# Patient Record
Sex: FEMALE | Race: WHITE | NOT HISPANIC OR LATINO | URBAN - METROPOLITAN AREA
[De-identification: names, ages, dates, MRNs, and addresses within clinical notes are randomized per-mention and may not be internally consistent; named-entity substitution may affect disease eponyms.]

---

## 2017-09-17 ENCOUNTER — INPATIENT (INPATIENT)
Facility: HOSPITAL | Age: 77
LOS: 2 days | Discharge: HOME | End: 2017-09-20
Attending: INTERNAL MEDICINE | Admitting: INTERNAL MEDICINE

## 2017-09-17 DIAGNOSIS — J15.6 PNEUMONIA DUE TO OTHER GRAM-NEGATIVE BACTERIA: ICD-10-CM

## 2017-09-17 DIAGNOSIS — R55 SYNCOPE AND COLLAPSE: ICD-10-CM

## 2017-09-17 DIAGNOSIS — N39.0 URINARY TRACT INFECTION, SITE NOT SPECIFIED: ICD-10-CM

## 2017-09-22 DIAGNOSIS — Z79.82 LONG TERM (CURRENT) USE OF ASPIRIN: ICD-10-CM

## 2017-09-22 DIAGNOSIS — L40.9 PSORIASIS, UNSPECIFIED: ICD-10-CM

## 2017-09-22 DIAGNOSIS — A41.9 SEPSIS, UNSPECIFIED ORGANISM: ICD-10-CM

## 2017-09-22 DIAGNOSIS — E11.9 TYPE 2 DIABETES MELLITUS WITHOUT COMPLICATIONS: ICD-10-CM

## 2017-09-22 DIAGNOSIS — F32.9 MAJOR DEPRESSIVE DISORDER, SINGLE EPISODE, UNSPECIFIED: ICD-10-CM

## 2017-09-22 DIAGNOSIS — R26.89 OTHER ABNORMALITIES OF GAIT AND MOBILITY: ICD-10-CM

## 2017-09-22 DIAGNOSIS — D50.9 IRON DEFICIENCY ANEMIA, UNSPECIFIED: ICD-10-CM

## 2017-09-22 DIAGNOSIS — Z79.4 LONG TERM (CURRENT) USE OF INSULIN: ICD-10-CM

## 2017-09-22 DIAGNOSIS — J18.9 PNEUMONIA, UNSPECIFIED ORGANISM: ICD-10-CM

## 2017-09-22 DIAGNOSIS — Z87.891 PERSONAL HISTORY OF NICOTINE DEPENDENCE: ICD-10-CM

## 2017-09-22 DIAGNOSIS — I10 ESSENTIAL (PRIMARY) HYPERTENSION: ICD-10-CM

## 2017-09-22 DIAGNOSIS — E78.5 HYPERLIPIDEMIA, UNSPECIFIED: ICD-10-CM

## 2017-09-22 DIAGNOSIS — N39.0 URINARY TRACT INFECTION, SITE NOT SPECIFIED: ICD-10-CM

## 2017-09-22 DIAGNOSIS — R55 SYNCOPE AND COLLAPSE: ICD-10-CM

## 2017-09-22 DIAGNOSIS — E86.0 DEHYDRATION: ICD-10-CM

## 2017-09-22 DIAGNOSIS — M19.90 UNSPECIFIED OSTEOARTHRITIS, UNSPECIFIED SITE: ICD-10-CM

## 2017-09-22 DIAGNOSIS — R53.1 WEAKNESS: ICD-10-CM

## 2017-09-22 DIAGNOSIS — M06.9 RHEUMATOID ARTHRITIS, UNSPECIFIED: ICD-10-CM

## 2017-10-03 DIAGNOSIS — E87.2 ACIDOSIS: ICD-10-CM

## 2017-10-03 DIAGNOSIS — J15.6 PNEUMONIA DUE TO OTHER GRAM-NEGATIVE BACTERIA: ICD-10-CM

## 2017-11-24 ENCOUNTER — EMERGENCY (EMERGENCY)
Facility: HOSPITAL | Age: 77
LOS: 0 days | Discharge: HOME | End: 2017-11-24

## 2017-11-24 DIAGNOSIS — J02.9 ACUTE PHARYNGITIS, UNSPECIFIED: ICD-10-CM

## 2017-11-24 DIAGNOSIS — R07.0 PAIN IN THROAT: ICD-10-CM

## 2017-11-24 DIAGNOSIS — Z79.899 OTHER LONG TERM (CURRENT) DRUG THERAPY: ICD-10-CM

## 2017-11-24 DIAGNOSIS — K08.89 OTHER SPECIFIED DISORDERS OF TEETH AND SUPPORTING STRUCTURES: ICD-10-CM

## 2017-11-24 DIAGNOSIS — Z79.4 LONG TERM (CURRENT) USE OF INSULIN: ICD-10-CM

## 2017-11-24 DIAGNOSIS — N39.0 URINARY TRACT INFECTION, SITE NOT SPECIFIED: ICD-10-CM

## 2017-11-24 DIAGNOSIS — Z79.82 LONG TERM (CURRENT) USE OF ASPIRIN: ICD-10-CM

## 2017-11-24 DIAGNOSIS — R35.0 FREQUENCY OF MICTURITION: ICD-10-CM

## 2017-11-24 DIAGNOSIS — I10 ESSENTIAL (PRIMARY) HYPERTENSION: ICD-10-CM

## 2017-11-24 DIAGNOSIS — E11.9 TYPE 2 DIABETES MELLITUS WITHOUT COMPLICATIONS: ICD-10-CM

## 2017-11-24 DIAGNOSIS — R55 SYNCOPE AND COLLAPSE: ICD-10-CM

## 2017-12-01 ENCOUNTER — EMERGENCY (EMERGENCY)
Facility: HOSPITAL | Age: 77
LOS: 0 days | Discharge: HOME | End: 2017-12-01
Admitting: INTERNAL MEDICINE

## 2017-12-01 DIAGNOSIS — N39.0 URINARY TRACT INFECTION, SITE NOT SPECIFIED: ICD-10-CM

## 2017-12-01 DIAGNOSIS — E86.0 DEHYDRATION: ICD-10-CM

## 2017-12-01 DIAGNOSIS — I10 ESSENTIAL (PRIMARY) HYPERTENSION: ICD-10-CM

## 2017-12-01 DIAGNOSIS — J02.9 ACUTE PHARYNGITIS, UNSPECIFIED: ICD-10-CM

## 2017-12-01 DIAGNOSIS — E11.9 TYPE 2 DIABETES MELLITUS WITHOUT COMPLICATIONS: ICD-10-CM

## 2017-12-01 DIAGNOSIS — R63.0 ANOREXIA: ICD-10-CM

## 2017-12-01 DIAGNOSIS — R55 SYNCOPE AND COLLAPSE: ICD-10-CM

## 2017-12-01 DIAGNOSIS — Z79.899 OTHER LONG TERM (CURRENT) DRUG THERAPY: ICD-10-CM

## 2017-12-01 DIAGNOSIS — K12.1 OTHER FORMS OF STOMATITIS: ICD-10-CM

## 2017-12-01 DIAGNOSIS — F32.9 MAJOR DEPRESSIVE DISORDER, SINGLE EPISODE, UNSPECIFIED: ICD-10-CM

## 2017-12-01 DIAGNOSIS — Z79.82 LONG TERM (CURRENT) USE OF ASPIRIN: ICD-10-CM

## 2017-12-01 DIAGNOSIS — R50.9 FEVER, UNSPECIFIED: ICD-10-CM

## 2017-12-01 DIAGNOSIS — K13.79 OTHER LESIONS OF ORAL MUCOSA: ICD-10-CM

## 2018-02-06 ENCOUNTER — EMERGENCY (EMERGENCY)
Facility: HOSPITAL | Age: 78
LOS: 0 days | Discharge: HOME | End: 2018-02-06
Attending: EMERGENCY MEDICINE

## 2018-02-06 VITALS
RESPIRATION RATE: 18 BRPM | DIASTOLIC BLOOD PRESSURE: 77 MMHG | SYSTOLIC BLOOD PRESSURE: 152 MMHG | OXYGEN SATURATION: 100 % | HEART RATE: 74 BPM | TEMPERATURE: 97 F

## 2018-02-06 VITALS
RESPIRATION RATE: 18 BRPM | OXYGEN SATURATION: 98 % | DIASTOLIC BLOOD PRESSURE: 83 MMHG | HEART RATE: 90 BPM | SYSTOLIC BLOOD PRESSURE: 196 MMHG

## 2018-02-06 DIAGNOSIS — I10 ESSENTIAL (PRIMARY) HYPERTENSION: ICD-10-CM

## 2018-02-06 DIAGNOSIS — Z79.4 LONG TERM (CURRENT) USE OF INSULIN: ICD-10-CM

## 2018-02-06 DIAGNOSIS — Z77.22 CONTACT WITH AND (SUSPECTED) EXPOSURE TO ENVIRONMENTAL TOBACCO SMOKE (ACUTE) (CHRONIC): ICD-10-CM

## 2018-02-06 DIAGNOSIS — R53.1 WEAKNESS: ICD-10-CM

## 2018-02-06 DIAGNOSIS — E11.649 TYPE 2 DIABETES MELLITUS WITH HYPOGLYCEMIA WITHOUT COMA: ICD-10-CM

## 2018-02-06 DIAGNOSIS — R10.814 LEFT LOWER QUADRANT ABDOMINAL TENDERNESS: ICD-10-CM

## 2018-02-06 DIAGNOSIS — Z79.82 LONG TERM (CURRENT) USE OF ASPIRIN: ICD-10-CM

## 2018-02-06 DIAGNOSIS — Z79.52 LONG TERM (CURRENT) USE OF SYSTEMIC STEROIDS: ICD-10-CM

## 2018-02-06 DIAGNOSIS — Z79.899 OTHER LONG TERM (CURRENT) DRUG THERAPY: ICD-10-CM

## 2018-02-06 LAB
ALBUMIN SERPL ELPH-MCNC: 3.5 G/DL — SIGNIFICANT CHANGE UP (ref 3–5.5)
ALP SERPL-CCNC: 60 U/L — SIGNIFICANT CHANGE UP (ref 30–115)
ALT FLD-CCNC: 23 U/L — SIGNIFICANT CHANGE UP (ref 0–41)
ANION GAP SERPL CALC-SCNC: 8 MMOL/L — SIGNIFICANT CHANGE UP (ref 7–14)
APPEARANCE UR: CLEAR — SIGNIFICANT CHANGE UP
APTT BLD: 37.6 SEC — SIGNIFICANT CHANGE UP (ref 27–39.2)
AST SERPL-CCNC: 25 U/L — SIGNIFICANT CHANGE UP (ref 0–41)
BACTERIA # UR AUTO: (no result)
BASE EXCESS BLDV CALC-SCNC: 1.4 MMOL/L — SIGNIFICANT CHANGE UP (ref -2–2)
BASOPHILS # BLD AUTO: 0.03 K/UL — SIGNIFICANT CHANGE UP (ref 0–0.2)
BASOPHILS NFR BLD AUTO: 0.4 % — SIGNIFICANT CHANGE UP (ref 0–1)
BILIRUB SERPL-MCNC: 0.4 MG/DL — SIGNIFICANT CHANGE UP (ref 0.2–1.2)
BILIRUB UR-MCNC: NEGATIVE — SIGNIFICANT CHANGE UP
BUN SERPL-MCNC: 20 MG/DL — SIGNIFICANT CHANGE UP (ref 10–20)
CA-I SERPL-SCNC: 1.24 MMOL/L — SIGNIFICANT CHANGE UP (ref 1.12–1.3)
CALCIUM SERPL-MCNC: 9.3 MG/DL — SIGNIFICANT CHANGE UP (ref 8.5–10.1)
CHLORIDE SERPL-SCNC: 105 MMOL/L — SIGNIFICANT CHANGE UP (ref 98–110)
CK MB BLD-MCNC: 5 % — HIGH (ref 0–4)
CK MB CFR SERPL CALC: 3.7 NG/ML — SIGNIFICANT CHANGE UP (ref 0.6–6.3)
CK SERPL-CCNC: 82 U/L — SIGNIFICANT CHANGE UP (ref 0–225)
CO2 SERPL-SCNC: 26 MMOL/L — SIGNIFICANT CHANGE UP (ref 17–32)
COD CRY URNS QL: NEGATIVE — SIGNIFICANT CHANGE UP
COLOR SPEC: YELLOW — SIGNIFICANT CHANGE UP
CREAT SERPL-MCNC: 0.8 MG/DL — SIGNIFICANT CHANGE UP (ref 0.7–1.5)
DIFF PNL FLD: SIGNIFICANT CHANGE UP
EOSINOPHIL # BLD AUTO: 0.04 K/UL — SIGNIFICANT CHANGE UP (ref 0–0.7)
EOSINOPHIL NFR BLD AUTO: 0.5 % — SIGNIFICANT CHANGE UP (ref 0–8)
EPI CELLS # UR: (no result) /HPF
GAS PNL BLDV: 138 MMOL/L — SIGNIFICANT CHANGE UP (ref 136–145)
GAS PNL BLDV: SIGNIFICANT CHANGE UP
GLUCOSE SERPL-MCNC: 248 MG/DL — HIGH (ref 70–110)
GLUCOSE UR QL: 100 MG/DL
GRAN CASTS # UR COMP ASSIST: NEGATIVE — SIGNIFICANT CHANGE UP
HCO3 BLDV-SCNC: 29 MMOL/L — SIGNIFICANT CHANGE UP (ref 22–29)
HCT VFR BLD CALC: 39.3 % — SIGNIFICANT CHANGE UP (ref 37–47)
HGB BLD-MCNC: 12.1 G/DL — LOW (ref 14–18)
HYALINE CASTS # UR AUTO: NEGATIVE — SIGNIFICANT CHANGE UP
IMM GRANULOCYTES NFR BLD AUTO: 1 % — HIGH (ref 0.1–0.3)
INR BLD: 1.01 RATIO — SIGNIFICANT CHANGE UP (ref 0.65–1.3)
KETONES UR-MCNC: NEGATIVE — SIGNIFICANT CHANGE UP
LACTATE BLDV-MCNC: 1.7 MMOL/L — HIGH (ref 0.5–1.6)
LACTATE SERPL-SCNC: 1.4 MMOL/L — SIGNIFICANT CHANGE UP (ref 0.5–2.2)
LEUKOCYTE ESTERASE UR-ACNC: (no result)
LIDOCAIN IGE QN: 93 U/L — HIGH (ref 7–60)
LYMPHOCYTES # BLD AUTO: 1.01 K/UL — LOW (ref 1.2–3.4)
LYMPHOCYTES # BLD AUTO: 12 % — LOW (ref 20.5–51.1)
MCHC RBC-ENTMCNC: 23.9 PG — LOW (ref 27–31)
MCHC RBC-ENTMCNC: 30.8 G/DL — LOW (ref 32–37)
MCV RBC AUTO: 77.5 FL — LOW (ref 81–91)
MONOCYTES # BLD AUTO: 0.68 K/UL — HIGH (ref 0.1–0.6)
MONOCYTES NFR BLD AUTO: 8.1 % — SIGNIFICANT CHANGE UP (ref 1.7–9.3)
NEUTROPHILS # BLD AUTO: 6.58 K/UL — HIGH (ref 1.4–6.5)
NEUTROPHILS NFR BLD AUTO: 78 % — HIGH (ref 42.2–75.2)
NITRITE UR-MCNC: NEGATIVE — SIGNIFICANT CHANGE UP
NRBC # BLD: 0 /100 WBCS — SIGNIFICANT CHANGE UP (ref 0–0)
PCO2 BLDV: 57 MMHG — HIGH (ref 41–51)
PH BLDV: 7.31 — SIGNIFICANT CHANGE UP (ref 7.26–7.43)
PH UR: 6.5 — SIGNIFICANT CHANGE UP (ref 5–8)
PLATELET # BLD AUTO: 208 K/UL — SIGNIFICANT CHANGE UP (ref 130–400)
PO2 BLDV: 38 MMHG — SIGNIFICANT CHANGE UP (ref 20–40)
POTASSIUM BLDV-SCNC: 4.2 MMOL/L — SIGNIFICANT CHANGE UP (ref 3.3–5.6)
POTASSIUM SERPL-MCNC: 4 MMOL/L — SIGNIFICANT CHANGE UP (ref 3.5–5)
POTASSIUM SERPL-SCNC: 4 MMOL/L — SIGNIFICANT CHANGE UP (ref 3.5–5)
PROT SERPL-MCNC: 6.5 G/DL — SIGNIFICANT CHANGE UP (ref 6–8)
PROT UR-MCNC: 100
PROTHROM AB SERPL-ACNC: 10.9 SEC — SIGNIFICANT CHANGE UP (ref 9.95–12.87)
RBC # BLD: 5.07 M/UL — SIGNIFICANT CHANGE UP (ref 4.2–5.4)
RBC # FLD: 14.6 % — HIGH (ref 11.5–14.5)
RBC CASTS # UR COMP ASSIST: (no result) /HPF
SAO2 % BLDV: 65 % — SIGNIFICANT CHANGE UP
SODIUM SERPL-SCNC: 139 MMOL/L — SIGNIFICANT CHANGE UP (ref 135–146)
SP GR SPEC: 1.01 — SIGNIFICANT CHANGE UP (ref 1.01–1.03)
TRI-PHOS CRY UR QL COMP ASSIST: NEGATIVE — SIGNIFICANT CHANGE UP
TROPONIN I SERPL-MCNC: <0.02 NG/ML — SIGNIFICANT CHANGE UP (ref 0–0.05)
URATE CRY FLD QL MICRO: NEGATIVE — SIGNIFICANT CHANGE UP
UROBILINOGEN FLD QL: 0.2 — SIGNIFICANT CHANGE UP (ref 0.2–0.2)
WBC # BLD: 8.42 K/UL — SIGNIFICANT CHANGE UP (ref 4.8–10.8)
WBC # FLD AUTO: 8.42 K/UL — SIGNIFICANT CHANGE UP (ref 4.8–10.8)
WBC UR QL: (no result) /HPF

## 2018-02-06 RX ORDER — SODIUM CHLORIDE 9 MG/ML
1000 INJECTION INTRAMUSCULAR; INTRAVENOUS; SUBCUTANEOUS ONCE
Qty: 0 | Refills: 0 | Status: COMPLETED | OUTPATIENT
Start: 2018-02-06 | End: 2018-02-06

## 2018-02-06 RX ORDER — CEFPODOXIME PROXETIL 100 MG
1 TABLET ORAL
Qty: 14 | Refills: 0 | OUTPATIENT
Start: 2018-02-06 | End: 2018-02-12

## 2018-02-06 RX ORDER — SODIUM CHLORIDE 9 MG/ML
3 INJECTION INTRAMUSCULAR; INTRAVENOUS; SUBCUTANEOUS ONCE
Qty: 0 | Refills: 0 | Status: COMPLETED | OUTPATIENT
Start: 2018-02-06 | End: 2018-02-06

## 2018-02-06 RX ORDER — CEFTRIAXONE 500 MG/1
1 INJECTION, POWDER, FOR SOLUTION INTRAMUSCULAR; INTRAVENOUS ONCE
Qty: 0 | Refills: 0 | Status: COMPLETED | OUTPATIENT
Start: 2018-02-06 | End: 2018-02-06

## 2018-02-06 RX ADMIN — SODIUM CHLORIDE 1000 MILLILITER(S): 9 INJECTION INTRAMUSCULAR; INTRAVENOUS; SUBCUTANEOUS at 04:23

## 2018-02-06 RX ADMIN — CEFTRIAXONE 100 GRAM(S): 500 INJECTION, POWDER, FOR SOLUTION INTRAMUSCULAR; INTRAVENOUS at 11:48

## 2018-02-06 RX ADMIN — SODIUM CHLORIDE 3 MILLILITER(S): 9 INJECTION INTRAMUSCULAR; INTRAVENOUS; SUBCUTANEOUS at 04:00

## 2018-02-06 NOTE — ED PROVIDER NOTE - DIAGNOSIS COUNSELING, MDM
conducted a detailed discussion... I had a detailed discussion with the patient and/or guardian regarding the historical points, exam findings, and any diagnostic results supporting the discharge/admit diagnosis. Family requested to go home with PO abx for UTI

## 2018-02-06 NOTE — ED PROVIDER NOTE - NS ED ROS FT
Review of Systems    Constitutional: (-) fever + weakness   Eyes/ENT: (-) blurry vision, (-) epistaxis  Cardiovascular: (-) chest pain, (-) syncope  Respiratory: (-) cough, (-) shortness of breath  Gastrointestinal: (-) vomiting, (-) diarrhea  Musculoskeletal: (-) neck pain, (-) back pain, (-) joint pain  Integumentary: (-) rash, (-) edema  Neurological: (-) headache, (-) altered mental status  Psychiatric: (-) hallucinations  Allergic/Immunologic: (-) pruritus

## 2018-02-06 NOTE — ED ADULT NURSE NOTE - OBJECTIVE STATEMENT
Patient arrived to the ED with daughter, [place] speaking for  hypoglycemia and chills.  Pt presents tot he ED via EMS, fs 37.  Yogurt and juice given, fs rechecked 107.  Pt placed on cardiac monitor, IV line 20G obtained.  Bloods drawn and sent.  CT scan obtained as well.  Meds given as ordered by MD.  NO other orders pending at this time, Will continue to monitor and reassess.

## 2018-02-06 NOTE — ED PROVIDER NOTE - PHYSICAL EXAMINATION
Physical Exam    Vital Signs: I have reviewed the initial vital signs.  Constitutional: elderly and frail, appears stated age, no acute distress  Cardiovascular: regular rate, regular rhythm, well-perfused extremities  Respiratory: unlabored respiratory effort, clear to auscultation bilaterally  Gastrointestinal: soft, non-tender abdomen, no pulsatile mass  Musculoskeletal: supple neck, no lower extremity edema  Integumentary: warm, dry, no rash  Neurologic: awake, alert, cranial nerves II-XII grossly intact, extremities’ motor and sensory functions grossly intact  Psychiatric: appropriate mood, appropriate affect

## 2018-02-06 NOTE — ED PROVIDER NOTE - OBJECTIVE STATEMENT
77 year old with PMH of HTN, DM and RA recently d/c methotraxte due to rash and started on prednisone. Pt on glyburide and Lantus comes in for 2 hypoglycemic episodes. Pt had called family and stated she was weak and sweaty - FSBS 36 given yoghurt on EMS arrival 180 and feeling better but still weak. Denies fever, chills, n/v/d, HA, dizziness, CP.

## 2018-02-06 NOTE — ED PROVIDER NOTE - ATTENDING CONTRIBUTION TO CARE
77yF pmhx DM HTN Chol RA, (on MTX - currently dc'd), pemphigus -  started on prednisone recently - since then has been having incontrolled FS at home -   Pt was on Glyburide am and pm,  lantus at night, Humalog recently added to am regiment.    Now has been having 2 days of hypoglycemic episdoes  around 2am.   Pt lives with   -  Patient called her daughter  to say   that sh felt hot sweaty and generally weak -  FS was 36 - pt ate yogurt-  on EMS arrival  FS was 180 - pt still admits to feeling generally weak,. Denies any chest pain sob,  abdominal pain  + urinary hesitancy, no fall . PE alert nontoxic  cvs rrr resp cta  head atraumatic perrl eomi  mm Dry  abd soft mild ttp LLQ,   neuro aox3 [id number]  5/5stregnth all extremities   skin  psoriatic rash to LE.  Plan labs keg cardiac   urine CXR ,  reeval 77yF pmhx DM HTN Chol RA, (on MTX - currently dc'd), pemphigus -  started on prednisone recently - since then has been having uncontrolled FS at home -   Pt was on Glyburide am and pm,  lantus at night, Humalog recently added to am regiment.    Now has been having 2 days of hypoglycemic episodes  around 2am.   Pt lives with   -  Patient called her daughter  to say   that sh felt hot sweaty and generally weak -  FS was 36 - pt ate yogurt-  on EMS arrival  FS was 180 - pt still admits to feeling generally weak,. Denies any chest pain sob,  abdominal pain  + urinary hesitancy, no fall . PE alert nontoxic  cvs rrr resp cta  head atraumatic perrl eomi  mm Dry  abd soft mild ttp LLQ,   neuro aox3 [id number]  5/5stregnth all extremities   skin  psoriatic rash to LE.  Plan labs keg cardiac   urine CXR ,  reeval

## 2018-02-07 LAB
CULTURE RESULTS: SIGNIFICANT CHANGE UP
SPECIMEN SOURCE: SIGNIFICANT CHANGE UP

## 2018-02-11 LAB
CULTURE RESULTS: SIGNIFICANT CHANGE UP
CULTURE RESULTS: SIGNIFICANT CHANGE UP
SPECIMEN SOURCE: SIGNIFICANT CHANGE UP
SPECIMEN SOURCE: SIGNIFICANT CHANGE UP

## 2018-04-17 ENCOUNTER — OUTPATIENT (OUTPATIENT)
Dept: OUTPATIENT SERVICES | Facility: HOSPITAL | Age: 78
LOS: 1 days | Discharge: HOME | End: 2018-04-17

## 2018-04-18 DIAGNOSIS — Z78.0 ASYMPTOMATIC MENOPAUSAL STATE: ICD-10-CM

## 2018-04-18 DIAGNOSIS — Z79.52 LONG TERM (CURRENT) USE OF SYSTEMIC STEROIDS: ICD-10-CM

## 2018-04-18 DIAGNOSIS — M89.9 DISORDER OF BONE, UNSPECIFIED: ICD-10-CM

## 2018-04-18 DIAGNOSIS — M06.9 RHEUMATOID ARTHRITIS, UNSPECIFIED: ICD-10-CM

## 2018-04-18 DIAGNOSIS — Z13.820 ENCOUNTER FOR SCREENING FOR OSTEOPOROSIS: ICD-10-CM

## 2018-05-01 ENCOUNTER — INPATIENT (INPATIENT)
Facility: HOSPITAL | Age: 78
LOS: 1 days | Discharge: HOME | End: 2018-05-03
Attending: INTERNAL MEDICINE | Admitting: INTERNAL MEDICINE
Payer: MEDICARE

## 2018-05-01 VITALS
TEMPERATURE: 98 F | DIASTOLIC BLOOD PRESSURE: 77 MMHG | HEIGHT: 64 IN | SYSTOLIC BLOOD PRESSURE: 142 MMHG | RESPIRATION RATE: 20 BRPM | OXYGEN SATURATION: 96 % | WEIGHT: 139.99 LBS | HEART RATE: 101 BPM

## 2018-05-01 LAB
ALBUMIN SERPL ELPH-MCNC: 3.8 G/DL — SIGNIFICANT CHANGE UP (ref 3.5–5.2)
ALP SERPL-CCNC: 92 U/L — SIGNIFICANT CHANGE UP (ref 30–115)
ALT FLD-CCNC: 13 U/L — SIGNIFICANT CHANGE UP (ref 0–41)
ANION GAP SERPL CALC-SCNC: 13 MMOL/L — SIGNIFICANT CHANGE UP (ref 7–14)
AST SERPL-CCNC: 18 U/L — SIGNIFICANT CHANGE UP (ref 0–41)
BASOPHILS # BLD AUTO: 0.01 K/UL — SIGNIFICANT CHANGE UP (ref 0–0.2)
BASOPHILS NFR BLD AUTO: 0.1 % — SIGNIFICANT CHANGE UP (ref 0–1)
BILIRUB SERPL-MCNC: 0.6 MG/DL — SIGNIFICANT CHANGE UP (ref 0.2–1.2)
BUN SERPL-MCNC: 14 MG/DL — SIGNIFICANT CHANGE UP (ref 10–20)
CALCIUM SERPL-MCNC: 9.3 MG/DL — SIGNIFICANT CHANGE UP (ref 8.5–10.1)
CHLORIDE SERPL-SCNC: 100 MMOL/L — SIGNIFICANT CHANGE UP (ref 98–110)
CO2 SERPL-SCNC: 27 MMOL/L — SIGNIFICANT CHANGE UP (ref 17–32)
CREAT SERPL-MCNC: 0.7 MG/DL — SIGNIFICANT CHANGE UP (ref 0.7–1.5)
EOSINOPHIL # BLD AUTO: 0.02 K/UL — SIGNIFICANT CHANGE UP (ref 0–0.7)
EOSINOPHIL NFR BLD AUTO: 0.2 % — SIGNIFICANT CHANGE UP (ref 0–8)
GLUCOSE SERPL-MCNC: 102 MG/DL — HIGH (ref 70–99)
HCT VFR BLD CALC: 41.9 % — SIGNIFICANT CHANGE UP (ref 37–47)
HGB BLD-MCNC: 13.3 G/DL — SIGNIFICANT CHANGE UP (ref 12–16)
IMM GRANULOCYTES NFR BLD AUTO: 0.4 % — HIGH (ref 0.1–0.3)
LACTATE BLDV-MCNC: 1.3 MMOL/L — SIGNIFICANT CHANGE UP (ref 0.5–1.6)
LYMPHOCYTES # BLD AUTO: 1.54 K/UL — SIGNIFICANT CHANGE UP (ref 1.2–3.4)
LYMPHOCYTES # BLD AUTO: 14.6 % — LOW (ref 20.5–51.1)
MCHC RBC-ENTMCNC: 23.6 PG — LOW (ref 27–31)
MCHC RBC-ENTMCNC: 31.7 G/DL — LOW (ref 32–37)
MCV RBC AUTO: 74.4 FL — LOW (ref 81–99)
MONOCYTES # BLD AUTO: 0.78 K/UL — HIGH (ref 0.1–0.6)
MONOCYTES NFR BLD AUTO: 7.4 % — SIGNIFICANT CHANGE UP (ref 1.7–9.3)
NEUTROPHILS # BLD AUTO: 8.17 K/UL — HIGH (ref 1.4–6.5)
NEUTROPHILS NFR BLD AUTO: 77.3 % — HIGH (ref 42.2–75.2)
PLATELET # BLD AUTO: 246 K/UL — SIGNIFICANT CHANGE UP (ref 130–400)
POTASSIUM SERPL-MCNC: 4.1 MMOL/L — SIGNIFICANT CHANGE UP (ref 3.5–5)
POTASSIUM SERPL-SCNC: 4.1 MMOL/L — SIGNIFICANT CHANGE UP (ref 3.5–5)
PROT SERPL-MCNC: 6.7 G/DL — SIGNIFICANT CHANGE UP (ref 6–8)
RBC # BLD: 5.63 M/UL — HIGH (ref 4.2–5.4)
RBC # FLD: 15.4 % — HIGH (ref 11.5–14.5)
SODIUM SERPL-SCNC: 140 MMOL/L — SIGNIFICANT CHANGE UP (ref 135–146)
WBC # BLD: 10.56 K/UL — SIGNIFICANT CHANGE UP (ref 4.8–10.8)
WBC # FLD AUTO: 10.56 K/UL — SIGNIFICANT CHANGE UP (ref 4.8–10.8)

## 2018-05-01 RX ORDER — PREDNISOLONE 5 MG
0 TABLET ORAL
Qty: 0 | Refills: 0 | COMMUNITY

## 2018-05-01 RX ORDER — BUPROPION HYDROCHLORIDE 150 MG/1
300 TABLET, EXTENDED RELEASE ORAL DAILY
Qty: 0 | Refills: 0 | Status: DISCONTINUED | OUTPATIENT
Start: 2018-05-01 | End: 2018-05-03

## 2018-05-01 RX ORDER — AMPICILLIN SODIUM AND SULBACTAM SODIUM 250; 125 MG/ML; MG/ML
3 INJECTION, POWDER, FOR SUSPENSION INTRAMUSCULAR; INTRAVENOUS ONCE
Qty: 0 | Refills: 0 | Status: COMPLETED | OUTPATIENT
Start: 2018-05-01 | End: 2018-05-01

## 2018-05-01 RX ORDER — ATORVASTATIN CALCIUM 80 MG/1
10 TABLET, FILM COATED ORAL AT BEDTIME
Qty: 0 | Refills: 0 | Status: DISCONTINUED | OUTPATIENT
Start: 2018-05-01 | End: 2018-05-03

## 2018-05-01 RX ORDER — LISINOPRIL 2.5 MG/1
40 TABLET ORAL DAILY
Qty: 0 | Refills: 0 | Status: DISCONTINUED | OUTPATIENT
Start: 2018-05-01 | End: 2018-05-03

## 2018-05-01 RX ORDER — AMLODIPINE BESYLATE 2.5 MG/1
5 TABLET ORAL DAILY
Qty: 0 | Refills: 0 | Status: DISCONTINUED | OUTPATIENT
Start: 2018-05-01 | End: 2018-05-03

## 2018-05-01 RX ORDER — METHOTREXATE 2.5 MG/1
2.5 TABLET ORAL DAILY
Qty: 0 | Refills: 0 | Status: DISCONTINUED | OUTPATIENT
Start: 2018-05-02 | End: 2018-05-02

## 2018-05-01 RX ORDER — ENOXAPARIN SODIUM 100 MG/ML
0 INJECTION SUBCUTANEOUS
Qty: 0 | Refills: 0 | COMMUNITY

## 2018-05-01 RX ORDER — SITAGLIPTIN AND METFORMIN HYDROCHLORIDE 500; 50 MG/1; MG/1
1 TABLET, FILM COATED ORAL
Qty: 0 | Refills: 0 | COMMUNITY

## 2018-05-01 RX ORDER — FOLIC ACID 0.8 MG
2 TABLET ORAL DAILY
Qty: 0 | Refills: 0 | Status: DISCONTINUED | OUTPATIENT
Start: 2018-05-01 | End: 2018-05-03

## 2018-05-01 RX ORDER — SODIUM CHLORIDE 9 MG/ML
1000 INJECTION INTRAMUSCULAR; INTRAVENOUS; SUBCUTANEOUS
Qty: 0 | Refills: 0 | Status: COMPLETED | OUTPATIENT
Start: 2018-05-01 | End: 2018-05-02

## 2018-05-01 RX ORDER — ASPIRIN/CALCIUM CARB/MAGNESIUM 324 MG
81 TABLET ORAL DAILY
Qty: 0 | Refills: 0 | Status: DISCONTINUED | OUTPATIENT
Start: 2018-05-01 | End: 2018-05-03

## 2018-05-01 RX ORDER — AMPICILLIN SODIUM AND SULBACTAM SODIUM 250; 125 MG/ML; MG/ML
3 INJECTION, POWDER, FOR SUSPENSION INTRAMUSCULAR; INTRAVENOUS EVERY 6 HOURS
Qty: 0 | Refills: 0 | Status: DISCONTINUED | OUTPATIENT
Start: 2018-05-01 | End: 2018-05-03

## 2018-05-01 RX ADMIN — AMPICILLIN SODIUM AND SULBACTAM SODIUM 200 GRAM(S): 250; 125 INJECTION, POWDER, FOR SUSPENSION INTRAMUSCULAR; INTRAVENOUS at 13:16

## 2018-05-01 NOTE — ED ADULT TRIAGE NOTE - CHIEF COMPLAINT QUOTE
Daughter states "she has swollen glands we went to urgent care the other day they said to take motrin, its not helping she does not eat or drink".

## 2018-05-01 NOTE — ED PROVIDER NOTE - NS ED ROS FT
Constitutional: (+) fevers/chills.    Head: No injury, headache.    Eyes:  No visual changes, eye pain or discharge. No injury.    ENMT:  No hearing changes, pain, discharge or infections. (+) neck pain (-) stiffness. No loss ROM.    Cardiac:  No chest pain, SOB or edema. No chest pain with exertion.    Respiratory:  No congestion, cough or respiratory distress.     GI:  No nausea, vomiting, diarrhea or abdominal pain. No anorexia. No change in PO intake.    :  No dysuria, frequency, urgency or burning. No change in urine output.    MS:  No myalgia, muscle weakness, joint pain or back pain. No loss ROM.    Neuro:  No dizziness or weakness.  No LOC.    Skin:  No skin rash.     Endocrine: No history of thyroid disease or diabetes.

## 2018-05-01 NOTE — ED ADULT NURSE REASSESSMENT NOTE - NS ED NURSE REASSESS COMMENT FT1
Pt to be transferred to [facility] for ENT consult.  Report to PILY Bello.  Pt. awaiting transport.

## 2018-05-01 NOTE — ED PROVIDER NOTE - PROGRESS NOTE DETAILS
Case discussed with ENT who agrees with plan.  Will see patient upon arrival at [facility].  Spoke to Dr. Benton who is aware of transfer. reviewed note. ENT MERARI Chau has seen patient and expressed pus from site. Sending wound culture. No surgery. Recommends medicine admission for IV ABX spoke to VENKATA Long, and hospitalist Dr CINDY Leone for admission for IV abx and reassessments by ENT

## 2018-05-01 NOTE — H&P ADULT - NSHPLABSRESULTS_GEN_ALL_CORE
ct neck1.  Right submandibular gland inflammation due to an obstructive calculus   measuring 1 cm at the level of the gland hilum/proximal duct.   Intraglandular ductal dilatation as well as an additional 2.5 mm calculus   within the gland are noted. Surrounding inflammatory changes without   abscess.    2.  Thickened/edematous appearance of the epiglottis and bilateral   aryepiglottic folds, recommend follow-up with directvisualization.

## 2018-05-01 NOTE — CONSULT NOTE ADULT - SUBJECTIVE AND OBJECTIVE BOX
HPI: Patient is a 78y old  Female with PMH of HTN, DM type 2, RA on Methotrexate and Prednisone, Psoriasis  presents To ED with a chief complaint of right neck pain 10/10,  swelling, + odynophagia, + dysphagia  for solids.  Pt's daughter at bedside, translates. Pt. states that Right neck swelling and tenderness started 10 days ago , on Sunday it got progressively worse. Pt. went to Urgent care center Motrin was prescribed, Pt. was sent home. Right sided swelling got worse with Pt. went to ED [facility] site CT neck soft tissues showed right submandibular gland inflammation due to obstructive calculus measuring 1 cm at the level of the gland hilum/proximal duct. ENT [facility] were called and are asked to evaluate the patient for CT neck findings.  Pt. states she is able to swallow liquids, but has + odynophagia, + dysphagia for solid food. Pt.b states she received  IV Abx (Unasyn) at the [facility] site. Pt. denies fever, chills, SOB, difficulty breathing, CP, hemoptysis.      PAST MEDICAL & SURGICAL HISTORY:  HTN (hypertension)  Diabetes  RA  Psoriasis    Allergies: No Known Allergies    Home medications:   Methotrexate, Folic Acid, Prednisone, Viibryo, buproprion, ASA , Vit. D, Vit. B complex, Fish oil.        ROS: GI, , CV, Pulm, Neuro, Psych, MS, Heme, Endo, Constitutional all negative except as noted in HPI    Vital Signs Last 24 Hrs  T(C): 36.9 (01 May 2018 18:57), Max: 36.9 (01 May 2018 12:10)  T(F): 98.5 (01 May 2018 18:57), Max: 98.5 (01 May 2018 18:57)  HR: 88 (01 May 2018 18:57) (80 - 101)  BP: 170/70 (01 May 2018 18:57) (137/79 - 180/90)  RR: 18 (01 May 2018 18:57) (18 - 20)  SpO2: 100% (01 May 2018 18:57) (96% - 100%)                          13.3   10.56 )-----------( 246      ( 01 May 2018 13:20 )             41.9    05-01    140  |  100  |  14  ----------------------------<  102<H>  4.1   |  27  |  0.7    Ca    9.3      01 May 2018 13:20    TPro  6.7  /  Alb  3.8  /  TBili  0.6  /  DBili  x   /  AST  18  /  ALT  13  /  AlkPhos  92  05-01       PHYSICAL EXAM:  Gen: NAD, well-developed  Head: Normocephalic, Atraumatic  Eyes: PERRL, EOMI  Ears: Right - ear canal clear, TM intact without effusion. No oralia/post auricular tenderness to palaption            Left - ear canal clear, TM intact without effusion. No oralia/post auricular tenderness to palaption    Nose: Nares bilaterally patent, no discharge  Mouth: Mucosa moist, + erythema, + mild edema, Uvula midline. + FOM tenderness over inferior surface of the tongue, frenulum of the tongue with + drainage of purulent fluid from submandibular gland.   Neck: + right submandibular swelling approximately 4x 4 cm,  with + tenderness to palpation, trachea midline.   Resp: breathing easily, no stridor, comprehensive speech.   CV: no peripheral edema/cyanosis      < from: CT Neck Soft Tissue w/ IV Cont (05.01.18 @ 15:07) >  EXAM:  CT NECK SOFT TISSUE IC            PROCEDURE DATE:  05/01/2018            INTERPRETATION:  Clinical History / Reason for exam: Rule out abscess.    Technique: Contrast-enhanced CT of the neck. Contiguous CT axial images   of the neck were obtained following the intravenous administration of 95   cc of Optiray (5 cc discarded) with coronal and sagittal reformats.    Comparison: None available    Findings:    There is enlargement and edema of the right submandibular gland with   intraglandular ductal dilatation. There is an obstructing calculus at the   level of the submandibular gland hilum/proximal duct measuring 1.0 x 0.9   x 0.7 cm (transverse, AP, CC). Additional calculus noted within the right   submandibular gland measuring 2.5 x2 x 2 mm. There are surrounding   inflammatory changes with subcutaneous fat stranding, thickening of the   platysma muscle and multiple presumably reactive lymph nodes.    The left submandibular gland is unremarkable. The bilateral parotid   glands are unremarkable.    The visualized brain parenchyma is unremarkable. The globes and orbits   are unremarkable.    The paranasal sinuses and mastoids are well-aerated.    There is thickening of the aryepiglottic folds greater on the right side   as well as the epiglottis. The visualized upper aerodigestive structures   are otherwise unremarkable.    Vascular structures demonstrate atheromatous changes.    There are multilevel cervical spine degenerative changes with disc   osteophyte complexes indenting the ventral thecal sac at C3-4, C4-5, C5-6   and C6-7.      IMPRESSION:    1.  Right submandibular gland inflammation due to an obstructive calculus   measuring 1 cm at the level of the gland hilum/proximal duct.   Intraglandular ductal dilatation as well as an additional 2.5 mm calculus   within the gland are noted. Surrounding inflammatory changes without   abscess.    2.  Thickened/edematous appearance of the epiglottis and bilateral   aryepiglottic folds, recommend follow-up with directvisualization.    < end of copied text >

## 2018-05-01 NOTE — CONSULT NOTE ADULT - ASSESSMENT
78 y.o F with right submandibular swelling x 10 days, getting progressively worse, + odynophagia, + dysphagia with CT neck soft tissues finding of right submandibular gland inflammation due to an obstructive calculus measuring 1 cm at the level of the gland hilum/proximal duct. goodman=ith intraglandular ductal dilation and additional 2.5 mm calculus within gland. Findings suggestive of Sialadenitis Sialolithiasis.         Plan   Admit to medicine   Monitor VS  Submandibular gland Cx were sent  Cont. IV Unasyn  Cont. NSAID  Start IVF   Warm compress  Encourage citrus fruit intake (Lemon, drinking water with lemon)  ENT will F/U

## 2018-05-01 NOTE — H&P ADULT - ATTENDING COMMENTS
Patient is a 78y old  Female with PMH of HTN, DM type 2, RA on Methotrexate and Prednisone, Psoriasis  presents To ED with a chief complaint of right neck pain 10/10,  swelling, + odynophagia, + dysphagia  for solids. used Belarusian interpretor and called her daughter as well to obtain history.  Pt. states that Right neck swelling and tenderness started 10 days ago , on Sunday it got progressively worse. Pt. went to Urgent care center Motrin was prescribed, Pt. was sent home. Right sided swelling got worse with Pt. went to ED [facility] site CT neck soft tissues showed right submandibular gland inflammation due to obstructive calculus measuring 1 cm at the level of the gland hilum/proximal duct. ENT [facility] were called and are asked to evaluate the patient for CT neck findings.  Pt. states she is able to swallow liquids, but has + odynophagia, + dysphagia for solid food. Pt. states she received  IV Abx (Unasyn) at the [facility] site. Pt. denies fever, chills, SOB, difficulty breathing, CP, hemoptysis. was transferred to [facility]    REVIEW OF SYSTEMS: See HPI  CONSTITUTIONAL: No weakness, fevers or chills  EYES/ENT: R sided facial and mandibular swelling and tender to palp, foul smelling odor. No visual changes;  No vertigo or throat pain   NECK: No pain or stiffness. (+) submandibular and ant cervical swelling on the R  RESPIRATORY: No cough, wheezing, hemoptysis; No shortness of breath  CARDIOVASCULAR: No chest pain or palpitations  GASTROINTESTINAL: No abdominal or epigastric pain. No nausea, vomiting, or hematemesis; No diarrhea or constipation. No melena or hematochezia.  GENITOURINARY: No dysuria, frequency or hematuria  NEUROLOGICAL: No numbness or weakness  SKIN: No itching, rashes    CT Neck Soft Tissue w/ IV Cont (05.01.18 @ 15:07) >    IMPRESSION:    1.  Right submandibular gland inflammation due to an obstructive calculus   measuring 1 cm at the level of the gland hilum/proximal duct.   Intraglandular ductal dilatation as well as an additional 2.5 mm calculus   within the gland are noted. Surrounding inflammatory changes without   abscess.    2.  Thickened/edematous appearance of the epiglottis and bilateral   aryepiglottic folds, recommend follow-up with directvisualization.    MARTHA JENSEN M.D., ATTENDING RADIOLOGIST  This document has been electronically signed. May  1 2018  3:40PM            Physical Exam:  General: WN/WD NAD  Neurology: A&Ox3, nonfocal, follows commands  Eyes: PERRLA/ EOMI  ENT/Neck: Neck supple, trachea midline, No JVD  Respiratory: B/L CTA, No wheezing, rales, rhonchi  CV: Normal rate regular rhythm, S1S2, no murmurs, rubs or gallops  Abdominal: Soft, NT, ND +BS,   Extremities: No edema, + peripheral pulses  Skin: No Rashes, Hematoma, Ecchymosis  Incisions: n/a  Tubes: n/a    A/P   Submandibular inflammation / sialolithiasis  - IV Abx  -PRN NSAID and warm compresses  -IV fluid   -ENT follow up    DM - stable  - hold oral hypoglycemic   -F/S monitoring and Lantus / Novolog SS    HTN - stable   - c/w outpatient Rx    DVT

## 2018-05-01 NOTE — CONSULT NOTE ADULT - CONSULT REASON
Right submandibular gland inflammation due to obstructive caliculus measuring 1 cm at the level of the gland hilum/ proximal duct.

## 2018-05-01 NOTE — ED PROVIDER NOTE - MEDICAL DECISION MAKING DETAILS
Chart finished.  Submandibular swelling due to obstructive calculus.  But radiology also concerned for epiglottic swelling.  Patient is no distress.  Will transfer for ENT eval. Chart finished.  Submandibular swelling due to obstructive calculus.  But radiology also concerned for epiglottic swelling.  Patient is no distress.  Will transfer for ENT eval.    See progress note

## 2018-05-01 NOTE — H&P ADULT - NSHPPHYSICALEXAM_GEN_ALL_CORE
PHYSICAL EXAM:  GENERAL: NAD, speaks in full sentences, no signs of respiratory distress  HEAD:  Atraumatic, Normocephalic  EYES: conjunctiva and sclera clear  NECK: Supple  CHEST/LUNG: Clear to auscultation bilaterally; No wheeze; No crackles; No accessory muscles used  HEART: Regular rate and rhythm; No murmurs;   ABDOMEN: Soft, Nontender, Nondistended; Bowel sounds present; No guarding  EXTREMITIES:  2+ Peripheral Pulses, No cyanosis or edema  PSYCH: AAOx3  NEUROLOGY: non-focal  SKIN: No rashes or lesions PHYSICAL EXAM:  GENERAL: NAD, speaks in full sentences, no signs of respiratory distress  HEAD:  Atraumatic, Normocephalic    oral cavity- tender swelling noted on right side of floor of mouth, erythema noted, there was some pus drainage from the selling when ENT examined the patient ans it was sent for culture to the lab.  EYES: conjunctiva and sclera clear    NECK: + right submandibular swelling approximately 4x 4 cm,  with + tenderness to palpation, trachea midline.   CHEST/LUNG: Clear to auscultation bilaterally; No wheeze; No crackles; No accessory muscles used  HEART: Regular rate and rhythm; No murmurs;   ABDOMEN: Soft, Nontender, Nondistended; Bowel sounds present; No guarding  EXTREMITIES:  2+ Peripheral Pulses, No cyanosis or edema  PSYCH: AAOx3  NEUROLOGY: non-focal  SKIN: No rashes or lesions

## 2018-05-01 NOTE — ED PROVIDER NOTE - ATTENDING CONTRIBUTION TO CARE
I have personally performed a history and physical exam on this patient and personally directed the management of the patient with resident.

## 2018-05-01 NOTE — ED PROVIDER NOTE - PHYSICAL EXAMINATION
GEN: Well appearing, in no apparent distress.    HEAD:  Normocephalic, atraumatic.    EYES:  Clear conjunctivae without injection, drainage or discharge.    ENMT:  Nasal mucosa moist; mouth moist without ulcerations or lesions; throat moist without erythema, exudate, ulcerations or lesions.    NECK:  Supple, (+) swelling to right submental area, tender to palpation approx 4cm x 3cm. Normal ROM.    CARDIAC:  RRR, normal S1 and S2, no murmurs, rubs or gallops.    RESP:  Respiratory rate and effort appear normal; lungs are clear to auscultation bilaterally; no rhonchi, rales or wheezes.    ABDOMEN:  Soft, non-tender, non-distended, no masses. Normal BS throughout.    MUSCULOSKELETAL/NEURO:  AAO x 3. Motor 5/5. Sensory intact. CN II – XII intact. Patient able to ambulate without difficulty.    SKIN:  Normal skin color for age and race, well-perfused; warm and dry.

## 2018-05-01 NOTE — H&P ADULT - HISTORY OF PRESENT ILLNESS
Patient is a 78y old  Female with PMH of HTN, DM type 2, RA on Methotrexate and Prednisone, Psoriasis  presents To ED with a chief complaint of right neck pain 10/10,  swelling, + odynophagia, + dysphagia  for solids.  Pt's daughter at bedside, translates. Pt. states that Right neck swelling and tenderness started 10 days ago , on Sunday it got progressively worse. Pt. went to Urgent care center Motrin was prescribed, Pt. was sent home. Right sided swelling got worse with Pt. went to ED [facility] site CT neck soft tissues showed right submandibular gland inflammation due to obstructive calculus measuring 1 cm at the level of the gland hilum/proximal duct. ENT [facility] were called and are asked to evaluate the patient for CT neck findings.  Pt. states she is able to swallow liquids, but has + odynophagia, + dysphagia for solid food. Pt.b states she received  IV Abx (Unasyn) at the [facility] site. Pt. denies fever, chills, SOB, difficulty breathing, CP, hemoptysis. Patient is a 78y old  Female with PMH of HTN, DM type 2, RA on Methotrexate and Prednisone, Psoriasis  presents To ED with a chief complaint of right neck pain 10/10,  swelling, + odynophagia, + dysphagia  for solids. used Mongolian interpretor and called her daughter as well to obtain history.  Pt. states that Right neck swelling and tenderness started 10 days ago , on Sunday it got progressively worse. Pt. went to Urgent care center Motrin was prescribed, Pt. was sent home. Right sided swelling got worse with Pt. went to ED [facility] CT neck soft tissues showed right submandibular gland inflammation due to obstructive calculus measuring 1 cm at the level of the gland hilum/proximal duct. ENT [facility] were called and are asked to evaluate the patient for CT neck findings.  Pt. states she is able to swallow liquids, but has + odynophagia, + dysphagia for solid food. Pt. states she received  IV Abx (Unasyn) at the [facility] site. Pt. denies fever, chills, SOB, difficulty breathing, CP, hemoptysis. was transferred to [facility] ER

## 2018-05-01 NOTE — ED PROVIDER NOTE - OBJECTIVE STATEMENT
79 yo female with PMHx HTN, DM, RA (on immunosuppresive therapy - prednisolone, methotrexate) presents for right neck swelling x 4 days. Patient admits to low grade subjective fevers and pain with swallowing. Patient went to urgent care two days ago and was told to take motrin and follow up with PMD. Patient states swelling worsened so came to ED. Patient/family denies chest pain, SOB, abdominal pain, nausea, vomiting, diarrhea, constipation, dizziness, weakness, recent travel, changes in PO intake, changes in urine output, changes in mental status.

## 2018-05-01 NOTE — H&P ADULT - ASSESSMENT
Admit to medicine   Monitor VS  Submandibular gland Cx were sent  Cont. IV Unasyn  Cont. NSAID  Start IVF   Warm compress  Encourage citrus fruit intake (Lemon, drinking water with lemon)  ENT will F/U 78 F admitted for submandibular gland swelling and calculus    1- Submandibular gland inflamation secondary to calculus  Submandibular gland Cx were sent by ENT  Cont. IV Unasyn  Cont. NSAID  Start IVF   Warm compress  Encourage citrus fruit intake (Lemon, drinking water with lemon)  ENT will F/U     2- DM2- f/u FS AC HS  patient takes glipizide 5 at home, will hold that for now  takes lantus at bedtime (2 UNITS FOR FS , 3 units for 150-200 and add 1 unit for every 50 of FS)  and humulin (26-30 units based on FS)  in am   start lantus and lispro based on FS    3- HTN-   c/w amlodpine and started lisinopril instead of benazepril    4- Depression- c/w buproprion  please follow with daughter to bring vibryd from home    5- RA- c/w MTX  and prednisone po    ambulate as tolerated  carb consistent diet    6- dvt ppx- f/u ent tomorrow, if no plan for surgery, start lovenox 40 q24 78 F admitted for submandibular gland swelling and calculus    1- Submandibular gland inflamation secondary to calculus  Submandibular gland Cx were sent by ENT  Cont. IV Unasyn  Cont. NSAID  Start IVF ns at 74 for 12 hrs as pt not eating drinking well , reassess in am,   Warm compress  Encourage citrus fruit intake (Lemon, drinking water with lemon)  ENT will F/U     2- DM2- f/u FS AC HS  patient takes glipizide 5 at home, will hold that for now  takes lantus at bedtime (2 UNITS FOR FS , 3 units for 150-200 and add 1 unit for every 50 of FS)  and humulin (26-30 units based on FS)  in am   start lantus and lispro based on FS    3- HTN-   c/w amlodpine and started lisinopril instead of benazepril    4- Depression- c/w buproprion  please follow with daughter to bring vibryd from home    5- RA- c/w MTX  and prednisone po    ambulate as tolerated  carb consistent diet    6- dvt ppx- f/u ent tomorrow, if no plan for surgery, start lovenox 40 q24 78 F admitted for submandibular gland swelling and calculus    1- Submandibular gland inflamation secondary to calculus  Submandibular gland Cx were sent by ENT  Cont. IV Unasyn  Cont. NSAID naproxen 250 q6h for 2days  Start IVF ns at 75 for 12 hrs as pt not eating drinking well , reassess in am,   Warm compress  Encourage citrus fruit intake (Lemon, drinking water with lemon)  ENT will F/U     2- DM2- f/u FS AC HS  patient takes glipizide 5 at home, will hold that for now  takes lantus at bedtime (2 UNITS FOR FS , 3 units for 150-200 and add 1 unit for every 50 of FS)  and humulin (26-30 units based on FS)  in am   start lantus and lispro based on FS    3- HTN-   c/w amlodpine and started lisinopril instead of benazepril    4- Depression- c/w buproprion  please follow with daughter to bring vibryd from home    5- RA- c/w MTX  and prednisone po    ambulate as tolerated  carb consistent diet    6- dvt ppx- f/u ent tomorrow, if no plan for surgery, start lovenox 40 q24

## 2018-05-02 DIAGNOSIS — F32.9 MAJOR DEPRESSIVE DISORDER, SINGLE EPISODE, UNSPECIFIED: ICD-10-CM

## 2018-05-02 DIAGNOSIS — I10 ESSENTIAL (PRIMARY) HYPERTENSION: ICD-10-CM

## 2018-05-02 DIAGNOSIS — K11.20 SIALOADENITIS, UNSPECIFIED: ICD-10-CM

## 2018-05-02 DIAGNOSIS — E11.9 TYPE 2 DIABETES MELLITUS WITHOUT COMPLICATIONS: ICD-10-CM

## 2018-05-02 DIAGNOSIS — M06.9 RHEUMATOID ARTHRITIS, UNSPECIFIED: ICD-10-CM

## 2018-05-02 LAB
ANION GAP SERPL CALC-SCNC: 12 MMOL/L — SIGNIFICANT CHANGE UP (ref 7–14)
BUN SERPL-MCNC: 14 MG/DL — SIGNIFICANT CHANGE UP (ref 10–20)
CALCIUM SERPL-MCNC: 8.3 MG/DL — LOW (ref 8.5–10.1)
CHLORIDE SERPL-SCNC: 100 MMOL/L — SIGNIFICANT CHANGE UP (ref 98–110)
CO2 SERPL-SCNC: 26 MMOL/L — SIGNIFICANT CHANGE UP (ref 17–32)
CREAT SERPL-MCNC: 0.8 MG/DL — SIGNIFICANT CHANGE UP (ref 0.7–1.5)
ESTIMATED AVERAGE GLUCOSE: 240 MG/DL — HIGH (ref 68–114)
GLUCOSE SERPL-MCNC: 167 MG/DL — HIGH (ref 70–99)
HBA1C BLD-MCNC: 10 % — HIGH (ref 4–5.6)
HCT VFR BLD CALC: 38.8 % — SIGNIFICANT CHANGE UP (ref 37–47)
HGB BLD-MCNC: 12 G/DL — SIGNIFICANT CHANGE UP (ref 12–16)
INR BLD: 1.2 RATIO — SIGNIFICANT CHANGE UP (ref 0.65–1.3)
MCHC RBC-ENTMCNC: 22.8 PG — LOW (ref 27–31)
MCHC RBC-ENTMCNC: 30.9 G/DL — LOW (ref 32–37)
MCV RBC AUTO: 73.8 FL — LOW (ref 81–99)
NRBC # BLD: 0 /100 WBCS — SIGNIFICANT CHANGE UP (ref 0–0)
PLATELET # BLD AUTO: 244 K/UL — SIGNIFICANT CHANGE UP (ref 130–400)
POTASSIUM SERPL-MCNC: 4.2 MMOL/L — SIGNIFICANT CHANGE UP (ref 3.5–5)
POTASSIUM SERPL-SCNC: 4.2 MMOL/L — SIGNIFICANT CHANGE UP (ref 3.5–5)
PROTHROM AB SERPL-ACNC: 13 SEC — HIGH (ref 9.95–12.87)
RBC # BLD: 5.26 M/UL — SIGNIFICANT CHANGE UP (ref 4.2–5.4)
RBC # FLD: 15.9 % — HIGH (ref 11.5–14.5)
SODIUM SERPL-SCNC: 138 MMOL/L — SIGNIFICANT CHANGE UP (ref 135–146)
WBC # BLD: 10.57 K/UL — SIGNIFICANT CHANGE UP (ref 4.8–10.8)
WBC # FLD AUTO: 10.57 K/UL — SIGNIFICANT CHANGE UP (ref 4.8–10.8)

## 2018-05-02 PROCEDURE — 99223 1ST HOSP IP/OBS HIGH 75: CPT

## 2018-05-02 RX ORDER — DEXTROSE 50 % IN WATER 50 %
12.5 SYRINGE (ML) INTRAVENOUS ONCE
Qty: 0 | Refills: 0 | Status: DISCONTINUED | OUTPATIENT
Start: 2018-05-02 | End: 2018-05-03

## 2018-05-02 RX ORDER — DEXTROSE 50 % IN WATER 50 %
25 SYRINGE (ML) INTRAVENOUS ONCE
Qty: 0 | Refills: 0 | Status: DISCONTINUED | OUTPATIENT
Start: 2018-05-02 | End: 2018-05-03

## 2018-05-02 RX ORDER — DEXTROSE 50 % IN WATER 50 %
1 SYRINGE (ML) INTRAVENOUS ONCE
Qty: 0 | Refills: 0 | Status: DISCONTINUED | OUTPATIENT
Start: 2018-05-02 | End: 2018-05-03

## 2018-05-02 RX ORDER — INSULIN LISPRO 100/ML
VIAL (ML) SUBCUTANEOUS
Qty: 0 | Refills: 0 | Status: DISCONTINUED | OUTPATIENT
Start: 2018-05-02 | End: 2018-05-03

## 2018-05-02 RX ORDER — SODIUM CHLORIDE 9 MG/ML
1000 INJECTION, SOLUTION INTRAVENOUS
Qty: 0 | Refills: 0 | Status: DISCONTINUED | OUTPATIENT
Start: 2018-05-02 | End: 2018-05-03

## 2018-05-02 RX ORDER — INSULIN GLARGINE 100 [IU]/ML
9 INJECTION, SOLUTION SUBCUTANEOUS AT BEDTIME
Qty: 0 | Refills: 0 | Status: DISCONTINUED | OUTPATIENT
Start: 2018-05-02 | End: 2018-05-03

## 2018-05-02 RX ORDER — INSULIN LISPRO 100/ML
3 VIAL (ML) SUBCUTANEOUS
Qty: 0 | Refills: 0 | Status: DISCONTINUED | OUTPATIENT
Start: 2018-05-02 | End: 2018-05-03

## 2018-05-02 RX ORDER — GLUCAGON INJECTION, SOLUTION 0.5 MG/.1ML
1 INJECTION, SOLUTION SUBCUTANEOUS ONCE
Qty: 0 | Refills: 0 | Status: DISCONTINUED | OUTPATIENT
Start: 2018-05-02 | End: 2018-05-03

## 2018-05-02 RX ORDER — METHOTREXATE 2.5 MG/1
10 TABLET ORAL
Qty: 0 | Refills: 0 | Status: DISCONTINUED | OUTPATIENT
Start: 2018-05-02 | End: 2018-05-03

## 2018-05-02 RX ADMIN — Medication 250 MILLIGRAM(S): at 23:03

## 2018-05-02 RX ADMIN — INSULIN GLARGINE 9 UNIT(S): 100 INJECTION, SOLUTION SUBCUTANEOUS at 22:40

## 2018-05-02 RX ADMIN — Medication 250 MILLIGRAM(S): at 12:44

## 2018-05-02 RX ADMIN — Medication 81 MILLIGRAM(S): at 12:43

## 2018-05-02 RX ADMIN — Medication 250 MILLIGRAM(S): at 01:47

## 2018-05-02 RX ADMIN — ATORVASTATIN CALCIUM 10 MILLIGRAM(S): 80 TABLET, FILM COATED ORAL at 22:20

## 2018-05-02 RX ADMIN — Medication 2: at 17:47

## 2018-05-02 RX ADMIN — Medication 2.5 MILLIGRAM(S): at 05:52

## 2018-05-02 RX ADMIN — AMPICILLIN SODIUM AND SULBACTAM SODIUM 200 GRAM(S): 250; 125 INJECTION, POWDER, FOR SUSPENSION INTRAMUSCULAR; INTRAVENOUS at 06:09

## 2018-05-02 RX ADMIN — LISINOPRIL 40 MILLIGRAM(S): 2.5 TABLET ORAL at 05:52

## 2018-05-02 RX ADMIN — Medication 2 MILLIGRAM(S): at 12:43

## 2018-05-02 RX ADMIN — AMLODIPINE BESYLATE 5 MILLIGRAM(S): 2.5 TABLET ORAL at 05:52

## 2018-05-02 RX ADMIN — SODIUM CHLORIDE 75 MILLILITER(S): 9 INJECTION INTRAMUSCULAR; INTRAVENOUS; SUBCUTANEOUS at 16:23

## 2018-05-02 RX ADMIN — AMPICILLIN SODIUM AND SULBACTAM SODIUM 200 GRAM(S): 250; 125 INJECTION, POWDER, FOR SUSPENSION INTRAMUSCULAR; INTRAVENOUS at 12:44

## 2018-05-02 RX ADMIN — AMPICILLIN SODIUM AND SULBACTAM SODIUM 200 GRAM(S): 250; 125 INJECTION, POWDER, FOR SUSPENSION INTRAMUSCULAR; INTRAVENOUS at 23:04

## 2018-05-02 RX ADMIN — Medication 3 UNIT(S): at 17:47

## 2018-05-02 RX ADMIN — Medication 250 MILLIGRAM(S): at 17:46

## 2018-05-02 RX ADMIN — Medication 250 MILLIGRAM(S): at 07:03

## 2018-05-02 RX ADMIN — SODIUM CHLORIDE 75 MILLILITER(S): 9 INJECTION INTRAMUSCULAR; INTRAVENOUS; SUBCUTANEOUS at 00:27

## 2018-05-02 RX ADMIN — Medication 250 MILLIGRAM(S): at 01:16

## 2018-05-02 RX ADMIN — Medication 250 MILLIGRAM(S): at 05:52

## 2018-05-02 RX ADMIN — AMPICILLIN SODIUM AND SULBACTAM SODIUM 200 GRAM(S): 250; 125 INJECTION, POWDER, FOR SUSPENSION INTRAMUSCULAR; INTRAVENOUS at 17:46

## 2018-05-02 RX ADMIN — AMPICILLIN SODIUM AND SULBACTAM SODIUM 200 GRAM(S): 250; 125 INJECTION, POWDER, FOR SUSPENSION INTRAMUSCULAR; INTRAVENOUS at 00:27

## 2018-05-02 RX ADMIN — BUPROPION HYDROCHLORIDE 300 MILLIGRAM(S): 150 TABLET, EXTENDED RELEASE ORAL at 12:43

## 2018-05-02 NOTE — PROGRESS NOTE ADULT - ASSESSMENT
77 y/o female with sialoadenitis  - cont warm compress to area  - cont sialogogues  - pain control  - IV unasyn  - IV fluid hydration  - change to soft diet if not tolerating regular  - will d/w attending, will follow

## 2018-05-02 NOTE — PROGRESS NOTE ADULT - SUBJECTIVE AND OBJECTIVE BOX
SUBJECTIVE:    Patient is a 78y old Female who presents with a chief complaint of submandibular salivary gland stone (01 May 2018 22:36).    Currently admitted to medicine with the primary diagnosis of Submandibular gland inflammation secondary to obstruction due to calculus.      Today is hospital day 1d. This morning she is resting comfortably in bed. Pt complaining of right mandibular swelling and pain.     PAST MEDICAL & SURGICAL HISTORY  Rheumatoid arthritis  HTN (hypertension)  Diabetes  No significant past surgical history    SOCIAL HISTORY:  Negative for smoking/alcohol/drug use.     ALLERGIES:  No Known Allergies    MEDICATIONS:  STANDING MEDICATIONS  amLODIPine   Tablet 5 milliGRAM(s) Oral daily  ampicillin/sulbactam  IVPB 3 Gram(s) IV Intermittent every 6 hours  aspirin enteric coated 81 milliGRAM(s) Oral daily  atorvastatin 10 milliGRAM(s) Oral at bedtime  buPROPion XL . 300 milliGRAM(s) Oral daily  folic acid 2 milliGRAM(s) Oral daily  lisinopril 40 milliGRAM(s) Oral daily  methotrexate 2.5 milliGRAM(s) Oral daily  naproxen 250 milliGRAM(s) Oral every 6 hours  predniSONE   Tablet 2.5 milliGRAM(s) Oral daily  sodium chloride 0.9%. 1000 milliLiter(s) IV Continuous <Continuous>    PRN MEDICATIONS    VITALS:   T(F): 98.3  HR: 86  BP: 143/66  RR: 18  SpO2: 100%    LABS:                        13.3   10.56 )-----------( 246      ( 01 May 2018 13:20 )             41.9     05-01    140  |  100  |  14  ----------------------------<  102<H>  4.1   |  27  |  0.7    Ca    9.3      01 May 2018 13:20    TPro  6.7  /  Alb  3.8  /  TBili  0.6  /  DBili  x   /  AST  18  /  ALT  13  /  AlkPhos  92  05-01        RADIOLOGY:    < from: CT Neck Soft Tissue w/ IV Cont (05.01.18 @ 15:07) >  IMPRESSION:    1.  Right submandibular gland inflammation due to an obstructive calculus   measuring 1 cm at the level of the gland hilum/proximal duct.   Intraglandular ductal dilatation as well as an additional 2.5 mm calculus   within the gland are noted. Surrounding inflammatory changes without   abscess.    2.  Thickened/edematous appearance of the epiglottis and bilateral   aryepiglottic folds, recommend follow-up with directvisualization.    < end of copied text >      PHYSICAL EXAM:  GEN: No acute distress. Right mandibular swelling and tenderness present.   LUNGS: Clear to auscultation bilaterally   HEART: S1/S2 present. RRR.   ABD: Soft, non-tender, non-distended. Bowel sounds present  EXT: NC/NC/NE/2+PP/VANEGAS  NEURO: Awake and alert.

## 2018-05-02 NOTE — PROGRESS NOTE ADULT - ASSESSMENT
78 F admitted for submandibular gland swelling and calculus    # Submandibular gland inflamation secondary to calculus   - Submandibular gland Cx were sent by ENT   - ENT following - recs warm compressed and citrus fruit intake. f/u further recs.    - c/w IV unasyn, IV hydration, and naptroxen for pain control.     # DM2- f/u FS AC HS   - Continue to monitor fingerstick glucose.    - Not currently on sq insulin.     # HTN   - c/w amlodpine and lisinopril    # Depression   - c/w buproprion   - Please follow with daughter to bring vibryd from home    # RA   - c/w MTX   - Prednisone po    DVT ppx- f/u ent tomorrow, if no plan for surgery, start lovenox 40 q24  ambulate as tolerated  carb consistent diet

## 2018-05-02 NOTE — PROGRESS NOTE ADULT - SUBJECTIVE AND OBJECTIVE BOX
ENT DAILY PROGRESS NOTE    Overnight events/Interval HPI: HPI:  Patient is a 78y old  Female with PMH of HTN, DM type 2, RA on Methotrexate and Prednisone, Psoriasis  presents To ED with a chief complaint of right neck pain 10/10,  swelling, + odynophagia, + dysphagia  for solids. used Armenian interpretor and called her daughter as well to obtain history.  Pt. states that Right neck swelling and tenderness started 10 days ago , on Sunday it got progressively worse. Pt. went to Urgent care center Motrin was prescribed, Pt. was sent home. Right sided swelling got worse with Pt. went to ED [facility] site CT neck soft tissues showed right submandibular gland inflammation due to obstructive calculus measuring 1 cm at the level of the gland hilum/proximal duct. ENT [facility] were called and are asked to evaluate the patient for CT neck findings.  Pt. states she is able to swallow liquids, but has + odynophagia, + dysphagia for solid food. Pt. states she received  IV Abx (Unasyn) at the [facility] site. Pt. denies fever, chills, SOB, difficulty breathing, CP, hemoptysis. was transferred [facility] (01 May 2018 22:36)    77 y/o female with R submandibular edema 2/2 R sialoadenitis w 1 cm obstructing submandibular gland stone. seen and examined at bedside, states she feels the same as yesterday with not much improvement but no worsening of symptoms. Has some odynophagia/dysphagia, not tolerating solids well but tolerating liquids.             Allergies    No Known Allergies    Intolerances        MEDICATIONS:  Antiinfectives:   ampicillin/sulbactam  IVPB 3 Gram(s) IV Intermittent every 6 hours    IV fluids:  folic acid 2 milliGRAM(s) Oral daily  sodium chloride 0.9%. 1000 milliLiter(s) IV Continuous <Continuous>    Hematologic/Anticoagulation:  aspirin enteric coated 81 milliGRAM(s) Oral daily    Pain medications/Neuro:  buPROPion XL . 300 milliGRAM(s) Oral daily  naproxen 250 milliGRAM(s) Oral every 6 hours    Endocrine Medications:   atorvastatin 10 milliGRAM(s) Oral at bedtime  predniSONE   Tablet 2.5 milliGRAM(s) Oral daily    All other standing medications:   amLODIPine   Tablet 5 milliGRAM(s) Oral daily  lisinopril 40 milliGRAM(s) Oral daily  methotrexate 2.5 milliGRAM(s) Oral daily    All other PRN medications:      Vital Signs Last 24 Hrs  T(C): 36.8 (02 May 2018 05:25), Max: 36.9 (01 May 2018 12:10)  T(F): 98.3 (02 May 2018 05:25), Max: 98.5 (01 May 2018 18:57)  HR: 86 (02 May 2018 05:25) (80 - 710)  BP: 143/66 (02 May 2018 05:25) (132/60 - 180/90)  BP(mean): --  RR: 18 (02 May 2018 05:25) (18 - 20)  SpO2: 100% (01 May 2018 18:57) (96% - 100%)          PHYSICAL EXAM:    ENT EXAM-   Constitutional: Well-developed, well-nourished.  No hoarseness.   awake/alert  Head:  normocephalic, atraumatic.   OC/OP:  mucosa moist, No FOM tenderness, + submandibular gland drainage with massage  Neck:  + R sided swelling over submandibular area. nontender to palpation, soft,    LABS:  CBC-                        13.3   10.56 )-----------( 246      ( 01 May 2018 13:20 )             41.9     BMP/CMP-  01 May 2018 13:20    140    |  100    |  14     ----------------------------<  102    4.1     |  27     |  0.7      Ca    9.3        01 May 2018 13:20    TPro  6.7    /  Alb  3.8    /  TBili  0.6    /  DBili  x      /  AST  18     /  ALT  13     /  AlkPhos  92     01 May 2018 13:20    Coagulation Studies-    Endocrine Panel-  Calcium, Total Serum: 9.3 mg/dL (05-01 @ 13:20)              RADIOLOGY & ADDITIONAL STUDIES:

## 2018-05-02 NOTE — PATIENT PROFILE ADULT. - NS PRO PT RIGHT SUPPORT PERSON
Declines

## 2018-05-03 ENCOUNTER — TRANSCRIPTION ENCOUNTER (OUTPATIENT)
Age: 78
End: 2018-05-03

## 2018-05-03 VITALS
SYSTOLIC BLOOD PRESSURE: 113 MMHG | TEMPERATURE: 98 F | HEART RATE: 86 BPM | RESPIRATION RATE: 17 BRPM | DIASTOLIC BLOOD PRESSURE: 56 MMHG

## 2018-05-03 RX ORDER — OXYCODONE HYDROCHLORIDE 5 MG/1
5 TABLET ORAL EVERY 6 HOURS
Qty: 0 | Refills: 0 | Status: DISCONTINUED | OUTPATIENT
Start: 2018-05-03 | End: 2018-05-03

## 2018-05-03 RX ORDER — METHOTREXATE 2.5 MG/1
4 TABLET ORAL
Qty: 0 | Refills: 0 | DISCHARGE
Start: 2018-05-03

## 2018-05-03 RX ORDER — ENOXAPARIN SODIUM 100 MG/ML
40 INJECTION SUBCUTANEOUS EVERY 24 HOURS
Qty: 0 | Refills: 0 | Status: DISCONTINUED | OUTPATIENT
Start: 2018-05-03 | End: 2018-05-03

## 2018-05-03 RX ORDER — OXYCODONE HYDROCHLORIDE 5 MG/1
1 TABLET ORAL
Qty: 8 | Refills: 0 | OUTPATIENT
Start: 2018-05-03

## 2018-05-03 RX ORDER — METHOTREXATE 2.5 MG/1
0 TABLET ORAL
Qty: 0 | Refills: 0 | COMMUNITY

## 2018-05-03 RX ORDER — METHOTREXATE 2.5 MG/1
6 TABLET ORAL
Qty: 0 | Refills: 0 | DISCHARGE
Start: 2018-05-03

## 2018-05-03 RX ADMIN — Medication 1 TABLET(S): at 17:58

## 2018-05-03 RX ADMIN — Medication 2 MILLIGRAM(S): at 12:02

## 2018-05-03 RX ADMIN — Medication 3 UNIT(S): at 17:57

## 2018-05-03 RX ADMIN — Medication 250 MILLIGRAM(S): at 05:51

## 2018-05-03 RX ADMIN — Medication 2: at 08:36

## 2018-05-03 RX ADMIN — Medication 3: at 17:57

## 2018-05-03 RX ADMIN — Medication 4: at 12:03

## 2018-05-03 RX ADMIN — LISINOPRIL 40 MILLIGRAM(S): 2.5 TABLET ORAL at 05:52

## 2018-05-03 RX ADMIN — Medication 3 UNIT(S): at 08:35

## 2018-05-03 RX ADMIN — AMPICILLIN SODIUM AND SULBACTAM SODIUM 200 GRAM(S): 250; 125 INJECTION, POWDER, FOR SUSPENSION INTRAMUSCULAR; INTRAVENOUS at 05:52

## 2018-05-03 RX ADMIN — Medication 250 MILLIGRAM(S): at 12:02

## 2018-05-03 RX ADMIN — Medication 3 UNIT(S): at 12:02

## 2018-05-03 RX ADMIN — Medication 250 MILLIGRAM(S): at 07:10

## 2018-05-03 RX ADMIN — Medication 250 MILLIGRAM(S): at 17:58

## 2018-05-03 RX ADMIN — AMLODIPINE BESYLATE 5 MILLIGRAM(S): 2.5 TABLET ORAL at 05:52

## 2018-05-03 RX ADMIN — Medication 81 MILLIGRAM(S): at 12:02

## 2018-05-03 RX ADMIN — Medication 2.5 MILLIGRAM(S): at 06:12

## 2018-05-03 RX ADMIN — Medication 250 MILLIGRAM(S): at 05:52

## 2018-05-03 RX ADMIN — BUPROPION HYDROCHLORIDE 300 MILLIGRAM(S): 150 TABLET, EXTENDED RELEASE ORAL at 12:02

## 2018-05-03 RX ADMIN — AMPICILLIN SODIUM AND SULBACTAM SODIUM 200 GRAM(S): 250; 125 INJECTION, POWDER, FOR SUSPENSION INTRAMUSCULAR; INTRAVENOUS at 12:01

## 2018-05-03 NOTE — PROGRESS NOTE ADULT - SUBJECTIVE AND OBJECTIVE BOX
SUBJECTIVE:    Patient is a 78y old Female who presents with a chief complaint of submandibular salivary gland stone (01 May 2018 22:36)    Currently admitted to medicine with the primary diagnosis of Submandibular gland inflammation secondary to obstructing calculus.      Today is hospital day 2d. This morning she is resting comfortably in bed. Still complaining of right jaw pain. Tolerating her diet.     PAST MEDICAL & SURGICAL HISTORY  Rheumatoid arthritis  HTN (hypertension)  Diabetes  No significant past surgical history    SOCIAL HISTORY:  Negative for smoking/alcohol/drug use.     ALLERGIES:  No Known Allergies    MEDICATIONS:  STANDING MEDICATIONS  amLODIPine   Tablet 5 milliGRAM(s) Oral daily  ampicillin/sulbactam  IVPB 3 Gram(s) IV Intermittent every 6 hours  aspirin enteric coated 81 milliGRAM(s) Oral daily  atorvastatin 10 milliGRAM(s) Oral at bedtime  buPROPion XL . 300 milliGRAM(s) Oral daily  dextrose 5%. 1000 milliLiter(s) IV Continuous <Continuous>  dextrose 50% Injectable 12.5 Gram(s) IV Push once  dextrose 50% Injectable 25 Gram(s) IV Push once  dextrose 50% Injectable 25 Gram(s) IV Push once  folic acid 2 milliGRAM(s) Oral daily  insulin glargine Injectable (LANTUS) 9 Unit(s) SubCutaneous at bedtime  insulin lispro (HumaLOG) corrective regimen sliding scale   SubCutaneous three times a day before meals  insulin lispro Injectable (HumaLOG) 3 Unit(s) SubCutaneous three times a day before meals  lisinopril 40 milliGRAM(s) Oral daily  methotrexate 10 milliGRAM(s) Oral <User Schedule>  naproxen 250 milliGRAM(s) Oral every 6 hours  predniSONE   Tablet 2.5 milliGRAM(s) Oral daily    PRN MEDICATIONS  dextrose Gel 1 Dose(s) Oral once PRN  glucagon  Injectable 1 milliGRAM(s) IntraMuscular once PRN    VITALS:   T(F): 97.8  HR: 76  BP: 134/60  RR: 18  SpO2: --    LABS:                        12.0   10.57 )-----------( 244      ( 02 May 2018 10:22 )             38.8     05-02    138  |  100  |  14  ----------------------------<  167<H>  4.2   |  26  |  0.8    Ca    8.3<L>      02 May 2018 10:22    TPro  6.7  /  Alb  3.8  /  TBili  0.6  /  DBili  x   /  AST  18  /  ALT  13  /  AlkPhos  92  05-01    PT/INR - ( 02 May 2018 10:22 )   PT: 13.00 sec;   INR: 1.20 ratio                   Culture - Blood (collected 01 May 2018 14:05)  Source: .Blood Blood  Preliminary Report (03 May 2018 01:02):    No growth to date.    Culture - Blood (collected 01 May 2018 14:05)  Source: .Blood Blood  Preliminary Report (03 May 2018 01:02):    No growth to date.    RADIOLOGY:    < from: CT Neck Soft Tissue w/ IV Cont (05.01.18 @ 15:07) >  IMPRESSION:    1.  Right submandibular gland inflammation due to an obstructive calculus   measuring 1 cm at the level of the gland hilum/proximal duct.   Intraglandular ductal dilatation as well as an additional 2.5 mm calculus   within the gland are noted. Surrounding inflammatory changes without   abscess.    2.  Thickened/edematous appearance of the epiglottis and bilateral   aryepiglottic folds, recommend follow-up with directvisualization.    < end of copied text >      PHYSICAL EXAM:  GEN: No acute distress. Right mandibular swelling present.   LUNGS: Clear to auscultation bilaterally   HEART: S1/S2 present. RRR.   ABD: Soft, non-tender, non-distended. Bowel sounds present  EXT: NC/NC/NE/2+PP/VANEGAS  NEURO: AAOX3

## 2018-05-03 NOTE — DISCHARGE NOTE ADULT - ADDITIONAL INSTRUCTIONS
Otolaryngology - Dr. Vazquez - [phone] - Within One week after discharge from the hospital.  Primary Care Doctor - Dr. Basurto - [phone] - 1 Week after discharge from the hospital.

## 2018-05-03 NOTE — PROGRESS NOTE ADULT - SUBJECTIVE AND OBJECTIVE BOX
Pt is a 78 y.o female with sialoadenitis with obstructing stone, seen and examined at bedside - doing well. Pt states slight improvement, but still with discomfort. PT eating breakfast well.     MEDICATIONS  (STANDING):  amLODIPine   Tablet 5 milliGRAM(s) Oral daily  ampicillin/sulbactam  IVPB 3 Gram(s) IV Intermittent every 6 hours  aspirin enteric coated 81 milliGRAM(s) Oral daily  atorvastatin 10 milliGRAM(s) Oral at bedtime  buPROPion XL . 300 milliGRAM(s) Oral daily  dextrose 5%. 1000 milliLiter(s) (50 mL/Hr) IV Continuous  dextrose 50% Injectable 12.5 Gram(s) IV Push once  dextrose 50% Injectable 25 Gram(s) IV Push once  dextrose 50% Injectable 25 Gram(s) IV Push once  folic acid 2 milliGRAM(s) Oral daily  insulin glargine Injectable (LANTUS) 9 Unit(s) SubCutaneous at bedtime  insulin lispro (HumaLOG) corrective regimen sliding scale   SubCutaneous three times a day before meals  insulin lispro Injectable (HumaLOG) 3 Unit(s) SubCutaneous three times a day before meals  lisinopril 40 milliGRAM(s) Oral daily  methotrexate 10 milliGRAM(s) Oral  naproxen 250 milliGRAM(s) Oral every 6 hours  predniSONE   Tablet 2.5 milliGRAM(s) Oral daily    Vital Signs: T(F): 97.8 (03 May 2018 05:48), Max: 98.6 (02 May 2018 21:21), HR: 76, BP: 134/60, RR: 18  GEN: NAD, awake and alert.   HEENT: + R submandibular gland swelling, area firm with TTP. oral mucosa pink, no erythema/edema, uvula midline. + edema to FOM noted, soft, not firm; + pus expressed from duct on palpation, no pus expressed with gland massage                          12.0   10.57 )-----------( 244      ( 02 May 2018 10:22 )             38.8

## 2018-05-03 NOTE — DISCHARGE NOTE ADULT - MEDICATION SUMMARY - MEDICATIONS TO TAKE
I will START or STAY ON the medications listed below when I get home from the hospital:    predniSONE 2.5 mg oral tablet  -- 1 tab(s) by mouth once a day  -- Indication: For Rheumatoid arthritis    Aspir-Low 81 mg oral delayed release tablet  -- 1 tab(s) by mouth once a day  -- Indication: For Coronary Artery Disease    naproxen 250 mg oral tablet  -- 1 tab(s) by mouth every 6 hours, As Needed   -- Indication: For Pain Conrol for Submandibular gland inflammation    oxyCODONE 5 mg oral tablet  -- 1 tab(s) by mouth every 6 hours, As needed, Severe Pain (7 - 10) MDD:Maximum 4 Tablets per day  -- Indication: For Pain Contrl for Submandibular gland inflammation    Viibryd 40 mg oral tablet  -- 1 tab(s) by mouth once a day  -- Indication: For Depression, unspecified depression type    Lantus Solostar Pen 100 units/mL subcutaneous solution  -- 2units for fs , 3 units 150-200, and 1 unit for every 50 increase in fs, subcutaneous once a day (at bedtime)  -- Indication: For Diabetes    HumuLIN 70/30 KwikPen 70 units-30 units/mL subcutaneous suspension  -- 26-30 units based on fs in am  -- Indication: For Diabetes    glipiZIDE 5 mg oral tablet  -- 1 tab(s) by mouth once a day  -- Indication: For Diabetes    atorvastatin 10 mg oral tablet  -- 1 tab(s) by mouth once a day  -- Indication: For Dyslipidemia    amlodipine-benazepril 5 mg-20 mg oral capsule  -- 1 cap(s) by mouth once a day  -- Indication: For Hypertension, unspecified type    methotrexate 2.5 mg oral tablet  -- 4 tab(s) by mouth once a week, on Wednesday  -- Indication: For Rheumatoid arthritis    amoxicillin-clavulanate 875 mg-125 mg oral tablet  -- 1 tab(s) by mouth 2 times a day  -- Indication: For Submandibular gland inflammation    buPROPion 300 mg/24 hours (XL) oral tablet, extended release  -- 1 tab(s) by mouth every 24 hours  -- Indication: For Depression, unspecified depression type    folic acid 1 mg oral tablet  -- 2 tab(s) by mouth once a day  -- Indication: For Vitamin Supplementation

## 2018-05-03 NOTE — DISCHARGE NOTE ADULT - PATIENT PORTAL LINK FT
You can access the [facility] Patient Portal, offered by [facility], by registering with the following website: [email or web address]

## 2018-05-03 NOTE — PROGRESS NOTE ADULT - ASSESSMENT
78 F admitted for submandibular gland swelling and calculus    # Submandibular gland inflamation secondary to calculus   - Submandibular gland Cx were sent by ENT   - Blood cultures remain negative.    - ENT following - recs warm compressed and citrus fruit intake. No acute surgical intervention while pt is admitted as per ENT.    - c/w IV unasyn, IV hydration, naproxen, and oxycodone for pain control.   - Consider transition to PO abx.     # DM2- f/u FS AC HS   - Continue to monitor fingerstick glucose.    - c/w current insulin regiment.     # HTN   - c/w amlodpine and lisinopril    # Depression   - c/w buproprion   - Pt also on Vilazodone at home.     # RA   - c/w MTX   - Prednisone po    DVT ppx- sq lovenox  ambulate as tolerated  carb consistent diet

## 2018-05-03 NOTE — DISCHARGE NOTE ADULT - CARE PLAN
Principal Discharge DX:	Submandibular gland inflammation  Goal:	Resolution of inflammation  Assessment and plan of treatment:	- Continue taking oral antibiotic as instructed for 10 days in total. Also followup with ENT doctors in outpatient setting to discuss further surgical intervention needed to remove stone present in submandibular gland. Also take Naproxen and oxycodone as needed for pain control.  Secondary Diagnosis:	HTN (hypertension)  Goal:	Control of blood pressure  Assessment and plan of treatment:	- Continue taking home blood pressure medications.  Secondary Diagnosis:	Rheumatoid arthritis  Goal:	Treatment of rheumatoid arthritis  Assessment and plan of treatment:	- Continue taking oral prednisone and oral methotrexate.  Secondary Diagnosis:	Diabetes  Goal:	Control of blood sugar levels  Assessment and plan of treatment:	- Continue with your home diabetes medications  Secondary Diagnosis:	Depression, unspecified depression type  Goal:	Prevention of depressive episode  Assessment and plan of treatment:	- Continue with your home depression medications.

## 2018-05-03 NOTE — PROGRESS NOTE ADULT - ASSESSMENT
Pt is a 78 y.o female with sialoadenitis with obstructing sialolith.    ·	cont IV abx  ·	cont pain control  ·	increase oral fluid intake  ·	will manage stone as OP after infection cleared  ·	w/d with attng, will follow

## 2018-05-03 NOTE — DISCHARGE NOTE ADULT - PLAN OF CARE
Resolution of inflammation - Continue taking oral antibiotic as instructed for 10 days in total. Also followup with ENT doctors in outpatient setting to discuss further surgical intervention needed to remove stone present in submandibular gland. Also take Naproxen and oxycodone as needed for pain control. Control of blood pressure - Continue taking home blood pressure medications. Treatment of rheumatoid arthritis - Continue taking oral prednisone and oral methotrexate. Control of blood sugar levels - Continue with your home diabetes medications Prevention of depressive episode - Continue with your home depression medications.

## 2018-05-03 NOTE — DISCHARGE NOTE ADULT - CARE PROVIDER_API CALL
Allen Maravilla), Medicine  [facility] [place] [address]  Phone: [phone]  Fax: [phone]    Josh Vazquez), Surgical Physicians  [address] [place] [address]  [address] [place] [address]  Phone: [phone]  Fax: [phone]

## 2018-05-07 DIAGNOSIS — I10 ESSENTIAL (PRIMARY) HYPERTENSION: ICD-10-CM

## 2018-05-07 DIAGNOSIS — K11.5 SIALOLITHIASIS: ICD-10-CM

## 2018-05-07 DIAGNOSIS — F32.9 MAJOR DEPRESSIVE DISORDER, SINGLE EPISODE, UNSPECIFIED: ICD-10-CM

## 2018-05-07 DIAGNOSIS — K11.20 SIALOADENITIS, UNSPECIFIED: ICD-10-CM

## 2018-05-07 DIAGNOSIS — M06.9 RHEUMATOID ARTHRITIS, UNSPECIFIED: ICD-10-CM

## 2018-05-07 DIAGNOSIS — E11.9 TYPE 2 DIABETES MELLITUS WITHOUT COMPLICATIONS: ICD-10-CM

## 2018-05-07 DIAGNOSIS — Z79.84 LONG TERM (CURRENT) USE OF ORAL HYPOGLYCEMIC DRUGS: ICD-10-CM

## 2018-05-21 ENCOUNTER — APPOINTMENT (OUTPATIENT)
Dept: OTOLARYNGOLOGY | Facility: CLINIC | Age: 78
End: 2018-05-21
Payer: MEDICARE

## 2018-05-21 VITALS — BODY MASS INDEX: 25.61 KG/M2 | HEIGHT: 64 IN | WEIGHT: 150 LBS

## 2018-05-21 PROCEDURE — 31575 DIAGNOSTIC LARYNGOSCOPY: CPT

## 2018-05-21 PROCEDURE — 99215 OFFICE O/P EST HI 40 MIN: CPT | Mod: 25

## 2018-06-17 ENCOUNTER — EMERGENCY (EMERGENCY)
Facility: HOSPITAL | Age: 78
LOS: 0 days | Discharge: HOME | End: 2018-06-17
Attending: EMERGENCY MEDICINE | Admitting: INTERNAL MEDICINE

## 2018-06-17 VITALS
OXYGEN SATURATION: 97 % | SYSTOLIC BLOOD PRESSURE: 150 MMHG | DIASTOLIC BLOOD PRESSURE: 68 MMHG | HEART RATE: 97 BPM | RESPIRATION RATE: 20 BRPM | TEMPERATURE: 99 F

## 2018-06-17 DIAGNOSIS — Z02.9 ENCOUNTER FOR ADMINISTRATIVE EXAMINATIONS, UNSPECIFIED: ICD-10-CM

## 2018-06-17 LAB
ALBUMIN SERPL ELPH-MCNC: 4.2 G/DL — SIGNIFICANT CHANGE UP (ref 3.5–5.2)
ALP SERPL-CCNC: 94 U/L — SIGNIFICANT CHANGE UP (ref 30–115)
ALT FLD-CCNC: 16 U/L — SIGNIFICANT CHANGE UP (ref 0–41)
ANION GAP SERPL CALC-SCNC: 11 MMOL/L — SIGNIFICANT CHANGE UP (ref 7–14)
APTT BLD: 35.2 SEC — SIGNIFICANT CHANGE UP (ref 27–39.2)
AST SERPL-CCNC: 17 U/L — SIGNIFICANT CHANGE UP (ref 0–41)
BASOPHILS # BLD AUTO: 0.03 K/UL — SIGNIFICANT CHANGE UP (ref 0–0.2)
BASOPHILS NFR BLD AUTO: 0.3 % — SIGNIFICANT CHANGE UP (ref 0–1)
BILIRUB SERPL-MCNC: 0.6 MG/DL — SIGNIFICANT CHANGE UP (ref 0.2–1.2)
BUN SERPL-MCNC: 16 MG/DL — SIGNIFICANT CHANGE UP (ref 10–20)
CALCIUM SERPL-MCNC: 9.2 MG/DL — SIGNIFICANT CHANGE UP (ref 8.5–10.1)
CHLORIDE SERPL-SCNC: 95 MMOL/L — LOW (ref 98–110)
CO2 SERPL-SCNC: 26 MMOL/L — SIGNIFICANT CHANGE UP (ref 17–32)
CREAT SERPL-MCNC: 0.8 MG/DL — SIGNIFICANT CHANGE UP (ref 0.7–1.5)
EOSINOPHIL # BLD AUTO: 0.02 K/UL — SIGNIFICANT CHANGE UP (ref 0–0.7)
EOSINOPHIL NFR BLD AUTO: 0.2 % — SIGNIFICANT CHANGE UP (ref 0–8)
GLUCOSE SERPL-MCNC: 241 MG/DL — HIGH (ref 70–99)
HCT VFR BLD CALC: 39.3 % — SIGNIFICANT CHANGE UP (ref 37–47)
HGB BLD-MCNC: 12.3 G/DL — SIGNIFICANT CHANGE UP (ref 12–16)
IMM GRANULOCYTES NFR BLD AUTO: 0.4 % — HIGH (ref 0.1–0.3)
INR BLD: 1.08 RATIO — SIGNIFICANT CHANGE UP (ref 0.65–1.3)
LACTATE SERPL-SCNC: 1.4 MMOL/L — SIGNIFICANT CHANGE UP (ref 0.5–2.2)
LYMPHOCYTES # BLD AUTO: 1.63 K/UL — SIGNIFICANT CHANGE UP (ref 1.2–3.4)
LYMPHOCYTES # BLD AUTO: 13.8 % — LOW (ref 20.5–51.1)
MCHC RBC-ENTMCNC: 23.2 PG — LOW (ref 27–31)
MCHC RBC-ENTMCNC: 31.3 G/DL — LOW (ref 32–37)
MCV RBC AUTO: 74 FL — LOW (ref 81–99)
MONOCYTES # BLD AUTO: 0.73 K/UL — HIGH (ref 0.1–0.6)
MONOCYTES NFR BLD AUTO: 6.2 % — SIGNIFICANT CHANGE UP (ref 1.7–9.3)
NEUTROPHILS # BLD AUTO: 9.31 K/UL — HIGH (ref 1.4–6.5)
NEUTROPHILS NFR BLD AUTO: 79.1 % — HIGH (ref 42.2–75.2)
NRBC # BLD: 0 /100 WBCS — SIGNIFICANT CHANGE UP (ref 0–0)
PLATELET # BLD AUTO: 247 K/UL — SIGNIFICANT CHANGE UP (ref 130–400)
POTASSIUM SERPL-MCNC: 4.2 MMOL/L — SIGNIFICANT CHANGE UP (ref 3.5–5)
POTASSIUM SERPL-SCNC: 4.2 MMOL/L — SIGNIFICANT CHANGE UP (ref 3.5–5)
PROT SERPL-MCNC: 7.1 G/DL — SIGNIFICANT CHANGE UP (ref 6–8)
PROTHROM AB SERPL-ACNC: 11.7 SEC — SIGNIFICANT CHANGE UP (ref 9.95–12.87)
RBC # BLD: 5.31 M/UL — SIGNIFICANT CHANGE UP (ref 4.2–5.4)
RBC # FLD: 15.9 % — HIGH (ref 11.5–14.5)
SODIUM SERPL-SCNC: 132 MMOL/L — LOW (ref 135–146)
WBC # BLD: 11.77 K/UL — HIGH (ref 4.8–10.8)
WBC # FLD AUTO: 11.77 K/UL — HIGH (ref 4.8–10.8)

## 2018-06-17 RX ORDER — ACETAMINOPHEN 500 MG
650 TABLET ORAL ONCE
Qty: 0 | Refills: 0 | Status: COMPLETED | OUTPATIENT
Start: 2018-06-17 | End: 2018-06-17

## 2018-06-17 RX ORDER — MORPHINE SULFATE 50 MG/1
2 CAPSULE, EXTENDED RELEASE ORAL ONCE
Qty: 0 | Refills: 0 | Status: DISCONTINUED | OUTPATIENT
Start: 2018-06-17 | End: 2018-06-17

## 2018-06-17 RX ADMIN — Medication 650 MILLIGRAM(S): at 14:21

## 2018-06-17 RX ADMIN — Medication 300 MILLIGRAM(S): at 14:21

## 2018-06-17 NOTE — CONSULT NOTE ADULT - SUBJECTIVE AND OBJECTIVE BOX
Patient is a 78y old Female admitted April/May 2018 for R submandibular sialoadenitis 2* 1cm obstructing sialolith. Pt was given IVF hydration and Unasyn, d/c home on PO amoxicillin x10 days which was completed about 1 month ago. Pt states symptoms completely resolved but returned 4 days ago, +odynophagia and dysphagia with R submandibular swelling. No fever/chills, resp distress/SOB. Tolerating PO liquids and solids. 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 Loera.    PAST MEDICAL & SURGICAL HISTORY:  Rheumatoid arthritis  HTN (hypertension)  Diabetes  No significant past surgical history  ALLERGIES: No Known Allergies    VS: T(F): 98.7, Max: 98.7 (-17 @ 12:07), HR: 97 (97 - 97), BP: 150/68 (150/68 - 150/68), RR: 20, SpO2: 97% (97% - 97%)  GEN: Alert, awake, NAD  HEENT: R submandibular gland with firm edema, no fluctuance, minimal TTP, +pus expressed from duct (about 2cc) with gland massage, FOM soft/NT, oral mucosa pink, no erythema/edema, uvula midline    LABS/IMAGIN.3   11.77 )-----------( 247      ( 2018 13:40 )             39.3     (CT 18: enlargement and edema of the right submandibular gland with   intraglandular ductal dilatation. There is an obstructing calculus at the   level of the submandibular gland hilum/proximal duct measuring 1.0 x 0.9   x 0.7 cm (transverse, AP, CC). Additional calculus noted within the right   submandibular gland measuring 2.5 x2 x 2 mm. There are surrounding   inflammatory changes with subcutaneous fat stranding, thickening of the   platysma muscle and multiple presumably reactive lymph nodes.

## 2018-06-17 NOTE — CONSULT NOTE ADULT - ASSESSMENT
78y old Female with recurrent R submandibular sialoadenitis 2* 1cm obstructing sialolith. Non-toxic, no fever, no leukocytosis, rere PO.    PLAN:  1.	Ok for d/c home on PO Clinda  2.	Encourage hydration  3.	Sialogogues  4.	Warm compresses  5.	F/u culture  6.	NSAIDs if not C/I  7.	Return to ER if worsening pain/swelling/fever/inability to tolerate PO/difficulty breathing - pt and family at bedside amenable to plan

## 2018-06-17 NOTE — ED PROVIDER NOTE - OBJECTIVE STATEMENT
79 yo F with h/o HTN, RA, DM, depression, p/w R neck pain/swelling. Pt states that she was diagnosed with a 1cm obstructed stone within her right salivery gland on 5/1/18. Pt was admitted to the hospital at that time and was given IV abx. Pt states that she was d/c'ed home with PO abx and sx had improved. Pt states that the sx had improved but she again developed swelling three days ago. Pt states that she is again having dysphagia and odynophagia with solids. Pt is scheduled to have surgery on 7/12/18 with . Pt denies any fever/chills, ha, dizziness, difficulty breathing, chest pain, sob.

## 2018-06-17 NOTE — ED ADULT TRIAGE NOTE - CHIEF COMPLAINT QUOTE
Pt came c/o right salivary gland swelling and pain, pt was in ER 40 days ago for the same problem, was scheduled for the surgery on July 12 but can not swallow today.

## 2018-06-17 NOTE — ED PROVIDER NOTE - PROGRESS NOTE DETAILS
Spoke with ENT PA and states will come to evaluate pt in ED ENT PAPrachi, able to massage pus out of salivary gland. Pt feeling better and able to tolerate PO fluids. Pt refusing morphine, given tylenol for pain control. ENT recommends d/c home with clindamycin. Awaiting labs Pt feeling better, abx and pain meds given. Labs stable. Will d/c pt home with abx and f/u with ENT. Pt tolerating oral fluids. Pt given instructions on when to return to ED

## 2018-06-17 NOTE — ED ADULT NURSE NOTE - OBJECTIVE STATEMENT
Pt recently admitted for right swollen salivary gland. Pt was sent home on PO antibiotics but has recently gotten swollen again. Pt scheduled for sx in a month.

## 2018-06-17 NOTE — ED PROVIDER NOTE - MEDICAL DECISION MAKING DETAILS
77 yo F with h/o HTN, RA, DM, depression, p/w R neck pain/swelling. Pt with h/o sialdenitis 2/2 to obstructing salivary stone. Pt seen by ENT and massaged out pus. Pt feeling better, abx and pain meds given. Labs stable. Will d/c pt home with abx and f/u with ENT. Pt tolerating oral fluids. Pt given instructions on when to return to ED.

## 2018-06-17 NOTE — ED PROVIDER NOTE - PHYSICAL EXAMINATION
· CONSTITUTIONAL: Well appearing, well nourished, +appears in moderate distress  ENT: +swelling noted to right submandibular region, no erythema/warmth/induration or fluctuant, +white spot noted under right side of tongue, no trismus, +tolerating oral secretions, +normal phonation, no drooling, no maurice's angina   · CARDIAC: Normal rate, regular rhythm.  Heart sounds S1, S2.  No murmurs, rubs or gallops.  · RESPIRATORY: Breath sounds clear and equal bilaterally.

## 2018-06-17 NOTE — ED PROVIDER NOTE - NS ED ROS FT
· Constitutional [-]: no chills, no fever, no night sweats, no weight loss  · CARDIOVASCULAR: normal rate and rhythm, no chest pain and no edema.  · RESPIRATORY: no chest pain, no cough, and no shortness of breath.  · GASTROINTESTINAL: no abdominal pain, no constipation, no diarrhea, no nausea and no vomiting.  ·ENT: +odynophagia with solids, +dysphagia with liquids  · ROS STATEMENT: all other ROS negative except as per HPI

## 2018-06-19 ENCOUNTER — OUTPATIENT (OUTPATIENT)
Dept: OUTPATIENT SERVICES | Facility: HOSPITAL | Age: 78
LOS: 1 days | Discharge: HOME | End: 2018-06-19

## 2018-06-19 ENCOUNTER — APPOINTMENT (OUTPATIENT)
Dept: OTOLARYNGOLOGY | Facility: CLINIC | Age: 78
End: 2018-06-19
Payer: MEDICARE

## 2018-06-19 VITALS — WEIGHT: 150 LBS | BODY MASS INDEX: 25.61 KG/M2 | HEIGHT: 64 IN

## 2018-06-19 DIAGNOSIS — K11.5 SIALOLITHIASIS: ICD-10-CM

## 2018-06-19 PROCEDURE — 99213 OFFICE O/P EST LOW 20 MIN: CPT

## 2018-06-20 ENCOUNTER — INPATIENT (INPATIENT)
Facility: HOSPITAL | Age: 78
LOS: 4 days | Discharge: HOME | End: 2018-06-25
Attending: HOSPITALIST | Admitting: HOSPITALIST
Payer: MEDICARE

## 2018-06-20 VITALS
HEART RATE: 89 BPM | SYSTOLIC BLOOD PRESSURE: 130 MMHG | OXYGEN SATURATION: 98 % | TEMPERATURE: 98 F | DIASTOLIC BLOOD PRESSURE: 63 MMHG | RESPIRATION RATE: 18 BRPM

## 2018-06-20 LAB
ALBUMIN SERPL ELPH-MCNC: 4 G/DL — SIGNIFICANT CHANGE UP (ref 3.5–5.2)
ALP SERPL-CCNC: 100 U/L — SIGNIFICANT CHANGE UP (ref 30–115)
ALT FLD-CCNC: 14 U/L — SIGNIFICANT CHANGE UP (ref 0–41)
ANION GAP SERPL CALC-SCNC: 13 MMOL/L — SIGNIFICANT CHANGE UP (ref 7–14)
APTT BLD: 33.2 SEC — SIGNIFICANT CHANGE UP (ref 27–39.2)
AST SERPL-CCNC: 14 U/L — SIGNIFICANT CHANGE UP (ref 0–41)
BASOPHILS # BLD AUTO: 0.03 K/UL — SIGNIFICANT CHANGE UP (ref 0–0.2)
BASOPHILS NFR BLD AUTO: 0.2 % — SIGNIFICANT CHANGE UP (ref 0–1)
BILIRUB SERPL-MCNC: 0.5 MG/DL — SIGNIFICANT CHANGE UP (ref 0.2–1.2)
BUN SERPL-MCNC: 14 MG/DL — SIGNIFICANT CHANGE UP (ref 10–20)
CALCIUM SERPL-MCNC: 9.2 MG/DL — SIGNIFICANT CHANGE UP (ref 8.5–10.1)
CHLORIDE SERPL-SCNC: 95 MMOL/L — LOW (ref 98–110)
CO2 SERPL-SCNC: 27 MMOL/L — SIGNIFICANT CHANGE UP (ref 17–32)
CREAT SERPL-MCNC: 0.7 MG/DL — SIGNIFICANT CHANGE UP (ref 0.7–1.5)
EOSINOPHIL # BLD AUTO: 0.03 K/UL — SIGNIFICANT CHANGE UP (ref 0–0.7)
EOSINOPHIL NFR BLD AUTO: 0.2 % — SIGNIFICANT CHANGE UP (ref 0–8)
GLUCOSE SERPL-MCNC: 244 MG/DL — HIGH (ref 70–99)
HCT VFR BLD CALC: 40.4 % — SIGNIFICANT CHANGE UP (ref 37–47)
HGB BLD-MCNC: 12.6 G/DL — SIGNIFICANT CHANGE UP (ref 12–16)
IMM GRANULOCYTES NFR BLD AUTO: 0.5 % — HIGH (ref 0.1–0.3)
INR BLD: 1.13 RATIO — SIGNIFICANT CHANGE UP (ref 0.65–1.3)
LACTATE SERPL-SCNC: 1.4 MMOL/L — SIGNIFICANT CHANGE UP (ref 0.5–2.2)
LYMPHOCYTES # BLD AUTO: 1.45 K/UL — SIGNIFICANT CHANGE UP (ref 1.2–3.4)
LYMPHOCYTES # BLD AUTO: 11.4 % — LOW (ref 20.5–51.1)
MCHC RBC-ENTMCNC: 23.2 PG — LOW (ref 27–31)
MCHC RBC-ENTMCNC: 31.2 G/DL — LOW (ref 32–37)
MCV RBC AUTO: 74.4 FL — LOW (ref 81–99)
MONOCYTES # BLD AUTO: 0.79 K/UL — HIGH (ref 0.1–0.6)
MONOCYTES NFR BLD AUTO: 6.2 % — SIGNIFICANT CHANGE UP (ref 1.7–9.3)
NEUTROPHILS # BLD AUTO: 10.39 K/UL — HIGH (ref 1.4–6.5)
NEUTROPHILS NFR BLD AUTO: 81.5 % — HIGH (ref 42.2–75.2)
NRBC # BLD: 0 /100 WBCS — SIGNIFICANT CHANGE UP (ref 0–0)
PLATELET # BLD AUTO: 261 K/UL — SIGNIFICANT CHANGE UP (ref 130–400)
POTASSIUM SERPL-MCNC: 5 MMOL/L — SIGNIFICANT CHANGE UP (ref 3.5–5)
POTASSIUM SERPL-SCNC: 5 MMOL/L — SIGNIFICANT CHANGE UP (ref 3.5–5)
PROT SERPL-MCNC: 7.1 G/DL — SIGNIFICANT CHANGE UP (ref 6–8)
PROTHROM AB SERPL-ACNC: 12.2 SEC — SIGNIFICANT CHANGE UP (ref 9.95–12.87)
RBC # BLD: 5.43 M/UL — HIGH (ref 4.2–5.4)
RBC # FLD: 16 % — HIGH (ref 11.5–14.5)
SODIUM SERPL-SCNC: 135 MMOL/L — SIGNIFICANT CHANGE UP (ref 135–146)
WBC # BLD: 12.76 K/UL — HIGH (ref 4.8–10.8)
WBC # FLD AUTO: 12.76 K/UL — HIGH (ref 4.8–10.8)

## 2018-06-20 PROCEDURE — 99223 1ST HOSP IP/OBS HIGH 75: CPT

## 2018-06-20 RX ORDER — OXYCODONE HYDROCHLORIDE 5 MG/1
5 TABLET ORAL EVERY 6 HOURS
Qty: 0 | Refills: 0 | Status: DISCONTINUED | OUTPATIENT
Start: 2018-06-20 | End: 2018-06-21

## 2018-06-20 RX ORDER — SODIUM CHLORIDE 9 MG/ML
1000 INJECTION INTRAMUSCULAR; INTRAVENOUS; SUBCUTANEOUS ONCE
Qty: 0 | Refills: 0 | Status: COMPLETED | OUTPATIENT
Start: 2018-06-20 | End: 2018-06-20

## 2018-06-20 RX ORDER — DEXTROSE 50 % IN WATER 50 %
15 SYRINGE (ML) INTRAVENOUS ONCE
Qty: 0 | Refills: 0 | Status: DISCONTINUED | OUTPATIENT
Start: 2018-06-20 | End: 2018-06-25

## 2018-06-20 RX ORDER — LISINOPRIL 2.5 MG/1
20 TABLET ORAL DAILY
Qty: 0 | Refills: 0 | Status: DISCONTINUED | OUTPATIENT
Start: 2018-06-20 | End: 2018-06-25

## 2018-06-20 RX ORDER — SODIUM CHLORIDE 9 MG/ML
1000 INJECTION INTRAMUSCULAR; INTRAVENOUS; SUBCUTANEOUS
Qty: 0 | Refills: 0 | Status: DISCONTINUED | OUTPATIENT
Start: 2018-06-20 | End: 2018-06-25

## 2018-06-20 RX ORDER — DEXTROSE 50 % IN WATER 50 %
12.5 SYRINGE (ML) INTRAVENOUS ONCE
Qty: 0 | Refills: 0 | Status: DISCONTINUED | OUTPATIENT
Start: 2018-06-20 | End: 2018-06-25

## 2018-06-20 RX ORDER — ENOXAPARIN SODIUM 100 MG/ML
40 INJECTION SUBCUTANEOUS EVERY 24 HOURS
Qty: 0 | Refills: 0 | Status: DISCONTINUED | OUTPATIENT
Start: 2018-06-20 | End: 2018-06-25

## 2018-06-20 RX ORDER — INSULIN LISPRO 100/ML
4 VIAL (ML) SUBCUTANEOUS
Qty: 0 | Refills: 0 | Status: DISCONTINUED | OUTPATIENT
Start: 2018-06-20 | End: 2018-06-23

## 2018-06-20 RX ORDER — KETOROLAC TROMETHAMINE 30 MG/ML
30 SYRINGE (ML) INJECTION ONCE
Qty: 0 | Refills: 0 | Status: DISCONTINUED | OUTPATIENT
Start: 2018-06-20 | End: 2018-06-20

## 2018-06-20 RX ORDER — ENOXAPARIN SODIUM 100 MG/ML
40 INJECTION SUBCUTANEOUS EVERY 24 HOURS
Qty: 0 | Refills: 0 | Status: DISCONTINUED | OUTPATIENT
Start: 2018-06-20 | End: 2018-06-20

## 2018-06-20 RX ORDER — SODIUM CHLORIDE 9 MG/ML
1000 INJECTION, SOLUTION INTRAVENOUS
Qty: 0 | Refills: 0 | Status: DISCONTINUED | OUTPATIENT
Start: 2018-06-20 | End: 2018-06-25

## 2018-06-20 RX ORDER — ASPIRIN/CALCIUM CARB/MAGNESIUM 324 MG
81 TABLET ORAL DAILY
Qty: 0 | Refills: 0 | Status: DISCONTINUED | OUTPATIENT
Start: 2018-06-20 | End: 2018-06-25

## 2018-06-20 RX ORDER — KETOROLAC TROMETHAMINE 30 MG/ML
30 SYRINGE (ML) INJECTION EVERY 6 HOURS
Qty: 0 | Refills: 0 | Status: DISCONTINUED | OUTPATIENT
Start: 2018-06-20 | End: 2018-06-20

## 2018-06-20 RX ORDER — INSULIN LISPRO 100/ML
VIAL (ML) SUBCUTANEOUS
Qty: 0 | Refills: 0 | Status: DISCONTINUED | OUTPATIENT
Start: 2018-06-20 | End: 2018-06-25

## 2018-06-20 RX ORDER — DEXTROSE 50 % IN WATER 50 %
25 SYRINGE (ML) INTRAVENOUS ONCE
Qty: 0 | Refills: 0 | Status: DISCONTINUED | OUTPATIENT
Start: 2018-06-20 | End: 2018-06-25

## 2018-06-20 RX ORDER — INSULIN GLARGINE 100 [IU]/ML
11 INJECTION, SOLUTION SUBCUTANEOUS EVERY MORNING
Qty: 0 | Refills: 0 | Status: DISCONTINUED | OUTPATIENT
Start: 2018-06-20 | End: 2018-06-23

## 2018-06-20 RX ORDER — BUPROPION HYDROCHLORIDE 150 MG/1
300 TABLET, EXTENDED RELEASE ORAL DAILY
Qty: 0 | Refills: 0 | Status: DISCONTINUED | OUTPATIENT
Start: 2018-06-20 | End: 2018-06-25

## 2018-06-20 RX ORDER — SALIVA SUBSTITUTE COMB NO.11 351 MG
30 POWDER IN PACKET (EA) MUCOUS MEMBRANE
Qty: 0 | Refills: 0 | Status: DISCONTINUED | OUTPATIENT
Start: 2018-06-20 | End: 2018-06-25

## 2018-06-20 RX ORDER — GLUCAGON INJECTION, SOLUTION 0.5 MG/.1ML
1 INJECTION, SOLUTION SUBCUTANEOUS ONCE
Qty: 0 | Refills: 0 | Status: DISCONTINUED | OUTPATIENT
Start: 2018-06-20 | End: 2018-06-25

## 2018-06-20 RX ORDER — ENOXAPARIN SODIUM 100 MG/ML
0 INJECTION SUBCUTANEOUS
Qty: 0 | Refills: 0 | COMMUNITY

## 2018-06-20 RX ORDER — AMLODIPINE BESYLATE 2.5 MG/1
5 TABLET ORAL DAILY
Qty: 0 | Refills: 0 | Status: DISCONTINUED | OUTPATIENT
Start: 2018-06-20 | End: 2018-06-21

## 2018-06-20 RX ORDER — ATORVASTATIN CALCIUM 80 MG/1
10 TABLET, FILM COATED ORAL DAILY
Qty: 0 | Refills: 0 | Status: DISCONTINUED | OUTPATIENT
Start: 2018-06-20 | End: 2018-06-25

## 2018-06-20 RX ORDER — SODIUM CHLORIDE 9 MG/ML
1000 INJECTION INTRAMUSCULAR; INTRAVENOUS; SUBCUTANEOUS
Qty: 0 | Refills: 0 | Status: DISCONTINUED | OUTPATIENT
Start: 2018-06-20 | End: 2018-06-20

## 2018-06-20 RX ORDER — FOLIC ACID 0.8 MG
2 TABLET ORAL DAILY
Qty: 0 | Refills: 0 | Status: DISCONTINUED | OUTPATIENT
Start: 2018-06-20 | End: 2018-06-25

## 2018-06-20 RX ORDER — INSULIN NPH HUM/REG INSULIN HM 70-30/ML
0 VIAL (ML) SUBCUTANEOUS
Qty: 0 | Refills: 0 | COMMUNITY

## 2018-06-20 RX ADMIN — SODIUM CHLORIDE 50 MILLILITER(S): 9 INJECTION INTRAMUSCULAR; INTRAVENOUS; SUBCUTANEOUS at 19:39

## 2018-06-20 RX ADMIN — Medication 30 MILLILITER(S): at 23:22

## 2018-06-20 RX ADMIN — Medication 30 MILLIGRAM(S): at 18:10

## 2018-06-20 RX ADMIN — OXYCODONE HYDROCHLORIDE 5 MILLIGRAM(S): 5 TABLET ORAL at 21:32

## 2018-06-20 RX ADMIN — Medication 30 MILLIGRAM(S): at 18:25

## 2018-06-20 RX ADMIN — SODIUM CHLORIDE 1000 MILLILITER(S): 9 INJECTION INTRAMUSCULAR; INTRAVENOUS; SUBCUTANEOUS at 13:55

## 2018-06-20 RX ADMIN — Medication 100 MILLIGRAM(S): at 15:32

## 2018-06-20 RX ADMIN — Medication 100 MILLIGRAM(S): at 21:33

## 2018-06-20 RX ADMIN — ENOXAPARIN SODIUM 40 MILLIGRAM(S): 100 INJECTION SUBCUTANEOUS at 21:29

## 2018-06-20 RX ADMIN — ATORVASTATIN CALCIUM 10 MILLIGRAM(S): 80 TABLET, FILM COATED ORAL at 21:29

## 2018-06-20 RX ADMIN — OXYCODONE HYDROCHLORIDE 5 MILLIGRAM(S): 5 TABLET ORAL at 22:52

## 2018-06-20 NOTE — ED PROVIDER NOTE - PHYSICAL EXAMINATION
CONST: Well appearing in NAD  EYES: PERRL, EOMI, Sclera and conjunctiva clear.   ENT: + swelling to right submandibular gland, mild erythema, no sublingual swelling or drooling, No nasal discharge. TM's clear B/L without drainage. Oropharynx normal appearing, no erythema or exudates. Uvula midline.  NECK: Non-tender, no meningeal signs  CARD: Normal S1 S2; Normal rate and rhythm  RESP: Equal BS B/L, No wheezes, rhonchi or rales. No distress  GI: Soft, non-tender, non-distended.  MS: Normal ROM in all extremities. No midline spinal tenderness.  SKIN: Warm, dry, no acute rashes. Good turgor  NEURO: A&Ox3, No focal deficits. Strength 5/5 with no sensory deficits. Steady gait

## 2018-06-20 NOTE — ED PROVIDER NOTE - OBJECTIVE STATEMENT
78 year old female with pmhx of DM, RA, and salivary gland stone, presents with right facial pain and swelling. pt was diagnosed with infected stone few weeks ago on CT scan. Pt following up with dr narayanan from ENT, seen in ED on sunday, given clindamycin. Pt denies fever, chills, drooling, N/V/D, or abdominal pain.

## 2018-06-20 NOTE — ED PROVIDER NOTE - ATTENDING CONTRIBUTION TO CARE
78y f hx HTN, DM, RA on methotexate presents w L sided face pain. 78y f hx HTN, DM, RA on methotexate presents w R sided face pain. Patient initially seen for this 5/1, CT at that time showed R submandibular gland inflammation 2/2 obstructing calculus. Has also been seen earlier this week in ED for similar. Patient has been on abx (clinda), and then after f/u with Dr. Carver, increased frequency of abx. However, patient without resolution of sx - pain, swelling, difficulty eating. Patient denies fever or systemic sx. No difficulty breathing. No HA, dizziness, CP, SOB, abdominal pain, n/v/d. Exam: WDWN NAD comfortable appearing and conversing appropriately, speaking in full sentences, no drooling or stridor. NCAT neck FROM no ttp and no meningismus. R submandibular region w edema, erythema, warmth, and ttp. OP clear, no airway edema. MMM. Skin warm and dry.  S1S2 RRR, equal pulses b/l, lungs CTAB, no w/r/r, abdomen soft NTND, no r/g, no CVAT, no suprapubic ttp. No LE edema. A&O, normal strength and sensation, neuro nonfocal. A/P - submandibular infection with failure outpatient abx - ENT consult, labs, abx, ivf, reassess, admit.

## 2018-06-20 NOTE — H&P ADULT - NSHPREVIEWOFSYSTEMS_GEN_ALL_CORE
: Review of Systems:  •	CONSTITUTIONAL - no fever, no diaphoresis, no chills  •	SKIN - no rash  •	EYES - no eye pain, no blurry vision  •	ENT - + right facial pain and swelling   •	RESPIRATORY - no shortness of breath, no cough  •	CARDIAC - no chest pain, no palpitations  •	GI - no abd pain, no nausea, no vomiting, no diarrhea, no constipation  •	GENITO-URINARY - no discharge, no dysuria; no hematuria, no increased urinary frequency  •	MUSCULOSKELETAL - no joint paint, no swelling, no redness  •	NEUROLOGIC - no weakness, no headache, no paresthesias, no LOC •CONSTITUTIONAL - Chills, no fever, no diaphoresis,   •SKIN - no rash  •EYES - no eye pain, no blurry vision  •ENT - + right facial pain and swelling ; Neck pain, Dysphagia   •RESPIRATORY - no shortness of breath, no cough  •CARDIAC - no chest pain, no palpitations  •GI - no abd pain, no nausea, no vomiting, no diarrhea, no constipation  •GENITO-URINARY - no discharge, no dysuria; no hematuria, no increased urinary frequency  •MUSCULOSKELETAL - no joint paint, no swelling, no redness  •NEUROLOGIC - no weakness, no headache, no paresthesias, no LOC

## 2018-06-20 NOTE — ED PROVIDER NOTE - NS ED ROS FT
Review of Systems:  	•	CONSTITUTIONAL - no fever, no diaphoresis, no chills  	•	SKIN - no rash  	•	EYES - no eye pain, no blurry vision  	•	ENT - + right facial pain and swelling   	•	RESPIRATORY - no shortness of breath, no cough  	•	CARDIAC - no chest pain, no palpitations  	•	GI - no abd pain, no nausea, no vomiting, no diarrhea, no constipation  	•	GENITO-URINARY - no discharge, no dysuria; no hematuria, no increased urinary frequency  	•	MUSCULOSKELETAL - no joint paint, no swelling, no redness  	•	NEUROLOGIC - no weakness, no headache, no paresthesias, no LOC

## 2018-06-20 NOTE — H&P ADULT - ASSESSMENT
79 yo F with PMHx of DM, RA on immunosuppression therapy, HTN, Depression and recurrent right sided sialoadenitis was sent to ED by ENT to ED for the right sided neck and face pain and dysphagia for one week.    # Right Sided submandibular sialoadenitis and sialolithiasis   - IV Clindamycin 300mg TID   - IV hydration   - ENT Follow up   - Spoke with the ENT  Continue IV fluids, Antibiotics and Pain control, No surgery at this time because of active infection   - Pain control with IV ketorolac   - Warm compresses   - ID consult     #HTN  - Continue home medications Amlodipine and Lisinopril ( instead of Benzapril)     # Rheumatoid arthritis   -Continue MTX and Prednisone   - Folic acid     # DM  - Check fingersticks and Insulin scale     # Depression  - Continue Bupropion and Viibryd ( Non formulary Daughter will bring medication tomorrow)     #DVT ppx: Lovenox    # Dispo: Home 79 yo F with PMHx of DM, RA on immunosuppression therapy, HTN, Depression and recurrent right sided sialoadenitis was sent to ED by ENT to ED for the right sided neck and face pain and dysphagia for one week.    # Right Sided submandibular sialoadenitis and sialolithiasis   - IV Clindamycin 900mg TID   - IV hydration NS 75 cc/hr   - ENT Follow up   - Spoke with the ENT  Continue IV fluids, Antibiotics and Pain control, No surgery at this time because of active infection   - Pain control with IV ketorolac   - Warm compresses   - ID consult     #HTN  - Continue home medications Amlodipine and Lisinopril ( instead of Benzapril)     # Rheumatoid arthritis   -Continue MTX and Prednisone   - Folic acid     # DM  - Check fingersticks and Insulin scale     # Depression  - Continue Bupropion and Viibryd ( Non formulary Daughter will bring medication tomorrow)     #DVT ppx: Lovenox    # Dispo: Home 77 yo F with PMHx of DM, RA on immunosuppression therapy, HTN, Depression and recurrent right sided sialoadenitis was sent to ED by ENT to ED for the right sided neck and face pain and dysphagia for one week.    # Right Sided submandibular sialoadenitis and sialolithiasis   - IV Clindamycin 900mg TID   - IV hydration NS 50 cc/hr   - ENT Follow up   - Spoke with the ENT  Continue IV fluids, Antibiotics and Pain control, No surgery at this time because of active infection   - Consider Ketorolac   - Warm compresses and sialogogue   - ID consult     #HTN  - Continue home medications Amlodipine and Lisinopril ( instead of Benzapril)     # Rheumatoid arthritis   -Continue MTX and Prednisone   - Folic acid     # DM  - Check fingersticks and Insulin scale     # Depression  - Continue Bupropion and Viibryd ( Non formulary Daughter will bring medication tomorrow)     #DVT ppx: Lovenox    # Dispo: Home

## 2018-06-20 NOTE — H&P ADULT - ATTENDING COMMENTS
Patient seen and examined independently. I agree with the resident's note, physical exam, and plan except as below.    #right sided submandibular swelling due to sialdentitis (acute on chronic) and sialolith  - Iv unasyn , check blood culture  =pain control , ENT follow up - no surgical intervention at this time - will need surgical fixation after acute infection clears  #dysphagia and odynophagia due to above - pt has not been able to eat or drink - keep IVFs  #DM II - monitor finger sticks - if >200 then can cover with insulin as she is not eating     discussed in detail with family at bedside

## 2018-06-20 NOTE — H&P ADULT - NSHPLABSRESULTS_GEN_ALL_CORE
06-20    135  |  95<L>  |  14  ----------------------------<  244<H>  5.0   |  27  |  0.7    Ca    9.2      20 Jun 2018 13:21    TPro  7.1  /  Alb  4.0  /  TBili  0.5  /  DBili  x   /  AST  14  /  ALT  14  /  AlkPhos  100  06-20                          12.6   12.76 )-----------( 261      ( 20 Jun 2018 13:21 )             40.4      < from: CT Neck Soft Tissue w/ IV Cont (05.01.18 @ 15:07) >    1.  Right submandibular gland inflammation due to an obstructive calculus   measuring 1 cm at the level of the gland hilum/proximal duct.   Intraglandular ductal dilatation as well as an additional 2.5 mm calculus   within the gland are noted. Surrounding inflammatory changes without   abscess.    2.  Thickened/edematous appearance of the epiglottis and bilateral   aryepiglottic folds, recommend follow-up with direct visualization.

## 2018-06-20 NOTE — H&P ADULT - PMH
Diabetes    HTN (hypertension)    Rheumatoid arthritis Depression    Diabetes    HTN (hypertension)    Rheumatoid arthritis

## 2018-06-20 NOTE — ED ADULT NURSE NOTE - OBJECTIVE STATEMENT
pt came to the ER today with c/o salivary gland pain. pt states she was recently here on sunday was given antibiotics but cannot swallow and is in pain

## 2018-06-20 NOTE — H&P ADULT - NSHPPHYSICALEXAM_GEN_ALL_CORE
PHYSICAL EXAM:      Constitutional: Well nourished, NAD     HEENT:  Right facial swelling and redness and tenderness over the face and neck     Respiratory: CTABL    Cardiovascular: Normal S1 S2 no m/r/g     Gastrointestinal: Soft, Nontender, No HSM, BS +     Extremities: No edema     Neurological: AA0 x3 Non focal

## 2018-06-20 NOTE — CONSULT NOTE ADULT - ASSESSMENT
78y old Female with recurrent R submandibular sialoadenitis 2* 1cm obstructing sialolith. No improvement on PO Clinda.     PLAN:  1.	  2.	Encourage hydration  3.	Sialogogues  4.	Warm compresses  5. 78y old Female with recurrent R submandibular sialoadenitis 2* 1cm obstructing sialolith. No improvement on PO Clinda.     PLAN:  1.	Cont IV abx  2.	F/u culture sent from office yesterday  3.	IVF hydration  4.	Full liquid to soft diet as tolerated  5.	Sialogogues  6.	Warm compresses  7.	IV Toradol for pain if no CI 78y old Female with recurrent R submandibular sialoadenitis 2* 1cm obstructing sialolith. +Odynophagia/dysphagia with R submandibular swelling -- no improvement on PO Clinda.     PLAN:  1.	Cont IV abx  2.	F/u culture sent from office yesterday  3.	IVF hydration  4.	Full liquid to soft diet as tolerated  5.	Sialogogues  6.	Warm compresses/gland massage  7.	IV Toradol for pain if no CI

## 2018-06-20 NOTE — H&P ADULT - HISTORY OF PRESENT ILLNESS
77 yo F with PMHx of DM, RA on immunosuppression therapy, HTN and recurrent right sided sialoadenitis presents to ED for the right sided neck and face pain. The patient came to ED for the 6/17 for the same symptoms and was sent home with PO Clindamycin. Patient was seen by Dr. Loera yesterday in the office and Clindamycin frequency  was increased to toTID but Patient had no improvement in symptoms.    Patient is scheduled for elective surgery 7/12/2018. 77 yo F with PMHx of DM, RA on immunosuppression therapy, HTN, Depression and recurrent right sided sialoadenitis presents to ED for the right sided neck and face pain and dysphagia for one week. The patient came to ED for the 6/17 for the same symptoms and was sent home with PO Clindamycin. Patient was seen by Dr. Loera yesterday in the office and Clindamycin frequency was increased to TID but Patient had no improvement in symptoms so she was sent to ED for the iv antibiotics.     Patient is scheduled for elective surgery 7/12/2018.

## 2018-06-20 NOTE — CONSULT NOTE ADULT - SUBJECTIVE AND OBJECTIVE BOX
78y old Female admitted April/May 2018 for R submandibular sialoadenitis 2* 1cm obstructing sialolith. Pt was given IVF hydration and Unasyn, d/c home on PO amoxicillin x10 days which was completed about 5 wks ago. Pt states symptoms completely resolved but returned 7 days ago, +odynophagia and dysphagia with R submandibular swelling. Of note, pt was seen in ER 6/17 for same sxs, started on PO Clinda without improvement. No fever/chills, resp distress/SOB. Tolerating PO liquids and solids. 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 Loera.    Vital Signs Last 24 Hrs  T(F): 98.3 (20 Jun 2018 12:32), Max: 98.3 (20 Jun 2018 12:32)  HR: 89 (20 Jun 2018 12:32) (89 - 89)  BP: 130/63 (20 Jun 2018 12:32) (130/63 - 130/63)  RR: 18 (20 Jun 2018 12:32) (18 - 18)  SpO2: 98% (20 Jun 2018 12:32) (98% - 98%)  GEN:  HEENT: R submandibular gland with firm edema, no fluctuance, minimal TTP, +pus expressed from duct (about 2cc) with gland massage, FOM soft/NT, oral mucosa pink, no erythema/edema, uvula midline    (CT 5/1/18: enlargement and edema of the right submandibular gland with   intraglandular ductal dilatation. There is an obstructing calculus at the   level of the submandibular gland hilum/proximal duct measuring 1.0 x 0.9   x 0.7 cm (transverse, AP, CC). Additional calculus noted within the right   submandibular gland measuring 2.5 x2 x 2 mm. There are surrounding   inflammatory changes with subcutaneous fat stranding, thickening of the   platysma muscle and multiple presumably reactive lymph nodes. 78y old Female admitted April/May 2018 for R submandibular sialoadenitis 2* 1cm obstructing sialolith. Pt was given IVF hydration and Unasyn, d/c home on PO amoxicillin x10 days which was completed about 5 wks ago. Pt states symptoms completely resolved but returned 7 days ago, +odynophagia and dysphagia with R submandibular swelling. Of note, pt was seen in ER 6/17 for same sxs, started on PO Clinda without improvement. Pt was seen yesterday in ENT Dr. Loera office & Clinda PO was increased to TID. No fever/chills, resp distress/SOB. Tolerating PO w significant discomfort. Pt scheduled to undergo SMG resection in July.    Vital Signs Last 24 Hrs  T(F): 98.3 (20 Jun 2018 12:32), Max: 98.3 (20 Jun 2018 12:32)  HR: 89 (20 Jun 2018 12:32) (89 - 89)  BP: 130/63 (20 Jun 2018 12:32) (130/63 - 130/63)  RR: 18 (20 Jun 2018 12:32) (18 - 18)  SpO2: 98% (20 Jun 2018 12:32) (98% - 98%)  GEN: ALert, NAD  HEENT: R submandibular gland with firm edema, no fluctuance, minimal TTP, no pus expressed from duct with gland massage, FOM soft/NT, oral mucosa pink, no erythema/edema, uvula midline    (CT 5/1/18: enlargement and edema of the right submandibular gland with   intraglandular ductal dilatation. There is an obstructing calculus at the   level of the submandibular gland hilum/proximal duct measuring 1.0 x 0.9   x 0.7 cm (transverse, AP, CC). Additional calculus noted within the right   submandibular gland measuring 2.5 x2 x 2 mm. There are surrounding   inflammatory changes with subcutaneous fat stranding, thickening of the   platysma muscle and multiple presumably reactive lymph nodes. 78y old Female admitted April/May 2018 for R submandibular sialoadenitis 2* 1cm obstructing sialolith. Pt was given IVF hydration and Unasyn, d/c home on PO amoxicillin x10 days which was completed about 5 wks ago. Pt states symptoms completely resolved but returned 7 days ago, +odynophagia and dysphagia with R submandibular swelling. Of note, pt was seen in ER 6/17 for same sxs, started on PO Clinda without improvement. Pt was seen yesterday in ENT Dr. Loera office & Clinda PO was increased to TID. No fever/chills, resp distress/SOB. Tolerating PO w significant discomfort. Pt scheduled to undergo SMG resection in July.    PAST MEDICAL & SURGICAL HISTORY:  Depression  Rheumatoid arthritis  HTN (hypertension)  Diabetes  No significant past surgical history  HOME MEDS:  amlodipine-benazepril 5 mg-20 mg oral capsule: 1 cap(s) orally once a day (01 May 2018 23:34)  Aspir-Low 81 mg oral delayed release tablet: 1 tab(s) orally once a day (01 May 2018 23:34)  atorvastatin 10 mg oral tablet: 1 tab(s) orally once a day (01 May 2018 23:34)  buPROPion 300 mg/24 hours (XL) oral tablet, extended release: 1 tab(s) orally every 24 hours (01 May 2018 23:34)  folic acid 1 mg oral tablet: 2 tab(s) orally once a day (01 May 2018 23:34)  glipiZIDE 5 mg oral tablet: 1 tab(s) orally once a day (01 May 2018 23:34)  HumuLIN 70/30 KwikPen 70 units-30 units/mL subcutaneous suspension: subcutaneous 2 times a day -30 units based on fs in am (20 Jun 2018 16:09)  methotrexate 2.5 mg oral tablet: 4 tab(s) orally once a week, on Wednesday (03 May 2018 14:47)  predniSONE 2.5 mg oral tablet: 1 tab(s) orally once a day (01 May 2018 23:34)  Viibryd 40 mg oral tablet: 1 tab(s) orally once a day (01 May 2018 23:34)  ALLERGIES: No Known Allergies    PHYSICAL EXAM:  VS: (20 Jun 2018 12:32) T(F): 98.3, HR: 89, BP: 130/63, RR: 18, SpO2: 98%  GEN: ALert, NAD. No drooling/pooling of secretions. Resp even/unlabored.  HEENT: R submandibular gland with firm edema, no fluctuance, minimal TTP, no pus expressed from duct with gland massage, FOM soft/NT, oral mucosa pink, no erythema/edema, uvula midline    LABS/IMAGING:    (CT 5/1/18: enlargement and edema of the right submandibular gland with   intraglandular ductal dilatation. There is an obstructing calculus at the   level of the submandibular gland hilum/proximal duct measuring 1.0 x 0.9   x 0.7 cm (transverse, AP, CC). Additional calculus noted within the right   submandibular gland measuring 2.5 x2 x 2 mm. There are surrounding   inflammatory changes with subcutaneous fat stranding, thickening of the   platysma muscle and multiple presumably reactive lymph nodes.                        12.6   12.76 )-----------( 261      ( 20 Jun 2018 13:21 )             40.4     06-20    135  |  95<L>  |  14  ----------------------------<  244<H>  5.0   |  27  |  0.7    Ca    9.2      20 Jun 2018 13:21    TPro  7.1  /  Alb  4.0  /  TBili  0.5  /  DBili  x   /  AST  14  /  ALT  14  /  AlkPhos  100  06-20

## 2018-06-20 NOTE — ED ADULT TRIAGE NOTE - CHIEF COMPLAINT QUOTE
Pt returns to ED for infected salivary gland, has been on po abx, swelling & pain is getting worse, sent by MD for poss IV abx

## 2018-06-21 DIAGNOSIS — Z02.9 ENCOUNTER FOR ADMINISTRATIVE EXAMINATIONS, UNSPECIFIED: ICD-10-CM

## 2018-06-21 LAB
ALBUMIN SERPL ELPH-MCNC: 3.4 G/DL — LOW (ref 3.5–5.2)
ALP SERPL-CCNC: 86 U/L — SIGNIFICANT CHANGE UP (ref 30–115)
ALT FLD-CCNC: 11 U/L — SIGNIFICANT CHANGE UP (ref 0–41)
ANION GAP SERPL CALC-SCNC: 13 MMOL/L — SIGNIFICANT CHANGE UP (ref 7–14)
AST SERPL-CCNC: 11 U/L — SIGNIFICANT CHANGE UP (ref 0–41)
BILIRUB SERPL-MCNC: 0.5 MG/DL — SIGNIFICANT CHANGE UP (ref 0.2–1.2)
BUN SERPL-MCNC: 10 MG/DL — SIGNIFICANT CHANGE UP (ref 10–20)
CALCIUM SERPL-MCNC: 8.6 MG/DL — SIGNIFICANT CHANGE UP (ref 8.5–10.1)
CHLORIDE SERPL-SCNC: 99 MMOL/L — SIGNIFICANT CHANGE UP (ref 98–110)
CO2 SERPL-SCNC: 26 MMOL/L — SIGNIFICANT CHANGE UP (ref 17–32)
CREAT SERPL-MCNC: 0.7 MG/DL — SIGNIFICANT CHANGE UP (ref 0.7–1.5)
GLUCOSE SERPL-MCNC: 227 MG/DL — HIGH (ref 70–99)
HCT VFR BLD CALC: 37.2 % — SIGNIFICANT CHANGE UP (ref 37–47)
HGB BLD-MCNC: 11.6 G/DL — LOW (ref 12–16)
MCHC RBC-ENTMCNC: 23.4 PG — LOW (ref 27–31)
MCHC RBC-ENTMCNC: 31.2 G/DL — LOW (ref 32–37)
MCV RBC AUTO: 75.2 FL — LOW (ref 81–99)
NRBC # BLD: 0 /100 WBCS — SIGNIFICANT CHANGE UP (ref 0–0)
PLATELET # BLD AUTO: 251 K/UL — SIGNIFICANT CHANGE UP (ref 130–400)
POTASSIUM SERPL-MCNC: 4.7 MMOL/L — SIGNIFICANT CHANGE UP (ref 3.5–5)
POTASSIUM SERPL-SCNC: 4.7 MMOL/L — SIGNIFICANT CHANGE UP (ref 3.5–5)
PROT SERPL-MCNC: 6.2 G/DL — SIGNIFICANT CHANGE UP (ref 6–8)
RBC # BLD: 4.95 M/UL — SIGNIFICANT CHANGE UP (ref 4.2–5.4)
RBC # FLD: 16 % — HIGH (ref 11.5–14.5)
SODIUM SERPL-SCNC: 138 MMOL/L — SIGNIFICANT CHANGE UP (ref 135–146)
WBC # BLD: 9.63 K/UL — SIGNIFICANT CHANGE UP (ref 4.8–10.8)
WBC # FLD AUTO: 9.63 K/UL — SIGNIFICANT CHANGE UP (ref 4.8–10.8)

## 2018-06-21 RX ORDER — METHOTREXATE 2.5 MG/1
10 TABLET ORAL ONCE
Qty: 0 | Refills: 0 | Status: COMPLETED | OUTPATIENT
Start: 2018-06-21 | End: 2018-06-21

## 2018-06-21 RX ORDER — HYDROMORPHONE HYDROCHLORIDE 2 MG/ML
0.5 INJECTION INTRAMUSCULAR; INTRAVENOUS; SUBCUTANEOUS ONCE
Qty: 0 | Refills: 0 | Status: DISCONTINUED | OUTPATIENT
Start: 2018-06-21 | End: 2018-06-21

## 2018-06-21 RX ORDER — OXYCODONE HYDROCHLORIDE 5 MG/1
5 TABLET ORAL EVERY 4 HOURS
Qty: 0 | Refills: 0 | Status: DISCONTINUED | OUTPATIENT
Start: 2018-06-21 | End: 2018-06-25

## 2018-06-21 RX ORDER — AMLODIPINE BESYLATE 2.5 MG/1
2.5 TABLET ORAL DAILY
Qty: 0 | Refills: 0 | Status: DISCONTINUED | OUTPATIENT
Start: 2018-06-21 | End: 2018-06-25

## 2018-06-21 RX ORDER — AMPICILLIN SODIUM AND SULBACTAM SODIUM 250; 125 MG/ML; MG/ML
3 INJECTION, POWDER, FOR SUSPENSION INTRAMUSCULAR; INTRAVENOUS ONCE
Qty: 0 | Refills: 0 | Status: COMPLETED | OUTPATIENT
Start: 2018-06-21 | End: 2018-06-21

## 2018-06-21 RX ORDER — AMPICILLIN SODIUM AND SULBACTAM SODIUM 250; 125 MG/ML; MG/ML
3 INJECTION, POWDER, FOR SUSPENSION INTRAMUSCULAR; INTRAVENOUS EVERY 6 HOURS
Qty: 0 | Refills: 0 | Status: DISCONTINUED | OUTPATIENT
Start: 2018-06-21 | End: 2018-06-25

## 2018-06-21 RX ORDER — HYDROMORPHONE HYDROCHLORIDE 2 MG/ML
0.5 INJECTION INTRAMUSCULAR; INTRAVENOUS; SUBCUTANEOUS EVERY 4 HOURS
Qty: 0 | Refills: 0 | Status: DISCONTINUED | OUTPATIENT
Start: 2018-06-21 | End: 2018-06-22

## 2018-06-21 RX ORDER — MORPHINE SULFATE 50 MG/1
4 CAPSULE, EXTENDED RELEASE ORAL EVERY 4 HOURS
Qty: 0 | Refills: 0 | Status: DISCONTINUED | OUTPATIENT
Start: 2018-06-21 | End: 2018-06-21

## 2018-06-21 RX ORDER — AMPICILLIN SODIUM AND SULBACTAM SODIUM 250; 125 MG/ML; MG/ML
INJECTION, POWDER, FOR SUSPENSION INTRAMUSCULAR; INTRAVENOUS
Qty: 0 | Refills: 0 | Status: DISCONTINUED | OUTPATIENT
Start: 2018-06-21 | End: 2018-06-25

## 2018-06-21 RX ADMIN — HYDROMORPHONE HYDROCHLORIDE 0.5 MILLIGRAM(S): 2 INJECTION INTRAMUSCULAR; INTRAVENOUS; SUBCUTANEOUS at 13:50

## 2018-06-21 RX ADMIN — AMPICILLIN SODIUM AND SULBACTAM SODIUM 200 GRAM(S): 250; 125 INJECTION, POWDER, FOR SUSPENSION INTRAMUSCULAR; INTRAVENOUS at 23:27

## 2018-06-21 RX ADMIN — HYDROMORPHONE HYDROCHLORIDE 0.5 MILLIGRAM(S): 2 INJECTION INTRAMUSCULAR; INTRAVENOUS; SUBCUTANEOUS at 16:05

## 2018-06-21 RX ADMIN — AMPICILLIN SODIUM AND SULBACTAM SODIUM 200 GRAM(S): 250; 125 INJECTION, POWDER, FOR SUSPENSION INTRAMUSCULAR; INTRAVENOUS at 10:08

## 2018-06-21 RX ADMIN — Medication 2: at 11:37

## 2018-06-21 RX ADMIN — OXYCODONE HYDROCHLORIDE 5 MILLIGRAM(S): 5 TABLET ORAL at 09:24

## 2018-06-21 RX ADMIN — AMPICILLIN SODIUM AND SULBACTAM SODIUM 200 GRAM(S): 250; 125 INJECTION, POWDER, FOR SUSPENSION INTRAMUSCULAR; INTRAVENOUS at 18:35

## 2018-06-21 RX ADMIN — ATORVASTATIN CALCIUM 10 MILLIGRAM(S): 80 TABLET, FILM COATED ORAL at 11:35

## 2018-06-21 RX ADMIN — BUPROPION HYDROCHLORIDE 300 MILLIGRAM(S): 150 TABLET, EXTENDED RELEASE ORAL at 11:34

## 2018-06-21 RX ADMIN — Medication 2 MILLIGRAM(S): at 11:34

## 2018-06-21 RX ADMIN — LISINOPRIL 20 MILLIGRAM(S): 2.5 TABLET ORAL at 06:36

## 2018-06-21 RX ADMIN — ENOXAPARIN SODIUM 40 MILLIGRAM(S): 100 INJECTION SUBCUTANEOUS at 20:31

## 2018-06-21 RX ADMIN — HYDROMORPHONE HYDROCHLORIDE 0.5 MILLIGRAM(S): 2 INJECTION INTRAMUSCULAR; INTRAVENOUS; SUBCUTANEOUS at 20:25

## 2018-06-21 RX ADMIN — Medication 4 UNIT(S): at 11:36

## 2018-06-21 RX ADMIN — INSULIN GLARGINE 11 UNIT(S): 100 INJECTION, SOLUTION SUBCUTANEOUS at 10:07

## 2018-06-21 RX ADMIN — Medication 30 MILLILITER(S): at 06:37

## 2018-06-21 RX ADMIN — AMLODIPINE BESYLATE 2.5 MILLIGRAM(S): 2.5 TABLET ORAL at 06:35

## 2018-06-21 RX ADMIN — Medication 100 MILLIGRAM(S): at 06:36

## 2018-06-21 RX ADMIN — OXYCODONE HYDROCHLORIDE 5 MILLIGRAM(S): 5 TABLET ORAL at 09:21

## 2018-06-21 RX ADMIN — Medication 30 MILLILITER(S): at 23:28

## 2018-06-21 RX ADMIN — Medication 2.5 MILLIGRAM(S): at 06:37

## 2018-06-21 RX ADMIN — Medication 30 MILLILITER(S): at 11:35

## 2018-06-21 RX ADMIN — SODIUM CHLORIDE 50 MILLILITER(S): 9 INJECTION INTRAMUSCULAR; INTRAVENOUS; SUBCUTANEOUS at 20:32

## 2018-06-21 RX ADMIN — HYDROMORPHONE HYDROCHLORIDE 0.5 MILLIGRAM(S): 2 INJECTION INTRAMUSCULAR; INTRAVENOUS; SUBCUTANEOUS at 16:04

## 2018-06-21 RX ADMIN — METHOTREXATE 10 MILLIGRAM(S): 2.5 TABLET ORAL at 06:35

## 2018-06-21 RX ADMIN — HYDROMORPHONE HYDROCHLORIDE 0.5 MILLIGRAM(S): 2 INJECTION INTRAMUSCULAR; INTRAVENOUS; SUBCUTANEOUS at 14:19

## 2018-06-21 RX ADMIN — HYDROMORPHONE HYDROCHLORIDE 0.5 MILLIGRAM(S): 2 INJECTION INTRAMUSCULAR; INTRAVENOUS; SUBCUTANEOUS at 20:27

## 2018-06-21 RX ADMIN — Medication 30 MILLILITER(S): at 18:35

## 2018-06-21 RX ADMIN — Medication 81 MILLIGRAM(S): at 11:34

## 2018-06-21 RX ADMIN — AMPICILLIN SODIUM AND SULBACTAM SODIUM 200 GRAM(S): 250; 125 INJECTION, POWDER, FOR SUSPENSION INTRAMUSCULAR; INTRAVENOUS at 20:29

## 2018-06-21 NOTE — CONSULT NOTE ADULT - ENMT COMMENTS
pain right submandibular area. extensive edema/ erythema/ induration/ pain right submandibular area.

## 2018-06-21 NOTE — CONSULT NOTE ADULT - SUBJECTIVE AND OBJECTIVE BOX
JOY ARAGON  78y, Female  Allergy: No Known Allergies      HPI:  77 yo F with PMHx of DM, RA on immunosuppression therapy, HTN, Depression and recurrent right sided sialoadenitis presents to ED for the right sided neck and face pain and dysphagia for one week. The patient came to ED for the 6/17 for the same symptoms and was sent home with PO Clindamycin. Patient was seen by Dr. Loera yesterday in the office and Clindamycin frequency was increased to TID but Patient had no improvement in symptoms so she was sent to ED for the iv antibiotics.     Patient is scheduled for elective surgery 7/12/2018. (20 Jun 2018 15:16)    FAMILY HISTORY:  No pertinent family history in first degree relatives    PAST MEDICAL & SURGICAL HISTORY:  Depression  Rheumatoid arthritis  HTN (hypertension)  Diabetes  No significant past surgical history        VITALS:  T(F): 98.5, Max: 99.1 (06-20-18 @ 17:03)  HR: 67  BP: 147/52  RR: 18Vital Signs Last 24 Hrs  T(C): 36.9 (21 Jun 2018 05:41), Max: 37.3 (20 Jun 2018 17:03)  T(F): 98.5 (21 Jun 2018 05:41), Max: 99.1 (20 Jun 2018 17:03)  HR: 67 (21 Jun 2018 05:41) (67 - 90)  BP: 147/52 (21 Jun 2018 05:41) (129/95 - 172/76)  BP(mean): --  RR: 18 (21 Jun 2018 05:41) (17 - 18)  SpO2: 98% (20 Jun 2018 12:32) (98% - 98%)    TESTS & MEASUREMENTS:                        11.6   9.63  )-----------( 251      ( 21 Jun 2018 06:35 )             37.2     06-21    138  |  99  |  10  ----------------------------<  227<H>  4.7   |  26  |  0.7    Ca    8.6      21 Jun 2018 06:35    TPro  6.2  /  Alb  3.4<L>  /  TBili  0.5  /  DBili  x   /  AST  11  /  ALT  11  /  AlkPhos  86  06-21    LIVER FUNCTIONS - ( 21 Jun 2018 06:35 )  Alb: 3.4 g/dL / Pro: 6.2 g/dL / ALK PHOS: 86 U/L / ALT: 11 U/L / AST: 11 U/L / GGT: x                   RADIOLOGY & ADDITIONAL TESTS:    ANTIBIOTICS:  clindamycin IVPB 900 milliGRAM(s) IV Intermittent every 8 hours

## 2018-06-21 NOTE — CONSULT NOTE ADULT - ASSESSMENT
IMPRESSION:  Right acute on chronic sialoadenitis secondary to obstructive sialoith.    RECOMMENDATIONS:  Unasyn 3 gm iv q6h.  D/C clinda.  ENT F/U for source control

## 2018-06-22 PROCEDURE — 99231 SBSQ HOSP IP/OBS SF/LOW 25: CPT

## 2018-06-22 RX ORDER — KETOROLAC TROMETHAMINE 30 MG/ML
15 SYRINGE (ML) INJECTION EVERY 12 HOURS
Qty: 0 | Refills: 0 | Status: DISCONTINUED | OUTPATIENT
Start: 2018-06-22 | End: 2018-06-25

## 2018-06-22 RX ORDER — HYDROMORPHONE HYDROCHLORIDE 2 MG/ML
1 INJECTION INTRAMUSCULAR; INTRAVENOUS; SUBCUTANEOUS EVERY 4 HOURS
Qty: 0 | Refills: 0 | Status: DISCONTINUED | OUTPATIENT
Start: 2018-06-22 | End: 2018-06-25

## 2018-06-22 RX ORDER — DEXAMETHASONE 0.5 MG/5ML
10 ELIXIR ORAL ONCE
Qty: 0 | Refills: 0 | Status: COMPLETED | OUTPATIENT
Start: 2018-06-22 | End: 2018-06-22

## 2018-06-22 RX ADMIN — HYDROMORPHONE HYDROCHLORIDE 1 MILLIGRAM(S): 2 INJECTION INTRAMUSCULAR; INTRAVENOUS; SUBCUTANEOUS at 11:19

## 2018-06-22 RX ADMIN — Medication 2.5 MILLIGRAM(S): at 06:31

## 2018-06-22 RX ADMIN — AMPICILLIN SODIUM AND SULBACTAM SODIUM 200 GRAM(S): 250; 125 INJECTION, POWDER, FOR SUSPENSION INTRAMUSCULAR; INTRAVENOUS at 17:30

## 2018-06-22 RX ADMIN — LISINOPRIL 20 MILLIGRAM(S): 2.5 TABLET ORAL at 06:30

## 2018-06-22 RX ADMIN — Medication 2 MILLIGRAM(S): at 11:25

## 2018-06-22 RX ADMIN — Medication 81 MILLIGRAM(S): at 11:26

## 2018-06-22 RX ADMIN — INSULIN GLARGINE 11 UNIT(S): 100 INJECTION, SOLUTION SUBCUTANEOUS at 08:19

## 2018-06-22 RX ADMIN — Medication 4 UNIT(S): at 17:31

## 2018-06-22 RX ADMIN — HYDROMORPHONE HYDROCHLORIDE 1 MILLIGRAM(S): 2 INJECTION INTRAMUSCULAR; INTRAVENOUS; SUBCUTANEOUS at 15:13

## 2018-06-22 RX ADMIN — ATORVASTATIN CALCIUM 10 MILLIGRAM(S): 80 TABLET, FILM COATED ORAL at 11:26

## 2018-06-22 RX ADMIN — Medication 2: at 17:30

## 2018-06-22 RX ADMIN — Medication 30 MILLILITER(S): at 06:30

## 2018-06-22 RX ADMIN — Medication 4 UNIT(S): at 08:21

## 2018-06-22 RX ADMIN — HYDROMORPHONE HYDROCHLORIDE 1 MILLIGRAM(S): 2 INJECTION INTRAMUSCULAR; INTRAVENOUS; SUBCUTANEOUS at 11:34

## 2018-06-22 RX ADMIN — AMLODIPINE BESYLATE 2.5 MILLIGRAM(S): 2.5 TABLET ORAL at 06:31

## 2018-06-22 RX ADMIN — ENOXAPARIN SODIUM 40 MILLIGRAM(S): 100 INJECTION SUBCUTANEOUS at 21:42

## 2018-06-22 RX ADMIN — Medication 102 MILLIGRAM(S): at 12:47

## 2018-06-22 RX ADMIN — Medication 30 MILLILITER(S): at 11:27

## 2018-06-22 RX ADMIN — BUPROPION HYDROCHLORIDE 300 MILLIGRAM(S): 150 TABLET, EXTENDED RELEASE ORAL at 11:24

## 2018-06-22 RX ADMIN — HYDROMORPHONE HYDROCHLORIDE 1 MILLIGRAM(S): 2 INJECTION INTRAMUSCULAR; INTRAVENOUS; SUBCUTANEOUS at 19:43

## 2018-06-22 RX ADMIN — HYDROMORPHONE HYDROCHLORIDE 1 MILLIGRAM(S): 2 INJECTION INTRAMUSCULAR; INTRAVENOUS; SUBCUTANEOUS at 15:29

## 2018-06-22 RX ADMIN — AMPICILLIN SODIUM AND SULBACTAM SODIUM 200 GRAM(S): 250; 125 INJECTION, POWDER, FOR SUSPENSION INTRAMUSCULAR; INTRAVENOUS at 11:24

## 2018-06-22 RX ADMIN — AMPICILLIN SODIUM AND SULBACTAM SODIUM 200 GRAM(S): 250; 125 INJECTION, POWDER, FOR SUSPENSION INTRAMUSCULAR; INTRAVENOUS at 23:39

## 2018-06-22 RX ADMIN — Medication 2: at 08:20

## 2018-06-22 RX ADMIN — AMPICILLIN SODIUM AND SULBACTAM SODIUM 200 GRAM(S): 250; 125 INJECTION, POWDER, FOR SUSPENSION INTRAMUSCULAR; INTRAVENOUS at 06:29

## 2018-06-22 RX ADMIN — Medication 4 UNIT(S): at 12:43

## 2018-06-22 NOTE — PROGRESS NOTE ADULT - ASSESSMENT
IMPRESSION:  Right acute on chronic sialoadenitis secondary to obstructive sialoith.  No significant change with antibiotics alone    RECOMMENDATIONS:  Unasyn 3 gm iv q6h.  ENT F/U for source control

## 2018-06-22 NOTE — PROGRESS NOTE ADULT - ASSESSMENT
77 yo F with PMHx of DM, RA on immunosuppression therapy, HTN, Depression and recurrent right sided sialoadenitis presents to ED for the right sided neck and face pain and dysphagia for one week. The patient came to ED for the 6/17 for the same symptoms and was sent home with PO Clindamycin. Patient was seen by Dr. Loera yesterday in the office and Clindamycin frequency was increased to TID but Patient had no improvement in symptoms so she was sent to ED for the iv antibiotics.         #right sided submandibular swelling due to sialdentitis (acute on chronic) and obstructing sialolith  - Iv unasyn (failed po clinda), check blood culture  =pain control , ENT follow up - no surgical intervention at this time - will need surgical fixation after acute infection clears - liekly will need earlier surgical intervention than scheduled elective date  -decadron for antiinflammatory effect   #dysphagia and odynophagia due to above - pt has not been able to eat or drink - keep IVFs - tolerated some clears, yogurt   #DM II - monitor finger sticks - if >200 then can cover with insulin as she is not eating   CAPILLARY BLOOD GLUCOSE  202 (22 Jun 2018 07:58)  144 (21 Jun 2018 22:42)  121 (21 Jun 2018 17:03)  228 (21 Jun 2018 11:37) 77 yo F with PMHx of DM, RA on immunosuppression therapy, HTN, Depression and recurrent right sided sialoadenitis presents to ED for the right sided neck and face pain and dysphagia for one week. The patient came to ED for the 6/17 for the same symptoms and was sent home with PO Clindamycin. Patient was seen by Dr. Loera yesterday in the office and Clindamycin frequency was increased to TID but Patient had no improvement in symptoms so she was sent to ED for the iv antibiotics.         #right sided submandibular swelling due to sialdentitis (acute on chronic) and obstructing sialolith  - Iv unasyn (failed po clinda), check blood culture - afebrile , bcx negative to date ,   =pain control , ENT follow up - no surgical intervention at this time - will need surgical fixation after acute infection clears - likely  will need earlier surgical intervention than scheduled elective date  -decadron for antiinflammatory effect   #dysphagia and odynophagia due to above - pt has not been able to eat or drink - keep IVFs - tolerated some clears, yogurt   #DM II - monitor finger sticks - if >200 then can cover with insulin as she is not eating   CAPILLARY BLOOD GLUCOSE  202 (22 Jun 2018 07:58)  144 (21 Jun 2018 22:42)  121 (21 Jun 2018 17:03)  228 (21 Jun 2018 11:37)

## 2018-06-22 NOTE — PROGRESS NOTE ADULT - SUBJECTIVE AND OBJECTIVE BOX
SONDRA ARAGONENZA    Patient is a 78y old  Female who presents with a chief complaint of Right sided neck pain & facial swelling (20 Jun 2018 15:16)      Interval History/Overnight events:    T(C): 36.6 (06-22-18 @ 13:59), Max: 37 (06-21-18 @ 22:42)  HR: 95 (06-22-18 @ 13:59)  BP: 113/53 (06-22-18 @ 13:59)  RR: 16 (06-22-18 @ 13:59)  SpO2: 96% (06-22-18 @ 00:30)    PHYSICAL EXAM:    GENERAL: Uncomfortable , in pain   HEENT: Anicteric sclera, EOMI , swollen and tenderness in the R mandibular area  CHEST/LUNG: Clear to auscultation bilaterally  HEART: Regular rate and rhythm; No murmurs, rubs, or gallops  ABDOMEN: Soft, Nontender, Nondistended; Bowel sounds present  EXTREMITIES: No clubbing, cyanosis, or edema      LABS:                          11.6   9.63  )-----------( 251      ( 21 Jun 2018 06:35 )             37.2     06-21    138  |  99  |  10  ----------------------------<  227<H>  4.7   |  26  |  0.7    Ca    8.6      21 Jun 2018 06:35    TPro  6.2  /  Alb  3.4<L>  /  TBili  0.5  /  DBili  x   /  AST  11  /  ALT  11  /  AlkPhos  86  06-21              LIVER FUNCTIONS - ( 21 Jun 2018 06:35 )  Alb: 3.4 g/dL / Pro: 6.2 g/dL / ALK PHOS: 86 U/L / ALT: 11 U/L / AST: 11 U/L / GGT: x             MEDICATIONS:    MEDICATIONS  (STANDING):  amLODIPine   Tablet 2.5 milliGRAM(s) Oral daily  ampicillin/sulbactam  IVPB 3 Gram(s) IV Intermittent every 6 hours  ampicillin/sulbactam  IVPB      aspirin enteric coated 81 milliGRAM(s) Oral daily  atorvastatin Oral Tab/Cap - Peds 10 milliGRAM(s) Oral daily  buPROPion XL . 300 milliGRAM(s) Oral daily  dextrose 5%. 1000 milliLiter(s) (50 mL/Hr) IV Continuous <Continuous>  dextrose 50% Injectable 12.5 Gram(s) IV Push once  dextrose 50% Injectable 25 Gram(s) IV Push once  dextrose 50% Injectable 25 Gram(s) IV Push once  enoxaparin Injectable 40 milliGRAM(s) SubCutaneous every 24 hours  folic acid 2 milliGRAM(s) Oral daily  insulin glargine Injectable (LANTUS) 11 Unit(s) SubCutaneous every morning  insulin lispro (HumaLOG) corrective regimen sliding scale   SubCutaneous three times a day before meals  insulin lispro Injectable (HumaLOG) 4 Unit(s) SubCutaneous three times a day before meals  lisinopril 20 milliGRAM(s) Oral daily  predniSONE Oral Tab/Cap - Peds 2.5 milliGRAM(s) Oral daily  Saliva Substitute (CAPHOSOL) 30 milliLiter(s) Swish and Spit four times a day  sodium chloride 0.9%. 1000 milliLiter(s) (50 mL/Hr) IV Continuous <Continuous>      MEDICATIONS  (PRN):  dextrose 40% Gel 15 Gram(s) Oral once PRN Blood Glucose LESS THAN 70 milliGRAM(s)/deciliter  glucagon  Injectable 1 milliGRAM(s) IntraMuscular once PRN Glucose LESS THAN 70 milligrams/deciliter  HYDROmorphone  Injectable 1 milliGRAM(s) IV Push every 4 hours PRN Severe Pain (7 - 10)  ketorolac   Injectable 15 milliGRAM(s) IV Push every 12 hours PRN Moderate Pain (4 - 6)  oxyCODONE    IR 5 milliGRAM(s) Oral every 4 hours PRN Mild Pain (1 - 3)

## 2018-06-22 NOTE — PROGRESS NOTE ADULT - SUBJECTIVE AND OBJECTIVE BOX
HOSPITALIST ATTENDING NOTE    AVZQUEZVIPINJOY OLIVA  78y Female  [id number]    INTERVAL HPI/OVERNIGHT EVENTS: still with significant pain - no change in swelling     T(C): 36.8 (06-22-18 @ 05:25), Max: 37 (06-21-18 @ 22:42)  HR: 101 (06-22-18 @ 05:25) (83 - 105)  BP: 145/67 (06-22-18 @ 05:25) (116/58 - 145/67)  RR: 18 (06-22-18 @ 05:25) (18 - 18)  SpO2: 96% (06-22-18 @ 00:30) (96% - 96%)  Wt(kg): --      PHYSICAL EXAM:  GENERAL: NAD  HEAD:  Atraumatic, Normocephalic  EYES: EOMI, PERRLA, conjunctiva and sclera clear  ENMT: No tonsillar erythema, exudates, or enlargement  NECK: Supple, No JVD, Normal thyroid - right submandibular swelling - tender - no erythema   NERVOUS SYSTEM:  Alert & Oriented X3, Good concentration; Non-focal- Motor Strength 5/5 B/L upper and lower extremities;  CHEST/LUNG: Clear to ascultation bilaterally; No rales, rhonchi, wheezing,   HEART: Regular rate and rhythm; No murmurs, rubs, or gallops  ABDOMEN: Soft, Nontender, Nondistended; Bowel sounds present  EXTREMITIES: No clubbing, cyanosis, or edema  LYMPH: No lymphadenopathy noted  SKIN: No rashes or lesions      Consultant(s) Notes Reviewed:  [x ] YES  [ ] NO  Care Discussed with Consultants/Other Providers/ Housestaff [ x] YES  [ ] NO    LABS:                        11.6   9.63  )-----------( 251      ( 21 Jun 2018 06:35 )             37.2     06-21    138  |  99  |  10  ----------------------------<  227<H>  4.7   |  26  |  0.7    Ca    8.6      21 Jun 2018 06:35    TPro  6.2  /  Alb  3.4<L>  /  TBili  0.5  /  DBili  x   /  AST  11  /  ALT  11  /  AlkPhos  86  06-21          RADIOLOGY & ADDITIONAL TESTS:    Imaging or report Personally Reviewed:  [ ] YES  [ ] NO    Case discussed with resident    Care discussed with pt/family      HEALTH ISSUES - PROBLEM Dx:

## 2018-06-22 NOTE — PROGRESS NOTE ADULT - ASSESSMENT
Pt is a 78 y.o female with R sialoadenitis 2* obstructing stone, clinically still with significant pain.    ·	Decadron 10mg x 1 dose, if no c/i - need to monitor FS as pt is DM  ·	cont IV abx  ·	pain control  ·	warm compresses to area, gland massage  ·	cont diet as tolerated  ·	w/d with attng, will follow

## 2018-06-22 NOTE — PROGRESS NOTE ADULT - SUBJECTIVE AND OBJECTIVE BOX
MARGOJOY  78y, Female      OVERNIGHT EVENTS:    no change in pain.    VITALS:  T(F): 98.2, Max: 98.6 (06-21-18 @ 22:42)  HR: 101  BP: 145/67  RR: 18Vital Signs Last 24 Hrs  T(C): 36.8 (22 Jun 2018 05:25), Max: 37 (21 Jun 2018 22:42)  T(F): 98.2 (22 Jun 2018 05:25), Max: 98.6 (21 Jun 2018 22:42)  HR: 101 (22 Jun 2018 05:25) (83 - 105)  BP: 145/67 (22 Jun 2018 05:25) (116/58 - 145/67)  BP(mean): --  RR: 18 (22 Jun 2018 05:25) (18 - 18)  SpO2: 96% (22 Jun 2018 00:30) (96% - 96%)    TESTS & MEASUREMENTS:                        11.6   9.63  )-----------( 251      ( 21 Jun 2018 06:35 )             37.2     06-21    138  |  99  |  10  ----------------------------<  227<H>  4.7   |  26  |  0.7    Ca    8.6      21 Jun 2018 06:35    TPro  6.2  /  Alb  3.4<L>  /  TBili  0.5  /  DBili  x   /  AST  11  /  ALT  11  /  AlkPhos  86  06-21    LIVER FUNCTIONS - ( 21 Jun 2018 06:35 )  Alb: 3.4 g/dL / Pro: 6.2 g/dL / ALK PHOS: 86 U/L / ALT: 11 U/L / AST: 11 U/L / GGT: x                   RADIOLOGY & ADDITIONAL TESTS:    ANTIBIOTICS:  ampicillin/sulbactam  IVPB 3 Gram(s) IV Intermittent every 6 hours  ampicillin/sulbactam  IVPB

## 2018-06-22 NOTE — PROGRESS NOTE ADULT - SUBJECTIVE AND OBJECTIVE BOX
Pt is a 78 y.o female with R sialoadenitis 2* obstructing stone, known to ENT - scheduled for resection in July, seen and examined at bedside. Pt still c/o pain to face and intraorally. Pt is able to take some PO (yogurt/liquid consistency), no difficulty breathing or swallowing saliva.    MEDICATIONS  (STANDING):  amLODIPine Tablet 2.5 milliGRAM(s) Oral daily  ampicillin/sulbactam IVPB 3 Gram(s) IV Intermittent every 6 hours  aspirin enteric coated 81 milliGRAM(s) Oral daily  atorvastatin Oral Tab/Cap - Peds 10 milliGRAM(s) Oral daily  buPROPion  milliGRAM(s) Oral daily  dextrose 5%. 1000 milliLiter(s) (50 mL/Hr) IV Continuous  dextrose 50% Injectable 25 Gram(s)  enoxaparin Injectable 40 milliGRAM(s) SubCutaneous every 24 hours  folic acid 2 milliGRAM(s) Oral daily  insulin glargine Injectable (LANTUS) 11 Unit(s) SubCutaneous every morning  insulin lispro (HumaLOG) corrective regimen sliding scale   SubCutaneous three times a day before meals  insulin lispro Injectable (HumaLOG) 4 Unit(s) SubCutaneous three times a day before meals  lisinopril 20 milliGRAM(s) Oral daily  predniSONE Oral Tab/Cap - Peds 2.5 milliGRAM(s) Oral daily  Saliva Substitute (CAPHOSOL) 30 milliLiter(s) Swish and Spit four times a day  sodium chloride 0.9%. 1000 milliLiter(s) (50 mL/Hr) IV Continuous <Continuous>    MEDICATIONS  (PRN):  dextrose 40% Gel 15 Gram(s) Oral once PRN Blood Glucose LESS THAN 70 milliGRAM(s)/deciliter  glucagon  Injectable 1 milliGRAM(s) IntraMuscular once PRN Glucose LESS THAN 70 milligrams/deciliter  HYDROmorphone  Injectable 0.5 milliGRAM(s) IV Push every 4 hours PRN Severe Pain (7 - 10)  oxyCODONE    IR 5 milliGRAM(s) Oral every 4 hours PRN Mild Pain (1 - 3)    Vital Signs: T(F): 98.2 (22 Jun 2018 05:25), Max: 98.6 (21 Jun 2018 22:42), HR: 101, BP: 145/67, RR: 18, SpO2: 96%  GEN: NAD, sleeping comfortably, easily aroused  HEENT: + L submandibular edema and TTP over gland, gland firm, no fluctuance. no surrounding areas open with drainage. + TTP with gland massage, no pus expressed intraorally with massage. + TTP to FOM, mild FOM edema, tongue is not raised. + mild trismus.                          11.6   9.63  )-----------( 251      ( 21 Jun 2018 06:35 )             37.2     Culture 6/19 - normal mouth leticia

## 2018-06-22 NOTE — PROGRESS NOTE ADULT - ASSESSMENT
79 yo F with PMHx of DM, RA on immunosuppression therapy, HTN, Depression and recurrent right sided sialoadenitis presents to ED for the right sided neck and face pain and dysphagia for one week. The patient came to ED for the 6/17 for the same symptoms and was sent home with PO Clindamycin. Patient was seen by Dr. Loera yesterday in the office and Clindamycin frequency was increased to TID but Patient had no improvement in symptoms so she was sent to ED for the iv antibiotics.         1)Right sided submandibular swelling due to sialdentitis (acute on chronic) and obstructing sialolith     -Keep on IV Unasyn     -Blood culture negative    -Remains afebrile     -Pain control with Dilaudid ,     -ENT following - no surgical intervention at this time -no need for reimaging     -S/P Decadron one dose for antiinflammatory effect     2)Dysphagia and odynophagia due to above - pt has not been able to eat or drink - keep IVFs - tolerated some clears, yogurt     3)DM II    - Keep on Lantus     - Monitor FS

## 2018-06-23 RX ORDER — SENNA PLUS 8.6 MG/1
2 TABLET ORAL AT BEDTIME
Qty: 0 | Refills: 0 | Status: DISCONTINUED | OUTPATIENT
Start: 2018-06-23 | End: 2018-06-25

## 2018-06-23 RX ORDER — INSULIN GLARGINE 100 [IU]/ML
14 INJECTION, SOLUTION SUBCUTANEOUS EVERY MORNING
Qty: 0 | Refills: 0 | Status: DISCONTINUED | OUTPATIENT
Start: 2018-06-23 | End: 2018-06-25

## 2018-06-23 RX ORDER — INSULIN HUMAN 100 [IU]/ML
6 INJECTION, SOLUTION SUBCUTANEOUS ONCE
Qty: 0 | Refills: 0 | Status: COMPLETED | OUTPATIENT
Start: 2018-06-23 | End: 2018-06-23

## 2018-06-23 RX ORDER — INSULIN LISPRO 100/ML
6 VIAL (ML) SUBCUTANEOUS
Qty: 0 | Refills: 0 | Status: DISCONTINUED | OUTPATIENT
Start: 2018-06-23 | End: 2018-06-25

## 2018-06-23 RX ORDER — DOCUSATE SODIUM 100 MG
100 CAPSULE ORAL THREE TIMES A DAY
Qty: 0 | Refills: 0 | Status: DISCONTINUED | OUTPATIENT
Start: 2018-06-23 | End: 2018-06-25

## 2018-06-23 RX ADMIN — LISINOPRIL 20 MILLIGRAM(S): 2.5 TABLET ORAL at 05:53

## 2018-06-23 RX ADMIN — HYDROMORPHONE HYDROCHLORIDE 1 MILLIGRAM(S): 2 INJECTION INTRAMUSCULAR; INTRAVENOUS; SUBCUTANEOUS at 17:05

## 2018-06-23 RX ADMIN — BUPROPION HYDROCHLORIDE 300 MILLIGRAM(S): 150 TABLET, EXTENDED RELEASE ORAL at 11:30

## 2018-06-23 RX ADMIN — AMPICILLIN SODIUM AND SULBACTAM SODIUM 200 GRAM(S): 250; 125 INJECTION, POWDER, FOR SUSPENSION INTRAMUSCULAR; INTRAVENOUS at 11:30

## 2018-06-23 RX ADMIN — AMLODIPINE BESYLATE 2.5 MILLIGRAM(S): 2.5 TABLET ORAL at 05:51

## 2018-06-23 RX ADMIN — HYDROMORPHONE HYDROCHLORIDE 1 MILLIGRAM(S): 2 INJECTION INTRAMUSCULAR; INTRAVENOUS; SUBCUTANEOUS at 11:53

## 2018-06-23 RX ADMIN — Medication 4: at 11:29

## 2018-06-23 RX ADMIN — Medication 2.5 MILLIGRAM(S): at 05:51

## 2018-06-23 RX ADMIN — INSULIN GLARGINE 11 UNIT(S): 100 INJECTION, SOLUTION SUBCUTANEOUS at 07:26

## 2018-06-23 RX ADMIN — INSULIN HUMAN 6 UNIT(S): 100 INJECTION, SOLUTION SUBCUTANEOUS at 19:49

## 2018-06-23 RX ADMIN — HYDROMORPHONE HYDROCHLORIDE 1 MILLIGRAM(S): 2 INJECTION INTRAMUSCULAR; INTRAVENOUS; SUBCUTANEOUS at 11:38

## 2018-06-23 RX ADMIN — ATORVASTATIN CALCIUM 10 MILLIGRAM(S): 80 TABLET, FILM COATED ORAL at 11:30

## 2018-06-23 RX ADMIN — Medication 1: at 17:06

## 2018-06-23 RX ADMIN — HYDROMORPHONE HYDROCHLORIDE 1 MILLIGRAM(S): 2 INJECTION INTRAMUSCULAR; INTRAVENOUS; SUBCUTANEOUS at 07:47

## 2018-06-23 RX ADMIN — Medication 4 UNIT(S): at 17:06

## 2018-06-23 RX ADMIN — Medication 2 MILLIGRAM(S): at 11:30

## 2018-06-23 RX ADMIN — AMPICILLIN SODIUM AND SULBACTAM SODIUM 200 GRAM(S): 250; 125 INJECTION, POWDER, FOR SUSPENSION INTRAMUSCULAR; INTRAVENOUS at 05:51

## 2018-06-23 RX ADMIN — Medication 4 UNIT(S): at 11:29

## 2018-06-23 RX ADMIN — ENOXAPARIN SODIUM 40 MILLIGRAM(S): 100 INJECTION SUBCUTANEOUS at 21:44

## 2018-06-23 RX ADMIN — AMPICILLIN SODIUM AND SULBACTAM SODIUM 200 GRAM(S): 250; 125 INJECTION, POWDER, FOR SUSPENSION INTRAMUSCULAR; INTRAVENOUS at 17:05

## 2018-06-23 RX ADMIN — HYDROMORPHONE HYDROCHLORIDE 1 MILLIGRAM(S): 2 INJECTION INTRAMUSCULAR; INTRAVENOUS; SUBCUTANEOUS at 07:32

## 2018-06-23 RX ADMIN — Medication 4 UNIT(S): at 07:27

## 2018-06-23 RX ADMIN — SENNA PLUS 2 TABLET(S): 8.6 TABLET ORAL at 21:44

## 2018-06-23 RX ADMIN — Medication 81 MILLIGRAM(S): at 11:30

## 2018-06-23 RX ADMIN — Medication 100 MILLIGRAM(S): at 21:45

## 2018-06-23 RX ADMIN — HYDROMORPHONE HYDROCHLORIDE 1 MILLIGRAM(S): 2 INJECTION INTRAMUSCULAR; INTRAVENOUS; SUBCUTANEOUS at 17:20

## 2018-06-23 RX ADMIN — Medication 4: at 07:27

## 2018-06-23 RX ADMIN — Medication 40 MILLIGRAM(S): at 19:50

## 2018-06-23 RX ADMIN — Medication 100 MILLIGRAM(S): at 14:23

## 2018-06-23 NOTE — PROGRESS NOTE ADULT - ASSESSMENT
79 y/o female with R sialoadenitis   - cont IV abx  - pain control  - warm compress to area  - monitor FS   - w/d with attending

## 2018-06-23 NOTE — PROGRESS NOTE ADULT - SUBJECTIVE AND OBJECTIVE BOX
JOY ARAGON  78y  Female      Patient is a 78y old  Female who presents with a chief complaint of Right sided neck pain & facial swelling (20 Jun 2018 15:16)      INTERVAL HPI/OVERNIGHT EVENTS: still having quite severe R sided jaw pain with salivation as prior. relieved with iv analgesia intermittently      REVIEW OF SYSTEMS:  as above  All other review of systems negative    T(C): 35.7 (06-23-18 @ 13:19), Max: 36.8 (06-23-18 @ 06:08)  HR: 73 (06-23-18 @ 13:19) (73 - 95)  BP: 106/58 (06-23-18 @ 13:19) (106/58 - 138/64)  RR: 16 (06-23-18 @ 13:19) (16 - 18)  SpO2: --  Wt(kg): --Vital Signs Last 24 Hrs  T(C): 35.7 (23 Jun 2018 13:19), Max: 36.8 (23 Jun 2018 06:08)  T(F): 96.2 (23 Jun 2018 13:19), Max: 98.3 (23 Jun 2018 06:08)  HR: 73 (23 Jun 2018 13:19) (73 - 95)  BP: 106/58 (23 Jun 2018 13:19) (106/58 - 138/64)  BP(mean): --  RR: 16 (23 Jun 2018 13:19) (16 - 18)  SpO2: --      06-22-18 @ 07:01  -  06-23-18 @ 07:00  --------------------------------------------------------  IN: 850 mL / OUT: 0 mL / NET: 850 mL    06-23-18 @ 07:01  -  06-23-18 @ 18:11  --------------------------------------------------------  IN: 750 mL / OUT: 0 mL / NET: 750 mL        PHYSICAL EXAM:  GENERAL: NAD  PSYCH: no agitation, baseline mentation  HEENT: a bit hoarse per family, R submandibular edema and tenderness to palpation, unable to appreciated drainage tract in the mouth, patent airway without stridor, no evidence of co2 narcosis, no upper airway turbulence, uvula visualized  NERVOUS SYSTEM:  Alert & Oriented X3, no new focal deficits  PULMONARY: Clear to percussion bilaterally; No rales, rhonchi, wheezing, or rubs  CARDIOVASCULAR: Regular rate and rhythm; No murmurs, rubs, or gallops  GI: Soft, Nontender, Nondistended; Bowel sounds present  EXTREMITIES:  2+ Peripheral Pulses, No clubbing, cyanosis, or edema    Consultant(s) Notes Reviewed:  [x ] YES  [ ] NO    Discussed with Consultants/Other Providers [ x] YES     LABS                  Lactate Trend  06-20 @ 13:21 Lactate:1.4         CAPILLARY BLOOD GLUCOSE  163 (23 Jun 2018 17:00)            RADIOLOGY & ADDITIONAL TESTS:    Imaging Personally Reviewed:  [ ] YES  [ ] NO    HEALTH ISSUES - PROBLEM Dx:

## 2018-06-23 NOTE — PROGRESS NOTE ADULT - ATTENDING COMMENTS
Patient seen and examined independently of resident. Case discussed with housestaff, nursing and patient and daughter

## 2018-06-23 NOTE — PROGRESS NOTE ADULT - SUBJECTIVE AND OBJECTIVE BOX
ENT DAILY PROGRESS NOTE    Overnight events/Interval HPI: HPI:  77 yo F with PMHx of DM, RA on immunosuppression therapy, HTN, Depression and recurrent right sided sialoadenitis presents to ED for the right sided neck and face pain and dysphagia for one week. The patient came to ED for the 6/17 for the same symptoms and was sent home with PO Clindamycin. Patient was seen by Dr. Loera yesterday in the office and Clindamycin frequency was increased to TID but Patient had no improvement in symptoms so she was sent to ED for the iv antibiotics.     Patient is scheduled for elective surgery 7/12/2018. (20 Jun 2018 15:16)    seen and examined at bedside, states she has improved symptoms from yesterday but still has tenderness to the area. trismus has improved since the decadron dose. Tolerating PO, no difficulty breathing. tolerating secretions, no fevers, chills, N/V          Allergies    No Known Allergies    Intolerances        MEDICATIONS:  Antiinfectives:   ampicillin/sulbactam  IVPB 3 Gram(s) IV Intermittent every 6 hours  ampicillin/sulbactam  IVPB        IV fluids:  dextrose 5%. 1000 milliLiter(s) IV Continuous <Continuous>  folic acid 2 milliGRAM(s) Oral daily  sodium chloride 0.9%. 1000 milliLiter(s) IV Continuous <Continuous>    Hematologic/Anticoagulation:  aspirin enteric coated 81 milliGRAM(s) Oral daily  enoxaparin Injectable 40 milliGRAM(s) SubCutaneous every 24 hours    Pain medications/Neuro:  buPROPion XL . 300 milliGRAM(s) Oral daily  HYDROmorphone  Injectable 1 milliGRAM(s) IV Push every 4 hours PRN  ketorolac   Injectable 15 milliGRAM(s) IV Push every 12 hours PRN  oxyCODONE    IR 5 milliGRAM(s) Oral every 4 hours PRN    Endocrine Medications:   atorvastatin Oral Tab/Cap - Peds 10 milliGRAM(s) Oral daily  dextrose 40% Gel 15 Gram(s) Oral once PRN  dextrose 50% Injectable 12.5 Gram(s) IV Push once  dextrose 50% Injectable 25 Gram(s) IV Push once  dextrose 50% Injectable 25 Gram(s) IV Push once  glucagon  Injectable 1 milliGRAM(s) IntraMuscular once PRN  insulin glargine Injectable (LANTUS) 11 Unit(s) SubCutaneous every morning  insulin lispro (HumaLOG) corrective regimen sliding scale   SubCutaneous three times a day before meals  insulin lispro Injectable (HumaLOG) 4 Unit(s) SubCutaneous three times a day before meals  predniSONE Oral Tab/Cap - Peds 2.5 milliGRAM(s) Oral daily    All other standing medications:   amLODIPine   Tablet 2.5 milliGRAM(s) Oral daily  lisinopril 20 milliGRAM(s) Oral daily  Saliva Substitute (CAPHOSOL) 30 milliLiter(s) Swish and Spit four times a day  vibryd 40 milliGRAM(s) 40 milliGRAM(s) Oral daily    All other PRN medications:      Vital Signs Last 24 Hrs  T(C): 36.8 (23 Jun 2018 06:08), Max: 36.8 (23 Jun 2018 06:08)  T(F): 98.3 (23 Jun 2018 06:08), Max: 98.3 (23 Jun 2018 06:08)  HR: 95 (23 Jun 2018 06:08) (87 - 95)  BP: 138/64 (23 Jun 2018 06:08) (111/53 - 138/64)  BP(mean): --  RR: 18 (23 Jun 2018 06:08) (16 - 18)  SpO2: --      06-22 @ 07:01  -  06-23 @ 07:00  --------------------------------------------------------  IN:    IV PiggyBack: 250 mL    sodium chloride 0.9%.: 600 mL  Total IN: 850 mL    OUT:  Total OUT: 0 mL    Total NET: 850 mL            PHYSICAL EXAM:    ENT EXAM-   Constitutional: Well-developed, well-nourished.  No hoarseness. awake/alert     Head:  normocephalic, atraumatic.   HEENT: + R submandibular gland edema and TTP over area. No drainage, firm, non fluctuant.       MULTISYSTEM EXAM-  Neuro/Psych:  A&O x 3.  Mood stable.  Affect bright.  Cranial nerves: 2-12 grossly intact bilaterally.  Eyes:  EOMI, no nystagmus.  Pulm:  No dyspnea, non-labored breathing  Cardiovascular: Carotid pulses 2+ bilaterally.  No periphreal edema.  Skin:  No rash or lesions on exposed skin of head/neck    LABS:  CBC-    BMP/CMP-        Coagulation Studies-    Endocrine Panel-              RADIOLOGY & ADDITIONAL STUDIES:

## 2018-06-23 NOTE — PROGRESS NOTE ADULT - ASSESSMENT
77 yo F with PMHx of DM, RA on immunosuppression therapy, HTN, Depression and recurrent right sided sialoadenitis here with recurrent R sialoadenitis failing outpatient PO abx regimen      #right sided submandibular swelling due to sialdentitis (acute on chronic) and obstructing sialolith  - Iv unasyn (failed po clinda), check blood culture - afebrile , bcx negative to date ,   =pain control , ENT follow up - no surgical intervention at this time - will need surgical fixation after acute infection clears  -decadron for antiinflammatory effect initially given, transitioning to PO prednisone  #dysphagia and odynophagia due to above - soft diet with aspiration precautions  #DM II with hyperglyemia potentiated by steroid- increasing insulin basal bolus, and giving additional insulin regular now. no evidence of dka  dvt ppx  bowel regimen with analgesia

## 2018-06-24 PROCEDURE — 99231 SBSQ HOSP IP/OBS SF/LOW 25: CPT

## 2018-06-24 RX ADMIN — Medication 6 UNIT(S): at 17:41

## 2018-06-24 RX ADMIN — BUPROPION HYDROCHLORIDE 300 MILLIGRAM(S): 150 TABLET, EXTENDED RELEASE ORAL at 11:16

## 2018-06-24 RX ADMIN — AMPICILLIN SODIUM AND SULBACTAM SODIUM 200 GRAM(S): 250; 125 INJECTION, POWDER, FOR SUSPENSION INTRAMUSCULAR; INTRAVENOUS at 05:32

## 2018-06-24 RX ADMIN — AMPICILLIN SODIUM AND SULBACTAM SODIUM 200 GRAM(S): 250; 125 INJECTION, POWDER, FOR SUSPENSION INTRAMUSCULAR; INTRAVENOUS at 23:11

## 2018-06-24 RX ADMIN — Medication 30 MILLILITER(S): at 17:42

## 2018-06-24 RX ADMIN — Medication 40 MILLIGRAM(S): at 05:33

## 2018-06-24 RX ADMIN — Medication 6 UNIT(S): at 11:20

## 2018-06-24 RX ADMIN — Medication 6 UNIT(S): at 08:12

## 2018-06-24 RX ADMIN — Medication 30 MILLILITER(S): at 11:21

## 2018-06-24 RX ADMIN — AMLODIPINE BESYLATE 2.5 MILLIGRAM(S): 2.5 TABLET ORAL at 05:32

## 2018-06-24 RX ADMIN — Medication 2 MILLIGRAM(S): at 11:16

## 2018-06-24 RX ADMIN — ENOXAPARIN SODIUM 40 MILLIGRAM(S): 100 INJECTION SUBCUTANEOUS at 22:01

## 2018-06-24 RX ADMIN — Medication 81 MILLIGRAM(S): at 11:16

## 2018-06-24 RX ADMIN — SENNA PLUS 2 TABLET(S): 8.6 TABLET ORAL at 22:01

## 2018-06-24 RX ADMIN — LISINOPRIL 20 MILLIGRAM(S): 2.5 TABLET ORAL at 05:32

## 2018-06-24 RX ADMIN — Medication 4: at 17:41

## 2018-06-24 RX ADMIN — Medication 100 MILLIGRAM(S): at 13:20

## 2018-06-24 RX ADMIN — AMPICILLIN SODIUM AND SULBACTAM SODIUM 200 GRAM(S): 250; 125 INJECTION, POWDER, FOR SUSPENSION INTRAMUSCULAR; INTRAVENOUS at 11:13

## 2018-06-24 RX ADMIN — ATORVASTATIN CALCIUM 10 MILLIGRAM(S): 80 TABLET, FILM COATED ORAL at 22:01

## 2018-06-24 RX ADMIN — Medication 4: at 11:20

## 2018-06-24 RX ADMIN — Medication 100 MILLIGRAM(S): at 22:01

## 2018-06-24 RX ADMIN — Medication 3: at 08:12

## 2018-06-24 RX ADMIN — AMPICILLIN SODIUM AND SULBACTAM SODIUM 200 GRAM(S): 250; 125 INJECTION, POWDER, FOR SUSPENSION INTRAMUSCULAR; INTRAVENOUS at 17:44

## 2018-06-24 RX ADMIN — INSULIN GLARGINE 14 UNIT(S): 100 INJECTION, SOLUTION SUBCUTANEOUS at 08:11

## 2018-06-24 RX ADMIN — Medication 100 MILLIGRAM(S): at 05:32

## 2018-06-24 RX ADMIN — AMPICILLIN SODIUM AND SULBACTAM SODIUM 200 GRAM(S): 250; 125 INJECTION, POWDER, FOR SUSPENSION INTRAMUSCULAR; INTRAVENOUS at 00:02

## 2018-06-24 NOTE — PROGRESS NOTE ADULT - ASSESSMENT
79 yo F with PMHx of DM, RA on immunosuppression therapy, HTN, Depression and recurrent right sided sialoadenitis here with recurrent R sialoadenitis failing outpatient PO abx regimen      #right sided submandibular swelling due to sialdentitis (acute on chronic) and obstructing sialolith  - Iv unasyn (failed po clinda), check blood culture - afebrile , bcx negative to date  =pain control , ENT follow up - no surgical intervention at this time - will need surgical fixation after acute infection clears. d/w ENT team today  -decadron for antiinflammatory effect initially given, will c/w PO prednisone  #dysphagia and odynophagia due to above - soft diet with aspiration precautions  #DM II with hyperglyemia improving with basal bolus regimen, potentiated by steroids  bowel regimen with analgesia  Dispo: if continues to improve will consider deescalating to PO augmentin and short steroid course in 24-48hr  marked improvement from yesterday

## 2018-06-24 NOTE — PROGRESS NOTE ADULT - SUBJECTIVE AND OBJECTIVE BOX
ENT DAILY PROGRESS NOTE    Overnight events/Interval HPI: HPI:  79 yo F with PMHx of DM, RA on immunosuppression therapy, HTN, Depression and recurrent right sided sialoadenitis presents to ED for the right sided neck and face pain and dysphagia for one week. pt with R submandibular sialoadenitis secondary to 1cm obstructing sialolith. seen and examined at bedside. pt states that the pain has improved and swallowing function is about the same. Less pain with opening mouth now as well.           Allergies    No Known Allergies    Intolerances        MEDICATIONS:  Antiinfectives:   ampicillin/sulbactam  IVPB 3 Gram(s) IV Intermittent every 6 hours  ampicillin/sulbactam  IVPB        IV fluids:  dextrose 5%. 1000 milliLiter(s) IV Continuous <Continuous>  folic acid 2 milliGRAM(s) Oral daily  sodium chloride 0.9%. 1000 milliLiter(s) IV Continuous <Continuous>    Hematologic/Anticoagulation:  aspirin enteric coated 81 milliGRAM(s) Oral daily  enoxaparin Injectable 40 milliGRAM(s) SubCutaneous every 24 hours    Pain medications/Neuro:  buPROPion XL . 300 milliGRAM(s) Oral daily  HYDROmorphone  Injectable 1 milliGRAM(s) IV Push every 4 hours PRN  ketorolac   Injectable 15 milliGRAM(s) IV Push every 12 hours PRN  oxyCODONE    IR 5 milliGRAM(s) Oral every 4 hours PRN    Endocrine Medications:   atorvastatin Oral Tab/Cap - Peds 10 milliGRAM(s) Oral daily  dextrose 40% Gel 15 Gram(s) Oral once PRN  dextrose 50% Injectable 12.5 Gram(s) IV Push once  dextrose 50% Injectable 25 Gram(s) IV Push once  dextrose 50% Injectable 25 Gram(s) IV Push once  glucagon  Injectable 1 milliGRAM(s) IntraMuscular once PRN  insulin glargine Injectable (LANTUS) 14 Unit(s) SubCutaneous every morning  insulin lispro (HumaLOG) corrective regimen sliding scale   SubCutaneous three times a day before meals  insulin lispro Injectable (HumaLOG) 6 Unit(s) SubCutaneous three times a day before meals  predniSONE   Tablet 40 milliGRAM(s) Oral daily    All other standing medications:   amLODIPine   Tablet 2.5 milliGRAM(s) Oral daily  docusate sodium 100 milliGRAM(s) Oral three times a day  lisinopril 20 milliGRAM(s) Oral daily  Saliva Substitute (CAPHOSOL) 30 milliLiter(s) Swish and Spit four times a day  senna 2 Tablet(s) Oral at bedtime  vibryd 40 milliGRAM(s) 40 milliGRAM(s) Oral daily    All other PRN medications:      Vital Signs Last 24 Hrs  T(C): 36.5 (24 Jun 2018 05:29), Max: 36.5 (24 Jun 2018 05:29)  T(F): 97.7 (24 Jun 2018 05:29), Max: 97.7 (24 Jun 2018 05:29)  HR: 78 (24 Jun 2018 05:29) (73 - 91)  BP: 123/58 (24 Jun 2018 05:29) (106/58 - 147/65)  BP(mean): --  RR: 18 (24 Jun 2018 05:29) (16 - 18)  SpO2: 95% (23 Jun 2018 20:17) (95% - 95%)      06-23 @ 07:01  -  06-24 @ 07:00  --------------------------------------------------------  IN:    IV PiggyBack: 200 mL    sodium chloride 0.9%.: 600 mL  Total IN: 800 mL    OUT:  Total OUT: 0 mL    Total NET: 800 mL            PHYSICAL EXAM:    ENT EXAM-   Constitutional: Well-developed, well-nourished.  No hoarseness.  awake/ alert   Head:  normocephalic, atraumatic.   OC/OP:  +FOM mild edema. no tongue protrusion.  unable to express pus with massage  Neck:  + R submandibular swelling, firm, nonfluctuant, nontender      LABS:  CBC-    BMP/CMP-        Coagulation Studies-    Endocrine Panel-              RADIOLOGY & ADDITIONAL STUDIES:

## 2018-06-24 NOTE — PROGRESS NOTE ADULT - ASSESSMENT
79 y/o female with R submandibular sialoadenitis secondary to 1cm obstructing sialolith  - cont IV abx  - Cont IVF hydration  - warm compress/massage to affected area  - sialologues  - pain control  - will d/w attending

## 2018-06-24 NOTE — PROGRESS NOTE ADULT - SUBJECTIVE AND OBJECTIVE BOX
JOY ARAGON  78y  Female      Patient is a 78y old  Female who presents with a chief complaint of Right sided neck pain & facial swelling (20 Jun 2018 15:16)      INTERVAL HPI/OVERNIGHT EVENTS: opening mouth wider today. able to eat a bit. still having pain but improving a bit      REVIEW OF SYSTEMS:  as above  All other review of systems negative    T(C): 36.7 (06-24-18 @ 14:25), Max: 36.7 (06-24-18 @ 14:25)  HR: 89 (06-24-18 @ 14:25) (78 - 91)  BP: 114/55 (06-24-18 @ 14:25) (114/55 - 147/65)  RR: 16 (06-24-18 @ 14:25) (16 - 18)  SpO2: 95% (06-23-18 @ 20:17) (95% - 95%)  Wt(kg): --Vital Signs Last 24 Hrs  T(C): 36.7 (24 Jun 2018 14:25), Max: 36.7 (24 Jun 2018 14:25)  T(F): 98 (24 Jun 2018 14:25), Max: 98 (24 Jun 2018 14:25)  HR: 89 (24 Jun 2018 14:25) (78 - 91)  BP: 114/55 (24 Jun 2018 14:25) (114/55 - 147/65)  BP(mean): --  RR: 16 (24 Jun 2018 14:25) (16 - 18)  SpO2: 95% (23 Jun 2018 20:17) (95% - 95%)      06-23-18 @ 07:01  -  06-24-18 @ 07:00  --------------------------------------------------------  IN: 800 mL / OUT: 0 mL / NET: 800 mL        PHYSICAL EXAM:  GENERAL: NAD  PSYCH: no agitation, baseline mentation  HEENT: R submandibular edema/ TTP, improving without any drainage tract directly visualized internally or externally  NERVOUS SYSTEM:  Alert & Oriented X3, no new focal deficits  PULMONARY: Clear to percussion bilaterally; No rales, rhonchi, wheezing, or rubs  CARDIOVASCULAR: Regular rate and rhythm; No murmurs, rubs, or gallops  GI: Soft, Nontender, Nondistended; Bowel sounds present  EXTREMITIES:  2+ Peripheral Pulses, No clubbing, cyanosis, or edema    Consultant(s) Notes Reviewed:  [x ] YES  [ ] NO    Discussed with Consultants/Other Providers [ x] YES     LABS                  Lactate Trend  06-20 @ 13:21 Lactate:1.4         CAPILLARY BLOOD GLUCOSE  332 (24 Jun 2018 16:35)            RADIOLOGY & ADDITIONAL TESTS:    Imaging Personally Reviewed:  [ ] YES  [ ] NO    HEALTH ISSUES - PROBLEM Dx:

## 2018-06-25 ENCOUNTER — TRANSCRIPTION ENCOUNTER (OUTPATIENT)
Age: 78
End: 2018-06-25

## 2018-06-25 VITALS
SYSTOLIC BLOOD PRESSURE: 128 MMHG | TEMPERATURE: 97 F | DIASTOLIC BLOOD PRESSURE: 51 MMHG | HEART RATE: 95 BPM | RESPIRATION RATE: 16 BRPM

## 2018-06-25 RX ORDER — OXYCODONE HYDROCHLORIDE 5 MG/1
1 TABLET ORAL
Qty: 16 | Refills: 0 | OUTPATIENT
Start: 2018-06-25 | End: 2018-06-28

## 2018-06-25 RX ORDER — SALIVA SUBSTITUTE COMB NO.11 351 MG
30 POWDER IN PACKET (EA) MUCOUS MEMBRANE
Qty: 300 | Refills: 0 | OUTPATIENT
Start: 2018-06-25

## 2018-06-25 RX ADMIN — INSULIN GLARGINE 14 UNIT(S): 100 INJECTION, SOLUTION SUBCUTANEOUS at 08:30

## 2018-06-25 RX ADMIN — Medication 2: at 08:30

## 2018-06-25 RX ADMIN — SODIUM CHLORIDE 50 MILLILITER(S): 9 INJECTION INTRAMUSCULAR; INTRAVENOUS; SUBCUTANEOUS at 09:02

## 2018-06-25 RX ADMIN — Medication 100 MILLIGRAM(S): at 06:00

## 2018-06-25 RX ADMIN — Medication 30 MILLILITER(S): at 06:04

## 2018-06-25 RX ADMIN — Medication 6 UNIT(S): at 11:38

## 2018-06-25 RX ADMIN — AMLODIPINE BESYLATE 2.5 MILLIGRAM(S): 2.5 TABLET ORAL at 06:00

## 2018-06-25 RX ADMIN — Medication 4: at 11:38

## 2018-06-25 RX ADMIN — AMPICILLIN SODIUM AND SULBACTAM SODIUM 200 GRAM(S): 250; 125 INJECTION, POWDER, FOR SUSPENSION INTRAMUSCULAR; INTRAVENOUS at 11:32

## 2018-06-25 RX ADMIN — LISINOPRIL 20 MILLIGRAM(S): 2.5 TABLET ORAL at 06:00

## 2018-06-25 RX ADMIN — Medication 40 MILLIGRAM(S): at 06:00

## 2018-06-25 RX ADMIN — Medication 30 MILLILITER(S): at 11:39

## 2018-06-25 RX ADMIN — AMPICILLIN SODIUM AND SULBACTAM SODIUM 200 GRAM(S): 250; 125 INJECTION, POWDER, FOR SUSPENSION INTRAMUSCULAR; INTRAVENOUS at 05:59

## 2018-06-25 RX ADMIN — Medication 6 UNIT(S): at 08:29

## 2018-06-25 RX ADMIN — Medication 2 MILLIGRAM(S): at 11:33

## 2018-06-25 RX ADMIN — BUPROPION HYDROCHLORIDE 300 MILLIGRAM(S): 150 TABLET, EXTENDED RELEASE ORAL at 11:33

## 2018-06-25 RX ADMIN — Medication 81 MILLIGRAM(S): at 11:33

## 2018-06-25 NOTE — DISCHARGE NOTE ADULT - PLAN OF CARE
Treatment Keep on antibiotics and salivary gland replacement  for 10 days   Follow up with ENT (Dr Loera)

## 2018-06-25 NOTE — PROGRESS NOTE ADULT - ASSESSMENT
78 y.o female with R submandibular gland edema 2* sialolith    ·	continue abx, consider switching to PO  ·	cont soft/full liquid diet  ·	pain control  ·	sialogogues/warm compress  ·	Dr Colin at bedside.

## 2018-06-25 NOTE — DISCHARGE NOTE ADULT - MEDICATION SUMMARY - MEDICATIONS TO TAKE
I will START or STAY ON the medications listed below when I get home from the hospital:    predniSONE 2.5 mg oral tablet  -- 1 tab(s) by mouth once a day  -- Indication: For RA    Aspir-Low 81 mg oral delayed release tablet  -- 1 tab(s) by mouth once a day  -- Indication: For Heart disease Prevention    oxyCODONE 5 mg oral tablet  -- 1 tab(s) by mouth every 6 hours, As Needed -Severe Pain (7 - 10) - for severe pain MDD:Maximum 4 Tablets per day   -- Indication: For pain    Viibryd 40 mg oral tablet  -- 1 tab(s) by mouth once a day  -- Indication: For Depression    glipiZIDE 5 mg oral tablet  -- 1 tab(s) by mouth once a day  -- Indication: For Diabetes    HumuLIN 70/30 KwikPen 70 units-30 units/mL subcutaneous suspension  -- subcutaneous 2 times a day -30 units based on fs in am  -- Indication: For Diabetes    atorvastatin 10 mg oral tablet  -- 1 tab(s) by mouth once a day  -- Indication: For Dyslipidemia    amlodipine-benazepril 5 mg-20 mg oral capsule  -- 1 cap(s) by mouth once a day  -- Indication: For Hyoertension    methotrexate 2.5 mg oral tablet  -- 4 tab(s) by mouth once a week, on Wednesday  -- Indication: For RA    saliva substitutes oral solution  -- 30 milliliter(s) by mouth every 6 hours   -- Indication: For Salivary gland inflammation    Augmentin 875 mg-125 mg oral tablet  -- 1 tab(s) by mouth 2 times a day   -- Finish all this medication unless otherwise directed by prescriber.  Take with food or milk.    -- Indication: For Salivary gland infection    buPROPion 300 mg/24 hours (XL) oral tablet, extended release  -- 1 tab(s) by mouth every 24 hours  -- Indication: For Depression    folic acid 1 mg oral tablet  -- 2 tab(s) by mouth once a day  -- Indication: For Supplement

## 2018-06-25 NOTE — DISCHARGE NOTE ADULT - CARE PROVIDER_API CALL
Josselyn Loera), [facility]  [address] [place] [address]  [address] [place] [address]  Phone: [phone]  Fax: [phone]

## 2018-06-25 NOTE — PROGRESS NOTE ADULT - SUBJECTIVE AND OBJECTIVE BOX
Discharge note    JOY ARAGON  78y Female    INTERVAL HPI/OVERNIGHT EVENTS:    Pt feels better.  Some pain of the right side of face but improved.   Pt tolerating soft diet. Ambulating.     T(F): 98 (06-25-18 @ 05:44), Max: 98 (06-24-18 @ 14:25)  HR: 80 (06-25-18 @ 05:44) (67 - 89)  BP: 148/67 (06-25-18 @ 05:44) (114/55 - 157/68)  RR: 16 (06-25-18 @ 05:44) (16 - 16)  SpO2: 99% (06-24-18 @ 20:04) (99% - 99%)  I&O's Summary    CAPILLARY BLOOD GLUCOSE  308 (25 Jun 2018 11:21)  201 (25 Jun 2018 07:37)  157 (25 Jun 2018 02:00)  222 (24 Jun 2018 20:23)  332 (24 Jun 2018 16:35)      PHYSICAL EXAM:  GENERAL: NAD  HEAD:  Normocephalic  EYES:  conjunctiva and sclera clear  ENMT: Moist mucous membranes  some tenderness and induration of right submandibular area   NECK: Supple  NERVOUS SYSTEM:  Alert & Oriented X3, Good concentration  CHEST/LUNG: Clear to percussion bilaterally; No rales, rhonchi, wheezing  HEART: Regular rate and rhythm; No murmurs  ABDOMEN: Soft, Nontender, Nondistended; Bowel sounds present  EXTREMITIES:   No edema  SKIN: No rashes     Consultant(s) Notes Reviewed:  [x ] YES  [ ] NO  Care Discussed with Consultants/Other Providers [ x] YES  [ ] NO    MEDICATIONS  (STANDING):  amLODIPine   Tablet 2.5 milliGRAM(s) Oral daily  ampicillin/sulbactam  IVPB 3 Gram(s) IV Intermittent every 6 hours  ampicillin/sulbactam  IVPB      aspirin enteric coated 81 milliGRAM(s) Oral daily  atorvastatin Oral Tab/Cap - Peds 10 milliGRAM(s) Oral daily  buPROPion XL . 300 milliGRAM(s) Oral daily  dextrose 5%. 1000 milliLiter(s) (50 mL/Hr) IV Continuous <Continuous>  dextrose 50% Injectable 12.5 Gram(s) IV Push once  dextrose 50% Injectable 25 Gram(s) IV Push once  dextrose 50% Injectable 25 Gram(s) IV Push once  docusate sodium 100 milliGRAM(s) Oral three times a day  enoxaparin Injectable 40 milliGRAM(s) SubCutaneous every 24 hours  folic acid 2 milliGRAM(s) Oral daily  insulin glargine Injectable (LANTUS) 14 Unit(s) SubCutaneous every morning  insulin lispro (HumaLOG) corrective regimen sliding scale   SubCutaneous three times a day before meals  insulin lispro Injectable (HumaLOG) 6 Unit(s) SubCutaneous three times a day before meals  lisinopril 20 milliGRAM(s) Oral daily  predniSONE   Tablet 40 milliGRAM(s) Oral daily  Saliva Substitute (CAPHOSOL) 30 milliLiter(s) Swish and Spit four times a day  senna 2 Tablet(s) Oral at bedtime  sodium chloride 0.9%. 1000 milliLiter(s) (50 mL/Hr) IV Continuous <Continuous>  vibryd 40 milliGRAM(s) 40 milliGRAM(s) Oral daily    MEDICATIONS  (PRN):  dextrose 40% Gel 15 Gram(s) Oral once PRN Blood Glucose LESS THAN 70 milliGRAM(s)/deciliter  glucagon  Injectable 1 milliGRAM(s) IntraMuscular once PRN Glucose LESS THAN 70 milligrams/deciliter  HYDROmorphone  Injectable 1 milliGRAM(s) IV Push every 4 hours PRN Severe Pain (7 - 10)  ketorolac   Injectable 15 milliGRAM(s) IV Push every 12 hours PRN Moderate Pain (4 - 6)  oxyCODONE    IR 5 milliGRAM(s) Oral every 4 hours PRN Mild Pain (1 - 3)        RADIOLOGY & ADDITIONAL TESTS:    Imaging or report Personally Reviewed:  [x ] YES  [ ] NO    < from: CT Neck Soft Tissue w/ IV Cont (05.01.18 @ 15:07) >  IMPRESSION:    1.  Right submandibular gland inflammation due to an obstructive calculus   measuring 1 cm at the level of the gland hilum/proximal duct.   Intraglandular ductal dilatation as well as an additional 2.5 mm calculus   within the gland are noted. Surrounding inflammatory changes without   abscess.    2.  Thickened/edematous appearance of the epiglottis and bilateral   aryepiglottic folds, recommend follow-up with directvisualization.    < end of copied text >      Case discussed with resident    Care discussed with pt

## 2018-06-25 NOTE — PROGRESS NOTE ADULT - SUBJECTIVE AND OBJECTIVE BOX
Pt is a 78 y.o female with R sialoadenitis 2* obstructing stone, known to ENT - scheduled for resection in July, seen and examined at bedside. Pt still c/o pain to face and intraorally. Pt is taking PO, feeling better overall. No difficulty breathing or swallowing saliva.    MEDICATIONS  (STANDING):  amLODIPine Tablet 2.5 milliGRAM(s) Oral daily  ampicillin/sulbactam IVPB 3 Gram(s) IV Intermittent every 6 hours  ampicillin/sulbactam IVPB      aspirin enteric coated 81 milliGRAM(s) Oral daily  atorvastatin Oral Tab/Cap - Peds 10 milliGRAM(s) Oral daily  buPROPion XL . 300 milliGRAM(s) Oral daily  dextrose 5%. 1000 milliLiter(s) (50 mL/Hr) IV Continuous <Continuous>  dextrose 50% Injectable 12.5 Gram(s) IV Push once  dextrose 50% Injectable 25 Gram(s) IV Push once  dextrose 50% Injectable 25 Gram(s) IV Push once  docusate sodium 100 milliGRAM(s) Oral three times a day  enoxaparin Injectable 40 milliGRAM(s) SubCutaneous every 24 hours  folic acid 2 milliGRAM(s) Oral daily  insulin glargine Injectable (LANTUS) 14 Unit(s) SubCutaneous every morning  insulin lispro (HumaLOG) corrective regimen sliding scale   SubCutaneous three times a day before meals  insulin lispro Injectable (HumaLOG) 6 Unit(s) SubCutaneous three times a day before meals  lisinopril 20 milliGRAM(s) Oral daily  predniSONE Tablet 40 milliGRAM(s) Oral daily  Saliva Substitute (CAPHOSOL) 30 milliLiter(s) Swish and Spit four times a day  senna 2 Tablet(s) Oral at bedtime  sodium chloride 0.9%. 1000 milliLiter(s) (50 mL/Hr) IV Continuous <Continuous>  vibryd 40 milliGRAM(s) 40 milliGRAM(s) Oral daily    Vital Signs: T(F): 98 (25 Jun 2018 05:44), Max: 98 (24 Jun 2018 14:25), HR: 80, BP: 148/67, RR: 16, SpO2: 99%  GEN: NAD, awake and alert.  HEENT: + mild edema noted to R submandibular area, gland still firm, but decreased in size overall. no surrounding erythema. no trismus, no pus expressed with gland massage.

## 2018-06-25 NOTE — PROGRESS NOTE ADULT - ASSESSMENT
1. 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 HTN, RA and depression - continue current management    3. DM type 2 - uncontrolled at times - on insulin and glipizide at home

## 2018-06-25 NOTE — DISCHARGE NOTE ADULT - CARE PLAN
Principal Discharge DX:	Sialoadenitis  Goal:	Treatment  Assessment and plan of treatment:	Keep on antibiotics and salivary gland replacement  for 10 days   Follow up with ENT (Dr Loera)

## 2018-06-25 NOTE — PROGRESS NOTE ADULT - SUBJECTIVE AND OBJECTIVE BOX
MARGO JOY  78y, Female      OVERNIGHT EVENTS:    Feels better. Pain is less.    VITALS:  T(F): 98, Max: 98 (06-24-18 @ 14:25)  HR: 80  BP: 148/67  RR: 16Vital Signs Last 24 Hrs  T(C): 36.7 (25 Jun 2018 05:44), Max: 36.7 (24 Jun 2018 14:25)  T(F): 98 (25 Jun 2018 05:44), Max: 98 (24 Jun 2018 14:25)  HR: 80 (25 Jun 2018 05:44) (67 - 89)  BP: 148/67 (25 Jun 2018 05:44) (114/55 - 157/68)  BP(mean): --  RR: 16 (25 Jun 2018 05:44) (16 - 16)  SpO2: 99% (24 Jun 2018 20:04) (99% - 99%)    TESTS & MEASUREMENTS:              Culture - Blood (collected 06-21-18 @ 06:35)  Source: .Blood None  Preliminary Report (06-22-18 @ 19:01):    No growth to date.            RADIOLOGY & ADDITIONAL TESTS:    ANTIBIOTICS:  ampicillin/sulbactam  IVPB 3 Gram(s) IV Intermittent every 6 hours  ampicillin/sulbactam  IVPB

## 2018-06-25 NOTE — PROGRESS NOTE ADULT - ASSESSMENT
IMPRESSION:  Right acute on chronic sialoadenitis secondary to obstructive sialoith.  Overall much improved on antibiotics    RECOMMENDATIONS:  Augmentin po 875 mg q12h for 10 more days.    Recall prn please.

## 2018-06-25 NOTE — DISCHARGE NOTE ADULT - PATIENT PORTAL LINK FT
You can access the [facility], offered by [facility], by registering with the following website: [email or web address]

## 2018-06-26 LAB
BACTERIA FLD CULT: NORMAL
CULTURE RESULTS: SIGNIFICANT CHANGE UP
SPECIMEN SOURCE: SIGNIFICANT CHANGE UP

## 2018-07-02 ENCOUNTER — FORM ENCOUNTER (OUTPATIENT)
Age: 78
End: 2018-07-02

## 2018-07-02 DIAGNOSIS — K11.22 ACUTE RECURRENT SIALOADENITIS: ICD-10-CM

## 2018-07-02 DIAGNOSIS — Z79.899 OTHER LONG TERM (CURRENT) DRUG THERAPY: ICD-10-CM

## 2018-07-02 DIAGNOSIS — M06.9 RHEUMATOID ARTHRITIS, UNSPECIFIED: ICD-10-CM

## 2018-07-02 DIAGNOSIS — R13.19 OTHER DYSPHAGIA: ICD-10-CM

## 2018-07-02 DIAGNOSIS — F32.9 MAJOR DEPRESSIVE DISORDER, SINGLE EPISODE, UNSPECIFIED: ICD-10-CM

## 2018-07-02 DIAGNOSIS — K11.5 SIALOLITHIASIS: ICD-10-CM

## 2018-07-02 DIAGNOSIS — Z79.4 LONG TERM (CURRENT) USE OF INSULIN: ICD-10-CM

## 2018-07-02 DIAGNOSIS — I10 ESSENTIAL (PRIMARY) HYPERTENSION: ICD-10-CM

## 2018-07-02 DIAGNOSIS — E11.65 TYPE 2 DIABETES MELLITUS WITH HYPERGLYCEMIA: ICD-10-CM

## 2018-07-03 ENCOUNTER — OUTPATIENT (OUTPATIENT)
Dept: OUTPATIENT SERVICES | Facility: HOSPITAL | Age: 78
LOS: 1 days | Discharge: HOME | End: 2018-07-03

## 2018-07-03 VITALS
SYSTOLIC BLOOD PRESSURE: 119 MMHG | HEART RATE: 78 BPM | DIASTOLIC BLOOD PRESSURE: 73 MMHG | RESPIRATION RATE: 18 BRPM | OXYGEN SATURATION: 98 % | WEIGHT: 132.28 LBS | HEIGHT: 64 IN | TEMPERATURE: 99 F

## 2018-07-03 DIAGNOSIS — Z92.89 PERSONAL HISTORY OF OTHER MEDICAL TREATMENT: Chronic | ICD-10-CM

## 2018-07-03 DIAGNOSIS — K11.5 SIALOLITHIASIS: ICD-10-CM

## 2018-07-03 DIAGNOSIS — R22.40 LOCALIZED SWELLING, MASS AND LUMP, UNSPECIFIED LOWER LIMB: Chronic | ICD-10-CM

## 2018-07-03 DIAGNOSIS — I10 ESSENTIAL (PRIMARY) HYPERTENSION: ICD-10-CM

## 2018-07-03 DIAGNOSIS — F17.200 NICOTINE DEPENDENCE, UNSPECIFIED, UNCOMPLICATED: ICD-10-CM

## 2018-07-03 DIAGNOSIS — F32.9 MAJOR DEPRESSIVE DISORDER, SINGLE EPISODE, UNSPECIFIED: ICD-10-CM

## 2018-07-03 DIAGNOSIS — Z01.818 ENCOUNTER FOR OTHER PREPROCEDURAL EXAMINATION: ICD-10-CM

## 2018-07-03 DIAGNOSIS — J03.91 ACUTE RECURRENT TONSILLITIS, UNSPECIFIED: ICD-10-CM

## 2018-07-03 DIAGNOSIS — E11.9 TYPE 2 DIABETES MELLITUS WITHOUT COMPLICATIONS: ICD-10-CM

## 2018-07-03 LAB
ALBUMIN SERPL ELPH-MCNC: 4 G/DL — SIGNIFICANT CHANGE UP (ref 3.5–5.2)
ALP SERPL-CCNC: 100 U/L — SIGNIFICANT CHANGE UP (ref 30–115)
ALT FLD-CCNC: 14 U/L — SIGNIFICANT CHANGE UP (ref 0–41)
ANION GAP SERPL CALC-SCNC: 12 MMOL/L — SIGNIFICANT CHANGE UP (ref 7–14)
APTT BLD: 35.3 SEC — SIGNIFICANT CHANGE UP (ref 27–39.2)
AST SERPL-CCNC: 15 U/L — SIGNIFICANT CHANGE UP (ref 0–41)
BASOPHILS # BLD AUTO: 0.02 K/UL — SIGNIFICANT CHANGE UP (ref 0–0.2)
BASOPHILS NFR BLD AUTO: 0.2 % — SIGNIFICANT CHANGE UP (ref 0–1)
BILIRUB SERPL-MCNC: 0.3 MG/DL — SIGNIFICANT CHANGE UP (ref 0.2–1.2)
BUN SERPL-MCNC: 12 MG/DL — SIGNIFICANT CHANGE UP (ref 10–20)
CALCIUM SERPL-MCNC: 10 MG/DL — SIGNIFICANT CHANGE UP (ref 8.5–10.1)
CHLORIDE SERPL-SCNC: 97 MMOL/L — LOW (ref 98–110)
CO2 SERPL-SCNC: 30 MMOL/L — SIGNIFICANT CHANGE UP (ref 17–32)
CREAT SERPL-MCNC: 0.8 MG/DL — SIGNIFICANT CHANGE UP (ref 0.7–1.5)
EOSINOPHIL # BLD AUTO: 0.04 K/UL — SIGNIFICANT CHANGE UP (ref 0–0.7)
EOSINOPHIL NFR BLD AUTO: 0.4 % — SIGNIFICANT CHANGE UP (ref 0–8)
ESTIMATED AVERAGE GLUCOSE: 220 MG/DL — HIGH (ref 68–114)
GLUCOSE SERPL-MCNC: 190 MG/DL — HIGH (ref 70–99)
HBA1C BLD-MCNC: 9.3 % — HIGH (ref 4–5.6)
HCT VFR BLD CALC: 42 % — SIGNIFICANT CHANGE UP (ref 37–47)
HGB BLD-MCNC: 12.7 G/DL — SIGNIFICANT CHANGE UP (ref 12–16)
IMM GRANULOCYTES NFR BLD AUTO: 0.4 % — HIGH (ref 0.1–0.3)
INR BLD: 1.22 RATIO — SIGNIFICANT CHANGE UP (ref 0.65–1.3)
LYMPHOCYTES # BLD AUTO: 1.38 K/UL — SIGNIFICANT CHANGE UP (ref 1.2–3.4)
LYMPHOCYTES # BLD AUTO: 14.4 % — LOW (ref 20.5–51.1)
MCHC RBC-ENTMCNC: 23 PG — LOW (ref 27–31)
MCHC RBC-ENTMCNC: 30.2 G/DL — LOW (ref 32–37)
MCV RBC AUTO: 76.1 FL — LOW (ref 81–99)
MONOCYTES # BLD AUTO: 0.7 K/UL — HIGH (ref 0.1–0.6)
MONOCYTES NFR BLD AUTO: 7.3 % — SIGNIFICANT CHANGE UP (ref 1.7–9.3)
NEUTROPHILS # BLD AUTO: 7.4 K/UL — HIGH (ref 1.4–6.5)
NEUTROPHILS NFR BLD AUTO: 77.3 % — HIGH (ref 42.2–75.2)
NRBC # BLD: 0 /100 WBCS — SIGNIFICANT CHANGE UP (ref 0–0)
PLATELET # BLD AUTO: 327 K/UL — SIGNIFICANT CHANGE UP (ref 130–400)
POTASSIUM SERPL-MCNC: 6 MMOL/L — CRITICAL HIGH (ref 3.5–5)
POTASSIUM SERPL-SCNC: 6 MMOL/L — CRITICAL HIGH (ref 3.5–5)
PROT SERPL-MCNC: 7.1 G/DL — SIGNIFICANT CHANGE UP (ref 6–8)
PROTHROM AB SERPL-ACNC: 13.2 SEC — HIGH (ref 9.95–12.87)
RBC # BLD: 5.52 M/UL — HIGH (ref 4.2–5.4)
RBC # FLD: 15.5 % — HIGH (ref 11.5–14.5)
SODIUM SERPL-SCNC: 139 MMOL/L — SIGNIFICANT CHANGE UP (ref 135–146)
WBC # BLD: 9.58 K/UL — SIGNIFICANT CHANGE UP (ref 4.8–10.8)
WBC # FLD AUTO: 9.58 K/UL — SIGNIFICANT CHANGE UP (ref 4.8–10.8)

## 2018-07-03 NOTE — H&P PST ADULT - NSANTHOSAYNRD_GEN_A_CORE
No. ASH screening performed.  STOP BANG Legend: 0-2 = LOW Risk; 3-4 = INTERMEDIATE Risk; 5-8 = HIGH Risk/SEE SCREEN

## 2018-07-03 NOTE — H&P PST ADULT - NECK DETAILS
decreased rom with pain due to enlarged gland  decreased rom   grape  tomato firm mass right neck  painful on touch

## 2018-07-03 NOTE — H&P PST ADULT - ATTENDING COMMENTS
Since previous clinic visit, patient was admitted to [facility] for acute right SMG sialadenitis, required IV antibiotics and pain control. She has since done well, afebrile. Continues to have right submandibular gland fullness.

## 2018-07-03 NOTE — H&P PST ADULT - HISTORY OF PRESENT ILLNESS
pt speaks no english  contacted [employer] interpre[employer]   for spelling of name, date of birth and side and site of procedure.   wishes daughter to be  for remainder of visit    "THEY ARE REMOVING A GLAND ON MY NECK"   POINTS TO MASS ON RIGHT NECK"  PT CURRENTLY DENIES CHEST PAIN, PALPITATIONS, DYSURIA, RECENT ILLNESS  EXERCISE TOLERANCE 4-5 BLOCKS  PT DENIES ANY RASHES, ABRASION, OR OPEN WOUNDS OR CUTS pt speaks no english  contacted [employer] interpreters   for spelling of name, date of birth and side and site of procedure.   wishes daughter to be  for remainder of visit    "THEY ARE REMOVING A GLAND ON MY NECK"   POINTS TO MASS ON RIGHT NECK"  PT CURRENTLY DENIES CHEST PAIN, PALPITATIONS, DYSURIA, RECENT ILLNESS  EXERCISE TOLERANCE 4-5 BLOCKS  PT DENIES ANY RASHES, ABRASION, OR OPEN WOUNDS OR CUTS    AS PER THE PT's daughter, THIS IS A COMPLETE MEDICAL AND SURGICAL HX, INCLUDING MEDICATIONS PRESCRIBED AND OVER THE COUNTER pt speaks no english  contacted [employer] interpreters   for spelling of name, date of birth and side and site of procedure.   wishes daughter to be  for remainder of visit    "THEY ARE REMOVING A GLAND ON MY NECK"   POINTS TO MASS ON RIGHT NECK"  PT CURRENTLY DENIES CHEST PAIN, PALPITATIONS, DYSURIA, RECENT ILLNESS  EXERCISE TOLERANCE 4-5 BLOCKS  PT DENIES ANY RASHES, ABRASION, OR OPEN WOUNDS OR CUTS    AS PER THE PT'S DAUGHTER, THIS IS A COMPLETE MEDICAL AND SURGICAL HX, INCLUDING MEDICATIONS PRESCRIBED AND OVER THE COUNTER

## 2018-07-03 NOTE — H&P PST ADULT - PMH
Depression    Diabetes    HTN (hypertension)    Rheumatoid arthritis    Sialadenitis Anxiety    Depression    Diabetes    HTN (hypertension)    Rheumatoid arthritis    Sialadenitis

## 2018-07-05 ENCOUNTER — OUTPATIENT (OUTPATIENT)
Dept: OUTPATIENT SERVICES | Facility: HOSPITAL | Age: 78
LOS: 1 days | Discharge: HOME | End: 2018-07-05

## 2018-07-05 DIAGNOSIS — Z01.818 ENCOUNTER FOR OTHER PREPROCEDURAL EXAMINATION: ICD-10-CM

## 2018-07-05 DIAGNOSIS — R22.40 LOCALIZED SWELLING, MASS AND LUMP, UNSPECIFIED LOWER LIMB: Chronic | ICD-10-CM

## 2018-07-05 DIAGNOSIS — Z92.89 PERSONAL HISTORY OF OTHER MEDICAL TREATMENT: Chronic | ICD-10-CM

## 2018-07-06 ENCOUNTER — OUTPATIENT (OUTPATIENT)
Dept: OUTPATIENT SERVICES | Facility: HOSPITAL | Age: 78
LOS: 1 days | Discharge: HOME | End: 2018-07-06

## 2018-07-06 DIAGNOSIS — R22.40 LOCALIZED SWELLING, MASS AND LUMP, UNSPECIFIED LOWER LIMB: Chronic | ICD-10-CM

## 2018-07-06 DIAGNOSIS — Z92.89 PERSONAL HISTORY OF OTHER MEDICAL TREATMENT: Chronic | ICD-10-CM

## 2018-07-06 DIAGNOSIS — Z01.818 ENCOUNTER FOR OTHER PREPROCEDURAL EXAMINATION: ICD-10-CM

## 2018-07-06 LAB
ALBUMIN SERPL ELPH-MCNC: 3.9 G/DL — SIGNIFICANT CHANGE UP (ref 3.5–5.2)
ALP SERPL-CCNC: 96 U/L — SIGNIFICANT CHANGE UP (ref 30–115)
ALT FLD-CCNC: 16 U/L — SIGNIFICANT CHANGE UP (ref 0–41)
ANION GAP SERPL CALC-SCNC: 12 MMOL/L — SIGNIFICANT CHANGE UP (ref 7–14)
AST SERPL-CCNC: 15 U/L — SIGNIFICANT CHANGE UP (ref 0–41)
BILIRUB SERPL-MCNC: 0.3 MG/DL — SIGNIFICANT CHANGE UP (ref 0.2–1.2)
BUN SERPL-MCNC: 16 MG/DL — SIGNIFICANT CHANGE UP (ref 10–20)
CALCIUM SERPL-MCNC: 9.4 MG/DL — SIGNIFICANT CHANGE UP (ref 8.5–10.1)
CHLORIDE SERPL-SCNC: 98 MMOL/L — SIGNIFICANT CHANGE UP (ref 98–110)
CO2 SERPL-SCNC: 26 MMOL/L — SIGNIFICANT CHANGE UP (ref 17–32)
CREAT SERPL-MCNC: 0.8 MG/DL — SIGNIFICANT CHANGE UP (ref 0.7–1.5)
GLUCOSE SERPL-MCNC: 256 MG/DL — HIGH (ref 70–99)
POTASSIUM SERPL-MCNC: 4.7 MMOL/L — SIGNIFICANT CHANGE UP (ref 3.5–5)
POTASSIUM SERPL-SCNC: 4.7 MMOL/L — SIGNIFICANT CHANGE UP (ref 3.5–5)
PROT SERPL-MCNC: 6.6 G/DL — SIGNIFICANT CHANGE UP (ref 6–8)
SODIUM SERPL-SCNC: 136 MMOL/L — SIGNIFICANT CHANGE UP (ref 135–146)

## 2018-07-16 ENCOUNTER — OTHER (OUTPATIENT)
Age: 78
End: 2018-07-16

## 2018-07-17 ENCOUNTER — OUTPATIENT (OUTPATIENT)
Dept: OUTPATIENT SERVICES | Facility: HOSPITAL | Age: 78
LOS: 1 days | Discharge: HOME | End: 2018-07-17

## 2018-07-17 ENCOUNTER — APPOINTMENT (OUTPATIENT)
Dept: OTOLARYNGOLOGY | Facility: AMBULATORY SURGERY CENTER | Age: 78
End: 2018-07-17
Payer: MEDICARE

## 2018-07-17 ENCOUNTER — RESULT REVIEW (OUTPATIENT)
Age: 78
End: 2018-07-17

## 2018-07-17 VITALS
HEART RATE: 70 BPM | TEMPERATURE: 98 F | WEIGHT: 132.28 LBS | HEIGHT: 64 IN | OXYGEN SATURATION: 100 % | RESPIRATION RATE: 18 BRPM | SYSTOLIC BLOOD PRESSURE: 134 MMHG | DIASTOLIC BLOOD PRESSURE: 59 MMHG

## 2018-07-17 VITALS
HEART RATE: 88 BPM | SYSTOLIC BLOOD PRESSURE: 140 MMHG | OXYGEN SATURATION: 98 % | DIASTOLIC BLOOD PRESSURE: 67 MMHG | RESPIRATION RATE: 17 BRPM

## 2018-07-17 DIAGNOSIS — R22.40 LOCALIZED SWELLING, MASS AND LUMP, UNSPECIFIED LOWER LIMB: Chronic | ICD-10-CM

## 2018-07-17 DIAGNOSIS — Z92.89 PERSONAL HISTORY OF OTHER MEDICAL TREATMENT: Chronic | ICD-10-CM

## 2018-07-17 PROBLEM — E11.9 TYPE 2 DIABETES MELLITUS WITHOUT COMPLICATIONS: Chronic | Status: ACTIVE | Noted: 2018-02-06

## 2018-07-17 PROBLEM — F32.9 MAJOR DEPRESSIVE DISORDER, SINGLE EPISODE, UNSPECIFIED: Chronic | Status: ACTIVE | Noted: 2018-06-20

## 2018-07-17 PROBLEM — M06.9 RHEUMATOID ARTHRITIS, UNSPECIFIED: Chronic | Status: ACTIVE | Noted: 2018-05-01

## 2018-07-17 PROBLEM — I10 ESSENTIAL (PRIMARY) HYPERTENSION: Chronic | Status: ACTIVE | Noted: 2018-02-06

## 2018-07-17 PROBLEM — F41.9 ANXIETY DISORDER, UNSPECIFIED: Chronic | Status: ACTIVE | Noted: 2018-07-03

## 2018-07-17 PROCEDURE — 42440 EXCISE SUBMAXILLARY GLAND: CPT

## 2018-07-17 RX ORDER — KETOROLAC TROMETHAMINE 30 MG/ML
15 SYRINGE (ML) INJECTION ONCE
Qty: 0 | Refills: 0 | Status: DISCONTINUED | OUTPATIENT
Start: 2018-07-17 | End: 2018-07-17

## 2018-07-17 RX ORDER — ONDANSETRON 8 MG/1
4 TABLET, FILM COATED ORAL ONCE
Qty: 0 | Refills: 0 | Status: DISCONTINUED | OUTPATIENT
Start: 2018-07-17 | End: 2018-08-01

## 2018-07-17 RX ORDER — SODIUM CHLORIDE 9 MG/ML
1000 INJECTION, SOLUTION INTRAVENOUS
Qty: 0 | Refills: 0 | Status: DISCONTINUED | OUTPATIENT
Start: 2018-07-17 | End: 2018-08-01

## 2018-07-17 RX ORDER — MORPHINE SULFATE 50 MG/1
1 CAPSULE, EXTENDED RELEASE ORAL ONCE
Qty: 0 | Refills: 0 | Status: DISCONTINUED | OUTPATIENT
Start: 2018-07-17 | End: 2018-07-17

## 2018-07-17 RX ADMIN — SODIUM CHLORIDE 75 MILLILITER(S): 9 INJECTION, SOLUTION INTRAVENOUS at 12:21

## 2018-07-17 NOTE — BRIEF OPERATIVE NOTE - OPERATION/FINDINGS
firm right enlarged submandibular gland, ~2cm sialolith, friable and necrotic appearing tissue at the entry point of the duct into gland - area of stone.

## 2018-07-17 NOTE — BRIEF OPERATIVE NOTE - PROCEDURE
<<-----Click on this checkbox to enter Procedure Submandibular gland removal  07/17/2018    Active  [email or web address]

## 2018-07-17 NOTE — BRIEF OPERATIVE NOTE - PRE-OP DX
Sialadenitis  07/17/2018    Active  Josselyn Loera  Sialolithiasis of submandibular gland  07/17/2018    Active  Josselyn Loera

## 2018-07-18 LAB — SURGICAL PATHOLOGY STUDY: SIGNIFICANT CHANGE UP

## 2018-07-19 ENCOUNTER — APPOINTMENT (OUTPATIENT)
Dept: OTOLARYNGOLOGY | Facility: CLINIC | Age: 78
End: 2018-07-19
Payer: MEDICARE

## 2018-07-19 VITALS
BODY MASS INDEX: 25.61 KG/M2 | SYSTOLIC BLOOD PRESSURE: 129 MMHG | WEIGHT: 150 LBS | HEIGHT: 64 IN | DIASTOLIC BLOOD PRESSURE: 78 MMHG

## 2018-07-19 DIAGNOSIS — Z79.4 LONG TERM (CURRENT) USE OF INSULIN: ICD-10-CM

## 2018-07-19 DIAGNOSIS — K11.5 SIALOLITHIASIS: ICD-10-CM

## 2018-07-19 DIAGNOSIS — I10 ESSENTIAL (PRIMARY) HYPERTENSION: ICD-10-CM

## 2018-07-19 DIAGNOSIS — E11.9 TYPE 2 DIABETES MELLITUS WITHOUT COMPLICATIONS: ICD-10-CM

## 2018-07-19 DIAGNOSIS — F41.8 OTHER SPECIFIED ANXIETY DISORDERS: ICD-10-CM

## 2018-07-19 DIAGNOSIS — K11.20 SIALOADENITIS, UNSPECIFIED: ICD-10-CM

## 2018-07-19 PROCEDURE — 99024 POSTOP FOLLOW-UP VISIT: CPT

## 2018-08-02 ENCOUNTER — APPOINTMENT (OUTPATIENT)
Dept: OTOLARYNGOLOGY | Facility: CLINIC | Age: 78
End: 2018-08-02
Payer: MEDICARE

## 2018-08-02 VITALS
DIASTOLIC BLOOD PRESSURE: 78 MMHG | BODY MASS INDEX: 25.61 KG/M2 | SYSTOLIC BLOOD PRESSURE: 122 MMHG | WEIGHT: 150 LBS | HEIGHT: 64 IN

## 2018-08-02 DIAGNOSIS — K11.20 SIALOADENITIS, UNSPECIFIED: ICD-10-CM

## 2018-08-02 DIAGNOSIS — G89.18 OTHER ACUTE POSTPROCEDURAL PAIN: ICD-10-CM

## 2018-08-02 PROCEDURE — 99024 POSTOP FOLLOW-UP VISIT: CPT

## 2018-09-10 ENCOUNTER — OUTPATIENT (OUTPATIENT)
Dept: OUTPATIENT SERVICES | Facility: HOSPITAL | Age: 78
LOS: 1 days | Discharge: HOME | End: 2018-09-10

## 2018-09-10 ENCOUNTER — APPOINTMENT (OUTPATIENT)
Dept: OTOLARYNGOLOGY | Facility: CLINIC | Age: 78
End: 2018-09-10
Payer: MEDICARE

## 2018-09-10 DIAGNOSIS — Z92.89 PERSONAL HISTORY OF OTHER MEDICAL TREATMENT: Chronic | ICD-10-CM

## 2018-09-10 DIAGNOSIS — K11.5 SIALOLITHIASIS: ICD-10-CM

## 2018-09-10 DIAGNOSIS — R22.40 LOCALIZED SWELLING, MASS AND LUMP, UNSPECIFIED LOWER LIMB: Chronic | ICD-10-CM

## 2018-09-10 PROCEDURE — 99212 OFFICE O/P EST SF 10 MIN: CPT | Mod: 24

## 2018-09-12 DIAGNOSIS — K11.5 SIALOLITHIASIS: ICD-10-CM

## 2018-09-19 LAB — BACTERIA SPEC CULT: NORMAL

## 2018-09-27 ENCOUNTER — INPATIENT (INPATIENT)
Facility: HOSPITAL | Age: 78
LOS: 3 days | Discharge: HOME | End: 2018-10-01

## 2018-09-27 VITALS
OXYGEN SATURATION: 97 % | RESPIRATION RATE: 18 BRPM | DIASTOLIC BLOOD PRESSURE: 70 MMHG | SYSTOLIC BLOOD PRESSURE: 120 MMHG | HEART RATE: 104 BPM | WEIGHT: 121.92 LBS | HEIGHT: 62 IN | TEMPERATURE: 100 F

## 2018-09-27 DIAGNOSIS — M06.9 RHEUMATOID ARTHRITIS, UNSPECIFIED: ICD-10-CM

## 2018-09-27 DIAGNOSIS — F32.9 MAJOR DEPRESSIVE DISORDER, SINGLE EPISODE, UNSPECIFIED: ICD-10-CM

## 2018-09-27 DIAGNOSIS — Z92.89 PERSONAL HISTORY OF OTHER MEDICAL TREATMENT: Chronic | ICD-10-CM

## 2018-09-27 DIAGNOSIS — R22.40 LOCALIZED SWELLING, MASS AND LUMP, UNSPECIFIED LOWER LIMB: Chronic | ICD-10-CM

## 2018-09-27 DIAGNOSIS — K51.00 ULCERATIVE (CHRONIC) PANCOLITIS WITHOUT COMPLICATIONS: ICD-10-CM

## 2018-09-27 DIAGNOSIS — E11.9 TYPE 2 DIABETES MELLITUS WITHOUT COMPLICATIONS: ICD-10-CM

## 2018-09-27 DIAGNOSIS — R19.7 DIARRHEA, UNSPECIFIED: ICD-10-CM

## 2018-09-27 DIAGNOSIS — F41.9 ANXIETY DISORDER, UNSPECIFIED: ICD-10-CM

## 2018-09-27 DIAGNOSIS — Z79.4 LONG TERM (CURRENT) USE OF INSULIN: ICD-10-CM

## 2018-09-27 DIAGNOSIS — I10 ESSENTIAL (PRIMARY) HYPERTENSION: ICD-10-CM

## 2018-09-27 LAB
ALBUMIN SERPL ELPH-MCNC: 4 G/DL — SIGNIFICANT CHANGE UP (ref 3.5–5.2)
ALP SERPL-CCNC: 91 U/L — SIGNIFICANT CHANGE UP (ref 30–115)
ALT FLD-CCNC: 12 U/L — SIGNIFICANT CHANGE UP (ref 0–41)
ANION GAP SERPL CALC-SCNC: 9 MMOL/L — SIGNIFICANT CHANGE UP (ref 7–14)
APPEARANCE UR: CLEAR — SIGNIFICANT CHANGE UP
APTT BLD: 28.5 SEC — SIGNIFICANT CHANGE UP (ref 27–39.2)
AST SERPL-CCNC: 15 U/L — SIGNIFICANT CHANGE UP (ref 0–41)
BACTERIA # UR AUTO: ABNORMAL
BASE EXCESS BLDV CALC-SCNC: 3.9 MMOL/L — HIGH (ref -2–2)
BASOPHILS # BLD AUTO: 0.01 K/UL — SIGNIFICANT CHANGE UP (ref 0–0.2)
BASOPHILS NFR BLD AUTO: 0.1 % — SIGNIFICANT CHANGE UP (ref 0–1)
BILIRUB SERPL-MCNC: 0.4 MG/DL — SIGNIFICANT CHANGE UP (ref 0.2–1.2)
BILIRUB UR-MCNC: NEGATIVE — SIGNIFICANT CHANGE UP
BUN SERPL-MCNC: 10 MG/DL — SIGNIFICANT CHANGE UP (ref 10–20)
CA-I SERPL-SCNC: 1.15 MMOL/L — SIGNIFICANT CHANGE UP (ref 1.12–1.3)
CALCIUM SERPL-MCNC: 8.7 MG/DL — SIGNIFICANT CHANGE UP (ref 8.5–10.1)
CHLORIDE SERPL-SCNC: 92 MMOL/L — LOW (ref 98–110)
CO2 SERPL-SCNC: 27 MMOL/L — SIGNIFICANT CHANGE UP (ref 17–32)
COLOR SPEC: YELLOW — SIGNIFICANT CHANGE UP
CREAT SERPL-MCNC: 1.1 MG/DL — SIGNIFICANT CHANGE UP (ref 0.7–1.5)
DIFF PNL FLD: ABNORMAL
EOSINOPHIL # BLD AUTO: 0 K/UL — SIGNIFICANT CHANGE UP (ref 0–0.7)
EOSINOPHIL NFR BLD AUTO: 0 % — SIGNIFICANT CHANGE UP (ref 0–8)
GAS PNL BLDV: 130 MMOL/L — LOW (ref 136–145)
GAS PNL BLDV: SIGNIFICANT CHANGE UP
GLUCOSE BLDC GLUCOMTR-MCNC: 208 MG/DL — HIGH (ref 70–99)
GLUCOSE BLDC GLUCOMTR-MCNC: 220 MG/DL — HIGH (ref 70–99)
GLUCOSE SERPL-MCNC: 354 MG/DL — HIGH (ref 70–99)
GLUCOSE UR QL: 500 MG/DL
HCO3 BLDV-SCNC: 30 MMOL/L — HIGH (ref 22–29)
HCT VFR BLD CALC: 40.9 % — SIGNIFICANT CHANGE UP (ref 37–47)
HCT VFR BLDA CALC: 42.3 % — SIGNIFICANT CHANGE UP (ref 34–44)
HGB BLD CALC-MCNC: 13.8 G/DL — LOW (ref 14–18)
HGB BLD-MCNC: 12.6 G/DL — SIGNIFICANT CHANGE UP (ref 12–16)
HOROWITZ INDEX BLDV+IHG-RTO: 21 — SIGNIFICANT CHANGE UP
IMM GRANULOCYTES NFR BLD AUTO: 0.3 % — SIGNIFICANT CHANGE UP (ref 0.1–0.3)
INR BLD: 1.13 RATIO — SIGNIFICANT CHANGE UP (ref 0.65–1.3)
KETONES UR-MCNC: 15 — SIGNIFICANT CHANGE UP
LACTATE BLDV-MCNC: 1.4 MMOL/L — SIGNIFICANT CHANGE UP (ref 0.5–1.6)
LEUKOCYTE ESTERASE UR-ACNC: NEGATIVE — SIGNIFICANT CHANGE UP
LYMPHOCYTES # BLD AUTO: 0.78 K/UL — LOW (ref 1.2–3.4)
LYMPHOCYTES # BLD AUTO: 6.7 % — LOW (ref 20.5–51.1)
MCHC RBC-ENTMCNC: 23.5 PG — LOW (ref 27–31)
MCHC RBC-ENTMCNC: 30.8 G/DL — LOW (ref 32–37)
MCV RBC AUTO: 76.2 FL — LOW (ref 81–99)
MONOCYTES # BLD AUTO: 0.47 K/UL — SIGNIFICANT CHANGE UP (ref 0.1–0.6)
MONOCYTES NFR BLD AUTO: 4 % — SIGNIFICANT CHANGE UP (ref 1.7–9.3)
NEUTROPHILS # BLD AUTO: 10.32 K/UL — HIGH (ref 1.4–6.5)
NEUTROPHILS NFR BLD AUTO: 88.9 % — HIGH (ref 42.2–75.2)
NITRITE UR-MCNC: NEGATIVE — SIGNIFICANT CHANGE UP
NRBC # BLD: 0 /100 WBCS — SIGNIFICANT CHANGE UP (ref 0–0)
PCO2 BLDV: 48 MMHG — SIGNIFICANT CHANGE UP (ref 41–51)
PH BLDV: 7.4 — SIGNIFICANT CHANGE UP (ref 7.26–7.43)
PH UR: 6 — SIGNIFICANT CHANGE UP (ref 5–8)
PLATELET # BLD AUTO: 177 K/UL — SIGNIFICANT CHANGE UP (ref 130–400)
PO2 BLDV: 14 MMHG — LOW (ref 20–40)
POTASSIUM BLDV-SCNC: 4.1 MMOL/L — SIGNIFICANT CHANGE UP (ref 3.3–5.6)
POTASSIUM SERPL-MCNC: 4.2 MMOL/L — SIGNIFICANT CHANGE UP (ref 3.5–5)
POTASSIUM SERPL-SCNC: 4.2 MMOL/L — SIGNIFICANT CHANGE UP (ref 3.5–5)
PROT SERPL-MCNC: 6.6 G/DL — SIGNIFICANT CHANGE UP (ref 6–8)
PROT UR-MCNC: 100
PROTHROM AB SERPL-ACNC: 12.2 SEC — SIGNIFICANT CHANGE UP (ref 9.95–12.87)
RBC # BLD: 5.37 M/UL — SIGNIFICANT CHANGE UP (ref 4.2–5.4)
RBC # FLD: 15.4 % — HIGH (ref 11.5–14.5)
RBC CASTS # UR COMP ASSIST: SIGNIFICANT CHANGE UP /HPF
SAO2 % BLDV: 22 % — SIGNIFICANT CHANGE UP
SODIUM SERPL-SCNC: 128 MMOL/L — LOW (ref 135–146)
SP GR SPEC: 1.02 — SIGNIFICANT CHANGE UP (ref 1.01–1.03)
UROBILINOGEN FLD QL: 0.2 — SIGNIFICANT CHANGE UP (ref 0.2–0.2)
WBC # BLD: 11.61 K/UL — HIGH (ref 4.8–10.8)
WBC # FLD AUTO: 11.61 K/UL — HIGH (ref 4.8–10.8)

## 2018-09-27 RX ORDER — SODIUM CHLORIDE 9 MG/ML
1000 INJECTION INTRAMUSCULAR; INTRAVENOUS; SUBCUTANEOUS
Qty: 0 | Refills: 0 | Status: DISCONTINUED | OUTPATIENT
Start: 2018-09-27 | End: 2018-10-01

## 2018-09-27 RX ORDER — ACETAMINOPHEN 500 MG
650 TABLET ORAL EVERY 6 HOURS
Qty: 0 | Refills: 0 | Status: DISCONTINUED | OUTPATIENT
Start: 2018-09-27 | End: 2018-10-01

## 2018-09-27 RX ORDER — INSULIN LISPRO 100/ML
VIAL (ML) SUBCUTANEOUS
Qty: 0 | Refills: 0 | Status: DISCONTINUED | OUTPATIENT
Start: 2018-09-27 | End: 2018-10-01

## 2018-09-27 RX ORDER — CEFOTETAN DISODIUM 1 G
2 VIAL (EA) INJECTION ONCE
Qty: 0 | Refills: 0 | Status: COMPLETED | OUTPATIENT
Start: 2018-09-27 | End: 2018-09-27

## 2018-09-27 RX ORDER — LOPERAMIDE HCL 2 MG
2 TABLET ORAL EVERY 6 HOURS
Qty: 0 | Refills: 0 | Status: DISCONTINUED | OUTPATIENT
Start: 2018-09-27 | End: 2018-09-27

## 2018-09-27 RX ORDER — OXYCODONE HYDROCHLORIDE 5 MG/1
5 TABLET ORAL EVERY 6 HOURS
Qty: 0 | Refills: 0 | Status: DISCONTINUED | OUTPATIENT
Start: 2018-09-27 | End: 2018-10-01

## 2018-09-27 RX ORDER — ATORVASTATIN CALCIUM 80 MG/1
10 TABLET, FILM COATED ORAL AT BEDTIME
Qty: 0 | Refills: 0 | Status: DISCONTINUED | OUTPATIENT
Start: 2018-09-27 | End: 2018-10-01

## 2018-09-27 RX ORDER — ACETAMINOPHEN 500 MG
975 TABLET ORAL ONCE
Qty: 0 | Refills: 0 | Status: COMPLETED | OUTPATIENT
Start: 2018-09-27 | End: 2018-09-27

## 2018-09-27 RX ORDER — METRONIDAZOLE 500 MG
500 TABLET ORAL EVERY 8 HOURS
Qty: 0 | Refills: 0 | Status: DISCONTINUED | OUTPATIENT
Start: 2018-09-27 | End: 2018-10-01

## 2018-09-27 RX ORDER — SODIUM CHLORIDE 9 MG/ML
1000 INJECTION, SOLUTION INTRAVENOUS
Qty: 0 | Refills: 0 | Status: DISCONTINUED | OUTPATIENT
Start: 2018-09-27 | End: 2018-10-01

## 2018-09-27 RX ORDER — BUPROPION HYDROCHLORIDE 150 MG/1
1 TABLET, EXTENDED RELEASE ORAL
Qty: 0 | Refills: 0 | COMMUNITY

## 2018-09-27 RX ORDER — FOLIC ACID 0.8 MG
1 TABLET ORAL DAILY
Qty: 0 | Refills: 0 | Status: DISCONTINUED | OUTPATIENT
Start: 2018-09-27 | End: 2018-10-01

## 2018-09-27 RX ORDER — PREGABALIN 225 MG/1
500 CAPSULE ORAL DAILY
Qty: 0 | Refills: 0 | Status: DISCONTINUED | OUTPATIENT
Start: 2018-09-27 | End: 2018-09-27

## 2018-09-27 RX ORDER — AMLODIPINE BESYLATE 2.5 MG/1
5 TABLET ORAL DAILY
Qty: 0 | Refills: 0 | Status: COMPLETED | OUTPATIENT
Start: 2018-09-27 | End: 2018-09-28

## 2018-09-27 RX ORDER — SODIUM CHLORIDE 9 MG/ML
2000 INJECTION INTRAMUSCULAR; INTRAVENOUS; SUBCUTANEOUS ONCE
Qty: 0 | Refills: 0 | Status: COMPLETED | OUTPATIENT
Start: 2018-09-27 | End: 2018-09-27

## 2018-09-27 RX ORDER — CEFOTETAN DISODIUM 1 G
1 VIAL (EA) INJECTION EVERY 12 HOURS
Qty: 0 | Refills: 0 | Status: DISCONTINUED | OUTPATIENT
Start: 2018-09-27 | End: 2018-10-01

## 2018-09-27 RX ORDER — METRONIDAZOLE 500 MG
500 TABLET ORAL ONCE
Qty: 0 | Refills: 0 | Status: COMPLETED | OUTPATIENT
Start: 2018-09-27 | End: 2018-09-27

## 2018-09-27 RX ORDER — BUPROPION HYDROCHLORIDE 150 MG/1
300 TABLET, EXTENDED RELEASE ORAL DAILY
Qty: 0 | Refills: 0 | Status: DISCONTINUED | OUTPATIENT
Start: 2018-09-27 | End: 2018-10-01

## 2018-09-27 RX ADMIN — Medication 100 MILLIGRAM(S): at 16:52

## 2018-09-27 RX ADMIN — SODIUM CHLORIDE 2000 MILLILITER(S): 9 INJECTION INTRAMUSCULAR; INTRAVENOUS; SUBCUTANEOUS at 14:30

## 2018-09-27 RX ADMIN — SODIUM CHLORIDE 75 MILLILITER(S): 9 INJECTION INTRAMUSCULAR; INTRAVENOUS; SUBCUTANEOUS at 18:16

## 2018-09-27 RX ADMIN — ATORVASTATIN CALCIUM 10 MILLIGRAM(S): 80 TABLET, FILM COATED ORAL at 21:47

## 2018-09-27 RX ADMIN — Medication 100 GRAM(S): at 14:58

## 2018-09-27 RX ADMIN — Medication 650 MILLIGRAM(S): at 21:47

## 2018-09-27 RX ADMIN — Medication 100 MILLIGRAM(S): at 21:47

## 2018-09-27 RX ADMIN — Medication 650 MILLIGRAM(S): at 22:15

## 2018-09-27 RX ADMIN — Medication 30 MILLILITER(S): at 18:38

## 2018-09-27 RX ADMIN — Medication 975 MILLIGRAM(S): at 14:30

## 2018-09-27 NOTE — H&P ADULT - NSHPLABSRESULTS_GEN_ALL_CORE
12.6   11.61 )-----------( 177      ( 27 Sep 2018 14:26 )             40.9           128<L>  |  92<L>  |  10  ----------------------------<  354<H>  4.2   |  27  |  1.1    Ca    8.7      27 Sep 2018 14:26    TPro  6.6  /  Alb  4.0  /  TBili  0.4  /  DBili  x   /  AST  15  /  ALT  12  /  AlkPhos  91                    Urinalysis Basic - ( 27 Sep 2018 14:53 )    Color: Yellow / Appearance: Clear / S.025 / pH: x  Gluc: x / Ketone: 15  / Bili: Negative / Urobili: 0.2   Blood: x / Protein: 100 / Nitrite: Negative   Leuk Esterase: Negative / RBC: 1-2 /HPF / WBC x   Sq Epi: x / Non Sq Epi: x / Bacteria: Few        PT/INR - ( 27 Sep 2018 14:26 )   PT: 12.20 sec;   INR: 1.13 ratio         PTT - ( 27 Sep 2018 14:26 )  PTT:28.5 sec    Lactate Trend

## 2018-09-27 NOTE — ED PROVIDER NOTE - MEDICAL DECISION MAKING DETAILS
I personally evaluated the patient. I reviewed the Resident’s or Physician Assistant’s note (as assigned above), and agree with the findings and plan except as documented in my note. Patient admitted for IV antibiotics and sepsis

## 2018-09-27 NOTE — ED PROVIDER NOTE - OBJECTIVE STATEMENT
77 y/o female with hx of diabetes, rheumatoid arthritis, HTN presents with daughter with fever and diarrhea x 4 days. daughter noted weight loss over last few months. patient not on any recent antibx, travel or sick contacts. patient with non bloody diarrhea. no vomiting. good appetite. no cough, dysuria or back pain.

## 2018-09-27 NOTE — H&P ADULT - ASSESSMENT
DX; PANCOLITIS  A/P; admit to med-surg  iv abx  iv hydration  labs/ecg/c-xray  continue previous meds  F.S with insulin coverage  replace sodium  pain mgmt  GI consult  monitor vss  monitor pt DX; PANCOLITIS  A/P; admit to med-surg  iv abx, clear diet for now  iv hydration,   labs/ecg/c-xray  Clinically stable    continue previous meds  F.S with insulin coverage  replace sodium  pain mgmt  GI consult  monitor vss  monitor pt

## 2018-09-27 NOTE — ED PROVIDER NOTE - ATTENDING CONTRIBUTION TO CARE
78 year old female, pmhx of htn and dm, come sin with complaint of abdominal pain, dull, suprapubic, + 4 days of multiple episodes of nb diarrhea, no cp/sob, no loc, no vomiting. + febrile in our ed. Code sepsis activated upon patient arrival. Daughter is bedside and is providing translation services.     CONSTITUTIONAL: Well-developed; well-nourished; in no acute distress. Sitting up and providing appropriate history and physical examination  SKIN: skin exam is warm and dry, no acute rash.  HEAD: Normocephalic; atraumatic.  EYES: PERRL, 3 mm bilateral, no nystagmus, EOM intact; conjunctiva and sclera clear.  ENT: + Dry mucous membranes, No nasal discharge; airway clear.  NECK: Supple; non tender. + full passive ROM in all directions. No JVD  CARD: S1, S2 normal; no murmurs, gallops, or rubs. Regular rate and rhythm. + Symmetric Strong Pulses  RESP: No wheezes, rales or rhonchi. Good air movement bilaterally  ABD: soft; non-distended; + LLQ tenderness. No Rebound, No Guarding, No signs of peritonitis, No CVA tenderness. No pulsatile abdominal mass. + Strong and Symmetric Pulses  EXT: Normal ROM. No clubbing, cyanosis or edema. Dp and Pt Pulses intact. Cap refill less than 3 seconds  NEURO: Alert, oriented, grossly unremarkable. No Focal deficits. GCS 15. NIH 0  PSYCH: Cooperative, appropriate.

## 2018-09-27 NOTE — H&P ADULT - HISTORY OF PRESENT ILLNESS
78y old female pmhx below presents to the ED as per daughter complaining of abdominal pain x4days, associated with non-bloody diarrhea x2days several times. pt now with fever, tmax-102. pt denies chest pain, SOB, but admits to decrease po intake

## 2018-09-27 NOTE — ED ADULT NURSE NOTE - NSIMPLEMENTINTERV_GEN_ALL_ED
Implemented All Universal Safety Interventions:  [place] to call system. Call bell, personal items and telephone within reach. Instruct patient to call for assistance. Room bathroom lighting operational. Non-slip footwear when patient is off stretcher. Physically safe environment: no spills, clutter or unnecessary equipment. Stretcher in lowest position, wheels locked, appropriate side rails in place.

## 2018-09-27 NOTE — H&P ADULT - NSHPPHYSICALEXAM_GEN_ALL_CORE
PHYSICAL EXAM:  GENERAL: NAD, well-developed, well nourished, looks stated age  HEAD:  Atraumatic, Normocephalic  EYES: EOMI, PERRLA, conjunctiva and sclera clear  NECK: Supple, No JVD, no bruits, no masses, no thyroid enlargement  ENT; dry mucous membranes  CHEST/LUNG: Clear to auscultation bilaterally  HEART: S1,S2 Regular rate and rhythm  ABDOMEN: Soft, +tenderness RLQ and LLQ, no rebound tenderness, Bowel sounds present and normoactive  EXTREMITIES:  2+ peripheral pulses bilaterally and symmetrically, no clubbing, cyanosis, or edema  NEUROLOGY: non-focal, muscle strength 5/5 all extremities  SKIN: No rashes or lesions

## 2018-09-27 NOTE — ED PROVIDER NOTE - PROGRESS NOTE DETAILS
sepsis suspected. code sepsis activated. labs drawn, fluid bolus administered, tylenol given, vbg drawn. spoke with dr. aceves who accepts admission. pa adde aware of admission

## 2018-09-28 LAB
ALBUMIN SERPL ELPH-MCNC: 2.9 G/DL — LOW (ref 3.5–5.2)
ALP SERPL-CCNC: 70 U/L — SIGNIFICANT CHANGE UP (ref 30–115)
ALT FLD-CCNC: 8 U/L — SIGNIFICANT CHANGE UP (ref 0–41)
ANION GAP SERPL CALC-SCNC: 14 MMOL/L — SIGNIFICANT CHANGE UP (ref 7–14)
AST SERPL-CCNC: 13 U/L — SIGNIFICANT CHANGE UP (ref 0–41)
BILIRUB SERPL-MCNC: 0.2 MG/DL — SIGNIFICANT CHANGE UP (ref 0.2–1.2)
BUN SERPL-MCNC: 6 MG/DL — LOW (ref 10–20)
CALCIUM SERPL-MCNC: 8 MG/DL — LOW (ref 8.5–10.1)
CHLORIDE SERPL-SCNC: 100 MMOL/L — SIGNIFICANT CHANGE UP (ref 98–110)
CO2 SERPL-SCNC: 22 MMOL/L — SIGNIFICANT CHANGE UP (ref 17–32)
CREAT SERPL-MCNC: 0.8 MG/DL — SIGNIFICANT CHANGE UP (ref 0.7–1.5)
CULTURE RESULTS: NO GROWTH — SIGNIFICANT CHANGE UP
ESTIMATED AVERAGE GLUCOSE: 209 MG/DL — HIGH (ref 68–114)
GLUCOSE BLDC GLUCOMTR-MCNC: 176 MG/DL — HIGH (ref 70–99)
GLUCOSE BLDC GLUCOMTR-MCNC: 183 MG/DL — HIGH (ref 70–99)
GLUCOSE BLDC GLUCOMTR-MCNC: 195 MG/DL — HIGH (ref 70–99)
GLUCOSE BLDC GLUCOMTR-MCNC: 203 MG/DL — HIGH (ref 70–99)
GLUCOSE BLDC GLUCOMTR-MCNC: 216 MG/DL — HIGH (ref 70–99)
GLUCOSE BLDC GLUCOMTR-MCNC: 249 MG/DL — HIGH (ref 70–99)
GLUCOSE SERPL-MCNC: 215 MG/DL — HIGH (ref 70–99)
HBA1C BLD-MCNC: 8.9 % — HIGH (ref 4–5.6)
HCT VFR BLD CALC: 35.2 % — LOW (ref 37–47)
HGB BLD-MCNC: 11.1 G/DL — LOW (ref 12–16)
MCHC RBC-ENTMCNC: 23.9 PG — LOW (ref 27–31)
MCHC RBC-ENTMCNC: 31.5 G/DL — LOW (ref 32–37)
MCV RBC AUTO: 75.9 FL — LOW (ref 81–99)
NRBC # BLD: 0 /100 WBCS — SIGNIFICANT CHANGE UP (ref 0–0)
PLATELET # BLD AUTO: 148 K/UL — SIGNIFICANT CHANGE UP (ref 130–400)
POTASSIUM SERPL-MCNC: 3.4 MMOL/L — LOW (ref 3.5–5)
POTASSIUM SERPL-SCNC: 3.4 MMOL/L — LOW (ref 3.5–5)
PROT SERPL-MCNC: 5.2 G/DL — LOW (ref 6–8)
RBC # BLD: 4.64 M/UL — SIGNIFICANT CHANGE UP (ref 4.2–5.4)
RBC # FLD: 15.3 % — HIGH (ref 11.5–14.5)
SODIUM SERPL-SCNC: 136 MMOL/L — SIGNIFICANT CHANGE UP (ref 135–146)
SPECIMEN SOURCE: SIGNIFICANT CHANGE UP
WBC # BLD: 7.6 K/UL — SIGNIFICANT CHANGE UP (ref 4.8–10.8)
WBC # FLD AUTO: 7.6 K/UL — SIGNIFICANT CHANGE UP (ref 4.8–10.8)

## 2018-09-28 RX ADMIN — BUPROPION HYDROCHLORIDE 300 MILLIGRAM(S): 150 TABLET, EXTENDED RELEASE ORAL at 11:12

## 2018-09-28 RX ADMIN — AMLODIPINE BESYLATE 5 MILLIGRAM(S): 2.5 TABLET ORAL at 05:46

## 2018-09-28 RX ADMIN — Medication 100 MILLIGRAM(S): at 05:46

## 2018-09-28 RX ADMIN — Medication 100 MILLIGRAM(S): at 13:58

## 2018-09-28 RX ADMIN — Medication 100 GRAM(S): at 17:17

## 2018-09-28 RX ADMIN — SODIUM CHLORIDE 75 MILLILITER(S): 9 INJECTION INTRAMUSCULAR; INTRAVENOUS; SUBCUTANEOUS at 05:47

## 2018-09-28 RX ADMIN — Medication 100 GRAM(S): at 05:47

## 2018-09-28 RX ADMIN — Medication 1 MILLIGRAM(S): at 11:12

## 2018-09-28 RX ADMIN — Medication 100 MILLIGRAM(S): at 21:24

## 2018-09-28 RX ADMIN — ATORVASTATIN CALCIUM 10 MILLIGRAM(S): 80 TABLET, FILM COATED ORAL at 21:24

## 2018-09-29 LAB
GLUCOSE BLDC GLUCOMTR-MCNC: 167 MG/DL — HIGH (ref 70–99)
GLUCOSE BLDC GLUCOMTR-MCNC: 178 MG/DL — HIGH (ref 70–99)
GLUCOSE BLDC GLUCOMTR-MCNC: 209 MG/DL — HIGH (ref 70–99)
GLUCOSE BLDC GLUCOMTR-MCNC: 245 MG/DL — HIGH (ref 70–99)

## 2018-09-29 RX ADMIN — Medication 2: at 17:15

## 2018-09-29 RX ADMIN — Medication 100 GRAM(S): at 17:15

## 2018-09-29 RX ADMIN — Medication 2: at 11:54

## 2018-09-29 RX ADMIN — BUPROPION HYDROCHLORIDE 300 MILLIGRAM(S): 150 TABLET, EXTENDED RELEASE ORAL at 11:22

## 2018-09-29 RX ADMIN — Medication 100 MILLIGRAM(S): at 13:45

## 2018-09-29 RX ADMIN — SODIUM CHLORIDE 75 MILLILITER(S): 9 INJECTION INTRAMUSCULAR; INTRAVENOUS; SUBCUTANEOUS at 03:18

## 2018-09-29 RX ADMIN — SODIUM CHLORIDE 75 MILLILITER(S): 9 INJECTION INTRAMUSCULAR; INTRAVENOUS; SUBCUTANEOUS at 13:46

## 2018-09-29 RX ADMIN — Medication 1: at 08:49

## 2018-09-29 RX ADMIN — Medication 100 GRAM(S): at 05:12

## 2018-09-29 RX ADMIN — Medication 1 MILLIGRAM(S): at 11:22

## 2018-09-29 RX ADMIN — Medication 100 MILLIGRAM(S): at 05:13

## 2018-09-29 RX ADMIN — ATORVASTATIN CALCIUM 10 MILLIGRAM(S): 80 TABLET, FILM COATED ORAL at 21:14

## 2018-09-29 RX ADMIN — Medication 100 MILLIGRAM(S): at 21:15

## 2018-09-30 LAB
ALBUMIN SERPL ELPH-MCNC: 2.9 G/DL — LOW (ref 3.5–5.2)
ALP SERPL-CCNC: 68 U/L — SIGNIFICANT CHANGE UP (ref 30–115)
ALT FLD-CCNC: 12 U/L — SIGNIFICANT CHANGE UP (ref 0–41)
ANION GAP SERPL CALC-SCNC: 10 MMOL/L — SIGNIFICANT CHANGE UP (ref 7–14)
AST SERPL-CCNC: 17 U/L — SIGNIFICANT CHANGE UP (ref 0–41)
BILIRUB SERPL-MCNC: <0.2 MG/DL — SIGNIFICANT CHANGE UP (ref 0.2–1.2)
BUN SERPL-MCNC: 4 MG/DL — LOW (ref 10–20)
CALCIUM SERPL-MCNC: 8.1 MG/DL — LOW (ref 8.5–10.1)
CHLORIDE SERPL-SCNC: 102 MMOL/L — SIGNIFICANT CHANGE UP (ref 98–110)
CO2 SERPL-SCNC: 28 MMOL/L — SIGNIFICANT CHANGE UP (ref 17–32)
CREAT SERPL-MCNC: 0.7 MG/DL — SIGNIFICANT CHANGE UP (ref 0.7–1.5)
GLUCOSE BLDC GLUCOMTR-MCNC: 198 MG/DL — HIGH (ref 70–99)
GLUCOSE BLDC GLUCOMTR-MCNC: 248 MG/DL — HIGH (ref 70–99)
GLUCOSE BLDC GLUCOMTR-MCNC: 256 MG/DL — HIGH (ref 70–99)
GLUCOSE BLDC GLUCOMTR-MCNC: 340 MG/DL — HIGH (ref 70–99)
GLUCOSE SERPL-MCNC: 299 MG/DL — HIGH (ref 70–99)
POTASSIUM SERPL-MCNC: 3.2 MMOL/L — LOW (ref 3.5–5)
POTASSIUM SERPL-SCNC: 3.2 MMOL/L — LOW (ref 3.5–5)
PROT SERPL-MCNC: 5.1 G/DL — LOW (ref 6–8)
SODIUM SERPL-SCNC: 140 MMOL/L — SIGNIFICANT CHANGE UP (ref 135–146)

## 2018-09-30 RX ADMIN — Medication 4: at 17:07

## 2018-09-30 RX ADMIN — Medication 1: at 08:13

## 2018-09-30 RX ADMIN — Medication 100 MILLIGRAM(S): at 05:47

## 2018-09-30 RX ADMIN — ATORVASTATIN CALCIUM 10 MILLIGRAM(S): 80 TABLET, FILM COATED ORAL at 21:21

## 2018-09-30 RX ADMIN — Medication 100 GRAM(S): at 17:08

## 2018-09-30 RX ADMIN — Medication 100 GRAM(S): at 05:46

## 2018-09-30 RX ADMIN — BUPROPION HYDROCHLORIDE 300 MILLIGRAM(S): 150 TABLET, EXTENDED RELEASE ORAL at 11:10

## 2018-09-30 RX ADMIN — Medication 1 MILLIGRAM(S): at 11:10

## 2018-09-30 RX ADMIN — SODIUM CHLORIDE 75 MILLILITER(S): 9 INJECTION INTRAMUSCULAR; INTRAVENOUS; SUBCUTANEOUS at 05:47

## 2018-09-30 RX ADMIN — Medication 100 MILLIGRAM(S): at 21:21

## 2018-09-30 RX ADMIN — Medication 100 MILLIGRAM(S): at 13:35

## 2018-09-30 RX ADMIN — Medication 2: at 12:15

## 2018-09-30 NOTE — DIETITIAN INITIAL EVALUATION ADULT. - NS AS NUTRI INTERV MEALS SNACK
Carbohydrate - modified diet/Fiber - modified diet/advance as tolerated to GI soft low fiber CHO controlled diet

## 2018-09-30 NOTE — CHART NOTE - NSCHARTNOTEFT_GEN_A_CORE
Called by attending and instructed to advance diet to full liquids.  Orders placed in SCM.  RN notified.

## 2018-09-30 NOTE — PROGRESS NOTE ADULT - SUBJECTIVE AND OBJECTIVE BOX
Name: JOY ARAGON  Age: 78y  Gender: Female    Pt was seen and examined.   c/o:  feels much better    Allergies:  No Known Allergies      PHYSICAL EXAM:    Vitals:  T(C): 36.3 (09-29-18 @ 22:20), Max: 36.4 (09-29-18 @ 05:50)  HR: 66 (09-29-18 @ 22:20) (66 - 80)  BP: 180/70 (09-29-18 @ 22:20) (119/54 - 182/67)  RR: 16 (09-29-18 @ 22:20) (16 - 16)  SpO2: --  Wt(kg): --Vital Signs Last 24 Hrs  T(C): 36.3 (29 Sep 2018 22:20), Max: 36.4 (29 Sep 2018 05:50)  T(F): 97.4 (29 Sep 2018 22:20), Max: 97.6 (29 Sep 2018 05:50)  HR: 66 (29 Sep 2018 22:20) (66 - 80)  BP: 180/70 (29 Sep 2018 22:20) (119/54 - 182/67)  BP(mean): --  RR: 16 (29 Sep 2018 22:20) (16 - 16)  SpO2: --      NECK: Supple, No JVD  CHEST/LUNG: CTA, B/L, No rales, rhonchi, wheezing,  HEART: S1,S2, N1 Regular rate and rhythm; No murmurs, rubs, or gallops  ABDOMEN: Soft, Nontender, Nondistended; Bowel sounds present  EXTREMITIES:  2+ Peripheral Pulses, No clubbing, cyanosis, or edema      LABS:                        11.1   7.60  )-----------( 148      ( 28 Sep 2018 07:24 )             35.2     09-28    136  |  100  |  6<L>  ----------------------------<  215<H>  3.4<L>   |  22  |  0.8    Ca    8.0<L>      28 Sep 2018 07:24    TPro  5.2<L>  /  Alb  2.9<L>  /  TBili  0.2  /  DBili  x   /  AST  13  /  ALT  8   /  AlkPhos  70  09-28    LIVER FUNCTIONS - ( 28 Sep 2018 07:24 )  Alb: 2.9 g/dL / Pro: 5.2 g/dL / ALK PHOS: 70 U/L / ALT: 8 U/L / AST: 13 U/L / GGT: x                 MEDICATIONS  (STANDING):  atorvastatin 10 milliGRAM(s) Oral at bedtime  buPROPion XL . 300 milliGRAM(s) Oral daily  cefoTEtan  IVPB 1 Gram(s) IV Intermittent every 12 hours  dextrose 5%. 1000 milliLiter(s) (50 mL/Hr) IV Continuous <Continuous>  folic acid 1 milliGRAM(s) Oral daily  insulin lispro (HumaLOG) corrective regimen sliding scale   SubCutaneous three times a day before meals  metroNIDAZOLE  IVPB 500 milliGRAM(s) IV Intermittent every 8 hours  sodium chloride 0.9%. 1000 milliLiter(s) (75 mL/Hr) IV Continuous <Continuous>  Viibryd 40 milliGRAM(s) 40 milliGRAM(s) Oral daily        RADIOLOGY & ADDITIONAL TESTS:    Imaging Personally Reviewed:  [ ] YES  [ ] NO    A/P:  A/P:  DX; PANCOLITIS  A/P; admit to med-surg  iv abx, clear diet for now  iv hydration,   labs/ecg/c-xray  Clinically stable    plan - d/c planing
Name: JOY ARAGON  Age: 78y  Gender: Female    Pt was seen and examined.   c/o:  doing much better, no diarrhea today    Allergies:  No Known Allergies      PHYSICAL EXAM:    Vitals:  T(C): 36.8 (09-30-18 @ 21:35), Max: 36.8 (09-30-18 @ 21:35)  HR: 63 (09-30-18 @ 21:35) (63 - 77)  BP: 174/67 (09-30-18 @ 21:35) (104/59 - 174/67)  RR: 16 (09-30-18 @ 21:35) (16 - 18)  SpO2: --  Wt(kg): --Vital Signs Last 24 Hrs  T(C): 36.8 (30 Sep 2018 21:35), Max: 36.8 (30 Sep 2018 21:35)  T(F): 98.2 (30 Sep 2018 21:35), Max: 98.2 (30 Sep 2018 21:35)  HR: 63 (30 Sep 2018 21:35) (63 - 77)  BP: 174/67 (30 Sep 2018 21:35) (104/59 - 174/67)  BP(mean): --  RR: 16 (30 Sep 2018 21:35) (16 - 18)  SpO2: --      NECK: Supple, No JVD  CHEST/LUNG: CTA, B/L, No rales, rhonchi, wheezing,   HEART: S1,S2, N1 Regular rate and rhythm; No murmurs, rubs, or gallops  ABDOMEN: Soft, Nontender, Nondistended; Bowel sounds present  EXTREMITIES:  2+ Peripheral Pulses, No clubbing, cyanosis, or edema      LABS:    09-30    140  |  102  |  4<L>  ----------------------------<  299<H>  3.2<L>   |  28  |  0.7    Ca    8.1<L>      30 Sep 2018 21:57    TPro  5.1<L>  /  Alb  2.9<L>  /  TBili  <0.2  /  DBili  x   /  AST  17  /  ALT  12  /  AlkPhos  68  09-30    LIVER FUNCTIONS - ( 30 Sep 2018 21:57 )  Alb: 2.9 g/dL / Pro: 5.1 g/dL / ALK PHOS: 68 U/L / ALT: 12 U/L / AST: 17 U/L / GGT: x                 MEDICATIONS  (STANDING):  atorvastatin 10 milliGRAM(s) Oral at bedtime  buPROPion XL . 300 milliGRAM(s) Oral daily  cefoTEtan  IVPB 1 Gram(s) IV Intermittent every 12 hours  dextrose 5%. 1000 milliLiter(s) (50 mL/Hr) IV Continuous <Continuous>  folic acid 1 milliGRAM(s) Oral daily  insulin lispro (HumaLOG) corrective regimen sliding scale   SubCutaneous three times a day before meals  metroNIDAZOLE  IVPB 500 milliGRAM(s) IV Intermittent every 8 hours  sodium chloride 0.9%. 1000 milliLiter(s) (75 mL/Hr) IV Continuous <Continuous>  Viibryd 40 milliGRAM(s) 40 milliGRAM(s) Oral daily        RADIOLOGY & ADDITIONAL TESTS:    Imaging Personally Reviewed:  [ ] YES  [ ] NO    A/P:  DX; PANCOLITIS  A/P; admit to med-surg  iv abx, doing well so will advance diet to full and soft as tolerating    iv hydration,   labs/ecg/c-xray  Clinically stable    plan - d/c planing
Name: JOY ARAGON  Age: 78y  Gender: Female    Pt was seen and examined.   c/o: feeling better    Allergies:  No Known Allergies      PHYSICAL EXAM:    Vitals:  T(C): 36.7 (09-28-18 @ 22:10), Max: 37.8 (09-28-18 @ 06:00)  HR: 76 (09-28-18 @ 22:10) (76 - 86)  BP: 120/53 (09-28-18 @ 22:10) (115/53 - 131/61)  RR: 16 (09-28-18 @ 22:10) (16 - 16)  SpO2: --  Wt(kg): --Vital Signs Last 24 Hrs  T(C): 36.7 (28 Sep 2018 22:10), Max: 37.8 (28 Sep 2018 06:00)  T(F): 98 (28 Sep 2018 22:10), Max: 100 (28 Sep 2018 06:00)  HR: 76 (28 Sep 2018 22:10) (76 - 86)  BP: 120/53 (28 Sep 2018 22:10) (115/53 - 131/61)  BP(mean): --  RR: 16 (28 Sep 2018 22:10) (16 - 16)  SpO2: --      NECK: Supple, No JVD  CHEST/LUNG: CTA, B/L, No rales, rhonchi, wheezing, or rubs  HEART: S1,S2, N1 Regular rate and rhythm; No murmurs, rubs, or gallops  ABDOMEN: Soft, Nontender, Nondistended; Bowel sounds present  EXTREMITIES:  2+ Peripheral Pulses, No clubbing, cyanosis, or edema      LABS:                        11.1   7.60  )-----------( 148      ( 28 Sep 2018 07:24 )             35.2     09-28    136  |  100  |  6<L>  ----------------------------<  215<H>  3.4<L>   |  22  |  0.8    Ca    8.0<L>      28 Sep 2018 07:24    TPro  5.2<L>  /  Alb  2.9<L>  /  TBili  0.2  /  DBili  x   /  AST  13  /  ALT  8   /  AlkPhos  70  09-28    LIVER FUNCTIONS - ( 28 Sep 2018 07:24 )  Alb: 2.9 g/dL / Pro: 5.2 g/dL / ALK PHOS: 70 U/L / ALT: 8 U/L / AST: 13 U/L / GGT: x                 MEDICATIONS  (STANDING):  atorvastatin 10 milliGRAM(s) Oral at bedtime  buPROPion XL . 300 milliGRAM(s) Oral daily  cefoTEtan  IVPB 1 Gram(s) IV Intermittent every 12 hours  dextrose 5%. 1000 milliLiter(s) (50 mL/Hr) IV Continuous <Continuous>  folic acid 1 milliGRAM(s) Oral daily  insulin lispro (HumaLOG) corrective regimen sliding scale   SubCutaneous three times a day before meals  metroNIDAZOLE  IVPB 500 milliGRAM(s) IV Intermittent every 8 hours  sodium chloride 0.9%. 1000 milliLiter(s) (75 mL/Hr) IV Continuous <Continuous>        RADIOLOGY & ADDITIONAL TESTS:    Imaging Personally Reviewed:  [ ] YES  [ ] NO    A/P:  DX; PANCOLITIS  A/P; admit to med-surg  iv abx, clear diet for now  iv hydration,   labs/ecg/c-xray  Clinically stable    continue previous meds  F.S with insulin coverage  replace sodium  pain mgmt  GI consult  monitor vss  monitor pt

## 2018-09-30 NOTE — DIETITIAN INITIAL EVALUATION ADULT. - OTHER INFO
pt presents with abd pain x 4 days with non bloody diarrhea x 2 days, + temp, CT indicative of pancolitis. PO diet presently  upgraded to full liquids tolerating well 100% consumed, pt requesting to upgraded to a regular diet

## 2018-10-01 ENCOUNTER — TRANSCRIPTION ENCOUNTER (OUTPATIENT)
Age: 78
End: 2018-10-01

## 2018-10-01 VITALS — DIASTOLIC BLOOD PRESSURE: 62 MMHG | SYSTOLIC BLOOD PRESSURE: 135 MMHG | HEART RATE: 71 BPM

## 2018-10-01 LAB
GLUCOSE BLDC GLUCOMTR-MCNC: 296 MG/DL — HIGH (ref 70–99)
GLUCOSE BLDC GLUCOMTR-MCNC: 324 MG/DL — HIGH (ref 70–99)
HCT VFR BLD CALC: 35.4 % — LOW (ref 37–47)
HGB BLD-MCNC: 11.1 G/DL — LOW (ref 12–16)
MAGNESIUM SERPL-MCNC: 1.5 MG/DL — LOW (ref 1.8–2.4)
MCHC RBC-ENTMCNC: 23.2 PG — LOW (ref 27–31)
MCHC RBC-ENTMCNC: 31.4 G/DL — LOW (ref 32–37)
MCV RBC AUTO: 74.1 FL — LOW (ref 81–99)
NRBC # BLD: 0 /100 WBCS — SIGNIFICANT CHANGE UP (ref 0–0)
PLATELET # BLD AUTO: 183 K/UL — SIGNIFICANT CHANGE UP (ref 130–400)
RBC # BLD: 4.78 M/UL — SIGNIFICANT CHANGE UP (ref 4.2–5.4)
RBC # FLD: 14.9 % — HIGH (ref 11.5–14.5)
WBC # BLD: 4.95 K/UL — SIGNIFICANT CHANGE UP (ref 4.8–10.8)
WBC # FLD AUTO: 4.95 K/UL — SIGNIFICANT CHANGE UP (ref 4.8–10.8)

## 2018-10-01 RX ORDER — FOLIC ACID 0.8 MG
2 TABLET ORAL
Qty: 0 | Refills: 0 | COMMUNITY

## 2018-10-01 RX ORDER — OXYCODONE HYDROCHLORIDE 5 MG/1
1 TABLET ORAL
Qty: 0 | Refills: 0 | DISCHARGE
Start: 2018-10-01

## 2018-10-01 RX ORDER — BUPROPION HYDROCHLORIDE 150 MG/1
1 TABLET, EXTENDED RELEASE ORAL
Qty: 0 | Refills: 0 | DISCHARGE
Start: 2018-10-01

## 2018-10-01 RX ORDER — CIPROFLOXACIN LACTATE 400MG/40ML
1 VIAL (ML) INTRAVENOUS
Qty: 10 | Refills: 0 | OUTPATIENT
Start: 2018-10-01 | End: 2018-10-05

## 2018-10-01 RX ORDER — FOLIC ACID 0.8 MG
1 TABLET ORAL
Qty: 0 | Refills: 0 | DISCHARGE
Start: 2018-10-01

## 2018-10-01 RX ORDER — CIPROFLOXACIN LACTATE 400MG/40ML
1 VIAL (ML) INTRAVENOUS
Qty: 10 | Refills: 0
Start: 2018-10-01 | End: 2018-10-05

## 2018-10-01 RX ORDER — ATORVASTATIN CALCIUM 80 MG/1
1 TABLET, FILM COATED ORAL
Qty: 0 | Refills: 0 | DISCHARGE
Start: 2018-10-01

## 2018-10-01 RX ORDER — METRONIDAZOLE 500 MG
1 TABLET ORAL
Qty: 15 | Refills: 0
Start: 2018-10-01 | End: 2018-10-05

## 2018-10-01 RX ORDER — ATORVASTATIN CALCIUM 80 MG/1
1 TABLET, FILM COATED ORAL
Qty: 0 | Refills: 0 | COMMUNITY

## 2018-10-01 RX ORDER — BUPROPION HYDROCHLORIDE 150 MG/1
1 TABLET, EXTENDED RELEASE ORAL
Qty: 0 | Refills: 0 | COMMUNITY

## 2018-10-01 RX ADMIN — Medication 4: at 12:21

## 2018-10-01 RX ADMIN — SODIUM CHLORIDE 75 MILLILITER(S): 9 INJECTION INTRAMUSCULAR; INTRAVENOUS; SUBCUTANEOUS at 08:04

## 2018-10-01 RX ADMIN — Medication 1 MILLIGRAM(S): at 12:22

## 2018-10-01 RX ADMIN — Medication 3: at 08:04

## 2018-10-01 RX ADMIN — BUPROPION HYDROCHLORIDE 300 MILLIGRAM(S): 150 TABLET, EXTENDED RELEASE ORAL at 12:22

## 2018-10-01 RX ADMIN — Medication 100 GRAM(S): at 06:26

## 2018-10-01 RX ADMIN — Medication 100 MILLIGRAM(S): at 05:19

## 2018-10-01 NOTE — DISCHARGE NOTE ADULT - PATIENT PORTAL LINK FT
You can access the [facility] Patient Portal, offered by [facility], by registering with the following website: [email or web address]

## 2018-10-01 NOTE — DISCHARGE NOTE ADULT - MEDICATION SUMMARY - MEDICATIONS TO TAKE
I will START or STAY ON the medications listed below when I get home from the hospital:    Flagyl 500 mg oral tablet  -- 1 tab(s) by mouth 3 times a day   -- Do not drink alcoholic beverages when taking this medication.  Finish all this medication unless otherwise directed by prescriber.  May discolor urine or feces.    -- Indication: For Pancolitis    oxyCODONE 5 mg oral tablet  -- 1 tab(s) by mouth every 6 hours, As needed, Severe Pain (7 - 10)  -- Indication: For RA    Aspir-Low 81 mg oral delayed release tablet  -- 1 tab(s) by mouth once a day  -- Indication: For Preventative    Viibryd 40 mg oral tablet  -- 1 tab(s) by mouth once a day  -- Indication: For Depression    glipiZIDE 2.5 mg oral tablet, extended release  -- orally 2 times a day  -- Indication: For DM    HumuLIN 70/30 KwikPen 70 units-30 units/mL subcutaneous suspension  -- subcutaneous 2 times a day -correction dose  -- Indication: For DM    atorvastatin 10 mg oral tablet  -- 1 tab(s) by mouth once a day (at bedtime)  -- Indication: For HLD    amlodipine-benazepril 5 mg-20 mg oral capsule  -- 1 cap(s) by mouth once a day  -- Indication: For HTN    methotrexate 2.5 mg oral tablet  -- 4 tab(s) by mouth once a week, on Wednesday  -- Indication: For RA    Fish Oil 1000 mg oral capsule  -- 1 cap(s) by mouth 3 times a day  -- Indication: For Preventative    ciprofloxacin 500 mg oral tablet, extended release  -- 1 tab(s) by mouth 2 times a day   -- Avoid prolonged or excessive exposure to direct and/or artificial sunlight while taking this medication.  Check with your doctor before becoming pregnant.  Do not take dairy products, antacids, or iron preparations within one hour of this medication.  Finish all this medication unless otherwise directed by prescriber.  It is very important that you take or use this exactly as directed.  Do not skip doses or discontinue unless directed by your doctor.  Medication should be taken with plenty of water.  Obtain medical advice before taking any non-prescription drugs as some may affect the action of this medication.  Swallow whole.  Do not crush.  This drug may impair the ability to drive or operate machinery.  Use care until you become familiar with its effects.    -- Indication: For Pancolitis    buPROPion 300 mg/24 hours (XL) oral tablet, extended release  -- 1 tab(s) by mouth once a day  -- Indication: For depression    folic acid 1 mg oral tablet  -- 1 tab(s) by mouth once a day  -- Indication: For Preventative    Vitamin D3 50,000 intl units oral capsule  -- 1 cap(s) by mouth once a month    -- Indication: For Preventative    Vitamin B12 500 mcg oral tablet  -- 1 tab(s) by mouth once a day  -- Indication: For Preventative I will START or STAY ON the medications listed below when I get home from the hospital:    Flagyl 500 mg oral tablet  -- 1 tab(s) by mouth 3 times a day   -- Do not drink alcoholic beverages when taking this medication.  Finish all this medication unless otherwise directed by prescriber.  May discolor urine or feces.    -- Indication: For Pancolitis    oxyCODONE 5 mg oral tablet  -- 1 tab(s) by mouth every 6 hours, As needed, Severe Pain (7 - 10)  -- Indication: For RA    Aspir-Low 81 mg oral delayed release tablet  -- 1 tab(s) by mouth once a day  -- Indication: For Preventative    Viibryd 40 mg oral tablet  -- 1 tab(s) by mouth once a day  -- Indication: For Depression    glipiZIDE 2.5 mg oral tablet, extended release  -- orally 2 times a day  -- Indication: For DM    HumuLIN 70/30 KwikPen 70 units-30 units/mL subcutaneous suspension  -- subcutaneous 2 times a day -correction dose  -- Indication: For DM    atorvastatin 10 mg oral tablet  -- 1 tab(s) by mouth once a day (at bedtime)  -- Indication: For HLD    amlodipine-benazepril 5 mg-20 mg oral capsule  -- 1 cap(s) by mouth once a day  -- Indication: For HTN    methotrexate 2.5 mg oral tablet  -- 4 tab(s) by mouth once a week, on Wednesday  -- Indication: For RA    Fish Oil 1000 mg oral capsule  -- 1 cap(s) by mouth 3 times a day  -- Indication: For Preventative    ciprofloxacin 500 mg oral tablet  -- 1 tab(s) by mouth 2 times a day   -- Avoid prolonged or excessive exposure to direct and/or artificial sunlight while taking this medication.  Check with your doctor before becoming pregnant.  Do not take dairy products, antacids, or iron preparations within one hour of this medication.  Finish all this medication unless otherwise directed by prescriber.  Medication should be taken with plenty of water.    -- Indication: For Pancolitis    buPROPion 300 mg/24 hours (XL) oral tablet, extended release  -- 1 tab(s) by mouth once a day  -- Indication: For depression    folic acid 1 mg oral tablet  -- 1 tab(s) by mouth once a day  -- Indication: For Preventative    Vitamin D3 50,000 intl units oral capsule  -- 1 cap(s) by mouth once a month    -- Indication: For Preventative    Vitamin B12 500 mcg oral tablet  -- 1 tab(s) by mouth once a day  -- Indication: For Preventative

## 2018-10-01 NOTE — CONSULT NOTE ADULT - ASSESSMENT
Impression: 78y year old Female with diarrhea: Infectious vs Colitis  vs Ischemic vs Neoplastic  Diarrhea and pain have resolved, and she is tolerating solid food.  She is requesting outpatient followup, which is reasonable, as long as there is close outpatient followup.    Recommendation:    Check stool Ova and Parasites, Cultures, C Diff by PCR, Stool Giardia, Serum Ameba titres    Empiric Cipro/Flagyl po for 5 days : since she is no longer having diarrhea, she could be discharged with close followup,  since she and her daughter have expressed a strong preference for outpatient followup    Outpatient Colonoscopy

## 2018-10-01 NOTE — CHART NOTE - NSCHARTNOTEFT_GEN_A_CORE
Called by Dr Yan to DC pt today - reviewed all DC instructions, FUP and meds with him. Pt aware also

## 2018-10-01 NOTE — DISCHARGE NOTE ADULT - SECONDARY DIAGNOSIS.
Diabetes Hypertension, unspecified type Depression, unspecified depression type Rheumatoid arthritis, involving unspecified site, unspecified rheumatoid factor presence

## 2018-10-01 NOTE — DISCHARGE NOTE ADULT - CARE PROVIDER_API CALL
Finesse Yan), Medicine  [address] [place] [address]  Phone: [phone]  Fax: [phone]    Zoltan Squires), Gastroenterology; Internal Medicine  [address] [place] [address]  Phone: [phone]  Fax: [phone]

## 2018-10-01 NOTE — DISCHARGE NOTE ADULT - CARE PLAN
Principal Discharge DX:	Pancolitis  Goal:	resolution  Assessment and plan of treatment:	cont antibx and FUP OP GI  Secondary Diagnosis:	Diabetes  Assessment and plan of treatment:	cont current tx  Secondary Diagnosis:	Hypertension, unspecified type  Assessment and plan of treatment:	cont meds  Secondary Diagnosis:	Depression, unspecified depression type  Assessment and plan of treatment:	cont meds  Secondary Diagnosis:	Rheumatoid arthritis, involving unspecified site, unspecified rheumatoid factor presence  Assessment and plan of treatment:	cont current tx

## 2018-10-01 NOTE — DISCHARGE NOTE ADULT - CARE PROVIDERS DIRECT ADDRESSES
,DirectAddress_Unknown,[email or web address]

## 2018-10-01 NOTE — DISCHARGE NOTE ADULT - MEDICATION SUMMARY - MEDICATIONS TO STOP TAKING
I will STOP taking the medications listed below when I get home from the hospital:    predniSONE 2.5 mg oral tablet  -- 1 tab(s) by mouth once a day

## 2018-12-05 NOTE — ED ADULT TRIAGE NOTE - NSWEIGHTCALCTOOLDRUG_GEN_A_CORE
Patient Instructions by Mark Lozada MD at 04/26/18 09:45 AM     Author:  Mark Lozada MD Service:  (none) Author Type:  Physician     Filed:  04/26/18 09:53 AM Encounter Date:  4/26/2018 Status:  Signed     :  Mark Lozada MD (Physician)              The following changes were made to your medications today:   Continue all present medications    The following lifestyle modifications are encouraged:  Diet  Exercise  Med Compliance  Compression    The following tests have been ordered:  Echocardiogram  Vein Ultrasound    Return to Clinic in 2 months or sooner if needed.    Additional Educational Resources:  For additional resources regarding your symptoms, diagnosis, or further health information, please visit the Health Resources section on [email or web address] or the Online Health Resources section in [email or web address].          Revision History        User Key Date/Time User Provider Type Action    > [N/A] 04/26/18 09:53 AM Mark Lozada MD Physician Sign            
 used

## 2018-12-06 ENCOUNTER — APPOINTMENT (OUTPATIENT)
Dept: OTOLARYNGOLOGY | Facility: CLINIC | Age: 78
End: 2018-12-06

## 2020-08-22 ENCOUNTER — EMERGENCY (EMERGENCY)
Facility: HOSPITAL | Age: 80
LOS: 0 days | Discharge: HOME | End: 2020-08-22
Attending: EMERGENCY MEDICINE | Admitting: EMERGENCY MEDICINE
Payer: MEDICARE

## 2020-08-22 VITALS
RESPIRATION RATE: 20 BRPM | SYSTOLIC BLOOD PRESSURE: 175 MMHG | HEIGHT: 65 IN | OXYGEN SATURATION: 100 % | DIASTOLIC BLOOD PRESSURE: 75 MMHG | WEIGHT: 160.06 LBS | HEART RATE: 78 BPM | TEMPERATURE: 98 F

## 2020-08-22 VITALS
HEART RATE: 68 BPM | RESPIRATION RATE: 18 BRPM | SYSTOLIC BLOOD PRESSURE: 125 MMHG | OXYGEN SATURATION: 100 % | TEMPERATURE: 98 F | DIASTOLIC BLOOD PRESSURE: 60 MMHG

## 2020-08-22 DIAGNOSIS — R22.40 LOCALIZED SWELLING, MASS AND LUMP, UNSPECIFIED LOWER LIMB: Chronic | ICD-10-CM

## 2020-08-22 DIAGNOSIS — R42 DIZZINESS AND GIDDINESS: ICD-10-CM

## 2020-08-22 DIAGNOSIS — Z98.890 OTHER SPECIFIED POSTPROCEDURAL STATES: ICD-10-CM

## 2020-08-22 DIAGNOSIS — Z92.89 PERSONAL HISTORY OF OTHER MEDICAL TREATMENT: Chronic | ICD-10-CM

## 2020-08-22 LAB
ALBUMIN SERPL ELPH-MCNC: 4.2 G/DL — SIGNIFICANT CHANGE UP (ref 3.5–5.2)
ALP SERPL-CCNC: 91 U/L — SIGNIFICANT CHANGE UP (ref 30–115)
ALT FLD-CCNC: 16 U/L — SIGNIFICANT CHANGE UP (ref 0–41)
ANION GAP SERPL CALC-SCNC: 9 MMOL/L — SIGNIFICANT CHANGE UP (ref 7–14)
APPEARANCE UR: CLEAR — SIGNIFICANT CHANGE UP
AST SERPL-CCNC: 17 U/L — SIGNIFICANT CHANGE UP (ref 0–41)
B-OH-BUTYR SERPL-SCNC: <0.2 MMOL/L — SIGNIFICANT CHANGE UP
BASE EXCESS BLDV CALC-SCNC: 6.4 MMOL/L — HIGH (ref -2–2)
BASOPHILS # BLD AUTO: 0.03 K/UL — SIGNIFICANT CHANGE UP (ref 0–0.2)
BASOPHILS NFR BLD AUTO: 0.4 % — SIGNIFICANT CHANGE UP (ref 0–1)
BILIRUB SERPL-MCNC: 0.5 MG/DL — SIGNIFICANT CHANGE UP (ref 0.2–1.2)
BILIRUB UR-MCNC: NEGATIVE — SIGNIFICANT CHANGE UP
BUN SERPL-MCNC: 16 MG/DL — SIGNIFICANT CHANGE UP (ref 10–20)
CA-I SERPL-SCNC: 1.25 MMOL/L — SIGNIFICANT CHANGE UP (ref 1.12–1.3)
CALCIUM SERPL-MCNC: 9.9 MG/DL — SIGNIFICANT CHANGE UP (ref 8.5–10.1)
CHLORIDE SERPL-SCNC: 97 MMOL/L — LOW (ref 98–110)
CO2 SERPL-SCNC: 29 MMOL/L — SIGNIFICANT CHANGE UP (ref 17–32)
COLOR SPEC: YELLOW — SIGNIFICANT CHANGE UP
CREAT SERPL-MCNC: 0.9 MG/DL — SIGNIFICANT CHANGE UP (ref 0.7–1.5)
DIFF PNL FLD: ABNORMAL
EOSINOPHIL # BLD AUTO: 0.13 K/UL — SIGNIFICANT CHANGE UP (ref 0–0.7)
EOSINOPHIL NFR BLD AUTO: 1.9 % — SIGNIFICANT CHANGE UP (ref 0–8)
EPI CELLS # UR: ABNORMAL /HPF
GAS PNL BLDV: 135 MMOL/L — LOW (ref 136–145)
GAS PNL BLDV: SIGNIFICANT CHANGE UP
GLUCOSE SERPL-MCNC: 271 MG/DL — HIGH (ref 70–99)
GLUCOSE UR QL: 100 MG/DL
HCO3 BLDV-SCNC: 33 MMOL/L — HIGH (ref 22–29)
HCT VFR BLD CALC: 40.8 % — SIGNIFICANT CHANGE UP (ref 37–47)
HCT VFR BLDA CALC: 41 % — SIGNIFICANT CHANGE UP (ref 34–44)
HGB BLD CALC-MCNC: 13.4 G/DL — LOW (ref 14–18)
HGB BLD-MCNC: 12.4 G/DL — SIGNIFICANT CHANGE UP (ref 12–16)
HOROWITZ INDEX BLDV+IHG-RTO: 21 — SIGNIFICANT CHANGE UP
IMM GRANULOCYTES NFR BLD AUTO: 0.3 % — SIGNIFICANT CHANGE UP (ref 0.1–0.3)
KETONES UR-MCNC: NEGATIVE — SIGNIFICANT CHANGE UP
LACTATE BLDV-MCNC: 1.5 MMOL/L — SIGNIFICANT CHANGE UP (ref 0.5–1.6)
LEUKOCYTE ESTERASE UR-ACNC: NEGATIVE — SIGNIFICANT CHANGE UP
LYMPHOCYTES # BLD AUTO: 1.39 K/UL — SIGNIFICANT CHANGE UP (ref 1.2–3.4)
LYMPHOCYTES # BLD AUTO: 20.2 % — LOW (ref 20.5–51.1)
MAGNESIUM SERPL-MCNC: 1.8 MG/DL — SIGNIFICANT CHANGE UP (ref 1.8–2.4)
MCHC RBC-ENTMCNC: 23.1 PG — LOW (ref 27–31)
MCHC RBC-ENTMCNC: 30.4 G/DL — LOW (ref 32–37)
MCV RBC AUTO: 76.1 FL — LOW (ref 81–99)
MONOCYTES # BLD AUTO: 0.56 K/UL — SIGNIFICANT CHANGE UP (ref 0.1–0.6)
MONOCYTES NFR BLD AUTO: 8.1 % — SIGNIFICANT CHANGE UP (ref 1.7–9.3)
NEUTROPHILS # BLD AUTO: 4.76 K/UL — SIGNIFICANT CHANGE UP (ref 1.4–6.5)
NEUTROPHILS NFR BLD AUTO: 69.1 % — SIGNIFICANT CHANGE UP (ref 42.2–75.2)
NITRITE UR-MCNC: NEGATIVE — SIGNIFICANT CHANGE UP
NRBC # BLD: 0 /100 WBCS — SIGNIFICANT CHANGE UP (ref 0–0)
PCO2 BLDV: 55 MMHG — HIGH (ref 41–51)
PH BLDV: 7.38 — SIGNIFICANT CHANGE UP (ref 7.26–7.43)
PH UR: 7 — SIGNIFICANT CHANGE UP (ref 5–8)
PLATELET # BLD AUTO: 239 K/UL — SIGNIFICANT CHANGE UP (ref 130–400)
PO2 BLDV: 28 MMHG — SIGNIFICANT CHANGE UP (ref 20–40)
POTASSIUM BLDV-SCNC: 4.5 MMOL/L — SIGNIFICANT CHANGE UP (ref 3.3–5.6)
POTASSIUM SERPL-MCNC: 5.3 MMOL/L — HIGH (ref 3.5–5)
POTASSIUM SERPL-SCNC: 5.3 MMOL/L — HIGH (ref 3.5–5)
PROT SERPL-MCNC: 6.8 G/DL — SIGNIFICANT CHANGE UP (ref 6–8)
PROT UR-MCNC: NEGATIVE MG/DL — SIGNIFICANT CHANGE UP
RBC # BLD: 5.36 M/UL — SIGNIFICANT CHANGE UP (ref 4.2–5.4)
RBC # FLD: 15 % — HIGH (ref 11.5–14.5)
RBC CASTS # UR COMP ASSIST: SIGNIFICANT CHANGE UP /HPF
SAO2 % BLDV: 52 % — SIGNIFICANT CHANGE UP
SODIUM SERPL-SCNC: 135 MMOL/L — SIGNIFICANT CHANGE UP (ref 135–146)
SP GR SPEC: 1.01 — SIGNIFICANT CHANGE UP (ref 1.01–1.03)
TROPONIN T SERPL-MCNC: <0.01 NG/ML — SIGNIFICANT CHANGE UP
UROBILINOGEN FLD QL: 0.2 MG/DL — SIGNIFICANT CHANGE UP (ref 0.2–0.2)
WBC # BLD: 6.89 K/UL — SIGNIFICANT CHANGE UP (ref 4.8–10.8)
WBC # FLD AUTO: 6.89 K/UL — SIGNIFICANT CHANGE UP (ref 4.8–10.8)
WBC UR QL: NEGATIVE — SIGNIFICANT CHANGE UP

## 2020-08-22 PROCEDURE — 70450 CT HEAD/BRAIN W/O DYE: CPT | Mod: 26

## 2020-08-22 PROCEDURE — 71045 X-RAY EXAM CHEST 1 VIEW: CPT | Mod: 26

## 2020-08-22 PROCEDURE — 99285 EMERGENCY DEPT VISIT HI MDM: CPT

## 2020-08-22 NOTE — ED PROVIDER NOTE - CLINICAL SUMMARY MEDICAL DECISION MAKING FREE TEXT BOX
Results reviewed and d/w pateint and daughter,  Rec to follow up with PMD on Monday for possible med change,  Pt to stop eating bananas, Pt instructed to return if any worsening symptoms or concerns.  They verbalize understanding.

## 2020-08-22 NOTE — ED PROVIDER NOTE - OBJECTIVE STATEMENT
patient Romanian speaking, daughter as translater  79 yo F hx of DM, HTN, RA, depression c/o intermittent dizziness since yesterday. No symptoms now. Patient also has been having elevated FS up to 500. Patient saw PMD 2 weeks ago and her Amlodipine/benazepril was discontinued and she was started on  Lisinopril. No CP, SOB, n/v, or abdominal pains.

## 2020-08-22 NOTE — ED PROVIDER NOTE - NSFOLLOWUPINSTRUCTIONS_ED_ALL_ED_FT
Dizziness    Dizziness is a common problem. It is a feeling of unsteadiness or light-headedness. You may feel like you are about to faint. This condition can be caused by a number of things, including medicines, dehydration, or illness. Drink enough fluid to keep your urine clear or pale yellow. Do not drink alcohol and limit your caffeine and salt intake. Avoid quick movement.  Rise slowly from chairs and steady yourself until you feel okay. In the morning, first sit up on the side of the bed.    SEEK IMMEDIATE MEDICAL CARE IF YOU HAVE THE FOLLOWING SYMPTOMS: vomiting, changes in your vision or speech, weakness in your arms or legs, trouble speaking or swallowing, chest pain, abdominal pain, shortness of breath, sweating, bleeding, headache, neck pain, or fever.    Hyperglycemia    Hyperglycemia occurs when the glucose (a sugar) level in your blood is too high. Symptoms include increased urination, increased thirst, a dry mouth, or changes in appetite. If started on a medication, take exactly as prescribed by your health care professional. Maintain a healthy lifestyle and follow up with your primary care physician.    SEEK IMMEDIATE MEDICAL CARE IF YOU HAVE THE FOLLOWING SYMPTOMS: shortness of breath, change in mental status, nausea or vomiting, fruity smell to your breath, or any signs of dehydration.    Hyperkalemia  Hyperkalemia is when you have too much potassium in your blood. Potassium is normally removed (excreted) from your body by your kidneys. If there is too much potassium in your blood, it can affect your heart’s ability to function.    What are the causes?  Hyperkalemia may be caused by:    Taking in too much potassium. You can do this by:    Using salt substitutes. They contain large amounts of potassium.  Taking potassium supplements.  Eating foods high in potassium.    Excreting too little potassium. This can happen if:    Your kidneys are not working properly. Kidney (renal) disease, including short- or long-term renal failure, is a very common cause of hyperkalemia.  You are taking medicines that lower your excretion of potassium.  You have Beaver disease.  You have a urinary tract blockage, such as kidney stones.  You are on treatment to mechanically clean your blood (dialysis) and you skip a treatment.    Releasing a high amount of potassium from your cells into your blood. This can happen with:    Injury to muscles (rhabdomyolysis) or other tissues. Most potassium is stored in your muscles.  Severe burns or infections.  Acidic blood plasma (acidosis). Acidosis can result from many diseases, such as uncontrolled diabetes.      What increases the risk?  The most common risk factor of hyperkalemia is kidney disease. Other risk factors of hyperkalemia include:    Beaver disease. This is a condition where your glands do not produce enough hormones.  Alcoholism or heavy drug use.  Using certain blood pressure medicines, such as angiotensin-converting enzyme (ACE) inhibitors, angiotensin II receptor blockers (ARBs), or potassium-sparing diuretics such as spironolactone.  Severe injury or burn.    What are the signs or symptoms?  Oftentimes, there are no signs or symptoms of hyperkalemia. However, when your potassium level becomes high enough, you may experience symptoms such as:    Irregular or very slow heartbeat.  Nausea.  Fatigue.  Tingling of the skin or numbness of the hands or feet.  Muscle weakness.  Fatigue.  Not being able to move (paralysis).    You may not have any symptoms of hyperkalemia.    How is this diagnosed?  Hyperkalemia may be diagnosed by:    Physical exam.  Blood tests.  ECG (electrocardiogram).  Discussion of prescription and non-prescription drug use.    How is this treated?  Treatment for hyperkalemia is often directed at the underlying cause. In some instances, treatment may include:    Insulin.  Glucose (sugar) and water solution given through a vein (intravenous or IV ).  Dialysis.  Medicines to remove the potassium from your body.  Medicines to move calcium from your bloodstream into your tissues.    Follow these instructions at home:  Take medicines only as directed by your health care provider.  Do not take any supplements, natural products, herbs, or vitamins without reviewing them with your health care provider. Certain supplements and natural food products can have high amounts of potassium.  Limit your alcohol intake as directed by your health care provider.  Stop illegal drug use. If you need help quitting, ask your health care provider.  Keep all follow-up visits as directed by your health care provider. This is important.  If you have kidney disease, you may need to follow a low potassium diet. A dietitian can help educate you on low potassium foods.  Contact a health care provider if:  You notice an irregular or very slow heartbeat.  You feel light-headed.  You feel weak.  You are nauseous.  You have tingling or numbness in your hands or feet.  Get help right away if:  You have shortness of breath.  You have chest pain or discomfort.  You pass out.  You have muscle paralysis.  This information is not intended to replace advice given to you by your health care provider. Make sure you discuss any questions you have with your health care provider.

## 2020-08-22 NOTE — ED PROVIDER NOTE - PATIENT PORTAL LINK FT
You can access the FollowMyHealth Patient Portal offered by [facility] by registering at the following website: [email or web address]. By joining [employer]s FollowMyHealth portal, you will also be able to view your health information using other applications (apps) compatible with our system.

## 2020-08-22 NOTE — ED PROVIDER NOTE - CARE PROVIDER_API CALL
Finesse Yan [facility]  [address] [place] [address]  Phone: [phone]  Fax: [phone]  Follow Up Time: 1-3 Days

## 2020-08-22 NOTE — ED PROVIDER NOTE - PHYSICAL EXAMINATION
Gen: Alert, NAD, well appearing  Head: NC, AT, PERRL, EOMI, normal lids/conjunctiva  ENT: normal hearing  Neck: +supple, no tenderness/meningismus,  Pulm: Bilateral BS, normal resp effort, no wheeze/stridor/retractions  CV: RRR, no murmer  Abd: soft, NT/ND  Mskel: no edema/erythema/cyanosis  Skin: no rash, warm/dry  Neuro: AAOx3, no sensory/motor deficits. Normal finger nose. No drift

## 2020-08-22 NOTE — ED ADULT TRIAGE NOTE - CHIEF COMPLAINT QUOTE
as per daughter "Her BP has been elevated her md changed her from norvasc to lisinopril. She has been feeling dizzy on and off for 1 day"

## 2020-08-22 NOTE — ED PROVIDER NOTE - NS ED ROS FT
Review of Systems    Constitutional: (-) fever, (-) chills  Eyes/ENT: (-) blurry vision, (-) epistaxis, (-) sore throat  Cardiovascular: (-) chest pain, (-) syncope  Respiratory: (-) cough, (-) shortness of breath  Gastrointestinal: (-) pain, (-) nausea, (-) vomiting, (-) diarrhea  Musculoskeletal: (-) neck pain, (-) back pain, (-) body aches  Integumentary: (-) rash, (-) edema  Neurological: (-) headache, (-) altered mental status, (+) dizzy  Psychiatric: (-) hallucinations  Allergic/Immunologic: (-) pruritus

## 2020-08-22 NOTE — ED PROVIDER NOTE - ATTENDING CONTRIBUTION TO CARE
81 yo PMHx noted including DM, HTN, RA presents with daughter from home with c/o feeling dizzy on and off since yesterday.  Daughter measured Bp at home and was elevated at 198/73.  BP meds changed about 2 weeks ago.  Amlodipine/Benzapril was d/demetrius and Lisinopril started,  FS was also elevated.  Pt ate banana and had some tea and felt better, no CP, no SOB, no n/v, no cough, no fever or chills.  on exam pt in nad aao x 3, face symmetrical, PERRL, TM clear and intact b/l, neck supple, Lungs cta b/l, good tone, equal strength, good finger to nose.

## 2020-09-18 ENCOUNTER — APPOINTMENT (OUTPATIENT)
Dept: VASCULAR SURGERY | Facility: CLINIC | Age: 80
End: 2020-09-18

## 2021-03-02 NOTE — CONSULT NOTE ADULT - CONSULT REASON
[History reviewed] : History reviewed. Sialoadenitis with sialolith [Medications and Allergies reviewed] : Medications and allergies reviewed.

## 2021-05-05 ENCOUNTER — APPOINTMENT (OUTPATIENT)
Dept: RADIATION ONCOLOGY | Facility: HOSPITAL | Age: 81
End: 2021-05-05
Payer: MEDICARE

## 2021-05-05 VITALS
SYSTOLIC BLOOD PRESSURE: 162 MMHG | WEIGHT: 174 LBS | DIASTOLIC BLOOD PRESSURE: 70 MMHG | HEART RATE: 69 BPM | TEMPERATURE: 97 F | RESPIRATION RATE: 12 BRPM | OXYGEN SATURATION: 100 % | BODY MASS INDEX: 29.87 KG/M2

## 2021-05-05 DIAGNOSIS — Z86.39 PERSONAL HISTORY OF OTHER ENDOCRINE, NUTRITIONAL AND METABOLIC DISEASE: ICD-10-CM

## 2021-05-05 DIAGNOSIS — Z86.79 PERSONAL HISTORY OF OTHER DISEASES OF THE CIRCULATORY SYSTEM: ICD-10-CM

## 2021-05-05 DIAGNOSIS — Z87.2 PERSONAL HISTORY OF DISEASES OF THE SKIN AND SUBCUTANEOUS TISSUE: ICD-10-CM

## 2021-05-05 PROCEDURE — 99205 OFFICE O/P NEW HI 60 MIN: CPT

## 2021-05-05 RX ORDER — VILAZODONE HYDROCHLORIDE 40 MG/1
40 TABLET ORAL
Refills: 0 | Status: ACTIVE | COMMUNITY

## 2021-05-05 RX ORDER — INSULIN HUMAN 100 [IU]/ML
INJECTION, SUSPENSION SUBCUTANEOUS
Refills: 0 | Status: ACTIVE | COMMUNITY

## 2021-05-05 RX ORDER — ACETAMINOPHEN AND CODEINE 300; 30 MG/1; MG/1
300-30 TABLET ORAL
Qty: 15 | Refills: 0 | Status: DISCONTINUED | COMMUNITY
Start: 2018-06-19 | End: 2021-05-05

## 2021-05-05 RX ORDER — GLIPIZIDE 2.5 MG/1
2.5 TABLET, FILM COATED, EXTENDED RELEASE ORAL
Refills: 0 | Status: ACTIVE | COMMUNITY

## 2021-05-05 RX ORDER — IBUPROFEN 600 MG
600 TABLET ORAL
Refills: 0 | Status: ACTIVE | COMMUNITY

## 2021-05-05 RX ORDER — FOLIC ACID 1 MG/1
1 TABLET ORAL
Refills: 0 | Status: ACTIVE | COMMUNITY

## 2021-05-05 RX ORDER — OXYCODONE AND ACETAMINOPHEN 5; 325 MG/1; MG/1
5-325 TABLET ORAL
Qty: 20 | Refills: 0 | Status: DISCONTINUED | COMMUNITY
Start: 2018-07-16 | End: 2021-05-05

## 2021-05-05 RX ORDER — AMOXICILLIN AND CLAVULANATE POTASSIUM 875; 125 MG/1; MG/1
875-125 TABLET, COATED ORAL
Qty: 14 | Refills: 0 | Status: DISCONTINUED | COMMUNITY
Start: 2018-09-10 | End: 2021-05-05

## 2021-05-05 RX ORDER — BUPROPION HYDROCHLORIDE 100 MG/1
TABLET, FILM COATED ORAL
Refills: 0 | Status: ACTIVE | COMMUNITY

## 2021-05-06 PROBLEM — Z86.39 HISTORY OF DIABETES MELLITUS: Status: ACTIVE | Noted: 2021-05-05

## 2021-05-06 PROBLEM — Z87.2 HISTORY OF PSORIASIS: Status: RESOLVED | Noted: 2021-05-05 | Resolved: 2021-05-06

## 2021-05-06 PROBLEM — Z86.39 HISTORY OF HYPERCHOLESTEROLEMIA: Status: RESOLVED | Noted: 2021-05-05 | Resolved: 2021-05-06

## 2021-05-06 PROBLEM — Z86.79 HISTORY OF HYPERTENSION: Status: RESOLVED | Noted: 2021-05-05 | Resolved: 2021-05-06

## 2021-05-06 NOTE — DISEASE MANAGEMENT
[Clinical] : TNM Stage: c [IIIA] : IIIA [FreeTextEntry4] : Squamous cell carcinoma of the cervix, FIGO Stage IIIA [TTNM] : 3a [NTNM] : 0 [MTNM] : 0

## 2021-05-06 NOTE — REVIEW OF SYSTEMS
[Negative] : Allergic/Immunologic [Patient Intake Form Reviewed] : Patient intake form was reviewed [Vaginal Discharge] : vaginal discharge [Urinary Frequency] : no change in urinary frequency [Nocturia] : no nocturia [FreeTextEntry3] : wears glasses

## 2021-05-06 NOTE — PHYSICAL EXAM
[Sclera] : the sclera and conjunctiva were normal [Heart Rate And Rhythm] : heart rate and rhythm were normal [Heart Sounds] : normal S1 and S2 [Normal External Genitalia] : normal external genitalia  [Normal] : oriented to person, place and time, the affect was normal, the mood was normal and not anxious [de-identified] : Erythematous and ulcerated cervix, firm, with anterior extension into the vagina.  No gross bleeding. No parametrial disease.

## 2021-05-06 NOTE — VITALS
[Maximal Pain Intensity: 4/10] : 4/10 [Least Pain Intensity: 2/10] : 2/10 [Pain Description/Quality: ___] : Pain description/quality: [unfilled] [Pain Duration: ___] : Pain duration: [unfilled] [Pain Location: ___] : Pain Location: [unfilled] [NSAID/Non-Opioid] : NSAID/Non-Opioid [80: Normal activity with effort; some signs or symptoms of disease.] : 80: Normal activity with effort; some signs or symptoms of disease.  [Pain Interferes with ADLs] : Pain does not interfere with activities of daily living

## 2021-05-06 NOTE — HISTORY OF PRESENT ILLNESS
[FreeTextEntry1] : The patient is an 81 year old woman who presented with post-menopausal vaginal bleeding in March 2021.    \par Upon further work up, PAP smear on 3/18/21 showed: LSIL/AGC/HPV+.  Pelvic US on 3/22/21 showed endometrium to be 4.2mm thick. 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  \par MRI pelvis on 4/3/21 showed signal abnormality in the posterior cervix  2.5cm x 1.6cm.  The lesion does not go beyond the limit of the cervix.  \par She saw Dr. Echevarria on 4/14/21 and on his exam, she was noted to have a 4-5cm cervical mass involving at least the middle of the vaginal wall, circumferentially.  No parametrial involvement. She was not deemed a surgical candidate.  \par Definitive chemoradiation is recommended.   She is here to discuss radiation. \par PET/CT: Pending\par Med/Onc consult: Pending\par \par

## 2021-05-06 NOTE — LETTER CLOSING
[Consult Closing:] : Thank you for allowing me to participate in the care of this patient.  If you have any questions, please do not hesitate to contact me. [Sincerely yours,] : Sincerely yours, [FreeTextEntry3] : Leslie Ragland M.D. \par \par Electronically proofread and signed by:  Leslie Ragland MD\par Attending, Department of Radiation Medicine\par [facility]\par \par CC: Dr. Krueger

## 2021-05-10 ENCOUNTER — LABORATORY RESULT (OUTPATIENT)
Age: 81
End: 2021-05-10

## 2021-05-10 ENCOUNTER — APPOINTMENT (OUTPATIENT)
Dept: HEMATOLOGY ONCOLOGY | Facility: CLINIC | Age: 81
End: 2021-05-10
Payer: MEDICARE

## 2021-05-10 VITALS
WEIGHT: 176 LBS | HEART RATE: 91 BPM | SYSTOLIC BLOOD PRESSURE: 125 MMHG | HEIGHT: 64 IN | TEMPERATURE: 98.3 F | BODY MASS INDEX: 30.05 KG/M2 | DIASTOLIC BLOOD PRESSURE: 60 MMHG

## 2021-05-10 LAB
ALBUMIN SERPL ELPH-MCNC: 4.1 G/DL
ALP BLD-CCNC: 89 U/L
ALT SERPL-CCNC: 15 U/L
ANION GAP SERPL CALC-SCNC: 16 MMOL/L
AST SERPL-CCNC: 20 U/L
BILIRUB SERPL-MCNC: 0.4 MG/DL
BUN SERPL-MCNC: 26 MG/DL
CALCIUM SERPL-MCNC: 10 MG/DL
CHLORIDE SERPL-SCNC: 101 MMOL/L
CO2 SERPL-SCNC: 23 MMOL/L
CREAT SERPL-MCNC: 1 MG/DL
GLUCOSE SERPL-MCNC: 125 MG/DL
HCT VFR BLD CALC: 40.4 %
HGB BLD-MCNC: 12.2 G/DL
IRON SATN MFR SERPL: 20 %
IRON SERPL-MCNC: 69 UG/DL
MCHC RBC-ENTMCNC: 23.5 PG
MCHC RBC-ENTMCNC: 30.2 G/DL
MCV RBC AUTO: 77.7 FL
PLATELET # BLD AUTO: 274 K/UL
PMV BLD: 11.3 FL
POTASSIUM SERPL-SCNC: 4.4 MMOL/L
PROT SERPL-MCNC: 6.6 G/DL
RBC # BLD: 5.2 M/UL
RBC # FLD: 14.6 %
SODIUM SERPL-SCNC: 140 MMOL/L
TIBC SERPL-MCNC: 345 UG/DL
UIBC SERPL-MCNC: 276 UG/DL
WBC # FLD AUTO: 10.54 K/UL

## 2021-05-10 PROCEDURE — 99205 OFFICE O/P NEW HI 60 MIN: CPT

## 2021-05-10 NOTE — CONSULT LETTER
[Dear  ___] : Dear  [unfilled], [Consult Letter:] : I had the pleasure of evaluating your patient, [unfilled]. [Please see my note below.] : Please see my note below. [Consult Closing:] : Thank you very much for allowing me to participate in the care of this patient.  If you have any questions, please do not hesitate to contact me. [Sincerely,] : Sincerely, [DrRaya  ___] : Dr. HOLGUIN [FreeTextEntry3] : Marlena Krueger MD\par Professor [employer]\par [place]\par Attending Physician\par [facility] [place]\par [phone]\par \par

## 2021-05-10 NOTE — HISTORY OF PRESENT ILLNESS
[Disease: _____________________] : Disease: [unfilled] [de-identified] : The patient is an 81 year old woman who presented with post-menopausal vaginal bleeding in March 2021. \par Upon further work up, PAP smear on 3/18/21 showed: LSIL/AGC/HPV+. Pelvic US on 3/22/21 showed endometrium to be 4.2mm thick. 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 \par MRI pelvis on 4/3/21 showed signal abnormality in the posterior cervix 2.5cm x 1.6cm. The lesion does not go beyond the limit of the cervix. \par She saw Dr. Echevarria on 4/14/21 and on his exam, she was noted to have a 4-5cm cervical mass involving at least the middle of the vaginal wall, circumferentially. No parametrial involvement. She was not deemed a surgical candidate. \par Definitive chemoradiation was recommended. She is here to discuss radiation. \par PET/CT: Pending\par \par Pt C/O low back pain, for which she takes Tylenol. Vaginal bleeding is not very excessive. No SOB, Chest pain.\par Pt has a stress test next week\par  [de-identified] : Squamous cell

## 2021-05-10 NOTE — ASSESSMENT
[FreeTextEntry1] : 81 year old female with squamous cell carcinoma of the cervix, FIGO Stage IIIA. PET scan is pending. \par \par PLAN\par Discussed staging, pathology and natural history of cervical cancer.\par Pt needs to complete PET scan to assess disease extent.\par if localized will offer chemo/RT.\par Given her age and possible cardiac issues ( inability to handle large volumes of pre and post chemo hydration) may need to use Carboplatin AUC 2 as a radiation sensitizer.\par Will need to obtain slides from [facility] for pathology review.\par Final plan will be discussed after PET scan.\par Labs ordered incl CBC.\par Plan d/w at length  pt and her daughter who preferred to translate.\par RTc in 2 weeks or earlier

## 2021-05-10 NOTE — REVIEW OF SYSTEMS
[Negative] : Allergic/Immunologic [FreeTextEntry2] : never had colonoscopy, last mammogram 2 yrs ago, completed Covid vaccination

## 2021-05-11 LAB
FERRITIN SERPL-MCNC: 215 NG/ML
FOLATE SERPL-MCNC: 19.6 NG/ML
VIT B12 SERPL-MCNC: 757 PG/ML

## 2021-05-14 ENCOUNTER — OUTPATIENT (OUTPATIENT)
Dept: OUTPATIENT SERVICES | Facility: HOSPITAL | Age: 81
LOS: 1 days | Discharge: HOME | End: 2021-05-14
Payer: MEDICARE

## 2021-05-14 DIAGNOSIS — C53.8 MALIGNANT NEOPLASM OF OVERLAPPING SITES OF CERVIX UTERI: ICD-10-CM

## 2021-05-14 DIAGNOSIS — R22.40 LOCALIZED SWELLING, MASS AND LUMP, UNSPECIFIED LOWER LIMB: Chronic | ICD-10-CM

## 2021-05-14 DIAGNOSIS — Z92.89 PERSONAL HISTORY OF OTHER MEDICAL TREATMENT: Chronic | ICD-10-CM

## 2021-05-14 DIAGNOSIS — C53.9 MALIGNANT NEOPLASM OF CERVIX UTERI, UNSPECIFIED: ICD-10-CM

## 2021-05-14 LAB — GLUCOSE BLDC GLUCOMTR-MCNC: 130 MG/DL — HIGH (ref 70–99)

## 2021-05-14 PROCEDURE — 78815 PET IMAGE W/CT SKULL-THIGH: CPT | Mod: 26,PI,MH

## 2021-05-18 ENCOUNTER — APPOINTMENT (OUTPATIENT)
Dept: HEMATOLOGY ONCOLOGY | Facility: CLINIC | Age: 81
End: 2021-05-18
Payer: MEDICARE

## 2021-05-18 VITALS
TEMPERATURE: 97.7 F | SYSTOLIC BLOOD PRESSURE: 143 MMHG | HEIGHT: 64 IN | DIASTOLIC BLOOD PRESSURE: 65 MMHG | BODY MASS INDEX: 30.05 KG/M2 | HEART RATE: 113 BPM | WEIGHT: 176 LBS

## 2021-05-18 PROCEDURE — 99215 OFFICE O/P EST HI 40 MIN: CPT

## 2021-05-19 ENCOUNTER — LABORATORY RESULT (OUTPATIENT)
Age: 81
End: 2021-05-19

## 2021-05-19 PROCEDURE — 77263 THER RADIOLOGY TX PLNG CPLX: CPT

## 2021-05-22 NOTE — HISTORY OF PRESENT ILLNESS
[Disease: _____________________] : Disease: [unfilled] [de-identified] : The patient is an 81 year old woman who presented with post-menopausal vaginal bleeding in March 2021. \par Upon further work up, PAP smear on 3/18/21 showed: LSIL/AGC/HPV+. Pelvic US on 3/22/21 showed endometrium to be 4.2mm thick. 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 \par MRI pelvis on 4/3/21 showed signal abnormality in the posterior cervix 2.5cm x 1.6cm. The lesion does not go beyond the limit of the cervix. \par She saw Dr. Echevarria on 4/14/21 and on his exam, she was noted to have a 4-5cm cervical mass involving at least the middle of the vaginal wall, circumferentially. No parametrial involvement. She was not deemed a surgical candidate. \par Definitive chemoradiation was recommended. She is here to discuss radiation. \par PET/CT: Pending\par \par Pt C/O low back pain, for which she takes Tylenol. Vaginal bleeding is not very excessive. No SOB, Chest pain.\par Pt has a stress test next week\par  [de-identified] : Squamous cell [de-identified] : 5/18/21\par Pt is here for follow up.\par She has completed a PET scan.\par has moderate back pain

## 2021-05-22 NOTE — ASSESSMENT
[FreeTextEntry1] : 81 year old female with squamous cell carcinoma of the cervix, FIGO Stage IIIA. PET scan is pending. \par \par PLAN\par Discussed staging, pathology and natural history of cervical cancer.\par Discussed PET scan results no distant disease\par  will offer chemo/RT.\par Given her age and possible cardiac issues ( inability to handle large volumes of pre and post chemo hydration) will need to use Carboplatin AUC 2 as a radiation sensitizer.\par will be administered with precautions, premeds every week X 5 weeks once weekly.\par SE discussed incl myelosuppression, allergic reactions, risk of infection, N, V, D\par The chemotherapy dose was adjusted according to pt's height, weight, labs and anticipated tolerance.\par The high risk of complications and complexity associated with antineoplastic therapy administration has been explained to the pt and family. Chemotherapy will be given with adequate precautions including premedications, hydration and close monitoring during treatment.\par Chemotherapy will be administered under my direct supervision\par \par SUPPORTIVE MEASURES\par Zofran 8mg Q 8 hrs prn for emesis and nausea.\par Compazine  Q 6hrs prn\par Encourage adequate fluid untake,\par GI prophylaxis with PPI\par \par \par Will coordinate care with Rad/Onc. Plan d/w dr Ragland\par Will need to obtain slides from [facility] for pathology review.\par \par Plan d/w at length  pt and her daughter who preferred to translate.\par RTc in 2 weeks or earlier

## 2021-05-22 NOTE — CONSULT LETTER
[Dear  ___] : Dear  [unfilled], [Consult Letter:] : I had the pleasure of evaluating your patient, [unfilled]. [Please see my note below.] : Please see my note below. [Consult Closing:] : Thank you very much for allowing me to participate in the care of this patient.  If you have any questions, please do not hesitate to contact me. [Sincerely,] : Sincerely, [DrRaya  ___] : Dr. HOLGUIN [FreeTextEntry3] : Marlena Krueger MD\par Professor [employer]\par [place]\par Attending Physician\par [facility] [place]\par [phone]\par \par

## 2021-05-27 PROCEDURE — 77300 RADIATION THERAPY DOSE PLAN: CPT | Mod: 26

## 2021-05-27 PROCEDURE — 77338 DESIGN MLC DEVICE FOR IMRT: CPT | Mod: 26

## 2021-05-27 PROCEDURE — 77301 RADIOTHERAPY DOSE PLAN IMRT: CPT | Mod: 26

## 2021-06-01 ENCOUNTER — APPOINTMENT (OUTPATIENT)
Dept: HEMATOLOGY ONCOLOGY | Facility: CLINIC | Age: 81
End: 2021-06-01

## 2021-06-02 ENCOUNTER — NON-APPOINTMENT (OUTPATIENT)
Age: 81
End: 2021-06-02

## 2021-06-02 ENCOUNTER — LABORATORY RESULT (OUTPATIENT)
Age: 81
End: 2021-06-02

## 2021-06-02 ENCOUNTER — APPOINTMENT (OUTPATIENT)
Dept: INFUSION THERAPY | Facility: CLINIC | Age: 81
End: 2021-06-02

## 2021-06-02 VITALS
DIASTOLIC BLOOD PRESSURE: 68 MMHG | HEART RATE: 79 BPM | WEIGHT: 176.13 LBS | TEMPERATURE: 96.9 F | RESPIRATION RATE: 16 BRPM | SYSTOLIC BLOOD PRESSURE: 164 MMHG | BODY MASS INDEX: 30.23 KG/M2 | OXYGEN SATURATION: 99 %

## 2021-06-02 DIAGNOSIS — Z00.00 ENCOUNTER FOR GENERAL ADULT MEDICAL EXAMINATION W/OUT ABNORMAL FINDINGS: ICD-10-CM

## 2021-06-02 LAB
HCT VFR BLD CALC: 40.7 %
HGB BLD-MCNC: 12.4 G/DL
MCHC RBC-ENTMCNC: 23.5 PG
MCHC RBC-ENTMCNC: 30.5 G/DL
MCV RBC AUTO: 77.2 FL
PLATELET # BLD AUTO: 167 K/UL
PMV BLD: 11.7 FL
RBC # BLD: 5.27 M/UL
RBC # FLD: 15 %
WBC # FLD AUTO: 8.1 K/UL

## 2021-06-02 PROCEDURE — 77427 RADIATION TX MANAGEMENT X5: CPT

## 2021-06-02 PROCEDURE — G6002: CPT | Mod: 26

## 2021-06-02 RX ORDER — FOSAPREPITANT DIMEGLUMINE 150 MG/5ML
150 INJECTION, POWDER, LYOPHILIZED, FOR SOLUTION INTRAVENOUS ONCE
Refills: 0 | Status: COMPLETED | OUTPATIENT
Start: 2021-06-02 | End: 2021-06-02

## 2021-06-02 RX ORDER — CYANOCOBALAMIN (VITAMIN B-12) 2500 MCG
2500 TABLET, SUBLINGUAL SUBLINGUAL
Refills: 0 | Status: DISCONTINUED | COMMUNITY
End: 2021-06-02

## 2021-06-02 RX ORDER — CHOLECALCIFEROL (VITAMIN D3) 125 MCG
125 MCG CAPSULE ORAL
Refills: 0 | Status: DISCONTINUED | COMMUNITY
End: 2021-06-02

## 2021-06-02 RX ORDER — CARBOPLATIN 50 MG
160 VIAL (EA) INTRAVENOUS ONCE
Refills: 0 | Status: COMPLETED | OUTPATIENT
Start: 2021-06-02 | End: 2021-06-02

## 2021-06-02 RX ORDER — DEXAMETHASONE 0.5 MG/5ML
12 ELIXIR ORAL ONCE
Refills: 0 | Status: COMPLETED | OUTPATIENT
Start: 2021-06-02 | End: 2021-06-02

## 2021-06-02 RX ORDER — METHOTREXATE 2.5 MG/1
2.5 TABLET ORAL
Refills: 0 | Status: DISCONTINUED | COMMUNITY
End: 2021-06-02

## 2021-06-02 RX ORDER — DIPHENHYDRAMINE HCL 50 MG
25 CAPSULE ORAL ONCE
Refills: 0 | Status: COMPLETED | OUTPATIENT
Start: 2021-06-02 | End: 2021-06-02

## 2021-06-02 RX ORDER — FAMOTIDINE 10 MG/ML
20 INJECTION INTRAVENOUS ONCE
Refills: 0 | Status: COMPLETED | OUTPATIENT
Start: 2021-06-02 | End: 2021-06-02

## 2021-06-02 RX ORDER — OMEGA-3/DHA/EPA/FISH OIL 300-1000MG
1000 CAPSULE ORAL
Refills: 0 | Status: DISCONTINUED | COMMUNITY
End: 2021-06-02

## 2021-06-02 RX ADMIN — Medication 266 MILLIGRAM(S): at 11:51

## 2021-06-02 RX ADMIN — Medication 12 MILLIGRAM(S): at 11:55

## 2021-06-02 RX ADMIN — Medication 101 MILLIGRAM(S): at 12:15

## 2021-06-02 RX ADMIN — FOSAPREPITANT DIMEGLUMINE 300 MILLIGRAM(S): 150 INJECTION, POWDER, LYOPHILIZED, FOR SOLUTION INTRAVENOUS at 12:35

## 2021-06-02 RX ADMIN — Medication 25 MILLIGRAM(S): at 12:35

## 2021-06-02 RX ADMIN — Medication 122 MILLIGRAM(S): at 11:35

## 2021-06-02 RX ADMIN — FAMOTIDINE 20 MILLIGRAM(S): 10 INJECTION INTRAVENOUS at 12:15

## 2021-06-02 RX ADMIN — FAMOTIDINE 104 MILLIGRAM(S): 10 INJECTION INTRAVENOUS at 11:55

## 2021-06-02 NOTE — DISEASE MANAGEMENT
[Clinical] : TNM Stage: c [IIIA] : IIIA [FreeTextEntry4] : Squamous cell carcinoma of the cervix, FIGO Stage IIIA [TTNM] : 3a [NTNM] : 0 [MTNM] : 0 [de-identified] : 180cGy [de-identified] : 6540cGy [de-identified] : pelvis/cervix

## 2021-06-02 NOTE — PHYSICAL EXAM
[] : no respiratory distress [Abdomen Tenderness] : non-tender [Exaggerated Use Of Accessory Muscles For Inspiration] : no accessory muscle use [Normal] : oriented to person, place and time, the affect was normal, the mood was normal and not anxious

## 2021-06-02 NOTE — REASON FOR VISIT
[Routine On-Treatment] : a routine on-treatment visit for [Other: ___] : [unfilled] [Other: _____] : [unfilled] [Patient Declined  Services] : - None: Patient declined  services [FreeTextEntry4] : daughter, Safia [TWNoteComboBox1] : [place]

## 2021-06-02 NOTE — REVIEW OF SYSTEMS
PROCEDURE:  CHEST RADIOGRAPH, 1 VIEW



HISTORY:

SOB



COMPARISON:

None available.



FINDINGS:



LUNGS:

Clear.



PLEURA:

No pneumothorax or pleural fluid seen.



CARDIOVASCULAR:

Normal.



OSSEOUS STRUCTURES:

No significant abnormalities.



VISUALIZED UPPER ABDOMEN:

Normal.



OTHER FINDINGS:

None. 



IMPRESSION:

No active disease. [Diarrhea: Grade 0] : Diarrhea: Grade 0 [Fatigue: Grade 0] : Fatigue: Grade 0 [Urinary Frequency: Grade 0] : Urinary Frequency: Grade 0 [Dermatitis Radiation: Grade 0] : Dermatitis Radiation: Grade 0

## 2021-06-02 NOTE — HISTORY OF PRESENT ILLNESS
[FreeTextEntry1] : Nursing OTV: 1st treatment today. Scheduled for chemo today. Reviewed treatment related side effect. \par \par MD Note:  She is doing well.  Just started. No bowel/bladder issues.  No new concerns\par

## 2021-06-03 ENCOUNTER — NON-APPOINTMENT (OUTPATIENT)
Age: 81
End: 2021-06-03

## 2021-06-03 PROCEDURE — G6002: CPT | Mod: 26

## 2021-06-07 ENCOUNTER — NON-APPOINTMENT (OUTPATIENT)
Age: 81
End: 2021-06-07

## 2021-06-07 VITALS
DIASTOLIC BLOOD PRESSURE: 90 MMHG | TEMPERATURE: 98.4 F | RESPIRATION RATE: 16 BRPM | SYSTOLIC BLOOD PRESSURE: 130 MMHG | WEIGHT: 175.13 LBS | HEART RATE: 92 BPM | BODY MASS INDEX: 30.06 KG/M2 | OXYGEN SATURATION: 99 %

## 2021-06-07 LAB
ALBUMIN SERPL ELPH-MCNC: 4.2 G/DL
ALP BLD-CCNC: 86 U/L
ALT SERPL-CCNC: 14 U/L
ANION GAP SERPL CALC-SCNC: 10 MMOL/L
AST SERPL-CCNC: 26 U/L
BILIRUB SERPL-MCNC: 0.3 MG/DL
BUN SERPL-MCNC: 25 MG/DL
CALCIUM SERPL-MCNC: 9.5 MG/DL
CHLORIDE SERPL-SCNC: 98 MMOL/L
CO2 SERPL-SCNC: 27 MMOL/L
CREAT SERPL-MCNC: 1 MG/DL
GLUCOSE SERPL-MCNC: 203 MG/DL
POTASSIUM SERPL-SCNC: 5.7 MMOL/L
PROT SERPL-MCNC: 6.8 G/DL
SODIUM SERPL-SCNC: 135 MMOL/L

## 2021-06-07 PROCEDURE — G6002: CPT | Mod: 26

## 2021-06-07 NOTE — REASON FOR VISIT
[Routine On-Treatment] : a routine on-treatment visit for [Other: ___] : [unfilled] [Patient Declined  Services] : - None: Patient declined  services [FreeTextEntry4] : daughter, Safia [TWNoteComboBox1] : [place]ic

## 2021-06-07 NOTE — PHYSICAL EXAM
[Sclera] : the sclera and conjunctiva were normal [] : no respiratory distress [Exaggerated Use Of Accessory Muscles For Inspiration] : no accessory muscle use [Abdomen Soft] : soft [Abdomen Tenderness] : non-tender [No Focal Deficits] : no focal deficits [Normal] : oriented to person, place and time, the affect was normal, the mood was normal and not anxious

## 2021-06-07 NOTE — DISEASE MANAGEMENT
[Clinical] : TNM Stage: c [IIIA] : IIIA [FreeTextEntry4] : Squamous cell carcinoma of the cervix, FIGO Stage IIIA [TTNM] : 3a [NTNM] : 0 [MTNM] : 0 [de-identified] : 746cGy [de-identified] : 0650cGy [de-identified] : pelvis/cervix

## 2021-06-07 NOTE — REVIEW OF SYSTEMS
[Diarrhea: Grade 2 - Increase of 4 - 6 stools per day over baseline; moderate increase in ostomy output compared to baseline] : Diarrhea: Grade 2 - Increase of 4 - 6 stools per day over baseline; moderate increase in ostomy output compared to baseline [Nausea: Grade 0] : Nausea: Grade 0 [Vomiting: Grade 0] : Vomiting: Grade 0 [Fatigue: Grade 1 - Fatigue relieved by rest] : Fatigue: Grade 1 - Fatigue relieved by rest [Urinary Tract Pain: Grade 0] : Urinary Tract Pain: Grade 0 [Dermatitis Radiation: Grade 0] : Dermatitis Radiation: Grade 0

## 2021-06-07 NOTE — HISTORY OF PRESENT ILLNESS
[FreeTextEntry1] : Nursing OTV: Just started radiation.  Had chemo last week.  Pt c/o diarrhea and mild cramping. Having 4-5 loose stool per day. Following low residue diet. Will try using Imodium PRN. Offered pt to speak with nutritionist but refused at this time. Next chemo scheduled for Wednesday.

## 2021-06-08 PROCEDURE — 77014: CPT | Mod: 26

## 2021-06-08 PROCEDURE — 77427 RADIATION TX MANAGEMENT X5: CPT

## 2021-06-09 ENCOUNTER — LABORATORY RESULT (OUTPATIENT)
Age: 81
End: 2021-06-09

## 2021-06-09 ENCOUNTER — APPOINTMENT (OUTPATIENT)
Dept: HEMATOLOGY ONCOLOGY | Facility: CLINIC | Age: 81
End: 2021-06-09
Payer: MEDICARE

## 2021-06-09 ENCOUNTER — APPOINTMENT (OUTPATIENT)
Dept: INFUSION THERAPY | Facility: CLINIC | Age: 81
End: 2021-06-09
Payer: MEDICARE

## 2021-06-09 VITALS
WEIGHT: 174 LBS | TEMPERATURE: 97.2 F | HEIGHT: 64 IN | SYSTOLIC BLOOD PRESSURE: 128 MMHG | DIASTOLIC BLOOD PRESSURE: 60 MMHG | BODY MASS INDEX: 29.71 KG/M2 | HEART RATE: 105 BPM

## 2021-06-09 LAB
HCT VFR BLD CALC: 38 %
HGB BLD-MCNC: 11.8 G/DL
MCHC RBC-ENTMCNC: 23.6 PG
MCHC RBC-ENTMCNC: 31.1 G/DL
MCV RBC AUTO: 76 FL
PLATELET # BLD AUTO: 173 K/UL
PMV BLD: 11.7 FL
RBC # BLD: 5 M/UL
RBC # FLD: 14.7 %
WBC # FLD AUTO: 6.74 K/UL

## 2021-06-09 PROCEDURE — G6002: CPT | Mod: 26

## 2021-06-09 PROCEDURE — 99215 OFFICE O/P EST HI 40 MIN: CPT

## 2021-06-09 PROCEDURE — 77427 RADIATION TX MANAGEMENT X5: CPT

## 2021-06-09 RX ORDER — CARBOPLATIN 50 MG
160 VIAL (EA) INTRAVENOUS ONCE
Refills: 0 | Status: COMPLETED | OUTPATIENT
Start: 2021-06-09 | End: 2021-06-09

## 2021-06-09 RX ORDER — SODIUM CHLORIDE 9 MG/ML
500 INJECTION INTRAMUSCULAR; INTRAVENOUS; SUBCUTANEOUS
Refills: 0 | Status: DISCONTINUED | OUTPATIENT
Start: 2021-06-09 | End: 2021-12-13

## 2021-06-09 RX ORDER — DEXAMETHASONE 0.5 MG/5ML
12 ELIXIR ORAL ONCE
Refills: 0 | Status: COMPLETED | OUTPATIENT
Start: 2021-06-09 | End: 2021-06-09

## 2021-06-09 RX ORDER — FOSAPREPITANT DIMEGLUMINE 150 MG/5ML
150 INJECTION, POWDER, LYOPHILIZED, FOR SOLUTION INTRAVENOUS ONCE
Refills: 0 | Status: COMPLETED | OUTPATIENT
Start: 2021-06-09 | End: 2021-06-09

## 2021-06-09 RX ORDER — FAMOTIDINE 10 MG/ML
20 INJECTION INTRAVENOUS ONCE
Refills: 0 | Status: COMPLETED | OUTPATIENT
Start: 2021-06-09 | End: 2021-06-09

## 2021-06-09 RX ORDER — DIPHENHYDRAMINE HCL 50 MG
25 CAPSULE ORAL ONCE
Refills: 0 | Status: COMPLETED | OUTPATIENT
Start: 2021-06-09 | End: 2021-06-09

## 2021-06-09 RX ADMIN — Medication 122 MILLIGRAM(S): at 15:00

## 2021-06-09 RX ADMIN — FOSAPREPITANT DIMEGLUMINE 300 MILLIGRAM(S): 150 INJECTION, POWDER, LYOPHILIZED, FOR SOLUTION INTRAVENOUS at 15:50

## 2021-06-09 RX ADMIN — Medication 101 MILLIGRAM(S): at 15:30

## 2021-06-09 RX ADMIN — SODIUM CHLORIDE 166.7 MILLILITER(S): 9 INJECTION INTRAMUSCULAR; INTRAVENOUS; SUBCUTANEOUS at 15:10

## 2021-06-09 RX ADMIN — Medication 160 MILLIGRAM(S): at 17:20

## 2021-06-09 RX ADMIN — Medication 266 MILLIGRAM(S): at 16:20

## 2021-06-09 RX ADMIN — FAMOTIDINE 104 MILLIGRAM(S): 10 INJECTION INTRAVENOUS at 15:15

## 2021-06-10 LAB
ANION GAP SERPL CALC-SCNC: 10 MMOL/L
BUN SERPL-MCNC: 24 MG/DL
CALCIUM SERPL-MCNC: 9.5 MG/DL
CHLORIDE SERPL-SCNC: 99 MMOL/L
CO2 SERPL-SCNC: 27 MMOL/L
CREAT SERPL-MCNC: 0.9 MG/DL
GLUCOSE SERPL-MCNC: 67 MG/DL
MAGNESIUM SERPL-MCNC: 1.8 MG/DL
POTASSIUM SERPL-SCNC: 4.1 MMOL/L
SODIUM SERPL-SCNC: 136 MMOL/L

## 2021-06-10 NOTE — ASSESSMENT
[FreeTextEntry1] : 81 year old female with squamous cell carcinoma of the cervix, FIGO Stage IIIA. \par \par PLAN\par Discussed staging, pathology and natural history of cervical cancer.\par Pt has started  chemo/RT.\par Given her age and possible cardiac issues ( inability to handle large volumes of pre and post chemo hydration) will  decided to use Carboplatin AUC 2 as a radiation sensitizer. She is s/p 1 weekly dose.\par She developed diarrhea d/t to RT and chemo\par She will proceed with week 2 of Carboplatin which will be administered with precautions, premeds every week X 5 weeks once weekly.\par \par Will administer IV hydration also\par SE discussed incl myelosuppression, allergic reactions, risk of infection, N, V, D\par The chemotherapy dose was adjusted according to pt's height, weight, labs and anticipated tolerance.\par The high risk of complications and complexity associated with antineoplastic therapy administration has been explained to the pt and family. Chemotherapy will be given with adequate precautions including premedications, hydration and close monitoring during treatment.\par Chemotherapy will be administered under my direct supervision\par \par Monitoring will be with each cycle Q 1 week\par CBC (with differential and platelet count), serum electrolytes, serum creatinine and BUN, CrCl, LFTs; signs/symptoms of hypersensitivity reactions.\par \par \par SUPPORTIVE MEASURES\par Zofran 8mg Q 8 hrs prn for emesis and nausea.\par Compazine  Q 6hrs prn\par Encourage adequate fluid untake,\par GI prophylaxis with PPI\par \par Discussed management of chemo induced diarrhea with loperamide\par loperamide 4 mg orally at onset of late diarrhea, followed by 2 mg every 2 hours (or 4 mg every 4 hours at night) until 12 hours have passed without a bowel movement. If diarrhea recurs, then repeat administration. \par \par If diarrhea is not controlled with this regimen will add LOMOTIL 1 tablet 4 times daily. Once control is achieved reduce dose and may continue with 1 tablet daily if needed\par \par Dietary modifications with BRAT diet and increased fluid intake to balance losses.\par \par \par \par Will coordinate care with Rad/Onc. \par  slides from [facility] were reviewed at [facility].\par \par Plan d/w at length  pt and her daughter who preferred to translate.\par RTc in  1 week

## 2021-06-10 NOTE — HISTORY OF PRESENT ILLNESS
[Disease: _____________________] : Disease: [unfilled] [de-identified] : The patient is an 81 year old woman who presented with post-menopausal vaginal bleeding in March 2021. \par Upon further work up, PAP smear on 3/18/21 showed: LSIL/AGC/HPV+. Pelvic US on 3/22/21 showed endometrium to be 4.2mm thick. 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 \par MRI pelvis on 4/3/21 showed signal abnormality in the posterior cervix 2.5cm x 1.6cm. The lesion does not go beyond the limit of the cervix. \par She saw Dr. Echevarria on 4/14/21 and on his exam, she was noted to have a 4-5cm cervical mass involving at least the middle of the vaginal wall, circumferentially. No parametrial involvement. She was not deemed a surgical candidate. \par Definitive chemoradiation was recommended. She is here to discuss radiation. \par PET/CT: Pending\par \par Pt C/O low back pain, for which she takes Tylenol. Vaginal bleeding is not very excessive. No SOB, Chest pain.\par Pt has a stress test next week\par  [de-identified] : Squamous cell [de-identified] : 5/18/21\par Pt is here for follow up.\par She has completed a PET scan.\par has moderate back pain\par \par 6/9/21\par Pt is here f or follow up.\par She has started RT last week. She also recd 1 dose of carboplatin last week.\par She did not have any nausea or vomiting. She has been getting diarrhea which she has been managing with imodium. Her bld sugar was also running high.\par No fever, mouth sores.

## 2021-06-10 NOTE — CONSULT LETTER
[Dear  ___] : Dear  [unfilled], [Consult Letter:] : I had the pleasure of evaluating your patient, [unfilled]. [Please see my note below.] : Please see my note below. [Consult Closing:] : Thank you very much for allowing me to participate in the care of this patient.  If you have any questions, please do not hesitate to contact me. [Sincerely,] : Sincerely, [DrRaya  ___] : Dr. HOLGUIN [FreeTextEntry3] : Marlena Krueger MD\par Professor [employer]\par [place]\par Attending Physician\par [facility] [place]\par [phone]\par \par

## 2021-06-11 PROCEDURE — G6002: CPT | Mod: 26

## 2021-06-14 ENCOUNTER — NON-APPOINTMENT (OUTPATIENT)
Age: 81
End: 2021-06-14

## 2021-06-14 ENCOUNTER — LABORATORY RESULT (OUTPATIENT)
Age: 81
End: 2021-06-14

## 2021-06-14 VITALS
HEART RATE: 92 BPM | RESPIRATION RATE: 16 BRPM | WEIGHT: 172.25 LBS | SYSTOLIC BLOOD PRESSURE: 135 MMHG | DIASTOLIC BLOOD PRESSURE: 60 MMHG | BODY MASS INDEX: 29.57 KG/M2 | OXYGEN SATURATION: 98 % | TEMPERATURE: 97.6 F

## 2021-06-14 DIAGNOSIS — R30.0 DYSURIA: ICD-10-CM

## 2021-06-14 PROCEDURE — G6002: CPT | Mod: 26

## 2021-06-14 NOTE — REASON FOR VISIT
[Routine On-Treatment] : a routine on-treatment visit for [Other: ___] : [unfilled] [Patient Declined  Services] : - None: Patient declined  services [Other: _____] : [unfilled] [FreeTextEntry4] : daughter, Safia [TWNoteComboBox1] : Armenian

## 2021-06-14 NOTE — VITALS
[Maximal Pain Intensity: 7/10] : 7/10 [Least Pain Intensity: 0/10] : 0/10 [Pain Location: ___] : Pain Location: [unfilled] [OTC] : OTC [80: Normal activity with effort; some signs or symptoms of disease.] : 80: Normal activity with effort; some signs or symptoms of disease.

## 2021-06-14 NOTE — REVIEW OF SYSTEMS
[Nausea: Grade 0] : Nausea: Grade 0 [Vomiting: Grade 0] : Vomiting: Grade 0 [Urinary Tract Pain: Grade 0] : Urinary Tract Pain: Grade 0 [Urinary Urgency: Grade 0] : Urinary Urgency: Grade 0 [Urinary Frequency: Grade 1 - Present] : Urinary Frequency: Grade 1 - Present [Dermatitis Radiation: Grade 0] : Dermatitis Radiation: Grade 0 [Diarrhea: Grade 1 - Increase of <4 stools per day over baseline; mild increase in ostomy output compared to baseline] : Diarrhea: Grade 1 - Increase of <4 stools per day over baseline; mild increase in ostomy output compared to baseline

## 2021-06-14 NOTE — PHYSICAL EXAM
[Sclera] : the sclera and conjunctiva were normal [Hearing Threshold Finger Rub Not Hood] : hearing was normal [] : no respiratory distress [Exaggerated Use Of Accessory Muscles For Inspiration] : no accessory muscle use [Abdomen Soft] : soft [Abdomen Tenderness] : non-tender [Normal] : oriented to person, place and time, the affect was normal, the mood was normal and not anxious

## 2021-06-14 NOTE — DISEASE MANAGEMENT
[Clinical] : TNM Stage: c [IIIA] : IIIA [FreeTextEntry4] : Squamous cell carcinoma of the cervix, FIGO Stage IIIA [TTNM] : 3a [NTNM] : 0 [MTNM] : 0 [de-identified] : 3386cGy [de-identified] : 8230cGy [de-identified] : pelvis/cervix

## 2021-06-15 PROCEDURE — 77014: CPT | Mod: 26

## 2021-06-16 ENCOUNTER — LABORATORY RESULT (OUTPATIENT)
Age: 81
End: 2021-06-16

## 2021-06-16 ENCOUNTER — APPOINTMENT (OUTPATIENT)
Dept: HEMATOLOGY ONCOLOGY | Facility: CLINIC | Age: 81
End: 2021-06-16
Payer: MEDICARE

## 2021-06-16 ENCOUNTER — APPOINTMENT (OUTPATIENT)
Dept: INFUSION THERAPY | Facility: CLINIC | Age: 81
End: 2021-06-16
Payer: MEDICARE

## 2021-06-16 VITALS
HEIGHT: 64 IN | HEART RATE: 114 BPM | SYSTOLIC BLOOD PRESSURE: 186 MMHG | WEIGHT: 172 LBS | RESPIRATION RATE: 14 BRPM | BODY MASS INDEX: 29.37 KG/M2 | DIASTOLIC BLOOD PRESSURE: 74 MMHG | TEMPERATURE: 98.4 F

## 2021-06-16 DIAGNOSIS — E86.0 DEHYDRATION: ICD-10-CM

## 2021-06-16 LAB
HCT VFR BLD CALC: 36.6 %
HGB BLD-MCNC: 11.1 G/DL
MCHC RBC-ENTMCNC: 23.2 PG
MCHC RBC-ENTMCNC: 30.3 G/DL
MCV RBC AUTO: 76.4 FL
PLATELET # BLD AUTO: 160 K/UL
PMV BLD: 10.8 FL
RBC # BLD: 4.79 M/UL
RBC # FLD: 14.7 %
WBC # FLD AUTO: 5.76 K/UL

## 2021-06-16 PROCEDURE — G6002: CPT | Mod: 26

## 2021-06-16 PROCEDURE — 99215 OFFICE O/P EST HI 40 MIN: CPT

## 2021-06-16 PROCEDURE — 77427 RADIATION TX MANAGEMENT X5: CPT

## 2021-06-16 RX ORDER — SODIUM CHLORIDE 9 MG/ML
500 INJECTION INTRAMUSCULAR; INTRAVENOUS; SUBCUTANEOUS
Refills: 0 | Status: DISCONTINUED | OUTPATIENT
Start: 2021-06-16 | End: 2021-12-13

## 2021-06-16 RX ORDER — THIAMINE HCL 100 MG
500 TABLET ORAL
Qty: 5 | Refills: 0 | Status: COMPLETED | COMMUNITY
Start: 2021-06-16 | End: 2021-06-21

## 2021-06-17 LAB
ALBUMIN SERPL ELPH-MCNC: 4 G/DL
ALP BLD-CCNC: 78 U/L
ALT SERPL-CCNC: 11 U/L
ANION GAP SERPL CALC-SCNC: 10 MMOL/L
AST SERPL-CCNC: 22 U/L
BILIRUB DIRECT SERPL-MCNC: <0.2 MG/DL
BILIRUB INDIRECT SERPL-MCNC: >0.1 MG/DL
BILIRUB SERPL-MCNC: 0.3 MG/DL
BUN SERPL-MCNC: 18 MG/DL
CALCIUM SERPL-MCNC: 8.8 MG/DL
CHLORIDE SERPL-SCNC: 98 MMOL/L
CO2 SERPL-SCNC: 25 MMOL/L
CREAT SERPL-MCNC: 0.9 MG/DL
GLUCOSE SERPL-MCNC: 196 MG/DL
MAGNESIUM SERPL-MCNC: 1.6 MG/DL
POTASSIUM SERPL-SCNC: 4.4 MMOL/L
PROT SERPL-MCNC: 6.4 G/DL
SODIUM SERPL-SCNC: 133 MMOL/L

## 2021-06-17 PROCEDURE — G6002: CPT | Mod: 26

## 2021-06-18 PROBLEM — E86.0 DEHYDRATION: Status: ACTIVE | Noted: 2021-06-18

## 2021-06-18 PROCEDURE — 77427 RADIATION TX MANAGEMENT X5: CPT

## 2021-06-18 PROCEDURE — G6002: CPT | Mod: 26

## 2021-06-18 NOTE — PHYSICAL EXAM
[Normal] : affect appropriate [Ambulatory and capable of all self care but unable to carry out any work activities] : Status 2- Ambulatory and capable of all self care but unable to carry out any work activities. Up and about more than 50% of waking hours [de-identified] : appears sick today [de-identified] : dry oral mucosa

## 2021-06-18 NOTE — HISTORY OF PRESENT ILLNESS
[Disease: _____________________] : Disease: [unfilled] [de-identified] : The patient is an 81 year old woman who presented with post-menopausal vaginal bleeding in March 2021. \par Upon further work up, PAP smear on 3/18/21 showed: LSIL/AGC/HPV+. Pelvic US on 3/22/21 showed endometrium to be 4.2mm thick. 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 \par MRI pelvis on 4/3/21 showed signal abnormality in the posterior cervix 2.5cm x 1.6cm. The lesion does not go beyond the limit of the cervix. \par She saw Dr. Echevarria on 4/14/21 and on his exam, she was noted to have a 4-5cm cervical mass involving at least the middle of the vaginal wall, circumferentially. No parametrial involvement. She was not deemed a surgical candidate. \par Definitive chemoradiation was recommended. She is here to discuss radiation. \par PET/CT: Pending\par \par Pt C/O low back pain, for which she takes Tylenol. Vaginal bleeding is not very excessive. No SOB, Chest pain.\par Pt has a stress test next week\par  [de-identified] : Squamous cell [de-identified] : 5/18/21\par Pt is here for follow up.\par She has completed a PET scan.\par has moderate back pain\par \par 6/9/21\par Pt is here for follow up.\par She has started RT last week. She also recd 1 dose of carboplatin last week.\par She did not have any nausea or vomiting. She has been getting diarrhea which she has been managing with imodium. Her bld sugar was also running high.\par No fever, mouth sores.\par \par 6/16/21\par Pt is here for follow up.\par She is s/p 2 doses of Carbo.\par C/O feeling very fatigued. Had diarrhea which is controlled with imodium.\par No N, V.\par has abdominal discomfort.

## 2021-06-18 NOTE — CONSULT LETTER
[Dear  ___] : Dear  [unfilled], [Consult Letter:] : I had the pleasure of evaluating your patient, [unfilled]. [Please see my note below.] : Please see my note below. [Consult Closing:] : Thank you very much for allowing me to participate in the care of this patient.  If you have any questions, please do not hesitate to contact me. [Sincerely,] : Sincerely, [DrRaya  ___] : Dr. HOLGUIN [FreeTextEntry3] : Marlena Krueger MD\par Professor [employer]\par [place]\par Attending Physician\par [facility] [place]\par [phone]\par \par

## 2021-06-18 NOTE — ASSESSMENT
[FreeTextEntry1] : 81 year old female with squamous cell carcinoma of the cervix, FIGO Stage IIIA. \par \par PLAN\par Discussed staging, pathology and natural history of cervical cancer.\par Pt has started  chemo/RT.\par Given her age and possible cardiac issues ( inability to handle large volumes of pre and post chemo hydration) we had decided to use Carboplatin AUC 2 as a radiation sensitizer. She is s/p 2 weekly doses.\par She developed diarrhea d/t to RT and chemo\par She appears dehydrated and weak today\par Will administer IV hydration and send stat BMP, Mg, LFTS\par \par If she improved post hydration will consider giving her the 3rd dose of carboplatin. If no improvement will hold treatment this week.\par \par \par SE discussed incl myelosuppression, allergic reactions, risk of infection, N, V, D\par The chemotherapy dose was adjusted according to pt's height, weight, labs and anticipated tolerance.\par The high risk of complications and complexity associated with antineoplastic therapy administration has been explained to the pt and family. Chemotherapy will be given with adequate precautions including premedications, hydration and close monitoring during treatment.\par Chemotherapy will be administered under my direct supervision\par \par \par \par Monitoring will be with each cycle Q 1 week\par CBC (with differential and platelet count), serum electrolytes, serum creatinine and BUN, CrCl, LFTs; signs/symptoms of hypersensitivity reactions.\par \par \par SUPPORTIVE MEASURES\par Zofran 8mg Q 8 hrs prn for emesis and nausea.\par Compazine  Q 6hrs prn\par Encourage adequate fluid intake,\par GI prophylaxis with PPI.\par imodium 2mg every 2 hrs prn for diarrhea\par \par Discussed management of chemo induced diarrhea with loperamide\par loperamide 4 mg orally at onset of late diarrhea, followed by 2 mg every 2 hours (or 4 mg every 4 hours at night) until 12 hours have passed without a bowel movement. If diarrhea recurs, then repeat administration. \par \par If diarrhea is not controlled with this regimen will add LOMOTIL 1 tablet 4 times daily. Once control is achieved reduce dose and may continue with 1 tablet daily if needed\par \par Dietary modifications with BRAT diet and increased fluid intake to balance losses.\par \par \par \par Will coordinate care with Rad/Onc. \par \par Plan d/w at length  pt and her daughter who preferred to translate.\par RTc in  1 week

## 2021-06-21 ENCOUNTER — NON-APPOINTMENT (OUTPATIENT)
Age: 81
End: 2021-06-21

## 2021-06-21 VITALS
BODY MASS INDEX: 29.7 KG/M2 | HEART RATE: 94 BPM | SYSTOLIC BLOOD PRESSURE: 136 MMHG | DIASTOLIC BLOOD PRESSURE: 66 MMHG | TEMPERATURE: 96.6 F | RESPIRATION RATE: 16 BRPM | OXYGEN SATURATION: 98 % | WEIGHT: 173 LBS

## 2021-06-21 PROCEDURE — G6002: CPT | Mod: 26

## 2021-06-21 NOTE — HISTORY OF PRESENT ILLNESS
[FreeTextEntry1] : She received IV fluids for dehydration last week, s/p 3 doses of Carbo with labs to be monitored. Scheduled for chemotherapy this Wednesday. She continues to have diarrhea and constipation, not taking Imodium. Denies N/V or urinary issues.

## 2021-06-21 NOTE — DISEASE MANAGEMENT
[Clinical] : TNM Stage: c [IIIA] : IIIA [FreeTextEntry4] : Squamous cell carcinoma of the cervix, FIGO Stage IIIA [TTNM] : 3a [NTNM] : 0 [MTNM] : 0 [de-identified] : 7386cGy [de-identified] : 6440cGy [de-identified] : pelvis/cervix

## 2021-06-21 NOTE — PHYSICAL EXAM
[Outer Ear] : the ears and nose were normal in appearance [] : no respiratory distress [Abdomen Soft] : soft [Abdomen Tenderness] : non-tender [Normal] : oriented to person, place and time, the affect was normal, the mood was normal and not anxious

## 2021-06-21 NOTE — REVIEW OF SYSTEMS
[Constipation: Grade 1 - Occasional or intermittent symptoms; occasional use of stool softeners, laxatives, dietary modification, or enema] : Constipation: Grade 1 - Occasional or intermittent symptoms; occasional use of stool softeners, laxatives, dietary modification, or enema [Diarrhea: Grade 1 - Increase of <4 stools per day over baseline; mild increase in ostomy output compared to baseline] : Diarrhea: Grade 1 - Increase of <4 stools per day over baseline; mild increase in ostomy output compared to baseline [Fatigue: Grade 2 - Fatigue not relieved by rest; limiting instrumental ADL] : Fatigue: Grade 2 - Fatigue not relieved by rest; limiting instrumental ADL [Urinary Tract Pain: Grade 0] : Urinary Tract Pain: Grade 0 [Dermatitis Radiation: Grade 0] : Dermatitis Radiation: Grade 0

## 2021-06-22 PROCEDURE — 77014: CPT | Mod: 26

## 2021-06-23 ENCOUNTER — APPOINTMENT (OUTPATIENT)
Dept: INFUSION THERAPY | Facility: CLINIC | Age: 81
End: 2021-06-23
Payer: MEDICARE

## 2021-06-23 ENCOUNTER — APPOINTMENT (OUTPATIENT)
Dept: HEMATOLOGY ONCOLOGY | Facility: CLINIC | Age: 81
End: 2021-06-23
Payer: MEDICARE

## 2021-06-23 ENCOUNTER — LABORATORY RESULT (OUTPATIENT)
Age: 81
End: 2021-06-23

## 2021-06-23 VITALS
HEIGHT: 64 IN | BODY MASS INDEX: 29.71 KG/M2 | WEIGHT: 174 LBS | TEMPERATURE: 97.6 F | HEART RATE: 92 BPM | SYSTOLIC BLOOD PRESSURE: 142 MMHG | DIASTOLIC BLOOD PRESSURE: 71 MMHG

## 2021-06-23 LAB
HCT VFR BLD CALC: 35.3 %
HGB BLD-MCNC: 11 G/DL
MCHC RBC-ENTMCNC: 24 PG
MCHC RBC-ENTMCNC: 31.2 G/DL
MCV RBC AUTO: 76.9 FL
PLATELET # BLD AUTO: 134 K/UL
PMV BLD: 11.7 FL
RBC # BLD: 4.59 M/UL
RBC # FLD: 14.8 %
WBC # FLD AUTO: 6.41 K/UL

## 2021-06-23 PROCEDURE — 77427 RADIATION TX MANAGEMENT X5: CPT

## 2021-06-23 PROCEDURE — 99215 OFFICE O/P EST HI 40 MIN: CPT

## 2021-06-23 PROCEDURE — G6002: CPT | Mod: 26

## 2021-06-23 RX ORDER — DEXAMETHASONE 0.5 MG/5ML
12 ELIXIR ORAL ONCE
Refills: 0 | Status: COMPLETED | OUTPATIENT
Start: 2021-06-23 | End: 2021-06-23

## 2021-06-23 RX ORDER — FOSAPREPITANT DIMEGLUMINE 150 MG/5ML
150 INJECTION, POWDER, LYOPHILIZED, FOR SOLUTION INTRAVENOUS ONCE
Refills: 0 | Status: COMPLETED | OUTPATIENT
Start: 2021-06-23 | End: 2021-06-23

## 2021-06-23 RX ORDER — FAMOTIDINE 10 MG/ML
20 INJECTION INTRAVENOUS ONCE
Refills: 0 | Status: COMPLETED | OUTPATIENT
Start: 2021-06-23 | End: 2021-06-23

## 2021-06-23 RX ORDER — CARBOPLATIN 50 MG
160 VIAL (EA) INTRAVENOUS ONCE
Refills: 0 | Status: COMPLETED | OUTPATIENT
Start: 2021-06-23 | End: 2021-06-23

## 2021-06-23 RX ORDER — DIPHENHYDRAMINE HCL 50 MG
25 CAPSULE ORAL ONCE
Refills: 0 | Status: COMPLETED | OUTPATIENT
Start: 2021-06-23 | End: 2021-06-23

## 2021-06-23 RX ADMIN — Medication 122 MILLIGRAM(S): at 14:50

## 2021-06-23 RX ADMIN — Medication 160 MILLIGRAM(S): at 17:20

## 2021-06-23 RX ADMIN — FAMOTIDINE 104 MILLIGRAM(S): 10 INJECTION INTRAVENOUS at 15:10

## 2021-06-23 RX ADMIN — Medication 101 MILLIGRAM(S): at 15:30

## 2021-06-23 RX ADMIN — Medication 25 MILLIGRAM(S): at 15:50

## 2021-06-23 RX ADMIN — FOSAPREPITANT DIMEGLUMINE 300 MILLIGRAM(S): 150 INJECTION, POWDER, LYOPHILIZED, FOR SOLUTION INTRAVENOUS at 15:50

## 2021-06-23 RX ADMIN — Medication 266 MILLIGRAM(S): at 16:20

## 2021-06-23 RX ADMIN — FOSAPREPITANT DIMEGLUMINE 150 MILLIGRAM(S): 150 INJECTION, POWDER, LYOPHILIZED, FOR SOLUTION INTRAVENOUS at 16:10

## 2021-06-23 RX ADMIN — Medication 12 MILLIGRAM(S): at 15:10

## 2021-06-23 RX ADMIN — FAMOTIDINE 20 MILLIGRAM(S): 10 INJECTION INTRAVENOUS at 15:30

## 2021-06-24 LAB
ALBUMIN SERPL ELPH-MCNC: 3.5 G/DL
ALP BLD-CCNC: 72 U/L
ALT SERPL-CCNC: 9 U/L
ANION GAP SERPL CALC-SCNC: 6 MMOL/L
AST SERPL-CCNC: 11 U/L
BILIRUB SERPL-MCNC: 0.2 MG/DL
BUN SERPL-MCNC: 15 MG/DL
CALCIUM SERPL-MCNC: 8.6 MG/DL
CHLORIDE SERPL-SCNC: 94 MMOL/L
CO2 SERPL-SCNC: 30 MMOL/L
CREAT SERPL-MCNC: 0.9 MG/DL
GLUCOSE SERPL-MCNC: 350 MG/DL
MAGNESIUM SERPL-MCNC: 1.7 MG/DL
POTASSIUM SERPL-SCNC: 4.3 MMOL/L
PROT SERPL-MCNC: 5.9 G/DL
SODIUM SERPL-SCNC: 130 MMOL/L

## 2021-06-24 PROCEDURE — 77427 RADIATION TX MANAGEMENT X5: CPT

## 2021-06-24 PROCEDURE — G6002: CPT | Mod: 26

## 2021-06-26 NOTE — HISTORY OF PRESENT ILLNESS
[Disease: _____________________] : Disease: [unfilled] [de-identified] : The patient is an 81 year old woman who presented with post-menopausal vaginal bleeding in March 2021. \par Upon further work up, PAP smear on 3/18/21 showed: LSIL/AGC/HPV+. Pelvic US on 3/22/21 showed endometrium to be 4.2mm thick. 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 \par MRI pelvis on 4/3/21 showed signal abnormality in the posterior cervix 2.5cm x 1.6cm. The lesion does not go beyond the limit of the cervix. \par She saw Dr. Echevarria on 4/14/21 and on his exam, she was noted to have a 4-5cm cervical mass involving at least the middle of the vaginal wall, circumferentially. No parametrial involvement. She was not deemed a surgical candidate. \par Definitive chemoradiation was recommended. She is here to discuss radiation. \par PET/CT: Pending\par \par Pt C/O low back pain, for which she takes Tylenol. Vaginal bleeding is not very excessive. No SOB, Chest pain.\par Pt has a stress test next week\par  [de-identified] : Squamous cell [de-identified] : 5/18/21\par Pt is here for follow up.\par She has completed a PET scan.\par has moderate back pain\par \par 6/9/21\par Pt is here for follow up.\par She has started RT last week. She also recd 1 dose of carboplatin last week.\par She did not have any nausea or vomiting. She has been getting diarrhea which she has been managing with imodium. Her bld sugar was also running high.\par No fever, mouth sores.\par \par 6/16/21\par Pt is here for follow up.\par She is s/p 2 doses of Carbo.\par C/O feeling very fatigued. Had diarrhea which is controlled with imodium.\par No N, V.\par has abdominal discomfort.\par \par 6/23/2021\par Pt is here to follow up for b=cervical cancer, accompanied by daughter\par She is on Carbo s/p 2 cycles, tolerating well\par She feels a lot better today\par She denies abdominal pain, nausea or vomiting\par She gets diarrhea but is managed with Imodium\par

## 2021-06-26 NOTE — ASSESSMENT
[FreeTextEntry1] : 81 year old female with squamous cell carcinoma of the cervix, FIGO Stage IIIA. \par \par PLAN:\par Previous notes reviewed and all relevant laboratory results discussed with Dr Krueger and communicated to the patient and her daughter.\par \par Discussed staging, pathology and natural history of cervical cancer.\par Pt has started  chemo/RT.\par Given her age and possible cardiac issues ( inability to handle large volumes of pre and post chemo hydration) we had decided to use Carboplatin AUC 2 as a radiation sensitizer. She is s/p 2 weekly doses.\par She developed diarrhea d/t to RT and chemo and cycle #3 was delayed\par \par - Continue cycle #3 Carbo. CBC is stable today\par SE discussed incl myelosuppression, allergic reactions, risk of infection, N, V, D\par The chemotherapy dose was adjusted according to pt's height, weight, labs and anticipated tolerance.\par The high risk of complications and complexity associated with antineoplastic therapy administration has been explained to the pt and family. Chemotherapy will be given with adequate precautions including premedications, hydration and close monitoring during treatment.\par Chemotherapy will be administered under my direct supervision\par \par Monitoring will be with each cycle Q 1 week\par CBC (with differential and platelet count), serum electrolytes, serum creatinine and BUN, CrCl, LFTs; signs/symptoms of hypersensitivity reactions.\par \par SUPPORTIVE MEASURES\par Zofran 8 mg Q 8 hrs prn for emesis and nausea.\par Compazine  Q 6 hrs prn\par Encourage adequate fluid intake,\par GI prophylaxis with PPI.\par Imodium 2 mg every 2 hrs prn for diarrhea\par \par Plan d/w at length  pt and her daughter who preferred to translate.\par \par RTC in  1 week\par \par Case was seen and discussed with Dr. Kureger who agreed with assessment and plan.\par

## 2021-06-26 NOTE — CONSULT LETTER
[Dear  ___] : Dear  [unfilled], [Consult Letter:] : I had the pleasure of evaluating your patient, [unfilled]. [Please see my note below.] : Please see my note below. [Consult Closing:] : Thank you very much for allowing me to participate in the care of this patient.  If you have any questions, please do not hesitate to contact me. [Sincerely,] : Sincerely, [DrRaya  ___] : Dr. HOLGUIN [FreeTextEntry3] : Marlena Krueger MD\par Professor [employer]\par [place]\par Attending Physician\par [facility] [place]\par [phone]\par \par

## 2021-06-28 ENCOUNTER — NON-APPOINTMENT (OUTPATIENT)
Age: 81
End: 2021-06-28

## 2021-06-28 VITALS
BODY MASS INDEX: 29.11 KG/M2 | RESPIRATION RATE: 16 BRPM | DIASTOLIC BLOOD PRESSURE: 66 MMHG | SYSTOLIC BLOOD PRESSURE: 135 MMHG | HEART RATE: 93 BPM | TEMPERATURE: 97.8 F | WEIGHT: 169.56 LBS

## 2021-06-28 PROCEDURE — G6002: CPT | Mod: 26

## 2021-06-28 NOTE — HISTORY OF PRESENT ILLNESS
[FreeTextEntry1] : She has alternating constipation and diarrhea.  Modifying diet and using Imodium.  She notes fatigue.  Doing well otherwise.

## 2021-06-28 NOTE — REASON FOR VISIT
[Routine On-Treatment] : a routine on-treatment visit for [Other: ___] : [unfilled] [Other: _____] : [unfilled] [Patient Declined  Services] : - None: Patient declined  services [FreeTextEntry4] : daughter, Safia [TWNoteComboBox1] : [place]

## 2021-06-28 NOTE — REVIEW OF SYSTEMS
[Constipation: Grade 1 - Occasional or intermittent symptoms; occasional use of stool softeners, laxatives, dietary modification, or enema] : Constipation: Grade 1 - Occasional or intermittent symptoms; occasional use of stool softeners, laxatives, dietary modification, or enema [Diarrhea: Grade 1 - Increase of <4 stools per day over baseline; mild increase in ostomy output compared to baseline] : Diarrhea: Grade 1 - Increase of <4 stools per day over baseline; mild increase in ostomy output compared to baseline [Fatigue: Grade 1 - Fatigue relieved by rest] : Fatigue: Grade 1 - Fatigue relieved by rest [Dermatitis Radiation: Grade 0] : Dermatitis Radiation: Grade 0

## 2021-06-28 NOTE — DISEASE MANAGEMENT
[Clinical] : TNM Stage: c [IIIA] : IIIA [FreeTextEntry4] : Squamous cell carcinoma of the cervix, FIGO Stage IIIA [TTNM] : 3a [NTNM] : 0 [MTNM] : 0 [de-identified] : 3420cZi [de-identified] : 7810cGy [de-identified] : pelvis/cervix

## 2021-06-28 NOTE — PHYSICAL EXAM
[Sclera] : the sclera and conjunctiva were normal [] : no respiratory distress [Exaggerated Use Of Accessory Muscles For Inspiration] : no accessory muscle use [Abdomen Soft] : soft [Abdomen Tenderness] : non-tender [Normal] : oriented to person, place and time, the affect was normal, the mood was normal and not anxious [Nail Clubbing] : no clubbing  or cyanosis of the fingernails

## 2021-06-29 PROCEDURE — 77014: CPT | Mod: 26

## 2021-06-29 NOTE — ED PROVIDER NOTE - NSTIMEPROVIDERCAREINITIATE_GEN_ER
Bed: RT3  Expected date:   Expected time:   Means of arrival:   Comments:  rtu  
Pt given discharge instructions with understanding. Pt is leaving ambulatory with employer. Bandage applied to head.   
Wound to the right side of the head cleaned with wound cleanser.   
22-Aug-2020 12:55

## 2021-06-30 PROCEDURE — 77334 RADIATION TREATMENT AID(S): CPT | Mod: 26

## 2021-06-30 PROCEDURE — G6002: CPT | Mod: 26

## 2021-07-01 ENCOUNTER — APPOINTMENT (OUTPATIENT)
Dept: HEMATOLOGY ONCOLOGY | Facility: CLINIC | Age: 81
End: 2021-07-01
Payer: MEDICARE

## 2021-07-01 ENCOUNTER — APPOINTMENT (OUTPATIENT)
Dept: INFUSION THERAPY | Facility: CLINIC | Age: 81
End: 2021-07-01

## 2021-07-01 ENCOUNTER — LABORATORY RESULT (OUTPATIENT)
Age: 81
End: 2021-07-01

## 2021-07-01 VITALS
WEIGHT: 169 LBS | DIASTOLIC BLOOD PRESSURE: 65 MMHG | HEIGHT: 64 IN | BODY MASS INDEX: 28.85 KG/M2 | HEART RATE: 94 BPM | TEMPERATURE: 97.6 F | SYSTOLIC BLOOD PRESSURE: 147 MMHG

## 2021-07-01 LAB
ALBUMIN SERPL ELPH-MCNC: 3.7 G/DL
ALP BLD-CCNC: 91 U/L
ALT SERPL-CCNC: 10 U/L
ANION GAP SERPL CALC-SCNC: 9 MMOL/L
AST SERPL-CCNC: 11 U/L
BILIRUB SERPL-MCNC: 0.3 MG/DL
BUN SERPL-MCNC: 12 MG/DL
CALCIUM SERPL-MCNC: 8.9 MG/DL
CHLORIDE SERPL-SCNC: 93 MMOL/L
CO2 SERPL-SCNC: 29 MMOL/L
CREAT SERPL-MCNC: 0.9 MG/DL
GLUCOSE SERPL-MCNC: 350 MG/DL
HCT VFR BLD CALC: 36.8 %
HGB BLD-MCNC: 11.5 G/DL
MAGNESIUM SERPL-MCNC: 1.7 MG/DL
MCHC RBC-ENTMCNC: 23.6 PG
MCHC RBC-ENTMCNC: 31.3 G/DL
MCV RBC AUTO: 75.6 FL
PLATELET # BLD AUTO: 192 K/UL
PMV BLD: 10.3 FL
POTASSIUM SERPL-SCNC: 4.9 MMOL/L
PROT SERPL-MCNC: 6.3 G/DL
RBC # BLD: 4.87 M/UL
RBC # FLD: 14.8 %
SODIUM SERPL-SCNC: 131 MMOL/L
WBC # FLD AUTO: 6.6 K/UL

## 2021-07-01 PROCEDURE — G6002: CPT | Mod: 26

## 2021-07-01 PROCEDURE — 99215 OFFICE O/P EST HI 40 MIN: CPT

## 2021-07-01 PROCEDURE — 77427 RADIATION TX MANAGEMENT X5: CPT

## 2021-07-01 RX ORDER — DIPHENHYDRAMINE HCL 50 MG
25 CAPSULE ORAL ONCE
Refills: 0 | Status: COMPLETED | OUTPATIENT
Start: 2021-07-01 | End: 2021-07-01

## 2021-07-01 RX ORDER — DEXAMETHASONE 0.5 MG/5ML
12 ELIXIR ORAL ONCE
Refills: 0 | Status: COMPLETED | OUTPATIENT
Start: 2021-07-01 | End: 2021-07-01

## 2021-07-01 RX ORDER — CARBOPLATIN 50 MG
160 VIAL (EA) INTRAVENOUS ONCE
Refills: 0 | Status: COMPLETED | OUTPATIENT
Start: 2021-07-01 | End: 2021-07-01

## 2021-07-01 RX ORDER — FOSAPREPITANT DIMEGLUMINE 150 MG/5ML
150 INJECTION, POWDER, LYOPHILIZED, FOR SOLUTION INTRAVENOUS ONCE
Refills: 0 | Status: COMPLETED | OUTPATIENT
Start: 2021-07-01 | End: 2021-07-01

## 2021-07-01 RX ORDER — FAMOTIDINE 10 MG/ML
20 INJECTION INTRAVENOUS ONCE
Refills: 0 | Status: COMPLETED | OUTPATIENT
Start: 2021-07-01 | End: 2021-07-01

## 2021-07-01 RX ADMIN — Medication 101 MILLIGRAM(S): at 11:30

## 2021-07-01 RX ADMIN — Medication 160 MILLIGRAM(S): at 14:00

## 2021-07-01 RX ADMIN — FAMOTIDINE 104 MILLIGRAM(S): 10 INJECTION INTRAVENOUS at 11:50

## 2021-07-01 RX ADMIN — Medication 266 MILLIGRAM(S): at 13:00

## 2021-07-01 RX ADMIN — FOSAPREPITANT DIMEGLUMINE 150 MILLIGRAM(S): 150 INJECTION, POWDER, LYOPHILIZED, FOR SOLUTION INTRAVENOUS at 12:50

## 2021-07-01 RX ADMIN — Medication 12 MILLIGRAM(S): at 12:30

## 2021-07-01 RX ADMIN — Medication 25 MILLIGRAM(S): at 11:50

## 2021-07-01 RX ADMIN — FOSAPREPITANT DIMEGLUMINE 300 MILLIGRAM(S): 150 INJECTION, POWDER, LYOPHILIZED, FOR SOLUTION INTRAVENOUS at 12:30

## 2021-07-01 RX ADMIN — Medication 122 MILLIGRAM(S): at 12:10

## 2021-07-01 RX ADMIN — FAMOTIDINE 20 MILLIGRAM(S): 10 INJECTION INTRAVENOUS at 12:10

## 2021-07-01 NOTE — ASSESSMENT
[FreeTextEntry1] : 81 year old female with squamous cell carcinoma of the cervix, FIGO Stage IIIA. \par \par PLAN:\par Previous notes reviewed and all relevant laboratory results discussed with Dr Krueger and communicated to the patient and her daughter.\par \par Discussed staging, pathology and natural history of cervical cancer.\par Pt has started  chemo/RT.\par Given her age and possible cardiac issues ( inability to handle large volumes of pre and post chemo hydration). We had decided to use Carboplatin AUC 2 as a radiation sensitizer. She is s/p 3 weekly doses of Carboplatin.\par She developed diarrhea d/t to RT and chemo and cycle #3 was delayed\par \par - Continue cycle #4 Carbo. CBC is stable today\par SE discussed incl myelosuppression, allergic reactions, risk of infection, N, V, D\par The chemotherapy dose was adjusted according to pt's height, weight, labs and anticipated tolerance.\par The high risk of complications and complexity associated with antineoplastic therapy administration has been explained to the pt and family. Chemotherapy will be given with adequate precautions including premedications, hydration and close monitoring during treatment.\par Chemotherapy will be administered under my direct supervision\par \par Monitoring will be with each cycle Q 1 week\par CBC (with differential and platelet count), serum electrolytes, serum creatinine and BUN, CrCl, LFTs; signs/symptoms of hypersensitivity reactions.\par \par SUPPORTIVE MEASURES\par Zofran 8 mg Q 8 hrs prn for emesis and nausea.\par Compazine  Q 6 hrs prn\par Encourage adequate fluid intake,\par GI prophylaxis with PPI.\par Imodium 2 mg every 2 hrs prn for diarrhea. Emphasized how to take Imodium appropriately.\par \par Plan d/w at length  pt and her daughter who preferred to translate.\par \par RTC on Wednesday for visit and chemo to coincide with her last RT.\par \par Case was seen and discussed with Dr. Krueger who agreed with assessment and plan.\par

## 2021-07-01 NOTE — CONSULT LETTER
[Dear  ___] : Dear  [unfilled], [Consult Letter:] : I had the pleasure of evaluating your patient, [unfilled]. [Please see my note below.] : Please see my note below. [Consult Closing:] : Thank you very much for allowing me to participate in the care of this patient.  If you have any questions, please do not hesitate to contact me. [Sincerely,] : Sincerely, [DrRaya  ___] : Dr. HOLGUIN [FreeTextEntry3] : Marlena Krueger MD\par Professor [employer]\par [place]\par Attending Physician\par [facility] [place]\par [phone]\par \par

## 2021-07-01 NOTE — HISTORY OF PRESENT ILLNESS
[Disease: _____________________] : Disease: [unfilled] [de-identified] : The patient is an 81 year old woman who presented with post-menopausal vaginal bleeding in March 2021. \par Upon further work up, PAP smear on 3/18/21 showed: LSIL/AGC/HPV+. Pelvic US on 3/22/21 showed endometrium to be 4.2mm thick. 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 \par MRI pelvis on 4/3/21 showed signal abnormality in the posterior cervix 2.5cm x 1.6cm. The lesion does not go beyond the limit of the cervix. \par She saw Dr. Echevarria on 4/14/21 and on his exam, she was noted to have a 4-5cm cervical mass involving at least the middle of the vaginal wall, circumferentially. No parametrial involvement. She was not deemed a surgical candidate. \par Definitive chemoradiation was recommended. She is here to discuss radiation. \par PET/CT: Pending\par \par Pt C/O low back pain, for which she takes Tylenol. Vaginal bleeding is not very excessive. No SOB, Chest pain.\par Pt has a stress test next week\par  [de-identified] : Squamous cell [de-identified] : 5/18/21\par Pt is here for follow up.\par She has completed a PET scan.\par has moderate back pain\par \par 6/9/21\par Pt is here for follow up.\par She has started RT last week. She also recd 1 dose of carboplatin last week.\par She did not have any nausea or vomiting. She has been getting diarrhea which she has been managing with imodium. Her bld sugar was also running high.\par No fever, mouth sores.\par \par 6/16/21\par Pt is here for follow up.\par She is s/p 2 doses of Carbo.\par C/O feeling very fatigued. Had diarrhea which is controlled with imodium.\par No N, V.\par has abdominal discomfort.\par \par 6/23/2021\par Pt is here to follow up for b=cervical cancer, accompanied by daughter\par She is on Carbo s/p 2 cycles, tolerating well\par She feels a lot better today\par She denies abdominal pain, nausea or vomiting\par She gets diarrhea but is managed with Imodium\par \par 7/1/2021\par Pt is here to follow up for cervical cancer, accompanied by daughter\par She is on Carbo s/p 3 cycles, tolerating well\par She feels fine today, appetite is good\par She denies abdominal pain, nausea or vomiting, no vaginal bleeding/discharge\par She gets diarrhea, takes Imodium after having 7-8 episodes then gets constipation the following day\par

## 2021-07-07 ENCOUNTER — APPOINTMENT (OUTPATIENT)
Dept: INFUSION THERAPY | Facility: CLINIC | Age: 81
End: 2021-07-07
Payer: MEDICARE

## 2021-07-07 ENCOUNTER — NON-APPOINTMENT (OUTPATIENT)
Age: 81
End: 2021-07-07

## 2021-07-07 ENCOUNTER — APPOINTMENT (OUTPATIENT)
Dept: HEMATOLOGY ONCOLOGY | Facility: CLINIC | Age: 81
End: 2021-07-07
Payer: MEDICARE

## 2021-07-07 ENCOUNTER — LABORATORY RESULT (OUTPATIENT)
Age: 81
End: 2021-07-07

## 2021-07-07 VITALS
TEMPERATURE: 97.7 F | DIASTOLIC BLOOD PRESSURE: 56 MMHG | HEIGHT: 64 IN | HEART RATE: 80 BPM | WEIGHT: 166 LBS | SYSTOLIC BLOOD PRESSURE: 111 MMHG | BODY MASS INDEX: 28.34 KG/M2

## 2021-07-07 VITALS
DIASTOLIC BLOOD PRESSURE: 71 MMHG | WEIGHT: 167.13 LBS | RESPIRATION RATE: 14 BRPM | HEART RATE: 95 BPM | SYSTOLIC BLOOD PRESSURE: 169 MMHG | OXYGEN SATURATION: 100 % | BODY MASS INDEX: 28.69 KG/M2

## 2021-07-07 VITALS
HEART RATE: 126 BPM | TEMPERATURE: 98 F | BODY MASS INDEX: 28.34 KG/M2 | HEIGHT: 64 IN | DIASTOLIC BLOOD PRESSURE: 58 MMHG | WEIGHT: 166 LBS | SYSTOLIC BLOOD PRESSURE: 103 MMHG

## 2021-07-07 LAB
ALBUMIN SERPL ELPH-MCNC: 3.8 G/DL
ALP BLD-CCNC: 89 U/L
ALT SERPL-CCNC: 11 U/L
ANION GAP SERPL CALC-SCNC: 10 MMOL/L
AST SERPL-CCNC: 14 U/L
BILIRUB SERPL-MCNC: 0.3 MG/DL
BUN SERPL-MCNC: 8 MG/DL
CALCIUM SERPL-MCNC: 8.8 MG/DL
CHLORIDE SERPL-SCNC: 95 MMOL/L
CO2 SERPL-SCNC: 27 MMOL/L
CREAT SERPL-MCNC: 0.9 MG/DL
GLUCOSE SERPL-MCNC: 185 MG/DL
HCT VFR BLD CALC: 36.6 %
HGB BLD-MCNC: 11.2 G/DL
MAGNESIUM SERPL-MCNC: 1.6 MG/DL
MCHC RBC-ENTMCNC: 23.4 PG
MCHC RBC-ENTMCNC: 30.6 G/DL
MCV RBC AUTO: 76.6 FL
PLATELET # BLD AUTO: 216 K/UL
PMV BLD: 10.3 FL
POTASSIUM SERPL-SCNC: 4.4 MMOL/L
PROT SERPL-MCNC: 6.3 G/DL
RBC # BLD: 4.78 M/UL
RBC # FLD: 15.2 %
SODIUM SERPL-SCNC: 132 MMOL/L
WBC # FLD AUTO: 5.08 K/UL

## 2021-07-07 PROCEDURE — 77387B: CUSTOM | Mod: 26

## 2021-07-07 PROCEDURE — 99215 OFFICE O/P EST HI 40 MIN: CPT

## 2021-07-07 RX ORDER — MAGNESIUM SULFATE 500 MG/ML
2 VIAL (ML) INJECTION ONCE
Refills: 0 | Status: COMPLETED | OUTPATIENT
Start: 2021-07-07 | End: 2021-07-07

## 2021-07-07 RX ORDER — FAMOTIDINE 10 MG/ML
20 INJECTION INTRAVENOUS ONCE
Refills: 0 | Status: COMPLETED | OUTPATIENT
Start: 2021-07-07 | End: 2021-07-07

## 2021-07-07 RX ORDER — FOSAPREPITANT DIMEGLUMINE 150 MG/5ML
150 INJECTION, POWDER, LYOPHILIZED, FOR SOLUTION INTRAVENOUS ONCE
Refills: 0 | Status: COMPLETED | OUTPATIENT
Start: 2021-07-07 | End: 2021-07-07

## 2021-07-07 RX ORDER — DEXAMETHASONE 0.5 MG/5ML
12 ELIXIR ORAL ONCE
Refills: 0 | Status: COMPLETED | OUTPATIENT
Start: 2021-07-07 | End: 2021-07-07

## 2021-07-07 RX ORDER — CARBOPLATIN 50 MG
160 VIAL (EA) INTRAVENOUS ONCE
Refills: 0 | Status: COMPLETED | OUTPATIENT
Start: 2021-07-07 | End: 2021-07-07

## 2021-07-07 RX ORDER — SODIUM CHLORIDE 9 MG/ML
500 INJECTION INTRAMUSCULAR; INTRAVENOUS; SUBCUTANEOUS
Refills: 0 | Status: DISCONTINUED | OUTPATIENT
Start: 2021-07-07 | End: 2021-12-13

## 2021-07-07 RX ORDER — DIPHENHYDRAMINE HCL 50 MG
25 CAPSULE ORAL ONCE
Refills: 0 | Status: COMPLETED | OUTPATIENT
Start: 2021-07-07 | End: 2021-07-07

## 2021-07-07 RX ADMIN — FOSAPREPITANT DIMEGLUMINE 300 MILLIGRAM(S): 150 INJECTION, POWDER, LYOPHILIZED, FOR SOLUTION INTRAVENOUS at 14:41

## 2021-07-07 RX ADMIN — SODIUM CHLORIDE 250 MILLILITER(S): 9 INJECTION INTRAMUSCULAR; INTRAVENOUS; SUBCUTANEOUS at 13:20

## 2021-07-07 RX ADMIN — FAMOTIDINE 104 MILLIGRAM(S): 10 INJECTION INTRAVENOUS at 14:01

## 2021-07-07 RX ADMIN — Medication 266 MILLIGRAM(S): at 15:04

## 2021-07-07 RX ADMIN — Medication 25 MILLIGRAM(S): at 14:41

## 2021-07-07 RX ADMIN — Medication 122 MILLIGRAM(S): at 13:41

## 2021-07-07 RX ADMIN — Medication 101 MILLIGRAM(S): at 14:21

## 2021-07-07 RX ADMIN — Medication 50 GRAM(S): at 15:01

## 2021-07-07 RX ADMIN — FAMOTIDINE 20 MILLIGRAM(S): 10 INJECTION INTRAVENOUS at 14:21

## 2021-07-07 RX ADMIN — Medication 12 MILLIGRAM(S): at 14:01

## 2021-07-07 NOTE — DISEASE MANAGEMENT
[Clinical] : TNM Stage: c [IIIA] : IIIA [FreeTextEntry4] : Squamous cell carcinoma of the cervix, FIGO Stage IIIA [TTNM] : 3a [NTNM] : 0 [MTNM] : 0 [de-identified] : 5702cDe [de-identified] : 8670cGy [de-identified] : pelvis/cervix

## 2021-07-07 NOTE — REASON FOR VISIT
[Routine On-Treatment] : a routine on-treatment visit for [Other: ___] : [unfilled] [Other: _____] : [unfilled] [Patient Declined  Services] : - None: Patient declined  services [FreeTextEntry4] : daughter, Safia [TWNoteComboBox1] : [place]

## 2021-07-07 NOTE — PHYSICAL EXAM
[Obese] : obese [Sclera] : the sclera and conjunctiva were normal [Hearing Threshold Finger Rub Not Stark] : hearing was normal [] : no respiratory distress [Respiration, Rhythm And Depth] : normal respiratory rhythm and effort [Exaggerated Use Of Accessory Muscles For Inspiration] : no accessory muscle use [Heart Rate And Rhythm] : heart rate and rhythm were normal [Abdomen Soft] : soft [Nondistended] : nondistended [Abdomen Tenderness] : non-tender [Nail Clubbing] : no clubbing  or cyanosis of the fingernails [Motor Tone] : muscle strength and tone were normal [No Focal Deficits] : no focal deficits [Normal] : oriented to person, place and time, the affect was normal, the mood was normal and not anxious

## 2021-07-07 NOTE — HISTORY OF PRESENT ILLNESS
[FreeTextEntry1] : Patient continues between loose stool and feeling constipation.  She took 1 tab of Imodium yesterday after having 4 episodes of loose BM, and now feels bloated.  She is scheduled for her last chemotherapy today.  Patient's daughter reports, pt feeling fatigue, going to bed early.  Otherwise, no new concerns.

## 2021-07-07 NOTE — REVIEW OF SYSTEMS
[Constipation: Grade 1 - Occasional or intermittent symptoms; occasional use of stool softeners, laxatives, dietary modification, or enema] : Constipation: Grade 1 - Occasional or intermittent symptoms; occasional use of stool softeners, laxatives, dietary modification, or enema [Nausea: Grade 0] : Nausea: Grade 0 [Vomiting: Grade 0] : Vomiting: Grade 0 [Fatigue: Grade 2 - Fatigue not relieved by rest; limiting instrumental ADL] : Fatigue: Grade 2 - Fatigue not relieved by rest; limiting instrumental ADL [Urinary Incontinence: Grade 0] : Urinary Incontinence: Grade 0  [Urinary Tract Pain: Grade 0] : Urinary Tract Pain: Grade 0 [Pruritus: Grade 0] : Pruritus: Grade 0 [Diarrhea: Grade 1 - Increase of <4 stools per day over baseline; mild increase in ostomy output compared to baseline] : Diarrhea: Grade 1 - Increase of <4 stools per day over baseline; mild increase in ostomy output compared to baseline [Dermatitis Radiation: Grade 1 - Faint erythema or dry desquamation] : Dermatitis Radiation: Grade 1 - Faint erythema or dry desquamation

## 2021-07-08 PROCEDURE — 77387B: CUSTOM | Mod: 26

## 2021-07-08 NOTE — HISTORY OF PRESENT ILLNESS
[Disease: _____________________] : Disease: [unfilled] [de-identified] : The patient is an 81 year old woman who presented with post-menopausal vaginal bleeding in March 2021. \par Upon further work up, PAP smear on 3/18/21 showed: LSIL/AGC/HPV+. Pelvic US on 3/22/21 showed endometrium to be 4.2mm thick. 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 \par MRI pelvis on 4/3/21 showed signal abnormality in the posterior cervix 2.5cm x 1.6cm. The lesion does not go beyond the limit of the cervix. \par She saw Dr. Echevarria on 4/14/21 and on his exam, she was noted to have a 4-5cm cervical mass involving at least the middle of the vaginal wall, circumferentially. No parametrial involvement. She was not deemed a surgical candidate. \par Definitive chemoradiation was recommended. She is here to discuss radiation. \par PET/CT: Pending\par \par Pt C/O low back pain, for which she takes Tylenol. Vaginal bleeding is not very excessive. No SOB, Chest pain.\par Pt has a stress test next week\par  [de-identified] : Squamous cell [de-identified] : 5/18/21\par Pt is here for follow up.\par She has completed a PET scan.\par has moderate back pain\par \par 6/9/21\par Pt is here for follow up.\par She has started RT last week. She also recd 1 dose of carboplatin last week.\par She did not have any nausea or vomiting. She has been getting diarrhea which she has been managing with imodium. Her bld sugar was also running high.\par No fever, mouth sores.\par \par 6/16/21\par Pt is here for follow up.\par She is s/p 2 doses of Carbo.\par C/O feeling very fatigued. Had diarrhea which is controlled with imodium.\par No N, V.\par has abdominal discomfort.\par \par 6/23/2021\par Pt is here to follow up for b=cervical cancer, accompanied by daughter\par She is on Carbo s/p 2 cycles, tolerating well\par She feels a lot better today\par She denies abdominal pain, nausea or vomiting\par She gets diarrhea but is managed with Imodium\par \par 7/1/2021\par Pt is here to follow up for cervical cancer, accompanied by daughter\par She is on Carbo s/p 3 cycles, tolerating well\par She feels fine today, appetite is good\par She denies abdominal pain, nausea or vomiting, no vaginal bleeding/discharge\par She gets diarrhea, takes Imodium after having 7-8 episodes then gets constipation the following day\par \par 7/7/2021\par Pt is here to follow up for cervical cancer, accompanied by daughter\par She is on weekly Carbo s/p  4 cycles, tolerating well\par She feels fine today, appetite is good\par She denies nausea or vomiting, no vaginal bleeding/discharge\par She c/o of low abdominal cramping and gets diarrhea 4-5x/day, managed with Imodium then gets constipation the following day\par

## 2021-07-08 NOTE — ASSESSMENT
[FreeTextEntry1] : 81 year old female with squamous cell carcinoma of the cervix, FIGO Stage IIIA. \par \par PLAN:\par Previous notes reviewed and all relevant laboratory results discussed with Dr Krueger and communicated to the patient and her daughter.\par \par Discussed staging, pathology and natural history of cervical cancer.\par Pt has started  chemo/RT.\par Given her age and possible cardiac issues ( inability to handle large volumes of pre and post chemo hydration). We had decided to use Carboplatin AUC 2 as a radiation sensitizer. She is s/p 4 weekly doses of Carboplatin.\par She developed diarrhea due to to RT and chemo and cycle #3 was delayed\par \par - Proceed with  cycle #5 Carbo. CBC is stable today\par SE discussed including myelosuppression, allergic reactions, risk of infection, nausea, vomiting, diarrhea\par The chemotherapy dose was adjusted according to pt's height, weight, labs and anticipated tolerance.\par The high risk of complications and complexity associated with antineoplastic therapy administration has been explained to the pt and family. Chemotherapy will be given with adequate precautions including premedications, hydration and close monitoring during treatment.\par Chemotherapy will be administered under my direct supervision\par \par - SUPPORTIVE MEASURES discussed\par Zofran 8 mg Q 8 hrs prn for emesis and nausea.\par Compazine  Q 6 hrs prn\par Encourage adequate fluid intake,\par GI prophylaxis with PPI.\par Imodium 2 mg every 2 hrs prn for diarrhea. Emphasized how to take Imodium appropriately.\par \par - Will do CMP and Mg stat. \par - Will do repeat imaging on next visit\par \par #Hypomagnesemia\par - Will continue magnesium pills x 10 days.\par \par IV hydration to be given today.\par \par #Hyponatremia\par - Will give Normal Saline 500 ml IV over 1 hour today\par \par RTC in 3-4 weeks or sooner if there are concerns\par Plan discussed at length  pt and her daughter who preferred to translate.\par \par Case was seen and discussed with Dr. Krueger who agreed with assessment and plan.\par

## 2021-07-08 NOTE — REVIEW OF SYSTEMS
[Constipation] : constipation [Diarrhea] : diarrhea [Negative] : Allergic/Immunologic [Abdominal Pain] : abdominal pain [FreeTextEntry7] : mild abdominal cramping

## 2021-07-09 ENCOUNTER — RESULT REVIEW (OUTPATIENT)
Age: 81
End: 2021-07-09

## 2021-07-09 ENCOUNTER — OUTPATIENT (OUTPATIENT)
Dept: OUTPATIENT SERVICES | Facility: HOSPITAL | Age: 81
LOS: 1 days | Discharge: HOME | End: 2021-07-09
Payer: MEDICARE

## 2021-07-09 DIAGNOSIS — C53.9 MALIGNANT NEOPLASM OF CERVIX UTERI, UNSPECIFIED: ICD-10-CM

## 2021-07-09 DIAGNOSIS — Z92.89 PERSONAL HISTORY OF OTHER MEDICAL TREATMENT: Chronic | ICD-10-CM

## 2021-07-09 DIAGNOSIS — R22.40 LOCALIZED SWELLING, MASS AND LUMP, UNSPECIFIED LOWER LIMB: Chronic | ICD-10-CM

## 2021-07-09 PROCEDURE — 72197 MRI PELVIS W/O & W/DYE: CPT | Mod: 26,MH

## 2021-07-14 ENCOUNTER — APPOINTMENT (OUTPATIENT)
Dept: INFUSION THERAPY | Facility: CLINIC | Age: 81
End: 2021-07-14

## 2021-07-19 PROCEDURE — 77295 3-D RADIOTHERAPY PLAN: CPT | Mod: 26

## 2021-07-19 PROCEDURE — 77334 RADIATION TREATMENT AID(S): CPT | Mod: 26

## 2021-07-19 PROCEDURE — 77300 RADIATION THERAPY DOSE PLAN: CPT | Mod: 26

## 2021-07-19 PROCEDURE — 77293 RESPIRATOR MOTION MGMT SIMUL: CPT | Mod: 26

## 2021-07-19 PROCEDURE — 77435 SBRT MANAGEMENT: CPT

## 2021-07-21 ENCOUNTER — APPOINTMENT (OUTPATIENT)
Dept: INFUSION THERAPY | Facility: CLINIC | Age: 81
End: 2021-07-21

## 2021-07-23 ENCOUNTER — LABORATORY RESULT (OUTPATIENT)
Age: 81
End: 2021-07-23

## 2021-07-26 ENCOUNTER — RX RENEWAL (OUTPATIENT)
Age: 81
End: 2021-07-26

## 2021-07-26 ENCOUNTER — LABORATORY RESULT (OUTPATIENT)
Age: 81
End: 2021-07-26

## 2021-07-26 ENCOUNTER — APPOINTMENT (OUTPATIENT)
Dept: HEMATOLOGY ONCOLOGY | Facility: CLINIC | Age: 81
End: 2021-07-26
Payer: MEDICARE

## 2021-07-26 VITALS
WEIGHT: 168 LBS | BODY MASS INDEX: 28.68 KG/M2 | SYSTOLIC BLOOD PRESSURE: 138 MMHG | DIASTOLIC BLOOD PRESSURE: 58 MMHG | HEIGHT: 64 IN | HEART RATE: 101 BPM | TEMPERATURE: 97.2 F

## 2021-07-26 LAB
HCT VFR BLD CALC: 35.1 %
HGB BLD-MCNC: 10.7 G/DL
MCHC RBC-ENTMCNC: 23.6 PG
MCHC RBC-ENTMCNC: 30.5 G/DL
MCV RBC AUTO: 77.3 FL
PLATELET # BLD AUTO: 147 K/UL
PMV BLD: 10.9 FL
RBC # BLD: 4.54 M/UL
RBC # FLD: 17.2 %
WBC # FLD AUTO: 4.04 K/UL

## 2021-07-26 PROCEDURE — 99214 OFFICE O/P EST MOD 30 MIN: CPT

## 2021-07-28 ENCOUNTER — NON-APPOINTMENT (OUTPATIENT)
Age: 81
End: 2021-07-28

## 2021-07-28 ENCOUNTER — APPOINTMENT (OUTPATIENT)
Dept: INFUSION THERAPY | Facility: CLINIC | Age: 81
End: 2021-07-28

## 2021-07-28 VITALS
DIASTOLIC BLOOD PRESSURE: 59 MMHG | BODY MASS INDEX: 28.84 KG/M2 | TEMPERATURE: 97.7 F | SYSTOLIC BLOOD PRESSURE: 139 MMHG | OXYGEN SATURATION: 99 % | HEART RATE: 92 BPM | RESPIRATION RATE: 18 BRPM | WEIGHT: 168 LBS

## 2021-07-28 NOTE — DISEASE MANAGEMENT
[Clinical] : TNM Stage: c [IIIA] : IIIA [FreeTextEntry4] : Squamous cell carcinoma of the cervix, FIGO Stage IIIA [TTNM] : 3a [NTNM] : 0 [MTNM] : 0 [de-identified] : 3350cGy [de-identified] : 7000cGy [de-identified] : pelvis/cervix

## 2021-07-28 NOTE — REASON FOR VISIT
[Routine On-Treatment] : a routine on-treatment visit for [Other: ___] : [unfilled] [Other: _____] : [unfilled] [Patient Declined  Services] : - None: Patient declined  services [FreeTextEntry4] : daughter, Safia [TWNoteComboBox1] : [place]

## 2021-07-28 NOTE — HISTORY OF PRESENT ILLNESS
[FreeTextEntry1] : Patient is feeling gassy and bloated.  Otherwise, she has no other concerns.  Patient's daughter with her.  She is on SBRT at this time.

## 2021-07-28 NOTE — REVIEW OF SYSTEMS
[Constipation: Grade 1 - Occasional or intermittent symptoms; occasional use of stool softeners, laxatives, dietary modification, or enema] : Constipation: Grade 1 - Occasional or intermittent symptoms; occasional use of stool softeners, laxatives, dietary modification, or enema [Diarrhea: Grade 1 - Increase of <4 stools per day over baseline; mild increase in ostomy output compared to baseline] : Diarrhea: Grade 1 - Increase of <4 stools per day over baseline; mild increase in ostomy output compared to baseline [Nausea: Grade 0] : Nausea: Grade 0 [Vomiting: Grade 0] : Vomiting: Grade 0 [Fatigue: Grade 2 - Fatigue not relieved by rest; limiting instrumental ADL] : Fatigue: Grade 2 - Fatigue not relieved by rest; limiting instrumental ADL [Hematuria: Grade 0] : Hematuria: Grade 0 [Urinary Incontinence: Grade 0] : Urinary Incontinence: Grade 0  [Urinary Retention: Grade 0] : Urinary Retention: Grade 0 [Urinary Urgency: Grade 1 - Present] : Urinary Urgency: Grade 1 - Present [Urinary Frequency: Grade 1 - Present] : Urinary Frequency: Grade 1 - Present [Pruritus: Grade 0] : Pruritus: Grade 0 [Dermatitis Radiation: Grade 1 - Faint erythema or dry desquamation] : Dermatitis Radiation: Grade 1 - Faint erythema or dry desquamation

## 2021-07-28 NOTE — PHYSICAL EXAM
[Obese] : obese [Sclera] : the sclera and conjunctiva were normal [Hearing Threshold Finger Rub Not [place]] : hearing was normal [Respiration, Rhythm And Depth] : normal respiratory rhythm and effort [Exaggerated Use Of Accessory Muscles For Inspiration] : no accessory muscle use [Heart Rate And Rhythm] : heart rate and rhythm were normal [Abdomen Soft] : soft [Nondistended] : nondistended [Abdomen Tenderness] : non-tender [Nail Clubbing] : no clubbing  or cyanosis of the fingernails [Normal] : normal skin color and pigmentation and no rash [No Focal Deficits] : no focal deficits [Affect] : the affect was normal [Mood] : the mood was normal [Not Anxious] : not anxious

## 2021-07-31 NOTE — ASSESSMENT
[FreeTextEntry1] : 81 year old female with squamous cell carcinoma of the cervix, FIGO Stage IIIA, s/p chemo/RT\par \par PLAN:\par  I have prepared the note after I reviewed the previous notes, reviewed the radiological and laboratory studies and have communicated those to the patient\par \par Pt  was treated with  chemo/RT.\par Given her age and possible cardiac issues ( inability to handle large volumes of pre and post chemo hydration). We had decided to use Carboplatin AUC 2 as a radiation sensitizer. She is s/p 5 weekly doses of Carboplatin.\par She developed diarrhea due to to RT and chemo and cycle #3 was delayed\par \par \par - SUPPORTIVE MEASURES discussed\par Zofran 8 mg Q 8 hrs prn for emesis and nausea.\par Compazine  Q 6 hrs prn\par Encourage adequate fluid intake,\par GI prophylaxis with PPI.\par Imodium 2 mg every 2 hrs prn for diarrhea. Emphasized how to take Imodium appropriately.\par \par - Will do CMP and Mg stat. \par - Will do repeat imaging on next visit.\par MRI pelvis showed improvement in disease status. She will complete brachyherapy as directed by Rad/Onc\par \par #Hypomagnesemia\par - Will continue magnesium po\par \par \par \par RTC in 3-4 weeks \par

## 2021-07-31 NOTE — REVIEW OF SYSTEMS
[Abdominal Pain] : abdominal pain [Constipation] : constipation [Diarrhea] : diarrhea [Negative] : Allergic/Immunologic [FreeTextEntry7] : mild abdominal cramping

## 2021-07-31 NOTE — CONSULT LETTER
[Dear  ___] : Dear  [unfilled], [Consult Letter:] : I had the pleasure of evaluating your patient, [unfilled]. [Please see my note below.] : Please see my note below. [Consult Closing:] : Thank you very much for allowing me to participate in the care of this patient.  If you have any questions, please do not hesitate to contact me. [Sincerely,] : Sincerely, [DrRaya  ___] : Dr. HOLGUIN [FreeTextEntry3] : Marlena Krueger MD\par Professor [employer]\par [place]\par Attending Physician\par [facility] [place]\par [phone]\par \par

## 2021-07-31 NOTE — HISTORY OF PRESENT ILLNESS
Patient : Price Christy Age: 47 year old Sex: male   MRN: [id number] Encounter Date: 2021      History     Chief Complaint   Patient presents with   • Chills   • Cough   • Generalized Body Aches   • Diarrhea Adult     HPI   O35/O35  2021    12:04 AM Price Christy is a 47 year old male w/ recently tested positive for COVID-19 via health dept but otherwise healthy presents to the ED with SOB since last Friday. He reports diarrhea, fatigue, nausea, decrease appetite, SOB, cough, CP and some numbness to the lateral aspect of the R thigh with no swelling. He reports pain to the right thigh as well.  He does report he has been taking Theraflu with no relief. No other medications reported.  There are no further complaints or modifying factors at this time.    Chart Review: I reviewed the patient's medications, allergies, and past medical and surgical history in [facility].     PCP: No Pcp      No Known Allergies    Current Discharge Medication List      Prior to Admission Medications    Details   Chlorpheniramine-DM (COUGH & COLD PO)       Phenylephrine-Pheniramine-DM (THERAFLU COLD & COUGH PO)       naproxen sodium (ALEVE) 220 MG tablet Take 220 mg by mouth 2 times daily (with meals).      benzonatate (TESSALON PERLES) 200 MG capsule Take 1 capsule by mouth 3 times daily as needed for Cough.  Qty: 21 capsule, Refills: 0      Acetaminophen (TYLENOL PO)              No past medical history reported     Past Surgical History:   Procedure Laterality Date   • NO PAST SURGERIES         Family History   Problem Relation Age of Onset   • Cancer Mother        Social History     Tobacco Use   • Smoking status: Former Smoker     Packs/day: 0.10     Types: Cigarettes     Quit date: 2021     Years since quittin.3   • Smokeless tobacco: Never Used   Substance Use Topics   • Alcohol use: Yes     Comment: OCC   • Drug use: Never       E-cigarette/Vaping     E-Cigarette/Vaping Substances & Devices       Review of Systems    Constitutional: Positive for appetite change (decrease) and fatigue.   HENT: Negative for congestion and sore throat.    Eyes: Negative for visual disturbance.   Respiratory: Positive for cough and shortness of breath.    Cardiovascular: Positive for chest pain. Negative for leg swelling.   Gastrointestinal: Positive for diarrhea and nausea. Negative for abdominal pain and vomiting.   Genitourinary: Negative for difficulty urinating.   Musculoskeletal: Positive for myalgias. Negative for back pain.   Skin: Negative for rash.   Allergic/Immunologic: Negative for food allergies.   Neurological: Positive for numbness (to the lateral aspect of the R thigh). Negative for dizziness.   Psychiatric/Behavioral: Negative for confusion.       Physical Exam     ED Triage Vitals [04/29/21 4347]   ED Triage Vitals Group      Temp 98.6 °F (37 °C)      Heart Rate 93      Resp 18      /62      SpO2 (S) (!) 84 %      EtCO2 mmHg       Height       Weight       Weight Scale Used       BMI (Calculated)       IBW/kg (Calculated)        Physical Exam   Constitutional: He is oriented to person, place, and time. He appears well-developed and well-nourished. No distress.   HENT:   Head: Normocephalic and atraumatic.   Right Ear: External ear normal.   Left Ear: External ear normal.   Nose: Nose normal.   Mouth/Throat: Oropharynx is clear and moist.   Eyes: Pupils are equal, round, and reactive to light. Conjunctivae and EOM are normal.   Neck: No JVD present. No thyromegaly present.   Cardiovascular: Normal rate, regular rhythm, normal heart sounds and intact distal pulses.   Pulses:       Dorsalis pedis pulses are 2+ on the right side and 2+ on the left side.        Posterior tibial pulses are 2+ on the right side and 2+ on the left side.   Pulmonary/Chest: Breath sounds normal. Accessory muscle usage (mild) present. Tachypnea noted.   Abdominal: Soft. He exhibits no distension. There is no abdominal tenderness.   Musculoskeletal:          General: No deformity or edema.      Cervical back: Normal range of motion and neck supple.      Comments: No erythema, warmth or edema to Rt LE   Neurological: He is alert and oriented to person, place, and time. Coordination normal.   Equal strength and sensation in both legs    Skin: Skin is warm and dry. He is not diaphoretic.   Psychiatric: He has a normal mood and affect.   Nursing note and vitals reviewed.      ED Course     Procedures    Lab Results     Results for orders placed or performed during the hospital encounter of 04/30/21   C Reactive Protein   Result Value Ref Range    C-Reactive Protein 16.0 (H) <=1.0 mg/dL   Prothrombin Time   Result Value Ref Range    Prothrombin Time 10.9 9.7 - 11.8 sec    INR 1.0 <=5.0   Comprehensive Metabolic Panel   Result Value Ref Range    Fasting Status      Sodium 136 135 - 145 mmol/L    Potassium 4.0 3.4 - 5.1 mmol/L    Chloride 104 98 - 107 mmol/L    Carbon Dioxide 25 21 - 32 mmol/L    Anion Gap 11 10 - 20 mmol/L    Glucose 115 (H) 65 - 99 mg/dL    BUN 13 6 - 20 mg/dL    Creatinine 0.74 0.67 - 1.17 mg/dL    Glomerular Filtration Rate >90 >90 mL/min/1.73m2    BUN/ Creatinine Ratio 18 7 - 25    Calcium 8.3 (L) 8.4 - 10.2 mg/dL    Bilirubin, Total 0.7 0.2 - 1.0 mg/dL    GOT/AST 83 (H) <=37 Units/L    GPT/ALT 57 <64 Units/L    Alkaline Phosphatase 78 45 - 117 Units/L    Albumin 2.8 (L) 3.6 - 5.1 g/dL    Protein, Total 7.1 6.4 - 8.2 g/dL    Globulin 4.3 (H) 2.0 - 4.0 g/dL    A/G Ratio 0.7 (L) 1.0 - 2.4   Creatine Kinase   Result Value Ref Range    CK 85 39 - 308 Units/L   Procalcitonin   Result Value Ref Range    Procalcitonin 0.14 (H) <=0.09 ng/mL   Lactate Dehydrogenase   Result Value Ref Range    LD, Total 763 (H) 86 - 234 Units/L   Ferritin   Result Value Ref Range    Ferritin 2,589 (H) 26 - 388 ng/mL   Troponin I Ultra Sensitive   Result Value Ref Range    Troponin I, Ultra Sensitive <0.02 <=0.04 ng/mL   D Dimer, Quantitative   Result Value Ref Range    D  Dimer, Quantitative 1.46 (H) <0.57 mg/L (FEU)   NT proBNP   Result Value Ref Range    NT-proBNP 138 (H) <=125 pg/mL   CBC with Automated Differential (performable only)   Result Value Ref Range    WBC 8.7 4.2 - 11.0 K/mcL    RBC 4.56 4.50 - 5.90 mil/mcL    HGB 13.9 13.0 - 17.0 g/dL    HCT 41.7 39.0 - 51.0 %    MCV 91.4 78.0 - 100.0 fl    MCH 30.5 26.0 - 34.0 pg    MCHC 33.3 32.0 - 36.5 g/dL    RDW-CV 13.0 11.0 - 15.0 %    RDW-SD 43.4 39.0 - 50.0 fL     140 - 450 K/mcL    NRBC 0 <=0 /100 WBC   Manual Differential   Result Value Ref Range    Neutrophil, Percent 84 %    Lymphocytes, Percent 10 %    Mono, Percent 4 %    Eosinophils, Percent 0 %    Basophils, Percent 0 %    Bands, Percent 1 0 - 10 %    Reactive Lymphocytes, Percent 1 0 - 5 %    Absolute Neutrophil 7.4 1.8 - 7.7 K/mcL    Absolute Lymphocytes 1.0 1.0 - 4.8 K/mcL    Absolute Monocytes 0.3 0.3 - 0.9 K/mcL    Absolute Eosinophils 0.0 0.0 - 0.5 K/mcL    Absolute Basophils 0.0 0.0 - 0.3 K/mcL    RBC Morphology Normal Normal    WBC Morphology Normal Normal    Platelet Morphology Normal Normal   Rapid SARS-CoV-2 by PCR    Specimen: Nasopharyngeal; Swab   Result Value Ref Range    Rapid SARS-COV-2 by PCR Detected (A) Not Detected / Detected / Presumptive Positive / Inhibitors present    Procedural Comment      SARS-CoV-2 Ct Value 28.3    BLOOD GAS, VENOUS -POINT OF CARE   Result Value Ref Range    PH, VENOUS - POINT OF CARE 7.44 7.35 - 7.45 Units    PCO2, VENOUS - POINT OF CARE 37 (L) 41 - 54 mm Hg    PO2, VENOUS - POINT OF CARE 55 (H) 35 - 42 mm Hg    HCO3, VENOUS - POINT OF CARE 25 22 - 28 mmol/L    BASE EXCESS / DEFICIT, VENOUS - POINT OF CARE 1 -2 - 2 mmol/L    O2 SATURATION, VENOUS - POINT OF CARE 90 (H) 60 - 80 %    TCO2 - POINT OF CARE 26 (H) 19 - 24 mmol/L   ISTAT8 VENOUS  POINT OF CARE   Result Value Ref Range    BUN - POINT OF CARE 14 6 - 20 mg/dL    SODIUM - POINT OF CARE 136 135 - 145 mmol/L    POTASSIUM - POINT OF CARE 4.1 3.4 - 5.1 mmol/L     CHLORIDE - POINT OF CARE 101 98 - 107 mmol/L    TCO2 - POINT OF CARE 23 19 - 24 mmol/L    ANION GAP - POINT OF CARE 16 10 - 20 mmol/L    HEMATOCRIT - POINT OF CARE 43.0 39.0 - 51.0 %    HEMOGLOBIN - POINT OF CARE 14.6 13.0 - 17.0 g/dL    GLUCOSE - POINT OF CARE 119 (H) 70 - 99 mg/dL    CALCIUM, IONIZED - POINT OF CARE 1.11 (L) 1.15 - 1.29 mmol/L    Creatinine 0.60 (L) 0.67 - 1.17 mg/dL    Glomerular Filtration Rate >90 >90 mL/min/1.73m2   LACTIC ACID VENOUS POINT OF CARE   Result Value Ref Range    LACTIC ACID, VENOUS - POINT OF CARE 1.3 <2.0 mmol/L   TROPONIN I  POINT OF CARE   Result Value Ref Range    TROPONIN I - POINT OF CARE <0.10 <0.10 ng/mL       EKG Results     Results for orders placed or performed during the hospital encounter of 04/30/21   Electrocardiogram 12-Lead   Result Value Ref Range    Systolic Blood Pressure 119     Diastolic Blood Pressure 73     Ventricular Rate EKG/Min (BPM) 90     Atrial Rate (BPM) 90     TN-Interval (MSEC) 128     QRS-Interval (MSEC) 80     QT-Interval (MSEC) 322     QTc 394     P Axis (Degrees) 16     R Axis (Degrees) 76     T Axis (Degrees) 20     REPORT TEXT       Normal sinus rhythm  Normal ECG  No previous ECGs available  Confirmed by COLETTE FRANCIS, RIGO ([id number]),  Angelica Cazares ([id number]) on 4/30/2021 3:10:33 AM          Radiology Results     Imaging Results          US Lower Extremity Venous Duplex Right (Final result)  Result time 04/30/21 03:05:12    Final result                 Impression:    IMPRESSION:    No ultrasound evidence of deep venous thrombosis in the right lower  extremity.    I, Attending Radiologist Gualberto Alston MD, have reviewed the images and  report and concur with these findings interpreted by Resident Radiologist,  Yris Thomas MD.              Narrative:    US LOWER EXTREMITY VENOUS DUPLEX RIGHT     HISTORY: Right leg pain.    COMPARISON: None.     TECHNIQUE: Grayscale with and without compressive augmentation, color  Doppler, and spectral  duplex images are obtained of the right and proximal  left lower extremity deep venous system.    FINDINGS: The visualized deep venous structures of the right lower  extremity, from the level of the popliteal vein cephalad to the common  femoral/greater saphenous vein junction, as well as the contralateral left  common femoral vein, demonstrate normal compressibility, augmentation, and  Doppler flow without intraluminal thrombus.     The paired right posterior tibialis and peroneal veins are patent on color  Doppler imaging.                    Preliminary result                 Impression:      No ultrasound evidence of deep venous thrombosis in the right lower  extremity.             Narrative:    US LOWER EXTREMITY VENOUS DUPLEX RIGHT     HISTORY: pain    COMPARISON: None available     TECHNIQUE: Grayscale, color Doppler, and spectral duplex images of the  right lower extremity venous system and the contralateral common femoral  vein.    FINDINGS:    The visualized deep venous structures of the right lower extremity, from  the level of the popliteal vein cephalad to the common femoral/greater  saphenous vein junction, demonstrate normal compressibility, augmentation,  and color and pulsed Doppler flow without intraluminal thrombus.     The right posterior tibialis and peroneal veins were patent on color  Doppler and grayscale imaging.     The contralateral common femoral vein is patent.                                   CT Chest PE Imaging (Final result)  Result time 04/30/21 03:10:13    Final result                 Impression:    IMPRESSION:    1.  No evidence for a pulmonary embolism.  2.  Extensive bilateral mixed groundglass and consolidative opacities,  consistent with severe COVID-19 pneumonia.  3.  Severe diffuse hepatic steatosis of the visualized liver.  4.  Air-liquid stool levels in the visualized transverse colon, likely  related to diarrhea.    I, Attending Radiologist Gualberto Alston MD, have reviewed  the images and  report and concur with these findings interpreted by Resident Radiologist,  Yris Thomas MD.              Narrative:    EXAM: CT ANGIOGRAM CHEST PE IMAGING- 3D    HISTORY: Male 47 years PE suspected, high prob. COVID-19 positive.    COMPARISON: None.    TECHNIQUE:  CT of the chest performed during the administration of 60  patient using 2 separate mL of intravenous Omnipaque 350 contrast.  Data  acquisition was obtained during the pulmonary arterial phase of  enhancement.  3D reconstruction and 2D multiplanar reformations were also  sent to PACS with the primary data set.    FINDINGS:     VASCULAR  Quality: Good opacification of the pulmonary arteries.  PE Assessment: No evidence for pulmonary embolism.  Main pulmonary artery: Not significantly dilated.  Thoracic aorta: No thoracic aortic aneurysm.  Heart: Size within normal limits. No pericardial effusion.    LUNGS AND PLEURA  Airways: Central airways are patent.  Parenchyma: Extensive bilateral mixed groundglass and consolidative  opacities, with the more consolidative portions dependently in the lower  lobes. Interlobular septal thickening within the regions of groundglass  opacity.  Pleura: No pleural effusion, thickening, or pneumothorax.    CHEST  Neck Base/Thyroid: No mass.  Mediastinum/Lymph nodes: Prominent to mildly enlarged mediastinal lymph  nodes measuring up to 1.2 cm an enlarged left hilar lymph node measuring  1.3 cm, likely reactive.  Esophagus: No pathologic distension or obvious mass.    SUPPORT DEVICES: None.    UPPER ABDOMEN: Severe diffuse hypoattenuation of the visualized liver.  Air-liquid stool levels in the visualized transverse colon.    BONES/SOFT TISSUES: No significant lesion.                    Preliminary result                 Impression:      1.  No evidence for a pulmonary embolism.  2.  Diffuse bilateral groundglass and consolidative opacities, consistent  with known viral infection.  3.  Hepatic steatosis.              Narrative:    EXAM: CT ANGIOGRAM CHEST PE IMAGING- 3D    HISTORY: Male 47 years PE suspected, high prob.     COMPARISON: None.    TECHNIQUE:  CT of the chest performed during the administration of 60  patient using 2 separate mL of intravenous Omnipaque 350 contrast.  Data  acquisition was obtained during the pulmonary arterial phase of  enhancement.  3D reconstruction and 2D multiplanar reformations were also  sent to PACS with the primary data set.    FINDINGS:     VASCULAR  Quality: Good opacification of the pulmonary arteries.  PE Assessment: No evidence for pulmonary embolism.  Main pulmonary artery: Not significantly dilated.  Thoracic aorta: No thoracic aortic aneurysm.  Heart: Size within normal limits. No pericardial effusion.    LUNGS AND PLEURA  Airways: Central airways are patent.  Parenchyma: Diffuse bilateral ground glass opacities, with more dense  consolidative opacities in the dependent portions of both lungs.  Pleura: No pleural effusion, thickening, or pneumothorax.    CHEST  Neck Base/Thyroid: No mass.  Mediastinum/Lymph nodes: No enlarged lymph nodes or masses.  Esophagus: No pathologic distension or obvious mass.    SUPPORT DEVICES: None.    UPPER ABDOMEN: Diffuse attenuation of the hepatic parenchyma consistent  with hepatic steatosis. The upper abdomen is otherwise unremarkable.    BONES/SOFT TISSUES: No significant lesion.                                  ED Medication Orders (From admission, onward)    Ordered Start     Status Ordering Provider    04/30/21 0019 04/30/21 0018  acetaminophen (TYLENOL) tablet 650 mg  ONCE PRN      Last MAR action: Given RIGO ALVARENGA    04/30/21 0019 04/30/21 0020  sodium chloride (NORMAL SALINE) 0.9 % bolus 1,000 mL  ONCE      Last MAR action: Completed RIGO ALVARENGA              [facility]  Vitals  Vitals:    04/30/21 0116 04/30/21 0212 04/30/21 0230 04/30/21 0330   BP:   108/63 106/57   BP Location:   RUE - Right upper extremity RUE - Right upper  [Disease: _____________________] : Disease: [unfilled] extremity   Patient Position:   Semi-Worrell's Semi-Worrell's   Pulse:  77 73 64   Resp: 18 (!) 22 (!) 22 (!) 25   Temp:       TempSrc:       SpO2: 96% 93% 94% 97%       ED Course  Initial Impression: The pt is a 47 year old male who presents to the ED for SOB and associated chills, diarrhea, fatigue, nausea, decrease appetite, CP and cough. Pt tested positive for COVID-19. Discussed plan for symptomatic treatment, ECG, CXR and screening labs.     ED Course as of Apr 30 0346 Fri Apr 30, 2021   0045 I spoke with Dr. Bryant, intensivist, about the patient - agrees with plan of starting optiflow in ED and reassessing to determine ICU vs. Floor.    [HL]   0256 I rechecked the patient who appears comfortable, just mildly tachypneic, but 93% on 15 liters NRB.  Rt updated that patient took off optiflow because he felt like it was \"burning\".    [HL]   0316 I spoke with Dr. Bryant, intensivist, about the patient, his ED results, tachypnea and O2 requirements.  Will admit to ICU.    [HL]      ED Course User Index  [HL] Sera Cruz MD        MDM  48 yo M w/ covid presenting w/ hypoxia requiring 15 liters O2 via  NRB but only mild respiratory distress.   Patient tolerated optiflow initially but then became bothered by it so was switched back to NRB.  CT showed no PE and leg US no DVT.  Labs consistent with covid.    Critical Care time spent on this patient outside of billable procedures:  32 minutes    Clinical Impression:  ED Diagnoses        Final diagnoses    Pneumonia due to COVID-19 virus          Acute respiratory failure with hypoxia (CMS/[facility])                  3:45 AM Does this patient meet Severe Sepsis criteria by CMS SEP-1 definition?No     3:45 AM Does this patient meet Septic Shock criteria by CMS SEP-1 definition?No      Pt to be admitted to Dr. Bryant in critical condition.      I have reviewed the information recorded by the scribe for accuracy and agree with its  [de-identified] : The patient is an 81 year old woman who presented with post-menopausal vaginal bleeding in March 2021. \par Upon further work up, PAP smear on 3/18/21 showed: LSIL/AGC/HPV+. Pelvic US on 3/22/21 showed endometrium to be 4.2mm thick. 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 \par MRI pelvis on 4/3/21 showed signal abnormality in the posterior cervix 2.5cm x 1.6cm. The lesion does not go beyond the limit of the cervix. \par She saw Dr. Echevarria on 4/14/21 and on his exam, she was noted to have a 4-5cm cervical mass involving at least the middle of the vaginal wall, circumferentially. No parametrial involvement. She was not deemed a surgical candidate. \par Definitive chemoradiation was recommended. She is here to discuss radiation. \par PET/CT: Pending\par \par Pt C/O low back pain, for which she takes Tylenol. Vaginal bleeding is not very excessive. No SOB, Chest pain.\par Pt has a stress test next week\par  contents.    ____________________________________________________________________    Angelica Juárez acting as a scribe for Dr. Sera Cruz  Dictation SER# [id number]  Scribe: Angelica Cruz MD  04/30/21 0825     [de-identified] : Squamous cell [de-identified] : 5/18/21\par Pt is here for follow up.\par She has completed a PET scan.\par has moderate back pain\par \par 6/9/21\par Pt is here for follow up.\par She has started RT last week. She also recd 1 dose of carboplatin last week.\par She did not have any nausea or vomiting. She has been getting diarrhea which she has been managing with imodium. Her bld sugar was also running high.\par No fever, mouth sores.\par \par 6/16/21\par Pt is here for follow up.\par She is s/p 2 doses of Carbo.\par C/O feeling very fatigued. Had diarrhea which is controlled with imodium.\par No N, V.\par has abdominal discomfort.\par \par 6/23/2021\par Pt is here to follow up for b=cervical cancer, accompanied by daughter\par She is on Carbo s/p 2 cycles, tolerating well\par She feels a lot better today\par She denies abdominal pain, nausea or vomiting\par She gets diarrhea but is managed with Imodium\par \par 7/1/2021\par Pt is here to follow up for cervical cancer, accompanied by daughter\par She is on Carbo s/p 3 cycles, tolerating well\par She feels fine today, appetite is good\par She denies abdominal pain, nausea or vomiting, no vaginal bleeding/discharge\par She gets diarrhea, takes Imodium after having 7-8 episodes then gets constipation the following day\par \par 7/7/2021\par Pt is here to follow up for cervical cancer, accompanied by daughter\par She is on weekly Carbo s/p  4 cycles, tolerating well\par She feels fine today, appetite is good\par She denies nausea or vomiting, no vaginal bleeding/discharge\par She c/o of low abdominal cramping and gets diarrhea 4-5x/day, managed with Imodium then gets constipation the following day\par \par 7/26/21\par Pt is here for follow up,\par She is feeling better. Diarrhea has improved. NO vaginal bleeding\par

## 2021-08-02 ENCOUNTER — OUTPATIENT (OUTPATIENT)
Dept: OUTPATIENT SERVICES | Facility: HOSPITAL | Age: 81
LOS: 1 days | Discharge: HOME | End: 2021-08-02
Payer: MEDICARE

## 2021-08-02 DIAGNOSIS — C53.9 MALIGNANT NEOPLASM OF CERVIX UTERI, UNSPECIFIED: ICD-10-CM

## 2021-08-02 DIAGNOSIS — Z92.3 PERSONAL HISTORY OF IRRADIATION: ICD-10-CM

## 2021-08-02 DIAGNOSIS — Z92.89 PERSONAL HISTORY OF OTHER MEDICAL TREATMENT: Chronic | ICD-10-CM

## 2021-08-02 DIAGNOSIS — R22.40 LOCALIZED SWELLING, MASS AND LUMP, UNSPECIFIED LOWER LIMB: Chronic | ICD-10-CM

## 2021-08-02 DIAGNOSIS — C53.0 MALIGNANT NEOPLASM OF ENDOCERVIX: ICD-10-CM

## 2021-08-02 PROCEDURE — 77435 SBRT MANAGEMENT: CPT

## 2021-09-01 ENCOUNTER — APPOINTMENT (OUTPATIENT)
Dept: RADIATION ONCOLOGY | Facility: HOSPITAL | Age: 81
End: 2021-09-01
Payer: MEDICARE

## 2021-09-01 VITALS
RESPIRATION RATE: 14 BRPM | SYSTOLIC BLOOD PRESSURE: 165 MMHG | WEIGHT: 168.13 LBS | OXYGEN SATURATION: 98 % | TEMPERATURE: 96.4 F | DIASTOLIC BLOOD PRESSURE: 72 MMHG | HEART RATE: 95 BPM | BODY MASS INDEX: 28.86 KG/M2

## 2021-09-01 PROCEDURE — 99024 POSTOP FOLLOW-UP VISIT: CPT

## 2021-09-01 NOTE — DISEASE MANAGEMENT
[FreeTextEntry4] : Squamous cell carcinoma of the cervix, FIGO Stage IIIA [TTNM] : 3a [NTNM] : 0 [MTNM] : 0 [de-identified] : pelvis/cervix

## 2021-09-01 NOTE — LETTER CLOSING
[Consult Closing:] : Thank you for allowing me to participate in the care of this patient.  If you have any questions, please do not hesitate to contact me. [Sincerely yours,] : Sincerely yours, [FreeTextEntry3] : Leslie Ragland M.D. \par \par Electronically proofread and signed by:  Leslie Ragland MD\par Attending, Department of Radiation Medicine\par [facility]\par \par CC: Dr. Krueger

## 2021-09-01 NOTE — REVIEW OF SYSTEMS
[Fatigue] : fatigue [Urinary Frequency] : urinary frequency [Joint Pain] : joint pain [Negative] : Neurological [Dysphagia] : no dysphagia [Chest Pain] : no chest pain [Palpitations] : no palpitations [Shortness Of Breath] : no shortness of breath [Wheezing] : no wheezing [Cough] : no cough [Vaginal Discharge] : no vaginal discharge [Dysmenorrhea/Abn Vaginal Bleeding] : no dysmenorrhea/abnormal vaginal bleeding [FreeTextEntry8] : improving

## 2021-09-01 NOTE — PHYSICAL EXAM
[Obese] : obese [Sclera] : the sclera and conjunctiva were normal [Hearing Threshold Finger Rub Not Ralls] : hearing was normal [Heart Rate And Rhythm] : heart rate and rhythm were normal [Edema] : no peripheral edema present [Abdomen Soft] : soft [Nondistended] : nondistended [Abdomen Tenderness] : non-tender [No Lesions] : no lesions were seen on the external genitalia [Labia Majora Swelling] : swelling [Pink Rugae] : pink rugae [No Bleeding] : there was no active vaginal bleeding [No Focal Deficits] : no focal deficits [Normal] : oriented to person, place and time, the affect was normal, the mood was normal and not anxious [Discharge] : no discharge [de-identified] : External skin around genitalia with residual hyperpigmentation.  No wet/dry desquamation.  Cervix is flat with some residual erythema.  No mass or ulceration.

## 2021-09-01 NOTE — HISTORY OF PRESENT ILLNESS
[FreeTextEntry1] : \par JOY ARAGON returns to clinic in follow up visit.  As you know, JOY ARAGON is an 81 year old female with squamous cell carcinoma of the cervix, FIGO Stage IIIA. Definitive chemoradiation was recommended .Due to her high anesthesia risk, brachytherapy boost was no longer an option.  SBRT boost to the cervix was recommended.  The patient received 7000 cGy to the Pelvis/cervix from 6/2/2021 through 8/2/2021.  I last saw her in August.  In the interim, the patient reports she has done well.  She is using her vaginal dilator daily without concerns.  Her GI/ issues are improving.   She denies vaginal bleeding, diarrhea, dysuria  and abdominal pain.  Otherwise, no new concerns.  \par \par \par Upcoming appointments: \raina Krueger 9/8/2021\par \par

## 2021-09-07 ENCOUNTER — APPOINTMENT (OUTPATIENT)
Dept: HEMATOLOGY ONCOLOGY | Facility: CLINIC | Age: 81
End: 2021-09-07

## 2021-09-08 ENCOUNTER — OUTPATIENT (OUTPATIENT)
Dept: OUTPATIENT SERVICES | Facility: HOSPITAL | Age: 81
LOS: 1 days | Discharge: HOME | End: 2021-09-08

## 2021-09-08 ENCOUNTER — LABORATORY RESULT (OUTPATIENT)
Age: 81
End: 2021-09-08

## 2021-09-08 ENCOUNTER — APPOINTMENT (OUTPATIENT)
Dept: HEMATOLOGY ONCOLOGY | Facility: CLINIC | Age: 81
End: 2021-09-08
Payer: MEDICARE

## 2021-09-08 VITALS
HEART RATE: 101 BPM | TEMPERATURE: 97.8 F | SYSTOLIC BLOOD PRESSURE: 140 MMHG | WEIGHT: 168 LBS | DIASTOLIC BLOOD PRESSURE: 67 MMHG | BODY MASS INDEX: 28.68 KG/M2 | HEIGHT: 64 IN

## 2021-09-08 DIAGNOSIS — Z51.11 ENCOUNTER FOR ANTINEOPLASTIC CHEMOTHERAPY: ICD-10-CM

## 2021-09-08 DIAGNOSIS — C53.9 MALIGNANT NEOPLASM OF CERVIX UTERI, UNSPECIFIED: ICD-10-CM

## 2021-09-08 DIAGNOSIS — Z92.89 PERSONAL HISTORY OF OTHER MEDICAL TREATMENT: Chronic | ICD-10-CM

## 2021-09-08 DIAGNOSIS — E83.42 HYPOMAGNESEMIA: ICD-10-CM

## 2021-09-08 DIAGNOSIS — E86.0 DEHYDRATION: ICD-10-CM

## 2021-09-08 DIAGNOSIS — R19.7 DIARRHEA, UNSPECIFIED: ICD-10-CM

## 2021-09-08 DIAGNOSIS — R22.40 LOCALIZED SWELLING, MASS AND LUMP, UNSPECIFIED LOWER LIMB: Chronic | ICD-10-CM

## 2021-09-08 PROCEDURE — 99213 OFFICE O/P EST LOW 20 MIN: CPT

## 2021-09-09 LAB
ALBUMIN SERPL ELPH-MCNC: 4 G/DL
ALP BLD-CCNC: 90 U/L
ALT SERPL-CCNC: 11 U/L
ANION GAP SERPL CALC-SCNC: 10 MMOL/L
AST SERPL-CCNC: 15 U/L
BILIRUB SERPL-MCNC: 0.3 MG/DL
BUN SERPL-MCNC: 19 MG/DL
CALCIUM SERPL-MCNC: 9.3 MG/DL
CHLORIDE SERPL-SCNC: 102 MMOL/L
CO2 SERPL-SCNC: 26 MMOL/L
CREAT SERPL-MCNC: 1.1 MG/DL
GLUCOSE SERPL-MCNC: 288 MG/DL
HCT VFR BLD CALC: 40.6 %
HGB BLD-MCNC: 12.2 G/DL
MCHC RBC-ENTMCNC: 24.2 PG
MCHC RBC-ENTMCNC: 30 G/DL
MCV RBC AUTO: 80.6 FL
PLATELET # BLD AUTO: 199 K/UL
PMV BLD: 10.6 FL
POTASSIUM SERPL-SCNC: 4.5 MMOL/L
PROT SERPL-MCNC: 6.6 G/DL
RBC # BLD: 5.04 M/UL
RBC # FLD: 15.1 %
SODIUM SERPL-SCNC: 138 MMOL/L
WBC # FLD AUTO: 6.09 K/UL

## 2021-09-10 NOTE — CONSULT LETTER
[Dear  ___] : Dear  [unfilled], [Consult Letter:] : I had the pleasure of evaluating your patient, [unfilled]. [Please see my note below.] : Please see my note below. [Consult Closing:] : Thank you very much for allowing me to participate in the care of this patient.  If you have any questions, please do not hesitate to contact me. [Sincerely,] : Sincerely, [DrRaya  ___] : Dr. HOLGUIN [FreeTextEntry3] : Marlena Krueger MD\par Professor [employer]\par [employer]\par Attending Physician\par [facility] [place]\par [phone]\par \par

## 2021-09-10 NOTE — HISTORY OF PRESENT ILLNESS
[Disease: _____________________] : Disease: [unfilled] [de-identified] : The patient is an 81 year old woman who presented with post-menopausal vaginal bleeding in March 2021. \par Upon further work up, PAP smear on 3/18/21 showed: LSIL/AGC/HPV+. Pelvic US on 3/22/21 showed endometrium to be 4.2mm thick. 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 \par MRI pelvis on 4/3/21 showed signal abnormality in the posterior cervix 2.5cm x 1.6cm. The lesion does not go beyond the limit of the cervix. \par She saw Dr. Echevarria on 4/14/21 and on his exam, she was noted to have a 4-5cm cervical mass involving at least the middle of the vaginal wall, circumferentially. No parametrial involvement. She was not deemed a surgical candidate. \par Definitive chemoradiation was recommended. She is here to discuss radiation. \par PET/CT: Pending\par \par Pt C/O low back pain, for which she takes Tylenol. Vaginal bleeding is not very excessive. No SOB, Chest pain.\par Pt has a stress test next week\par  [de-identified] : Squamous cell [de-identified] : 5/18/21\par Pt is here for follow up.\par She has completed a PET scan.\par has moderate back pain\par \par 6/9/21\par Pt is here for follow up.\par She has started RT last week. She also recd 1 dose of carboplatin last week.\par She did not have any nausea or vomiting. She has been getting diarrhea which she has been managing with imodium. Her bld sugar was also running high.\par No fever, mouth sores.\par \par 6/16/21\par Pt is here for follow up.\par She is s/p 2 doses of Carbo.\par C/O feeling very fatigued. Had diarrhea which is controlled with imodium.\par No N, V.\par has abdominal discomfort.\par \par 6/23/2021\par Pt is here to follow up for b=cervical cancer, accompanied by daughter\par She is on Carbo s/p 2 cycles, tolerating well\par She feels a lot better today\par She denies abdominal pain, nausea or vomiting\par She gets diarrhea but is managed with Imodium\par \par 7/1/2021\par Pt is here to follow up for cervical cancer, accompanied by daughter\par She is on Carbo s/p 3 cycles, tolerating well\par She feels fine today, appetite is good\par She denies abdominal pain, nausea or vomiting, no vaginal bleeding/discharge\par She gets diarrhea, takes Imodium after having 7-8 episodes then gets constipation the following day\par \par 7/7/2021\par Pt is here to follow up for cervical cancer, accompanied by daughter\par She is on weekly Carbo s/p  4 cycles, tolerating well\par She feels fine today, appetite is good\par She denies nausea or vomiting, no vaginal bleeding/discharge\par She c/o of low abdominal cramping and gets diarrhea 4-5x/day, managed with Imodium then gets constipation the following day\par \par 7/26/21\par Pt is here for follow up,\par She is feeling better. Diarrhea has improved. NO vaginal bleeding\par \par 9/8/21\par Pt is here for follow up. Has completed RT on 8/2/21.\par No vag bleeding, no pelvic pain.\par

## 2021-09-16 DIAGNOSIS — Z92.3 PERSONAL HISTORY OF IRRADIATION: ICD-10-CM

## 2021-10-18 DIAGNOSIS — Z91.89 OTHER SPECIFIED PERSONAL RISK FACTORS, NOT ELSEWHERE CLASSIFIED: ICD-10-CM

## 2021-12-01 ENCOUNTER — OUTPATIENT (OUTPATIENT)
Dept: OUTPATIENT SERVICES | Facility: HOSPITAL | Age: 81
LOS: 1 days | Discharge: HOME | End: 2021-12-01
Payer: MEDICARE

## 2021-12-01 ENCOUNTER — RESULT REVIEW (OUTPATIENT)
Age: 81
End: 2021-12-01

## 2021-12-01 DIAGNOSIS — R22.40 LOCALIZED SWELLING, MASS AND LUMP, UNSPECIFIED LOWER LIMB: Chronic | ICD-10-CM

## 2021-12-01 DIAGNOSIS — Z92.89 PERSONAL HISTORY OF OTHER MEDICAL TREATMENT: Chronic | ICD-10-CM

## 2021-12-01 LAB — GLUCOSE BLDC GLUCOMTR-MCNC: 118 MG/DL — HIGH (ref 70–99)

## 2021-12-01 PROCEDURE — 78815 PET IMAGE W/CT SKULL-THIGH: CPT | Mod: 26,PS,MH

## 2021-12-02 NOTE — H&P PST ADULT - SPO2 (%)
Izabella Gould was seen and treated in our emergency department on 12/2/2021. She may return to work on 12/12/2021. If you have any questions or concerns, please don't hesitate to call.       Lesa Cheadle, MD 98

## 2021-12-14 DIAGNOSIS — C53.8 MALIGNANT NEOPLASM OF OVERLAPPING SITES OF CERVIX UTERI: ICD-10-CM

## 2021-12-16 ENCOUNTER — APPOINTMENT (OUTPATIENT)
Dept: RADIATION ONCOLOGY | Facility: HOSPITAL | Age: 81
End: 2021-12-16
Payer: MEDICARE

## 2021-12-16 VITALS
DIASTOLIC BLOOD PRESSURE: 75 MMHG | TEMPERATURE: 97 F | HEIGHT: 63 IN | WEIGHT: 171.2 LBS | HEART RATE: 92 BPM | SYSTOLIC BLOOD PRESSURE: 176 MMHG | RESPIRATION RATE: 16 BRPM | OXYGEN SATURATION: 100 % | BODY MASS INDEX: 30.33 KG/M2

## 2021-12-16 PROCEDURE — 99213 OFFICE O/P EST LOW 20 MIN: CPT

## 2021-12-16 NOTE — PHYSICAL EXAM
[Sclera] : the sclera and conjunctiva were normal [Hearing Threshold Finger Rub Not [place]] : hearing was normal [Heart Rate And Rhythm] : heart rate and rhythm were normal [Heart Sounds] : normal S1 and S2 [Murmurs] : no murmurs present [Edema] : no peripheral edema present [Abdomen Soft] : soft [Nondistended] : nondistended [Abdomen Tenderness] : non-tender [No Focal Deficits] : no focal deficits [Normal] : no palpable adenopathy

## 2021-12-17 NOTE — REVIEW OF SYSTEMS
[Fatigue] : fatigue [Abdominal Pain] : abdominal pain [Negative] : Allergic/Immunologic [FreeTextEntry3] : glasses

## 2021-12-17 NOTE — HISTORY OF PRESENT ILLNESS
[FreeTextEntry1] : \par JOY ARAGON returns to clinic in follow up visit.  As you know, JOY ARAGON is an 81 year old female with squamous cell carcinoma of the cervix, FIGO Stage IIIA. Definitive chemoradiation was recommended .Due to her high anesthesia risk, brachytherapy boost was no longer an option.  SBRT boost to the cervix was recommended.  The patient received 4500cGy to the Pelvis followed by an SBRT boost (2500cGy) to the cervix from 6/2/2021 through 8/2/2021.  I last saw her in September.  In the interim, she notes that she has intermittent diarrhea and constipation, but not worsening. Overall, seems to be slowly improving.   No abdominal pain, no vaginal bleeding.  No new concerns. \par \par 12/1/2021 PET CT IMPRESSION: FDG avid right external iliac lymph node, SUV max 10.0, measuring 1.0 cm, consistent with biologic tumor activity. Interval resolution of primary cervical mass. Intense FDG uptake of the rectosigmoid colon with wall thickening seen on CT, probably representing post radiation change\par \par Upcoming appointments: \par Dr. Krueger 12/21/2021\par

## 2021-12-17 NOTE — LETTER CLOSING
[Consult Closing:] : Thank you for allowing me to participate in the care of this patient.  If you have any questions, please do not hesitate to contact me. [Sincerely yours,] : Sincerely yours, [FreeTextEntry3] : Leslie Ragland M.D. \par \par Electronically proofread and signed by:  Leslie Ragland MD\par Attending, Department of Radiation Medicine\par [facility]\par \par CC: Dr. Krueger

## 2021-12-21 ENCOUNTER — OUTPATIENT (OUTPATIENT)
Dept: OUTPATIENT SERVICES | Facility: HOSPITAL | Age: 81
LOS: 1 days | Discharge: HOME | End: 2021-12-21

## 2021-12-21 ENCOUNTER — APPOINTMENT (OUTPATIENT)
Dept: HEMATOLOGY ONCOLOGY | Facility: CLINIC | Age: 81
End: 2021-12-21
Payer: MEDICARE

## 2021-12-21 DIAGNOSIS — Z92.89 PERSONAL HISTORY OF OTHER MEDICAL TREATMENT: Chronic | ICD-10-CM

## 2021-12-21 DIAGNOSIS — R22.40 LOCALIZED SWELLING, MASS AND LUMP, UNSPECIFIED LOWER LIMB: Chronic | ICD-10-CM

## 2021-12-21 PROCEDURE — 77334 RADIATION TREATMENT AID(S): CPT | Mod: 26

## 2021-12-21 PROCEDURE — 77263 THER RADIOLOGY TX PLNG CPLX: CPT

## 2021-12-21 PROCEDURE — 99213 OFFICE O/P EST LOW 20 MIN: CPT

## 2021-12-30 NOTE — HISTORY OF PRESENT ILLNESS
[Disease: _____________________] : Disease: [unfilled] [de-identified] : The patient is an 81 year old woman who presented with post-menopausal vaginal bleeding in March 2021. \par Upon further work up, PAP smear on 3/18/21 showed: LSIL/AGC/HPV+. Pelvic US on 3/22/21 showed endometrium to be 4.2mm thick. 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 \par MRI pelvis on 4/3/21 showed signal abnormality in the posterior cervix 2.5cm x 1.6cm. The lesion does not go beyond the limit of the cervix. \par She saw Dr. Echevarria on 4/14/21 and on his exam, she was noted to have a 4-5cm cervical mass involving at least the middle of the vaginal wall, circumferentially. No parametrial involvement. She was not deemed a surgical candidate. \par Definitive chemoradiation was recommended. She is here to discuss radiation. \par PET/CT: Pending\par \par Pt C/O low back pain, for which she takes Tylenol. Vaginal bleeding is not very excessive. No SOB, Chest pain.\par Pt has a stress test next week\par  [de-identified] : Squamous cell [de-identified] : 5/18/21\par Pt is here for follow up.\par She has completed a PET scan.\par has moderate back pain\par \par 6/9/21\par Pt is here for follow up.\par She has started RT last week. She also recd 1 dose of carboplatin last week.\par She did not have any nausea or vomiting. She has been getting diarrhea which she has been managing with imodium. Her bld sugar was also running high.\par No fever, mouth sores.\par \par 6/16/21\par Pt is here for follow up.\par She is s/p 2 doses of Carbo.\par C/O feeling very fatigued. Had diarrhea which is controlled with imodium.\par No N, V.\par has abdominal discomfort.\par \par 6/23/2021\par Pt is here to follow up for b=cervical cancer, accompanied by daughter\par She is on Carbo s/p 2 cycles, tolerating well\par She feels a lot better today\par She denies abdominal pain, nausea or vomiting\par She gets diarrhea but is managed with Imodium\par \par 7/1/2021\par Pt is here to follow up for cervical cancer, accompanied by daughter\par She is on Carbo s/p 3 cycles, tolerating well\par She feels fine today, appetite is good\par She denies abdominal pain, nausea or vomiting, no vaginal bleeding/discharge\par She gets diarrhea, takes Imodium after having 7-8 episodes then gets constipation the following day\par \par 7/7/2021\par Pt is here to follow up for cervical cancer, accompanied by daughter\par She is on weekly Carbo s/p  4 cycles, tolerating well\par She feels fine today, appetite is good\par She denies nausea or vomiting, no vaginal bleeding/discharge\par She c/o of low abdominal cramping and gets diarrhea 4-5x/day, managed with Imodium then gets constipation the following day\par \par 7/26/21\par Pt is here for follow up,\par She is feeling better. Diarrhea has improved. NO vaginal bleeding\par \par 9/8/21\par Pt is here for follow up. Has completed RT on 8/2/21.\par No vag bleeding, no pelvic pain.\par \par 12/21/21\par Pt is here today for follow up visit for cervical cancer. She has completed chemo (7/2021)/Rad (8/2021).\par Pt denies any vaginal bleeding or abd/pelvic pain.\par She had a PET CT scan on 12/21/21 which showed FDG avid right external iliac lymph node, SUV max 10.0 measuring\par 1.0 cm, consistent with biological tumor activity. She saw Dr. Ragland and agreed to have SBRT since she is not surgical candidate.\par Simulation was done.\par She has an appt with Dr. Echevarria 1/12/22.\par \par

## 2021-12-30 NOTE — ASSESSMENT
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 We had decided to use Carboplatin AUC 2 as a radiation sensitizer. She is s/p 5 weekly doses of Carboplatin.\par She completed RT on 8/2/21\par \par PET CT scan on 12/21/21 showed FDG avid right external iliac lymph node, SUV max 10.0 measuring\par 1.0 cm, consistent with biological tumor activity. Dr. Ragland was seen and patient agreed to have SBRT x 5 since she is not surgical candidate. Simulation was done.\par \par #Hypomagnesemia\par - Will continue magnesium po\par \par RTC in 2 months with Dr. Krueger.\par \par Case was seen and discussed with Dr. Krueger who agreed with the assessment and plan.\par \par

## 2021-12-30 NOTE — CONSULT LETTER
[Dear  ___] : Dear  [unfilled], [Consult Letter:] : I had the pleasure of evaluating your patient, [unfilled]. [Please see my note below.] : Please see my note below. [Consult Closing:] : Thank you very much for allowing me to participate in the care of this patient.  If you have any questions, please do not hesitate to contact me. [Sincerely,] : Sincerely, [DrRaya  ___] : Dr. HOLGUIN [FreeTextEntry3] : Marlena Krueger MD\par Professor [employer]\par [employer]\par Attending Physician\par [facility] [place]\par [phone]\par \par

## 2022-01-05 PROCEDURE — 77295 3-D RADIOTHERAPY PLAN: CPT | Mod: 26

## 2022-01-05 PROCEDURE — 77300 RADIATION THERAPY DOSE PLAN: CPT | Mod: 26

## 2022-01-05 PROCEDURE — 77435 SBRT MANAGEMENT: CPT

## 2022-01-05 PROCEDURE — 77306 TELETHX ISODOSE PLAN SIMPLE: CPT | Mod: 26

## 2022-01-05 PROCEDURE — 77293 RESPIRATOR MOTION MGMT SIMUL: CPT | Mod: 26

## 2022-01-05 PROCEDURE — 77334 RADIATION TREATMENT AID(S): CPT | Mod: 26

## 2022-01-06 DIAGNOSIS — C53.9 MALIGNANT NEOPLASM OF CERVIX UTERI, UNSPECIFIED: ICD-10-CM

## 2022-01-10 ENCOUNTER — NON-APPOINTMENT (OUTPATIENT)
Age: 82
End: 2022-01-10

## 2022-01-10 VITALS
WEIGHT: 171.8 LBS | TEMPERATURE: 96.6 F | RESPIRATION RATE: 14 BRPM | SYSTOLIC BLOOD PRESSURE: 175 MMHG | BODY MASS INDEX: 30.43 KG/M2 | HEART RATE: 82 BPM | OXYGEN SATURATION: 100 % | DIASTOLIC BLOOD PRESSURE: 77 MMHG

## 2022-01-10 NOTE — REVIEW OF SYSTEMS
[Constipation: Grade 1 - Occasional or intermittent symptoms; occasional use of stool softeners, laxatives, dietary modification, or enema] : Constipation: Grade 1 - Occasional or intermittent symptoms; occasional use of stool softeners, laxatives, dietary modification, or enema [Diarrhea: Grade 1 - Increase of <4 stools per day over baseline; mild increase in ostomy output compared to baseline] : Diarrhea: Grade 1 - Increase of <4 stools per day over baseline; mild increase in ostomy output compared to baseline [Nausea: Grade 0] : Nausea: Grade 0 [Vomiting: Grade 0] : Vomiting: Grade 0 [Fatigue: Grade 1 - Fatigue relieved by rest] : Fatigue: Grade 1 - Fatigue relieved by rest [Urinary Tract Pain: Grade 0] : Urinary Tract Pain: Grade 0 [Pruritus: Grade 0] : Pruritus: Grade 0 [Dermatitis Radiation: Grade 0] : Dermatitis Radiation: Grade 0

## 2022-01-10 NOTE — PHYSICAL EXAM
[Sclera] : the sclera and conjunctiva were normal [Hearing Threshold Finger Rub Not [place]] : hearing was normal [] : no respiratory distress [Respiration, Rhythm And Depth] : normal respiratory rhythm and effort [Exaggerated Use Of Accessory Muscles For Inspiration] : no accessory muscle use [Heart Rate And Rhythm] : heart rate and rhythm were normal [Nondistended] : nondistended [Skin Color & Pigmentation] : normal skin color and pigmentation [No Focal Deficits] : no focal deficits [Normal] : oriented to person, place and time, the affect was normal, the mood was normal and not anxious [Abdomen Tenderness] : non-tender

## 2022-01-10 NOTE — DISEASE MANAGEMENT
[Clinical] : TNM Stage: c [IIIA] : IIIA [FreeTextEntry4] : Squamous cell carcinoma of the cervix, FIGO Stage IIIA [TTNM] : 3a [NTNM] : 0 [MTNM] : 0 [de-identified] : 1000 [de-identified] : 6339 [de-identified] : pelvis (right pelvic node)

## 2022-01-10 NOTE — HISTORY OF PRESENT ILLNESS
[FreeTextEntry1] : Patient reports feeling well.  She continues to have diarrhea and constipation, managing it with diet and meds.  Otherwise, no new concerns.

## 2022-01-11 ENCOUNTER — NON-APPOINTMENT (OUTPATIENT)
Age: 82
End: 2022-01-11

## 2022-01-18 ENCOUNTER — OUTPATIENT (OUTPATIENT)
Dept: OUTPATIENT SERVICES | Facility: HOSPITAL | Age: 82
LOS: 1 days | Discharge: HOME | End: 2022-01-18
Payer: MEDICARE

## 2022-01-18 DIAGNOSIS — C53.9 MALIGNANT NEOPLASM OF CERVIX UTERI, UNSPECIFIED: ICD-10-CM

## 2022-01-18 DIAGNOSIS — R22.40 LOCALIZED SWELLING, MASS AND LUMP, UNSPECIFIED LOWER LIMB: Chronic | ICD-10-CM

## 2022-01-18 DIAGNOSIS — Z91.89 OTHER SPECIFIED PERSONAL RISK FACTORS, NOT ELSEWHERE CLASSIFIED: ICD-10-CM

## 2022-01-18 DIAGNOSIS — Z92.89 PERSONAL HISTORY OF OTHER MEDICAL TREATMENT: Chronic | ICD-10-CM

## 2022-01-18 PROCEDURE — 77435 SBRT MANAGEMENT: CPT

## 2022-02-10 ENCOUNTER — APPOINTMENT (OUTPATIENT)
Dept: HEMATOLOGY ONCOLOGY | Facility: CLINIC | Age: 82
End: 2022-02-10

## 2022-02-11 DIAGNOSIS — C53.0 MALIGNANT NEOPLASM OF ENDOCERVIX: ICD-10-CM

## 2022-03-01 ENCOUNTER — APPOINTMENT (OUTPATIENT)
Dept: RADIATION ONCOLOGY | Facility: HOSPITAL | Age: 82
End: 2022-03-01
Payer: MEDICARE

## 2022-03-01 VITALS
WEIGHT: 168 LBS | OXYGEN SATURATION: 99 % | SYSTOLIC BLOOD PRESSURE: 154 MMHG | TEMPERATURE: 96.6 F | HEART RATE: 88 BPM | RESPIRATION RATE: 12 BRPM | DIASTOLIC BLOOD PRESSURE: 56 MMHG | BODY MASS INDEX: 29.76 KG/M2

## 2022-03-01 PROCEDURE — 99024 POSTOP FOLLOW-UP VISIT: CPT

## 2022-03-01 NOTE — REVIEW OF SYSTEMS
[Negative] : Psychiatric [Dysphagia] : no dysphagia [Chest Pain] : no chest pain [Palpitations] : no palpitations [Shortness Of Breath] : no shortness of breath [Wheezing] : no wheezing [Cough] : no cough [Abdominal Pain] : no abdominal pain [Vomiting] : no vomiting

## 2022-03-01 NOTE — LETTER CLOSING
[Consult Closing:] : Thank you for allowing me to participate in the care of this patient.  If you have any questions, please do not hesitate to contact me. [Sincerely yours,] : Sincerely yours, [FreeTextEntry3] : Leslie Ragland M.D. \par \par Electronically proofread and signed by:  Leslie Ragland MD\par Attending, Department of Radiation Medicine\par [facility]\par \par CC: Dr. Krueger

## 2022-03-01 NOTE — DISEASE MANAGEMENT
[Clinical] : TNM Stage: c [IIIA] : IIIA [FreeTextEntry4] : Squamous cell carcinoma of the cervix, FIGO Stage IIIA [TTNM] : 3a [NTNM] : 0 [MTNM] : 0 [de-identified] : 1696 [de-identified] : 2022 [de-identified] : pelvis (right pelvic node)

## 2022-03-01 NOTE — PHYSICAL EXAM
[Sclera] : the sclera and conjunctiva were normal [Hearing Threshold Finger Rub Not [place]] : hearing was normal [] : no respiratory distress [Respiration, Rhythm And Depth] : normal respiratory rhythm and effort [Exaggerated Use Of Accessory Muscles For Inspiration] : no accessory muscle use [Heart Rate And Rhythm] : heart rate and rhythm were normal [Abdomen Soft] : soft [Nondistended] : nondistended [Abdomen Tenderness] : non-tender [Normal External Genitalia] : normal external genitalia  [Motor Tone] : muscle strength and tone were normal [Nail Clubbing] : no clubbing  or cyanosis of the fingernails [No Focal Deficits] : no focal deficits [Motor Exam] : the motor exam was normal [Normal Vagina] : normal vagina without lesions or masses [Discharge] : had no discharge [Mass ___ cm] : had no mass [Normal] : no palpable adenopathy

## 2022-03-01 NOTE — HISTORY OF PRESENT ILLNESS
[FreeTextEntry1] : JOY ARAGON returns to clinic in follow up visit.  As you know, JOY ARAGON is an 81 year old female with squamous cell carcinoma of the cervix, FIGO Stage IIIA. Definitive chemoradiation was recommended.  Due to her high anesthesia risk, brachytherapy boost was no longer an option. SBRT boost to the cervix was recommended. The patient received 4500cGy to the Pelvis followed by an SBRT boost (2500cGy) to the cervix from 6/2/2021 through 8/2/2021.  Her post treatment scan showed an FDG avid right pelvic node, likely residual. The patient was agreeable to SBRT to the node, followed by consideration of systemic therapy.  The patient tolerated treatments quite well. The patient received 2500 cGy to the right pelvic node from 1/5/2022  through 1/18/2022 without complications.  I last saw her in Jan, 2022.    In the interim, the patient reports doing well.  She continues to have GI issues between diarrhea  (last episode 2 weeks ago) and constipation.  She is eating well, fatigue mostly in the morning.  She denies N/V, vaginal bleeding and abdominal pain.     \par \par \par Interim Imaging: none\par Upcoming appointments: \par Dr. Krueger - March/2022\par \par

## 2022-03-02 DIAGNOSIS — Z92.3 PERSONAL HISTORY OF IRRADIATION: ICD-10-CM

## 2022-03-29 ENCOUNTER — RESULT REVIEW (OUTPATIENT)
Age: 82
End: 2022-03-29

## 2022-03-29 ENCOUNTER — OUTPATIENT (OUTPATIENT)
Dept: OUTPATIENT SERVICES | Facility: HOSPITAL | Age: 82
LOS: 1 days | Discharge: HOME | End: 2022-03-29
Payer: MEDICARE

## 2022-03-29 DIAGNOSIS — R22.40 LOCALIZED SWELLING, MASS AND LUMP, UNSPECIFIED LOWER LIMB: Chronic | ICD-10-CM

## 2022-03-29 DIAGNOSIS — C53.8 MALIGNANT NEOPLASM OF OVERLAPPING SITES OF CERVIX UTERI: ICD-10-CM

## 2022-03-29 DIAGNOSIS — Z92.3 PERSONAL HISTORY OF IRRADIATION: ICD-10-CM

## 2022-03-29 DIAGNOSIS — C53.9 MALIGNANT NEOPLASM OF CERVIX UTERI, UNSPECIFIED: ICD-10-CM

## 2022-03-29 DIAGNOSIS — Z92.89 PERSONAL HISTORY OF OTHER MEDICAL TREATMENT: Chronic | ICD-10-CM

## 2022-03-29 DIAGNOSIS — D49.89 NEOPLASM OF UNSPECIFIED BEHAVIOR OF OTHER SPECIFIED SITES: ICD-10-CM

## 2022-03-29 LAB — GLUCOSE BLDC GLUCOMTR-MCNC: 98 MG/DL — SIGNIFICANT CHANGE UP (ref 70–99)

## 2022-03-29 PROCEDURE — 78815 PET IMAGE W/CT SKULL-THIGH: CPT | Mod: 26,PS,MH

## 2022-04-12 ENCOUNTER — APPOINTMENT (OUTPATIENT)
Dept: HEMATOLOGY ONCOLOGY | Facility: CLINIC | Age: 82
End: 2022-04-12

## 2022-04-19 ENCOUNTER — EMERGENCY (EMERGENCY)
Facility: HOSPITAL | Age: 82
LOS: 0 days | Discharge: HOME | End: 2022-04-19
Attending: STUDENT IN AN ORGANIZED HEALTH CARE EDUCATION/TRAINING PROGRAM | Admitting: STUDENT IN AN ORGANIZED HEALTH CARE EDUCATION/TRAINING PROGRAM
Payer: MEDICARE

## 2022-04-19 VITALS
TEMPERATURE: 96 F | DIASTOLIC BLOOD PRESSURE: 63 MMHG | HEART RATE: 77 BPM | SYSTOLIC BLOOD PRESSURE: 147 MMHG | HEIGHT: 65 IN | WEIGHT: 164.91 LBS | OXYGEN SATURATION: 99 % | RESPIRATION RATE: 19 BRPM

## 2022-04-19 VITALS
DIASTOLIC BLOOD PRESSURE: 67 MMHG | TEMPERATURE: 97 F | OXYGEN SATURATION: 100 % | SYSTOLIC BLOOD PRESSURE: 150 MMHG | RESPIRATION RATE: 18 BRPM | HEART RATE: 84 BPM

## 2022-04-19 DIAGNOSIS — K62.89 OTHER SPECIFIED DISEASES OF ANUS AND RECTUM: ICD-10-CM

## 2022-04-19 DIAGNOSIS — C53.9 MALIGNANT NEOPLASM OF CERVIX UTERI, UNSPECIFIED: ICD-10-CM

## 2022-04-19 DIAGNOSIS — F41.9 ANXIETY DISORDER, UNSPECIFIED: ICD-10-CM

## 2022-04-19 DIAGNOSIS — Z92.89 PERSONAL HISTORY OF OTHER MEDICAL TREATMENT: Chronic | ICD-10-CM

## 2022-04-19 DIAGNOSIS — Z20.822 CONTACT WITH AND (SUSPECTED) EXPOSURE TO COVID-19: ICD-10-CM

## 2022-04-19 DIAGNOSIS — E11.9 TYPE 2 DIABETES MELLITUS WITHOUT COMPLICATIONS: ICD-10-CM

## 2022-04-19 DIAGNOSIS — K51.30 ULCERATIVE (CHRONIC) RECTOSIGMOIDITIS WITHOUT COMPLICATIONS: ICD-10-CM

## 2022-04-19 DIAGNOSIS — K92.1 MELENA: ICD-10-CM

## 2022-04-19 DIAGNOSIS — F32.A DEPRESSION, UNSPECIFIED: ICD-10-CM

## 2022-04-19 DIAGNOSIS — M06.9 RHEUMATOID ARTHRITIS, UNSPECIFIED: ICD-10-CM

## 2022-04-19 DIAGNOSIS — R22.40 LOCALIZED SWELLING, MASS AND LUMP, UNSPECIFIED LOWER LIMB: Chronic | ICD-10-CM

## 2022-04-19 DIAGNOSIS — Z79.82 LONG TERM (CURRENT) USE OF ASPIRIN: ICD-10-CM

## 2022-04-19 DIAGNOSIS — Z79.84 LONG TERM (CURRENT) USE OF ORAL HYPOGLYCEMIC DRUGS: ICD-10-CM

## 2022-04-19 DIAGNOSIS — I10 ESSENTIAL (PRIMARY) HYPERTENSION: ICD-10-CM

## 2022-04-19 LAB
ALBUMIN SERPL ELPH-MCNC: 4.5 G/DL — SIGNIFICANT CHANGE UP (ref 3.5–5.2)
ALP SERPL-CCNC: 93 U/L — SIGNIFICANT CHANGE UP (ref 30–115)
ALT FLD-CCNC: 11 U/L — SIGNIFICANT CHANGE UP (ref 0–41)
ANION GAP SERPL CALC-SCNC: 12 MMOL/L — SIGNIFICANT CHANGE UP (ref 7–14)
APPEARANCE UR: CLEAR — SIGNIFICANT CHANGE UP
APTT BLD: 30.8 SEC — SIGNIFICANT CHANGE UP (ref 27–39.2)
AST SERPL-CCNC: 15 U/L — SIGNIFICANT CHANGE UP (ref 0–41)
BACTERIA # UR AUTO: NEGATIVE — SIGNIFICANT CHANGE UP
BASOPHILS # BLD AUTO: 0.03 K/UL — SIGNIFICANT CHANGE UP (ref 0–0.2)
BASOPHILS NFR BLD AUTO: 0.5 % — SIGNIFICANT CHANGE UP (ref 0–1)
BILIRUB DIRECT SERPL-MCNC: <0.2 MG/DL — SIGNIFICANT CHANGE UP (ref 0–0.3)
BILIRUB INDIRECT FLD-MCNC: >0.3 MG/DL — SIGNIFICANT CHANGE UP (ref 0.2–1.2)
BILIRUB SERPL-MCNC: 0.5 MG/DL — SIGNIFICANT CHANGE UP (ref 0.2–1.2)
BILIRUB UR-MCNC: NEGATIVE — SIGNIFICANT CHANGE UP
BLD GP AB SCN SERPL QL: SIGNIFICANT CHANGE UP
BUN SERPL-MCNC: 17 MG/DL — SIGNIFICANT CHANGE UP (ref 10–20)
CALCIUM SERPL-MCNC: 9.9 MG/DL — SIGNIFICANT CHANGE UP (ref 8.5–10.1)
CHLORIDE SERPL-SCNC: 101 MMOL/L — SIGNIFICANT CHANGE UP (ref 98–110)
CO2 SERPL-SCNC: 28 MMOL/L — SIGNIFICANT CHANGE UP (ref 17–32)
COLOR SPEC: COLORLESS — SIGNIFICANT CHANGE UP
CREAT SERPL-MCNC: 0.9 MG/DL — SIGNIFICANT CHANGE UP (ref 0.7–1.5)
DIFF PNL FLD: NEGATIVE — SIGNIFICANT CHANGE UP
EGFR: 63 ML/MIN/1.73M2 — SIGNIFICANT CHANGE UP
EOSINOPHIL # BLD AUTO: 0.16 K/UL — SIGNIFICANT CHANGE UP (ref 0–0.7)
EOSINOPHIL NFR BLD AUTO: 2.8 % — SIGNIFICANT CHANGE UP (ref 0–8)
EPI CELLS # UR: 0 /HPF — SIGNIFICANT CHANGE UP (ref 0–5)
GLUCOSE SERPL-MCNC: 64 MG/DL — LOW (ref 70–99)
GLUCOSE UR QL: NEGATIVE — SIGNIFICANT CHANGE UP
HCT VFR BLD CALC: 39.7 % — SIGNIFICANT CHANGE UP (ref 37–47)
HGB BLD-MCNC: 12 G/DL — SIGNIFICANT CHANGE UP (ref 12–16)
HYALINE CASTS # UR AUTO: 0 /LPF — SIGNIFICANT CHANGE UP (ref 0–7)
IMM GRANULOCYTES NFR BLD AUTO: 0.4 % — HIGH (ref 0.1–0.3)
INR BLD: 0.99 RATIO — SIGNIFICANT CHANGE UP (ref 0.65–1.3)
KETONES UR-MCNC: NEGATIVE — SIGNIFICANT CHANGE UP
LACTATE SERPL-SCNC: 2 MMOL/L — SIGNIFICANT CHANGE UP (ref 0.7–2)
LEUKOCYTE ESTERASE UR-ACNC: ABNORMAL
LIDOCAIN IGE QN: 18 U/L — SIGNIFICANT CHANGE UP (ref 7–60)
LYMPHOCYTES # BLD AUTO: 0.91 K/UL — LOW (ref 1.2–3.4)
LYMPHOCYTES # BLD AUTO: 16 % — LOW (ref 20.5–51.1)
MCHC RBC-ENTMCNC: 24.1 PG — LOW (ref 27–31)
MCHC RBC-ENTMCNC: 30.2 G/DL — LOW (ref 32–37)
MCV RBC AUTO: 79.9 FL — LOW (ref 81–99)
MONOCYTES # BLD AUTO: 0.83 K/UL — HIGH (ref 0.1–0.6)
MONOCYTES NFR BLD AUTO: 14.6 % — HIGH (ref 1.7–9.3)
NEUTROPHILS # BLD AUTO: 3.75 K/UL — SIGNIFICANT CHANGE UP (ref 1.4–6.5)
NEUTROPHILS NFR BLD AUTO: 65.7 % — SIGNIFICANT CHANGE UP (ref 42.2–75.2)
NITRITE UR-MCNC: NEGATIVE — SIGNIFICANT CHANGE UP
NRBC # BLD: 0 /100 WBCS — SIGNIFICANT CHANGE UP (ref 0–0)
PH UR: 6.5 — SIGNIFICANT CHANGE UP (ref 5–8)
PLATELET # BLD AUTO: 262 K/UL — SIGNIFICANT CHANGE UP (ref 130–400)
POTASSIUM SERPL-MCNC: 4.6 MMOL/L — SIGNIFICANT CHANGE UP (ref 3.5–5)
POTASSIUM SERPL-SCNC: 4.6 MMOL/L — SIGNIFICANT CHANGE UP (ref 3.5–5)
PROT SERPL-MCNC: 7.2 G/DL — SIGNIFICANT CHANGE UP (ref 6–8)
PROT UR-MCNC: NEGATIVE — SIGNIFICANT CHANGE UP
PROTHROM AB SERPL-ACNC: 11.4 SEC — SIGNIFICANT CHANGE UP (ref 9.95–12.87)
RBC # BLD: 4.97 M/UL — SIGNIFICANT CHANGE UP (ref 4.2–5.4)
RBC # FLD: 14.7 % — HIGH (ref 11.5–14.5)
RBC CASTS # UR COMP ASSIST: 0 /HPF — SIGNIFICANT CHANGE UP (ref 0–4)
SARS-COV-2 RNA SPEC QL NAA+PROBE: SIGNIFICANT CHANGE UP
SODIUM SERPL-SCNC: 141 MMOL/L — SIGNIFICANT CHANGE UP (ref 135–146)
SP GR SPEC: 1.01 — LOW (ref 1.01–1.03)
UROBILINOGEN FLD QL: SIGNIFICANT CHANGE UP
WBC # BLD: 5.7 K/UL — SIGNIFICANT CHANGE UP (ref 4.8–10.8)
WBC # FLD AUTO: 5.7 K/UL — SIGNIFICANT CHANGE UP (ref 4.8–10.8)
WBC UR QL: 3 /HPF — SIGNIFICANT CHANGE UP (ref 0–5)

## 2022-04-19 PROCEDURE — 99284 EMERGENCY DEPT VISIT MOD MDM: CPT | Mod: FS

## 2022-04-19 PROCEDURE — 74177 CT ABD & PELVIS W/CONTRAST: CPT | Mod: 26,MA

## 2022-04-19 RX ORDER — KETOROLAC TROMETHAMINE 30 MG/ML
15 SYRINGE (ML) INJECTION ONCE
Refills: 0 | Status: DISCONTINUED | OUTPATIENT
Start: 2022-04-19 | End: 2022-04-19

## 2022-04-19 RX ORDER — SODIUM CHLORIDE 9 MG/ML
1000 INJECTION, SOLUTION INTRAVENOUS ONCE
Refills: 0 | Status: COMPLETED | OUTPATIENT
Start: 2022-04-19 | End: 2022-04-19

## 2022-04-19 RX ORDER — METRONIDAZOLE 500 MG
1 TABLET ORAL
Qty: 21 | Refills: 0
Start: 2022-04-19 | End: 2022-04-25

## 2022-04-19 RX ORDER — MOXIFLOXACIN HYDROCHLORIDE TABLETS, 400 MG 400 MG/1
1 TABLET, FILM COATED ORAL
Qty: 20 | Refills: 0
Start: 2022-04-19 | End: 2022-04-28

## 2022-04-19 RX ADMIN — SODIUM CHLORIDE 1000 MILLILITER(S): 9 INJECTION, SOLUTION INTRAVENOUS at 14:44

## 2022-04-19 RX ADMIN — Medication 15 MILLIGRAM(S): at 14:44

## 2022-04-19 NOTE — ED PROVIDER NOTE - IV ALTEPLASE EXCL ABS HIDDEN
Please obtain blood work fasting for at least 10 hours, okay to drink water the day of your blood draw. Because of the lymph nodes noted in the right armpit and chest, it may be worth continuing mammograms at this time.     There is a lymph node in the l non-screening if indicated for medical reasons -      Ultrasound Screening for Abdominal Aortic Aneurysm (AAA) Covered once in a lifetime for one of the following risk factors   • Men who are 73-68 years old and have ever smoked   • Anyone with a family hi by Medicare Part B -  No recommendations at this time    Zoster Not covered by Medicare Part B; may be covered with your pharmacy  prescription benefits -  Zoster Vaccines(1 of 2) Never done        Recommended Websites for Advanced Directives    http://www show

## 2022-04-19 NOTE — ED PROVIDER NOTE - DISCHARGE REVIEW MATERIAL PRESENTED
RECEIVED REPORT FROM DONNA ER RN. PT ARRIVED TO  [facility]. PT WAS
ABLE TO AMBULATE TO  BED, VERY SOB WITH EXERTION, SHALLOW BREATHS, NOT ON
02. STATES SHE'S BEEN SOB SINCE HER TRIPLE BYPASS JULY 12 OF THIS YEAR,
HOWEVER IT'S WORSEN IN THE LAST FOUR DAYS. A/O. ABLE TO ANSWER QUESTIONS
REGARDING MEDS AND HEALTH HISTORY APPROPRIATELY. ORIENTED PT TO CALL LT SYSTEM
AND ENCOURAGED PT TO USE FWW DURING AMBULATION, PT STATES SHE USED A WALKER
WHILE AT [facility]. .

## 2022-04-19 NOTE — ED PROVIDER NOTE - OBJECTIVE STATEMENT
82-year-old female with past medical history of hypertension diabetes rheumatoid arthritis cervical cancer actively receiving radiation since January here for bloody stools x3 days.  As per daughter noted large clots in toilet.  Last colonoscopy was in 2009 by Dr. Augustin.  Last received chemo in July 2021.  Last received radiation in January February 2022.  No fever or chills no nausea no vomiting no diarrhea no chest pain no shortness of breath no dysuria no hematuria no vaginal bleeding or discharge.

## 2022-04-19 NOTE — ED PROVIDER NOTE - ATTENDING APP SHARED VISIT CONTRIBUTION OF CARE
82-year-old female past medical history of hypertension, diabetes, rheumatoid arthritis, cervical cancer currently receiving radiation presents with bloody stools.  Per patient's daughter whenever she has a bowel movement patient notices large clots in the toilet.  No blood coating the toilet bowl, no blood in her underwear.  Last colonoscopy was in 2009.  Last chemo was July 2021, patient currently undergoing radiation therapy.  No abdominal pain, no fever/chills, no nausea/vomiting/diarrhea, no chest pain, no shortness of breath, no vaginal bleeding/discharge, no urinary symptoms, no flank pain, no back pain, no dizziness, no lightheadedness.    CONSTITUTIONAL: Well-developed; well-nourished; in no acute distress. Sitting up and providing appropriate history and physical examination  SKIN: skin exam is warm and dry, no acute rash.  HEAD: Normocephalic; atraumatic.  EYES: PERRL, 3 mm bilateral, no nystagmus, EOM intact; conjunctiva and sclera clear.  ENT: No nasal discharge; airway clear.  NECK: Supple; non tender. + full passive ROM in all directions. No JVD  CARD: S1, S2 normal; no murmurs, gallops, or rubs. Regular rate and rhythm. + Symmetric Strong Pulses  RESP: No wheezes, rales or rhonchi. Good air movement bilaterally  ABD: soft; non-distended; non-tender. No Rebound, No Guarding, No signs of peritonitis, No CVA tenderness. No pulsatile abdominal mass. + Strong and Symmetric Pulses.  Empty rectal vault with no active bleeding or clots noted on rectal exam.  EXT: Normal ROM. No clubbing, cyanosis or edema. Dp and Pt Pulses intact. Cap refill less than 3 seconds  NEURO: CN 2-12 intact, normal finger to nose, normal romberg, stable gait, no sensory or motor deficits, Alert, oriented, grossly unremarkable. No Focal deficits. GCS 15. NIH 0  PSYCH: Cooperative, appropriate.

## 2022-04-19 NOTE — ED PROVIDER NOTE - PHYSICAL EXAMINATION
Physical Exam    Vital Signs: I have reviewed the initial vital signs.  Constitutional: well-nourished, appears stated age, no acute distress  Eyes: Conjunctiva pink, Sclera clear,   Cardiovascular: S1 and S2, regular rate, r  Respiratory: unlabored respiratory effort, speaking in full sentences, handling oral secretions,  Gastrointestinal: soft, non-tender abdomen, no pulsatile mass, normal bowl sounds, no cvat b/l ALIE no bright red blood. + nonthrombosed external hemorrhoids noted. (chaperoned by Dr. Oliva)  Integumentary: warm, dry, no rashes, lacerations,  Neurologic: awake, alert, no ataxic gait

## 2022-04-19 NOTE — ED PROVIDER NOTE - NSFOLLOWUPINSTRUCTIONS_ED_ALL_ED_FT
PLEASE FOLLOW UP WITH THE GASTROENTEROLOGIST FOR YOUR SCHEDULED APPOINTMENT WITH DR. LUCAS. TAKE YOUR ANTIBIOTIC AS DIRECTED. RETURN TO THE ER IF YOU FEEL FEVERISH, CHILLS, NAUSEA, VOMITING, ABDOMINAL PAIN.     Colitis    Colitis is inflammation of the colon. Colitis may last a short time (acute) or it may last a long time (chronic).     CAUSES  This condition may be caused by:    Viruses.  Bacteria.  Reactions to medicine.  Certain autoimmune diseases, such as Crohn disease or ulcerative colitis.    SYMPTOMS  Symptoms of this condition include:    Diarrhea.  Passing bloody or tarry stool.  Pain.  Fever.  Vomiting.  Tiredness (fatigue).  Weight loss.  Bloating.  Sudden increase in abdominal pain.  Having fewer bowel movements than usual.    DIAGNOSIS  This condition is diagnosed with a stool test or a blood test. You may also have other tests, including X-rays, a CT scan, or a colonoscopy.    TREATMENT  Treatment may include:    Resting the bowel. This involves not eating or drinking for a period of time.  Fluids that are given through an IV tube.  Medicine for pain and diarrhea.  Antibiotic medicines.  Surgery.    HOME CARE INSTRUCTIONS  Eating and Drinking    Follow instructions from your health care provider about eating or drinking restrictions.  Drink enough fluid to keep your urine clear or pale yellow.  Work with a dietitian to determine which foods cause your condition to flare up.  Avoid foods that cause flare-ups.  Eat a well-balanced diet.    Medicines    Take over-the-counter and prescription medicines only as told by your health care provider.  If you were prescribed an antibiotic medicine, take it as told by your health care provider. Do not stop taking the antibiotic even if you start to feel better.    General Instructions    Keep all follow-up visits as told by your health care provider. This is important.    SEEK MEDICAL CARE IF:  Your symptoms do not go away.  You develop new symptoms.    SEEK IMMEDIATE MEDICAL CARE IF:  You have a fever that does not go away with treatment.  You develop chills.  You have extreme weakness, fainting, or dehydration.  You have repeated vomiting.  You develop severe pain in your abdomen.  You pass bloody or tarry stool.    ADDITIONAL NOTES AND INSTRUCTIONS    Please follow up with your Primary MD in 24-48 hr.  Seek immediate medical care for any new/worsening signs or symptoms.

## 2022-04-19 NOTE — ED PROVIDER NOTE - NSICDXPASTSURGICALHX_GEN_ALL_CORE_FT
PAST SURGICAL HISTORY:  Foot mass left foot mass removal 20 years ago    History of dental surgery

## 2022-04-19 NOTE — ED PROVIDER NOTE - NSICDXPASTMEDICALHX_GEN_ALL_CORE_FT
PAST MEDICAL HISTORY:  Anxiety     Depression     Diabetes     HTN (hypertension)     Rheumatoid arthritis     Sialadenitis

## 2022-04-19 NOTE — ED PROVIDER NOTE - PATIENT PORTAL LINK FT
You can access the FollowMyHealth Patient Portal offered by [facility] by registering at the following website: [email or web address]. By joining [employer]’s FollowMyHealth portal, you will also be able to view your health information using other applications (apps) compatible with our system.

## 2022-04-19 NOTE — ED PROVIDER NOTE - PROGRESS NOTE DETAILS
DC: Results discussed with patient's daughter and patient.  All aware of necessity to follow-up with gastroenterology.  Patient's daughter states they have an appointment for the upcoming Tuesday with Dr. Augustin.  Antibiotics prescribed probiotics recommended.  Return precautions given for worsening abdominal pain fever chills nausea vomiting or other new onset of symptoms.  Will discharge.

## 2022-04-19 NOTE — ED PROVIDER NOTE - CLINICAL SUMMARY MEDICAL DECISION MAKING FREE TEXT BOX
I personally evaluated the patient. I reviewed the Resident´s or Physician Assistant´s note (as assigned above), and agree with the findings and plan except as documented in my note.  Patient evaluated for bloody stools.  Labs, CT abdomen pelvis performed in the ED.  No active bleeding noted on rectal exam.  Patient noted to have proctocolitis.  Antibiotic prescription sent, given follow-up with GI.  I have fully discussed the medical management and delivery of care with the patient. I have discussed any available labs, imaging and treatment options with the patient. Patient confirms understanding and has been given detailed return precautions. Patient instructed to return to the ED should symptoms persist or worsen. Patient has demonstrated capacity and has verbalized understanding. Patient is well appearing upon discharge.

## 2022-04-19 NOTE — ED PROVIDER NOTE - NS ED ATTENDING STATEMENT MOD
This was a shared visit with the NICK. I reviewed and verified the documentation and independently performed the documented:

## 2022-04-19 NOTE — ED ADULT NURSE NOTE - NSIMPLEMENTINTERV_GEN_ALL_ED
Implemented All Universal Safety Interventions:  [place] to call system. Call bell, personal items and telephone within reach. Instruct patient to call for assistance. Room bathroom lighting operational. Non-slip footwear when patient is off stretcher. Physically safe environment: no spills, clutter or unnecessary equipment. Stretcher in lowest position, wheels locked, appropriate side rails in place.

## 2022-04-21 LAB
CULTURE RESULTS: SIGNIFICANT CHANGE UP
SPECIMEN SOURCE: SIGNIFICANT CHANGE UP

## 2022-04-22 ENCOUNTER — APPOINTMENT (OUTPATIENT)
Dept: HEMATOLOGY ONCOLOGY | Facility: CLINIC | Age: 82
End: 2022-04-22

## 2022-04-25 ENCOUNTER — LABORATORY RESULT (OUTPATIENT)
Age: 82
End: 2022-04-25

## 2022-04-25 ENCOUNTER — APPOINTMENT (OUTPATIENT)
Dept: HEMATOLOGY ONCOLOGY | Facility: CLINIC | Age: 82
End: 2022-04-25
Payer: MEDICARE

## 2022-04-25 VITALS
RESPIRATION RATE: 18 BRPM | HEART RATE: 76 BPM | HEIGHT: 63 IN | WEIGHT: 168 LBS | BODY MASS INDEX: 29.77 KG/M2 | DIASTOLIC BLOOD PRESSURE: 64 MMHG | TEMPERATURE: 98 F | SYSTOLIC BLOOD PRESSURE: 152 MMHG

## 2022-04-25 LAB
HCT VFR BLD CALC: 36.8 %
HGB BLD-MCNC: 11.3 G/DL
MCHC RBC-ENTMCNC: 24.7 PG
MCHC RBC-ENTMCNC: 30.7 G/DL
MCV RBC AUTO: 80.3 FL
PLATELET # BLD AUTO: 228 K/UL
PMV BLD: 9.9 FL
RBC # BLD: 4.58 M/UL
RBC # FLD: 14.8 %
WBC # FLD AUTO: 5.09 K/UL

## 2022-04-25 PROCEDURE — 99214 OFFICE O/P EST MOD 30 MIN: CPT

## 2022-04-25 NOTE — HISTORY OF PRESENT ILLNESS
[Disease: _____________________] : Disease: [unfilled] [de-identified] : The patient is an 81 year old woman who presented with post-menopausal vaginal bleeding in March 2021. \par Upon further work up, PAP smear on 3/18/21 showed: LSIL/AGC/HPV+. Pelvic US on 3/22/21 showed endometrium to be 4.2mm thick. 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 \par MRI pelvis on 4/3/21 showed signal abnormality in the posterior cervix 2.5cm x 1.6cm. The lesion does not go beyond the limit of the cervix. \par She saw Dr. Echevarria on 4/14/21 and on his exam, she was noted to have a 4-5cm cervical mass involving at least the middle of the vaginal wall, circumferentially. No parametrial involvement. She was not deemed a surgical candidate. \par Definitive chemoradiation was recommended. She is here to discuss radiation. \par PET/CT: Pending\par \par Pt C/O low back pain, for which she takes Tylenol. Vaginal bleeding is not very excessive. No SOB, Chest pain.\par Pt has a stress test next week\par  [de-identified] : Squamous cell [de-identified] : 5/18/21\par Pt is here for follow up.\par She has completed a PET scan.\par has moderate back pain\par \par 6/9/21\par Pt is here for follow up.\par She has started RT last week. She also recd 1 dose of carboplatin last week.\par She did not have any nausea or vomiting. She has been getting diarrhea which she has been managing with imodium. Her bld sugar was also running high.\par No fever, mouth sores.\par \par 6/16/21\par Pt is here for follow up.\par She is s/p 2 doses of Carbo.\par C/O feeling very fatigued. Had diarrhea which is controlled with imodium.\par No N, V.\par has abdominal discomfort.\par \par 6/23/2021\par Pt is here to follow up for b=cervical cancer, accompanied by daughter\par She is on Carbo s/p 2 cycles, tolerating well\par She feels a lot better today\par She denies abdominal pain, nausea or vomiting\par She gets diarrhea but is managed with Imodium\par \par 7/1/2021\par Pt is here to follow up for cervical cancer, accompanied by daughter\par She is on Carbo s/p 3 cycles, tolerating well\par She feels fine today, appetite is good\par She denies abdominal pain, nausea or vomiting, no vaginal bleeding/discharge\par She gets diarrhea, takes Imodium after having 7-8 episodes then gets constipation the following day\par \par 7/7/2021\par Pt is here to follow up for cervical cancer, accompanied by daughter\par She is on weekly Carbo s/p  4 cycles, tolerating well\par She feels fine today, appetite is good\par She denies nausea or vomiting, no vaginal bleeding/discharge\par She c/o of low abdominal cramping and gets diarrhea 4-5x/day, managed with Imodium then gets constipation the following day\par \par 7/26/21\par Pt is here for follow up,\par She is feeling better. Diarrhea has improved. NO vaginal bleeding\par \par 9/8/21\par Pt is here for follow up. Has completed RT on 8/2/21.\par No vag bleeding, no pelvic pain.\par \par 12/21/21\par Pt is here today for follow up visit for cervical cancer. She has completed chemo (7/2021)/Rad (8/2021).\par Pt denies any vaginal bleeding or abd/pelvic pain.\par She had a PET CT scan on 12/21/21 which showed FDG avid right external iliac lymph node, SUV max 10.0 measuring\par 1.0 cm, consistent with biological tumor activity. She saw Dr. Ragland and agreed to have SBRT since she is not surgical candidate.\par Simulation was done.\par She has an appt with Dr. Echevarria 1/12/22.\par \par 4/25/22\par Pt is here for follow up. She had been C/O rectal bleeding over the past week. She went to ER and was treated with ABX. CT scan abdomen and pelvis reviwed ? proctoclitis.\par Symptoms have improved.Pt has appt with GI.\par

## 2022-04-25 NOTE — CONSULT LETTER
[Dear  ___] : Dear  [unfilled], [Consult Letter:] : I had the pleasure of evaluating your patient, [unfilled]. [Please see my note below.] : Please see my note below. [Consult Closing:] : Thank you very much for allowing me to participate in the care of this patient.  If you have any questions, please do not hesitate to contact me. [Sincerely,] : Sincerely, [DrRaya  ___] : Dr. HOLGUIN [FreeTextEntry3] : Marlena Krueger MD\par Professor [employer]\par [employer]\par Attending Physician\par [facility] [place]\par [phone]\par \par

## 2022-04-25 NOTE — ASSESSMENT
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 We had decided to use Carboplatin AUC 2 as a radiation sensitizer. She is s/p 5 weekly doses of Carboplatin.\par She completed RT on 8/2/21\par \par PET CT scan on 12/21/21 showed FDG avid right external iliac lymph node, SUV max 10.0 measuring\par 1.0 cm, consistent with biological tumor activity. Dr. Ragland was seen and patient agreed to have SBRT x 5 since she is not surgical candidate. Simulation was done.\par \par Rpt PET scan 3/22 shows improvement in disease status although the Rt ext iliac LN is still FDG avid, SUV has improved.\par \par Will monitor for now with rpt PET scan in 3 M. If there is residual disease noted at that time will consider systemic treatment.\par \par \par Pt will follow in 3 M, VAZQUEZ cooney advised\par

## 2022-04-25 NOTE — RESULTS/DATA
[FreeTextEntry1] : CT ABDOMEN AND PELVIS IC\par \par PROCEDURE DATE: 04/19/2022\par \par \par \par INTERPRETATION: CLINICAL STATEMENT: Abdominal pain. Hematochezia. Uterine cancer.\par \par TECHNIQUE: Contiguous axial CT images were obtained from the lower chest to the pubic symphysis following administration of 100cc Optiray 320 intravenous contrast. Oral contrast was not administered. Reformatted images in the coronal and sagittal planes were acquired.\par \par COMPARISON CT: 9/27/2018\par \par OTHER STUDIES USED FOR CORRELATION: Pet/CT 3/29/2022\par \par \par FINDINGS:\par \par LOWER CHEST: Bibasilar subsegmental atelectasis. Coronary artery calcifications. Aortic valve calcifications.\par \par HEPATOBILIARY: No focal liver lesion identified. Cholelithiasis.\par \par SPLEEN: Unremarkable.\par \par PANCREAS: Unremarkable.\par \par ADRENAL GLANDS: Unremarkable.\par \par KIDNEYS: No hydronephrosis. Symmetric pattern. Renal enhancement. Bilateral subcentimeter cortical hypodensities too small to characterize\par \par ABDOMINOPELVIC NODES: Stable about 1.5 cm right external iliac node as previously described on prior PET imaging.\par \par PELVIC ORGANS: Unremarkable.\par \par PERITONEUM/MESENTERY/BOWEL: No bowel obstruction. No free fluid or free air. Diverticulosis without evidence of diverticulitis. Normal appendix. Bowel wall thickening of the sigmoid colon and rectum with mild surrounding inflammation may represent colitis..\par \par BONES/SOFT TISSUES: Osteopenia. Degenerative changes along the visualized vertebral column.\par \par OTHER: Marked atherosclerosis. Mild ectasia of the abdominal aorta without vishal aneurysm.\par \par \par IMPRESSION:\par 1. Mild proctocolitis involving the sigmoid colon and rectum.\par 2. No bowel obstruction.\par 3. Cholelithiasis.

## 2022-04-26 LAB
FERRITIN SERPL-MCNC: 139 NG/ML
IRON SATN MFR SERPL: 18 %
IRON SERPL-MCNC: 56 UG/DL
TIBC SERPL-MCNC: 317 UG/DL
UIBC SERPL-MCNC: 261 UG/DL

## 2022-05-01 NOTE — ASU DISCHARGE PLAN (ADULT/PEDIATRIC). - DISCHARGE DATE
Vitals: WNL  Gen: AAOx3, NAD, sitting uncomfortably in stretcher, non-toxic   Head: ncat, perrla, eomi b/l  Neck: supple, no lymphadenopathy, no midline deviation  Heart: rrr, no m/r/g  Lungs: CTA b/l, no rales/ronchi/wheezes  Abd: soft, + tender in epigastrium, non-distended, no rebound or guarding  Ext: no clubbing/cyanosis/edema  Neuro: sensation and muscle strength intact b/l 17-Jul-2018 13:24

## 2022-06-28 ENCOUNTER — OUTPATIENT (OUTPATIENT)
Dept: OUTPATIENT SERVICES | Facility: HOSPITAL | Age: 82
LOS: 1 days | Discharge: HOME | End: 2022-06-28

## 2022-06-28 ENCOUNTER — RESULT REVIEW (OUTPATIENT)
Age: 82
End: 2022-06-28

## 2022-06-28 DIAGNOSIS — D49.89 NEOPLASM OF UNSPECIFIED BEHAVIOR OF OTHER SPECIFIED SITES: ICD-10-CM

## 2022-06-28 DIAGNOSIS — C53.9 MALIGNANT NEOPLASM OF CERVIX UTERI, UNSPECIFIED: ICD-10-CM

## 2022-06-28 DIAGNOSIS — Z92.89 PERSONAL HISTORY OF OTHER MEDICAL TREATMENT: Chronic | ICD-10-CM

## 2022-06-28 DIAGNOSIS — C53.8 MALIGNANT NEOPLASM OF OVERLAPPING SITES OF CERVIX UTERI: ICD-10-CM

## 2022-06-28 DIAGNOSIS — R22.40 LOCALIZED SWELLING, MASS AND LUMP, UNSPECIFIED LOWER LIMB: Chronic | ICD-10-CM

## 2022-06-28 LAB
GLUCOSE BLDC GLUCOMTR-MCNC: 51 MG/DL — CRITICAL LOW (ref 70–99)
GLUCOSE BLDC GLUCOMTR-MCNC: 71 MG/DL — SIGNIFICANT CHANGE UP (ref 70–99)

## 2022-06-28 PROCEDURE — 78815 PET IMAGE W/CT SKULL-THIGH: CPT | Mod: 26,PS,MH

## 2022-07-14 ENCOUNTER — OUTPATIENT (OUTPATIENT)
Dept: OUTPATIENT SERVICES | Facility: HOSPITAL | Age: 82
LOS: 1 days | Discharge: HOME | End: 2022-07-14

## 2022-07-14 ENCOUNTER — LABORATORY RESULT (OUTPATIENT)
Age: 82
End: 2022-07-14

## 2022-07-14 ENCOUNTER — APPOINTMENT (OUTPATIENT)
Dept: HEMATOLOGY ONCOLOGY | Facility: CLINIC | Age: 82
End: 2022-07-14

## 2022-07-14 VITALS
BODY MASS INDEX: 29.77 KG/M2 | SYSTOLIC BLOOD PRESSURE: 147 MMHG | HEIGHT: 63 IN | DIASTOLIC BLOOD PRESSURE: 75 MMHG | WEIGHT: 168 LBS | HEART RATE: 103 BPM | TEMPERATURE: 97.7 F

## 2022-07-14 DIAGNOSIS — R22.40 LOCALIZED SWELLING, MASS AND LUMP, UNSPECIFIED LOWER LIMB: Chronic | ICD-10-CM

## 2022-07-14 DIAGNOSIS — D49.89 NEOPLASM OF UNSPECIFIED BEHAVIOR OF OTHER SPECIFIED SITES: ICD-10-CM

## 2022-07-14 DIAGNOSIS — Z92.89 PERSONAL HISTORY OF OTHER MEDICAL TREATMENT: Chronic | ICD-10-CM

## 2022-07-14 DIAGNOSIS — C53.9 MALIGNANT NEOPLASM OF CERVIX UTERI, UNSPECIFIED: ICD-10-CM

## 2022-07-14 LAB
HCT VFR BLD CALC: 38.3 %
HGB BLD-MCNC: 12 G/DL
MCHC RBC-ENTMCNC: 24.5 PG
MCHC RBC-ENTMCNC: 31.3 G/DL
MCV RBC AUTO: 78.2 FL
PLATELET # BLD AUTO: 239 K/UL
PMV BLD: 10.5 FL
RBC # BLD: 4.9 M/UL
RBC # FLD: 14.6 %
WBC # FLD AUTO: 5.67 K/UL

## 2022-07-14 PROCEDURE — 99214 OFFICE O/P EST MOD 30 MIN: CPT

## 2022-07-15 LAB
ALBUMIN SERPL ELPH-MCNC: 4.1 G/DL
ALP BLD-CCNC: 93 U/L
ALT SERPL-CCNC: 10 U/L
ANION GAP SERPL CALC-SCNC: 11 MMOL/L
AST SERPL-CCNC: 13 U/L
BILIRUB SERPL-MCNC: 0.3 MG/DL
BUN SERPL-MCNC: 15 MG/DL
CALCIUM SERPL-MCNC: 9.2 MG/DL
CHLORIDE SERPL-SCNC: 97 MMOL/L
CO2 SERPL-SCNC: 27 MMOL/L
CREAT SERPL-MCNC: 0.9 MG/DL
EGFR: 63 ML/MIN/1.73M2
GLUCOSE SERPL-MCNC: 243 MG/DL
POTASSIUM SERPL-SCNC: 4.5 MMOL/L
PROT SERPL-MCNC: 6.5 G/DL
SODIUM SERPL-SCNC: 135 MMOL/L

## 2022-07-15 NOTE — HISTORY OF PRESENT ILLNESS
[Disease: _____________________] : Disease: [unfilled] [de-identified] : The patient is an 81 year old woman who presented with post-menopausal vaginal bleeding in March 2021. \par Upon further work up, PAP smear on 3/18/21 showed: LSIL/AGC/HPV+. Pelvic US on 3/22/21 showed endometrium to be 4.2mm thick. 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 \par MRI pelvis on 4/3/21 showed signal abnormality in the posterior cervix 2.5cm x 1.6cm. The lesion does not go beyond the limit of the cervix. \par She saw Dr. Echevarria on 4/14/21 and on his exam, she was noted to have a 4-5cm cervical mass involving at least the middle of the vaginal wall, circumferentially. No parametrial involvement. She was not deemed a surgical candidate. \par Definitive chemoradiation was recommended. She is here to discuss radiation. \par PET/CT: Pending\par \par Pt C/O low back pain, for which she takes Tylenol. Vaginal bleeding is not very excessive. No SOB, Chest pain.\par Pt has a stress test next week\par  [de-identified] : Squamous cell [de-identified] : 5/18/21\par Pt is here for follow up.\par She has completed a PET scan.\par has moderate back pain\par \par 6/9/21\par Pt is here for follow up.\par She has started RT last week. She also recd 1 dose of carboplatin last week.\par She did not have any nausea or vomiting. She has been getting diarrhea which she has been managing with imodium. Her bld sugar was also running high.\par No fever, mouth sores.\par \par 6/16/21\par Pt is here for follow up.\par She is s/p 2 doses of Carbo.\par C/O feeling very fatigued. Had diarrhea which is controlled with imodium.\par No N, V.\par has abdominal discomfort.\par \par 6/23/2021\par Pt is here to follow up for b=cervical cancer, accompanied by daughter\par She is on Carbo s/p 2 cycles, tolerating well\par She feels a lot better today\par She denies abdominal pain, nausea or vomiting\par She gets diarrhea but is managed with Imodium\par \par 7/1/2021\par Pt is here to follow up for cervical cancer, accompanied by daughter\par She is on Carbo s/p 3 cycles, tolerating well\par She feels fine today, appetite is good\par She denies abdominal pain, nausea or vomiting, no vaginal bleeding/discharge\par She gets diarrhea, takes Imodium after having 7-8 episodes then gets constipation the following day\par \par 7/7/2021\par Pt is here to follow up for cervical cancer, accompanied by daughter\par She is on weekly Carbo s/p  4 cycles, tolerating well\par She feels fine today, appetite is good\par She denies nausea or vomiting, no vaginal bleeding/discharge\par She c/o of low abdominal cramping and gets diarrhea 4-5x/day, managed with Imodium then gets constipation the following day\par \par 7/26/21\par Pt is here for follow up,\par She is feeling better. Diarrhea has improved. NO vaginal bleeding\par \par 9/8/21\par Pt is here for follow up. Has completed RT on 8/2/21.\par No vag bleeding, no pelvic pain.\par \par 12/21/21\par Pt is here today for follow up visit for cervical cancer. She has completed chemo (7/2021)/Rad (8/2021).\par Pt denies any vaginal bleeding or abd/pelvic pain.\par She had a PET CT scan on 12/21/21 which showed FDG avid right external iliac lymph node, SUV max 10.0 measuring\par 1.0 cm, consistent with biological tumor activity. She saw Dr. Ragland and agreed to have SBRT since she is not surgical candidate.\par Simulation was done.\par She has an appt with Dr. Echevarria 1/12/22.\par \par 4/25/22\par Pt is here for follow up. She had been C/O rectal bleeding over the past week. She went to ER and was treated with ABX. CT scan abdomen and pelvis reviwed ? proctoclitis.\par Symptoms have improved.Pt has appt with GI.\par \par 7/14/22\par Pt is here for follow up.\par She feels well. No new complaints. Completed repeat PET scan.\par Denies any vaginal bleeding,\par Has not seen GYN ONC\par

## 2022-07-15 NOTE — ASSESSMENT
[FreeTextEntry1] : 82 year old female with squamous cell carcinoma of the cervix, FIGO Stage IIIA, s/p chemo/RT\par Pt  was treated with  chemo/RT.\par Given her age and possible cardiac issues ( inability to handle large volumes of pre and post chemo hydration). We had decided to use Carboplatin AUC 2 as a radiation sensitizer. She is s/p 5 weekly doses of Carboplatin.\par She completed RT on 8/2/21\par \par The patient received 7000 cGy to the Pelvis/cervix from 6/2/2021 through 8/2/2021\par \par Post definitive chemo/Rt in  12/21/21 she had a FDG avid right external iliac lymph node, SUV max 10.0 measuring\par 1.0 cm, consistent with biological tumor activity. She is s/p  SBRT x 5 to that lesion.\par \par Her most recent PET scan ( 6/22) shows residual FDG uptake in the Rt external iliac LN although stable, findings d/w pt and daughter\par \par PLAN:\par  I have prepared the note after I reviewed the previous notes, reviewed the radiological and laboratory studies and have communicated those to the patient\par \par Will continue to monitor pt for now.\par labs ordered\par She should follow with GYN ONC\par \par Pt will follow in 3 M.\par

## 2022-07-15 NOTE — CONSULT LETTER
[Dear  ___] : Dear  [unfilled], [Consult Letter:] : I had the pleasure of evaluating your patient, [unfilled]. [Please see my note below.] : Please see my note below. [Consult Closing:] : Thank you very much for allowing me to participate in the care of this patient.  If you have any questions, please do not hesitate to contact me. [Sincerely,] : Sincerely, [DrRaya  ___] : Dr. HOLGUIN [FreeTextEntry3] : Marlena Krueger MD\par Professor [employer]\par [employer]\par Attending Physician\par [facility] [place]\par [phone]\par \par

## 2022-07-19 NOTE — ED ADULT NURSE NOTE - NS ED NOTE ABUSE RESPONSE YN
SW/CM Discharge Plan  Informed patient is ready for discharge. Patient’s discharge destination is Home/apartment. Patient to be picked up by Family.  Patient/interested person has been counseled for post hospitalization care.  Patient agrees and understands goals and plan. Daughter to assist on dc. Discharge plan communicated to RN.        Yes

## 2022-09-20 ENCOUNTER — OUTPATIENT (OUTPATIENT)
Dept: OUTPATIENT SERVICES | Facility: HOSPITAL | Age: 82
LOS: 1 days | Discharge: HOME | End: 2022-09-20

## 2022-09-20 ENCOUNTER — APPOINTMENT (OUTPATIENT)
Dept: RADIATION ONCOLOGY | Facility: HOSPITAL | Age: 82
End: 2022-09-20

## 2022-09-20 VITALS
RESPIRATION RATE: 12 BRPM | WEIGHT: 167.13 LBS | HEART RATE: 95 BPM | TEMPERATURE: 97.2 F | BODY MASS INDEX: 29.61 KG/M2 | SYSTOLIC BLOOD PRESSURE: 148 MMHG | OXYGEN SATURATION: 96 % | DIASTOLIC BLOOD PRESSURE: 67 MMHG

## 2022-09-20 DIAGNOSIS — C53.0 MALIGNANT NEOPLASM OF ENDOCERVIX: ICD-10-CM

## 2022-09-20 DIAGNOSIS — R22.40 LOCALIZED SWELLING, MASS AND LUMP, UNSPECIFIED LOWER LIMB: Chronic | ICD-10-CM

## 2022-09-20 DIAGNOSIS — Z92.89 PERSONAL HISTORY OF OTHER MEDICAL TREATMENT: Chronic | ICD-10-CM

## 2022-09-20 PROCEDURE — 99213 OFFICE O/P EST LOW 20 MIN: CPT

## 2022-09-20 NOTE — REASON FOR VISIT
[Routine Follow-Up] : routine follow-up visit for [Other: ___] : [unfilled] [Other: _____] : [unfilled] [Patient Declined  Services] : - None: Patient declined  services

## 2022-09-22 NOTE — HISTORY OF PRESENT ILLNESS
[FreeTextEntry1] : JOY ARAGON returns to clinic in follow up visit.  As you know, JOY ARAGON is an 82 year old female with squamous cell carcinoma of the cervix, FIGO Stage IIIA. Definitive chemoradiation was recommended.  Due to her high anesthesia risk, brachytherapy boost was no longer an option. SBRT boost to the cervix was recommended. The patient received 4500cGy to the Pelvis followed by an SBRT boost (2500cGy) to the cervix from 6/2/2021 through 8/2/2021.  Her post treatment scan showed an FDG avid right pelvic node, likely residual. The patient was agreeable to SBRT to the node, followed by consideration of systemic therapy.  The patient tolerated treatments quite well. The patient received 2500 cGy to the right pelvic node from 1/5/2022  through 1/18/2022 without complications.  I last saw her in March, 2022.    In the interim, the patient reports doing well.  Her fatigue continues the same, no change.  Her GI symptoms of diarrhea and constipation have improved, "better than before".  Her daughter reports, pt was seen by Dr. Echevarria approximately 1 month ago, CT A/P ordered for Oct/2022.  Otherwise, no new concerns.  Her PCP is Dr. NILA Yan. \par \par Interim Imaging: \par On 6/28/2022 PET CT IMPRESSION:\par Compared to 3/29/2022, no new sites of pathologic FDG uptake\par Persistent FDG avid right iliac lymph node with stable SUV (SUV 5.3 vs 5.6 previously).\par No other sites of abnormal FDG uptake\par \par 4/19/2022 CT A/P IMPRESSION:\par Mild proctocolitis involving the sigmoid colon and rectum.\par No bowel obstruction.\par Cholelithiasis.\par \par Upcoming appointments: \par Dr. Krueger - 11/3/2022\par \par

## 2022-09-22 NOTE — PHYSICAL EXAM
[Sclera] : the sclera and conjunctiva were normal [Hearing Threshold Finger Rub Not [place]] : hearing was normal [Heart Rate And Rhythm] : heart rate and rhythm were normal [Heart Sounds] : normal S1 and S2 [Murmurs] : no murmurs present [Edema] : no peripheral edema present [Abdomen Soft] : soft [Nondistended] : nondistended [Abdomen Tenderness] : non-tender [No Lesions] : no lesions were seen on the external genitalia [No Bleeding] : there was no active vaginal bleeding [Femoral Lymph Nodes Enlarged Bilaterally] : femoral [Inguinal Lymph Nodes Enlarged Bilaterally] : inguinal [Nail Clubbing] : no clubbing  or cyanosis of the fingernails [Motor Tone] : muscle strength and tone were normal [Skin Color & Pigmentation] : normal skin color and pigmentation [No Focal Deficits] : no focal deficits [Motor Exam] : the motor exam was normal [Normal External Genitalia] : normal external genitalia  [Mass ___ cm] : had no mass [Normal] : no palpable adenopathy

## 2022-09-22 NOTE — REVIEW OF SYSTEMS
[Fatigue] : fatigue [Joint Pain] : joint pain [Fever] : no fever [Chills] : no chills [Dysphagia] : no dysphagia [Chest Pain] : no chest pain [Shortness Of Breath] : no shortness of breath [Abdominal Pain] : no abdominal pain [Vomiting] : no vomiting [Constipation] : no constipation [Diarrhea] : no diarrhea [Urinary Frequency] : no change in urinary frequency [Vaginal Discharge] : no vaginal discharge [Dysmenorrhea/Abn Vaginal Bleeding] : no dysmenorrhea/abnormal vaginal bleeding [Skin Wound] : no skin wound [Confused] : no confusion [Dizziness] : no dizziness

## 2022-10-04 NOTE — ED PROVIDER NOTE - NS ED MD DISPO DIVISION
[facility] Adjacent Tissue Transfer Text: The defect edges were debeveled with a #15 scalpel blade.  Given the location of the defect and the proximity to free margins an adjacent tissue transfer was deemed most appropriate.  Using a sterile surgical marker, an appropriate flap was drawn incorporating the defect and placing the expected incisions within the relaxed skin tension lines where possible.    The area thus outlined was incised deep to adipose tissue with a #15 scalpel blade.  The skin margins were undermined to an appropriate distance in all directions utilizing iris scissors.

## 2022-10-07 ENCOUNTER — INPATIENT (INPATIENT)
Facility: HOSPITAL | Age: 82
LOS: 2 days | Discharge: HOME | End: 2022-10-10

## 2022-10-07 VITALS
DIASTOLIC BLOOD PRESSURE: 83 MMHG | WEIGHT: 162.04 LBS | RESPIRATION RATE: 20 BRPM | OXYGEN SATURATION: 94 % | HEART RATE: 98 BPM | TEMPERATURE: 101 F | HEIGHT: 65 IN | SYSTOLIC BLOOD PRESSURE: 168 MMHG

## 2022-10-07 DIAGNOSIS — R22.40 LOCALIZED SWELLING, MASS AND LUMP, UNSPECIFIED LOWER LIMB: Chronic | ICD-10-CM

## 2022-10-07 DIAGNOSIS — Z92.89 PERSONAL HISTORY OF OTHER MEDICAL TREATMENT: Chronic | ICD-10-CM

## 2022-10-07 LAB
ALBUMIN SERPL ELPH-MCNC: 4.3 G/DL — SIGNIFICANT CHANGE UP (ref 3.5–5.2)
ALP SERPL-CCNC: 84 U/L — SIGNIFICANT CHANGE UP (ref 30–115)
ALT FLD-CCNC: 13 U/L — SIGNIFICANT CHANGE UP (ref 0–41)
ANION GAP SERPL CALC-SCNC: 10 MMOL/L — SIGNIFICANT CHANGE UP (ref 7–14)
APPEARANCE UR: CLEAR — SIGNIFICANT CHANGE UP
AST SERPL-CCNC: 16 U/L — SIGNIFICANT CHANGE UP (ref 0–41)
B-OH-BUTYR SERPL-SCNC: <0.2 MMOL/L — SIGNIFICANT CHANGE UP
BACTERIA # UR AUTO: ABNORMAL
BASE EXCESS BLDV CALC-SCNC: 4.5 MMOL/L — HIGH (ref -2–3)
BASOPHILS # BLD AUTO: 0.02 K/UL — SIGNIFICANT CHANGE UP (ref 0–0.2)
BASOPHILS NFR BLD AUTO: 0.2 % — SIGNIFICANT CHANGE UP (ref 0–1)
BILIRUB SERPL-MCNC: 0.3 MG/DL — SIGNIFICANT CHANGE UP (ref 0.2–1.2)
BILIRUB UR-MCNC: NEGATIVE — SIGNIFICANT CHANGE UP
BUN SERPL-MCNC: 14 MG/DL — SIGNIFICANT CHANGE UP (ref 10–20)
CA-I SERPL-SCNC: 1.16 MMOL/L — SIGNIFICANT CHANGE UP (ref 1.15–1.33)
CALCIUM SERPL-MCNC: 9.2 MG/DL — SIGNIFICANT CHANGE UP (ref 8.4–10.5)
CHLORIDE SERPL-SCNC: 96 MMOL/L — LOW (ref 98–110)
CO2 SERPL-SCNC: 27 MMOL/L — SIGNIFICANT CHANGE UP (ref 17–32)
COLOR SPEC: YELLOW — SIGNIFICANT CHANGE UP
CREAT SERPL-MCNC: 0.9 MG/DL — SIGNIFICANT CHANGE UP (ref 0.7–1.5)
DIFF PNL FLD: ABNORMAL
EGFR: 63 ML/MIN/1.73M2 — SIGNIFICANT CHANGE UP
EOSINOPHIL # BLD AUTO: 0 K/UL — SIGNIFICANT CHANGE UP (ref 0–0.7)
EOSINOPHIL NFR BLD AUTO: 0 % — SIGNIFICANT CHANGE UP (ref 0–8)
EPI CELLS # UR: ABNORMAL /HPF
FLUAV AG NPH QL: SIGNIFICANT CHANGE UP
FLUBV AG NPH QL: SIGNIFICANT CHANGE UP
GAS PNL BLDV: 129 MMOL/L — LOW (ref 136–145)
GAS PNL BLDV: SIGNIFICANT CHANGE UP
GLUCOSE SERPL-MCNC: 122 MG/DL — HIGH (ref 70–99)
GLUCOSE UR QL: NEGATIVE MG/DL — SIGNIFICANT CHANGE UP
HCO3 BLDV-SCNC: 29 MMOL/L — SIGNIFICANT CHANGE UP (ref 22–29)
HCT VFR BLD CALC: 35.3 % — LOW (ref 37–47)
HCT VFR BLDA CALC: 35 % — LOW (ref 39–51)
HGB BLD CALC-MCNC: 11.7 G/DL — LOW (ref 12.6–17.4)
HGB BLD-MCNC: 10.8 G/DL — LOW (ref 12–16)
IMM GRANULOCYTES NFR BLD AUTO: 0.6 % — HIGH (ref 0.1–0.3)
KETONES UR-MCNC: NEGATIVE — SIGNIFICANT CHANGE UP
LACTATE BLDV-MCNC: 0.8 MMOL/L — SIGNIFICANT CHANGE UP (ref 0.5–2)
LEUKOCYTE ESTERASE UR-ACNC: ABNORMAL
LYMPHOCYTES # BLD AUTO: 0.31 K/UL — LOW (ref 1.2–3.4)
LYMPHOCYTES # BLD AUTO: 3.5 % — LOW (ref 20.5–51.1)
MAGNESIUM SERPL-MCNC: 1.6 MG/DL — LOW (ref 1.8–2.4)
MCHC RBC-ENTMCNC: 23.7 PG — LOW (ref 27–31)
MCHC RBC-ENTMCNC: 30.6 G/DL — LOW (ref 32–37)
MCV RBC AUTO: 77.6 FL — LOW (ref 81–99)
MONOCYTES # BLD AUTO: 0.69 K/UL — HIGH (ref 0.1–0.6)
MONOCYTES NFR BLD AUTO: 7.8 % — SIGNIFICANT CHANGE UP (ref 1.7–9.3)
NEUTROPHILS # BLD AUTO: 7.73 K/UL — HIGH (ref 1.4–6.5)
NEUTROPHILS NFR BLD AUTO: 87.9 % — HIGH (ref 42.2–75.2)
NITRITE UR-MCNC: NEGATIVE — SIGNIFICANT CHANGE UP
NRBC # BLD: 0 /100 WBCS — SIGNIFICANT CHANGE UP (ref 0–0)
PCO2 BLDV: 43 MMHG — HIGH (ref 39–42)
PH BLDV: 7.44 — HIGH (ref 7.32–7.43)
PH UR: 6.5 — SIGNIFICANT CHANGE UP (ref 5–8)
PHOSPHATE SERPL-MCNC: 2.1 MG/DL — SIGNIFICANT CHANGE UP (ref 2.1–4.9)
PLATELET # BLD AUTO: 187 K/UL — SIGNIFICANT CHANGE UP (ref 130–400)
PO2 BLDV: 45 MMHG — SIGNIFICANT CHANGE UP
POTASSIUM BLDV-SCNC: 3.6 MMOL/L — SIGNIFICANT CHANGE UP (ref 3.5–5.1)
POTASSIUM SERPL-MCNC: 3.8 MMOL/L — SIGNIFICANT CHANGE UP (ref 3.5–5)
POTASSIUM SERPL-SCNC: 3.8 MMOL/L — SIGNIFICANT CHANGE UP (ref 3.5–5)
PROT SERPL-MCNC: 6.7 G/DL — SIGNIFICANT CHANGE UP (ref 6–8)
PROT UR-MCNC: ABNORMAL MG/DL
RBC # BLD: 4.55 M/UL — SIGNIFICANT CHANGE UP (ref 4.2–5.4)
RBC # FLD: 15.2 % — HIGH (ref 11.5–14.5)
RBC CASTS # UR COMP ASSIST: ABNORMAL /HPF
RSV RNA NPH QL NAA+NON-PROBE: SIGNIFICANT CHANGE UP
SAO2 % BLDV: 80.9 % — SIGNIFICANT CHANGE UP
SARS-COV-2 RNA SPEC QL NAA+PROBE: DETECTED
SODIUM SERPL-SCNC: 133 MMOL/L — LOW (ref 135–146)
SP GR SPEC: 1.01 — SIGNIFICANT CHANGE UP (ref 1.01–1.03)
TROPONIN T SERPL-MCNC: <0.01 NG/ML — SIGNIFICANT CHANGE UP
UROBILINOGEN FLD QL: 0.2 MG/DL — SIGNIFICANT CHANGE UP
WBC # BLD: 8.8 K/UL — SIGNIFICANT CHANGE UP (ref 4.8–10.8)
WBC # FLD AUTO: 8.8 K/UL — SIGNIFICANT CHANGE UP (ref 4.8–10.8)
WBC UR QL: ABNORMAL /HPF

## 2022-10-07 PROCEDURE — 71045 X-RAY EXAM CHEST 1 VIEW: CPT | Mod: 26

## 2022-10-07 PROCEDURE — 70450 CT HEAD/BRAIN W/O DYE: CPT | Mod: 26,MA

## 2022-10-07 PROCEDURE — 99285 EMERGENCY DEPT VISIT HI MDM: CPT | Mod: FS,CS

## 2022-10-07 PROCEDURE — 93010 ELECTROCARDIOGRAM REPORT: CPT

## 2022-10-07 RX ORDER — SODIUM CHLORIDE 9 MG/ML
1000 INJECTION INTRAMUSCULAR; INTRAVENOUS; SUBCUTANEOUS ONCE
Refills: 0 | Status: COMPLETED | OUTPATIENT
Start: 2022-10-07 | End: 2022-10-07

## 2022-10-07 RX ORDER — MAGNESIUM SULFATE 500 MG/ML
2 VIAL (ML) INJECTION ONCE
Refills: 0 | Status: COMPLETED | OUTPATIENT
Start: 2022-10-07 | End: 2022-10-07

## 2022-10-07 RX ORDER — ACETAMINOPHEN 500 MG
975 TABLET ORAL ONCE
Refills: 0 | Status: COMPLETED | OUTPATIENT
Start: 2022-10-07 | End: 2022-10-07

## 2022-10-07 RX ADMIN — Medication 975 MILLIGRAM(S): at 20:59

## 2022-10-07 RX ADMIN — SODIUM CHLORIDE 1000 MILLILITER(S): 9 INJECTION INTRAMUSCULAR; INTRAVENOUS; SUBCUTANEOUS at 21:01

## 2022-10-07 RX ADMIN — Medication 25 GRAM(S): at 21:59

## 2022-10-07 NOTE — H&P ADULT - ASSESSMENT
Assessment:          Plan:    #COVID       #Social placement  Social work consult  Case management      #HTN  Low sodium diet.  Continue with BP medications.  Monitor blood pressure    #HLD        Dr. Yan made aware. Assessment:          Plan:    #COVID   Contact and airborne precaution  Follow up procalcitonin   ID consult  Supplemental oxygen PRN.  Decadron 6mg daily IVP ?  Remdesivir 100mg ?    #Hypomagnesemia  Mg 1.6  Repleted in the ER.  Follow up in the AM    #Hyponatremia  Sodium 133.  Continue with IV fluids.   Will trend.    #Social placement  Social work consult  Case management      #HTN  Low sodium diet.  Continue with BP medications.  Monitor blood pressure    #HLD  Continue with Statin.      Dr. Yan made aware. Assessment:          Plan:    #Confusion most likely secondary to COVID vs UTI  Contact and airborne precaution  Follow up procalcitonin   ID consult  Supplemental oxygen PRN.  Decadron 6mg daily IVP ?  Remdesivir 100mg ?  Follow up urine culture.  Ceftriaxone     #Hypomagnesemia  Mg 1.6  Repleted in the ER.  Follow up in the AM    #Hyponatremia  Sodium 133.  Continue with IV fluids.   Will trend.    #Social placement  Social work consult  Case management      #HTN  Low sodium diet.  Continue with BP medications.  Monitor blood pressure    #HLD  Continue with Statin.      Dr. Yan made aware. Assessment:  Patient is an 81 y/o F with a pmhx of DM, HTN, cervical cancer s/p radiation therapy who presents with subjective fevers at home, mild non productive cough x 1 day. Additionally, patients family members report she lives at home alone and have noticed increased confusion, concern for a UTI.     Plan:    #Confusion most likely secondary to COVID vs UTI  Contact and airborne precaution  Follow up procalcitonin   ID consult  Supplemental oxygen PRN.  Patient with mild covid symptoms, Decadron and remdesivir not warranted at this time. Will reassess.   Follow up urine culture.  Ceftriaxone     #Hypomagnesemia  Mg 1.6  Repleted in the ER.  Follow up in the AM    #Hyponatremia  Sodium 133.  Continue with IV fluids.   Will trend.    #Social placement  Social work consult  Case management    #HTN  Low sodium diet.  Continue with BP medications.  Monitor blood pressure    #HLD  Continue with Statin.    #Cervical cancer   S/p radiation therapy.    #DM  Carb consistent diet.  FS acqhs  Insulin sliding scale.    Diet: DASH/TLC  VTE: Lovenox.    Dr. Yan made aware.

## 2022-10-07 NOTE — H&P ADULT - NSHPPHYSICALEXAM_GEN_ALL_CORE
Vital Signs Last 24 Hrs  T(C): 37.4 (07 Oct 2022 22:01), Max: 38.5 (07 Oct 2022 19:54)  T(F): 99.4 (07 Oct 2022 22:01), Max: 101.3 (07 Oct 2022 19:54)  HR: 93 (07 Oct 2022 22:01) (93 - 98)  BP: 152/67 (07 Oct 2022 22:01) (152/67 - 168/83)  RR: 20 (07 Oct 2022 22:01) (20 - 20)  SpO2: 95% (07 Oct 2022 22:01) (94% - 95%)    Parameters below as of 07 Oct 2022 22:01  Patient On (Oxygen Delivery Method): room air    GENERAL: NAD, lying in bed comfortably  HEAD:  Atraumatic, Normocephalic  EYES: EOMI, PERRLA, conjunctiva and sclera clear  ENT: Moist mucous membranes  NECK: Supple, No JVD  CHEST/LUNG: Clear to auscultation bilaterally; No rales, rhonchi, wheezing, or rubs. Unlabored respirations  HEART: Regular rate and rhythm; No murmurs, rubs, or gallops  ABDOMEN: Bowel sounds present; Soft, Nontender, Nondistended. No hepatomegally  EXTREMITIES:  2+ Peripheral Pulses, brisk capillary refill. No clubbing, cyanosis, or edema  NERVOUS SYSTEM:  Alert & Oriented X3, speech clear. No deficits   MSK: FROM all 4 extremities, full and equal strength  SKIN: No rashes or lesions

## 2022-10-07 NOTE — ED ADULT TRIAGE NOTE - CHIEF COMPLAINT QUOTE
daughter states pt has cold, cough, congestion x a couple days. started on abx and steroids today zithromax and decadron for UTI.

## 2022-10-07 NOTE — H&P ADULT - NSICDXPASTMEDICALHX_GEN_ALL_CORE_FT
PAST MEDICAL HISTORY:  Anxiety     Cervical cancer     Depression     Diabetes     HTN (hypertension)     Rheumatoid arthritis

## 2022-10-07 NOTE — ED PROVIDER NOTE - NS ED ROS FT
Constitutional: (+)fever, chills.  Eyes:  No visual changes  ENMT:  No neck pain  Cardiac:  No chest pain  Respiratory: (+) cough. no SOB  GI:  No nausea, vomiting, diarrhea, abdominal pain.  :  No dysuria, hematuria  MS:  No back pain.  Neuro: (+)ams. No headache or lightheadedness  Skin:  No skin rash  Endocrine: No history of thyroid disease or diabetes.  Except as documented in the HPI,  all other systems are negative.

## 2022-10-07 NOTE — ED PROVIDER NOTE - PHYSICAL EXAMINATION
GENERAL: Well-nourished, Well-developed. NAD.  HEAD: No visible or palpable bumps or hematomas. No ecchymosis behind ears B/L.  Eyes: PERRLA, EOMI. No asymmetry. No nystagmus. No conjunctival injection. Non-icteric sclera.  ENMT: MMM.   Neck: Supple. FROM  CVS: Normal S1,S2. No murmurs appreciated on auscultation   RESP: No use of accessory muscles. Chest rise symmetrical with good expansion. Lungs clear to auscultation B/L. No wheezing, rales, or rhonchi auscultated.  GI: Normal auscultation of bowel sounds in all 4 quadrants. Soft, Nontender, Nondistended. No guarding or rebound tenderness. No CVAT B/L.  Skin: Warm, Dry. No rashes or lesions. Good cap refill < 2 sec B/L.  EXT: Radial and pedal pulses present B/L. No calf tenderness or swelling B/L. No palpable cords. No pedal edema B/L.  Neuro: AA&O x 3. CNs II-XII grossly intact. Speaking in full sentences. No slurring of speech. No facial droop. No tremors. Sensation grossly intact. Strength 5/5 B/L.

## 2022-10-07 NOTE — H&P ADULT - NSHPLABSRESULTS_GEN_ALL_CORE
LABS:                     10.8   8.80  )-----------( 187      ( 07 Oct 2022 21:00 )             35.3     10-    133<L>  |  96<L>  |  14  ----------------------------<  122<H>  3.8   |  27  |  0.9    Ca    9.2      07 Oct 2022 21:00  Phos  2.1     10-07  Mg     1.6     10-07    TPro  6.7  /  Alb  4.3  /  TBili  0.3  /  DBili  x   /  AST  16  /  ALT  13  /  AlkPhos  84  10    LIVER FUNCTIONS - ( 07 Oct 2022 21:00 )  Alb: 4.3 g/dL / Pro: 6.7 g/dL / ALK PHOS: 84 U/L / ALT: 13 U/L / AST: 16 U/L / GGT: x           Urinalysis Basic - ( 07 Oct 2022 20:07 )    Color: Yellow / Appearance: Clear / S.010 / pH: x  Gluc: x / Ketone: Negative  / Bili: Negative / Urobili: 0.2 mg/dL   Blood: x / Protein: Trace mg/dL / Nitrite: Negative   Leuk Esterase: Trace / RBC: 3-5 /HPF / WBC 6-10 /HPF   Sq Epi: x / Non Sq Epi: Few /HPF / Bacteria: Few  ++++++++++++++++++++++++++++++++++++++++++++++++++++++++++  < from: CT Head No Cont (10.07.22 @ 20:40) >  IMPRESSION:  No evidence of acute intracranial hemorrhage or large territorial infarct.  Stable mild chronic microvascular changes.  --- End of Report ---    FRANCIE ZAPATA MD; Resident Radiologist  This document has been electronically signed.  CHAVA PETTIT MD; Attending Radiologist  This document has been electronically signed. Oct  7 2022  9:07PM    < end of copied text >

## 2022-10-07 NOTE — H&P ADULT - HISTORY OF PRESENT ILLNESS
Patient is an 81 y/o F with a pmhx of DM, HTN, cervical cancer s/p radiation therapy who presents with subjective fevers at home, mild non productive cough x 1 day. Additionally, patients family members report she lives at home alone and have noticed increased confusion, concern for a UTI. Patient denies urinary complaints, chest pain, abdominal pain, headache. Per family, they report they can no longer care for her and are requesting if she can be placed in a nursing facility.

## 2022-10-07 NOTE — ED PROVIDER NOTE - OBJECTIVE STATEMENT
82-year-old female past medical history hypertension, diabetes presenting to the ED with her daughters for evaluation of confusion and fever today.  Patient daughter reports she has been experiencing congestion, dry cough for the past few days and was started on steroids and Z-Francisco.  Patient's daughters report that in the past when patient has UTI she gets confused.  Patient's daughter states that she lives alone and is usually able to perform ADLs however today due to confusion and is unable to.  Denies abdominal pain, nausea, vomiting, headache, dizziness, chest pain, shortness of breath.

## 2022-10-07 NOTE — ED PROVIDER NOTE - CLINICAL SUMMARY MEDICAL DECISION MAKING FREE TEXT BOX
I personally evaluated the patient. I reviewed the Resident´s or Physician Assistant´s note (as assigned above), and agree with the findings and plan except as documented in my note.  Patient evaluated for altered mental status.  Labs, chest x-ray, EKG, CT head performed in the ED.  Patient noted to have positive COVID test.  Patient still slightly confused in the ED.  Admitted to medicine for further evaluation and treatment.

## 2022-10-08 LAB
ALBUMIN SERPL ELPH-MCNC: 4.2 G/DL — SIGNIFICANT CHANGE UP (ref 3.5–5.2)
ALP SERPL-CCNC: 79 U/L — SIGNIFICANT CHANGE UP (ref 30–115)
ALT FLD-CCNC: 13 U/L — SIGNIFICANT CHANGE UP (ref 0–41)
ANION GAP SERPL CALC-SCNC: 12 MMOL/L — SIGNIFICANT CHANGE UP (ref 7–14)
AST SERPL-CCNC: 15 U/L — SIGNIFICANT CHANGE UP (ref 0–41)
BILIRUB DIRECT SERPL-MCNC: <0.2 MG/DL — SIGNIFICANT CHANGE UP (ref 0–0.3)
BILIRUB INDIRECT FLD-MCNC: SIGNIFICANT CHANGE UP MG/DL (ref 0.2–1.2)
BILIRUB SERPL-MCNC: <0.2 MG/DL — SIGNIFICANT CHANGE UP (ref 0.2–1.2)
BUN SERPL-MCNC: 14 MG/DL — SIGNIFICANT CHANGE UP (ref 10–20)
CALCIUM SERPL-MCNC: 9.1 MG/DL — SIGNIFICANT CHANGE UP (ref 8.4–10.5)
CHLORIDE SERPL-SCNC: 103 MMOL/L — SIGNIFICANT CHANGE UP (ref 98–110)
CO2 SERPL-SCNC: 24 MMOL/L — SIGNIFICANT CHANGE UP (ref 17–32)
CREAT SERPL-MCNC: 0.8 MG/DL — SIGNIFICANT CHANGE UP (ref 0.7–1.5)
CREAT SERPL-MCNC: 0.9 MG/DL — SIGNIFICANT CHANGE UP (ref 0.7–1.5)
EGFR: 63 ML/MIN/1.73M2 — SIGNIFICANT CHANGE UP
EGFR: 73 ML/MIN/1.73M2 — SIGNIFICANT CHANGE UP
GLUCOSE BLDC GLUCOMTR-MCNC: 174 MG/DL — HIGH (ref 70–99)
GLUCOSE BLDC GLUCOMTR-MCNC: 212 MG/DL — HIGH (ref 70–99)
GLUCOSE BLDC GLUCOMTR-MCNC: 219 MG/DL — HIGH (ref 70–99)
GLUCOSE SERPL-MCNC: 268 MG/DL — HIGH (ref 70–99)
HCT VFR BLD CALC: 36.4 % — LOW (ref 37–47)
HGB BLD-MCNC: 10.9 G/DL — LOW (ref 12–16)
INR BLD: 1.03 RATIO — SIGNIFICANT CHANGE UP (ref 0.65–1.3)
MAGNESIUM SERPL-MCNC: 2 MG/DL — SIGNIFICANT CHANGE UP (ref 1.8–2.4)
MCHC RBC-ENTMCNC: 23.7 PG — LOW (ref 27–31)
MCHC RBC-ENTMCNC: 29.9 G/DL — LOW (ref 32–37)
MCV RBC AUTO: 79.1 FL — LOW (ref 81–99)
NRBC # BLD: 0 /100 WBCS — SIGNIFICANT CHANGE UP (ref 0–0)
PHOSPHATE SERPL-MCNC: 3.3 MG/DL — SIGNIFICANT CHANGE UP (ref 2.1–4.9)
PLATELET # BLD AUTO: 184 K/UL — SIGNIFICANT CHANGE UP (ref 130–400)
POTASSIUM SERPL-MCNC: 4.3 MMOL/L — SIGNIFICANT CHANGE UP (ref 3.5–5)
POTASSIUM SERPL-SCNC: 4.3 MMOL/L — SIGNIFICANT CHANGE UP (ref 3.5–5)
PROCALCITONIN SERPL-MCNC: 0.06 NG/ML — SIGNIFICANT CHANGE UP (ref 0.02–0.1)
PROT SERPL-MCNC: 6.7 G/DL — SIGNIFICANT CHANGE UP (ref 6–8)
PROTHROM AB SERPL-ACNC: 11.7 SEC — SIGNIFICANT CHANGE UP (ref 9.95–12.87)
RBC # BLD: 4.6 M/UL — SIGNIFICANT CHANGE UP (ref 4.2–5.4)
RBC # FLD: 15.2 % — HIGH (ref 11.5–14.5)
SODIUM SERPL-SCNC: 139 MMOL/L — SIGNIFICANT CHANGE UP (ref 135–146)
WBC # BLD: 6.26 K/UL — SIGNIFICANT CHANGE UP (ref 4.8–10.8)
WBC # FLD AUTO: 6.26 K/UL — SIGNIFICANT CHANGE UP (ref 4.8–10.8)

## 2022-10-08 RX ORDER — DEXTROSE 50 % IN WATER 50 %
25 SYRINGE (ML) INTRAVENOUS ONCE
Refills: 0 | Status: DISCONTINUED | OUTPATIENT
Start: 2022-10-08 | End: 2022-10-10

## 2022-10-08 RX ORDER — SODIUM CHLORIDE 9 MG/ML
1000 INJECTION INTRAMUSCULAR; INTRAVENOUS; SUBCUTANEOUS
Refills: 0 | Status: DISCONTINUED | OUTPATIENT
Start: 2022-10-08 | End: 2022-10-10

## 2022-10-08 RX ORDER — REMDESIVIR 5 MG/ML
200 INJECTION INTRAVENOUS EVERY 24 HOURS
Refills: 0 | Status: COMPLETED | OUTPATIENT
Start: 2022-10-08 | End: 2022-10-08

## 2022-10-08 RX ORDER — ASPIRIN/CALCIUM CARB/MAGNESIUM 324 MG
81 TABLET ORAL DAILY
Refills: 0 | Status: DISCONTINUED | OUTPATIENT
Start: 2022-10-08 | End: 2022-10-10

## 2022-10-08 RX ORDER — DEXTROSE 50 % IN WATER 50 %
15 SYRINGE (ML) INTRAVENOUS ONCE
Refills: 0 | Status: DISCONTINUED | OUTPATIENT
Start: 2022-10-08 | End: 2022-10-10

## 2022-10-08 RX ORDER — SODIUM CHLORIDE 9 MG/ML
1000 INJECTION, SOLUTION INTRAVENOUS
Refills: 0 | Status: DISCONTINUED | OUTPATIENT
Start: 2022-10-08 | End: 2022-10-10

## 2022-10-08 RX ORDER — FOLIC ACID 0.8 MG
1 TABLET ORAL DAILY
Refills: 0 | Status: DISCONTINUED | OUTPATIENT
Start: 2022-10-08 | End: 2022-10-10

## 2022-10-08 RX ORDER — DEXTROSE 50 % IN WATER 50 %
12.5 SYRINGE (ML) INTRAVENOUS ONCE
Refills: 0 | Status: DISCONTINUED | OUTPATIENT
Start: 2022-10-08 | End: 2022-10-10

## 2022-10-08 RX ORDER — REMDESIVIR 5 MG/ML
100 INJECTION INTRAVENOUS EVERY 24 HOURS
Refills: 0 | Status: COMPLETED | OUTPATIENT
Start: 2022-10-09 | End: 2022-10-10

## 2022-10-08 RX ORDER — INSULIN LISPRO 100/ML
VIAL (ML) SUBCUTANEOUS
Refills: 0 | Status: DISCONTINUED | OUTPATIENT
Start: 2022-10-08 | End: 2022-10-10

## 2022-10-08 RX ORDER — ATORVASTATIN CALCIUM 80 MG/1
10 TABLET, FILM COATED ORAL AT BEDTIME
Refills: 0 | Status: DISCONTINUED | OUTPATIENT
Start: 2022-10-08 | End: 2022-10-10

## 2022-10-08 RX ORDER — ENOXAPARIN SODIUM 100 MG/ML
40 INJECTION SUBCUTANEOUS EVERY 24 HOURS
Refills: 0 | Status: DISCONTINUED | OUTPATIENT
Start: 2022-10-08 | End: 2022-10-10

## 2022-10-08 RX ORDER — AMLODIPINE BESYLATE 2.5 MG/1
5 TABLET ORAL DAILY
Refills: 0 | Status: DISCONTINUED | OUTPATIENT
Start: 2022-10-08 | End: 2022-10-10

## 2022-10-08 RX ORDER — GLUCAGON INJECTION, SOLUTION 0.5 MG/.1ML
1 INJECTION, SOLUTION SUBCUTANEOUS ONCE
Refills: 0 | Status: DISCONTINUED | OUTPATIENT
Start: 2022-10-08 | End: 2022-10-10

## 2022-10-08 RX ORDER — CEFTRIAXONE 500 MG/1
1000 INJECTION, POWDER, FOR SOLUTION INTRAMUSCULAR; INTRAVENOUS EVERY 24 HOURS
Refills: 0 | Status: COMPLETED | OUTPATIENT
Start: 2022-10-08 | End: 2022-10-10

## 2022-10-08 RX ORDER — LISINOPRIL 2.5 MG/1
20 TABLET ORAL DAILY
Refills: 0 | Status: DISCONTINUED | OUTPATIENT
Start: 2022-10-08 | End: 2022-10-10

## 2022-10-08 RX ADMIN — Medication 1: at 11:34

## 2022-10-08 RX ADMIN — Medication 2: at 08:25

## 2022-10-08 RX ADMIN — Medication 81 MILLIGRAM(S): at 13:00

## 2022-10-08 RX ADMIN — SODIUM CHLORIDE 100 MILLILITER(S): 9 INJECTION INTRAMUSCULAR; INTRAVENOUS; SUBCUTANEOUS at 05:27

## 2022-10-08 RX ADMIN — ATORVASTATIN CALCIUM 10 MILLIGRAM(S): 80 TABLET, FILM COATED ORAL at 21:35

## 2022-10-08 RX ADMIN — Medication 2: at 16:53

## 2022-10-08 RX ADMIN — Medication 1 MILLIGRAM(S): at 13:01

## 2022-10-08 RX ADMIN — ENOXAPARIN SODIUM 40 MILLIGRAM(S): 100 INJECTION SUBCUTANEOUS at 05:26

## 2022-10-08 RX ADMIN — AMLODIPINE BESYLATE 5 MILLIGRAM(S): 2.5 TABLET ORAL at 05:27

## 2022-10-08 RX ADMIN — LISINOPRIL 20 MILLIGRAM(S): 2.5 TABLET ORAL at 05:27

## 2022-10-08 RX ADMIN — CEFTRIAXONE 100 MILLIGRAM(S): 500 INJECTION, POWDER, FOR SOLUTION INTRAMUSCULAR; INTRAVENOUS at 09:49

## 2022-10-08 RX ADMIN — REMDESIVIR 500 MILLIGRAM(S): 5 INJECTION INTRAVENOUS at 09:48

## 2022-10-08 NOTE — CONSULT NOTE ADULT - SUBJECTIVE AND OBJECTIVE BOX
MARGO JOY  82y, Female  Allergy: No Known Allergies      All historical available data reviewed.    HPI:  Patient is an 81 y/o F with a pmhx of DM, HTN, cervical cancer s/p radiation therapy who presents with subjective fevers at home, mild non productive cough x 1 day. Additionally, patients family members report she lives at home alone and have noticed increased confusion, concern for a UTI. Patient denies urinary complaints, chest pain, abdominal pain, headache. Per family, they report they can no longer care for her and are requesting if she can be placed in a nursing facility.  (07 Oct 2022 22:37)    FAMILY HISTORY:  No pertinent family history in first degree relatives      PAST MEDICAL & SURGICAL HISTORY:  Diabetes      HTN (hypertension)      Rheumatoid arthritis      Depression      Anxiety      Cervical cancer      History of dental surgery      Foot mass  left foot mass removal 20 years ago            VITALS:  T(F): 97.2, Max: 101.3 (10-07-22 @ 19:54)  HR: 95  BP: 134/63  RR: 20Vital Signs Last 24 Hrs  T(C): 36.2 (08 Oct 2022 01:00), Max: 38.5 (07 Oct 2022 19:54)  T(F): 97.2 (08 Oct 2022 01:00), Max: 101.3 (07 Oct 2022 19:54)  HR: 95 (08 Oct 2022 01:00) (88 - 98)  BP: 134/63 (08 Oct 2022 01:00) (116/56 - 168/83)  BP(mean): --  RR: 20 (07 Oct 2022 22:01) (20 - 20)  SpO2: 95% (07 Oct 2022 22:01) (94% - 95%)    Parameters below as of 07 Oct 2022 22:01  Patient On (Oxygen Delivery Method): room air        TESTS & MEASUREMENTS:                        10.8   8.80  )-----------( 187      ( 07 Oct 2022 21:00 )             35.3     10-07    133<L>  |  96<L>  |  14  ----------------------------<  122<H>  3.8   |  27  |  0.9    Ca    9.2      07 Oct 2022 21:00  Phos  2.1     10-  Mg     1.6     10-07    TPro  6.7  /  Alb  4.3  /  TBili  0.3  /  DBili  x   /  AST  16  /  ALT  13  /  AlkPhos  84  10-07    LIVER FUNCTIONS - ( 07 Oct 2022 21:00 )  Alb: 4.3 g/dL / Pro: 6.7 g/dL / ALK PHOS: 84 U/L / ALT: 13 U/L / AST: 16 U/L / GGT: x             Urinalysis Basic - ( 07 Oct 2022 20:07 )    Color: Yellow / Appearance: Clear / S.010 / pH: x  Gluc: x / Ketone: Negative  / Bili: Negative / Urobili: 0.2 mg/dL   Blood: x / Protein: Trace mg/dL / Nitrite: Negative   Leuk Esterase: Trace / RBC: 3-5 /HPF / WBC 6-10 /HPF   Sq Epi: x / Non Sq Epi: Few /HPF / Bacteria: Few          RADIOLOGY & ADDITIONAL TESTS:  Personal review of radiological diagnostics performed  Echo and EKG results noted when applicable.     MEDICATIONS:  amLODIPine   Tablet 5 milliGRAM(s) Oral daily  aspirin enteric coated 81 milliGRAM(s) Oral daily  atorvastatin 10 milliGRAM(s) Oral at bedtime  dextrose 5%. 1000 milliLiter(s) IV Continuous <Continuous>  dextrose 5%. 1000 milliLiter(s) IV Continuous <Continuous>  dextrose 50% Injectable 25 Gram(s) IV Push once  dextrose 50% Injectable 12.5 Gram(s) IV Push once  dextrose 50% Injectable 25 Gram(s) IV Push once  dextrose Oral Gel 15 Gram(s) Oral once PRN  enoxaparin Injectable 40 milliGRAM(s) SubCutaneous every 24 hours  folic acid 1 milliGRAM(s) Oral daily  glucagon  Injectable 1 milliGRAM(s) IntraMuscular once  insulin lispro (ADMELOG) corrective regimen sliding scale   SubCutaneous three times a day before meals  lisinopril 20 milliGRAM(s) Oral daily  sodium chloride 0.9%. 1000 milliLiter(s) IV Continuous <Continuous>      ANTIBIOTICS:

## 2022-10-08 NOTE — CHART NOTE - NSCHARTNOTEFT_GEN_A_CORE
Pt noted to be sleeping this AM, appearing to be resting comfortably in no acute distress. Spoke with PILY Santana later in the afternoon, reporting pt reportedly to be "feeling okay" overall with no acute complaints.  mg iv was ordered for today & 100 mg IV x 2 days ordered to start tomorrow as per ID recs    Pt d/w Dr. Yan

## 2022-10-08 NOTE — PATIENT PROFILE ADULT - FALL HARM RISK - RISK INTERVENTIONS

## 2022-10-08 NOTE — CONSULT NOTE ADULT - ASSESSMENT
83 y/o F with a pmhx of DM, HTN, cervical cancer s/p radiation therapy who presents with subjective fevers at home, mild non productive cough x 1 day. Additionally, patients family members report she lives at home alone and have noticed increased confusion, concern for a UTI. Patient denies urinary complaints, chest pain, abdominal pain, headache. Per family, they report they can no longer care for her and are requesting if she can be placed in a nursing facility.    IMPRESSION;   COVID 19 with mild illness- pt has physiological /non pulmonary complaints with no SOB and with a normal CXR  Comfortable at rest  Pt is in the early viral replicative phase based on the timeline/onset of symptoms.  No bacterial PNA  Clinically no pyelo    RECOMMENDATIONS;   mg iv on Day 1, then 100 mg iv D2 and D3  no steroids  Off loading to prevent pressure sores and preventive measures to avoid aspiration

## 2022-10-09 LAB
ALBUMIN SERPL ELPH-MCNC: 3.7 G/DL — SIGNIFICANT CHANGE UP (ref 3.5–5.2)
ALP SERPL-CCNC: 71 U/L — SIGNIFICANT CHANGE UP (ref 30–115)
ALT FLD-CCNC: 12 U/L — SIGNIFICANT CHANGE UP (ref 0–41)
ANION GAP SERPL CALC-SCNC: 8 MMOL/L — SIGNIFICANT CHANGE UP (ref 7–14)
AST SERPL-CCNC: 15 U/L — SIGNIFICANT CHANGE UP (ref 0–41)
BILIRUB DIRECT SERPL-MCNC: <0.2 MG/DL — SIGNIFICANT CHANGE UP (ref 0–0.3)
BILIRUB INDIRECT FLD-MCNC: SIGNIFICANT CHANGE UP MG/DL (ref 0.2–1.2)
BILIRUB SERPL-MCNC: <0.2 MG/DL — SIGNIFICANT CHANGE UP (ref 0.2–1.2)
BUN SERPL-MCNC: 10 MG/DL — SIGNIFICANT CHANGE UP (ref 10–20)
CALCIUM SERPL-MCNC: 8.6 MG/DL — SIGNIFICANT CHANGE UP (ref 8.4–10.5)
CHLORIDE SERPL-SCNC: 103 MMOL/L — SIGNIFICANT CHANGE UP (ref 98–110)
CO2 SERPL-SCNC: 27 MMOL/L — SIGNIFICANT CHANGE UP (ref 17–32)
CREAT SERPL-MCNC: 0.7 MG/DL — SIGNIFICANT CHANGE UP (ref 0.7–1.5)
CREAT SERPL-MCNC: 0.9 MG/DL — SIGNIFICANT CHANGE UP (ref 0.7–1.5)
EGFR: 63 ML/MIN/1.73M2 — SIGNIFICANT CHANGE UP
EGFR: 86 ML/MIN/1.73M2 — SIGNIFICANT CHANGE UP
GLUCOSE BLDC GLUCOMTR-MCNC: 181 MG/DL — HIGH (ref 70–99)
GLUCOSE BLDC GLUCOMTR-MCNC: 205 MG/DL — HIGH (ref 70–99)
GLUCOSE BLDC GLUCOMTR-MCNC: 247 MG/DL — HIGH (ref 70–99)
GLUCOSE BLDC GLUCOMTR-MCNC: 290 MG/DL — HIGH (ref 70–99)
GLUCOSE SERPL-MCNC: 212 MG/DL — HIGH (ref 70–99)
HCT VFR BLD CALC: 35.7 % — LOW (ref 37–47)
HGB BLD-MCNC: 10.8 G/DL — LOW (ref 12–16)
INR BLD: 1.06 RATIO — SIGNIFICANT CHANGE UP (ref 0.65–1.3)
MAGNESIUM SERPL-MCNC: 1.7 MG/DL — LOW (ref 1.8–2.4)
MCHC RBC-ENTMCNC: 24 PG — LOW (ref 27–31)
MCHC RBC-ENTMCNC: 30.3 G/DL — LOW (ref 32–37)
MCV RBC AUTO: 79.3 FL — LOW (ref 81–99)
NRBC # BLD: 0 /100 WBCS — SIGNIFICANT CHANGE UP (ref 0–0)
PLATELET # BLD AUTO: 173 K/UL — SIGNIFICANT CHANGE UP (ref 130–400)
POTASSIUM SERPL-MCNC: 4.3 MMOL/L — SIGNIFICANT CHANGE UP (ref 3.5–5)
POTASSIUM SERPL-SCNC: 4.3 MMOL/L — SIGNIFICANT CHANGE UP (ref 3.5–5)
PROT SERPL-MCNC: 6 G/DL — SIGNIFICANT CHANGE UP (ref 6–8)
PROTHROM AB SERPL-ACNC: 12.1 SEC — SIGNIFICANT CHANGE UP (ref 9.95–12.87)
RBC # BLD: 4.5 M/UL — SIGNIFICANT CHANGE UP (ref 4.2–5.4)
RBC # FLD: 15.4 % — HIGH (ref 11.5–14.5)
SODIUM SERPL-SCNC: 138 MMOL/L — SIGNIFICANT CHANGE UP (ref 135–146)
WBC # BLD: 3.49 K/UL — LOW (ref 4.8–10.8)
WBC # FLD AUTO: 3.49 K/UL — LOW (ref 4.8–10.8)

## 2022-10-09 RX ORDER — MAGNESIUM SULFATE 500 MG/ML
2 VIAL (ML) INJECTION ONCE
Refills: 0 | Status: COMPLETED | OUTPATIENT
Start: 2022-10-09 | End: 2022-10-09

## 2022-10-09 RX ADMIN — Medication 1: at 11:30

## 2022-10-09 RX ADMIN — ENOXAPARIN SODIUM 40 MILLIGRAM(S): 100 INJECTION SUBCUTANEOUS at 05:43

## 2022-10-09 RX ADMIN — Medication 3: at 16:40

## 2022-10-09 RX ADMIN — AMLODIPINE BESYLATE 5 MILLIGRAM(S): 2.5 TABLET ORAL at 05:43

## 2022-10-09 RX ADMIN — REMDESIVIR 500 MILLIGRAM(S): 5 INJECTION INTRAVENOUS at 07:49

## 2022-10-09 RX ADMIN — Medication 1 MILLIGRAM(S): at 11:19

## 2022-10-09 RX ADMIN — Medication 25 GRAM(S): at 16:18

## 2022-10-09 RX ADMIN — LISINOPRIL 20 MILLIGRAM(S): 2.5 TABLET ORAL at 05:43

## 2022-10-09 RX ADMIN — Medication 81 MILLIGRAM(S): at 11:19

## 2022-10-09 RX ADMIN — Medication 2: at 07:50

## 2022-10-09 RX ADMIN — ATORVASTATIN CALCIUM 10 MILLIGRAM(S): 80 TABLET, FILM COATED ORAL at 21:30

## 2022-10-09 RX ADMIN — CEFTRIAXONE 100 MILLIGRAM(S): 500 INJECTION, POWDER, FOR SOLUTION INTRAMUSCULAR; INTRAVENOUS at 09:25

## 2022-10-09 NOTE — PROGRESS NOTE ADULT - SUBJECTIVE AND OBJECTIVE BOX
Name: JOY ARAGON  Age: 82y  Gender: Female    Pt was seen and examined.   c/o:  doing great, has no ocmplaints    Allergies:  No Known Allergies      PHYSICAL EXAM:    Vitals:  T(C): 36.6 (10-08-22 @ 21:36), Max: 36.6 (10-08-22 @ 21:36)  HR: 80 (10-08-22 @ 21:36) (79 - 95)  BP: 147/65 (10-08-22 @ 21:36) (134/59 - 171/77)  RR: 18 (10-08-22 @ 21:36) (18 - 18)  SpO2: --  Wt(kg): --Vital Signs Last 24 Hrs  T(C): 36.6 (08 Oct 2022 21:36), Max: 36.6 (08 Oct 2022 21:36)  T(F): 97.8 (08 Oct 2022 21:36), Max: 97.8 (08 Oct 2022 21:36)  HR: 80 (08 Oct 2022 21:36) (79 - 95)  BP: 147/65 (08 Oct 2022 21:36) (134/59 - 171/77)  BP(mean): --  RR: 18 (08 Oct 2022 21:36) (18 - 18)  SpO2: --          NECK: Supple, No JVD  CHEST/LUNG: CTA, B/L, No rales, rhonchi, wheezing, or rubs  HEART: S1,S2, N1 Regular rate and rhythm; No murmurs, rubs, or gallops  ABDOMEN: Soft, Nontender, Nondistended; Bowel sounds present  EXTREMITIES:  2+ Peripheral Pulses, No clubbing, cyanosis, or edema      LABS:                        10.9   6.26  )-----------( 184      ( 08 Oct 2022 05:54 )             36.4     10-08    x   |  x   |  x   ----------------------------<  x   x    |  x   |  0.8    Ca    9.1      08 Oct 2022 05:54  Phos  3.3     10-08  Mg     2.0     10-08    TPro  6.7  /  Alb  4.2  /  TBili  <0.2  /  DBili  <0.2  /  AST  15  /  ALT  13  /  AlkPhos  79  10-08    LIVER FUNCTIONS - ( 08 Oct 2022 12:22 )  Alb: 4.2 g/dL / Pro: 6.7 g/dL / ALK PHOS: 79 U/L / ALT: 13 U/L / AST: 15 U/L / GGT: x           CARDIAC MARKERS ( 07 Oct 2022 21:00 )  x     / <0.01 ng/mL / x     / x     / x            MEDICATIONS  (STANDING):  amLODIPine   Tablet 5 milliGRAM(s) Oral daily  aspirin enteric coated 81 milliGRAM(s) Oral daily  atorvastatin 10 milliGRAM(s) Oral at bedtime  cefTRIAXone   IVPB 1000 milliGRAM(s) IV Intermittent every 24 hours  dextrose 5%. 1000 milliLiter(s) (50 mL/Hr) IV Continuous <Continuous>  dextrose 5%. 1000 milliLiter(s) (100 mL/Hr) IV Continuous <Continuous>  dextrose 50% Injectable 25 Gram(s) IV Push once  dextrose 50% Injectable 12.5 Gram(s) IV Push once  dextrose 50% Injectable 25 Gram(s) IV Push once  enoxaparin Injectable 40 milliGRAM(s) SubCutaneous every 24 hours  folic acid 1 milliGRAM(s) Oral daily  glucagon  Injectable 1 milliGRAM(s) IntraMuscular once  insulin lispro (ADMELOG) corrective regimen sliding scale   SubCutaneous three times a day before meals  lisinopril 20 milliGRAM(s) Oral daily  remdesivir  IVPB 100 milliGRAM(s) IV Intermittent every 24 hours  sodium chloride 0.9%. 1000 milliLiter(s) (100 mL/Hr) IV Continuous <Continuous>        RADIOLOGY & ADDITIONAL TESTS:    Imaging Personally Reviewed:  [ ] YES  [ ] NO    A/P:  Covid 19 positive     clinically asymptomatic  RDV per ID  d/c planing

## 2022-10-09 NOTE — PROGRESS NOTE ADULT - ASSESSMENT
83 y/o F with a pmhx of DM, HTN, cervical cancer s/p radiation therapy who presents with subjective fevers at home, mild non productive cough x 1 day. Additionally, patients family members report she lives at home alone and have noticed increased confusion, concern for a UTI. Patient denies urinary complaints, chest pain, abdominal pain, headache. Per family, they report they can no longer care for her and are requesting if she can be placed in a nursing facility.    IMPRESSION;   COVID 19 with mild illness- pt has physiological /non pulmonary complaints with no SOB and with a normal CXR  Comfortable at rest  Pt is in the early viral replicative phase based on the timeline/onset of symptoms.  No bacterial PNA  Clinically no pyelo    RECOMMENDATIONS;   mg iv on Day 1, then 100 mg iv D2 and D3  no steroids  Off loading to prevent pressure sores and preventive measures to avoid aspiration      81 y/o F with a pmhx of DM, HTN, cervical cancer s/p radiation therapy who presents with subjective fevers at home, mild non productive cough x 1 day. Additionally, patients family members report she lives at home alone and have noticed increased confusion, concern for a UTI. Patient denies urinary complaints, chest pain, abdominal pain, headache. Per family, they report they can no longer care for her and are requesting if she can be placed in a nursing facility.    IMPRESSION;   COVID 19 with mild illness- pt has physiological /non pulmonary complaints with no SOB and with a normal CXR-  mg iv on Day 1, then 100 mg iv D2 and D3  Comfortable at rest  Pt is in the early viral replicative phase based on the timeline/onset of symptoms.  No bacterial PNA  Clinically no pyelo    would recommend:    1. Continue Remdesivir to complete the 3 days course  2. Aspiration precaution  3. Supportive care including AC  4. COVID Precautions  5. OOB to chair     Attending Attestation:    Spent more than 35 minutes on total encounter, more than 50 % of the visit was spent counseling and/or coordinating care by the Attending physician.

## 2022-10-09 NOTE — PROGRESS NOTE ADULT - SUBJECTIVE AND OBJECTIVE BOX
Patient is seen and examined at the bed side, is afebrile.        REVIEW OF SYSTEMS: All other review systems are negative      ALLERGIES: No Known Allergies        Vital Signs Last 24 Hrs  T(C): 36.2 (09 Oct 2022 14:17), Max: 36.6 (08 Oct 2022 21:36)  T(F): 97.1 (09 Oct 2022 14:17), Max: 97.8 (08 Oct 2022 21:36)  HR: 87 (09 Oct 2022 14:17) (80 - 87)  BP: 118/83 (09 Oct 2022 14:17) (118/83 - 147/65)  BP(mean): --  RR: 18 (09 Oct 2022 14:17) (18 - 18)  SpO2: --        PHYSICAL EXAM:    -please refer to Primary team's note        LABS:                        10.8   3.49  )-----------( 173      ( 09 Oct 2022 07:36 )             35.7       10-    138  |  103  |  10  ----------------------------<  212<H>  4.3   |  27  |  0.7    Ca    8.6      09 Oct 2022 07:36  Phos  3.3     10-08  Mg     1.7     10-    TPro  6.0  /  Alb  3.7  /  TBili  <0.2  /  DBili  <0.2  /  AST  15  /  ALT  12  /  AlkPhos  71  10-09    PT/INR - ( 09 Oct 2022 07:36 )   PT: 12.10 sec;   INR: 1.06 ratio           CAPILLARY BLOOD GLUCOSE  POCT Blood Glucose.: 290 mg/dL (09 Oct 2022 16:27)  POCT Blood Glucose.: 181 mg/dL (09 Oct 2022 11:25)  POCT Blood Glucose.: 205 mg/dL (09 Oct 2022 07:31)        Urinalysis Basic - ( 07 Oct 2022 20:07 )  Color: Yellow / Appearance: Clear / S.010 / pH: x  Gluc: x / Ketone: Negative  / Bili: Negative / Urobili: 0.2 mg/dL   Blood: x / Protein: Trace mg/dL / Nitrite: Negative   Leuk Esterase: Trace / RBC: 3-5 /HPF / WBC 6-10 /HPF   Sq Epi: x / Non Sq Epi: Few /HPF / Bacteria: Few        pProcalcitonin, Serum (10.08.22 @ 11:11)   Procalcitonin, Serum: 0.06    MEDICATIONS  (STANDING):  amLODIPine   Tablet 5 milliGRAM(s) Oral daily  aspirin enteric coated 81 milliGRAM(s) Oral daily  atorvastatin 10 milliGRAM(s) Oral at bedtime  cefTRIAXone   IVPB 1000 milliGRAM(s) IV Intermittent every 24 hours  dextrose 5%. 1000 milliLiter(s) (100 mL/Hr) IV Continuous <Continuous>  dextrose 5%. 1000 milliLiter(s) (50 mL/Hr) IV Continuous <Continuous>  dextrose 50% Injectable 25 Gram(s) IV Push once  dextrose 50% Injectable 12.5 Gram(s) IV Push once  dextrose 50% Injectable 25 Gram(s) IV Push once  enoxaparin Injectable 40 milliGRAM(s) SubCutaneous every 24 hours  folic acid 1 milliGRAM(s) Oral daily  glucagon  Injectable 1 milliGRAM(s) IntraMuscular once  insulin lispro (ADMELOG) corrective regimen sliding scale   SubCutaneous three times a day before meals  lisinopril 20 milliGRAM(s) Oral daily  remdesivir  IVPB 100 milliGRAM(s) IV Intermittent every 24 hours  sodium chloride 0.9%. 1000 milliLiter(s) (100 mL/Hr) IV Continuous <Continuous>    MEDICATIONS  (PRN):  dextrose Oral Gel 15 Gram(s) Oral once PRN Blood Glucose LESS THAN 70 milliGRAM(s)/deciliter        RADIOLOGY & ADDITIONAL TESTS:    < from: CT Head No Cont (10.07.22 @ 20:40) >  No evidence of acute intracranial hemorrhage or large territorial infarct.    Stable mild chronic microvascular changes.    < end of copied text >      < from: Xray Chest 1 View- PORTABLE-Urgent (10.07.22 @ 20:40) >    Cardiac magnification/cardiomegaly.        MICROBIOLOGY DATA:    Flu With COVID-19 By MICHELLE (10.07.22 @ 20:20)   SARS-CoV-2 Result: Detected                Patient is seen at the bed side, is afebrile. She is tolerating Remdesivir well and saturating 98 % in Room Air.      REVIEW OF SYSTEMS: All other review systems are negative      ALLERGIES: No Known Allergies        Vital Signs Last 24 Hrs  T(C): 36.2 (09 Oct 2022 14:17), Max: 36.6 (08 Oct 2022 21:36)  T(F): 97.1 (09 Oct 2022 14:17), Max: 97.8 (08 Oct 2022 21:36)  HR: 87 (09 Oct 2022 14:17) (80 - 87)  BP: 118/83 (09 Oct 2022 14:17) (118/83 - 147/65)  BP(mean): --  RR: 18 (09 Oct 2022 14:17) (18 - 18)  SpO2: --        PHYSICAL EXAM:    -please refer to Primary team's note        LABS:                        10.8   3.49  )-----------( 173      ( 09 Oct 2022 07:36 )             35.7       10-    138  |  103  |  10  ----------------------------<  212<H>  4.3   |  27  |  0.7    Ca    8.6      09 Oct 2022 07:36  Phos  3.3     10-08  Mg     1.7     10-    TPro  6.0  /  Alb  3.7  /  TBili  <0.2  /  DBili  <0.2  /  AST  15  /  ALT  12  /  AlkPhos  71  10-09    PT/INR - ( 09 Oct 2022 07:36 )   PT: 12.10 sec;   INR: 1.06 ratio           CAPILLARY BLOOD GLUCOSE  POCT Blood Glucose.: 290 mg/dL (09 Oct 2022 16:27)  POCT Blood Glucose.: 181 mg/dL (09 Oct 2022 11:25)  POCT Blood Glucose.: 205 mg/dL (09 Oct 2022 07:31)        Urinalysis Basic - ( 07 Oct 2022 20:07 )  Color: Yellow / Appearance: Clear / S.010 / pH: x  Gluc: x / Ketone: Negative  / Bili: Negative / Urobili: 0.2 mg/dL   Blood: x / Protein: Trace mg/dL / Nitrite: Negative   Leuk Esterase: Trace / RBC: 3-5 /HPF / WBC 6-10 /HPF   Sq Epi: x / Non Sq Epi: Few /HPF / Bacteria: Few        Procalcitonin, Serum (10.08.22 @ 11:11)   Procalcitonin, Serum: 0.06      MEDICATIONS  (STANDING):    amLODIPine   Tablet 5 milliGRAM(s) Oral daily  aspirin enteric coated 81 milliGRAM(s) Oral daily  atorvastatin 10 milliGRAM(s) Oral at bedtime  cefTRIAXone   IVPB 1000 milliGRAM(s) IV Intermittent every 24 hours  enoxaparin Injectable 40 milliGRAM(s) SubCutaneous every 24 hours  folic acid 1 milliGRAM(s) Oral daily  glucagon  Injectable 1 milliGRAM(s) IntraMuscular once  insulin lispro (ADMELOG) corrective regimen sliding scale   SubCutaneous three times a day before meals  lisinopril 20 milliGRAM(s) Oral daily  remdesivir  IVPB 100 milliGRAM(s) IV Intermittent every 24 hours  sodium chloride 0.9%. 1000 milliLiter(s) (100 mL/Hr) IV Continuous <Continuous>    MEDICATIONS  (PRN):  dextrose Oral Gel 15 Gram(s) Oral once PRN Blood Glucose LESS THAN 70 milliGRAM(s)/deciliter        RADIOLOGY & ADDITIONAL TESTS:    < from: CT Head No Cont (10.07.22 @ 20:40) >  No evidence of acute intracranial hemorrhage or large territorial infarct.    Stable mild chronic microvascular changes.      < from: Xray Chest 1 View- PORTABLE-Urgent (10.07.22 @ 20:40) >    Cardiac magnification/cardiomegaly.        MICROBIOLOGY DATA:    Flu With COVID-19 By MICHELLE (10.07.22 @ 20:20)   SARS-CoV-2 Result: Detected

## 2022-10-10 ENCOUNTER — TRANSCRIPTION ENCOUNTER (OUTPATIENT)
Age: 82
End: 2022-10-10

## 2022-10-10 VITALS — RESPIRATION RATE: 16 BRPM | OXYGEN SATURATION: 99 %

## 2022-10-10 LAB
A1C WITH ESTIMATED AVERAGE GLUCOSE RESULT: 8.7 % — HIGH (ref 4–5.6)
ALBUMIN SERPL ELPH-MCNC: 3.5 G/DL — SIGNIFICANT CHANGE UP (ref 3.5–5.2)
ALBUMIN SERPL ELPH-MCNC: 3.5 G/DL — SIGNIFICANT CHANGE UP (ref 3.5–5.2)
ALP SERPL-CCNC: 71 U/L — SIGNIFICANT CHANGE UP (ref 30–115)
ALP SERPL-CCNC: 71 U/L — SIGNIFICANT CHANGE UP (ref 30–115)
ALT FLD-CCNC: 11 U/L — SIGNIFICANT CHANGE UP (ref 0–41)
ALT FLD-CCNC: 11 U/L — SIGNIFICANT CHANGE UP (ref 0–41)
ANION GAP SERPL CALC-SCNC: 9 MMOL/L — SIGNIFICANT CHANGE UP (ref 7–14)
AST SERPL-CCNC: 13 U/L — SIGNIFICANT CHANGE UP (ref 0–41)
AST SERPL-CCNC: 13 U/L — SIGNIFICANT CHANGE UP (ref 0–41)
BILIRUB DIRECT SERPL-MCNC: <0.2 MG/DL — SIGNIFICANT CHANGE UP (ref 0–0.3)
BILIRUB INDIRECT FLD-MCNC: SIGNIFICANT CHANGE UP MG/DL (ref 0.2–1.2)
BILIRUB SERPL-MCNC: <0.2 MG/DL — SIGNIFICANT CHANGE UP (ref 0.2–1.2)
BILIRUB SERPL-MCNC: <0.2 MG/DL — SIGNIFICANT CHANGE UP (ref 0.2–1.2)
BUN SERPL-MCNC: 9 MG/DL — LOW (ref 10–20)
CALCIUM SERPL-MCNC: 8.5 MG/DL — SIGNIFICANT CHANGE UP (ref 8.4–10.5)
CHLORIDE SERPL-SCNC: 101 MMOL/L — SIGNIFICANT CHANGE UP (ref 98–110)
CO2 SERPL-SCNC: 27 MMOL/L — SIGNIFICANT CHANGE UP (ref 17–32)
CREAT SERPL-MCNC: 0.8 MG/DL — SIGNIFICANT CHANGE UP (ref 0.7–1.5)
CREAT SERPL-MCNC: 0.8 MG/DL — SIGNIFICANT CHANGE UP (ref 0.7–1.5)
EGFR: 73 ML/MIN/1.73M2 — SIGNIFICANT CHANGE UP
EGFR: 73 ML/MIN/1.73M2 — SIGNIFICANT CHANGE UP
ESTIMATED AVERAGE GLUCOSE: 203 MG/DL — HIGH (ref 68–114)
GLUCOSE BLDC GLUCOMTR-MCNC: 219 MG/DL — HIGH (ref 70–99)
GLUCOSE BLDC GLUCOMTR-MCNC: 235 MG/DL — HIGH (ref 70–99)
GLUCOSE SERPL-MCNC: 240 MG/DL — HIGH (ref 70–99)
HCT VFR BLD CALC: 34.8 % — LOW (ref 37–47)
HGB BLD-MCNC: 10.6 G/DL — LOW (ref 12–16)
INR BLD: 1.03 RATIO — SIGNIFICANT CHANGE UP (ref 0.65–1.3)
MAGNESIUM SERPL-MCNC: 1.8 MG/DL — SIGNIFICANT CHANGE UP (ref 1.8–2.4)
MCHC RBC-ENTMCNC: 23.7 PG — LOW (ref 27–31)
MCHC RBC-ENTMCNC: 30.5 G/DL — LOW (ref 32–37)
MCV RBC AUTO: 77.9 FL — LOW (ref 81–99)
NRBC # BLD: 0 /100 WBCS — SIGNIFICANT CHANGE UP (ref 0–0)
PLATELET # BLD AUTO: 168 K/UL — SIGNIFICANT CHANGE UP (ref 130–400)
POTASSIUM SERPL-MCNC: 4.5 MMOL/L — SIGNIFICANT CHANGE UP (ref 3.5–5)
POTASSIUM SERPL-SCNC: 4.5 MMOL/L — SIGNIFICANT CHANGE UP (ref 3.5–5)
PROT SERPL-MCNC: 5.8 G/DL — LOW (ref 6–8)
PROT SERPL-MCNC: 5.8 G/DL — LOW (ref 6–8)
PROTHROM AB SERPL-ACNC: 11.8 SEC — SIGNIFICANT CHANGE UP (ref 9.95–12.87)
RBC # BLD: 4.47 M/UL — SIGNIFICANT CHANGE UP (ref 4.2–5.4)
RBC # FLD: 15.1 % — HIGH (ref 11.5–14.5)
SODIUM SERPL-SCNC: 137 MMOL/L — SIGNIFICANT CHANGE UP (ref 135–146)
WBC # BLD: 4.06 K/UL — LOW (ref 4.8–10.8)
WBC # FLD AUTO: 4.06 K/UL — LOW (ref 4.8–10.8)

## 2022-10-10 RX ORDER — BENAZEPRIL HYDROCHLORIDE 40 MG/1
1 TABLET ORAL
Qty: 0 | Refills: 0 | DISCHARGE

## 2022-10-10 RX ORDER — AMLODIPINE BESYLATE 2.5 MG/1
1 TABLET ORAL
Qty: 0 | Refills: 0 | DISCHARGE

## 2022-10-10 RX ADMIN — Medication 81 MILLIGRAM(S): at 11:44

## 2022-10-10 RX ADMIN — CEFTRIAXONE 100 MILLIGRAM(S): 500 INJECTION, POWDER, FOR SOLUTION INTRAMUSCULAR; INTRAVENOUS at 09:22

## 2022-10-10 RX ADMIN — Medication 2: at 11:43

## 2022-10-10 RX ADMIN — ENOXAPARIN SODIUM 40 MILLIGRAM(S): 100 INJECTION SUBCUTANEOUS at 05:39

## 2022-10-10 RX ADMIN — LISINOPRIL 20 MILLIGRAM(S): 2.5 TABLET ORAL at 05:38

## 2022-10-10 RX ADMIN — REMDESIVIR 500 MILLIGRAM(S): 5 INJECTION INTRAVENOUS at 07:00

## 2022-10-10 RX ADMIN — Medication 1 MILLIGRAM(S): at 11:43

## 2022-10-10 RX ADMIN — Medication 2: at 07:42

## 2022-10-10 RX ADMIN — AMLODIPINE BESYLATE 5 MILLIGRAM(S): 2.5 TABLET ORAL at 05:38

## 2022-10-10 NOTE — DISCHARGE NOTE PROVIDER - HOSPITAL COURSE
Patient is an 81 y/o F with a pmhx of DM, HTN, cervical cancer s/p radiation therapy who presents with subjective fevers at home, mild non productive cough x 1 day. Additionally, patients family members report she lives at home alone and have noticed increased confusion, concern for a UTI. Patient denies urinary complaints, chest pain, abdominal pain, headache. Per family, they report they can no longer care for her and are requesting if she can be placed in a nursing facility.     Patient admitted to medicine service with COVID< seen by ID and received RDV x 3 doses to complete inpatient course.  Patient symptomatically improved, saturating well on RA and subsequently discharged.  UCx pending but patient taken off ABX as symptoms felt to b2 2/2 COVID.  Advised outpatient FU on discharge.

## 2022-10-10 NOTE — CHART NOTE - NSCHARTNOTEFT_GEN_A_CORE
Called by medicine attending, patient stable for discharge.  Completed course of RDV, resume home meds on discharge without any changes.  Patient to FU in 1 week with attending.  Papers completed, orders placed.

## 2022-10-10 NOTE — DISCHARGE NOTE NURSING/CASE MANAGEMENT/SOCIAL WORK - NSDCPEFALRISK_GEN_ALL_CORE
For information on Fall & Injury Prevention, visit: [email or web address] OR  [email or web address] OR  https://www.cdc.gov/steadi/patient.html

## 2022-10-10 NOTE — DISCHARGE NOTE PROVIDER - NSDCCPCAREPLAN_GEN_ALL_CORE_FT
PRINCIPAL DISCHARGE DIAGNOSIS  Diagnosis: COVID-19  Assessment and Plan of Treatment: You received treatment and are stable for discharge.  Make sure to qurantine for a total of 5 days from positive test.  FU outpatient with your PMD in 1 week      SECONDARY DISCHARGE DIAGNOSES  Diagnosis: Confusion  Assessment and Plan of Treatment: Resolved

## 2022-10-10 NOTE — DISCHARGE NOTE PROVIDER - NSDCFUSCHEDAPPT_GEN_ALL_CORE_FT
Marlena Krueger  [facility]  [facility] [address]  Scheduled Appointment: 11/03/2022

## 2022-10-10 NOTE — DISCHARGE NOTE PROVIDER - CARE PROVIDER_API CALL
Finesse Yan (MD)  Medicine  [address] [place] [address]  Phone: [phone]  Fax: [phone]  Follow Up Time:

## 2022-10-10 NOTE — DISCHARGE NOTE NURSING/CASE MANAGEMENT/SOCIAL WORK - PATIENT PORTAL LINK FT
You can access the FollowMyHealth Patient Portal offered by [facility] by registering at the following website: [email or web address]. By joining [employer]’s FollowMyHealth portal, you will also be able to view your health information using other applications (apps) compatible with our system.

## 2022-10-10 NOTE — DISCHARGE NOTE PROVIDER - NSDCMRMEDTOKEN_GEN_ALL_CORE_FT
amlodipine-benazepril 5 mg-20 mg oral capsule: 1 cap(s) orally once a day  Aspir-Low 81 mg oral delayed release tablet: 1 tab(s) orally once a day  atorvastatin 10 mg oral tablet: 1 tab(s) orally once a day (at bedtime)  buPROPion 300 mg/24 hours (XL) oral tablet, extended release: 1 tab(s) orally once a day  Fish Oil 1000 mg oral capsule: 1 cap(s) orally 3 times a day  folic acid 1 mg oral tablet: 1 tab(s) orally once a day  glipiZIDE 2.5 mg oral tablet, extended release: orally 2 times a day  HumuLIN 70/30 KwikPen 70 units-30 units/mL subcutaneous suspension: subcutaneous 2 times a day -correction dose  methotrexate 2.5 mg oral tablet: 4 tab(s) orally once a week, on Wednesday  oxyCODONE 5 mg oral tablet: 1 tab(s) orally every 6 hours, As needed, Severe Pain (7 - 10)  Viibryd 40 mg oral tablet: 1 tab(s) orally once a day  Vitamin B12 500 mcg oral tablet: 1 tab(s) orally once a day  Vitamin D3 50,000 intl units oral capsule: 1 cap(s) orally once a month

## 2022-10-11 PROBLEM — C53.9 MALIGNANT NEOPLASM OF CERVIX UTERI, UNSPECIFIED: Chronic | Status: ACTIVE | Noted: 2022-10-08

## 2022-10-11 LAB
-  AMIKACIN: SIGNIFICANT CHANGE UP
-  AMOXICILLIN/CLAVULANIC ACID: SIGNIFICANT CHANGE UP
-  AMPICILLIN/SULBACTAM: SIGNIFICANT CHANGE UP
-  AMPICILLIN: SIGNIFICANT CHANGE UP
-  AZTREONAM: SIGNIFICANT CHANGE UP
-  CEFAZOLIN: SIGNIFICANT CHANGE UP
-  CEFEPIME: SIGNIFICANT CHANGE UP
-  CEFOXITIN: SIGNIFICANT CHANGE UP
-  CEFTRIAXONE: SIGNIFICANT CHANGE UP
-  CIPROFLOXACIN: SIGNIFICANT CHANGE UP
-  ERTAPENEM: SIGNIFICANT CHANGE UP
-  GENTAMICIN: SIGNIFICANT CHANGE UP
-  LEVOFLOXACIN: SIGNIFICANT CHANGE UP
-  MEROPENEM: SIGNIFICANT CHANGE UP
-  NITROFURANTOIN: SIGNIFICANT CHANGE UP
-  PIPERACILLIN/TAZOBACTAM: SIGNIFICANT CHANGE UP
-  TOBRAMYCIN: SIGNIFICANT CHANGE UP
-  TRIMETHOPRIM/SULFAMETHOXAZOLE: SIGNIFICANT CHANGE UP
CULTURE RESULTS: SIGNIFICANT CHANGE UP
METHOD TYPE: SIGNIFICANT CHANGE UP
ORGANISM # SPEC MICROSCOPIC CNT: SIGNIFICANT CHANGE UP
ORGANISM # SPEC MICROSCOPIC CNT: SIGNIFICANT CHANGE UP
SPECIMEN SOURCE: SIGNIFICANT CHANGE UP

## 2022-10-18 DIAGNOSIS — F41.9 ANXIETY DISORDER, UNSPECIFIED: ICD-10-CM

## 2022-10-18 DIAGNOSIS — E87.1 HYPO-OSMOLALITY AND HYPONATREMIA: ICD-10-CM

## 2022-10-18 DIAGNOSIS — I10 ESSENTIAL (PRIMARY) HYPERTENSION: ICD-10-CM

## 2022-10-18 DIAGNOSIS — E83.42 HYPOMAGNESEMIA: ICD-10-CM

## 2022-10-18 DIAGNOSIS — U07.1 COVID-19: ICD-10-CM

## 2022-10-18 DIAGNOSIS — F32.A DEPRESSION, UNSPECIFIED: ICD-10-CM

## 2022-10-18 DIAGNOSIS — E78.5 HYPERLIPIDEMIA, UNSPECIFIED: ICD-10-CM

## 2022-10-18 DIAGNOSIS — Z79.891 LONG TERM (CURRENT) USE OF OPIATE ANALGESIC: ICD-10-CM

## 2022-10-18 DIAGNOSIS — M06.9 RHEUMATOID ARTHRITIS, UNSPECIFIED: ICD-10-CM

## 2022-10-18 DIAGNOSIS — Z79.82 LONG TERM (CURRENT) USE OF ASPIRIN: ICD-10-CM

## 2022-10-18 DIAGNOSIS — Z92.3 PERSONAL HISTORY OF IRRADIATION: ICD-10-CM

## 2022-10-18 DIAGNOSIS — Z79.4 LONG TERM (CURRENT) USE OF INSULIN: ICD-10-CM

## 2022-10-18 DIAGNOSIS — Z79.84 LONG TERM (CURRENT) USE OF ORAL HYPOGLYCEMIC DRUGS: ICD-10-CM

## 2022-10-18 DIAGNOSIS — R41.0 DISORIENTATION, UNSPECIFIED: ICD-10-CM

## 2022-10-18 DIAGNOSIS — E11.9 TYPE 2 DIABETES MELLITUS WITHOUT COMPLICATIONS: ICD-10-CM

## 2022-10-18 DIAGNOSIS — Z85.41 PERSONAL HISTORY OF MALIGNANT NEOPLASM OF CERVIX UTERI: ICD-10-CM

## 2022-10-26 NOTE — PATIENT PROFILE ADULT - PATIENT'S SEXUAL ORIENTATION
Various/other Island Pedicle Flap With Canthal Suspension Text: The defect edges were debeveled with a #15 scalpel blade.  Given the location of the defect, shape of the defect and the proximity to free margins an island pedicle advancement flap was deemed most appropriate.  Using a sterile surgical marker, an appropriate advancement flap was drawn incorporating the defect, outlining the appropriate donor tissue and placing the expected incisions within the relaxed skin tension lines where possible. The area thus outlined was incised deep to adipose tissue with a #15 scalpel blade.  The skin margins were undermined to an appropriate distance in all directions around the primary defect and laterally outward around the island pedicle utilizing iris scissors.  There was minimal undermining beneath the pedicle flap. A suspension suture was placed in the canthal tendon to prevent tension and prevent ectropion.

## 2022-11-03 ENCOUNTER — APPOINTMENT (OUTPATIENT)
Dept: HEMATOLOGY ONCOLOGY | Facility: CLINIC | Age: 82
End: 2022-11-03

## 2022-11-15 ENCOUNTER — EMERGENCY (EMERGENCY)
Facility: HOSPITAL | Age: 82
LOS: 0 days | Discharge: HOME | End: 2022-11-16
Attending: EMERGENCY MEDICINE | Admitting: EMERGENCY MEDICINE

## 2022-11-15 ENCOUNTER — OUTPATIENT (OUTPATIENT)
Dept: OUTPATIENT SERVICES | Facility: HOSPITAL | Age: 82
LOS: 1 days | Discharge: HOME | End: 2022-11-15

## 2022-11-15 VITALS
HEART RATE: 85 BPM | RESPIRATION RATE: 18 BRPM | HEIGHT: 65 IN | TEMPERATURE: 98 F | OXYGEN SATURATION: 100 % | DIASTOLIC BLOOD PRESSURE: 67 MMHG | WEIGHT: 175.05 LBS | SYSTOLIC BLOOD PRESSURE: 157 MMHG

## 2022-11-15 DIAGNOSIS — Z92.89 PERSONAL HISTORY OF OTHER MEDICAL TREATMENT: Chronic | ICD-10-CM

## 2022-11-15 DIAGNOSIS — Z85.41 PERSONAL HISTORY OF MALIGNANT NEOPLASM OF CERVIX UTERI: ICD-10-CM

## 2022-11-15 DIAGNOSIS — C77.9 SECONDARY AND UNSPECIFIED MALIGNANT NEOPLASM OF LYMPH NODE, UNSPECIFIED: ICD-10-CM

## 2022-11-15 DIAGNOSIS — M79.89 OTHER SPECIFIED SOFT TISSUE DISORDERS: ICD-10-CM

## 2022-11-15 DIAGNOSIS — I10 ESSENTIAL (PRIMARY) HYPERTENSION: ICD-10-CM

## 2022-11-15 DIAGNOSIS — C53.9 MALIGNANT NEOPLASM OF CERVIX UTERI, UNSPECIFIED: ICD-10-CM

## 2022-11-15 DIAGNOSIS — E11.9 TYPE 2 DIABETES MELLITUS WITHOUT COMPLICATIONS: ICD-10-CM

## 2022-11-15 DIAGNOSIS — Z79.84 LONG TERM (CURRENT) USE OF ORAL HYPOGLYCEMIC DRUGS: ICD-10-CM

## 2022-11-15 DIAGNOSIS — R22.40 LOCALIZED SWELLING, MASS AND LUMP, UNSPECIFIED LOWER LIMB: Chronic | ICD-10-CM

## 2022-11-15 DIAGNOSIS — Z79.82 LONG TERM (CURRENT) USE OF ASPIRIN: ICD-10-CM

## 2022-11-15 DIAGNOSIS — Z02.9 ENCOUNTER FOR ADMINISTRATIVE EXAMINATIONS, UNSPECIFIED: ICD-10-CM

## 2022-11-15 DIAGNOSIS — Z79.4 LONG TERM (CURRENT) USE OF INSULIN: ICD-10-CM

## 2022-11-15 DIAGNOSIS — M06.9 RHEUMATOID ARTHRITIS, UNSPECIFIED: ICD-10-CM

## 2022-11-15 DIAGNOSIS — E78.5 HYPERLIPIDEMIA, UNSPECIFIED: ICD-10-CM

## 2022-11-15 LAB
ALBUMIN SERPL ELPH-MCNC: 4 G/DL — SIGNIFICANT CHANGE UP (ref 3.5–5.2)
ALP SERPL-CCNC: 92 U/L — SIGNIFICANT CHANGE UP (ref 30–115)
ALT FLD-CCNC: 10 U/L — SIGNIFICANT CHANGE UP (ref 0–41)
ANION GAP SERPL CALC-SCNC: 10 MMOL/L — SIGNIFICANT CHANGE UP (ref 7–14)
ANISOCYTOSIS BLD QL: SLIGHT — SIGNIFICANT CHANGE UP
APTT BLD: 30.4 SEC — SIGNIFICANT CHANGE UP (ref 27–39.2)
AST SERPL-CCNC: 14 U/L — SIGNIFICANT CHANGE UP (ref 0–41)
BASOPHILS # BLD AUTO: 0 K/UL — SIGNIFICANT CHANGE UP (ref 0–0.2)
BASOPHILS NFR BLD AUTO: 0 % — SIGNIFICANT CHANGE UP (ref 0–1)
BILIRUB SERPL-MCNC: 0.3 MG/DL — SIGNIFICANT CHANGE UP (ref 0.2–1.2)
BUN SERPL-MCNC: 17 MG/DL — SIGNIFICANT CHANGE UP (ref 10–20)
CALCIUM SERPL-MCNC: 9.7 MG/DL — SIGNIFICANT CHANGE UP (ref 8.4–10.4)
CHLORIDE SERPL-SCNC: 98 MMOL/L — SIGNIFICANT CHANGE UP (ref 98–110)
CO2 SERPL-SCNC: 27 MMOL/L — SIGNIFICANT CHANGE UP (ref 17–32)
CREAT SERPL-MCNC: 1 MG/DL — SIGNIFICANT CHANGE UP (ref 0.7–1.5)
EGFR: 56 ML/MIN/1.73M2 — LOW
EOSINOPHIL # BLD AUTO: 0.07 K/UL — SIGNIFICANT CHANGE UP (ref 0–0.7)
EOSINOPHIL NFR BLD AUTO: 0.9 % — SIGNIFICANT CHANGE UP (ref 0–8)
GIANT PLATELETS BLD QL SMEAR: PRESENT — SIGNIFICANT CHANGE UP
GLUCOSE BLDC GLUCOMTR-MCNC: 256 MG/DL — HIGH (ref 70–99)
GLUCOSE SERPL-MCNC: 55 MG/DL — LOW (ref 70–99)
HCT VFR BLD CALC: 36.9 % — LOW (ref 37–47)
HGB BLD-MCNC: 11.2 G/DL — LOW (ref 12–16)
HYPOCHROMIA BLD QL: SLIGHT — SIGNIFICANT CHANGE UP
INR BLD: 0.97 RATIO — SIGNIFICANT CHANGE UP (ref 0.65–1.3)
LYMPHOCYTES # BLD AUTO: 0.36 K/UL — LOW (ref 1.2–3.4)
LYMPHOCYTES # BLD AUTO: 4.5 % — LOW (ref 20.5–51.1)
MANUAL SMEAR VERIFICATION: SIGNIFICANT CHANGE UP
MCHC RBC-ENTMCNC: 24.2 PG — LOW (ref 27–31)
MCHC RBC-ENTMCNC: 30.4 G/DL — LOW (ref 32–37)
MCV RBC AUTO: 79.9 FL — LOW (ref 81–99)
MONOCYTES # BLD AUTO: 0.29 K/UL — SIGNIFICANT CHANGE UP (ref 0.1–0.6)
MONOCYTES NFR BLD AUTO: 3.6 % — SIGNIFICANT CHANGE UP (ref 1.7–9.3)
NEUTROPHILS # BLD AUTO: 7.09 K/UL — HIGH (ref 1.4–6.5)
NEUTROPHILS NFR BLD AUTO: 89.2 % — HIGH (ref 42.2–75.2)
NRBC # BLD: 1 /100 — HIGH (ref 0–0)
NRBC # BLD: SIGNIFICANT CHANGE UP /100 WBCS (ref 0–0)
PLAT MORPH BLD: NORMAL — SIGNIFICANT CHANGE UP
PLATELET # BLD AUTO: 286 K/UL — SIGNIFICANT CHANGE UP (ref 130–400)
POLYCHROMASIA BLD QL SMEAR: SLIGHT — SIGNIFICANT CHANGE UP
POTASSIUM SERPL-MCNC: 4.3 MMOL/L — SIGNIFICANT CHANGE UP (ref 3.5–5)
POTASSIUM SERPL-SCNC: 4.3 MMOL/L — SIGNIFICANT CHANGE UP (ref 3.5–5)
PROT SERPL-MCNC: 6.9 G/DL — SIGNIFICANT CHANGE UP (ref 6–8)
PROTHROM AB SERPL-ACNC: 11.1 SEC — SIGNIFICANT CHANGE UP (ref 9.95–12.87)
RBC # BLD: 4.62 M/UL — SIGNIFICANT CHANGE UP (ref 4.2–5.4)
RBC # FLD: 15.4 % — HIGH (ref 11.5–14.5)
RBC BLD AUTO: ABNORMAL
SMUDGE CELLS # BLD: PRESENT — SIGNIFICANT CHANGE UP
SODIUM SERPL-SCNC: 135 MMOL/L — SIGNIFICANT CHANGE UP (ref 135–146)
VARIANT LYMPHS # BLD: 1.8 % — SIGNIFICANT CHANGE UP (ref 0–5)
WBC # BLD: 7.95 K/UL — SIGNIFICANT CHANGE UP (ref 4.8–10.8)
WBC # FLD AUTO: 7.95 K/UL — SIGNIFICANT CHANGE UP (ref 4.8–10.8)

## 2022-11-15 PROCEDURE — 99284 EMERGENCY DEPT VISIT MOD MDM: CPT | Mod: FS

## 2022-11-15 PROCEDURE — 93970 EXTREMITY STUDY: CPT | Mod: 26

## 2022-11-15 NOTE — ED ADULT TRIAGE NOTE - CHIEF COMPLAINT QUOTE
Pt c/o RLE pain and swelling X 1 month, worsened today. As per family Pt had CT , has smth compressing in the groin.

## 2022-11-16 VITALS
DIASTOLIC BLOOD PRESSURE: 67 MMHG | TEMPERATURE: 97 F | RESPIRATION RATE: 18 BRPM | HEART RATE: 86 BPM | OXYGEN SATURATION: 98 % | SYSTOLIC BLOOD PRESSURE: 138 MMHG

## 2022-11-16 NOTE — ED PROVIDER NOTE - PHYSICAL EXAMINATION
Physical Exam    Vital Signs: I have reviewed the initial vital signs.  Constitutional: appears stated age, no acute distress  Eyes: Conjunctiva pink, Sclera clear  Cardiovascular: S1 and S2, regular rate, regular rhythm, well-perfused extremities, radial pulses equal and 2+, pedal pulses 2+ and equal  Respiratory: unlabored respiratory effort, clear to auscultation bilaterally no wheezing, rales and rhonchi  Gastrointestinal: soft, non-tender abdomen, no pulsatile mass, normal bowl sounds  Musculoskeletal: supple neck, right  lower extremity edema, no midline tenderness  Integumentary: warm, dry, no rash  Neurologic: awake, alert, nvi

## 2022-11-16 NOTE — ED PROVIDER NOTE - PATIENT PORTAL LINK FT
You can access the FollowMyHealth Patient Portal offered by [facility] by registering at the following website: [email or web address]. By joining [employer]’s FollowMyHealth portal, you will also be able to view your health information using other applications (apps) compatible with our system.

## 2022-11-16 NOTE — ED PROVIDER NOTE - CLINICAL SUMMARY MEDICAL DECISION MAKING FREE TEXT BOX
83 yo F, hx as noted, relevantly roughly 1.5 years sp radiation for cervical ca, here for assessment of 3-4 weeks of progressively worsening RLE edema. Had previous negative duplex, was started on course of lasix with some improvement. Then had CT scan which reportedly showed compression of femoral vein at inguinal canal from mass vs post radiation scarring.    Patient here for assessment of swelling with pain, is pending follow up with Onc Dr. Khan and repeat PET scan.    No CP or dyspnea. LE pain described as heavy, aching and occasionally sharp.     Patient has significant, non pitting edema to entire RLE with good pulses, warm extremities, good cap refill and steady gait.    Duplex negative for DVT.    Give hx, this likely represents edema secondary either to venous or lymphatic obstruction from previous RT.     Advised on analgesia, elevation, compression and close follow up with Dr. Khan and Dr. Yan.    No signs of cellulitis.

## 2022-11-16 NOTE — ED PROVIDER NOTE - OBJECTIVE STATEMENT
83 yo female, pmh of hld, ra, dm, htn, cervical ca s/p radiation in remission, presents to ed for right leg swelling, x 3 weeks, mild, aching, no radiation, throughout whole leg. Denies fever, chills, cp, sob, neck pain, visual changes, nvd, dizziness, numbness, tingling.

## 2022-11-18 ENCOUNTER — NON-APPOINTMENT (OUTPATIENT)
Age: 82
End: 2022-11-18

## 2022-11-18 ENCOUNTER — INPATIENT (INPATIENT)
Facility: HOSPITAL | Age: 82
LOS: 4 days | Discharge: HOME HEALTH | End: 2022-11-23

## 2022-11-18 VITALS
DIASTOLIC BLOOD PRESSURE: 65 MMHG | HEIGHT: 65 IN | TEMPERATURE: 98 F | SYSTOLIC BLOOD PRESSURE: 143 MMHG | HEART RATE: 92 BPM | OXYGEN SATURATION: 100 % | RESPIRATION RATE: 20 BRPM

## 2022-11-18 DIAGNOSIS — I82.411 ACUTE EMBOLISM AND THROMBOSIS OF RIGHT FEMORAL VEIN: ICD-10-CM

## 2022-11-18 DIAGNOSIS — Z92.89 PERSONAL HISTORY OF OTHER MEDICAL TREATMENT: Chronic | ICD-10-CM

## 2022-11-18 DIAGNOSIS — I10 ESSENTIAL (PRIMARY) HYPERTENSION: ICD-10-CM

## 2022-11-18 DIAGNOSIS — C53.9 MALIGNANT NEOPLASM OF CERVIX UTERI, UNSPECIFIED: ICD-10-CM

## 2022-11-18 DIAGNOSIS — F32.A DEPRESSION, UNSPECIFIED: ICD-10-CM

## 2022-11-18 DIAGNOSIS — Z79.4 LONG TERM (CURRENT) USE OF INSULIN: ICD-10-CM

## 2022-11-18 DIAGNOSIS — M06.9 RHEUMATOID ARTHRITIS, UNSPECIFIED: ICD-10-CM

## 2022-11-18 DIAGNOSIS — E78.5 HYPERLIPIDEMIA, UNSPECIFIED: ICD-10-CM

## 2022-11-18 DIAGNOSIS — C77.9 SECONDARY AND UNSPECIFIED MALIGNANT NEOPLASM OF LYMPH NODE, UNSPECIFIED: ICD-10-CM

## 2022-11-18 DIAGNOSIS — Z86.16 PERSONAL HISTORY OF COVID-19: ICD-10-CM

## 2022-11-18 DIAGNOSIS — I82.421 ACUTE EMBOLISM AND THROMBOSIS OF RIGHT ILIAC VEIN: ICD-10-CM

## 2022-11-18 DIAGNOSIS — R41.0 DISORIENTATION, UNSPECIFIED: ICD-10-CM

## 2022-11-18 DIAGNOSIS — E11.649 TYPE 2 DIABETES MELLITUS WITH HYPOGLYCEMIA WITHOUT COMA: ICD-10-CM

## 2022-11-18 DIAGNOSIS — Z79.82 LONG TERM (CURRENT) USE OF ASPIRIN: ICD-10-CM

## 2022-11-18 DIAGNOSIS — I87.1 COMPRESSION OF VEIN: ICD-10-CM

## 2022-11-18 DIAGNOSIS — R60.0 LOCALIZED EDEMA: ICD-10-CM

## 2022-11-18 DIAGNOSIS — R22.40 LOCALIZED SWELLING, MASS AND LUMP, UNSPECIFIED LOWER LIMB: Chronic | ICD-10-CM

## 2022-11-18 LAB
ALBUMIN SERPL ELPH-MCNC: 4.4 G/DL — SIGNIFICANT CHANGE UP (ref 3.5–5.2)
ALP SERPL-CCNC: 93 U/L — SIGNIFICANT CHANGE UP (ref 30–115)
ALT FLD-CCNC: 14 U/L — SIGNIFICANT CHANGE UP (ref 0–41)
ANION GAP SERPL CALC-SCNC: 11 MMOL/L — SIGNIFICANT CHANGE UP (ref 7–14)
APTT BLD: 34.6 SEC — SIGNIFICANT CHANGE UP (ref 27–39.2)
AST SERPL-CCNC: 25 U/L — SIGNIFICANT CHANGE UP (ref 0–41)
BASOPHILS # BLD AUTO: 0.03 K/UL — SIGNIFICANT CHANGE UP (ref 0–0.2)
BASOPHILS NFR BLD AUTO: 0.4 % — SIGNIFICANT CHANGE UP (ref 0–1)
BILIRUB SERPL-MCNC: 0.3 MG/DL — SIGNIFICANT CHANGE UP (ref 0.2–1.2)
BLD GP AB SCN SERPL QL: SIGNIFICANT CHANGE UP
BUN SERPL-MCNC: 21 MG/DL — HIGH (ref 10–20)
CALCIUM SERPL-MCNC: 9.1 MG/DL — SIGNIFICANT CHANGE UP (ref 8.4–10.5)
CHLORIDE SERPL-SCNC: 97 MMOL/L — LOW (ref 98–110)
CO2 SERPL-SCNC: 29 MMOL/L — SIGNIFICANT CHANGE UP (ref 17–32)
CREAT SERPL-MCNC: 0.9 MG/DL — SIGNIFICANT CHANGE UP (ref 0.7–1.5)
EGFR: 63 ML/MIN/1.73M2 — SIGNIFICANT CHANGE UP
EOSINOPHIL # BLD AUTO: 0.13 K/UL — SIGNIFICANT CHANGE UP (ref 0–0.7)
EOSINOPHIL NFR BLD AUTO: 1.7 % — SIGNIFICANT CHANGE UP (ref 0–8)
GLUCOSE BLDC GLUCOMTR-MCNC: 127 MG/DL — HIGH (ref 70–99)
GLUCOSE SERPL-MCNC: 70 MG/DL — SIGNIFICANT CHANGE UP (ref 70–99)
HCT VFR BLD CALC: 37.7 % — SIGNIFICANT CHANGE UP (ref 37–47)
HGB BLD-MCNC: 11.3 G/DL — LOW (ref 12–16)
IMM GRANULOCYTES NFR BLD AUTO: 0.3 % — SIGNIFICANT CHANGE UP (ref 0.1–0.3)
INR BLD: 0.97 RATIO — SIGNIFICANT CHANGE UP (ref 0.65–1.3)
LACTATE SERPL-SCNC: 2.6 MMOL/L — HIGH (ref 0.7–2)
LYMPHOCYTES # BLD AUTO: 0.42 K/UL — LOW (ref 1.2–3.4)
LYMPHOCYTES # BLD AUTO: 5.5 % — LOW (ref 20.5–51.1)
MCHC RBC-ENTMCNC: 24 PG — LOW (ref 27–31)
MCHC RBC-ENTMCNC: 30 G/DL — LOW (ref 32–37)
MCV RBC AUTO: 80.2 FL — LOW (ref 81–99)
MONOCYTES # BLD AUTO: 0.6 K/UL — SIGNIFICANT CHANGE UP (ref 0.1–0.6)
MONOCYTES NFR BLD AUTO: 7.8 % — SIGNIFICANT CHANGE UP (ref 1.7–9.3)
NEUTROPHILS # BLD AUTO: 6.47 K/UL — SIGNIFICANT CHANGE UP (ref 1.4–6.5)
NEUTROPHILS NFR BLD AUTO: 84.3 % — HIGH (ref 42.2–75.2)
NRBC # BLD: 0 /100 WBCS — SIGNIFICANT CHANGE UP (ref 0–0)
PLATELET # BLD AUTO: 273 K/UL — SIGNIFICANT CHANGE UP (ref 130–400)
POTASSIUM SERPL-MCNC: 4.5 MMOL/L — SIGNIFICANT CHANGE UP (ref 3.5–5)
POTASSIUM SERPL-SCNC: 4.5 MMOL/L — SIGNIFICANT CHANGE UP (ref 3.5–5)
PROT SERPL-MCNC: 7 G/DL — SIGNIFICANT CHANGE UP (ref 6–8)
PROTHROM AB SERPL-ACNC: 11 SEC — SIGNIFICANT CHANGE UP (ref 9.95–12.87)
RBC # BLD: 4.7 M/UL — SIGNIFICANT CHANGE UP (ref 4.2–5.4)
RBC # FLD: 15.3 % — HIGH (ref 11.5–14.5)
SARS-COV-2 RNA SPEC QL NAA+PROBE: SIGNIFICANT CHANGE UP
SODIUM SERPL-SCNC: 137 MMOL/L — SIGNIFICANT CHANGE UP (ref 135–146)
WBC # BLD: 7.67 K/UL — SIGNIFICANT CHANGE UP (ref 4.8–10.8)
WBC # FLD AUTO: 7.67 K/UL — SIGNIFICANT CHANGE UP (ref 4.8–10.8)

## 2022-11-18 PROCEDURE — 73701 CT LOWER EXTREMITY W/DYE: CPT | Mod: 26,RT,MA

## 2022-11-18 PROCEDURE — 99285 EMERGENCY DEPT VISIT HI MDM: CPT | Mod: FS,CS

## 2022-11-18 PROCEDURE — 74177 CT ABD & PELVIS W/CONTRAST: CPT | Mod: 26,MA

## 2022-11-18 RX ORDER — MORPHINE SULFATE 50 MG/1
4 CAPSULE, EXTENDED RELEASE ORAL ONCE
Refills: 0 | Status: DISCONTINUED | OUTPATIENT
Start: 2022-11-18 | End: 2022-11-18

## 2022-11-18 RX ORDER — INSULIN HUMAN 100 [IU]/ML
10 INJECTION, SOLUTION SUBCUTANEOUS ONCE
Refills: 0 | Status: COMPLETED | OUTPATIENT
Start: 2022-11-18 | End: 2022-11-18

## 2022-11-18 RX ORDER — ENOXAPARIN SODIUM 100 MG/ML
60 INJECTION SUBCUTANEOUS ONCE
Refills: 0 | Status: COMPLETED | OUTPATIENT
Start: 2022-11-18 | End: 2022-11-18

## 2022-11-18 RX ADMIN — ENOXAPARIN SODIUM 60 MILLIGRAM(S): 100 INJECTION SUBCUTANEOUS at 23:45

## 2022-11-18 RX ADMIN — INSULIN HUMAN 10 UNIT(S): 100 INJECTION, SOLUTION SUBCUTANEOUS at 22:13

## 2022-11-18 NOTE — ED PROVIDER NOTE - NS ED ROS FT
Constitutional: (-) fever  Eyes/ENT: (-) blurry vision, (-) epistaxis  Cardiovascular: (-) chest pain, (-) syncope  Respiratory: (-) cough, (-) shortness of breath  Gastrointestinal: (-) vomiting, (-) diarrhea  : (-) dysuria, (-) hematuria  Musculoskeletal: (-) neck pain, (-) back pain, (-) joint pain  Integumentary: (+) RLE swelling, (-) rash  Neurological: (-) headache, (-) altered mental status  Allergic/Immunologic: (-) pruritus

## 2022-11-18 NOTE — ED PROVIDER NOTE - PHYSICAL EXAMINATION
CONST: NAD  EYES: Sclera and conjunctiva clear.   ENT: No nasal discharge. Oropharynx normal appearing  NECK: Non-tender, no meningeal signs. normal ROM. supple   CARD: S1 S2; No jvd  RESP: Equal BS B/L, No wheezes, rhonchi or rales. No distress  GI: Soft, non-tender, non-distended. no cva tenderness. normal BS  MS: Unilateral edema to RLE. Normal ROM in all extremities. pulses intact  SKIN: Warm, dry, no acute rashes. Good turgor  NEURO: A&Ox3, No focal deficits. Strength 5/5 with no sensory deficits.

## 2022-11-18 NOTE — ED PROVIDER NOTE - CARE PLAN
Principal Discharge DX:	Obstruction of femoral vein  Secondary Diagnosis:	Leg swelling  Secondary Diagnosis:	COVID   1

## 2022-11-18 NOTE — ED PROVIDER NOTE - OBJECTIVE STATEMENT
82-year-old female past medical history of hyperlipidemia, RA, cervical cancer status post radiation in remission for 1.5 years presents for evaluation of right lower extremity swelling. Patient endorses worsening right lower extremity swelling over the past couple weeks. Patient had multiple duplexes negative for DVT. Patient had CT abdomen at [facility] that was concerning for mass versus scarring causing obstruction of right femoral vein. Now presents with markedly increased swelling over the past 2 days with moderate pressure-like pain, aggravated with ambulation, no relieving factors.

## 2022-11-18 NOTE — ED PROVIDER NOTE - ATTENDING CONTRIBUTION TO CARE
82-year-old female past medical history of ovarian cancer presents with swelling of her left leg for several weeks, now worse over the past several days.  Seen in the office and concern for cellulitis so referred to the ED.  On my exam she has no evidence of cellulitis, however she does appear to have a venous obstruction of her left leg.  We will get a repeat CAT scan with IV contrast and contact vascular surgery.

## 2022-11-19 LAB
ALBUMIN SERPL ELPH-MCNC: 4 G/DL — SIGNIFICANT CHANGE UP (ref 3.5–5.2)
ALP SERPL-CCNC: 92 U/L — SIGNIFICANT CHANGE UP (ref 30–115)
ALT FLD-CCNC: 12 U/L — SIGNIFICANT CHANGE UP (ref 0–41)
ANION GAP SERPL CALC-SCNC: 7 MMOL/L — SIGNIFICANT CHANGE UP (ref 7–14)
AST SERPL-CCNC: 15 U/L — SIGNIFICANT CHANGE UP (ref 0–41)
BASOPHILS # BLD AUTO: 0.03 K/UL — SIGNIFICANT CHANGE UP (ref 0–0.2)
BASOPHILS NFR BLD AUTO: 0.4 % — SIGNIFICANT CHANGE UP (ref 0–1)
BILIRUB SERPL-MCNC: 0.4 MG/DL — SIGNIFICANT CHANGE UP (ref 0.2–1.2)
BUN SERPL-MCNC: 14 MG/DL — SIGNIFICANT CHANGE UP (ref 10–20)
CALCIUM SERPL-MCNC: 9.3 MG/DL — SIGNIFICANT CHANGE UP (ref 8.4–10.4)
CHLORIDE SERPL-SCNC: 102 MMOL/L — SIGNIFICANT CHANGE UP (ref 98–110)
CO2 SERPL-SCNC: 29 MMOL/L — SIGNIFICANT CHANGE UP (ref 17–32)
CREAT SERPL-MCNC: 0.9 MG/DL — SIGNIFICANT CHANGE UP (ref 0.7–1.5)
EGFR: 63 ML/MIN/1.73M2 — SIGNIFICANT CHANGE UP
EOSINOPHIL # BLD AUTO: 0.15 K/UL — SIGNIFICANT CHANGE UP (ref 0–0.7)
EOSINOPHIL NFR BLD AUTO: 2.2 % — SIGNIFICANT CHANGE UP (ref 0–8)
GLUCOSE BLDC GLUCOMTR-MCNC: 174 MG/DL — HIGH (ref 70–99)
GLUCOSE BLDC GLUCOMTR-MCNC: 201 MG/DL — HIGH (ref 70–99)
GLUCOSE BLDC GLUCOMTR-MCNC: 240 MG/DL — HIGH (ref 70–99)
GLUCOSE BLDC GLUCOMTR-MCNC: 274 MG/DL — HIGH (ref 70–99)
GLUCOSE BLDC GLUCOMTR-MCNC: 45 MG/DL — CRITICAL LOW (ref 70–99)
GLUCOSE BLDC GLUCOMTR-MCNC: 80 MG/DL — SIGNIFICANT CHANGE UP (ref 70–99)
GLUCOSE SERPL-MCNC: 170 MG/DL — HIGH (ref 70–99)
HCT VFR BLD CALC: 36.3 % — LOW (ref 37–47)
HGB BLD-MCNC: 10.9 G/DL — LOW (ref 12–16)
IMM GRANULOCYTES NFR BLD AUTO: 0.4 % — HIGH (ref 0.1–0.3)
LYMPHOCYTES # BLD AUTO: 0.52 K/UL — LOW (ref 1.2–3.4)
LYMPHOCYTES # BLD AUTO: 7.5 % — LOW (ref 20.5–51.1)
MAGNESIUM SERPL-MCNC: 1.8 MG/DL — SIGNIFICANT CHANGE UP (ref 1.8–2.4)
MCHC RBC-ENTMCNC: 23.9 PG — LOW (ref 27–31)
MCHC RBC-ENTMCNC: 30 G/DL — LOW (ref 32–37)
MCV RBC AUTO: 79.6 FL — LOW (ref 81–99)
MONOCYTES # BLD AUTO: 0.71 K/UL — HIGH (ref 0.1–0.6)
MONOCYTES NFR BLD AUTO: 10.3 % — HIGH (ref 1.7–9.3)
NEUTROPHILS # BLD AUTO: 5.45 K/UL — SIGNIFICANT CHANGE UP (ref 1.4–6.5)
NEUTROPHILS NFR BLD AUTO: 79.2 % — HIGH (ref 42.2–75.2)
NRBC # BLD: 0 /100 WBCS — SIGNIFICANT CHANGE UP (ref 0–0)
PLATELET # BLD AUTO: 254 K/UL — SIGNIFICANT CHANGE UP (ref 130–400)
POTASSIUM SERPL-MCNC: 4.9 MMOL/L — SIGNIFICANT CHANGE UP (ref 3.5–5)
POTASSIUM SERPL-SCNC: 4.9 MMOL/L — SIGNIFICANT CHANGE UP (ref 3.5–5)
PROT SERPL-MCNC: 6.8 G/DL — SIGNIFICANT CHANGE UP (ref 6–8)
RBC # BLD: 4.56 M/UL — SIGNIFICANT CHANGE UP (ref 4.2–5.4)
RBC # FLD: 15.7 % — HIGH (ref 11.5–14.5)
SODIUM SERPL-SCNC: 138 MMOL/L — SIGNIFICANT CHANGE UP (ref 135–146)
WBC # BLD: 6.89 K/UL — SIGNIFICANT CHANGE UP (ref 4.8–10.8)
WBC # FLD AUTO: 6.89 K/UL — SIGNIFICANT CHANGE UP (ref 4.8–10.8)

## 2022-11-19 PROCEDURE — 99221 1ST HOSP IP/OBS SF/LOW 40: CPT

## 2022-11-19 PROCEDURE — 99223 1ST HOSP IP/OBS HIGH 75: CPT

## 2022-11-19 PROCEDURE — 99222 1ST HOSP IP/OBS MODERATE 55: CPT

## 2022-11-19 RX ORDER — ENOXAPARIN SODIUM 100 MG/ML
60 INJECTION SUBCUTANEOUS EVERY 12 HOURS
Refills: 0 | Status: DISCONTINUED | OUTPATIENT
Start: 2022-11-19 | End: 2022-11-20

## 2022-11-19 RX ORDER — PREGABALIN 225 MG/1
1 CAPSULE ORAL
Qty: 0 | Refills: 0 | DISCHARGE

## 2022-11-19 RX ORDER — AMLODIPINE BESYLATE AND BENAZEPRIL HYDROCHLORIDE 10; 20 MG/1; MG/1
1 CAPSULE ORAL
Qty: 0 | Refills: 0 | DISCHARGE

## 2022-11-19 RX ORDER — DEXTROSE 50 % IN WATER 50 %
15 SYRINGE (ML) INTRAVENOUS ONCE
Refills: 0 | Status: DISCONTINUED | OUTPATIENT
Start: 2022-11-19 | End: 2022-11-21

## 2022-11-19 RX ORDER — ATORVASTATIN CALCIUM 80 MG/1
10 TABLET, FILM COATED ORAL AT BEDTIME
Refills: 0 | Status: DISCONTINUED | OUTPATIENT
Start: 2022-11-19 | End: 2022-11-21

## 2022-11-19 RX ORDER — VILAZODONE HYDROCHLORIDE 20 MG/1
1 TABLET, FILM COATED ORAL
Qty: 0 | Refills: 0 | DISCHARGE

## 2022-11-19 RX ORDER — CHOLECALCIFEROL (VITAMIN D3) 125 MCG
1 CAPSULE ORAL
Qty: 0 | Refills: 0 | DISCHARGE

## 2022-11-19 RX ORDER — OMEGA-3 ACID ETHYL ESTERS 1 G
1 CAPSULE ORAL
Qty: 0 | Refills: 0 | DISCHARGE

## 2022-11-19 RX ORDER — SODIUM CHLORIDE 9 MG/ML
1000 INJECTION, SOLUTION INTRAVENOUS
Refills: 0 | Status: DISCONTINUED | OUTPATIENT
Start: 2022-11-19 | End: 2022-11-21

## 2022-11-19 RX ORDER — SENNA PLUS 8.6 MG/1
2 TABLET ORAL AT BEDTIME
Refills: 0 | Status: DISCONTINUED | OUTPATIENT
Start: 2022-11-19 | End: 2022-11-21

## 2022-11-19 RX ORDER — NIFEDIPINE 30 MG
30 TABLET, EXTENDED RELEASE 24 HR ORAL DAILY
Refills: 0 | Status: DISCONTINUED | OUTPATIENT
Start: 2022-11-19 | End: 2022-11-21

## 2022-11-19 RX ORDER — FOLIC ACID 0.8 MG
1 TABLET ORAL DAILY
Refills: 0 | Status: DISCONTINUED | OUTPATIENT
Start: 2022-11-19 | End: 2022-11-21

## 2022-11-19 RX ORDER — POLYETHYLENE GLYCOL 3350 17 G/17G
17 POWDER, FOR SOLUTION ORAL DAILY
Refills: 0 | Status: DISCONTINUED | OUTPATIENT
Start: 2022-11-19 | End: 2022-11-21

## 2022-11-19 RX ORDER — INSULIN LISPRO 100/ML
VIAL (ML) SUBCUTANEOUS
Refills: 0 | Status: DISCONTINUED | OUTPATIENT
Start: 2022-11-19 | End: 2022-11-21

## 2022-11-19 RX ORDER — ASPIRIN/CALCIUM CARB/MAGNESIUM 324 MG
1 TABLET ORAL
Qty: 0 | Refills: 0 | DISCHARGE

## 2022-11-19 RX ORDER — DEXTROSE 50 % IN WATER 50 %
25 SYRINGE (ML) INTRAVENOUS ONCE
Refills: 0 | Status: DISCONTINUED | OUTPATIENT
Start: 2022-11-19 | End: 2022-11-21

## 2022-11-19 RX ORDER — GLUCAGON INJECTION, SOLUTION 0.5 MG/.1ML
1 INJECTION, SOLUTION SUBCUTANEOUS ONCE
Refills: 0 | Status: DISCONTINUED | OUTPATIENT
Start: 2022-11-19 | End: 2022-11-21

## 2022-11-19 RX ORDER — DEXTROSE 50 % IN WATER 50 %
12.5 SYRINGE (ML) INTRAVENOUS ONCE
Refills: 0 | Status: DISCONTINUED | OUTPATIENT
Start: 2022-11-19 | End: 2022-11-21

## 2022-11-19 RX ADMIN — Medication 1: at 08:14

## 2022-11-19 RX ADMIN — Medication 2: at 17:11

## 2022-11-19 RX ADMIN — Medication 1 MILLIGRAM(S): at 12:22

## 2022-11-19 RX ADMIN — SENNA PLUS 2 TABLET(S): 8.6 TABLET ORAL at 21:53

## 2022-11-19 RX ADMIN — ENOXAPARIN SODIUM 60 MILLIGRAM(S): 100 INJECTION SUBCUTANEOUS at 05:25

## 2022-11-19 RX ADMIN — Medication 30 MILLIGRAM(S): at 05:23

## 2022-11-19 RX ADMIN — ATORVASTATIN CALCIUM 10 MILLIGRAM(S): 80 TABLET, FILM COATED ORAL at 21:53

## 2022-11-19 RX ADMIN — Medication 3: at 12:22

## 2022-11-19 RX ADMIN — POLYETHYLENE GLYCOL 3350 17 GRAM(S): 17 POWDER, FOR SOLUTION ORAL at 17:08

## 2022-11-19 RX ADMIN — ENOXAPARIN SODIUM 60 MILLIGRAM(S): 100 INJECTION SUBCUTANEOUS at 17:08

## 2022-11-19 NOTE — H&P ADULT - HISTORY OF PRESENT ILLNESS
82-year-old female past medical history of hyperlipidemia, RA, cervical cancer status post radiation in remission for 1.5 years presents for evaluation of right lower extremity swelling. Patient endorses worsening right lower extremity swelling over the past couple weeks. Patient had multiple duplexes negative for DVT. Patient had CT abdomen at [facility] that was concerning for mass versus scarring causing obstruction of right femoral vein. Now presents with markedly increased swelling over the past 2 days with moderate pressure-like pain, aggravated with ambulation, no relieving factors.    82-year-old female past medical history of ovarian cancer presents with swelling of her left leg for several weeks, now worse over the past several days.  Seen in the office and concern for cellulitis so referred to the ED.  On my exam she has no evidence of cellulitis, however she does appear to have a venous obstruction of her left leg.  We will get a repeat CAT scan with IV contrast and contact vascular surgery.      81yo female with PMHx of HTN, DM, RA on methotrexate, cervical cancer s/p chemotherapy and radiation (last round 1/2022), on ASA 81mg daily, no anticoagulation, presented to ED for evaluation of RLE swelling and pain for several weeks. Pt accompanied by daughter who states pt has had RLE pain and swelling for the past seven weeks but over the past three days the pain has increased in intensity and the swelling has progressively gotten worse. Pt's daughter also states it has become difficult to ambulate due to the swelling and pain, causing her to become more fatigued. Pt normally ambulates independently. Pt was recently here three days ago for the same symptoms but no DVTs were noted on venous duplex of the RLE, further imaging was not performed. Denies history of DVTs. Admitted for COVID-19 one month ago. Denies fever, chills, SOB, chest pain, abdominal pain, urinary incontinence, back pain, dizziness, weight changes, appetite changes. PMD: Dr. Yan    Imaging performed in the ED demonstrated increased necrotic right iliac lymph nodes, RLE subcutaneous edema, necrotic right pelvic sidewall lymph nodes (c/w metastatic disease), possible occlusion of the right external iliac vein and possible right common femoral vein occlusion.     Vascular surgery consulted for possible occlusion of the right external iliac vein and possible right common femoral vein occlusion.  81yo female with PMHx of HTN, DM, RA on methotrexate, cervical cancer s/p chemotherapy and radiation (last round 1/2022), on ASA 81mg daily, no anticoagulation, presented to ED for evaluation of RLE swelling and pain for several weeks. At baseline, patient is independent in ambulation and lives alone, very near to her daughter.    Pt has had RLE pain and swelling for the past seven weeks but over the past three days the pain has increased in intensity and the swelling has progressively gotten worse. It has become difficult to ambulate due to the swelling and pain, causing her to become more fatigued. Pt normally ambulates independently. Pt was recently here three days ago for the same symptoms but no DVTs were noted on venous duplex of the RLE, further imaging was not performed. Denies history of DVTs. Admitted for COVID-19 one month ago. Denies fever, chills, SOB, chest pain, abdominal pain, urinary incontinence, back pain, dizziness, weight changes, appetite changes.    Imaging performed in the ED demonstrated increased necrotic right iliac lymph nodes, RLE subcutaneous edema, necrotic right pelvic sidewall lymph nodes (c/w metastatic disease), possible occlusion of the right external iliac vein and possible right common femoral vein occlusion.     Vital Signs Last 24 Hrs:  T(F): 97 (19 Nov 2022 02:28), Max: 98.6 (18 Nov 2022 22:43)  HR: 95 (19 Nov 2022 02:28) (83 - 95)  BP: 183/72 (19 Nov 2022 02:28) (143/65 - 183/72)  RR: 18 (19 Nov 2022 02:28) (18 - 20)  SpO2: 100% (19 Nov 2022 02:28) (99% - 100%) on RA

## 2022-11-19 NOTE — CONSULT NOTE ADULT - ASSESSMENT
83yo P4, post-menopausal (40yo) with PMHx of HTN, DM, RA, cervical cancer s/p chemotherapy and radiation, admitted for evaluation of RLE swelling and pain for several weeks, found to have pelvic venous obstruction and necrotic pelvic side walls consistent with metastatic disease  - Per chart review, patient was not initially a surgical candidate and is not a surgical candidate at this time either  - After discussion with patient's daughter over the phone, decision was made to try pelvic exam/speculum when she is present, will contact GYN team later today  - Continue care with Dr. 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 Ragland and Dr Krueger  - Patient to be discussed with and evaluated by gyn oncologist on Monday  - No acute intervention by GYN at this time recommended  - Per vascular consult: possible venogram and stent on 11/21  - Care per primary team

## 2022-11-19 NOTE — CONSULT NOTE ADULT - ASSESSMENT
ASSESSMENT:  82yF w/ PMHx of DM, HTN, RA, cervical cancer s/p chemotherapy/radiation (last round 1/2022) who presented with progressively worsening RLE swelling and pain. Physical exam findings, imaging, and labs as documented above.   Vascular surgery consulted for possible occlusion of the right external iliac vein and right common femoral vein.     PLAN:   - Recommend starting therapeutic Lovenox  - Due to CT scan findings of necrotic pelvic lymph nodes, recommend gyn/onc evaluation for possible metastatic disease  - Recommend further oncologic investigation, tumor markers and appropriate workup      - Vascular surgery to follow   - Attending to see in AM     Above case and plan discussed Vascular Fellow, Dr. Mark and to be d/w Vascular Attending Dr. Mcallister  11-18-22 @ 23:08    Vascular Spectra [phone]

## 2022-11-19 NOTE — PATIENT PROFILE ADULT - FALL HARM RISK - RISK INTERVENTIONS

## 2022-11-19 NOTE — H&P ADULT - NSHPLABSRESULTS_GEN_ALL_CORE
11.3   7.67  )-----------( 273      ( 18 Nov 2022 17:13 )             37.7     11-18    137  |  97<L>  |  21<H>  ----------------------------<  70  4.5   |  29  |  0.9    Ca    9.1      18 Nov 2022 17:13    TPro  7.0  /  Alb  4.4  /  TBili  0.3  /  DBili  x   /  AST  25  /  ALT  14  /  AlkPhos  93  11-18

## 2022-11-19 NOTE — CONSULT NOTE ADULT - SUBJECTIVE AND OBJECTIVE BOX
VASCULAR SURGERY CONSULT NOTE    Patient: JOY ARAGON , 82y ([birth year])Female   MRN: [id number]  Location: [facility] ED  Visit: 11-18-22 Emergency  Date: 11-19-22 @ 00:23    HPI:  81yo female with PMHx of HTN, DM, RA on methotrexate, cervical cancer s/p chemotherapy and radiation (last round 1/2022), on ASA 81mg daily, no anticoagulation, presented to ED for evaluation of RLE swelling and pain for several weeks. Pt accompanied by daughter who states pt has had RLE pain and swelling for the past seven weeks but over the past three days the pain has increased in intensity and the swelling has progressively gotten worse. Pt's daughter also states it has become difficult to ambulate due to the swelling and pain, causing her to become more fatigued. Pt normally ambulates independently. Pt was recently here three days ago for the same symptoms but no DVTs were noted on venous duplex of the RLE, further imaging was not performed. Denies history of DVTs. Admitted for COVID-19 one month ago. Denies fever, chills, SOB, chest pain, abdominal pain, urinary incontinence, back pain, dizziness, weight changes, appetite changes. PMD: Dr. Yan    Imaging performed in the ED demonstrated increased necrotic right iliac lymph nodes, RLE subcutaneous edema, necrotic right pelvic sidewall lymph nodes (c/w metastatic disease), possible occlusion of the right external iliac vein and possible right common femoral vein occlusion.     Vascular surgery consulted for possible occlusion of the right external iliac vein and possible right common femoral vein occlusion.       PAST MEDICAL & SURGICAL HISTORY:  Diabetes  HTN (hypertension)  Rheumatoid arthritis  Depression  Anxiety  Cervical cancer  History of dental surgery  Foot mass  left foot mass removal 20 years ago    Home Medications:  amlodipine-benazepril 5 mg-20 mg oral capsule: 1 cap(s) orally once a day (27 Sep 2018 17:42)  Aspir-Low 81 mg oral delayed release tablet: 1 tab(s) orally once a day (27 Sep 2018 17:42)  atorvastatin 10 mg oral tablet: 1 tab(s) orally once a day (at bedtime) (01 Oct 2018 12:36)  buPROPion 300 mg/24 hours (XL) oral tablet, extended release: 1 tab(s) orally once a day (01 Oct 2018 12:36)  Fish Oil 1000 mg oral capsule: 1 cap(s) orally 3 times a day (27 Sep 2018 17:42)  folic acid 1 mg oral tablet: 1 tab(s) orally once a day (01 Oct 2018 12:36)  glipiZIDE 2.5 mg oral tablet, extended release: orally 2 times a day (27 Sep 2018 17:42)  HumuLIN 70/30 KwikPen 70 units-30 units/mL subcutaneous suspension: subcutaneous 2 times a day -correction dose (27 Sep 2018 17:42)  methotrexate 2.5 mg oral tablet: 4 tab(s) orally once a week, on Wednesday (27 Sep 2018 17:42)  oxyCODONE 5 mg oral tablet: 1 tab(s) orally every 6 hours, As needed, Severe Pain (7 - 10) (01 Oct 2018 12:36)  Viibryd 40 mg oral tablet: 1 tab(s) orally once a day (27 Sep 2018 17:42)  Vitamin B12 500 mcg oral tablet: 1 tab(s) orally once a day (27 Sep 2018 17:42)  Vitamin D3 50,000 intl units oral capsule: 1 cap(s) orally once a month   (27 Sep 2018 17:42)      VITALS:  T(F): 98.6 (11-18-22 @ 22:43), Max: 98.6 (11-18-22 @ 22:43)  HR: 83 (11-18-22 @ 22:43) (83 - 92)  BP: 155/89 (11-18-22 @ 22:43) (143/65 - 155/89)  RR: 20 (11-18-22 @ 22:43) (20 - 20)  SpO2: 99% (11-18-22 @ 22:43) (99% - 100%)    PHYSICAL EXAM:  General: NAD, AAOx3, calm and cooperative  Cardiac: RRR  Respiratory: normal respiratory effort, symmetric chest rise  Abdomen: Soft, non-distended, non-tender, no rebound, no guarding.   Neuro: Sensation grossly intact and equal throughout, no focal deficits  Skin: Warm/dry, normal color, no jaundice  Extremities: Strength 4/5 RLE 5/5 UE/LLE. ROM intact. Edematous, erythematous right lower extremity. Bilateral DP/PT pulses obtained by doppler      MEDICATIONS  (STANDING):  enoxaparin Injectable 60 milliGRAM(s) SubCutaneous Once      LAB/STUDIES:                        11.3   7.67  )-----------( 273      ( 18 Nov 2022 17:13 )             37.7     11-18    137  |  97<L>  |  21<H>  ----------------------------<  70  4.5   |  29  |  0.9    Ca    9.1      18 Nov 2022 17:13    TPro  7.0  /  Alb  4.4  /  TBili  0.3  /  DBili  x   /  AST  25  /  ALT  14  /  AlkPhos  93  11-18    PT/INR - ( 18 Nov 2022 17:13 )   PT: 11.00 sec;   INR: 0.97 ratio      PTT - ( 18 Nov 2022 17:13 )  PTT:34.6 sec  LIVER FUNCTIONS - ( 18 Nov 2022 17:13 )  Alb: 4.4 g/dL / Pro: 7.0 g/dL / ALK PHOS: 93 U/L / ALT: 14 U/L / AST: 25 U/L / GGT: x             IMAGING:  < from: CT Abdomen and Pelvis w/ IV Cont (11.18.22 @ 19:40) >  IMPRESSION:  Necrotic right iliac lymph nodes have increased in size since April 2022   with asymmetrically dilated right iliac and feeding veins as well as   asymmetric right lower extremity edema. Findings suggesting pelvic venous   obstruction. Intravenous thrombus is also possible however is not   distinctly visualized as the veins are suboptimally opacified on the   current examination.      < from: CT Lower Extremity w/ IV Cont, Right (11.18.22 @ 19:48) >  IMPRESSION:  1.  Apparent occlusion of the right external iliac vein and possibly of   the right common femoral vein.  2.  Diffuse subcutaneous edema throughout the right lower extremity,   consistent with venous obstruction.  3.  Necrotic right pelvic sidewall lymph nodes consistent with metastatic   disease.      ACCESS DEVICES:  [x] Peripheral IV

## 2022-11-19 NOTE — CONSULT NOTE ADULT - NS ATTEND AMEND GEN_ALL_CORE FT
as above  pt with right leg swelling  Ct reveals iliac vein compression  will plan for venogram and possible stent on Monday.

## 2022-11-19 NOTE — CONSULT NOTE ADULT - SUBJECTIVE AND OBJECTIVE BOX
Chief Complaint: RLE pain    Kazakh  #[id number] and Daughter (Edna, [phone]) used to interpret:    HPI: 81yo P4, post-menopausal (38yo) with PMHx of HTN, DM, RA on methotrexate, cervical cancer s/p chemotherapy and radiation, on ASA 81mg daily, presented to ED for evaluation of RLE swelling and pain for several weeks. At baseline, patient is independent in ambulation and lives alone, very near to her daughter. Pt has had RLE pain and swelling for the past seven weeks but over the past three days the pain has increased in intensity and the swelling has progressively gotten worse. It has become difficult to ambulate due to the swelling and pain, causing her to become more fatigued. Pt normally ambulates independently. Pt was recently here three days ago for the same symptoms but no DVTs were noted on venous duplex of the RLE, further imaging was not performed. Denies history of DVTs. Admitted for COVID-19 one month ago. Denies fever, chills, SOB, chest pain, abdominal pain, urinary incontinence, back pain, dizziness, weight changes, appetite changes.Imaging performed in the ED demonstrated increased necrotic right iliac lymph nodes, RLE subcutaneous edema, necrotic right pelvic sidewall lymph nodes (c/w metastatic disease), possible occlusion of the right external iliac vein and possible right common femoral vein occlusion. GYN consulted for this reason and considering patient's history of cervical cancer.     Cervical Cancer History::  Patient initially presented in 2021 to Dr. Echevarria at [facility] with postmenopausal bleeding. On exam, she was noted to have a 4-5cm cervical mass involving at least the middle of the vaginal wall, circumferentially. No parametrial involvement. She was not deemed a surgical candidate. PAP smear on 3/18/21 showed: LSIL/AGC/HPV+. Pelvic US on 3/22/21 showed endometrium to be 4.2mm thick.   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 Positive AE1/AE3, CK5/6, p63, p53. Negative for CK, CK20, PAX8, and p16 = Stage IIIA  -Started carboplatin in 2021. S/p five cycles of carboplatin in 2021  -S/p 4500 cGy to pelvis/cervix followed by an SBRT boost (2500cGy) from  to 2021. Posttreatment scan demonstrated an FDG avid right pelvic node   -S/p an additional 2500cGy to the right pelvic node from 2022 to 2022. Posttreatment PET scan demonstrated a persistent FDG avid right iliac lymph node with stable SUV (SUV 5.3 vs 5.6)   Patient scheduled to repeat PET on Monday, however due to hospitalization unable to complete. Her next PET rescheduled to .   Continues to follow with Dr. Krueger and Dr. Ragland, has not seen Dr. Echevarria since last year.     Ob/Gyn History:     (FT  x4)              LMP -  menopausal @38yo                   Denies history of ovarian cysts, uterine fibroids, or STIs    Denies the following: constitutional symptoms, visual symptoms, cardiovascular symptoms, respiratory symptoms, GI symptoms, musculoskeletal symptoms, skin symptoms, neurologic symptoms, hematologic symptoms, allergic symptoms, psychiatric symptoms  Except any pertinent positives listed.     Home Medications:  atorvastatin 10 mg oral tablet: 1 tab(s) orally once a day (at bedtime) (2022 03:34)  folic acid 1 mg oral tablet: 1 tab(s) orally once a day (2022 03:34)  HumuLIN 70/30 KwikPen 70 units-30 units/mL subcutaneous suspension: subcutaneous 2 times a day -correction dose (2022 03:34)  methotrexate 2.5 mg oral tablet: 6 tab(s) orally once a week (2022 03:34)    Allergies:  No Known Allergies    PAST MEDICAL & SURGICAL HISTORY:  Diabetes  HTN (hypertension)  Rheumatoid arthritis  Depression  Anxiety  Cervical cancer  History of dental surgery  Foot mass left foot mass removal 20 years ago    FAMILY HISTORY: Denies    SOCIAL HISTORY: Prior history of cigarette use; denies alcohol use, or illicit drug use    Vital Signs Last 24 Hrs  T(F): 97.1 (2022 08:05), Max: 98.6 (2022 22:43)  HR: 71 (2022 08:05) (71 - 95)  BP: 150/65 (2022 08:05) (119/58 - 183/72)  RR: 18 (2022 08:05) (18 - 20)  Height (cm): 160 (22 @ 02:28)    Meds:   enoxaparin Injectable 60 milliGRAM(s) SubCutaneous Once  insulin regular  human recombinant. 10 Unit(s) SubCutaneous once  morphine  - Injectable 4 milliGRAM(s) IV Push Once  oxycodone    5 mG/acetaminophen 325 mG 1 Tablet(s) Oral Once    Height (cm): 160 (22 @ 02:28)    LABS:                        10.9   6.89  )-----------( 254      ( 2022 06:59 )             36.3       ABO RH Interpretation: O POS (22 @ 17:13)  Antibody Screen: NEG (22 @ 17:13)        138  |  102  |  14  ----------------------------<  170<H>  4.9   |  29  |  0.9    Ca    9.3      2022 06:59  Mg     1.8         TPro  6.8  /  Alb  4.0  /  TBili  0.4  /  DBili  x   /  AST  15  /  ALT  12  /  AlkPhos  92      PT/INR - ( 2022 17:13 )   PT: 11.00 sec;   INR: 0.97 ratio       PTT - ( 2022 17:13 )  PTT:34.6 sec    RADIOLOGY & ADDITIONAL STUDIES:  2022 PET: Persistent FDG avid1.8cm right iliac lymph node with stable SUV. No other sites of abnormal FDG uptake    2022 PET: Persistent FDG avid1.8cm right iliac lymph node with stable SUV. No other sites of abnormal FDG uptake      < from: CT Lower Extremity w/ IV Cont, Right (22 @ 19:48) >    ACC: [id number] EXAM:  CT LWR EXT IC RT                          PROCEDURE DATE:  2022      INTERPRETATION:  INDICATION: Worsening right leg swelling. History of   cervical cancer.    TECHNIQUE: Contiguous axial CT images of the right lowerextremity from   the level of the right hip to the right knee were obtained following the   administration of intravenous contrast. Coronal and sagittal reformats   were obtained.    COMPARISON: None    FINDINGS:    BONES/JOINTS: No acute fracture ordislocation. Moderate to severe   degenerative changes of the hip. Severe degenerative change in the pubic   symphysis. Degenerative change also seen in the knee. Multifocal   chondrocalcinosis noted. Small knee joint fluid and small Baker/popliteal   cyst. No significant hip joint effusion.    SOFT TISSUES: Diffuse subcutaneous and fascial edema throughout the right   lower extremity, with cutaneous thickening. No loculated collections or   subcutaneous gas.    OTHER: Poor contrast opacificationlimits evaluation of vessel patency.   Apparent occlusion of the right external iliac vein (series 8/1) with   surrounding inflammation. Questionable occlusion of the right common   femoral vein also noted (series 8/79) with surrounding inflammation.  Adjacent right pelvic sidewall necrotic lymph nodes (series 8/19).    Atherosclerotic disease.    IMPRESSION:    1.  Apparent occlusion of the right external iliac vein and possibly of   the right common femoral vein.  2.  Diffuse subcutaneous edema throughout the right lower extremity,   consistent with venous obstruction.  3.  Necrotic right pelvic sidewall lymph nodes consistent with metastatic   disease.    --- End of Report ---      LUIS LUCIANO MD; Resident Radiologist  This document has been electronically signed.  SHAYY MCCURDY MD; Attending Radiologist  This document has been electronically signed. 2022  9:15PM    < end of copied text >  < from: CT Abdomen and Pelvis w/ IV Cont (22 @ 19:40) >    ACC: [id number] EXAM:  CT ABDOMEN AND PELVIS IC                          PROCEDURE DATE:  2022      INTERPRETATION:  CLINICAL STATEMENT: Right leg swelling. History of   cervical carcinoma.    TECHNIQUE: Contiguous axial CT images were obtained from the lower chest   to the pubic symphysis following administration of intravenous contrast.    Oral contrast was not administered.  Reformatted images in the coronal   and sagittal planes were acquired.    COMPARISON CT: 2022    OTHER STUDIES USED FOR CORRELATION: PET/CT 2022      FINDINGS:    LOWER CHEST:  Multivessel coronary arterial calcifications. Aortic valve calcification.   Lung base without mass or consolidation.    HEPATOBILIARY: Cholelithiasis.    SPLEEN: Unremarkable.    PANCREAS: Unremarkable.    ADRENAL GLANDS: Unremarkable.    KIDNEYS: Symmetric enhancement. No hydronephrosis. Subcentimeter right   upper pole hypodensity, too small to characterize.    ABDOMINOPELVIC NODES: Necrotic right iliac lymph nodes, increased in size   since prior exam from 2022 and not adequately compared to the PET/CT   due to differences in technique.    PELVIC ORGANS: Unremarkable.    PERITONEUM/MESENTERY/BOWEL: No bowel obstruction, pneumatosis,   pneumoperitoneum, or ascites. Normal appendix.    BONES/SOFT TISSUES: No aggressive osseous lesion. Bony degenerative   changes..    OTHER: Normal caliber aorta. Diffuse atherosclerotic disease.   Asymmetrically distended right iliac vein and distal feeding veins. The   lumenis not well opacified however there is likely some degree of   obstruction necrotic lymph nodes. Thrombus is not excluded. Partially   imaged right lower extremity edema.    IMPRESSION:    Necrotic right iliac lymph nodes have increased in size sinceApr2022   with asymmetrically dilated right iliac and feeding veins as well as   asymmetric right lower extremity edema. Findings suggesting pelvic venous   obstruction. Intravenous thrombus is also possible however is not   distinctly visualized as the veins are suboptimally opacified on the   current examination.    --- End of Report ---      MARY BENNETT MD; Attending Radiologist  This document has been electronically signed. 2022  8:51PM    < end of copied text >     Chief Complaint: RLE pain    Portuguese  #[id number] and Daughter (Edna, [phone]) used to interpret:    HPI: 83yo P4, post-menopausal (40yo) with PMHx of HTN, DM, RA on methotrexate, cervical cancer s/p chemotherapy and radiation, on ASA 81mg daily, presented to ED for evaluation of RLE swelling and pain for several weeks. At baseline, patient is independent in ambulation and lives alone, very near to her daughter. Pt has had RLE pain and swelling for the past seven weeks but over the past three days the pain has increased in intensity and the swelling has progressively gotten worse. It has become difficult to ambulate due to the swelling and pain, causing her to become more fatigued. Pt normally ambulates independently. Pt was recently here three days ago for the same symptoms but no DVTs were noted on venous duplex of the RLE, further imaging was not performed. Denies history of DVTs. Admitted for COVID-19 one month ago. Denies fever, chills, SOB, chest pain, abdominal pain, urinary incontinence, back pain, dizziness, weight changes, appetite changes.Imaging performed in the ED demonstrated increased necrotic right iliac lymph nodes, RLE subcutaneous edema, necrotic right pelvic sidewall lymph nodes (c/w metastatic disease), possible occlusion of the right external iliac vein and possible right common femoral vein occlusion. GYN consulted for this reason and considering patient's history of cervical cancer.     Cervical Cancer History::  Patient initially presented in 2021 to Dr. Bryson and pap smear on 3/18/21 showed: LSIL/AGC/HPV+. Pelvic US on 3/22/21 showed endometrium to be 4.2mm thick. In 2022, she was seen by Dr. Echevarria at [facility], was noted to have a 4-5cm cervical mass involving at least the middle of the vaginal wall, circumferentially with no parametrial involvement; consistent with stage IIIA. She was not deemed a surgical candidate.   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 Positive AE1/AE3, CK5/6, p63, p53. Negative for CK, CK20, PAX8, and p16  -Started carboplatin in 2021. S/p five cycles of carboplatin in 2021  -S/p 4500 cGy to pelvis/cervix followed by an SBRT boost (2500cGy) from  to 2021. Posttreatment scan demonstrated an FDG avid right pelvic node   -S/p an additional 2500cGy to the right pelvic node from 2022 to 2022. Posttreatment PET scan demonstrated a persistent FDG avid right iliac lymph node with stable SUV (SUV 5.3 vs 5.6)   Patient scheduled to repeat PET on Monday, however due to hospitalization unable to complete. Her next PET rescheduled to .   Continues to follow with Dr. Krueger and Dr. Ragland, has not seen Dr. Echevarria since last year.     Ob/Gyn History:     (FT  x4)              LMP -  menopausal @40yo                   Denies history of ovarian cysts, uterine fibroids, or STIs    Denies the following: constitutional symptoms, visual symptoms, cardiovascular symptoms, respiratory symptoms, GI symptoms, musculoskeletal symptoms, skin symptoms, neurologic symptoms, hematologic symptoms, allergic symptoms, psychiatric symptoms  Except any pertinent positives listed.     Home Medications:  atorvastatin 10 mg oral tablet: 1 tab(s) orally once a day (at bedtime) (2022 03:34)  folic acid 1 mg oral tablet: 1 tab(s) orally once a day (2022 03:34)  HumuLIN 70/30 KwikPen 70 units-30 units/mL subcutaneous suspension: subcutaneous 2 times a day -correction dose (2022 03:34)  methotrexate 2.5 mg oral tablet: 6 tab(s) orally once a week (2022 03:34)    Allergies:  No Known Allergies    PAST MEDICAL & SURGICAL HISTORY:  Diabetes  HTN (hypertension)  Rheumatoid arthritis  Depression  Anxiety  Cervical cancer  History of dental surgery  Foot mass left foot mass removal 20 years ago    FAMILY HISTORY: Denies    SOCIAL HISTORY: Prior history of cigarette use; denies alcohol use, or illicit drug use    Vital Signs Last 24 Hrs  T(F): 97.1 (2022 08:05), Max: 98.6 (2022 22:43)  HR: 71 (2022 08:05) (71 - 95)  BP: 150/65 (2022 08:05) (119/58 - 183/72)  RR: 18 (2022 08:05) (18 - 20)  Height (cm): 160 (22 @ 02:28)    Physical Exam:   GA: AOX3, NAD   Pelvic: Pt refused     Meds:   enoxaparin Injectable 60 milliGRAM(s) SubCutaneous Once  insulin regular  human recombinant. 10 Unit(s) SubCutaneous once  morphine  - Injectable 4 milliGRAM(s) IV Push Once  oxycodone    5 mG/acetaminophen 325 mG 1 Tablet(s) Oral Once    Height (cm): 160 (22 @ 02:28)    LABS:                        10.9   6.89  )-----------( 254      ( 2022 06:59 )             36.3       ABO RH Interpretation: O POS (22 @ 17:13)  Antibody Screen: NEG (22 @ 17:13)        138  |  102  |  14  ----------------------------<  170<H>  4.9   |  29  |  0.9    Ca    9.3      2022 06:59  Mg     1.8         TPro  6.8  /  Alb  4.0  /  TBili  0.4  /  DBili  x   /  AST  15  /  ALT  12  /  AlkPhos  92      PT/INR - ( 2022 17:13 )   PT: 11.00 sec;   INR: 0.97 ratio       PTT - ( 2022 17:13 )  PTT:34.6 sec    RADIOLOGY & ADDITIONAL STUDIES:  2022 PET: Persistent FDG avid1.8cm right iliac lymph node with stable SUV. No other sites of abnormal FDG uptake    2022 PET: Persistent FDG avid1.8cm right iliac lymph node with stable SUV. No other sites of abnormal FDG uptake      < from: CT Lower Extremity w/ IV Cont, Right (22 @ 19:48) >    ACC: [id number] EXAM:  CT LWR EXT IC RT                          PROCEDURE DATE:  2022      INTERPRETATION:  INDICATION: Worsening right leg swelling. History of   cervical cancer.    TECHNIQUE: Contiguous axial CT images of the right lowerextremity from   the level of the right hip to the right knee were obtained following the   administration of intravenous contrast. Coronal and sagittal reformats   were obtained.    COMPARISON: None    FINDINGS:    BONES/JOINTS: No acute fracture ordislocation. Moderate to severe   degenerative changes of the hip. Severe degenerative change in the pubic   symphysis. Degenerative change also seen in the knee. Multifocal   chondrocalcinosis noted. Small knee joint fluid and small Baker/popliteal   cyst. No significant hip joint effusion.    SOFT TISSUES: Diffuse subcutaneous and fascial edema throughout the right   lower extremity, with cutaneous thickening. No loculated collections or   subcutaneous gas.    OTHER: Poor contrast opacificationlimits evaluation of vessel patency.   Apparent occlusion of the right external iliac vein (series 8/1) with   surrounding inflammation. Questionable occlusion of the right common   femoral vein also noted (series 8/79) with surrounding inflammation.  Adjacent right pelvic sidewall necrotic lymph nodes (series 8/19).    Atherosclerotic disease.    IMPRESSION:    1.  Apparent occlusion of the right external iliac vein and possibly of   the right common femoral vein.  2.  Diffuse subcutaneous edema throughout the right lower extremity,   consistent with venous obstruction.  3.  Necrotic right pelvic sidewall lymph nodes consistent with metastatic   disease.    --- End of Report ---      LUIS LUCIANO MD; Resident Radiologist  This document has been electronically signed.  SHAYY MCCURDY MD; Attending Radiologist  This document has been electronically signed. 2022  9:15PM    < end of copied text >  < from: CT Abdomen and Pelvis w/ IV Cont (22 @ 19:40) >    ACC: [id number] EXAM:  CT ABDOMEN AND PELVIS IC                          PROCEDURE DATE:  2022      INTERPRETATION:  CLINICAL STATEMENT: Right leg swelling. History of   cervical carcinoma.    TECHNIQUE: Contiguous axial CT images were obtained from the lower chest   to the pubic symphysis following administration of intravenous contrast.    Oral contrast was not administered.  Reformatted images in the coronal   and sagittal planes were acquired.    COMPARISON CT: 2022    OTHER STUDIES USED FOR CORRELATION: PET/CT 2022      FINDINGS:    LOWER CHEST:  Multivessel coronary arterial calcifications. Aortic valve calcification.   Lung base without mass or consolidation.    HEPATOBILIARY: Cholelithiasis.    SPLEEN: Unremarkable.    PANCREAS: Unremarkable.    ADRENAL GLANDS: Unremarkable.    KIDNEYS: Symmetric enhancement. No hydronephrosis. Subcentimeter right   upper pole hypodensity, too small to characterize.    ABDOMINOPELVIC NODES: Necrotic right iliac lymph nodes, increased in size   since prior exam from 2022 and not adequately compared to the PET/CT   due to differences in technique.    PELVIC ORGANS: Unremarkable.    PERITONEUM/MESENTERY/BOWEL: No bowel obstruction, pneumatosis,   pneumoperitoneum, or ascites. Normal appendix.    BONES/SOFT TISSUES: No aggressive osseous lesion. Bony degenerative   changes..    OTHER: Normal caliber aorta. Diffuse atherosclerotic disease.   Asymmetrically distended right iliac vein and distal feeding veins. The   lumenis not well opacified however there is likely some degree of   obstruction necrotic lymph nodes. Thrombus is not excluded. Partially   imaged right lower extremity edema.    IMPRESSION:    Necrotic right iliac lymph nodes have increased in size since2022   with asymmetrically dilated right iliac and feeding veins as well as   asymmetric right lower extremity edema. Findings suggesting pelvic venous   obstruction. Intravenous thrombus is also possible however is not   distinctly visualized as the veins are suboptimally opacified on the   current examination.    --- End of Report ---      MARY BENNETT MD; Attending Radiologist  This document has been electronically signed. 2022  8:51PM    < end of copied text >     Chief Complaint: RLE pain    Turkmen  #[id number] and Daughter (Edna, [phone]) used to interpret:    HPI: 81yo P4, post-menopausal (38yo) with PMHx of HTN, DM, RA on methotrexate, cervical cancer s/p chemotherapy and radiation, on ASA 81mg daily, presented to ED for evaluation of RLE swelling and pain for several weeks. At baseline, patient is independent in ambulation and lives alone, very near to her daughter. Pt has had RLE pain and swelling for the past seven weeks but over the past three days the pain has increased in intensity and the swelling has progressively gotten worse. It has become difficult to ambulate due to the swelling and pain, causing her to become more fatigued. Pt normally ambulates independently. Pt was recently here three days ago for the same symptoms but no DVTs were noted on venous duplex of the RLE, further imaging was not performed. Denies history of DVTs. Admitted for COVID-19 one month ago. Denies fever, chills, SOB, chest pain, abdominal pain, urinary incontinence, back pain, dizziness, weight changes, appetite changes.Imaging performed in the ED demonstrated increased necrotic right iliac lymph nodes, RLE subcutaneous edema, necrotic right pelvic sidewall lymph nodes (c/w metastatic disease), possible occlusion of the right external iliac vein and possible right common femoral vein occlusion. GYN consulted for this reason and considering patient's history of cervical cancer.     Cervical Cancer History::  Patient initially presented in 2021 to Dr. Bryson and pap smear on 3/18/21 showed: LSIL/AGC/HPV+. Pelvic US on 3/22/21 showed endometrium to be 4.2mm thick. In 2022, she was seen by Dr. Echevarria at [facility], was noted to have a 4-5cm cervical mass involving at least the middle of the vaginal wall, circumferentially with no parametrial involvement; consistent with stage IIIA. She was not deemed a surgical candidate.   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 Positive AE1/AE3, CK5/6, p63, p53. Negative for CK, CK20, PAX8, and p16  -Started carboplatin in 2021. S/p five cycles of carboplatin in 2021  -S/p 4500 cGy to pelvis/cervix followed by an SBRT boost (2500cGy) from  to 2021. Posttreatment scan demonstrated an FDG avid right pelvic node   -S/p an additional 2500cGy to the right pelvic node from 2022 to 2022. Posttreatment PET scan demonstrated a persistent FDG avid right iliac lymph node with stable SUV (SUV 5.3 vs 5.6)   Patient scheduled to repeat PET on Monday, however due to hospitalization unable to complete. Her next PET rescheduled to .   Continues to follow with Dr. Krueger and Dr. Ragland, has not seen Dr. Echevarria since last year.     Ob/Gyn History:     (FT  x4)              LMP -  menopausal @38yo                   Denies history of ovarian cysts, uterine fibroids, or STIs    Denies the following: constitutional symptoms, visual symptoms, cardiovascular symptoms, respiratory symptoms, GI symptoms, musculoskeletal symptoms, skin symptoms, neurologic symptoms, hematologic symptoms, allergic symptoms, psychiatric symptoms  Except any pertinent positives listed.     Home Medications:  atorvastatin 10 mg oral tablet: 1 tab(s) orally once a day (at bedtime) (2022 03:34)  folic acid 1 mg oral tablet: 1 tab(s) orally once a day (2022 03:34)  HumuLIN 70/30 KwikPen 70 units-30 units/mL subcutaneous suspension: subcutaneous 2 times a day -correction dose (2022 03:34)  methotrexate 2.5 mg oral tablet: 6 tab(s) orally once a week (2022 03:34)    Allergies:  No Known Allergies    PAST MEDICAL & SURGICAL HISTORY:  Diabetes  HTN (hypertension)  Rheumatoid arthritis  Depression  Anxiety  Cervical cancer  History of dental surgery  Foot mass left foot mass removal 20 years ago    FAMILY HISTORY: Denies    SOCIAL HISTORY: Prior history of cigarette use; denies alcohol use, or illicit drug use    Vital Signs Last 24 Hrs  T(F): 97.1 (2022 08:05), Max: 98.6 (2022 22:43)  HR: 71 (2022 08:05) (71 - 95)  BP: 150/65 (2022 08:05) (119/58 - 183/72)  RR: 18 (2022 08:05) (18 - 20)  Height (cm): 160 (22 @ 02:28)    Physical Exam:   GA: AOX3, NAD   Pelvic: Pt refused     Meds:   enoxaparin Injectable 60 milliGRAM(s) SubCutaneous Once  insulin regular  human recombinant. 10 Unit(s) SubCutaneous once  morphine  - Injectable 4 milliGRAM(s) IV Push Once  oxycodone    5 mG/acetaminophen 325 mG 1 Tablet(s) Oral Once    Height (cm): 160 (22 @ 02:28)    LABS:                        10.9   6.89  )-----------( 254      ( 2022 06:59 )             36.3       ABO RH Interpretation: O POS (22 @ 17:13)  Antibody Screen: NEG (22 @ 17:13)        138  |  102  |  14  ----------------------------<  170<H>  4.9   |  29  |  0.9    Ca    9.3      2022 06:59  Mg     1.8         TPro  6.8  /  Alb  4.0  /  TBili  0.4  /  DBili  x   /  AST  15  /  ALT  12  /  AlkPhos  92      PT/INR - ( 2022 17:13 )   PT: 11.00 sec;   INR: 0.97 ratio       PTT - ( 2022 17:13 )  PTT:34.6 sec    RADIOLOGY & ADDITIONAL STUDIES:  2022 PET: Persistent FDG avid1.8cm right iliac lymph node with stable SUV. No other sites of abnormal FDG uptake    2022 PET: Persistent FDG avid1.8cm right iliac lymph node with stable SUV. No other sites of abnormal FDG uptake      < from: CT Lower Extremity w/ IV Cont, Right (22 @ 19:48) >    ACC: [id number] EXAM:  CT LWR EXT IC RT                          PROCEDURE DATE:  2022      INTERPRETATION:  INDICATION: Worsening right leg swelling. History of   cervical cancer.    TECHNIQUE: Contiguous axial CT images of the right lowerextremity from   the level of the right hip to the right knee were obtained following the   administration of intravenous contrast. Coronal and sagittal reformats   were obtained.    COMPARISON: None    FINDINGS:    BONES/JOINTS: No acute fracture ordislocation. Moderate to severe   degenerative changes of the hip. Severe degenerative change in the pubic   symphysis. Degenerative change also seen in the knee. Multifocal   chondrocalcinosis noted. Small knee joint fluid and small Baker/popliteal   cyst. No significant hip joint effusion.    SOFT TISSUES: Diffuse subcutaneous and fascial edema throughout the right   lower extremity, with cutaneous thickening. No loculated collections or   subcutaneous gas.    OTHER: Poor contrast opacificationlimits evaluation of vessel patency.   Apparent occlusion of the right external iliac vein (series 8/1) with   surrounding inflammation. Questionable occlusion of the right common   femoral vein also noted (series 8/79) with surrounding inflammation.  Adjacent right pelvic sidewall necrotic lymph nodes (series 8/19).    Atherosclerotic disease.    IMPRESSION:    1.  Apparent occlusion of the right external iliac vein and possibly of   the right common femoral vein.  2.  Diffuse subcutaneous edema throughout the right lower extremity,   consistent with venous obstruction.  3.  Necrotic right pelvic sidewall lymph nodes consistent with metastatic   disease.    --- End of Report ---      LUIS LUCIANO MD; Resident Radiologist  This document has been electronically signed.  SHAYY MCCURDY MD; Attending Radiologist  This document has been electronically signed. 2022  9:15PM    < end of copied text >  < from: CT Abdomen and Pelvis w/ IV Cont (22 @ 19:40) >    ACC: [id number] EXAM:  CT ABDOMEN AND PELVIS IC                          PROCEDURE DATE:  2022      INTERPRETATION:  CLINICAL STATEMENT: Right leg swelling. History of   cervical carcinoma.    TECHNIQUE: Contiguous axial CT images were obtained from the lower chest   to the pubic symphysis following administration of intravenous contrast.    Oral contrast was not administered.  Reformatted images in the coronal   and sagittal planes were acquired.    COMPARISON CT: 2022    OTHER STUDIES USED FOR CORRELATION: PET/CT 2022      FINDINGS:    LOWER CHEST:  Multivessel coronary arterial calcifications. Aortic valve calcification.   Lung base without mass or consolidation.    HEPATOBILIARY: Cholelithiasis.    SPLEEN: Unremarkable.    PANCREAS: Unremarkable.    ADRENAL GLANDS: Unremarkable.    KIDNEYS: Symmetric enhancement. No hydronephrosis. Subcentimeter right   upper pole hypodensity, too small to characterize.    ABDOMINOPELVIC NODES: Necrotic right iliac lymph nodes, increased in size   since prior exam from 2022 and not adequately compared to the PET/CT   due to differences in technique.    PELVIC ORGANS: Unremarkable.    PERITONEUM/MESENTERY/BOWEL: No bowel obstruction, pneumatosis,   pneumoperitoneum, or ascites. Normal appendix.    BONES/SOFT TISSUES: No aggressive osseous lesion. Bony degenerative   changes..    OTHER: Normal caliber aorta. Diffuse atherosclerotic disease.   Asymmetrically distended right iliac vein and distal feeding veins. The   lumenis not well opacified however there is likely some degree of   obstruction necrotic lymph nodes. Thrombus is not excluded. Partially   imaged right lower extremity edema.    IMPRESSION:    Necrotic right iliac lymph nodes have increased in size since2022   with asymmetrically dilated right iliac and feeding veins as well as   asymmetric right lower extremity edema. Findings suggesting pelvic venous   obstruction. Intravenous thrombus is also possible however is not   distinctly visualized as the veins are suboptimally opacified on the   current examination.    --- End of Report ---      MARY BENNETT MD; Attending Radiologist  This document has been electronically signed. 2022  8:51PM    < end of copied text >     Chief Complaint: RLE pain    [place]  #[id number] and Daughter (Edna, [phone]) used to interpret:    HPI: 83yo P4, post-menopausal (40yo) with PMHx of HTN, DM, RA on methotrexate, cervical cancer s/p chemotherapy and radiation, on ASA 81mg daily, presented to ED for evaluation of RLE swelling and pain for several weeks. At baseline, patient is independent in ambulation and lives alone, very near to her daughter. Pt has had RLE pain and swelling for the past seven weeks but over the past three days the pain has increased in intensity and the swelling has progressively gotten worse. It has become difficult to ambulate due to the swelling and pain, causing her to become more fatigued. Pt normally ambulates independently. Pt was recently here three days ago for the same symptoms but no DVTs were noted on venous duplex of the RLE, further imaging was not performed. Denies history of DVTs. Admitted for COVID-19 one month ago. Denies fever, chills, SOB, chest pain, abdominal pain, urinary incontinence, back pain, dizziness, weight changes, appetite changes.Imaging performed in the ED demonstrated increased necrotic right iliac lymph nodes, RLE subcutaneous edema, necrotic right pelvic sidewall lymph nodes (c/w metastatic disease), possible occlusion of the right external iliac vein and possible right common femoral vein occlusion. GYN consulted for this reason and considering patient's history of cervical cancer.     Cervical Cancer History::  Patient initially presented in 2021 to Dr. Bryson and pap smear on 3/18/21 showed: LSIL/AGC/HPV+. Pelvic US on 3/22/21 showed endometrium to be 4.2mm thick. In 2022, she was seen by Dr. Echevarria at [facility], was noted to have a 4-5cm cervical mass involving at least the middle of the vaginal wall, circumferentially with no parametrial involvement; consistent with stage IIIA. She was not deemed a surgical candidate.   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 Positive AE1/AE3, CK5/6, p63, p53. Negative for CK, CK20, PAX8, and p16  -Started carboplatin in 2021. S/p five cycles of carboplatin in 2021  -S/p 4500 cGy to pelvis/cervix followed by an SBRT boost (2500cGy) from  to 2021. Posttreatment scan demonstrated an FDG avid right pelvic node   -S/p an additional 2500cGy to the right pelvic node from 2022 to 2022. Posttreatment PET scan demonstrated a persistent FDG avid right iliac lymph node with stable SUV (SUV 5.3 vs 5.6)   Patient scheduled to repeat PET on Monday, however due to hospitalization unable to complete. Her next PET rescheduled to .   Continues to follow with Dr. Krueger and Dr. Ragland, has not seen Dr. Echevarria since last year.     Ob/Gyn History:     (FT  x4)              LMP -  menopausal @40yo                   Denies history of ovarian cysts, uterine fibroids, or STIs    Denies the following: constitutional symptoms, visual symptoms, cardiovascular symptoms, respiratory symptoms, GI symptoms, musculoskeletal symptoms, skin symptoms, neurologic symptoms, hematologic symptoms, allergic symptoms, psychiatric symptoms  Except any pertinent positives listed.     Home Medications:  atorvastatin 10 mg oral tablet: 1 tab(s) orally once a day (at bedtime) (2022 03:34)  folic acid 1 mg oral tablet: 1 tab(s) orally once a day (2022 03:34)  HumuLIN 70/30 KwikPen 70 units-30 units/mL subcutaneous suspension: subcutaneous 2 times a day -correction dose (2022 03:34)  methotrexate 2.5 mg oral tablet: 6 tab(s) orally once a week (2022 03:34)    Allergies:  No Known Allergies    PAST MEDICAL & SURGICAL HISTORY:  Diabetes  HTN (hypertension)  Rheumatoid arthritis  Depression  Anxiety  Cervical cancer  History of dental surgery  Foot mass left foot mass removal 20 years ago    FAMILY HISTORY: Denies    SOCIAL HISTORY: Prior history of cigarette use; denies alcohol use, or illicit drug use    Vital Signs Last 24 Hrs  T(F): 97.1 (2022 08:05), Max: 98.6 (2022 22:43)  HR: 71 (2022 08:05) (71 - 95)  BP: 150/65 (2022 08:05) (119/58 - 183/72)  RR: 18 (2022 08:05) (18 - 20)  Height (cm): 160 (22 @ 02:28)    Physical Exam:   GA: AOX3, NAD   Pelvic: Patient's daughter at bedside. On speculum exam, no apparent lesions nor bleeding noted. Post-radiation atrophic vagina. On vaginal exam, no palpable masses, severely stenotic cervix secondary to chemo-radiation.     Meds:   enoxaparin Injectable 60 milliGRAM(s) SubCutaneous Once  insulin regular  human recombinant. 10 Unit(s) SubCutaneous once  morphine  - Injectable 4 milliGRAM(s) IV Push Once  oxycodone    5 mG/acetaminophen 325 mG 1 Tablet(s) Oral Once    Height (cm): 160 (22 @ 02:28)    LABS:                        10.9   6.89  )-----------( 254      ( 2022 06:59 )             36.3       ABO RH Interpretation: O POS (22 @ 17:13)  Antibody Screen: NEG (22 @ 17:13)        138  |  102  |  14  ----------------------------<  170<H>  4.9   |  29  |  0.9    Ca    9.3      2022 06:59  Mg     1.8         TPro  6.8  /  Alb  4.0  /  TBili  0.4  /  DBili  x   /  AST  15  /  ALT  12  /  AlkPhos  92      PT/INR - ( 2022 17:13 )   PT: 11.00 sec;   INR: 0.97 ratio       PTT - ( 2022 17:13 )  PTT:34.6 sec    RADIOLOGY & ADDITIONAL STUDIES:  2022 PET: Persistent FDG avid1.8cm right iliac lymph node with stable SUV. No other sites of abnormal FDG uptake    2022 PET: Persistent FDG avid1.8cm right iliac lymph node with stable SUV. No other sites of abnormal FDG uptake      < from: CT Lower Extremity w/ IV Cont, Right (22 @ 19:48) >    ACC: [id number] EXAM:  CT LWR EXT IC RT                          PROCEDURE DATE:  2022      INTERPRETATION:  INDICATION: Worsening right leg swelling. History of   cervical cancer.    TECHNIQUE: Contiguous axial CT images of the right lowerextremity from   the level of the right hip to the right knee were obtained following the   administration of intravenous contrast. Coronal and sagittal reformats   were obtained.    COMPARISON: None    FINDINGS:    BONES/JOINTS: No acute fracture ordislocation. Moderate to severe   degenerative changes of the hip. Severe degenerative change in the pubic   symphysis. Degenerative change also seen in the knee. Multifocal   chondrocalcinosis noted. Small knee joint fluid and small Baker/popliteal   cyst. No significant hip joint effusion.    SOFT TISSUES: Diffuse subcutaneous and fascial edema throughout the right   lower extremity, with cutaneous thickening. No loculated collections or   subcutaneous gas.    OTHER: Poor contrast opacificationlimits evaluation of vessel patency.   Apparent occlusion of the right external iliac vein (series 8/1) with   surrounding inflammation. Questionable occlusion of the right common   femoral vein also noted (series 8/79) with surrounding inflammation.  Adjacent right pelvic sidewall necrotic lymph nodes (series 8/19).    Atherosclerotic disease.    IMPRESSION:    1.  Apparent occlusion of the right external iliac vein and possibly of   the right common femoral vein.  2.  Diffuse subcutaneous edema throughout the right lower extremity,   consistent with venous obstruction.  3.  Necrotic right pelvic sidewall lymph nodes consistent with metastatic   disease.    --- End of Report ---      LUIS LUCIANO MD; Resident Radiologist  This document has been electronically signed.  SHAYY MCCURDY MD; Attending Radiologist  This document has been electronically signed. 2022  9:15PM    < end of copied text >  < from: CT Abdomen and Pelvis w/ IV Cont (22 @ 19:40) >    ACC: [id number] EXAM:  CT ABDOMEN AND PELVIS IC                          PROCEDURE DATE:  2022      INTERPRETATION:  CLINICAL STATEMENT: Right leg swelling. History of   cervical carcinoma.    TECHNIQUE: Contiguous axial CT images were obtained from the lower chest   to the pubic symphysis following administration of intravenous contrast.    Oral contrast was not administered.  Reformatted images in the coronal   and sagittal planes were acquired.    COMPARISON CT: 2022    OTHER STUDIES USED FOR CORRELATION: PET/CT 2022      FINDINGS:    LOWER CHEST:  Multivessel coronary arterial calcifications. Aortic valve calcification.   Lung base without mass or consolidation.    HEPATOBILIARY: Cholelithiasis.    SPLEEN: Unremarkable.    PANCREAS: Unremarkable.    ADRENAL GLANDS: Unremarkable.    KIDNEYS: Symmetric enhancement. No hydronephrosis. Subcentimeter right   upper pole hypodensity, too small to characterize.    ABDOMINOPELVIC NODES: Necrotic right iliac lymph nodes, increased in size   since prior exam from 2022 and not adequately compared to the PET/CT   due to differences in technique.    PELVIC ORGANS: Unremarkable.    PERITONEUM/MESENTERY/BOWEL: No bowel obstruction, pneumatosis,   pneumoperitoneum, or ascites. Normal appendix.    BONES/SOFT TISSUES: No aggressive osseous lesion. Bony degenerative   changes..    OTHER: Normal caliber aorta. Diffuse atherosclerotic disease.   Asymmetrically distended right iliac vein and distal feeding veins. The   lumenis not well opacified however there is likely some degree of   obstruction necrotic lymph nodes. Thrombus is not excluded. Partially   imaged right lower extremity edema.    IMPRESSION:    Necrotic right iliac lymph nodes have increased in size since2022   with asymmetrically dilated right iliac and feeding veins as well as   asymmetric right lower extremity edema. Findings suggesting pelvic venous   obstruction. Intravenous thrombus is also possible however is not   distinctly visualized as the veins are suboptimally opacified on the   current examination.    --- End of Report ---      MARY BENNETT MD; Attending Radiologist  This document has been electronically signed. 2022  8:51PM    < end of copied text >

## 2022-11-19 NOTE — H&P ADULT - ASSESSMENT
#Right lower extremity swelling   #Metastatic disease  #Cervical cancer   -CT abdomen, pelvis and right lower extremity showed right iliac and common femoral vein blockage and increase in size of right necrotic LN, cannot exclude DVT  -Vascular on board  - Start therapeutic Lovenox  - Oncology-Dr. Krueger c/s  - Gyn/Onc c/s  - Tumour markers     #Hypoglycemia  #DM  -Carb consistent diet.  -FS acqhs  -Insulin sliding scale.    #HTN  -Continue with BP medications.    #HLD  -Continue with Statin.    #Misc  -DVT prophylaxis:  -GI prophylaxis:  -Diet:  -Code status:  -Activity:  -Dispo:    -Med rec confirmed with  81yo female with PMHx of HTN, DM, RA on methotrexate, cervical cancer s/p chemotherapy and radiation (last round 1/2022), on ASA 81mg daily, no anticoagulation, presented to ED for evaluation of RLE swelling and pain for several weeks. At baseline, patient is independent in ambulation and lives alone, very near to her daughter.    #Right lower extremity venous congestion 2/2 Lymph node compression vs scarring  #Metastatic disease  #Cervical cancer s/p radiation and chemo- seems to be in remission  -CT abdomen, pelvis and right lower extremity showed right iliac and common femoral vein blockage and increase in size of right necrotic LN, cannot exclude DVT  -No fever, no WBC elevation  -Vascular on board  -Start therapeutic Lovenox  -Oncology-Dr. Krueger c/s  -Gyn/Onc c/s    #Hypoglycemia  #DM  -Carb consistent diet.  -FS acqhs  -Insulin sliding scale for now    #HTN  -Start Procardia for now    #HLD  -Continue with Statin.    #Misc  -DVT prophylaxis: Lovenox AC  -GI prophylaxis: Not indicated  -Diet: DASH/CC  -Code status: Full code  -Activity: IAT  -Dispo: Acute    -Please confirm med rec with the daughter in the AM

## 2022-11-19 NOTE — H&P ADULT - TIME BILLING
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 Necrotic right pelvic sidewall lymph nodes consistent with metastatic disease.  Started on therapeutic Lovenox  Vascular on board  Hypoglycemia-Insulin sliding scale for now  HTN-Start Procardia 30 mg for now. If not well controlled increase dose to 60 mg  Expected inpatient care exceeds 48 hrs. pt seen in my office a few days ago.  pt was dx by me with right ext iliac vein occlusion 2/2 mass/tumor  case was d/w pts dtr in detail at the time but she wanted not to treat until she speaks to onc.    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 Mcallitser , plan is to schedule for venogram and likely stent.  need onc on board, ? rad onc as well if onc agrees

## 2022-11-20 LAB
ALBUMIN SERPL ELPH-MCNC: 3.9 G/DL — SIGNIFICANT CHANGE UP (ref 3.5–5.2)
ALP SERPL-CCNC: 87 U/L — SIGNIFICANT CHANGE UP (ref 30–115)
ALT FLD-CCNC: 10 U/L — SIGNIFICANT CHANGE UP (ref 0–41)
ANION GAP SERPL CALC-SCNC: 9 MMOL/L — SIGNIFICANT CHANGE UP (ref 7–14)
APTT BLD: 37.7 SEC — SIGNIFICANT CHANGE UP (ref 27–39.2)
AST SERPL-CCNC: 12 U/L — SIGNIFICANT CHANGE UP (ref 0–41)
BASOPHILS # BLD AUTO: 0.03 K/UL — SIGNIFICANT CHANGE UP (ref 0–0.2)
BASOPHILS NFR BLD AUTO: 0.6 % — SIGNIFICANT CHANGE UP (ref 0–1)
BILIRUB SERPL-MCNC: 0.5 MG/DL — SIGNIFICANT CHANGE UP (ref 0.2–1.2)
BLD GP AB SCN SERPL QL: SIGNIFICANT CHANGE UP
BUN SERPL-MCNC: 11 MG/DL — SIGNIFICANT CHANGE UP (ref 10–20)
CALCIUM SERPL-MCNC: 9.3 MG/DL — SIGNIFICANT CHANGE UP (ref 8.4–10.5)
CHLORIDE SERPL-SCNC: 105 MMOL/L — SIGNIFICANT CHANGE UP (ref 98–110)
CHOLEST SERPL-MCNC: 112 MG/DL — SIGNIFICANT CHANGE UP
CO2 SERPL-SCNC: 28 MMOL/L — SIGNIFICANT CHANGE UP (ref 17–32)
CREAT SERPL-MCNC: 0.8 MG/DL — SIGNIFICANT CHANGE UP (ref 0.7–1.5)
EGFR: 73 ML/MIN/1.73M2 — SIGNIFICANT CHANGE UP
EOSINOPHIL # BLD AUTO: 0.14 K/UL — SIGNIFICANT CHANGE UP (ref 0–0.7)
EOSINOPHIL NFR BLD AUTO: 2.9 % — SIGNIFICANT CHANGE UP (ref 0–8)
GLUCOSE BLDC GLUCOMTR-MCNC: 165 MG/DL — HIGH (ref 70–99)
GLUCOSE BLDC GLUCOMTR-MCNC: 220 MG/DL — HIGH (ref 70–99)
GLUCOSE BLDC GLUCOMTR-MCNC: 250 MG/DL — HIGH (ref 70–99)
GLUCOSE BLDC GLUCOMTR-MCNC: 266 MG/DL — HIGH (ref 70–99)
GLUCOSE SERPL-MCNC: 153 MG/DL — HIGH (ref 70–99)
HCT VFR BLD CALC: 37.1 % — SIGNIFICANT CHANGE UP (ref 37–47)
HDLC SERPL-MCNC: 43 MG/DL — LOW
HGB BLD-MCNC: 11.1 G/DL — LOW (ref 12–16)
IMM GRANULOCYTES NFR BLD AUTO: 0.4 % — HIGH (ref 0.1–0.3)
INR BLD: 0.98 RATIO — SIGNIFICANT CHANGE UP (ref 0.65–1.3)
LIPID PNL WITH DIRECT LDL SERPL: 45 MG/DL — SIGNIFICANT CHANGE UP
LYMPHOCYTES # BLD AUTO: 0.46 K/UL — LOW (ref 1.2–3.4)
LYMPHOCYTES # BLD AUTO: 9.6 % — LOW (ref 20.5–51.1)
MAGNESIUM SERPL-MCNC: 1.8 MG/DL — SIGNIFICANT CHANGE UP (ref 1.8–2.4)
MCHC RBC-ENTMCNC: 24 PG — LOW (ref 27–31)
MCHC RBC-ENTMCNC: 29.9 G/DL — LOW (ref 32–37)
MCV RBC AUTO: 80.1 FL — LOW (ref 81–99)
MONOCYTES # BLD AUTO: 0.74 K/UL — HIGH (ref 0.1–0.6)
MONOCYTES NFR BLD AUTO: 15.5 % — HIGH (ref 1.7–9.3)
NEUTROPHILS # BLD AUTO: 3.38 K/UL — SIGNIFICANT CHANGE UP (ref 1.4–6.5)
NEUTROPHILS NFR BLD AUTO: 71 % — SIGNIFICANT CHANGE UP (ref 42.2–75.2)
NON HDL CHOLESTEROL: 69 MG/DL — SIGNIFICANT CHANGE UP
NRBC # BLD: 0 /100 WBCS — SIGNIFICANT CHANGE UP (ref 0–0)
PLATELET # BLD AUTO: 256 K/UL — SIGNIFICANT CHANGE UP (ref 130–400)
POTASSIUM SERPL-MCNC: 4.9 MMOL/L — SIGNIFICANT CHANGE UP (ref 3.5–5)
POTASSIUM SERPL-SCNC: 4.9 MMOL/L — SIGNIFICANT CHANGE UP (ref 3.5–5)
PROT SERPL-MCNC: 6 G/DL — SIGNIFICANT CHANGE UP (ref 6–8)
PROTHROM AB SERPL-ACNC: 11.2 SEC — SIGNIFICANT CHANGE UP (ref 9.95–12.87)
RBC # BLD: 4.63 M/UL — SIGNIFICANT CHANGE UP (ref 4.2–5.4)
RBC # FLD: 15.5 % — HIGH (ref 11.5–14.5)
SODIUM SERPL-SCNC: 142 MMOL/L — SIGNIFICANT CHANGE UP (ref 135–146)
TRIGL SERPL-MCNC: 116 MG/DL — SIGNIFICANT CHANGE UP
WBC # BLD: 4.77 K/UL — LOW (ref 4.8–10.8)
WBC # FLD AUTO: 4.77 K/UL — LOW (ref 4.8–10.8)

## 2022-11-20 PROCEDURE — 99222 1ST HOSP IP/OBS MODERATE 55: CPT

## 2022-11-20 RX ADMIN — Medication 30 MILLIGRAM(S): at 05:41

## 2022-11-20 RX ADMIN — Medication 3: at 17:12

## 2022-11-20 RX ADMIN — ENOXAPARIN SODIUM 60 MILLIGRAM(S): 100 INJECTION SUBCUTANEOUS at 05:41

## 2022-11-20 RX ADMIN — Medication 1 MILLIGRAM(S): at 11:44

## 2022-11-20 RX ADMIN — POLYETHYLENE GLYCOL 3350 17 GRAM(S): 17 POWDER, FOR SOLUTION ORAL at 11:44

## 2022-11-20 RX ADMIN — Medication 1: at 08:11

## 2022-11-20 RX ADMIN — ATORVASTATIN CALCIUM 10 MILLIGRAM(S): 80 TABLET, FILM COATED ORAL at 21:49

## 2022-11-20 RX ADMIN — Medication 2: at 11:47

## 2022-11-20 RX ADMIN — SENNA PLUS 2 TABLET(S): 8.6 TABLET ORAL at 21:49

## 2022-11-20 NOTE — CONSULT NOTE ADULT - ASSESSMENT
83 yo F with PMH of HTN, DM, RA on methotrexate, cervical cancer s/p chemoRT presented to ED for evaluation of RLE swelling and pain for several weeks. In the ED, CT demonstrated increased necrotic right iliac lymph nodes, RLE subcutaneous edema, necrotic right pelvic sidewall lymph nodes (c/w metastatic disease), possible occlusion of the right external iliac vein and possible right common femoral vein occlusion. Oncology consulted for her hx of cervical CA.    # Squamous cell carcinoma of the cervix, FIGO Stage IIIA, s/p chemo/RT  - Received 7000 cGy to the Pelvis/cervix from 6/2/2021 through 8/2/2021  - Post definitive chemoRT w/ carboplatin in 12/21/21  - PET on 12/16/22 showed a FDG avid right external iliac lymph node, SUV max 10.0 measuring 1.0 cm, consistent with biological tumor activity. She is s/p SBRT x 5 to that lesion.  - PET scan on 6/28/22 showed residual FDG uptake in the Rt external iliac LN although stable  - Now CT AP shows necrotic right iliac lymph nodes have increased in size since April 2022 with asymmetrically dilated right iliac and feeding veins as well as asymmetric right lower extremity edema.     Plan:  - gyn onc consult   - vascular f/u for possible stent on Monday   - obtain CT chest to complete metastatic workup 81 yo F with PMH of HTN, DM, RA on methotrexate, cervical cancer s/p chemoRT presented to ED for evaluation of RLE swelling and pain for several weeks. In the ED, CT demonstrated increased necrotic right iliac lymph nodes, RLE subcutaneous edema, necrotic right pelvic sidewall lymph nodes (c/w metastatic disease), possible occlusion of the right external iliac vein and possible right common femoral vein occlusion. Oncology consulted for her hx of cervical CA.    # RLE subcutaneous edema with necrotic R iliac and pelvic lymph nodes likely residual disease from cervical CA  # Squamous cell carcinoma of the cervix, FIGO Stage IIIA, s/p chemo/RT  - Received 7000 cGy to the Pelvis/cervix from 6/2/2021 through 8/2/2021  - Post definitive chemoRT w/ carboplatin in 12/21/21  - PET on 12/16/22 showed a FDG avid right external iliac lymph node, SUV max 10.0 measuring 1.0 cm, consistent with biological tumor activity. She is s/p SBRT x 5 to that lesion.  - PET scan on 6/28/22 showed residual FDG uptake in the Rt external iliac LN although stable  - Now CT AP shows necrotic right iliac lymph nodes have increased in size since April 2022 with asymmetrically dilated right iliac and feeding veins as well as asymmetric right lower extremity edema.     Plan:  - gynonc consult   - radonc consult but doubt the utility of more RT    - vascular f/u for possible stent on Monday   - obtain CT chest to complete metastatic workup  - can consider chemo as outpatient     Rest of management per primary team.

## 2022-11-20 NOTE — PROGRESS NOTE ADULT - ASSESSMENT
83yo female with PMHx of HTN, DM, RA on methotrexate, cervical cancer s/p chemotherapy and radiation (last round 1/2022), on ASA 81mg daily, no anticoagulation, presented to ED for evaluation of RLE swelling and pain for several weeks. At baseline, patient is independent in ambulation and lives alone, very near to her daughter.    #Right lower extremity venous congestion 2/2 Lymph node compression vs scarring  #Metastatic disease  #Cervical cancer s/p radiation and chemo- seems to be in remission  -CT abdomen, pelvis and right lower extremity showed right iliac and common femoral vein blockage and increase in size of right necrotic LN, cannot exclude DVT  -No fever, no WBC elevation  -Vascular on board  -Start therapeutic Lovenox  -Oncology-Dr. Krueger c/s  -Gyn/Onc consulted: no active intervention at this time.    #Hypoglycemia  #DM  -Carb consistent diet.  -Monitor FS  -Insulin sliding scale for now    #HTN  -Start Procardia for now    #HLD  -Continue with Statin.    #Misc  -DVT prophylaxis: Lovenox AC  -GI prophylaxis: Not indicated  -Diet: DASH/CC  -Code status: Full code  -Activity: IAT  -Dispo: Acute

## 2022-11-20 NOTE — CONSULT NOTE ADULT - SUBJECTIVE AND OBJECTIVE BOX
Patient is a 82y old  Female who presents with a chief complaint of RLE pain and swelling (20 Nov 2022 04:44)      HPI:  81yo female with PMHx of HTN, DM, RA on methotrexate, cervical cancer s/p chemotherapy and radiation (last round 1/2022), on ASA 81mg daily, no anticoagulation, presented to ED for evaluation of RLE swelling and pain for several weeks. At baseline, patient is independent in ambulation and lives alone, very near to her daughter.    Pt has had RLE pain and swelling for the past seven weeks but over the past three days the pain has increased in intensity and the swelling has progressively gotten worse. It has become difficult to ambulate due to the swelling and pain, causing her to become more fatigued. Pt normally ambulates independently. Pt was recently here three days ago for the same symptoms but no DVTs were noted on venous duplex of the RLE, further imaging was not performed. Denies history of DVTs. Admitted for COVID-19 one month ago. Denies fever, chills, SOB, chest pain, abdominal pain, urinary incontinence, back pain, dizziness, weight changes, appetite changes.    Imaging performed in the ED demonstrated increased necrotic right iliac lymph nodes, RLE subcutaneous edema, necrotic right pelvic sidewall lymph nodes (c/w metastatic disease), possible occlusion of the right external iliac vein and possible right common femoral vein occlusion.     Vital Signs Last 24 Hrs:  T(F): 97 (19 Nov 2022 02:28), Max: 98.6 (18 Nov 2022 22:43)  HR: 95 (19 Nov 2022 02:28) (83 - 95)  BP: 183/72 (19 Nov 2022 02:28) (143/65 - 183/72)  RR: 18 (19 Nov 2022 02:28) (18 - 20)  SpO2: 100% (19 Nov 2022 02:28) (99% - 100%) on RA   (19 Nov 2022 02:41)       ROS:  Negative except for:    PAST MEDICAL & SURGICAL HISTORY:  Diabetes      HTN (hypertension)      Rheumatoid arthritis      Depression      Anxiety      Cervical cancer      History of dental surgery      Foot mass  left foot mass removal 20 years ago          SOCIAL HISTORY:    FAMILY HISTORY:      MEDICATIONS  (STANDING):  atorvastatin 10 milliGRAM(s) Oral at bedtime  dextrose 5%. 1000 milliLiter(s) (100 mL/Hr) IV Continuous <Continuous>  dextrose 5%. 1000 milliLiter(s) (50 mL/Hr) IV Continuous <Continuous>  dextrose 50% Injectable 25 Gram(s) IV Push once  dextrose 50% Injectable 12.5 Gram(s) IV Push once  dextrose 50% Injectable 25 Gram(s) IV Push once  enoxaparin Injectable 60 milliGRAM(s) SubCutaneous every 12 hours  folic acid 1 milliGRAM(s) Oral daily  glucagon  Injectable 1 milliGRAM(s) IntraMuscular once  insulin lispro (ADMELOG) corrective regimen sliding scale   SubCutaneous three times a day before meals  NIFEdipine XL 30 milliGRAM(s) Oral daily  senna 2 Tablet(s) Oral at bedtime    MEDICATIONS  (PRN):  dextrose Oral Gel 15 Gram(s) Oral once PRN Blood Glucose LESS THAN 70 milliGRAM(s)/deciliter  oxycodone    5 mG/acetaminophen 325 mG 1 Tablet(s) Oral every 6 hours PRN Severe Pain (7 - 10)  polyethylene glycol 3350 17 Gram(s) Oral daily PRN Constipation      Allergies    No Known Allergies    Intolerances        Vital Signs Last 24 Hrs  T(C): 36.6 (20 Nov 2022 00:00), Max: 36.6 (20 Nov 2022 00:00)  T(F): 97.9 (20 Nov 2022 00:00), Max: 97.9 (20 Nov 2022 00:00)  HR: 97 (20 Nov 2022 00:00) (71 - 97)  BP: 135/63 (20 Nov 2022 00:00) (135/63 - 150/65)  BP(mean): --  RR: 18 (20 Nov 2022 00:00) (18 - 18)  SpO2: --        PHYSICAL EXAM  General: adult in NAD  HEENT: clear oropharynx, anicteric sclera, pink conjunctiva  Neck: supple  CV: normal S1/S2 with no murmur rubs or gallops  Lungs: positive air movement b/l ant lungs,clear to auscultation, no wheezes, no rales  Abdomen: soft non-tender non-distended, no hepatosplenomegaly  Ext: no clubbing cyanosis or edema  Skin: no rashes and no petechiae  Neuro: alert and oriented X 4, no focal deficits      LABS:                          11.1   4.77  )-----------( 256      ( 20 Nov 2022 05:53 )             37.1         Mean Cell Volume : 80.1 fL  Mean Cell Hemoglobin : 24.0 pg  Mean Cell Hemoglobin Concentration : 29.9 g/dL  Auto Neutrophil # : 3.38 K/uL  Auto Lymphocyte # : 0.46 K/uL  Auto Monocyte # : 0.74 K/uL  Auto Eosinophil # : 0.14 K/uL  Auto Basophil # : 0.03 K/uL  Auto Neutrophil % : 71.0 %  Auto Lymphocyte % : 9.6 %  Auto Monocyte % : 15.5 %  Auto Eosinophil % : 2.9 %  Auto Basophil % : 0.6 %      Serial CBC's  11-20 @ 05:53  Hct-37.1 / Hgb-11.1 / Plat-256 / RBC-4.63 / WBC-4.77  Serial CBC's  11-19 @ 06:59  Hct-36.3 / Hgb-10.9 / Plat-254 / RBC-4.56 / WBC-6.89  Serial CBC's  11-18 @ 17:13  Hct-37.7 / Hgb-11.3 / Plat-273 / RBC-4.70 / WBC-7.67      11-19    138  |  102  |  14  ----------------------------<  170<H>  4.9   |  29  |  0.9    Ca    9.3      19 Nov 2022 06:59  Mg     1.8     11-19    TPro  6.8  /  Alb  4.0  /  TBili  0.4  /  DBili  x   /  AST  15  /  ALT  12  /  AlkPhos  92  11-19      PT/INR - ( 20 Nov 2022 05:53 )   PT: 11.20 sec;   INR: 0.98 ratio         PTT - ( 20 Nov 2022 05:53 )  PTT:37.7 sec                BLOOD SMEAR INTERPRETATION:       RADIOLOGY & ADDITIONAL STUDIES:  < from: CT Lower Extremity w/ IV Cont, Right (11.18.22 @ 19:48) >  IMPRESSION:  1.  Apparent occlusion of the right external iliac vein and possibly of   the right common femoral vein.  2.  Diffuse subcutaneous edema throughout the right lower extremity,   consistent with venous obstruction.  3.  Necrotic right pelvic sidewall lymph nodes consistent with metastatic   disease.  --- End of Report ---  < end of copied text >    < from: CT Abdomen and Pelvis w/ IV Cont (11.18.22 @ 19:40) >  IMPRESSION:  Necrotic right iliac lymph nodes have increased in size sinceApril 2022   with asymmetrically dilated right iliac and feeding veins as well as   asymmetric right lower extremity edema. Findings suggesting pelvic venous   obstruction. Intravenous thrombus is also possible however is not   distinctly visualized as the veins are suboptimally opacified on the   current examination.  --- End of Report ---  < end of copied text >    < from: [facility] Duplex Lower Ext Vein Scan, Bilat (11.15.22 @ 22:28) >  IMPRESSION:  No evidence of deep venous thrombosis in either lower extremity.    < end of copied text >         Patient is a 82y old  Female who presents with a chief complaint of RLE pain and swelling (20 Nov 2022 04:44)      HPI:  83yo female with PMHx of HTN, DM, RA on methotrexate, cervical cancer s/p chemotherapy and radiation (last round 1/2022), on ASA 81mg daily, no anticoagulation, presented to ED for evaluation of RLE swelling and pain for several weeks. At baseline, patient is independent in ambulation and lives alone, very near to her daughter.    Pt has had RLE pain and swelling for the past seven weeks but over the past three days the pain has increased in intensity and the swelling has progressively gotten worse. It has become difficult to ambulate due to the swelling and pain, causing her to become more fatigued. Pt normally ambulates independently. Pt was recently here three days ago for the same symptoms but no DVTs were noted on venous duplex of the RLE, further imaging was not performed. Denies history of DVTs. Admitted for COVID-19 one month ago. Denies fever, chills, SOB, chest pain, abdominal pain, urinary incontinence, back pain, dizziness, weight changes, appetite changes.    Imaging performed in the ED demonstrated increased necrotic right iliac lymph nodes, RLE subcutaneous edema, necrotic right pelvic sidewall lymph nodes (c/w metastatic disease), possible occlusion of the right external iliac vein and possible right common femoral vein occlusion.     Vital Signs Last 24 Hrs:  T(F): 97 (19 Nov 2022 02:28), Max: 98.6 (18 Nov 2022 22:43)  HR: 95 (19 Nov 2022 02:28) (83 - 95)  BP: 183/72 (19 Nov 2022 02:28) (143/65 - 183/72)  RR: 18 (19 Nov 2022 02:28) (18 - 20)  SpO2: 100% (19 Nov 2022 02:28) (99% - 100%) on RA   (19 Nov 2022 02:41)       ROS:  Negative except for: above.    PAST MEDICAL & SURGICAL HISTORY:  Diabetes  HTN (hypertension)  Rheumatoid arthritis  Depression  Anxiety  Cervical cancer  History of dental surgery  Foot mass  left foot mass removal 20 years ago    SOCIAL HISTORY: Denies alcohol or tobacco use.    FAMILY HISTORY: No pertinent family hx.      MEDICATIONS  (STANDING):  atorvastatin 10 milliGRAM(s) Oral at bedtime  dextrose 5%. 1000 milliLiter(s) (100 mL/Hr) IV Continuous <Continuous>  dextrose 5%. 1000 milliLiter(s) (50 mL/Hr) IV Continuous <Continuous>  dextrose 50% Injectable 25 Gram(s) IV Push once  dextrose 50% Injectable 12.5 Gram(s) IV Push once  dextrose 50% Injectable 25 Gram(s) IV Push once  enoxaparin Injectable 60 milliGRAM(s) SubCutaneous every 12 hours  folic acid 1 milliGRAM(s) Oral daily  glucagon  Injectable 1 milliGRAM(s) IntraMuscular once  insulin lispro (ADMELOG) corrective regimen sliding scale   SubCutaneous three times a day before meals  NIFEdipine XL 30 milliGRAM(s) Oral daily  senna 2 Tablet(s) Oral at bedtime    MEDICATIONS  (PRN):  dextrose Oral Gel 15 Gram(s) Oral once PRN Blood Glucose LESS THAN 70 milliGRAM(s)/deciliter  oxycodone    5 mG/acetaminophen 325 mG 1 Tablet(s) Oral every 6 hours PRN Severe Pain (7 - 10)  polyethylene glycol 3350 17 Gram(s) Oral daily PRN Constipation      Allergies    No Known Allergies    Intolerances        Vital Signs Last 24 Hrs  T(C): 36.6 (20 Nov 2022 00:00), Max: 36.6 (20 Nov 2022 00:00)  T(F): 97.9 (20 Nov 2022 00:00), Max: 97.9 (20 Nov 2022 00:00)  HR: 97 (20 Nov 2022 00:00) (71 - 97)  BP: 135/63 (20 Nov 2022 00:00) (135/63 - 150/65)  BP(mean): --  RR: 18 (20 Nov 2022 00:00) (18 - 18)  SpO2: --        PHYSICAL EXAM  General: adult in NAD  HEENT: clear oropharynx, anicteric sclera, pink conjunctiva  Neck: supple  CV: normal S1/S2 with no murmur rubs or gallops  Lungs: positive air movement b/l ant lungs,clear to auscultation, no wheezes, no rales  Abdomen: soft non-tender non-distended, no hepatosplenomegaly  Ext: +2 pitting edema w/ associated erythema of RLE   Skin: no rashes and no petechiae  Neuro: alert and oriented X 4, no focal deficits      LABS:                          11.1   4.77  )-----------( 256      ( 20 Nov 2022 05:53 )             37.1         Mean Cell Volume : 80.1 fL  Mean Cell Hemoglobin : 24.0 pg  Mean Cell Hemoglobin Concentration : 29.9 g/dL  Auto Neutrophil # : 3.38 K/uL  Auto Lymphocyte # : 0.46 K/uL  Auto Monocyte # : 0.74 K/uL  Auto Eosinophil # : 0.14 K/uL  Auto Basophil # : 0.03 K/uL  Auto Neutrophil % : 71.0 %  Auto Lymphocyte % : 9.6 %  Auto Monocyte % : 15.5 %  Auto Eosinophil % : 2.9 %  Auto Basophil % : 0.6 %      Serial CBC's  11-20 @ 05:53  Hct-37.1 / Hgb-11.1 / Plat-256 / RBC-4.63 / WBC-4.77  Serial CBC's  11-19 @ 06:59  Hct-36.3 / Hgb-10.9 / Plat-254 / RBC-4.56 / WBC-6.89  Serial CBC's  11-18 @ 17:13  Hct-37.7 / Hgb-11.3 / Plat-273 / RBC-4.70 / WBC-7.67      11-19    138  |  102  |  14  ----------------------------<  170<H>  4.9   |  29  |  0.9    Ca    9.3      19 Nov 2022 06:59  Mg     1.8     11-19    TPro  6.8  /  Alb  4.0  /  TBili  0.4  /  DBili  x   /  AST  15  /  ALT  12  /  AlkPhos  92  11-19      PT/INR - ( 20 Nov 2022 05:53 )   PT: 11.20 sec;   INR: 0.98 ratio         PTT - ( 20 Nov 2022 05:53 )  PTT:37.7 sec                BLOOD SMEAR INTERPRETATION:       RADIOLOGY & ADDITIONAL STUDIES:  < from: CT Lower Extremity w/ IV Cont, Right (11.18.22 @ 19:48) >  IMPRESSION:  1.  Apparent occlusion of the right external iliac vein and possibly of   the right common femoral vein.  2.  Diffuse subcutaneous edema throughout the right lower extremity,   consistent with venous obstruction.  3.  Necrotic right pelvic sidewall lymph nodes consistent with metastatic   disease.  --- End of Report ---  < end of copied text >    < from: CT Abdomen and Pelvis w/ IV Cont (11.18.22 @ 19:40) >  IMPRESSION:  Necrotic right iliac lymph nodes have increased in size sinceApril 2022   with asymmetrically dilated right iliac and feeding veins as well as   asymmetric right lower extremity edema. Findings suggesting pelvic venous   obstruction. Intravenous thrombus is also possible however is not   distinctly visualized as the veins are suboptimally opacified on the   current examination.  --- End of Report ---  < end of copied text >    < from: [facility] Duplex Lower Ext Vein Scan, Bilat (11.15.22 @ 22:28) >  IMPRESSION:  No evidence of deep venous thrombosis in either lower extremity.    < end of copied text >

## 2022-11-21 ENCOUNTER — TRANSCRIPTION ENCOUNTER (OUTPATIENT)
Age: 82
End: 2022-11-21

## 2022-11-21 DIAGNOSIS — Z02.9 ENCOUNTER FOR ADMINISTRATIVE EXAMINATIONS, UNSPECIFIED: ICD-10-CM

## 2022-11-21 LAB
A1C WITH ESTIMATED AVERAGE GLUCOSE RESULT: 8.2 % — HIGH (ref 4–5.6)
ALBUMIN SERPL ELPH-MCNC: 3.9 G/DL — SIGNIFICANT CHANGE UP (ref 3.5–5.2)
ALP SERPL-CCNC: 94 U/L — SIGNIFICANT CHANGE UP (ref 30–115)
ALT FLD-CCNC: 10 U/L — SIGNIFICANT CHANGE UP (ref 0–41)
ANION GAP SERPL CALC-SCNC: 9 MMOL/L — SIGNIFICANT CHANGE UP (ref 7–14)
APTT BLD: 32.2 SEC — SIGNIFICANT CHANGE UP (ref 27–39.2)
AST SERPL-CCNC: 11 U/L — SIGNIFICANT CHANGE UP (ref 0–41)
BASOPHILS # BLD AUTO: 0.03 K/UL — SIGNIFICANT CHANGE UP (ref 0–0.2)
BASOPHILS NFR BLD AUTO: 0.6 % — SIGNIFICANT CHANGE UP (ref 0–1)
BILIRUB SERPL-MCNC: 0.6 MG/DL — SIGNIFICANT CHANGE UP (ref 0.2–1.2)
BUN SERPL-MCNC: 10 MG/DL — SIGNIFICANT CHANGE UP (ref 10–20)
CALCIUM SERPL-MCNC: 9.3 MG/DL — SIGNIFICANT CHANGE UP (ref 8.4–10.5)
CHLORIDE SERPL-SCNC: 102 MMOL/L — SIGNIFICANT CHANGE UP (ref 98–110)
CO2 SERPL-SCNC: 28 MMOL/L — SIGNIFICANT CHANGE UP (ref 17–32)
CREAT SERPL-MCNC: 0.8 MG/DL — SIGNIFICANT CHANGE UP (ref 0.7–1.5)
EGFR: 73 ML/MIN/1.73M2 — SIGNIFICANT CHANGE UP
EOSINOPHIL # BLD AUTO: 0.12 K/UL — SIGNIFICANT CHANGE UP (ref 0–0.7)
EOSINOPHIL NFR BLD AUTO: 2.4 % — SIGNIFICANT CHANGE UP (ref 0–8)
ESTIMATED AVERAGE GLUCOSE: 189 MG/DL — HIGH (ref 68–114)
GLUCOSE BLDC GLUCOMTR-MCNC: 212 MG/DL — HIGH (ref 70–99)
GLUCOSE BLDC GLUCOMTR-MCNC: 262 MG/DL — HIGH (ref 70–99)
GLUCOSE SERPL-MCNC: 264 MG/DL — HIGH (ref 70–99)
HCT VFR BLD CALC: 35.5 % — LOW (ref 37–47)
HGB BLD-MCNC: 11 G/DL — LOW (ref 12–16)
IMM GRANULOCYTES NFR BLD AUTO: 0.2 % — SIGNIFICANT CHANGE UP (ref 0.1–0.3)
INR BLD: 0.97 RATIO — SIGNIFICANT CHANGE UP (ref 0.65–1.3)
LYMPHOCYTES # BLD AUTO: 0.31 K/UL — LOW (ref 1.2–3.4)
LYMPHOCYTES # BLD AUTO: 6.3 % — LOW (ref 20.5–51.1)
MAGNESIUM SERPL-MCNC: 1.8 MG/DL — SIGNIFICANT CHANGE UP (ref 1.8–2.4)
MCHC RBC-ENTMCNC: 24.3 PG — LOW (ref 27–31)
MCHC RBC-ENTMCNC: 31 G/DL — LOW (ref 32–37)
MCV RBC AUTO: 78.4 FL — LOW (ref 81–99)
MONOCYTES # BLD AUTO: 0.74 K/UL — HIGH (ref 0.1–0.6)
MONOCYTES NFR BLD AUTO: 14.9 % — HIGH (ref 1.7–9.3)
NEUTROPHILS # BLD AUTO: 3.74 K/UL — SIGNIFICANT CHANGE UP (ref 1.4–6.5)
NEUTROPHILS NFR BLD AUTO: 75.6 % — HIGH (ref 42.2–75.2)
NRBC # BLD: 0 /100 WBCS — SIGNIFICANT CHANGE UP (ref 0–0)
PLATELET # BLD AUTO: 252 K/UL — SIGNIFICANT CHANGE UP (ref 130–400)
POTASSIUM SERPL-MCNC: 5 MMOL/L — SIGNIFICANT CHANGE UP (ref 3.5–5)
POTASSIUM SERPL-SCNC: 5 MMOL/L — SIGNIFICANT CHANGE UP (ref 3.5–5)
PROT SERPL-MCNC: 6.5 G/DL — SIGNIFICANT CHANGE UP (ref 6–8)
PROTHROM AB SERPL-ACNC: 11.1 SEC — SIGNIFICANT CHANGE UP (ref 9.95–12.87)
RBC # BLD: 4.53 M/UL — SIGNIFICANT CHANGE UP (ref 4.2–5.4)
RBC # FLD: 15.6 % — HIGH (ref 11.5–14.5)
SODIUM SERPL-SCNC: 139 MMOL/L — SIGNIFICANT CHANGE UP (ref 135–146)
WBC # BLD: 4.95 K/UL — SIGNIFICANT CHANGE UP (ref 4.8–10.8)
WBC # FLD AUTO: 4.95 K/UL — SIGNIFICANT CHANGE UP (ref 4.8–10.8)

## 2022-11-21 PROCEDURE — 76937 US GUIDE VASCULAR ACCESS: CPT | Mod: 26

## 2022-11-21 PROCEDURE — 37248 TRLUML BALO ANGIOP 1ST VEIN: CPT | Mod: 59

## 2022-11-21 PROCEDURE — 99221 1ST HOSP IP/OBS SF/LOW 40: CPT

## 2022-11-21 PROCEDURE — 37187 VENOUS MECH THROMBECTOMY: CPT

## 2022-11-21 PROCEDURE — 37252 INTRVASC US NONCORONARY 1ST: CPT

## 2022-11-21 PROCEDURE — 37238 OPEN/PERQ PLACE STENT SAME: CPT

## 2022-11-21 RX ORDER — INSULIN LISPRO 100/ML
VIAL (ML) SUBCUTANEOUS
Refills: 0 | Status: DISCONTINUED | OUTPATIENT
Start: 2022-11-21 | End: 2022-11-23

## 2022-11-21 RX ORDER — ATORVASTATIN CALCIUM 80 MG/1
10 TABLET, FILM COATED ORAL AT BEDTIME
Refills: 0 | Status: DISCONTINUED | OUTPATIENT
Start: 2022-11-21 | End: 2022-11-23

## 2022-11-21 RX ORDER — POLYETHYLENE GLYCOL 3350 17 G/17G
17 POWDER, FOR SOLUTION ORAL DAILY
Refills: 0 | Status: DISCONTINUED | OUTPATIENT
Start: 2022-11-21 | End: 2022-11-23

## 2022-11-21 RX ORDER — ENOXAPARIN SODIUM 100 MG/ML
60 INJECTION SUBCUTANEOUS EVERY 12 HOURS
Refills: 0 | Status: DISCONTINUED | OUTPATIENT
Start: 2022-11-21 | End: 2022-11-23

## 2022-11-21 RX ORDER — HYDROMORPHONE HYDROCHLORIDE 2 MG/ML
0.5 INJECTION INTRAMUSCULAR; INTRAVENOUS; SUBCUTANEOUS
Refills: 0 | Status: DISCONTINUED | OUTPATIENT
Start: 2022-11-21 | End: 2022-11-21

## 2022-11-21 RX ORDER — FOLIC ACID 0.8 MG
1 TABLET ORAL DAILY
Refills: 0 | Status: DISCONTINUED | OUTPATIENT
Start: 2022-11-21 | End: 2022-11-23

## 2022-11-21 RX ORDER — DEXTROSE 50 % IN WATER 50 %
25 SYRINGE (ML) INTRAVENOUS ONCE
Refills: 0 | Status: DISCONTINUED | OUTPATIENT
Start: 2022-11-21 | End: 2022-11-23

## 2022-11-21 RX ORDER — MORPHINE SULFATE 50 MG/1
2 CAPSULE, EXTENDED RELEASE ORAL
Refills: 0 | Status: DISCONTINUED | OUTPATIENT
Start: 2022-11-21 | End: 2022-11-21

## 2022-11-21 RX ORDER — HEPARIN SODIUM 5000 [USP'U]/ML
INJECTION INTRAVENOUS; SUBCUTANEOUS
Qty: 25000 | Refills: 0 | Status: DISCONTINUED | OUTPATIENT
Start: 2022-11-21 | End: 2022-11-21

## 2022-11-21 RX ORDER — NIFEDIPINE 30 MG
30 TABLET, EXTENDED RELEASE 24 HR ORAL DAILY
Refills: 0 | Status: DISCONTINUED | OUTPATIENT
Start: 2022-11-21 | End: 2022-11-23

## 2022-11-21 RX ORDER — SODIUM CHLORIDE 9 MG/ML
1000 INJECTION, SOLUTION INTRAVENOUS
Refills: 0 | Status: DISCONTINUED | OUTPATIENT
Start: 2022-11-21 | End: 2022-11-21

## 2022-11-21 RX ORDER — SENNA PLUS 8.6 MG/1
2 TABLET ORAL AT BEDTIME
Refills: 0 | Status: DISCONTINUED | OUTPATIENT
Start: 2022-11-21 | End: 2022-11-23

## 2022-11-21 RX ORDER — SODIUM CHLORIDE 9 MG/ML
1000 INJECTION, SOLUTION INTRAVENOUS
Refills: 0 | Status: DISCONTINUED | OUTPATIENT
Start: 2022-11-21 | End: 2022-11-23

## 2022-11-21 RX ORDER — ENOXAPARIN SODIUM 100 MG/ML
60 INJECTION SUBCUTANEOUS ONCE
Refills: 0 | Status: DISCONTINUED | OUTPATIENT
Start: 2022-11-21 | End: 2022-11-21

## 2022-11-21 RX ORDER — ONDANSETRON 8 MG/1
4 TABLET, FILM COATED ORAL ONCE
Refills: 0 | Status: DISCONTINUED | OUTPATIENT
Start: 2022-11-21 | End: 2022-11-21

## 2022-11-21 RX ADMIN — Medication 1 MILLIGRAM(S): at 21:53

## 2022-11-21 RX ADMIN — SENNA PLUS 2 TABLET(S): 8.6 TABLET ORAL at 21:53

## 2022-11-21 RX ADMIN — HYDROMORPHONE HYDROCHLORIDE 0.5 MILLIGRAM(S): 2 INJECTION INTRAMUSCULAR; INTRAVENOUS; SUBCUTANEOUS at 19:10

## 2022-11-21 RX ADMIN — Medication 3: at 09:07

## 2022-11-21 RX ADMIN — Medication 1 MILLIGRAM(S): at 14:10

## 2022-11-21 RX ADMIN — ENOXAPARIN SODIUM 60 MILLIGRAM(S): 100 INJECTION SUBCUTANEOUS at 19:40

## 2022-11-21 RX ADMIN — HYDROMORPHONE HYDROCHLORIDE 0.5 MILLIGRAM(S): 2 INJECTION INTRAMUSCULAR; INTRAVENOUS; SUBCUTANEOUS at 18:50

## 2022-11-21 RX ADMIN — Medication 2: at 11:31

## 2022-11-21 RX ADMIN — Medication 30 MILLIGRAM(S): at 05:36

## 2022-11-21 RX ADMIN — Medication 30 MILLIGRAM(S): at 21:53

## 2022-11-21 RX ADMIN — ATORVASTATIN CALCIUM 10 MILLIGRAM(S): 80 TABLET, FILM COATED ORAL at 21:53

## 2022-11-21 NOTE — PROGRESS NOTE ADULT - ASSESSMENT
ASSESSMENT:   82yF w/ PMHx of DM, HTN, RA, cervical cancer s/p chemotherapy/radiation (last round 1/2022) who presented with progressively worsening RLE swelling and pain. Physical exam findings, imaging, and labs as documented above.   Vascular surgery consulted for possible occlusion of the right external iliac vein and right common femoral vein.     PLAN:   - Hold AM lovenox  - Patient preopped. Consent in chart.   - Venogram with possible endovascular revascularization on 11/21  - Due to CT scan findings of necrotic pelvic lymph nodes, recommend gyn/onc evaluation for possible metastatic disease  - Recommend further oncologic investigation, tumor markers and appropriate workup      - Vascular surgery to follow   - Attending to see in AM     Above case and plan discussed Vascular Fellow, Dr. Mark and to be d/w Vascular Attending Dr. Mcallister  11-18-22 @ 23:08    Vascular Spectra [phone]

## 2022-11-21 NOTE — CONSULT NOTE ADULT - ATTENDING COMMENTS
81 y/o P4 with stage IIIA cervical cancer (or more likely stage IIIC1R) admitted with R DVT    Cervical cancer was treated in 4/2021 with chemo/RT and R parametrial boost  Throughout treatment as well as most recently i9n 6/2022 she exhibits diease activity in the R obturator region on my review.    On most recent CT this month she demonstrates a mass lesion in the R obturator area which is adjacent to her R sided DVT.    Recommend IR Bx of this mass for Dx as well as PDL assay, followed by most likely chemotherapy for recurrence    40 min spent with reviewing patient's history and radiology imaging studies
Agree with above A/P
84yo w/ cervical cancer  no gyn intervention at this time  GYN onc will be informed on Monday  continue care per primary team

## 2022-11-21 NOTE — PROGRESS NOTE ADULT - ASSESSMENT
81yo female with PMHx of HTN, DM, RA on methotrexate, cervical cancer s/p chemotherapy and radiation (last round 1/2022), on ASA 81mg daily, no anticoagulation, presented to ED for evaluation of RLE swelling and pain for several weeks. At baseline, patient is independent in ambulation and lives alone, very near to her daughter.    #Right lower extremity venous congestion 2/2 Lymph node compression vs scarring  #Metastatic disease  #Cervical cancer s/p radiation and chemo- seems to be in remission  -CT abdomen, pelvis and right lower extremity showed right iliac and common femoral vein blockage and increase in size of right necrotic LN, cannot exclude DVT  -Doppler  USG LE-- no dvt seen  -No fever, no WBC elevation  -Vascular on board----> will do Venogram with possible endovascular revascularization on 11/21  -Start therapeutic Lovenox  -Oncology-Dr. Krueger c/s  -Gyn/Onc consulted: no active intervention at this time.    #Hypoglycemia  #DM  -Carb consistent diet.  -Monitor FS  -Insulin sliding scale for now    #HTN  -Start Procardia for now    #HLD  -Continue with Statin.    #Misc  -DVT prophylaxis: Lovenox AC  -GI prophylaxis: Not indicated  -Diet: DASH/CC  -Code status: Full code  -Activity: IAT  -Dispo: Acute

## 2022-11-21 NOTE — CONSULT NOTE ADULT - CONSULT REASON
possible right external iliac and right common femoral venous occlusion
cervical cancer
history of cervical cancer
Cervical CA

## 2022-11-21 NOTE — CONSULT NOTE ADULT - ASSESSMENT
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    - continue care per primary team  - Patient with stage IIIA cervical cancer s/p chemo/RT completed on 1/2022 with no change in right iliac node overlying obturator since June 2021; now with congestion of RLE   - not a surgical candidate  - recommend IR biopsy of right iliac node  - recommend chemotherapy as per heme/onc   - gyn onc to follow     Dr. Ta aware.

## 2022-11-21 NOTE — CONSULT NOTE ADULT - SUBJECTIVE AND OBJECTIVE BOX
Chief Complaint: leg swelling and pain    HPI: 83yo female with PMHx of HTN, DM, RA on methotrexate, cervical cancer s/p chemotherapy and radiation (last round 2022), on ASA 81mg daily, no anticoagulation, presented to ED for evaluation of RLE swelling and pain for several weeks. At baseline, patient is independent in ambulation and lives alone, very near to her daughter.    Imaging performed in the ED demonstrated increased necrotic right iliac lymph nodes, RLE subcutaneous edema, necrotic right pelvic sidewall lymph nodes (c/w metastatic disease), possible occlusion of the right external iliac vein and possible right common femoral vein occlusion.     Cervical Cancer History:  Patient initially presented in 2021 to Dr. Bryson and pap smear on 3/18/21 showed: LSIL/AGC/HPV+. Pelvic US on 3/22/21 showed endometrium to be 4.2mm thick. In 2022, she was seen by Dr. Echevarria at [facility], was noted to have a 4-5cm cervical mass involving at least the middle of the vaginal wall, circumferentially with no parametrial involvement; consistent with stage IIIA. She was not deemed a surgical candidate.   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 Positive AE1/AE3, CK5/6, p63, p53. Negative for CK, CK20, PAX8, and p16  -Started carboplatin in 2021. S/p five cycles of carboplatin in 2021  -S/p 4500 cGy to pelvis/cervix followed by an SBRT boost (2500cGy) from  to 2021. Posttreatment scan demonstrated an FDG avid right pelvic node   -S/p an additional 2500cGy to the right pelvic node from 2022 to 2022. Posttreatment PET scan demonstrated a persistent FDG avid right iliac lymph node with stable SUV (SUV 5.3 vs 5.6)   Continues to follow with Dr. Krueger and Dr. Ragland, has not seen Dr. Echevarria since last year.       Ob/Gyn History:     (FT  x4)              LMP -  menopausal @38yo                   Denies history of ovarian cysts, uterine fibroids, or STIs    Denies the following: constitutional symptoms, visual symptoms, cardiovascular symptoms, respiratory symptoms, GI symptoms, musculoskeletal symptoms, skin symptoms, neurologic symptoms, hematologic symptoms, allergic symptoms, psychiatric symptoms  Except any pertinent positives listed.     Home Medications:  atorvastatin 10 mg oral tablet: 1 tab(s) orally once a day (at bedtime) (2022 03:34)  folic acid 1 mg oral tablet: 1 tab(s) orally once a day (2022 03:34)  HumuLIN 70/30 KwikPen 70 units-30 units/mL subcutaneous suspension: subcutaneous 2 times a day - correction dose (2022 03:34)  methotrexate 2.5 mg oral tablet: 6 tab(s) orally once a week (2022 03:34)      Allergies    No Known Allergies    Intolerances        PAST MEDICAL & SURGICAL HISTORY:  Diabetes      HTN (hypertension)      Rheumatoid arthritis      Depression      Anxiety      Cervical cancer      History of dental surgery      Foot mass  left foot mass removal 20 years ago          FAMILY HISTORY:      SOCIAL HISTORY: Denies cigarette use, alcohol use, or illicit drug use    Vital Signs Last 24 Hrs  T(F): 97.7 (2022 08:00), Max: 97.7 (2022 08:00)  HR: 92 (2022 08:00) (92 - 102)  BP: 161/72 (2022 08:00) (143/65 - 166/72)  RR: 18 (2022 08:00) (18 - 18)        Physical Exam:   GENERAL: NAD, lying in bed comfortably  HEAD:  Atraumatic, Normocephalic  CHEST/LUNG:No rales, rhonchi, wheezing, or rubs. Unlabored respirations  HEART: Regular rate and rhythm  ABDOMEN: Soft, Nontender, Nondistended  EXTREMITIES:  Right lower extremity 3+ pitting edema, erythema, tenderness  NERVOUS SYSTEM:  Alert & Oriented X3, speech clear. No deficits      Meds:   enoxaparin Injectable 60 milliGRAM(s) SubCutaneous Once  insulin regular  human recombinant. 10 Unit(s) SubCutaneous once  morphine  - Injectable 4 milliGRAM(s) IV Push Once  oxycodone    5 mG/acetaminophen 325 mG 1 Tablet(s) Oral Once        LABS:                        11.1   4.77  )-----------( 256      ( 2022 05:53 )             37.1       ABO RH Interpretation: O POS (22 @ 17:59)  Antibody Screen: NEG (22 @ 17:59)        142  |  105  |  11  ----------------------------<  153<H>  4.9   |  28  |  0.8    Ca    9.3      2022 05:53  Mg     1.8         TPro  6.0  /  Alb  3.9  /  TBili  0.5  /  DBili  x   /  AST  12  /  ALT  10  /  AlkPhos  87  -    PT/INR - ( 2022 05:53 )   PT: 11.20 sec;   INR: 0.98 ratio         PTT - ( 2022 05:53 )  PTT:37.7 sec        RADIOLOGY & ADDITIONAL STUDIES:  < from: CT Abdomen and Pelvis w/ IV Cont (11.18.22 @ 19:40) >    COMPARISON CT: 2022    OTHER STUDIES USED FOR CORRELATION: PET/CT 2022      FINDINGS:    LOWER CHEST:  Multivessel coronary arterial calcifications. Aortic valve calcification.   Lung base without mass or consolidation.    HEPATOBILIARY: Cholelithiasis.    SPLEEN: Unremarkable.    PANCREAS: Unremarkable.    ADRENAL GLANDS: Unremarkable.    KIDNEYS: Symmetric enhancement. No hydronephrosis. Subcentimeter right   upper pole hypodensity, too small to characterize.    ABDOMINOPELVIC NODES: Necrotic right iliac lymph nodes, increased in size   since prior exam from 2022 and not adequately compared to the PET/CT   due to differences in technique.    PELVIC ORGANS: Unremarkable.    PERITONEUM/MESENTERY/BOWEL: No bowel obstruction, pneumatosis,   pneumoperitoneum, or ascites. Normal appendix.    BONES/SOFT TISSUES: No aggressive osseous lesion. Bony degenerative   changes..    OTHER: Normal caliber aorta. Diffuse atherosclerotic disease.   Asymmetrically distended right iliac vein and distal feeding veins. The   lumenis not well opacified however there is likely some degree of   obstruction necrotic lymph nodes. Thrombus is not excluded. Partially   imaged right lower extremity edema.    IMPRESSION:    Necrotic right iliac lymph nodes have increased in size since2022   with asymmetrically dilated right iliac and feeding veins as well as   asymmetric right lower extremity edema. Findings suggesting pelvic venous   obstruction. Intravenous thrombus is also possible however is not   distinctly visualized as the veins are suboptimally opacified on the   current examination.    < end of copied text >

## 2022-11-21 NOTE — BRIEF OPERATIVE NOTE - NSICDXBRIEFPROCEDURE_GEN_ALL_CORE_FT
PROCEDURES:  IVC venogram 21-Nov-2022 18:34:37  Rosalia Leo  Placement of venous stent 21-Nov-2022 18:34:48  Rosalia Leo  Venous thrombectomy with imaging guidance 21-Nov-2022 18:35:11  Rosalia Leo

## 2022-11-21 NOTE — CHART NOTE - NSCHARTNOTEFT_GEN_A_CORE
Post Operative Note  Patient: JOY ARAGON 82y (1940) Female   MRN: [id number]  Location: [address]   Visit: 11-18-22 Inpatient  Date: 11-21-22 @ 23:46    Procedure:  S/P IVC venogram    Placement of venous stent    Venous thrombectomy with imaging guidance      Subjective:   Nausea:  no, Vomiting:  no, Ambulating:  no, Flatus:  no  Pain Assessment: Patient is complaining of mild pain that is appropriate for post-operative course.     Objective:  Vitals: T(F): 98 (11-21-22 @ 21:30), Max: 98.4 (11-21-22 @ 14:58)  HR: 95 (11-21-22 @ 21:30)  BP: 152/70 (11-21-22 @ 20:30) (141/66 - 166/72)  RR: 18 (11-21-22 @ 20:30)  SpO2: 97% (11-21-22 @ 20:30)  Vent Settings:     In:   11-20-22 @ 07:01  -  11-21-22 @ 07:00  --------------------------------------------------------  IN: 810 mL    11-21-22 @ 07:01  -  11-21-22 @ 23:46  --------------------------------------------------------  IN: 150 mL      IV Fluids: dextrose 5%. 1000 milliLiter(s) (50 mL/Hr) IV Continuous <Continuous>  dextrose 5%. 1000 milliLiter(s) (100 mL/Hr) IV Continuous <Continuous>  folic acid 1 milliGRAM(s) Oral daily      Out:   11-20-22 @ 07:01  -  11-21-22 @ 07:00  --------------------------------------------------------  OUT: 0 mL    11-21-22 @ 07:01  -  11-21-22 @ 23:46  --------------------------------------------------------  OUT: 425 mL      EBL:     Voided Urine:   11-20-22 @ 07:01  -  11-21-22 @ 07:00  --------------------------------------------------------  OUT: 0 mL    11-21-22 @ 07:01  -  11-21-22 @ 23:46  --------------------------------------------------------  OUT: 425 mL      López Catheter: yes no   Drains:   JAZMIN:    ,   Chest Tube:      NG Tube:       Physical Examination:  General Appearance: NAD,   HEENT: EOMI, sclera non-icteric.  Heart: RRR  Lungs: CTABL  Abdomen:  Soft, non tender, appropriate for post-op course, nondistended. No rigidity, guarding, or rebound tenderness.   MSK/Extremities: RLE warm, moderate swelling, doplerable dp/pt, LLE wnl  Incisions/Wounds: right groin Dressings in place, clean, dry and intact, no signs of infection/active bleeding/drainage    Medications: [Standing]  atorvastatin 10 milliGRAM(s) Oral at bedtime  dextrose 5%. 1000 milliLiter(s) IV Continuous <Continuous>  dextrose 5%. 1000 milliLiter(s) IV Continuous <Continuous>  dextrose 50% Injectable 25 Gram(s) IV Push once  dextrose 50% Injectable 25 Gram(s) IV Push once  enoxaparin Injectable 60 milliGRAM(s) SubCutaneous every 12 hours  folic acid 1 milliGRAM(s) Oral daily  insulin lispro (ADMELOG) corrective regimen sliding scale   SubCutaneous three times a day before meals  NIFEdipine XL 30 milliGRAM(s) Oral daily  oxycodone    5 mG/acetaminophen 325 mG 1 Tablet(s) Oral every 6 hours PRN  polyethylene glycol 3350 17 Gram(s) Oral daily PRN  senna 2 Tablet(s) Oral at bedtime    Medications: [PRN]  atorvastatin 10 milliGRAM(s) Oral at bedtime  dextrose 5%. 1000 milliLiter(s) IV Continuous <Continuous>  dextrose 5%. 1000 milliLiter(s) IV Continuous <Continuous>  dextrose 50% Injectable 25 Gram(s) IV Push once  dextrose 50% Injectable 25 Gram(s) IV Push once  enoxaparin Injectable 60 milliGRAM(s) SubCutaneous every 12 hours  folic acid 1 milliGRAM(s) Oral daily  insulin lispro (ADMELOG) corrective regimen sliding scale   SubCutaneous three times a day before meals  NIFEdipine XL 30 milliGRAM(s) Oral daily  oxycodone    5 mG/acetaminophen 325 mG 1 Tablet(s) Oral every 6 hours PRN  polyethylene glycol 3350 17 Gram(s) Oral daily PRN  senna 2 Tablet(s) Oral at bedtime      DVT PROPHYLAXIS: enoxaparin Injectable 60 milliGRAM(s) SubCutaneous every 12 hours    GI PROPHYLAXIS:   ANTICOAGULATION:   ANTIBIOTICS:        Labs:                        11.0   4.95  )-----------( 252      ( 21 Nov 2022 07:56 )             35.5     11-21    139  |  102  |  10  ----------------------------<  264<H>  5.0   |  28  |  0.8    Ca    9.3      21 Nov 2022 07:56  Mg     1.8     11-21    TPro  6.5  /  Alb  3.9  /  TBili  0.6  /  DBili  x   /  AST  11  /  ALT  10  /  AlkPhos  94  11-21    PT/INR - ( 21 Nov 2022 07:56 )   PT: 11.10 sec;   INR: 0.97 ratio         PTT - ( 21 Nov 2022 07:56 )  PTT:32.2 sec        Imaging:  No post-op imaging studies    Assessment:  82yF s/p  right leg venogram, right iliac venoplasty, venous stent, mechanical thrombectomy    Plan:  - q4 hours neurovascular checks  - will obtain a venous duplex of the RLE tomorrow   - wong hugger to RLE   - Monitor vitals  - Continue Pain Medications   - Monitor wound and dressing for changes, redress as needed.      Date/Time: 11-21-22 @ 23:46

## 2022-11-21 NOTE — PHARMACOTHERAPY INTERVENTION NOTE - COMMENTS
Recommended changing Lovenox to 75 mg BID based on patient's weight. MD wanted to keep patient's home dose. Patient is taking 60 mg BID at home.

## 2022-11-21 NOTE — BRIEF OPERATIVE NOTE - OPERATION/FINDINGS
preop; right leg swelling, dvt   operation; right leg venogram, right iliac venoplasty, venous stent, mechanical thromebctomy

## 2022-11-22 LAB
ALBUMIN SERPL ELPH-MCNC: 3.9 G/DL — SIGNIFICANT CHANGE UP (ref 3.5–5.2)
ALP SERPL-CCNC: 98 U/L — SIGNIFICANT CHANGE UP (ref 30–115)
ALT FLD-CCNC: 11 U/L — SIGNIFICANT CHANGE UP (ref 0–41)
ANION GAP SERPL CALC-SCNC: 14 MMOL/L — SIGNIFICANT CHANGE UP (ref 7–14)
APPEARANCE UR: CLEAR — SIGNIFICANT CHANGE UP
APPEARANCE UR: CLEAR — SIGNIFICANT CHANGE UP
AST SERPL-CCNC: 15 U/L — SIGNIFICANT CHANGE UP (ref 0–41)
BACTERIA # UR AUTO: NEGATIVE — SIGNIFICANT CHANGE UP
BASOPHILS # BLD AUTO: 0.02 K/UL — SIGNIFICANT CHANGE UP (ref 0–0.2)
BASOPHILS NFR BLD AUTO: 0.3 % — SIGNIFICANT CHANGE UP (ref 0–1)
BILIRUB SERPL-MCNC: 0.6 MG/DL — SIGNIFICANT CHANGE UP (ref 0.2–1.2)
BILIRUB UR-MCNC: NEGATIVE — SIGNIFICANT CHANGE UP
BILIRUB UR-MCNC: NEGATIVE — SIGNIFICANT CHANGE UP
BUN SERPL-MCNC: 11 MG/DL — SIGNIFICANT CHANGE UP (ref 10–20)
CALCIUM SERPL-MCNC: 9.4 MG/DL — SIGNIFICANT CHANGE UP (ref 8.4–10.5)
CHLORIDE SERPL-SCNC: 98 MMOL/L — SIGNIFICANT CHANGE UP (ref 98–110)
CO2 SERPL-SCNC: 28 MMOL/L — SIGNIFICANT CHANGE UP (ref 17–32)
COLOR SPEC: SIGNIFICANT CHANGE UP
COLOR SPEC: SIGNIFICANT CHANGE UP
CREAT SERPL-MCNC: 0.8 MG/DL — SIGNIFICANT CHANGE UP (ref 0.7–1.5)
DIFF PNL FLD: ABNORMAL
DIFF PNL FLD: ABNORMAL
EGFR: 73 ML/MIN/1.73M2 — SIGNIFICANT CHANGE UP
EOSINOPHIL # BLD AUTO: 0.03 K/UL — SIGNIFICANT CHANGE UP (ref 0–0.7)
EOSINOPHIL NFR BLD AUTO: 0.4 % — SIGNIFICANT CHANGE UP (ref 0–8)
EPI CELLS # UR: 4 /HPF — SIGNIFICANT CHANGE UP (ref 0–5)
GLUCOSE BLDC GLUCOMTR-MCNC: 274 MG/DL — HIGH (ref 70–99)
GLUCOSE BLDC GLUCOMTR-MCNC: 346 MG/DL — HIGH (ref 70–99)
GLUCOSE BLDC GLUCOMTR-MCNC: 383 MG/DL — HIGH (ref 70–99)
GLUCOSE BLDC GLUCOMTR-MCNC: 73 MG/DL — SIGNIFICANT CHANGE UP (ref 70–99)
GLUCOSE SERPL-MCNC: 314 MG/DL — HIGH (ref 70–99)
GLUCOSE UR QL: ABNORMAL
GLUCOSE UR QL: ABNORMAL
HCT VFR BLD CALC: 35.7 % — LOW (ref 37–47)
HGB BLD-MCNC: 11 G/DL — LOW (ref 12–16)
HYALINE CASTS # UR AUTO: 0 /LPF — SIGNIFICANT CHANGE UP (ref 0–7)
IMM GRANULOCYTES NFR BLD AUTO: 0.6 % — HIGH (ref 0.1–0.3)
KETONES UR-MCNC: NEGATIVE — SIGNIFICANT CHANGE UP
KETONES UR-MCNC: NEGATIVE — SIGNIFICANT CHANGE UP
LEUKOCYTE ESTERASE UR-ACNC: ABNORMAL
LEUKOCYTE ESTERASE UR-ACNC: ABNORMAL
LYMPHOCYTES # BLD AUTO: 0.35 K/UL — LOW (ref 1.2–3.4)
LYMPHOCYTES # BLD AUTO: 4.8 % — LOW (ref 20.5–51.1)
MAGNESIUM SERPL-MCNC: 1.6 MG/DL — LOW (ref 1.8–2.4)
MCHC RBC-ENTMCNC: 24 PG — LOW (ref 27–31)
MCHC RBC-ENTMCNC: 30.8 G/DL — LOW (ref 32–37)
MCV RBC AUTO: 77.9 FL — LOW (ref 81–99)
MONOCYTES # BLD AUTO: 0.83 K/UL — HIGH (ref 0.1–0.6)
MONOCYTES NFR BLD AUTO: 11.4 % — HIGH (ref 1.7–9.3)
NEUTROPHILS # BLD AUTO: 5.99 K/UL — SIGNIFICANT CHANGE UP (ref 1.4–6.5)
NEUTROPHILS NFR BLD AUTO: 82.5 % — HIGH (ref 42.2–75.2)
NITRITE UR-MCNC: NEGATIVE — SIGNIFICANT CHANGE UP
NITRITE UR-MCNC: NEGATIVE — SIGNIFICANT CHANGE UP
NRBC # BLD: 0 /100 WBCS — SIGNIFICANT CHANGE UP (ref 0–0)
PH UR: 6.5 — SIGNIFICANT CHANGE UP (ref 5–8)
PH UR: 6.5 — SIGNIFICANT CHANGE UP (ref 5–8)
PLATELET # BLD AUTO: 245 K/UL — SIGNIFICANT CHANGE UP (ref 130–400)
POTASSIUM SERPL-MCNC: 4.3 MMOL/L — SIGNIFICANT CHANGE UP (ref 3.5–5)
POTASSIUM SERPL-SCNC: 4.3 MMOL/L — SIGNIFICANT CHANGE UP (ref 3.5–5)
PROT SERPL-MCNC: 6.9 G/DL — SIGNIFICANT CHANGE UP (ref 6–8)
PROT UR-MCNC: SIGNIFICANT CHANGE UP
PROT UR-MCNC: SIGNIFICANT CHANGE UP
RBC # BLD: 4.58 M/UL — SIGNIFICANT CHANGE UP (ref 4.2–5.4)
RBC # FLD: 15.5 % — HIGH (ref 11.5–14.5)
RBC CASTS # UR COMP ASSIST: 8 /HPF — HIGH (ref 0–4)
SODIUM SERPL-SCNC: 140 MMOL/L — SIGNIFICANT CHANGE UP (ref 135–146)
SP GR SPEC: 1.01 — SIGNIFICANT CHANGE UP (ref 1.01–1.03)
SP GR SPEC: 1.01 — SIGNIFICANT CHANGE UP (ref 1.01–1.03)
UROBILINOGEN FLD QL: SIGNIFICANT CHANGE UP
UROBILINOGEN FLD QL: SIGNIFICANT CHANGE UP
WBC # BLD: 7.26 K/UL — SIGNIFICANT CHANGE UP (ref 4.8–10.8)
WBC # FLD AUTO: 7.26 K/UL — SIGNIFICANT CHANGE UP (ref 4.8–10.8)
WBC UR QL: 16 /HPF — HIGH (ref 0–5)

## 2022-11-22 PROCEDURE — 93971 EXTREMITY STUDY: CPT | Mod: 26,RT

## 2022-11-22 PROCEDURE — 71045 X-RAY EXAM CHEST 1 VIEW: CPT | Mod: 26

## 2022-11-22 RX ORDER — KETOROLAC TROMETHAMINE 30 MG/ML
15 SYRINGE (ML) INJECTION ONCE
Refills: 0 | Status: DISCONTINUED | OUTPATIENT
Start: 2022-11-22 | End: 2022-11-22

## 2022-11-22 RX ORDER — INSULIN HUMAN 100 [IU]/ML
10 INJECTION, SOLUTION SUBCUTANEOUS ONCE
Refills: 0 | Status: COMPLETED | OUTPATIENT
Start: 2022-11-22 | End: 2022-11-22

## 2022-11-22 RX ORDER — HYDROMORPHONE HYDROCHLORIDE 2 MG/ML
2 INJECTION INTRAMUSCULAR; INTRAVENOUS; SUBCUTANEOUS ONCE
Refills: 0 | Status: DISCONTINUED | OUTPATIENT
Start: 2022-11-22 | End: 2022-11-22

## 2022-11-22 RX ORDER — ACETAMINOPHEN 500 MG
650 TABLET ORAL EVERY 6 HOURS
Refills: 0 | Status: DISCONTINUED | OUTPATIENT
Start: 2022-11-22 | End: 2022-11-23

## 2022-11-22 RX ADMIN — Medication 650 MILLIGRAM(S): at 10:52

## 2022-11-22 RX ADMIN — Medication 30 MILLIGRAM(S): at 09:03

## 2022-11-22 RX ADMIN — Medication 3: at 17:45

## 2022-11-22 RX ADMIN — SENNA PLUS 2 TABLET(S): 8.6 TABLET ORAL at 22:24

## 2022-11-22 RX ADMIN — Medication 15 MILLIGRAM(S): at 13:38

## 2022-11-22 RX ADMIN — Medication 5: at 08:55

## 2022-11-22 RX ADMIN — Medication 3: at 12:04

## 2022-11-22 RX ADMIN — ENOXAPARIN SODIUM 60 MILLIGRAM(S): 100 INJECTION SUBCUTANEOUS at 08:58

## 2022-11-22 RX ADMIN — Medication 650 MILLIGRAM(S): at 22:24

## 2022-11-22 RX ADMIN — INSULIN HUMAN 10 UNIT(S): 100 INJECTION, SOLUTION SUBCUTANEOUS at 15:23

## 2022-11-22 RX ADMIN — ENOXAPARIN SODIUM 60 MILLIGRAM(S): 100 INJECTION SUBCUTANEOUS at 17:46

## 2022-11-22 RX ADMIN — HYDROMORPHONE HYDROCHLORIDE 2 MILLIGRAM(S): 2 INJECTION INTRAMUSCULAR; INTRAVENOUS; SUBCUTANEOUS at 12:03

## 2022-11-22 RX ADMIN — Medication 1 MILLIGRAM(S): at 13:38

## 2022-11-22 RX ADMIN — ATORVASTATIN CALCIUM 10 MILLIGRAM(S): 80 TABLET, FILM COATED ORAL at 22:23

## 2022-11-22 NOTE — PROGRESS NOTE ADULT - ASSESSMENT
81yo female with PMHx of HTN, DM, RA on methotrexate, cervical cancer s/p chemotherapy and radiation (last round 1/2022), on ASA 81mg daily, no anticoagulation, presented to ED for evaluation of RLE swelling and pain for several weeks. At baseline, patient is independent in ambulation and lives alone, very near to her daughter.    #Right lower extremity venous congestion 2/2 Lymph node compression vs scarring  #Metastatic disease  #Cervical cancer s/p radiation and chemo- seems to be in remission  -CT abdomen, pelvis and right lower extremity showed right iliac and common femoral vein blockage and increase in size of right necrotic LN, cannot exclude DVT  -Doppler  USG LE-- no dvt seen  -No fever, no WBC elevation  -Vascular on board----> s/p  right leg venogram, right iliac venoplasty, venous stent, mechanical thrombectomy on 11/21/22. Plan --->AM venous dplx reviewed, no further imagings needed on this admission.  f/u heme/onc studies. continue therapeutic AC, upon discharge switch to Eliquis 10 mg po bid x 7 days then 5 mg po bid. follow up with Dr. Mcallister in 2-3 weeks in the office for repeat venous dplx  -Oncology-Dr. Krueger c/s  -Gyn/Onc consulted: no active intervention at this time.    #Post op confusion  -As per night shift nurse, pt was confused last night  -[facility]  -Sepsis workup ordered    #Hypoglycemia  #DM  -Carb consistent diet.  -Monitor FS  -Insulin sliding scale for now    #HTN  -Start Procardia for now    #HLD  -Continue with Statin.    #Misc  -DVT prophylaxis: Lovenox AC  -GI prophylaxis: Not indicated  -Diet: DASH/CC  -Code status: Full code  -Activity: IAT  -Dispo: Acute

## 2022-11-22 NOTE — PROGRESS NOTE ADULT - ATTENDING COMMENTS
feels better but the right thigh in signif pain she says.  exam of the rle benign, warm, distal pulses palpable.  willl treatwith percocet prn  pt to follow with onc  plan for d/c in am
appreciate onc consult.  rad onc consult pending but unlikely to receive additional RT, I agree  I think the grreatest help here is Vsc Surgery to hopefully place a stent and then f/u chemo in hopes of reducing tumor burden.
appreciate vscular input  no contraindications to proceed with procedure as scheduled.  although slightly hypertensive will allow it for now   and treat post procedure.  labs noted, medically optimized.

## 2022-11-22 NOTE — PROGRESS NOTE ADULT - ASSESSMENT
Assessment:  82yF s/p  right leg venogram, right iliac venoplasty, venous stent, mechanical thrombectomy    Plan:  - q4 hours neurovascular checks  - will obtain a venous duplex of the RLE today  - wong hugger to RLE   - Monitor vitals  - Continue Pain Medications   - Monitor wound and dressing for changes, redress as needed.    spectra [id number] Assessment:  82yF s/p  right leg venogram, right iliac venoplasty, venous stent, mechanical thrombectomy    Plan:  - AM venous dplx reviewed, no further imagings needed on this admission  - f/u heme/onc studies  - continue therapeutic AC, upon discharge switch to Eliquis 10 mg po bid x 7 days then 5 mg po bid   - follow up with Dr. Mcallister in 2-3 weeks in the office for repeat venous dplx  - apply ace wrap while in the hospital, then compression stocking to the right lower extremity       spectra [id number]

## 2022-11-23 ENCOUNTER — TRANSCRIPTION ENCOUNTER (OUTPATIENT)
Age: 82
End: 2022-11-23

## 2022-11-23 VITALS
HEART RATE: 98 BPM | TEMPERATURE: 99 F | OXYGEN SATURATION: 98 % | RESPIRATION RATE: 18 BRPM | SYSTOLIC BLOOD PRESSURE: 161 MMHG | DIASTOLIC BLOOD PRESSURE: 70 MMHG

## 2022-11-23 LAB
ALBUMIN SERPL ELPH-MCNC: 3.5 G/DL — SIGNIFICANT CHANGE UP (ref 3.5–5.2)
ALP SERPL-CCNC: 78 U/L — SIGNIFICANT CHANGE UP (ref 30–115)
ALT FLD-CCNC: 10 U/L — SIGNIFICANT CHANGE UP (ref 0–41)
ANION GAP SERPL CALC-SCNC: 10 MMOL/L — SIGNIFICANT CHANGE UP (ref 7–14)
AST SERPL-CCNC: 13 U/L — SIGNIFICANT CHANGE UP (ref 0–41)
BASOPHILS # BLD AUTO: 0.01 K/UL — SIGNIFICANT CHANGE UP (ref 0–0.2)
BASOPHILS NFR BLD AUTO: 0.2 % — SIGNIFICANT CHANGE UP (ref 0–1)
BILIRUB SERPL-MCNC: 0.4 MG/DL — SIGNIFICANT CHANGE UP (ref 0.2–1.2)
BUN SERPL-MCNC: 9 MG/DL — LOW (ref 10–20)
CALCIUM SERPL-MCNC: 8.7 MG/DL — SIGNIFICANT CHANGE UP (ref 8.4–10.5)
CHLORIDE SERPL-SCNC: 97 MMOL/L — LOW (ref 98–110)
CO2 SERPL-SCNC: 27 MMOL/L — SIGNIFICANT CHANGE UP (ref 17–32)
CREAT SERPL-MCNC: 0.8 MG/DL — SIGNIFICANT CHANGE UP (ref 0.7–1.5)
EGFR: 73 ML/MIN/1.73M2 — SIGNIFICANT CHANGE UP
EOSINOPHIL # BLD AUTO: 0.11 K/UL — SIGNIFICANT CHANGE UP (ref 0–0.7)
EOSINOPHIL NFR BLD AUTO: 2.2 % — SIGNIFICANT CHANGE UP (ref 0–8)
GLUCOSE BLDC GLUCOMTR-MCNC: 290 MG/DL — HIGH (ref 70–99)
GLUCOSE BLDC GLUCOMTR-MCNC: 290 MG/DL — HIGH (ref 70–99)
GLUCOSE BLDC GLUCOMTR-MCNC: 304 MG/DL — HIGH (ref 70–99)
GLUCOSE BLDC GLUCOMTR-MCNC: 313 MG/DL — HIGH (ref 70–99)
GLUCOSE BLDC GLUCOMTR-MCNC: 360 MG/DL — HIGH (ref 70–99)
GLUCOSE SERPL-MCNC: 294 MG/DL — HIGH (ref 70–99)
HCT VFR BLD CALC: 34.1 % — LOW (ref 37–47)
HGB BLD-MCNC: 10.4 G/DL — LOW (ref 12–16)
IMM GRANULOCYTES NFR BLD AUTO: 0.4 % — HIGH (ref 0.1–0.3)
LYMPHOCYTES # BLD AUTO: 0.29 K/UL — LOW (ref 1.2–3.4)
LYMPHOCYTES # BLD AUTO: 5.8 % — LOW (ref 20.5–51.1)
MAGNESIUM SERPL-MCNC: 1.5 MG/DL — LOW (ref 1.8–2.4)
MCHC RBC-ENTMCNC: 24 PG — LOW (ref 27–31)
MCHC RBC-ENTMCNC: 30.5 G/DL — LOW (ref 32–37)
MCV RBC AUTO: 78.8 FL — LOW (ref 81–99)
MONOCYTES # BLD AUTO: 0.92 K/UL — HIGH (ref 0.1–0.6)
MONOCYTES NFR BLD AUTO: 18.4 % — HIGH (ref 1.7–9.3)
NEUTROPHILS # BLD AUTO: 3.65 K/UL — SIGNIFICANT CHANGE UP (ref 1.4–6.5)
NEUTROPHILS NFR BLD AUTO: 73 % — SIGNIFICANT CHANGE UP (ref 42.2–75.2)
NRBC # BLD: 0 /100 WBCS — SIGNIFICANT CHANGE UP (ref 0–0)
PLATELET # BLD AUTO: 211 K/UL — SIGNIFICANT CHANGE UP (ref 130–400)
POTASSIUM SERPL-MCNC: 4 MMOL/L — SIGNIFICANT CHANGE UP (ref 3.5–5)
POTASSIUM SERPL-SCNC: 4 MMOL/L — SIGNIFICANT CHANGE UP (ref 3.5–5)
PROT SERPL-MCNC: 5.9 G/DL — LOW (ref 6–8)
RBC # BLD: 4.33 M/UL — SIGNIFICANT CHANGE UP (ref 4.2–5.4)
RBC # FLD: 15.1 % — HIGH (ref 11.5–14.5)
SODIUM SERPL-SCNC: 134 MMOL/L — LOW (ref 135–146)
WBC # BLD: 5 K/UL — SIGNIFICANT CHANGE UP (ref 4.8–10.8)
WBC # FLD AUTO: 5 K/UL — SIGNIFICANT CHANGE UP (ref 4.8–10.8)

## 2022-11-23 RX ORDER — INSULIN LISPRO 100/ML
5 VIAL (ML) SUBCUTANEOUS
Refills: 0 | Status: DISCONTINUED | OUTPATIENT
Start: 2022-11-23 | End: 2022-11-23

## 2022-11-23 RX ORDER — APIXABAN 2.5 MG/1
1 TABLET, FILM COATED ORAL
Qty: 180 | Refills: 0
Start: 2022-11-23 | End: 2023-02-20

## 2022-11-23 RX ORDER — APIXABAN 2.5 MG/1
2 TABLET, FILM COATED ORAL
Qty: 360 | Refills: 0
Start: 2022-11-23 | End: 2023-02-20

## 2022-11-23 RX ORDER — MAGNESIUM SULFATE 500 MG/ML
2 VIAL (ML) INJECTION ONCE
Refills: 0 | Status: COMPLETED | OUTPATIENT
Start: 2022-11-23 | End: 2022-11-23

## 2022-11-23 RX ORDER — NIFEDIPINE 30 MG
1 TABLET, EXTENDED RELEASE 24 HR ORAL
Qty: 0 | Refills: 0 | DISCHARGE
Start: 2022-11-23

## 2022-11-23 RX ORDER — DEXTROSE 50 % IN WATER 50 %
25 SYRINGE (ML) INTRAVENOUS ONCE
Refills: 0 | Status: DISCONTINUED | OUTPATIENT
Start: 2022-11-23 | End: 2022-11-23

## 2022-11-23 RX ORDER — AMLODIPINE BESYLATE AND BENAZEPRIL HYDROCHLORIDE 10; 20 MG/1; MG/1
1 CAPSULE ORAL
Qty: 28 | Refills: 0
Start: 2022-11-23 | End: 2022-12-20

## 2022-11-23 RX ORDER — APIXABAN 2.5 MG/1
2 TABLET, FILM COATED ORAL
Qty: 24 | Refills: 0
Start: 2022-11-23 | End: 2022-11-28

## 2022-11-23 RX ORDER — INSULIN GLARGINE 100 [IU]/ML
15 INJECTION, SOLUTION SUBCUTANEOUS AT BEDTIME
Refills: 0 | Status: DISCONTINUED | OUTPATIENT
Start: 2022-11-23 | End: 2022-11-23

## 2022-11-23 RX ORDER — SODIUM CHLORIDE 9 MG/ML
1000 INJECTION, SOLUTION INTRAVENOUS
Refills: 0 | Status: DISCONTINUED | OUTPATIENT
Start: 2022-11-23 | End: 2022-11-23

## 2022-11-23 RX ORDER — INSULIN GLARGINE 100 [IU]/ML
15 INJECTION, SOLUTION SUBCUTANEOUS
Qty: 420 | Refills: 0
Start: 2022-11-23 | End: 2022-12-20

## 2022-11-23 RX ORDER — APIXABAN 2.5 MG/1
10 TABLET, FILM COATED ORAL EVERY 12 HOURS
Refills: 0 | Status: DISCONTINUED | OUTPATIENT
Start: 2022-11-23 | End: 2022-11-23

## 2022-11-23 RX ORDER — DEXTROSE 50 % IN WATER 50 %
12.5 SYRINGE (ML) INTRAVENOUS ONCE
Refills: 0 | Status: DISCONTINUED | OUTPATIENT
Start: 2022-11-23 | End: 2022-11-23

## 2022-11-23 RX ORDER — DEXTROSE 50 % IN WATER 50 %
15 SYRINGE (ML) INTRAVENOUS ONCE
Refills: 0 | Status: DISCONTINUED | OUTPATIENT
Start: 2022-11-23 | End: 2022-11-23

## 2022-11-23 RX ORDER — INSULIN LISPRO 100/ML
VIAL (ML) SUBCUTANEOUS
Refills: 0 | Status: DISCONTINUED | OUTPATIENT
Start: 2022-11-23 | End: 2022-11-23

## 2022-11-23 RX ORDER — GLUCAGON INJECTION, SOLUTION 0.5 MG/.1ML
1 INJECTION, SOLUTION SUBCUTANEOUS ONCE
Refills: 0 | Status: DISCONTINUED | OUTPATIENT
Start: 2022-11-23 | End: 2022-11-23

## 2022-11-23 RX ADMIN — Medication 10: at 12:09

## 2022-11-23 RX ADMIN — Medication 30 MILLIGRAM(S): at 05:29

## 2022-11-23 RX ADMIN — Medication 1 MILLIGRAM(S): at 12:08

## 2022-11-23 RX ADMIN — ENOXAPARIN SODIUM 60 MILLIGRAM(S): 100 INJECTION SUBCUTANEOUS at 05:30

## 2022-11-23 RX ADMIN — Medication 4: at 08:19

## 2022-11-23 RX ADMIN — Medication 25 GRAM(S): at 12:09

## 2022-11-23 RX ADMIN — Medication 5 UNIT(S): at 12:09

## 2022-11-23 NOTE — PHYSICAL THERAPY INITIAL EVALUATION ADULT - ADDITIONAL COMMENTS
Patient lives alne in house with steps to enter and 12 steps to bed room. Was independent in ADL's and ambulation using RW.

## 2022-11-23 NOTE — DISCHARGE NOTE PROVIDER - HOSPITAL COURSE
83yo female, 3rd day of admission,  with PMHx of HTN, DM, RA on methotrexate, cervical cancer s/p chemotherapy and radiation (last round 1/2022), on ASA 81mg daily, no anticoagulation, presented to ED for evaluation of RLE swelling and pain for several weeks. At baseline, patient is independent in ambulation and lives alone, very near to her daughter.  Patient treated for     #Right lower extremity venous congestion 2/2 Lymph node compression vs scarring    -CT abdomen, pelvis and right lower extremity showed right iliac and common femoral vein blockage and increase in size of right necrotic LN likely residual disease from cervical CA  -Vascular on board-----did venogram and venoplasty on 11/21. recommend discharge.. Cont Therapeutic lovenox, switch to 10mg eliquis BID for 7 days, then 5mg BID. Patient will f/u with Dr. Mcallister in 2 weeks in the office for evaluation. Apply Ace wrap RLE  -Gyn/Onc consulted: no active intervention at this time.  -doppler ultrasound LE: no dvt  -heme/onco notes: obtain CT chest to complete metastatic workup. can consider chemo as outpatient            83yo female, 3rd day of admission,  with PMHx of HTN, DM, RA on methotrexate, cervical cancer s/p chemotherapy and radiation (last round 1/2022), on ASA 81mg daily, no anticoagulation, presented to ED for evaluation of RLE swelling and pain for several weeks. At baseline, patient is independent in ambulation and lives alone, very near to her daughter.  Patient treated for     #Right lower extremity venous congestion 2/2 Lymph node compression vs scarring    -CT abdomen, pelvis and right lower extremity showed right iliac and common femoral vein blockage and increase in size of right necrotic LN likely residual disease from cervical CA  -Vascular on board-----did venogram and venoplasty on 11/21. recommend discharge.. Cont Therapeutic lovenox, switch to 10mg eliquis BID for 7 days, then 5mg BID. Patient will f/u with Dr. Mcallister in 2 weeks in the office for evaluation. Apply Ace wrap RLE  -Gyn/Onc consulted: no active intervention at this time.  -doppler ultrasound LE: no dvt  -heme/onco notes: obtain CT chest to complete metastatic workup. can consider chemo as outpatient .

## 2022-11-23 NOTE — DISCHARGE NOTE PROVIDER - NSDCCPCAREPLAN_GEN_ALL_CORE_FT
PRINCIPAL DISCHARGE DIAGNOSIS  Diagnosis: Obstruction of femoral vein  Assessment and Plan of Treatment: You were admitted and managed here for the blockage of right iliac and common femoral vein. You are medically stable for discharge.  Please take your medications as prescribed and follow the instructions given to you regarding your health. Keep follow up appointments with your doctors as instructed.      SECONDARY DISCHARGE DIAGNOSES  Diagnosis: Leg swelling  Assessment and Plan of Treatment:     Diagnosis: COVID  Assessment and Plan of Treatment:

## 2022-11-23 NOTE — DISCHARGE NOTE PROVIDER - CARE PROVIDERS DIRECT ADDRESSES
[email or web address],DirectAddress_Unknown

## 2022-11-23 NOTE — DISCHARGE NOTE NURSING/CASE MANAGEMENT/SOCIAL WORK - NSDCPEFALRISK_GEN_ALL_CORE
For information on Fall & Injury Prevention, visit: [email or web address] OR  [email or web address] OR  https://www.cdc.gov/steadi/patient.html

## 2022-11-23 NOTE — DISCHARGE NOTE PROVIDER - NSDCFUSCHEDAPPT_GEN_ALL_CORE_FT
Marlena Krueger  [facility]  [facility] [address]  Scheduled Appointment: 11/29/2022

## 2022-11-23 NOTE — PHYSICAL THERAPY INITIAL EVALUATION ADULT - GAIT DEVIATIONS NOTED, PT EVAL
c/o fatigue and dizziness after ambulation/increased time in double stance/decreased step length/decreased stride length/increased stride width/decreased weight-shifting ability

## 2022-11-23 NOTE — DISCHARGE NOTE PROVIDER - PROVIDER TOKENS
PROVIDER:[TOKEN:[[id number]],FOLLOWUP:[1 week]] PROVIDER:[TOKEN:[[id number]],FOLLOWUP:[2 weeks]],PROVIDER:[TOKEN:[[id number]],FOLLOWUP:[1 month]],PROVIDER:[TOKEN:[[id number]],FOLLOWUP:[1 month]] PROVIDER:[TOKEN:[[id number]],FOLLOWUP:[1 week]],PROVIDER:[TOKEN:[[id number]],FOLLOWUP:[2 weeks]] PROVIDER:[TOKEN:[[id number]],FOLLOWUP:[2 weeks]],PROVIDER:[TOKEN:[[id number]],FOLLOWUP:[1 month]]

## 2022-11-23 NOTE — PROGRESS NOTE ADULT - ASSESSMENT
Assessment:   Patient is an 82 year old female who initially presented with RLE pain and swelling. She is now s/p venogram, right iliac vein stenting, and mechanical thrombectomy.     Plan:  - Cont Therapeutic lovenox, switch to 10mg eliquis BID for 7 days, then 5mg BID   - Patient will f/u with Dr. Mcallister in 2 weeks in the office for evaluation  - Appy Ace wrap RLE              Plan:  - I reviewed labs  - I reviewed radiology imagings  - I personally visualized the imagings  - I discussed the findings and imagings with  ________

## 2022-11-23 NOTE — DISCHARGE NOTE NURSING/CASE MANAGEMENT/SOCIAL WORK - PATIENT PORTAL LINK FT
You can access the FollowMyHealth Patient Portal offered by [facility] by registering at the following website: [email or web address]. By joining [facility]’s FollowMyHealth portal, you will also be able to view your health information using other applications (apps) compatible with our system.

## 2022-11-23 NOTE — DISCHARGE NOTE PROVIDER - CARE PROVIDER_API CALL
Alvin Mcallister)  Surgery; Vascular Surgery  [address] [place] [address]  Phone: [phone]  Fax: [phone]  Follow Up Time: 1 week   Alvin Mcallister)  Surgery; Vascular Surgery  [address] [place] [address]  Phone: [phone]  Fax: [phone]  Follow Up Time: 2 weeks    Finesse Yan)  [facility]  [address] [place] [address]  Phone: [phone]  Fax: [phone]  Follow Up Time: 1 month    Marlena Krueger)  Hematology; Internal Medicine; Medical Oncology  [address] [place] [address]  Phone: [phone]  Fax: [phone]  Follow Up Time: 1 month   Alvin Mcallister)  Surgery; Vascular Surgery  [address] [place] [address]  Phone: [phone]  Fax: [phone]  Follow Up Time: 1 week    Fineses Yan)  Medicine  [address] [place] [address]  Phone: [phone]  Fax: [phone]  Follow Up Time: 2 weeks   Alvin Mcallister)  Surgery; Vascular Surgery  [address] [place] [address]  Phone: [phone]  Fax: [phone]  Follow Up Time: 2 weeks    Finesse Yan)  Medicine  [address] [place] [address]  Phone: [phone]  Fax: [phone]  Follow Up Time: 1 month

## 2022-11-23 NOTE — PROGRESS NOTE ADULT - PROVIDER SPECIALTY LIST ADULT
Vascular Surgery
Internal Medicine
Internal Medicine
Vascular Surgery
Vascular Surgery
Internal Medicine

## 2022-11-23 NOTE — PROGRESS NOTE ADULT - SUBJECTIVE AND OBJECTIVE BOX
SURGERY PROGRESS NOTE     Patient: JOY ARAGON , 82y ([birth year])Female   MRN: [id number]  Location: [place] [id number]  Visit: 11-18-22 Inpatient  Date: 11-21-22 @ 01:34       Events of past 24 hours:  Patient seen resting comfortably in bed with family bedside. Used interpreting services for conversation (ID [id number]). Patient planned for RLE venogram with possible endovascular revascularization on 11/21. All questions answered. No acute events overnight. Hemodynamically stable.     PAST MEDICAL & SURGICAL HISTORY:  Diabetes  HTN (hypertension)  Rheumatoid arthritis  Depression  Anxiety  Cervical cancer  History of dental surgery  Foot mass  left foot mass removal 20 years ago    Vitals:   T(F): 96 (11-20-22 @ 23:52), Max: 97.1 (11-20-22 @ 07:40)  HR: 102 (11-20-22 @ 23:52)  BP: 166/72 (11-20-22 @ 23:52)  RR: 18 (11-20-22 @ 23:52)  SpO2: 96% (11-20-22 @ 23:52)      Diet, NPO after Midnight:      NPO Start Date: 20-Nov-2022,   NPO Start Time: 23:59  Diet, Consistent Carbohydrate w/Evening Snack      Fluids:     I & O's:    11-19-22 @ 07:01  -  11-20-22 @ 07:00  --------------------------------------------------------  IN:    Oral Fluid: 690 mL  Total IN: 690 mL    OUT:  Total OUT: 0 mL    Total NET: 690 mL           PHYSICAL EXAM:   General: NAD, AAOx3, calm and cooperative  Cardiac: RRR S1, S2  Respiratory: CTAB, normal respiratory effort  Abdomen: Soft, non-distended, non-tender, no rebound, no guarding. +BS.  Vascular: Pulses 2+ throughout, extremities well perfused  Extremities: +2 edema of RLE  Skin: Warm/dry, normal color, no jaundice    MEDICATIONS  (STANDING):  atorvastatin 10 milliGRAM(s) Oral at bedtime  dextrose 5%. 1000 milliLiter(s) (50 mL/Hr) IV Continuous <Continuous>  dextrose 5%. 1000 milliLiter(s) (100 mL/Hr) IV Continuous <Continuous>  dextrose 50% Injectable 25 Gram(s) IV Push once  dextrose 50% Injectable 12.5 Gram(s) IV Push once  dextrose 50% Injectable 25 Gram(s) IV Push once  folic acid 1 milliGRAM(s) Oral daily  glucagon  Injectable 1 milliGRAM(s) IntraMuscular once  insulin lispro (ADMELOG) corrective regimen sliding scale   SubCutaneous three times a day before meals  NIFEdipine XL 30 milliGRAM(s) Oral daily  senna 2 Tablet(s) Oral at bedtime    MEDICATIONS  (PRN):  dextrose Oral Gel 15 Gram(s) Oral once PRN Blood Glucose LESS THAN 70 milliGRAM(s)/deciliter  oxycodone    5 mG/acetaminophen 325 mG 1 Tablet(s) Oral every 6 hours PRN Severe Pain (7 - 10)  polyethylene glycol 3350 17 Gram(s) Oral daily PRN Constipation      DVT PROPHYLAXIS:   GI PROPHYLAXIS:   ANTICOAGULATION:   ANTIBIOTICS:            LAB/STUDIES:  Labs:  CAPILLARY BLOOD GLUCOSE      POCT Blood Glucose.: 220 mg/dL (20 Nov 2022 21:07)  POCT Blood Glucose.: 266 mg/dL (20 Nov 2022 16:17)  POCT Blood Glucose.: 250 mg/dL (20 Nov 2022 11:38)  POCT Blood Glucose.: 165 mg/dL (20 Nov 2022 07:51)                          11.1   4.77  )-----------( 256      ( 20 Nov 2022 05:53 )             37.1       Auto Neutrophil %: 71.0 % (11-20-22 @ 05:53)  Auto Immature Granulocyte %: 0.4 % (11-20-22 @ 05:53)    11-20    142  |  105  |  11  ----------------------------<  153<H>  4.9   |  28  |  0.8      Calcium, Total Serum: 9.3 mg/dL (11-20-22 @ 05:53)      LFTs:             6.0  | 0.5  | 12       ------------------[87      ( 20 Nov 2022 05:53 )  3.9  | x    | 10          Lipase:x      Amylase:x         Lactate, Blood: 2.6 mmol/L (11-18-22 @ 17:13)      Coags:     11.20  ----< 0.98    ( 20 Nov 2022 05:53 )     37.7                 
GENERAL SURGERY PROGRESS NOTE    Patient: JOY ARAGON , 82y ([birth year])Female   MRN: [id number]  Location: [address]  Visit: 11-18-22 Inpatient  Date: 11-22-22 @ 01:13    Procedure/Dx/Injuries: 82yF s/p  right leg venogram, right iliac venoplasty, venous stent, mechanical thrombectomy    Events of past 24 hours: no acute post op events, patient c/o some RLE pain     PAST MEDICAL & SURGICAL HISTORY:  Diabetes      HTN (hypertension)      Rheumatoid arthritis      Depression      Anxiety      Cervical cancer      History of dental surgery      Foot mass  left foot mass removal 20 years ago          Vitals:   T(F): 99.8 (11-22-22 @ 00:11), Max: 99.8 (11-22-22 @ 00:11)  HR: 111 (11-22-22 @ 00:11)  BP: 166/70 (11-22-22 @ 00:11)  RR: 18 (11-22-22 @ 00:11)  SpO2: 97% (11-21-22 @ 20:30)      Diet, Consistent Carbohydrate w/Evening Snack      Fluids:     I & O's:    11-20-22 @ 07:01  -  11-21-22 @ 07:00  --------------------------------------------------------  IN:    Oral Fluid: 810 mL  Total IN: 810 mL    OUT:  Total OUT: 0 mL    Total NET: 810 mL        Bowel Movement: : [] YES [] NO  Flatus: : [] YES [] NO      Physical Examination:  General Appearance: NAD,   Heart: RRR  Lungs: CTABL  Abdomen:  Soft, non tender, appropriate for post-op course, nondistended. No rigidity, guarding, or rebound tenderness.   MSK/Extremities: RLE warm, moderate swelling, doplerable dp/pt, LLE wnl  Incisions/Wounds: right groin Dressings in place, clean, dry and intact, no signs of infection/active bleeding/drainage    MEDICATIONS  (STANDING):  atorvastatin 10 milliGRAM(s) Oral at bedtime  dextrose 5%. 1000 milliLiter(s) (50 mL/Hr) IV Continuous <Continuous>  dextrose 5%. 1000 milliLiter(s) (100 mL/Hr) IV Continuous <Continuous>  dextrose 50% Injectable 25 Gram(s) IV Push once  dextrose 50% Injectable 25 Gram(s) IV Push once  enoxaparin Injectable 60 milliGRAM(s) SubCutaneous every 12 hours  folic acid 1 milliGRAM(s) Oral daily  insulin lispro (ADMELOG) corrective regimen sliding scale   SubCutaneous three times a day before meals  NIFEdipine XL 30 milliGRAM(s) Oral daily  senna 2 Tablet(s) Oral at bedtime    MEDICATIONS  (PRN):  oxycodone    5 mG/acetaminophen 325 mG 1 Tablet(s) Oral every 6 hours PRN Severe Pain (7 - 10)  polyethylene glycol 3350 17 Gram(s) Oral daily PRN Constipation      DVT PROPHYLAXIS: enoxaparin Injectable 60 milliGRAM(s) SubCutaneous every 12 hours    GI PROPHYLAXIS:   ANTICOAGULATION:   ANTIBIOTICS:            LAB/STUDIES:  Labs:  CAPILLARY BLOOD GLUCOSE      POCT Blood Glucose.: 212 mg/dL (21 Nov 2022 11:11)  POCT Blood Glucose.: 262 mg/dL (21 Nov 2022 08:22)                          11.0   4.95  )-----------( 252      ( 21 Nov 2022 07:56 )             35.5       Auto Neutrophil %: 75.6 % (11-21-22 @ 07:56)  Auto Immature Granulocyte %: 0.2 % (11-21-22 @ 07:56)    11-21    139  |  102  |  10  ----------------------------<  264<H>  5.0   |  28  |  0.8      Calcium, Total Serum: 9.3 mg/dL (11-21-22 @ 07:56)      LFTs:             6.5  | 0.6  | 11       ------------------[94      ( 21 Nov 2022 07:56 )  3.9  | x    | 10          Lipase:x      Amylase:x             Coags:     11.10  ----< 0.97    ( 21 Nov 2022 07:56 )     32.2     IMAGING:      ACCESS/ DEVICES:  [ x] Peripheral IV  [ ] Central Venous Line	[ ] R	[ ] L	[ ] IJ	[ ] Fem	[ ] SC	Placed:   [ ] Arterial Line		[ ] R	[ ] L	[ ] Fem	[ ] Rad	[ ] Ax	Placed:   [ ] PICC:					[ ] Mediport  [ ] Urinary Catheter,  Date Placed:   [ ] Chest tube: [ ] Right, [ ] Left  [ ] JAZMIN/Tom Drains    
JOY ARAGON 82y Female  MRN#: [id number]     Hospital Day: 2d    Pt is currently admitted with the primary diagnosis of  OBSTRUCTION OF FEMORAL VEIN; LEG SWELLING; COVID        SUBJECTIVE     Overnight events  None    Subjective complaints  Pt was evaluated this am. Patient mentioned that her symptoms are not improving.                                             ----------------------------------------------------------  OBJECTIVE  PAST MEDICAL & SURGICAL HISTORY  Diabetes    HTN (hypertension)    Rheumatoid arthritis    Depression    Anxiety    Cervical cancer    History of dental surgery    Foot mass  left foot mass removal 20 years ago                                              -----------------------------------------------------------  ALLERGIES:  No Known Allergies                                            ------------------------------------------------------------    HOME MEDICATIONS  Home Medications:  atorvastatin 10 mg oral tablet: 1 tab(s) orally once a day (at bedtime) (19 Nov 2022 03:34)  folic acid 1 mg oral tablet: 1 tab(s) orally once a day (19 Nov 2022 03:34)  HumuLIN 70/30 KwikPen 70 units-30 units/mL subcutaneous suspension: subcutaneous 2 times a day -correction dose (19 Nov 2022 03:34)  methotrexate 2.5 mg oral tablet: 6 tab(s) orally once a week (19 Nov 2022 03:34)                           MEDICATIONS:  STANDING MEDICATIONS  atorvastatin 10 milliGRAM(s) Oral at bedtime  dextrose 5%. 1000 milliLiter(s) IV Continuous <Continuous>  dextrose 5%. 1000 milliLiter(s) IV Continuous <Continuous>  dextrose 50% Injectable 25 Gram(s) IV Push once  dextrose 50% Injectable 12.5 Gram(s) IV Push once  dextrose 50% Injectable 25 Gram(s) IV Push once  enoxaparin Injectable 60 milliGRAM(s) SubCutaneous every 12 hours  folic acid 1 milliGRAM(s) Oral daily  glucagon  Injectable 1 milliGRAM(s) IntraMuscular once  insulin lispro (ADMELOG) corrective regimen sliding scale   SubCutaneous three times a day before meals  NIFEdipine XL 30 milliGRAM(s) Oral daily  senna 2 Tablet(s) Oral at bedtime    PRN MEDICATIONS  dextrose Oral Gel 15 Gram(s) Oral once PRN  oxycodone    5 mG/acetaminophen 325 mG 1 Tablet(s) Oral every 6 hours PRN  polyethylene glycol 3350 17 Gram(s) Oral daily PRN                                            ------------------------------------------------------------  VITAL SIGNS: Last 24 Hours  T(C): 36.6 (20 Nov 2022 00:00), Max: 36.6 (20 Nov 2022 00:00)  T(F): 97.9 (20 Nov 2022 00:00), Max: 97.9 (20 Nov 2022 00:00)  HR: 97 (20 Nov 2022 00:00) (71 - 97)  BP: 135/63 (20 Nov 2022 00:00) (119/58 - 150/65)  BP(mean): --  RR: 18 (20 Nov 2022 00:00) (18 - 18)  SpO2: --      11-19-22 @ 07:01  -  11-20-22 @ 04:45  --------------------------------------------------------  IN: 690 mL / OUT: 0 mL / NET: 690 mL                                             --------------------------------------------------------------  LABS:                        10.9   6.89  )-----------( 254      ( 19 Nov 2022 06:59 )             36.3     11-19    138  |  102  |  14  ----------------------------<  170<H>  4.9   |  29  |  0.9    Ca    9.3      19 Nov 2022 06:59  Mg     1.8     11-19    TPro  6.8  /  Alb  4.0  /  TBili  0.4  /  DBili  x   /  AST  15  /  ALT  12  /  AlkPhos  92  11-19    PT/INR - ( 18 Nov 2022 17:13 )   PT: 11.00 sec;   INR: 0.97 ratio         PTT - ( 18 Nov 2022 17:13 )  PTT:34.6 sec                                                          -------------------------------------------------------------  RADIOLOGY:  < from: CT Lower Extremity w/ IV Cont, Right (11.18.22 @ 19:48) >  IMPRESSION:    1.  Apparent occlusion of the right external iliac vein and possibly of   the right common femoral vein.  2.  Diffuse subcutaneous edema throughout the right lower extremity,   consistent with venous obstruction.  3.  Necrotic right pelvic sidewall lymph nodes consistent with metastatic   disease.      < from: CT Abdomen and Pelvis w/ IV Cont (11.18.22 @ 19:40) >  IMPRESSION:    Necrotic right iliac lymph nodes have increased in size sinceApril 2022   with asymmetrically dilated right iliac and feeding veins as well as   asymmetric right lower extremity edema. Findings suggesting pelvic venous   obstruction. Intravenous thrombus is also possible however is not   distinctly visualized as the veins are suboptimally opacified on the   current examination.      < from: [facility] Duplex Lower Ext Vein Scan, Bilat (11.15.22 @ 22:28) >  IMPRESSION:  No evidence of deep venous thrombosis in either lower extremity.                                                  --------------------------------------------------------------    PHYSICAL EXAM:  GENERAL: NAD, lying in bed comfortably  HEAD:  Atraumatic, Normocephalic  CHEST/LUNG:No rales, rhonchi, wheezing, or rubs. Unlabored respirations  HEART: Regular rate and rhythm  ABDOMEN: Soft, Nontender, Nondistended  EXTREMITIES:  Right lower extremity 3+ pitting edema, erythema, tenderness  NERVOUS SYSTEM:  Alert & Oriented X3, speech clear. No deficits                                         --------------------------------------------------------------                
VASCULAR SURGERY PROGRESS NOTE    CC: RLE pain and swelling    Procedure: 82 year old female who is s/p right leg venogram with right iliac vein stenting and mechanical thrombectomy    Events of past 24 hours: no events to report. RLE less swollen and red    PAST MEDICAL & SURGICAL HISTORY:  Diabetes      HTN (hypertension)      Rheumatoid arthritis      Depression      Anxiety      Cervical cancer      History of dental surgery      Foot mass  left foot mass removal 20 years ago          Vital Signs Last 24 Hrs  T(C): 35.6 (2022 23:58), Max: 36 (2022 15:16)  T(F): 96 (2022 23:58), Max: 96.8 (2022 15:16)  HR: 104 (2022 05:48) (92 - 104)  BP: 135/74 (2022 05:48) (130/64 - 151/68)  RR: 17 (2022 05:48) (17 - 18    Diet: cons carb with snack    I&O's Detail    2022 07:01  -  2022 07:00  --------------------------------------------------------  IN:    Oral Fluid: 790 mL  Total IN: 790 mL    OUT:  Total OUT: 0 mL    Total NET: 790 mL    PHYSICAL EXAM    Appearance: Normal	  HEENT:  NAD, [place] speaking, laying in bed sleeping	  Neck: No JVD  Cardiovascular: Normal S1 S2  Respiratory: Lungs clear to auscultation  Gastrointestinal:  Soft, Non-tender  Extremities: Normal range of motion, No clubbing, cyanosis or edema. RLE less swollen and red from day prior. Dopplerable pt/dp. LLE unremarkable  Surgery: Incision healing well. dressing remove. No drainage, swelling, hematoma, infection signs    MEDICATIONS:   MEDICATIONS  (STANDING):  atorvastatin 10 milliGRAM(s) Oral at bedtime  dextrose 5%. 1000 milliLiter(s) (50 mL/Hr) IV Continuous <Continuous>  dextrose 5%. 1000 milliLiter(s) (100 mL/Hr) IV Continuous <Continuous>  dextrose 50% Injectable 25 Gram(s) IV Push once  dextrose 50% Injectable 25 Gram(s) IV Push once  enoxaparin Injectable 60 milliGRAM(s) SubCutaneous every 12 hours  folic acid 1 milliGRAM(s) Oral daily  insulin lispro (ADMELOG) corrective regimen sliding scale   SubCutaneous three times a day before meals  magnesium sulfate  IVPB 2 Gram(s) IV Intermittent once  NIFEdipine XL 30 milliGRAM(s) Oral daily  senna 2 Tablet(s) Oral at bedtime    MEDICATIONS  (PRN):  acetaminophen     Tablet .. 650 milliGRAM(s) Oral every 6 hours PRN Temp greater or equal to 38C (100.4F), Moderate Pain (4 - 6)  oxycodone    5 mG/acetaminophen 325 mG 1 Tablet(s) Oral every 6 hours PRN Severe Pain (7 - 10)  oxycodone    5 mG/acetaminophen 325 mG 1 Tablet(s) Oral every 6 hours PRN Moderate Pain (4 - 6)  polyethylene glycol 3350 17 Gram(s) Oral daily PRN Constipation    ANTICOAGULATION: [x] YES Therapeutic Lovenox      LAB/STUDIES:                        11.0   7.26  )-----------( 245      ( 2022 11:39 )             35.7     11    140  |  98  |  11  ----------------------------<  314<H>  4.3   |  28  |  0.8    Ca    9.4      2022 11:39  Mg     1.6         TPro  6.9  /  Alb  3.9  /  TBili  0.6  /  DBili  x   /  AST  15  /  ALT  11  /  AlkPhos  98  11-    PT/INR - ( 2022 07:56 )   PT: 11.10 sec;   INR: 0.97 ratio         PTT - ( 2022 07:56 )  PTT:32.2 sec  LIVER FUNCTIONS - ( 2022 11:39 )  Alb: 3.9 g/dL / Pro: 6.9 g/dL / ALK PHOS: 98 U/L / ALT: 11 U/L / AST: 15 U/L / GGT: x             Urinalysis Basic - ( 2022 19:06 )    Color: Light Yellow / Appearance: Clear / S.011 / pH: x  Gluc: x / Ketone: Negative  / Bili: Negative / Urobili: <2 mg/dL   Blood: x / Protein: Trace / Nitrite: Negative   Leuk Esterase: Large / RBC: 8 /HPF / WBC 16 /HPF   Sq Epi: x / Non Sq Epi: 4 /HPF / Bacteria: Negative                  
24H events:    Patient is a 82y old Female who presents with a chief complaint of RLE pain and swelling (20 Nov 2022 04:44)  Primary diagnosis of Obstruction of femoral vein  Today is hospital day 3d.    Patient seen and examined at bedside. Reports no active complaints.     PAST MEDICAL & SURGICAL HISTORY  Diabetes    HTN (hypertension)    Rheumatoid arthritis    Depression    Anxiety    Cervical cancer    History of dental surgery    Foot mass  left foot mass removal 20 years ago      SOCIAL HISTORY:  Negative for smoking/alcohol/drug use.     ALLERGIES:  No Known Allergies    MEDICATIONS:  STANDING MEDICATIONS  atorvastatin 10 milliGRAM(s) Oral at bedtime  dextrose 5%. 1000 milliLiter(s) IV Continuous <Continuous>  dextrose 5%. 1000 milliLiter(s) IV Continuous <Continuous>  dextrose 50% Injectable 25 Gram(s) IV Push once  dextrose 50% Injectable 12.5 Gram(s) IV Push once  dextrose 50% Injectable 25 Gram(s) IV Push once  folic acid 1 milliGRAM(s) Oral daily  glucagon  Injectable 1 milliGRAM(s) IntraMuscular once  insulin lispro (ADMELOG) corrective regimen sliding scale   SubCutaneous three times a day before meals  NIFEdipine XL 30 milliGRAM(s) Oral daily  senna 2 Tablet(s) Oral at bedtime    PRN MEDICATIONS  dextrose Oral Gel 15 Gram(s) Oral once PRN  oxycodone    5 mG/acetaminophen 325 mG 1 Tablet(s) Oral every 6 hours PRN  polyethylene glycol 3350 17 Gram(s) Oral daily PRN    VITALS:   T(F): 97.7  HR: 92  BP: 161/72  RR: 18  SpO2: 96%    LABS:                        11.0   4.95  )-----------( 252      ( 21 Nov 2022 07:56 )             35.5     11-21    139  |  102  |  10  ----------------------------<  264<H>  5.0   |  28  |  0.8    Ca    9.3      21 Nov 2022 07:56  Mg     1.8     11-21    TPro  6.5  /  Alb  3.9  /  TBili  0.6  /  DBili  x   /  AST  11  /  ALT  10  /  AlkPhos  94  11-21    PT/INR - ( 21 Nov 2022 07:56 )   PT: 11.10 sec;   INR: 0.97 ratio         PTT - ( 21 Nov 2022 07:56 )  PTT:32.2 sec              RADIOLOGY:    PHYSICAL EXAM:  GENERAL: NAD, lying in bed comfortably  HEAD:  Atraumatic, Normocephalic  CHEST/LUNG:No rales, rhonchi, wheezing, or rubs. Unlabored respirations  HEART: Regular rate and rhythm  ABDOMEN: Soft, Nontender, Nondistended  EXTREMITIES:  Right lower extremity 3+ pitting edema, erythema, tenderness  NERVOUS SYSTEM:  Alert & Oriented X3, speech clear. No deficits      
24H events:    Patient is a 82y old Female who presents with a chief complaint of RLE pain and swelling (20 Nov 2022 04:44)  Primary diagnosis of Obstruction of femoral vein  Today is hospital day 4d.    Patient seen and examined at bedside. Reports no active complaints.     PAST MEDICAL & SURGICAL HISTORY  Diabetes    HTN (hypertension)    Rheumatoid arthritis    Depression    Anxiety    Cervical cancer    History of dental surgery    Foot mass  left foot mass removal 20 years ago      SOCIAL HISTORY:  Negative for smoking/alcohol/drug use.     ALLERGIES:  No Known Allergies    MEDICATIONS:  STANDING MEDICATIONS  atorvastatin 10 milliGRAM(s) Oral at bedtime  dextrose 5%. 1000 milliLiter(s) IV Continuous <Continuous>  dextrose 5%. 1000 milliLiter(s) IV Continuous <Continuous>  dextrose 50% Injectable 25 Gram(s) IV Push once  dextrose 50% Injectable 25 Gram(s) IV Push once  enoxaparin Injectable 60 milliGRAM(s) SubCutaneous every 12 hours  folic acid 1 milliGRAM(s) Oral daily  insulin lispro (ADMELOG) corrective regimen sliding scale   SubCutaneous three times a day before meals  NIFEdipine XL 30 milliGRAM(s) Oral daily  senna 2 Tablet(s) Oral at bedtime    PRN MEDICATIONS  acetaminophen     Tablet .. 650 milliGRAM(s) Oral every 6 hours PRN  oxycodone    5 mG/acetaminophen 325 mG 1 Tablet(s) Oral every 6 hours PRN  polyethylene glycol 3350 17 Gram(s) Oral daily PRN    VITALS:   T(F): 97.6  HR: 111  BP: 149/67  RR: 20  SpO2: 97%    LABS:                        11.0   7.26  )-----------( 245      ( 22 Nov 2022 11:39 )             35.7     11-22    140  |  98  |  11  ----------------------------<  314<H>  4.3   |  28  |  0.8    Ca    9.4      22 Nov 2022 11:39  Mg     1.6     11-22    TPro  6.9  /  Alb  3.9  /  TBili  0.6  /  DBili  x   /  AST  15  /  ALT  11  /  AlkPhos  98  11-22    PT/INR - ( 21 Nov 2022 07:56 )   PT: 11.10 sec;   INR: 0.97 ratio         PTT - ( 21 Nov 2022 07:56 )  PTT:32.2 sec              RADIOLOGY:    PHYSICAL EXAM:  GENERAL: NAD, lying in bed comfortably  HEAD:  Atraumatic, Normocephalic  CHEST/LUNG:No rales, rhonchi, wheezing, or rubs. Unlabored respirations  HEART: Regular rate and rhythm  ABDOMEN: Soft, Nontender, Nondistended  EXTREMITIES:  Right lower extremity 3+ pitting edema, erythema, tenderness  NERVOUS SYSTEM:  Alert & Oriented X3, speech clear. No deficits

## 2022-11-23 NOTE — PHYSICAL THERAPY INITIAL EVALUATION ADULT - PERTINENT HX OF CURRENT PROBLEM, REHAB EVAL
83yo female with PMHx of HTN, DM, RA on methotrexate, cervical cancer s/p chemotherapy and radiation (last round 1/2022), on ASA 81mg daily, no anticoagulation, presented to ED for evaluation of RLE swelling and pain for several weeks. At baseline, patient is independent in ambulation and lives alone, very near to her daughter.  Pt has had RLE pain and swelling for the past seven weeks but over the past three days the pain has increased in intensity and the swelling has progressively gotten worse. It has become difficult to ambulate due to the swelling and pain, causing her to become more fatigued. Pt normally ambulates independently. Pt was recently here three days ago for the same symptoms but no DVTs were noted on venous duplex of the RLE, further imaging was not performed. Denies history of DVTs. Admitted for COVID-19 one month ago. Denies fever, chills, SOB, chest pain, abdominal pain, urinary incontinence, back pain, dizziness, weight changes, appetite changes.

## 2022-11-23 NOTE — PHYSICAL THERAPY INITIAL EVALUATION ADULT - GENERAL OBSERVATIONS, REHAB EVAL
Patient encountered sitting in chair NAD.  Agreed to  participate in therapy. [place]  #[id number] used

## 2022-11-27 LAB
CULTURE RESULTS: SIGNIFICANT CHANGE UP
SPECIMEN SOURCE: SIGNIFICANT CHANGE UP

## 2022-11-29 ENCOUNTER — APPOINTMENT (OUTPATIENT)
Dept: HEMATOLOGY ONCOLOGY | Facility: CLINIC | Age: 82
End: 2022-11-29

## 2022-11-29 ENCOUNTER — APPOINTMENT (OUTPATIENT)
Dept: VASCULAR SURGERY | Facility: CLINIC | Age: 82
End: 2022-11-29

## 2022-11-29 VITALS — HEIGHT: 63 IN | WEIGHT: 170 LBS | BODY MASS INDEX: 30.12 KG/M2

## 2022-11-29 VITALS — SYSTOLIC BLOOD PRESSURE: 139 MMHG | DIASTOLIC BLOOD PRESSURE: 79 MMHG

## 2022-11-29 PROCEDURE — 99213 OFFICE O/P EST LOW 20 MIN: CPT

## 2022-11-29 PROCEDURE — 93971 EXTREMITY STUDY: CPT

## 2022-11-29 NOTE — DATA REVIEWED
[FreeTextEntry1] : I performed a venous duplex which was medically necessary to evaluate for DVT. It showed patent right iliac vein stent and no evidence of DVT in the right lower extremity.\par

## 2022-11-29 NOTE — ASSESSMENT
[FreeTextEntry1] : 83 y/o female with RLE DVT and underwent right iliac vein thrombectomy and iliac vein stent on 11/21/22, had enlarged right inguinal lymph nodes, now on Eliquis, presents with right thigh pain.\par \par Venous duplex showed patent right iliac vein stent and no evidence of DVT in the right lower extremity.\par \par She has followup with Oncology, advised to continue on Elqiuis.

## 2022-11-29 NOTE — HISTORY OF PRESENT ILLNESS
[FreeTextEntry1] : 83 y/o female with h/o cervical cancer, and underwent chemo and radiation treatment one year ago, presented with right thigh and leg swelling, found to have DVT and underwent right iliac vein thrombectomy and iliac vein stent on 11/21/22, had enlarged right inguinal lymph nodes, now on Eliquis, presents with right thigh pain.

## 2022-11-30 ENCOUNTER — EMERGENCY (EMERGENCY)
Facility: HOSPITAL | Age: 82
LOS: 0 days | Discharge: HOME | End: 2022-11-30
Attending: EMERGENCY MEDICINE | Admitting: EMERGENCY MEDICINE

## 2022-11-30 VITALS
HEART RATE: 73 BPM | DIASTOLIC BLOOD PRESSURE: 64 MMHG | RESPIRATION RATE: 20 BRPM | SYSTOLIC BLOOD PRESSURE: 142 MMHG | OXYGEN SATURATION: 98 % | TEMPERATURE: 99 F

## 2022-11-30 VITALS
HEART RATE: 68 BPM | OXYGEN SATURATION: 100 % | WEIGHT: 167.99 LBS | RESPIRATION RATE: 18 BRPM | TEMPERATURE: 96 F | DIASTOLIC BLOOD PRESSURE: 75 MMHG | SYSTOLIC BLOOD PRESSURE: 193 MMHG | HEIGHT: 63 IN

## 2022-11-30 DIAGNOSIS — Z87.891 PERSONAL HISTORY OF NICOTINE DEPENDENCE: ICD-10-CM

## 2022-11-30 DIAGNOSIS — I10 ESSENTIAL (PRIMARY) HYPERTENSION: ICD-10-CM

## 2022-11-30 DIAGNOSIS — F41.9 ANXIETY DISORDER, UNSPECIFIED: ICD-10-CM

## 2022-11-30 DIAGNOSIS — M79.661 PAIN IN RIGHT LOWER LEG: ICD-10-CM

## 2022-11-30 DIAGNOSIS — Z92.3 PERSONAL HISTORY OF IRRADIATION: ICD-10-CM

## 2022-11-30 DIAGNOSIS — E78.00 PURE HYPERCHOLESTEROLEMIA, UNSPECIFIED: ICD-10-CM

## 2022-11-30 DIAGNOSIS — Z98.890 OTHER SPECIFIED POSTPROCEDURAL STATES: ICD-10-CM

## 2022-11-30 DIAGNOSIS — Z85.41 PERSONAL HISTORY OF MALIGNANT NEOPLASM OF CERVIX UTERI: ICD-10-CM

## 2022-11-30 DIAGNOSIS — Z79.4 LONG TERM (CURRENT) USE OF INSULIN: ICD-10-CM

## 2022-11-30 DIAGNOSIS — E11.9 TYPE 2 DIABETES MELLITUS WITHOUT COMPLICATIONS: ICD-10-CM

## 2022-11-30 DIAGNOSIS — Z79.01 LONG TERM (CURRENT) USE OF ANTICOAGULANTS: ICD-10-CM

## 2022-11-30 DIAGNOSIS — F32.A DEPRESSION, UNSPECIFIED: ICD-10-CM

## 2022-11-30 DIAGNOSIS — R22.40 LOCALIZED SWELLING, MASS AND LUMP, UNSPECIFIED LOWER LIMB: Chronic | ICD-10-CM

## 2022-11-30 DIAGNOSIS — Z92.21 PERSONAL HISTORY OF ANTINEOPLASTIC CHEMOTHERAPY: ICD-10-CM

## 2022-11-30 DIAGNOSIS — Z92.89 PERSONAL HISTORY OF OTHER MEDICAL TREATMENT: Chronic | ICD-10-CM

## 2022-11-30 DIAGNOSIS — M06.9 RHEUMATOID ARTHRITIS, UNSPECIFIED: ICD-10-CM

## 2022-11-30 LAB
ALBUMIN SERPL ELPH-MCNC: 3.9 G/DL — SIGNIFICANT CHANGE UP (ref 3.5–5.2)
ALP SERPL-CCNC: 81 U/L — SIGNIFICANT CHANGE UP (ref 30–115)
ALT FLD-CCNC: 9 U/L — SIGNIFICANT CHANGE UP (ref 0–41)
ANION GAP SERPL CALC-SCNC: 11 MMOL/L — SIGNIFICANT CHANGE UP (ref 7–14)
AST SERPL-CCNC: 18 U/L — SIGNIFICANT CHANGE UP (ref 0–41)
BASOPHILS # BLD AUTO: 0.02 K/UL — SIGNIFICANT CHANGE UP (ref 0–0.2)
BASOPHILS NFR BLD AUTO: 0.3 % — SIGNIFICANT CHANGE UP (ref 0–1)
BILIRUB SERPL-MCNC: 0.3 MG/DL — SIGNIFICANT CHANGE UP (ref 0.2–1.2)
BUN SERPL-MCNC: 18 MG/DL — SIGNIFICANT CHANGE UP (ref 10–20)
CALCIUM SERPL-MCNC: 8.9 MG/DL — SIGNIFICANT CHANGE UP (ref 8.4–10.5)
CHLORIDE SERPL-SCNC: 97 MMOL/L — LOW (ref 98–110)
CO2 SERPL-SCNC: 25 MMOL/L — SIGNIFICANT CHANGE UP (ref 17–32)
CREAT SERPL-MCNC: 1 MG/DL — SIGNIFICANT CHANGE UP (ref 0.7–1.5)
EGFR: 56 ML/MIN/1.73M2 — LOW
EOSINOPHIL # BLD AUTO: 0.09 K/UL — SIGNIFICANT CHANGE UP (ref 0–0.7)
EOSINOPHIL NFR BLD AUTO: 1.4 % — SIGNIFICANT CHANGE UP (ref 0–8)
GLUCOSE SERPL-MCNC: 159 MG/DL — HIGH (ref 70–99)
HCT VFR BLD CALC: 36.2 % — LOW (ref 37–47)
HGB BLD-MCNC: 10.7 G/DL — LOW (ref 12–16)
IMM GRANULOCYTES NFR BLD AUTO: 0.6 % — HIGH (ref 0.1–0.3)
INR BLD: 1.48 RATIO — HIGH (ref 0.65–1.3)
LYMPHOCYTES # BLD AUTO: 0.6 K/UL — LOW (ref 1.2–3.4)
LYMPHOCYTES # BLD AUTO: 9.5 % — LOW (ref 20.5–51.1)
MCHC RBC-ENTMCNC: 23.8 PG — LOW (ref 27–31)
MCHC RBC-ENTMCNC: 29.6 G/DL — LOW (ref 32–37)
MCV RBC AUTO: 80.4 FL — LOW (ref 81–99)
MONOCYTES # BLD AUTO: 0.81 K/UL — HIGH (ref 0.1–0.6)
MONOCYTES NFR BLD AUTO: 12.8 % — HIGH (ref 1.7–9.3)
NEUTROPHILS # BLD AUTO: 4.76 K/UL — SIGNIFICANT CHANGE UP (ref 1.4–6.5)
NEUTROPHILS NFR BLD AUTO: 75.4 % — HIGH (ref 42.2–75.2)
NRBC # BLD: 0 /100 WBCS — SIGNIFICANT CHANGE UP (ref 0–0)
PLATELET # BLD AUTO: 314 K/UL — SIGNIFICANT CHANGE UP (ref 130–400)
POTASSIUM SERPL-MCNC: 4.5 MMOL/L — SIGNIFICANT CHANGE UP (ref 3.5–5)
POTASSIUM SERPL-SCNC: 4.5 MMOL/L — SIGNIFICANT CHANGE UP (ref 3.5–5)
PROT SERPL-MCNC: 6.7 G/DL — SIGNIFICANT CHANGE UP (ref 6–8)
PROTHROM AB SERPL-ACNC: 17.1 SEC — HIGH (ref 9.95–12.87)
RBC # BLD: 4.5 M/UL — SIGNIFICANT CHANGE UP (ref 4.2–5.4)
RBC # FLD: 15.3 % — HIGH (ref 11.5–14.5)
SODIUM SERPL-SCNC: 133 MMOL/L — LOW (ref 135–146)
WBC # BLD: 6.32 K/UL — SIGNIFICANT CHANGE UP (ref 4.8–10.8)
WBC # FLD AUTO: 6.32 K/UL — SIGNIFICANT CHANGE UP (ref 4.8–10.8)

## 2022-11-30 PROCEDURE — 99284 EMERGENCY DEPT VISIT MOD MDM: CPT | Mod: FS

## 2022-11-30 RX ORDER — OXYCODONE AND ACETAMINOPHEN 5; 325 MG/1; MG/1
1 TABLET ORAL ONCE
Refills: 0 | Status: DISCONTINUED | OUTPATIENT
Start: 2022-11-30 | End: 2022-11-30

## 2022-11-30 RX ADMIN — OXYCODONE AND ACETAMINOPHEN 1 TABLET(S): 5; 325 TABLET ORAL at 14:43

## 2022-11-30 RX ADMIN — Medication 5 MILLIGRAM(S): at 14:43

## 2022-11-30 NOTE — ED PROVIDER NOTE - PATIENT PORTAL LINK FT
You can access the FollowMyHealth Patient Portal offered by  by registering at the following website: [email or web address]. By joining [employer]’s FollowMyHealth portal, you will also be able to view your health information using other applications (apps) compatible with our system.

## 2022-11-30 NOTE — ED PROVIDER NOTE - CLINICAL SUMMARY MEDICAL DECISION MAKING FREE TEXT BOX
Pt with persistent RLE pain s/p venoplasty by dr. casiano, pt is already on percocet at home without relief.  vascular team consulted and labs ordered

## 2022-11-30 NOTE — ED PROVIDER NOTE - PHYSICAL EXAMINATION
Vital Signs: I have reviewed the initial vital signs.  Constitutional: appears stated age, appears uncomfortable sitting in chair, no acute distress  Eyes: Conjunctiva pink, Sclera clear  Cardiovascular: S1 and S2, regular rate, regular rhythm, well-perfused extremities, radial pulses equal and 2+, pedal pulses 2+ and equal  Respiratory: unlabored respiratory effort, clear to auscultation bilaterally no wheezing, rales and rhonchi  Musculoskeletal: supple neck, no lower extremity edema  Integumentary: warm, dry, no rash  Neurologic: awake, alert, extremities’ motor and sensory functions grossly intact  Psychiatric: appropriate mood, appropriate affect

## 2022-11-30 NOTE — CONSULT NOTE ADULT - SUBJECTIVE AND OBJECTIVE BOX
VASCULAR SURGERY CONSULT NOTE    Patient: JOY ARAGON , 82y ([birth year])Female   MRN: [id number]  Location: [facility] ED  Visit: 11-30-22 Emergency  Date: 11-30-22 @ 14:06    HPI: 81yo female with PMHx of HTN, DM, RA on methotrexate, cervical cancer s/p chemotherapy and radiation (last round 1/2022), on eliquis, s/p right venogram, right iliac venoplasty, venous stent placement, mechanical thrombectomy presented to ED for evaluation of RLE pain on anterior thigh to pelvis. Patient is well known to Dr. Mcallister, just completed follow up appointment 11/29/22. Patient reported pain at follow up visit, was advised to change percocet from Q8 to Q6. Patient has minimal relief for approximately 2 hours. Venous duplex 11/22/22 reveals no DVT. Palpable pulses bilaterally. Patient is ambulating independently.        PAST MEDICAL & SURGICAL HISTORY:  Diabetes      HTN (hypertension)      Rheumatoid arthritis      Depression      Anxiety      Cervical cancer      History of dental surgery      Foot mass  left foot mass removal 20 years ago          Home Medications:  atorvastatin 10 mg oral tablet: 1 tab(s) orally once a day (at bedtime) (23 Nov 2022 11:18)  folic acid 1 mg oral tablet: 1 tab(s) orally once a day (23 Nov 2022 11:18)  HumuLIN 70/30 KwikPen 70 units-30 units/mL subcutaneous suspension: subcutaneous 2 times a day -correction dose (19 Nov 2022 03:34)  methotrexate 2.5 mg oral tablet: 6 tab(s) orally once a week (23 Nov 2022 11:18)        VITALS:  T(F): 96.4 (11-30-22 @ 12:30), Max: 96.4 (11-30-22 @ 12:30)  HR: 68 (11-30-22 @ 12:30) (68 - 68)  BP: 193/75 (11-30-22 @ 12:30) (193/75 - 193/75)  RR: 18 (11-30-22 @ 12:30) (18 - 18)  SpO2: 100% (11-30-22 @ 12:30) (100% - 100%)    PHYSICAL EXAM:  General: NAD, AAOx3, calm and cooperative  HEENT: NCAT, DANIELE, EOMI,  Cardiac: RRR   Respiratory: CTAB, normal respiratory effort,   Abdomen: Soft, non-distended,   Musculoskeletal: Strength 5/5 BL UE/LE, ROM intact, compartments soft  Neuro: Sensation grossly intact and equal throughout, no focal deficits, gait steady   Vascular: Pulses 2+ throughout, extremities well perfused  Skin: Warm/dry, normal color, no jaundice    MEDICATIONS  (STANDING):    MEDICATIONS  (PRN):      LAB/STUDIES:      IMAGING:  < from: VA Duplex Lower Ext Vein Scan, Right (11.22.22 @ 09:01) >  Wall thickening seen within the right common femoral and femoral veins.   No evidence of deep venous thrombosis or superficial thrombophlebitis of   the right lower extremity.    < end of copied text >

## 2022-11-30 NOTE — ED PROVIDER NOTE - ATTENDING APP SHARED VISIT CONTRIBUTION OF CARE
81 yo f with pmh of cervical ca on mtx, htn, hld, recent R iliac venoplasty by dr. casiano on 11/21 2/2 dvt, on eliquis, presents with persistent R thigh pain.  pt says has been having pain since procedure.  went to see dr. casiano yesterday and had duplex which was neg.  says possibly stent placed is next to a nerve. pt was advised to take her percocet from q8 to q6.  family says relief is only lasting 2 hrs and pt continues to c/o pain.  pt lives by herself.  no difficulty walking.  denies back pain.  no fever or chills.  exam: nad, ncat, perrl, eomi, mmm, rrr, ctab, abd soft, nt, nd aox3, RLE mild swelling, no erythema, no warmth, nontender R anterior thigh

## 2022-11-30 NOTE — ED PROVIDER NOTE - NS ED ROS FT
Constitutional: (-) fever, (-) chills  Eyes: (-) visual changes  ENT: (-) nasal congestions  Cardiovascular: (-) chest pain, (-) syncope  Respiratory: (-) cough, (-) shortness of breath, (-) dyspnea,   Gastrointestinal: (-) vomiting, (-) diarrhea, (-)nausea,  Musculoskeletal: (-) neck pain, (-) back pain, (+) Right thigh pain  Neurological: (-) headache, (-) loc, (-) dizziness  Peripheral Vascular: (-) leg swelling  :  (-)dysuria,  (-) hematuria

## 2022-11-30 NOTE — ED PROVIDER NOTE - PROGRESS NOTE DETAILS
Case discussed with vascular, will come see patient in ED. Patient evaluated by vascular and recommend continued pain management. I agree with evaluation and treatment of this patient with MERARI Ewing.

## 2022-11-30 NOTE — ED PROVIDER NOTE - NSFOLLOWUPINSTRUCTIONS_ED_ALL_ED_FT
Our Emergency Department Referral Coordinators will be reaching out ot you in the next 24-48 hours from 9:00am to 5:00pm (Monday to Friday) with a follow up appointment. Please expect a phone call from the hospital in that time frame. If you do not receive a call or if you have any questions or concerns, you can reach them at [phone] or [phone]      Leg pain may be caused by a variety of health conditions. Your tests did not show any broken bones or blood clots.    DISCHARGE INSTRUCTIONS:    Return to the emergency department if:   •You have a fever.      •Your leg starts to swell.      •Your leg pain gets worse.      •You have numbness or tingling in your leg or toes.      •You cannot put any weight on or move your leg.      Contact your healthcare provider if:   •Your pain does not decrease, even after treatment.      •You have questions or concerns about your condition or care.      Medicines:   •NSAIDs, such as ibuprofen, help decrease swelling, pain, and fever. This medicine is available with or without a doctor's order. NSAIDs can cause stomach bleeding or kidney problems in certain people. If you take blood thinner medicine, always ask your healthcare provider if NSAIDs are safe for you. Always read the medicine label and follow directions.      •Take your medicine as directed. Contact your healthcare provider if you think your medicine is not helping or if you have side effects. Tell him of her if you are allergic to any medicine. Keep a list of the medicines, vitamins, and herbs you take. Include the amounts, and when and why you take them. Bring the list or the pill bottles to follow-up visits. Carry your medicine list with you in case of an emergency.      Follow up with your healthcare provider as directed: You may need more tests to find the cause of your leg pain. You may need to see an orthopedic specialist or a physical therapist. Write down your questions so you remember to ask them during your visits.

## 2022-11-30 NOTE — ED PROVIDER NOTE - OBJECTIVE STATEMENT
82F with PMH cervical CA (tx last yr with 6mo chemo +rad), DM, HTN, psoriasis, Hchol, p/w complaint of RLE pain since R iliac venoplasty done by Dr. Mcallister on 11/21 to treat RLE DVT. 82F with PMH cervical CA (tx last yr with 6mo chemo +rad), DM, HTN, psoriasis, Hchol, p/w complaint of RLE pain since R iliac venoplasty done by Dr. Mcallister on 11/21 to treat RLE DVT. Patient states that since the procedure, she has had pain in the right thigh despite increasing percocet from q8 to q8, as recommended by Dr. Mcallister when patient saw him for follow-up yesterday. Pt had a venous duplex study done yesterday which was negative for DVT. Pain is not caused by any specific trigger, and patient states nothing relieves it. Pt denies trauma, SOB, CP, palpitations, abdominal pain, N/V/D.

## 2022-11-30 NOTE — CONSULT NOTE ADULT - ASSESSMENT
ASSESSMENT:  82yF w/ PMHx of  HTN, DM, RA on methotrexate, cervical cancer s/p chemotherapy and radiation (last round 1/2022), on eliquis, s/p right venogram, right iliac venoplasty, venous stent placement, mechanical thrombectomy who presented with RLE pain on anterior thigh to pelvis. Physical exam findings, imaging, and labs as documented above.      Venous duplex 11/22/22 reveals no DVT. Pulses 2+. Compartments soft. Pain most likely due to oncological diagnosis of cervical cancer.    PLAN:  - Patient will need to follow up with Oncologist for pain management   - No acute vascular surgery intervention  - Continue eliquis   - Follow up outpatient   - Dispo per ED      Above plan discussed with Attending Surgeon Dr. Mcallister, patient, patient family, and Primary team  11-30-22 @ 14:06

## 2022-12-01 RX ORDER — APIXABAN 2.5 MG/1
1 TABLET, FILM COATED ORAL
Qty: 180 | Refills: 0
Start: 2022-12-01 | End: 2023-02-28

## 2022-12-07 ENCOUNTER — RESULT REVIEW (OUTPATIENT)
Age: 82
End: 2022-12-07

## 2022-12-07 PROCEDURE — 78815 PET IMAGE W/CT SKULL-THIGH: CPT | Mod: 26,PS,MH

## 2022-12-12 ENCOUNTER — APPOINTMENT (OUTPATIENT)
Dept: HEMATOLOGY ONCOLOGY | Facility: CLINIC | Age: 82
End: 2022-12-12

## 2022-12-12 VITALS
WEIGHT: 170 LBS | BODY MASS INDEX: 30.12 KG/M2 | RESPIRATION RATE: 14 BRPM | TEMPERATURE: 97.6 F | OXYGEN SATURATION: 99 % | HEART RATE: 97 BPM | HEIGHT: 63 IN | DIASTOLIC BLOOD PRESSURE: 70 MMHG | SYSTOLIC BLOOD PRESSURE: 163 MMHG

## 2022-12-12 VITALS
TEMPERATURE: 98.5 F | HEIGHT: 63 IN | DIASTOLIC BLOOD PRESSURE: 67 MMHG | WEIGHT: 168 LBS | RESPIRATION RATE: 14 BRPM | BODY MASS INDEX: 29.77 KG/M2 | HEART RATE: 73 BPM | SYSTOLIC BLOOD PRESSURE: 128 MMHG

## 2022-12-12 DIAGNOSIS — M79.661 PAIN IN RIGHT LOWER LEG: ICD-10-CM

## 2022-12-12 DIAGNOSIS — E87.5 HYPERKALEMIA: ICD-10-CM

## 2022-12-12 DIAGNOSIS — M79.89 PAIN IN RIGHT LOWER LEG: ICD-10-CM

## 2022-12-12 LAB — GLUCOSE BLDC GLUCOMTR-MCNC: 112 MG/DL — HIGH (ref 70–99)

## 2022-12-12 PROCEDURE — 99215 OFFICE O/P EST HI 40 MIN: CPT

## 2022-12-12 NOTE — PROGRESS NOTE ADULT - PROBLEM SELECTOR PLAN 4
Would she be able to provide UA STAT today? Would need testing to confirm what her current status is before starting another course of antibiotic treatment.    Prednisone / Methotrexate  Stable

## 2022-12-13 PROBLEM — M79.661 PAIN AND SWELLING OF RIGHT LOWER LEG: Status: ACTIVE | Noted: 2022-11-29

## 2022-12-13 LAB
ALBUMIN SERPL ELPH-MCNC: 4.4 G/DL
ALP BLD-CCNC: 83 U/L
ALT SERPL-CCNC: 12 U/L
ANION GAP SERPL CALC-SCNC: 11 MMOL/L
AST SERPL-CCNC: 12 U/L
BASOPHILS # BLD AUTO: 0.03 K/UL
BASOPHILS NFR BLD AUTO: 0.5 %
BILIRUB SERPL-MCNC: 0.3 MG/DL
BUN SERPL-MCNC: 17 MG/DL
CALCIUM SERPL-MCNC: 10.4 MG/DL
CHLORIDE SERPL-SCNC: 98 MMOL/L
CO2 SERPL-SCNC: 28 MMOL/L
CREAT SERPL-MCNC: 0.9 MG/DL
EGFR: 63 ML/MIN/1.73M2
EOSINOPHIL # BLD AUTO: 0.12 K/UL
EOSINOPHIL NFR BLD AUTO: 1.8 %
GLUCOSE SERPL-MCNC: 325 MG/DL
HBV CORE IGG+IGM SER QL: REACTIVE
HBV SURFACE AB SER QL: REACTIVE
HBV SURFACE AG SER QL: NONREACTIVE
HCT VFR BLD CALC: 36.2 %
HCV RNA SERPL NAA+PROBE-LOG IU: NOT DETECTED LOGIU/ML
HEPC RNA INTERP: NOT DETECTED IU/ML
HGB BLD-MCNC: 10.7 G/DL
IMM GRANULOCYTES NFR BLD AUTO: 0.3 %
LYMPHOCYTES # BLD AUTO: 0.52 K/UL
LYMPHOCYTES NFR BLD AUTO: 8 %
MAN DIFF?: NORMAL
MCHC RBC-ENTMCNC: 23.8 PG
MCHC RBC-ENTMCNC: 29.6 G/DL
MCV RBC AUTO: 80.4 FL
MONOCYTES # BLD AUTO: 0.57 K/UL
MONOCYTES NFR BLD AUTO: 8.8 %
NEUTROPHILS # BLD AUTO: 5.25 K/UL
NEUTROPHILS NFR BLD AUTO: 80.6 %
PLATELET # BLD AUTO: 262 K/UL
POTASSIUM SERPL-SCNC: 6.1 MMOL/L
PROT SERPL-MCNC: 7.2 G/DL
RBC # BLD: 4.5 M/UL
RBC # FLD: 14.9 %
SODIUM SERPL-SCNC: 137 MMOL/L
TSH SERPL-ACNC: 0.5 UIU/ML
WBC # FLD AUTO: 6.51 K/UL

## 2022-12-14 ENCOUNTER — APPOINTMENT (OUTPATIENT)
Dept: HEMATOLOGY ONCOLOGY | Facility: CLINIC | Age: 82
End: 2022-12-14

## 2022-12-14 LAB
ALBUMIN SERPL ELPH-MCNC: 4.3 G/DL
ALP BLD-CCNC: 82 U/L
ALT SERPL-CCNC: 12 U/L
ANION GAP SERPL CALC-SCNC: 11 MMOL/L
AST SERPL-CCNC: 14 U/L
BILIRUB SERPL-MCNC: 0.3 MG/DL
BUN SERPL-MCNC: 13 MG/DL
CALCIUM SERPL-MCNC: 9.5 MG/DL
CHLORIDE SERPL-SCNC: 98 MMOL/L
CO2 SERPL-SCNC: 27 MMOL/L
CREAT SERPL-MCNC: 1 MG/DL
EGFR: 56 ML/MIN/1.73M2
GLUCOSE SERPL-MCNC: 181 MG/DL
POTASSIUM SERPL-SCNC: 4.7 MMOL/L
PROT SERPL-MCNC: 6.8 G/DL
SODIUM SERPL-SCNC: 136 MMOL/L

## 2022-12-14 NOTE — RESULTS/DATA
[FreeTextEntry1] : CT ABDOMEN AND PELVIS IC\par \par PROCEDURE DATE: 04/19/2022\par \par INTERPRETATION: CLINICAL STATEMENT: Abdominal pain. Hematochezia. Uterine cancer.\par \par TECHNIQUE: Contiguous axial CT images were obtained from the lower chest to the pubic symphysis following administration of 100cc Optiray 320 intravenous contrast. Oral contrast was not administered. Reformatted images in the coronal and sagittal planes were acquired.\par \par COMPARISON CT: 9/27/2018\par \par OTHER STUDIES USED FOR CORRELATION: Pet/CT 3/29/2022\par \par \par FINDINGS:\par \par LOWER CHEST: Bibasilar subsegmental atelectasis. Coronary artery calcifications. Aortic valve calcifications.\par \par HEPATOBILIARY: No focal liver lesion identified. Cholelithiasis.\par \par SPLEEN: Unremarkable.\par \par PANCREAS: Unremarkable.\par \par ADRENAL GLANDS: Unremarkable.\par \par KIDNEYS: No hydronephrosis. Symmetric pattern. Renal enhancement. Bilateral subcentimeter cortical hypodensities too small to characterize\par \par ABDOMINOPELVIC NODES: Stable about 1.5 cm right external iliac node as previously described on prior PET imaging.\par \par PELVIC ORGANS: Unremarkable.\par \par PERITONEUM/MESENTERY/BOWEL: No bowel obstruction. No free fluid or free air. Diverticulosis without evidence of diverticulitis. Normal appendix. Bowel wall thickening of the sigmoid colon and rectum with mild surrounding inflammation may represent colitis..\par \par BONES/SOFT TISSUES: Osteopenia. Degenerative changes along the visualized vertebral column.\par \par OTHER: Marked atherosclerosis. Mild ectasia of the abdominal aorta without vishal aneurysm.\par \par \par IMPRESSION:\par 1. Mild proctocolitis involving the sigmoid colon and rectum.\par 2. No bowel obstruction.\par 3. Cholelithiasis.

## 2022-12-14 NOTE — HISTORY OF PRESENT ILLNESS
[Disease: _____________________] : Disease: [unfilled] [de-identified] : The patient is an 81 year old woman who presented with post-menopausal vaginal bleeding in March 2021. \par Upon further work up, PAP smear on 3/18/21 showed: LSIL/AGC/HPV+. Pelvic US on 3/22/21 showed endometrium to be 4.2mm thick. 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 \par MRI pelvis on 4/3/21 showed signal abnormality in the posterior cervix 2.5cm x 1.6cm. The lesion does not go beyond the limit of the cervix. \par She saw Dr. Echevarria on 4/14/21 and on his exam, she was noted to have a 4-5cm cervical mass involving at least the middle of the vaginal wall, circumferentially. No parametrial involvement. She was not deemed a surgical candidate. \par Definitive chemoradiation was recommended. She is here to discuss radiation. \par PET/CT: Pending\par \par Pt C/O low back pain, for which she takes Tylenol. Vaginal bleeding is not very excessive. No SOB, Chest pain.\par Pt has a stress test next week\par  [de-identified] : Squamous cell [de-identified] : 5/18/21\par Pt is here for follow up.\par She has completed a PET scan.\par has moderate back pain\par \par 6/9/21\par Pt is here for follow up.\par She has started RT last week. She also recd 1 dose of carboplatin last week.\par She did not have any nausea or vomiting. She has been getting diarrhea which she has been managing with imodium. Her bld sugar was also running high.\par No fever, mouth sores.\par \par 6/16/21\par Pt is here for follow up.\par She is s/p 2 doses of Carbo.\par C/O feeling very fatigued. Had diarrhea which is controlled with imodium.\par No N, V.\par has abdominal discomfort.\par \par 6/23/2021\par Pt is here to follow up for b=cervical cancer, accompanied by daughter\par She is on Carbo s/p 2 cycles, tolerating well\par She feels a lot better today\par She denies abdominal pain, nausea or vomiting\par She gets diarrhea but is managed with Imodium\par \par 7/1/2021\par Pt is here to follow up for cervical cancer, accompanied by daughter\par She is on Carbo s/p 3 cycles, tolerating well\par She feels fine today, appetite is good\par She denies abdominal pain, nausea or vomiting, no vaginal bleeding/discharge\par She gets diarrhea, takes Imodium after having 7-8 episodes then gets constipation the following day\par \par 7/7/2021\par Pt is here to follow up for cervical cancer, accompanied by daughter\par She is on weekly Carbo s/p  4 cycles, tolerating well\par She feels fine today, appetite is good\par She denies nausea or vomiting, no vaginal bleeding/discharge\par She c/o of low abdominal cramping and gets diarrhea 4-5x/day, managed with Imodium then gets constipation the following day\par \par 7/26/21\par Pt is here for follow up,\par She is feeling better. Diarrhea has improved. NO vaginal bleeding\par \par 9/8/21\par Pt is here for follow up. Has completed RT on 8/2/21.\par No vag bleeding, no pelvic pain.\par \par 12/21/21\par Pt is here today for follow up visit for cervical cancer. She has completed chemo (7/2021)/Rad (8/2021).\par Pt denies any vaginal bleeding or abd/pelvic pain.\par She had a PET CT scan on 12/21/21 which showed FDG avid right external iliac lymph node, SUV max 10.0 measuring\par 1.0 cm, consistent with biological tumor activity. She saw Dr. Ragland and agreed to have SBRT since she is not surgical candidate.\par Simulation was done.\par She has an appt with Dr. Echevarria 1/12/22.\par \par 4/25/22\par Pt is here for follow up. She had been C/O rectal bleeding over the past week. She went to ER and was treated with ABX. CT scan abdomen and pelvis reviwed ? proctoclitis.\par Symptoms have improved.Pt has appt with GI.\par \par 12/12/22\par Pt is here for follow up. \par She was recently admitted to hospital in November 2022 when she had severe RLE pain. She underwent CT AP in ED showing necrotic R iliac lymph node that increased in size since 4/2022 with asymmetrically dilated R iliac and feeding veins. She was evaluated by vascular team and she was taken for R iliac vein stent placement and now on eliquis. She was also evaluated by gynonc team as well radiation oncology team, unfortunately cannot offer any intervention at this time.\par She is doing much better since she was discharged from hospital, swelling of RLE significant improved. \par She still experiences R groin pain which she takes Percocet. \par Otherwise, she denies fever, chill, SOB, CP, N/V/D.

## 2022-12-14 NOTE — ASSESSMENT
[FreeTextEntry1] : In summary, Uzma Pop is a 82 year old female who initially diagnosed with squamous cell carcinoma of the cervix, FIGO Stage IIIA, s/p chemo/RT.\par \par Pt was treated with chemoRT.\par \par Given her age and possible cardiac issues (inability to handle large volumes of pre and post chemo hydration). We had decided to use Carboplatin AUC 2 as a radiation sensitizer. She is s/p 5 weekly doses of Carboplatin. The patient received 7000 cGy to the Pelvis/cervix from 6/2/2021 through 8/2/2021\par \par Post definitive chemoRt in 12/21/21, she had a FDG avid right external iliac lymph node, SUV max 10.0 measuring\par 1.0 cm, consistent with biological tumor activity. She is s/p SBRT x 5 to that lesion.\par \par Her PET scan on 6/22 shows residual FDG uptake in the Rt external iliac LN although stable, findings d/w pt and daughter.\par \par Unfortunately, pt was admitted to hospital in November 2022 when she had severe RLE pain. She underwent CT AP in ED showing necrotic R iliac lymph node that increased in size since 4/2022 with asymmetrically dilated R iliac and feeding veins. She was evaluated by vascular team and was taken for R iliac vein stent placement and now on eliquis. She was also evaluated by gynonc team as well radiation oncology team, unfortunately cannot offer any intervention at this time.\par \par She underwent PET scan on 12/2022 shows compared to PET/CT 6/28/2022, interval placement of a right iliac venous stent. Increased FDG uptake in the region of the right pelvic sidewall (SUV 13.3), difficult to delineate on CT or emission imaging if this is related to recent stent placement versus right iliac lymph node. No other definite sites of abnormal FDG uptake to suggest biologic tumor activity.\par \par Given pt has locoregional recurrence disease w/ prior hx of RT, the next best regimen will be systemic therapy. \par \par Since pt's cervical cancer has PDL1 of 70-80%, we will offer her carbotaxol + keytruda based on the KEYNOTE-826 trial (pembrolizumab improved median PFS versus placebo (10.4 versus 8.2 months) and OS at 24 months (50% vs 40% in pts w/ PD-L1 CPS of =1). \par \par In the KEYNOTE-826 trial, Bevacizumab was optional (2/3 of pts received) and given pt has possible thrombus in her iliac vein, we will hold off on giving bevacizumab. \par \par We will also modify carboplatin/taxol to weekly dosing due to her age and fraility with carboplatin AUC2 and taxol 80 mg/m2 D1 and D8 every 3 weeks. \par \par PLAN:\par I have prepared the note after I reviewed the previous notes, reviewed the radiological and laboratory studies and have communicated those to the patient\par \par Will start carboplatin AUC2 and taxol 80 mg/m2 D1 and D8 every 3 weeks + Keytruda 200 mg IV D1 every 3 weeks as soon as possible.\par ****Side effect profile of carboplatin were discussed with pt including but not limited to: Decreased serum calcium, decreased serum magnesium, decreased serum potassium, decreased serum sodium, gastrointestinal pain, nausea and vomiting, anemia, leukopenia, neutropenia, thrombocytopenia, increased serum alkaline phosphatase, increased serum aspartate aminotransferase, decreased creatinine clearance, increased blood urea nitrogen, alopecia,  constipation, diarrhea, dysgeusia, stomatitis, hypersensitivity reaction, infection, neurotoxicity, peripheral neuropathy, ototoxicity.\par ****Side effect profile of taxol were discussed with pt including but not limited to: Low blood counts, risk for infection, anemia and/or bleeding, hair loss, arthralgias and myalgias, peripheral neuropathy, nausea and vomiting, diarrhea, mouth sores, hypersensitivity reaction.  \par ****Side effect profile of pembrolizumab were discussed with pt including but not limited to: anemia, fatigue, hyperglycemia, hyponatremia, hypoalbuminemia, itching, cough, nausea, rash, decreased appetite, hypertriglyceridemia, increased liver enzymes, hypocalcemia, constipation, diarrhea, upper respiratory tract infection and an immune-mediated reaction. Lab work will check for elevated liver enzymes and thyroid function. \par \par Given pt had carboplatin previously, we advised pt to take zytrec 10 mg qD 3 days before and 3 days after each cycle of chemo. \par \par # R iliac vein dilation secondary to necrotic R iliac lymph node w/ possible thrombosis s/p stent placement\par - follow up with vascular \par - c/w Eliquis \par \par # Hyperkalemia possibly due to poorly controlled hyperglycemia \par - pt will be seeing her endocrinologist this coming week\par - sent lokelma 10 mg qD x 3 days\par - pt will return for repeat CMP \par \par Labs ordered \par Will notify pt ASAP to start chemo treatment.\par RTC 3 weeks after starting chemo.

## 2022-12-14 NOTE — CONSULT LETTER
[Dear  ___] : Dear  [unfilled], [Consult Letter:] : I had the pleasure of evaluating your patient, [unfilled]. [Please see my note below.] : Please see my note below. [Consult Closing:] : Thank you very much for allowing me to participate in the care of this patient.  If you have any questions, please do not hesitate to contact me. [Sincerely,] : Sincerely, [DrRaya  ___] : Dr. HOLGUIN [FreeTextEntry3] : Marlena Krueger MD\par Professor [employer]\par [facility]\par Attending Physician\par [facility]\par [phone]\par \par

## 2022-12-18 RX ORDER — OXYCODONE AND ACETAMINOPHEN 5; 325 MG/1; MG/1
1 TABLET ORAL
Qty: 12 | Refills: 0
Start: 2022-12-18 | End: 2022-12-20

## 2022-12-20 RX ORDER — OXYCODONE AND ACETAMINOPHEN 10; 325 MG/1; MG/1
10-325 TABLET ORAL
Qty: 84 | Refills: 0 | Status: COMPLETED | COMMUNITY
Start: 2022-11-17 | End: 2023-01-03

## 2022-12-23 ENCOUNTER — APPOINTMENT (OUTPATIENT)
Dept: INFUSION THERAPY | Facility: CLINIC | Age: 82
End: 2022-12-23

## 2022-12-23 VITALS
WEIGHT: 158 LBS | HEIGHT: 61 IN | SYSTOLIC BLOOD PRESSURE: 152 MMHG | BODY MASS INDEX: 29.83 KG/M2 | TEMPERATURE: 97.3 F | HEART RATE: 69 BPM | DIASTOLIC BLOOD PRESSURE: 65 MMHG

## 2022-12-23 LAB
BASOPHILS # BLD AUTO: 0.02 K/UL
BASOPHILS NFR BLD AUTO: 0.3 %
EOSINOPHIL # BLD AUTO: 0.13 K/UL
EOSINOPHIL NFR BLD AUTO: 2.2 %
HCT VFR BLD CALC: 36.4 %
HGB BLD-MCNC: 11 G/DL
IMM GRANULOCYTES NFR BLD AUTO: 0.7 %
LYMPHOCYTES # BLD AUTO: 0.62 K/UL
LYMPHOCYTES NFR BLD AUTO: 10.4 %
MAN DIFF?: NORMAL
MCHC RBC-ENTMCNC: 24.2 PG
MCHC RBC-ENTMCNC: 30.2 G/DL
MCV RBC AUTO: 80 FL
MONOCYTES # BLD AUTO: 0.75 K/UL
MONOCYTES NFR BLD AUTO: 12.6 %
NEUTROPHILS # BLD AUTO: 4.4 K/UL
NEUTROPHILS NFR BLD AUTO: 73.8 %
PLATELET # BLD AUTO: 256 K/UL
RBC # BLD: 4.55 M/UL
RBC # FLD: 15.1 %
WBC # FLD AUTO: 5.96 K/UL

## 2022-12-23 RX ORDER — PEMBROLIZUMAB 25 MG/ML
200 INJECTION, SOLUTION INTRAVENOUS ONCE
Refills: 0 | Status: COMPLETED | OUTPATIENT
Start: 2022-12-23 | End: 2022-12-23

## 2022-12-23 RX ORDER — CARBOPLATIN 50 MG
150 VIAL (EA) INTRAVENOUS ONCE
Refills: 0 | Status: COMPLETED | OUTPATIENT
Start: 2022-12-23 | End: 2022-12-23

## 2022-12-23 RX ORDER — FAMOTIDINE 10 MG/ML
20 INJECTION INTRAVENOUS ONCE
Refills: 0 | Status: COMPLETED | OUTPATIENT
Start: 2022-12-23 | End: 2022-12-23

## 2022-12-23 RX ORDER — PACLITAXEL 6 MG/ML
140 INJECTION, SOLUTION, CONCENTRATE INTRAVENOUS ONCE
Refills: 0 | Status: COMPLETED | OUTPATIENT
Start: 2022-12-23 | End: 2022-12-23

## 2022-12-23 RX ORDER — DEXAMETHASONE 0.5 MG/5ML
10 ELIXIR ORAL ONCE
Refills: 0 | Status: COMPLETED | OUTPATIENT
Start: 2022-12-23 | End: 2022-12-23

## 2022-12-23 RX ORDER — DIPHENHYDRAMINE HCL 50 MG
50 CAPSULE ORAL ONCE
Refills: 0 | Status: COMPLETED | OUTPATIENT
Start: 2022-12-23 | End: 2022-12-23

## 2022-12-23 RX ADMIN — PACLITAXEL 140 MILLIGRAM(S): 6 INJECTION, SOLUTION, CONCENTRATE INTRAVENOUS at 10:35

## 2022-12-23 RX ADMIN — PEMBROLIZUMAB 200 MILLIGRAM(S): 25 INJECTION, SOLUTION INTRAVENOUS at 10:34

## 2022-12-23 RX ADMIN — Medication 150 MILLIGRAM(S): at 10:34

## 2022-12-23 RX ADMIN — Medication 118 MILLIGRAM(S): at 10:03

## 2022-12-23 RX ADMIN — Medication 102 MILLIGRAM(S): at 10:04

## 2022-12-23 RX ADMIN — FAMOTIDINE 104 MILLIGRAM(S): 10 INJECTION INTRAVENOUS at 10:03

## 2022-12-29 LAB
ALBUMIN SERPL ELPH-MCNC: 3.9 G/DL
ALP BLD-CCNC: 78 U/L
ALT SERPL-CCNC: 8 U/L
ANION GAP SERPL CALC-SCNC: 11 MMOL/L
AST SERPL-CCNC: 12 U/L
BILIRUB SERPL-MCNC: 0.3 MG/DL
BUN SERPL-MCNC: 13 MG/DL
CALCIUM SERPL-MCNC: 9.4 MG/DL
CHLORIDE SERPL-SCNC: 97 MMOL/L
CO2 SERPL-SCNC: 27 MMOL/L
CREAT SERPL-MCNC: 0.9 MG/DL
EGFR: 63 ML/MIN/1.73M2
GLUCOSE SERPL-MCNC: 231 MG/DL
POTASSIUM SERPL-SCNC: 4.6 MMOL/L
PROT SERPL-MCNC: 6.2 G/DL
SODIUM SERPL-SCNC: 135 MMOL/L
TSH SERPL-ACNC: 1.09 UIU/ML

## 2022-12-30 ENCOUNTER — APPOINTMENT (OUTPATIENT)
Dept: INFUSION THERAPY | Facility: CLINIC | Age: 82
End: 2022-12-30

## 2022-12-30 LAB
BASOPHILS # BLD AUTO: 0.03 K/UL
BASOPHILS NFR BLD AUTO: 0.5 %
EOSINOPHIL # BLD AUTO: 0.06 K/UL
EOSINOPHIL NFR BLD AUTO: 1 %
HCT VFR BLD CALC: 36 %
HGB BLD-MCNC: 11.1 G/DL
IMM GRANULOCYTES NFR BLD AUTO: 0.5 %
LYMPHOCYTES # BLD AUTO: 0.69 K/UL
LYMPHOCYTES NFR BLD AUTO: 11.3 %
MAGNESIUM SERPL-MCNC: 1.6 MG/DL
MAN DIFF?: NORMAL
MCHC RBC-ENTMCNC: 24.1 PG
MCHC RBC-ENTMCNC: 30.8 G/DL
MCV RBC AUTO: 78.1 FL
MONOCYTES # BLD AUTO: 0.57 K/UL
MONOCYTES NFR BLD AUTO: 9.3 %
NEUTROPHILS # BLD AUTO: 4.75 K/UL
NEUTROPHILS NFR BLD AUTO: 77.4 %
PLATELET # BLD AUTO: 224 K/UL
RBC # BLD: 4.61 M/UL
RBC # FLD: 14.7 %
WBC # FLD AUTO: 6.13 K/UL

## 2022-12-30 RX ORDER — PACLITAXEL 6 MG/ML
140 INJECTION, SOLUTION, CONCENTRATE INTRAVENOUS ONCE
Refills: 0 | Status: COMPLETED | OUTPATIENT
Start: 2022-12-30 | End: 2022-12-30

## 2022-12-30 RX ORDER — DIPHENHYDRAMINE HCL 50 MG
50 CAPSULE ORAL ONCE
Refills: 0 | Status: COMPLETED | OUTPATIENT
Start: 2022-12-30 | End: 2022-12-30

## 2022-12-30 RX ORDER — DEXAMETHASONE 0.5 MG/5ML
10 ELIXIR ORAL ONCE
Refills: 0 | Status: COMPLETED | OUTPATIENT
Start: 2022-12-30 | End: 2022-12-30

## 2022-12-30 RX ORDER — MAGNESIUM SULFATE 500 MG/ML
2 VIAL (ML) INJECTION ONCE
Refills: 0 | Status: COMPLETED | OUTPATIENT
Start: 2022-12-30 | End: 2022-12-30

## 2022-12-30 RX ORDER — CARBOPLATIN 50 MG
150 VIAL (EA) INTRAVENOUS ONCE
Refills: 0 | Status: COMPLETED | OUTPATIENT
Start: 2022-12-30 | End: 2022-12-30

## 2022-12-30 RX ORDER — FAMOTIDINE 10 MG/ML
20 INJECTION INTRAVENOUS ONCE
Refills: 0 | Status: COMPLETED | OUTPATIENT
Start: 2022-12-30 | End: 2022-12-30

## 2022-12-30 RX ORDER — CHOLECALCIFEROL (VITAMIN D3) 25 MCG
50 CAPSULE ORAL
Qty: 40 | Refills: 0 | Status: ACTIVE | COMMUNITY
Start: 2022-12-30 | End: 1900-01-01

## 2022-12-30 RX ADMIN — Medication 118 MILLIGRAM(S): at 09:55

## 2022-12-30 RX ADMIN — FAMOTIDINE 104 MILLIGRAM(S): 10 INJECTION INTRAVENOUS at 09:55

## 2022-12-30 RX ADMIN — Medication 102 MILLIGRAM(S): at 09:55

## 2022-12-30 RX ADMIN — Medication 150 MILLIGRAM(S): at 10:14

## 2022-12-30 RX ADMIN — PACLITAXEL 140 MILLIGRAM(S): 6 INJECTION, SOLUTION, CONCENTRATE INTRAVENOUS at 10:14

## 2022-12-30 RX ADMIN — Medication 50 GRAM(S): at 11:36

## 2023-01-12 ENCOUNTER — APPOINTMENT (OUTPATIENT)
Dept: HEMATOLOGY ONCOLOGY | Facility: CLINIC | Age: 83
End: 2023-01-12
Payer: MEDICARE

## 2023-01-12 ENCOUNTER — LABORATORY RESULT (OUTPATIENT)
Age: 83
End: 2023-01-12

## 2023-01-12 VITALS
SYSTOLIC BLOOD PRESSURE: 176 MMHG | DIASTOLIC BLOOD PRESSURE: 86 MMHG | HEIGHT: 61 IN | WEIGHT: 152 LBS | HEART RATE: 76 BPM | TEMPERATURE: 97.2 F | BODY MASS INDEX: 28.7 KG/M2

## 2023-01-12 LAB
HCT VFR BLD CALC: 33.8 %
HGB BLD-MCNC: 10.1 G/DL
MCHC RBC-ENTMCNC: 23.3 PG
MCHC RBC-ENTMCNC: 29.9 G/DL
MCV RBC AUTO: 77.9 FL
PLATELET # BLD AUTO: 236 K/UL
PMV BLD: 11.3 FL
RBC # BLD: 4.34 M/UL
RBC # FLD: 14.7 %
WBC # FLD AUTO: 4.35 K/UL

## 2023-01-12 PROCEDURE — 99215 OFFICE O/P EST HI 40 MIN: CPT

## 2023-01-13 ENCOUNTER — APPOINTMENT (OUTPATIENT)
Dept: INFUSION THERAPY | Facility: CLINIC | Age: 83
End: 2023-01-13

## 2023-01-13 LAB
ALBUMIN SERPL ELPH-MCNC: 3.8 G/DL
ALP BLD-CCNC: 81 U/L
ALT SERPL-CCNC: 8 U/L
ANION GAP SERPL CALC-SCNC: 10 MMOL/L
AST SERPL-CCNC: 13 U/L
BILIRUB SERPL-MCNC: 0.2 MG/DL
BUN SERPL-MCNC: 17 MG/DL
CALCIUM SERPL-MCNC: 9 MG/DL
CHLORIDE SERPL-SCNC: 102 MMOL/L
CO2 SERPL-SCNC: 27 MMOL/L
CREAT SERPL-MCNC: 0.9 MG/DL
EGFR: 63 ML/MIN/1.73M2
GLUCOSE SERPL-MCNC: 211 MG/DL
POTASSIUM SERPL-SCNC: 5.1 MMOL/L
PROT SERPL-MCNC: 6.2 G/DL
SODIUM SERPL-SCNC: 139 MMOL/L
TSH SERPL-ACNC: 0.79 UIU/ML

## 2023-01-13 RX ORDER — FAMOTIDINE 10 MG/ML
20 INJECTION INTRAVENOUS ONCE
Refills: 0 | Status: COMPLETED | OUTPATIENT
Start: 2023-01-13 | End: 2023-01-13

## 2023-01-13 RX ORDER — PACLITAXEL 6 MG/ML
140 INJECTION, SOLUTION, CONCENTRATE INTRAVENOUS ONCE
Refills: 0 | Status: COMPLETED | OUTPATIENT
Start: 2023-01-13 | End: 2023-01-13

## 2023-01-13 RX ORDER — DEXAMETHASONE 0.5 MG/5ML
10 ELIXIR ORAL ONCE
Refills: 0 | Status: COMPLETED | OUTPATIENT
Start: 2023-01-13 | End: 2023-01-13

## 2023-01-13 RX ORDER — PEMBROLIZUMAB 25 MG/ML
200 INJECTION, SOLUTION INTRAVENOUS ONCE
Refills: 0 | Status: COMPLETED | OUTPATIENT
Start: 2023-01-13 | End: 2023-01-13

## 2023-01-13 RX ORDER — DIPHENHYDRAMINE HCL 50 MG
50 CAPSULE ORAL ONCE
Refills: 0 | Status: COMPLETED | OUTPATIENT
Start: 2023-01-13 | End: 2023-01-13

## 2023-01-13 RX ORDER — CARBOPLATIN 50 MG
150 VIAL (EA) INTRAVENOUS ONCE
Refills: 0 | Status: COMPLETED | OUTPATIENT
Start: 2023-01-13 | End: 2023-01-13

## 2023-01-13 RX ADMIN — Medication 150 MILLIGRAM(S): at 10:13

## 2023-01-13 RX ADMIN — Medication 102 MILLIGRAM(S): at 09:58

## 2023-01-13 RX ADMIN — PEMBROLIZUMAB 200 MILLIGRAM(S): 25 INJECTION, SOLUTION INTRAVENOUS at 10:14

## 2023-01-13 RX ADMIN — PACLITAXEL 140 MILLIGRAM(S): 6 INJECTION, SOLUTION, CONCENTRATE INTRAVENOUS at 10:13

## 2023-01-13 RX ADMIN — FAMOTIDINE 104 MILLIGRAM(S): 10 INJECTION INTRAVENOUS at 09:58

## 2023-01-13 RX ADMIN — Medication 118 MILLIGRAM(S): at 09:58

## 2023-01-13 NOTE — ASSESSMENT
[FreeTextEntry1] : In summary, Uzma Pop is a 82 year old female who initially diagnosed with squamous cell carcinoma of the cervix, FIGO Stage IIIA, s/p chemo/RT.\par \par Pt was treated with chemoRT.\par \par Given her age and possible cardiac issues (inability to handle large volumes of pre and post chemo hydration). We had decided to use Carboplatin AUC 2 as a radiation sensitizer. She is s/p 5 weekly doses of Carboplatin. The patient received 7000 cGy to the Pelvis/cervix from 6/2/2021 through 8/2/2021\par \par Post definitive chemoRt in 12/21/21, she had a FDG avid right external iliac lymph node, SUV max 10.0 measuring\par 1.0 cm, consistent with biological tumor activity. She is s/p SBRT x 5 to that lesion.\par \par Her PET scan on 6/22 shows residual FDG uptake in the Rt external iliac LN although stable, findings d/w pt and daughter.\par \par Unfortunately, pt was admitted to hospital in November 2022 when she had severe RLE pain. She underwent CT AP in ED showing necrotic R iliac lymph node that increased in size since 4/2022 with asymmetrically dilated R iliac and feeding veins. She was evaluated by vascular team and was taken for R iliac vein stent placement and now on eliquis. She was also evaluated by gynonc team as well radiation oncology team, unfortunately cannot offer any intervention at this time.\par \par She underwent PET scan on 12/2022 shows compared to PET/CT 6/28/2022, interval placement of a right iliac venous stent. Increased FDG uptake in the region of the right pelvic sidewall (SUV 13.3), difficult to delineate on CT or emission imaging if this is related to recent stent placement versus right iliac lymph node. No other definite sites of abnormal FDG uptake to suggest biologic tumor activity.\par \par Given pt has locoregional recurrence disease w/ prior hx of RT, the next best regimen will be systemic therapy. \par \par Since pt's cervical cancer has PDL1 of 70-80%, we offered her carbotaxol + keytruda based on the KEYNOTE-826 trial (pembrolizumab improved median PFS versus placebo (10.4 versus 8.2 months) and OS at 24 months (50% vs 40% in pts w/ PD-L1 CPS of =1). \par \par In the KEYNOTE-826 trial, Bevacizumab was optional (2/3 of pts received) and given pt has possible thrombus in her iliac vein, we will hold off on giving bevacizumab. \par \par We will also modify carboplatin/taxol to weekly dosing due to her age and fraility with carboplatin AUC2 and taxol 80 mg/m2 D1 and D8 every 3 weeks. \par \par PLAN:\par I have prepared the note after I reviewed the previous notes, reviewed the radiological and laboratory studies and have communicated those to the patient\par \par Continue carboplatin AUC2 and taxol 80 mg/m2 D1 and D8 every 3 weeks + Keytruda 200 mg IV D1 every 3 weeks Proceed with Cycle #2 (1/13/23)\par ****Side effect profile of carboplatin were discussed with pt including but not limited to: Decreased serum calcium, decreased serum magnesium, decreased serum potassium, decreased serum sodium, gastrointestinal pain, nausea and vomiting, anemia, leukopenia, neutropenia, thrombocytopenia, increased serum alkaline phosphatase, increased serum aspartate aminotransferase, decreased creatinine clearance, increased blood urea nitrogen, alopecia,  constipation, diarrhea, dysgeusia, stomatitis, hypersensitivity reaction, infection, neurotoxicity, peripheral neuropathy, ototoxicity.\par ****Side effect profile of taxol were discussed with pt including but not limited to: Low blood counts, risk for infection, anemia and/or bleeding, hair loss, arthralgias and myalgias, peripheral neuropathy, nausea and vomiting, diarrhea, mouth sores, hypersensitivity reaction.  \par ****Side effect profile of pembrolizumab were discussed with pt including but not limited to: anemia, fatigue, hyperglycemia, hyponatremia, hypoalbuminemia, itching, cough, nausea, rash, decreased appetite, hypertriglyceridemia, increased liver enzymes, hypocalcemia, constipation, diarrhea, upper respiratory tract infection and an immune-mediated reaction. Lab work will check for elevated liver enzymes and thyroid function. \par \par Given pt had carboplatin previously, we advised pt to take zytrec 10 mg qD 3 days before and 3 days after each cycle of chemo. \par \par # R iliac vein dilation secondary to necrotic R iliac lymph node w/ possible thrombosis s/p stent placement\par - follow up with vascular \par - c/w Eliquis \par \par \par Labs ordered \par \par RTC 3 weeks

## 2023-01-13 NOTE — HISTORY OF PRESENT ILLNESS
[Disease: _____________________] : Disease: [unfilled] [de-identified] : The patient is an 81 year old woman who presented with post-menopausal vaginal bleeding in March 2021. \par Upon further work up, PAP smear on 3/18/21 showed: LSIL/AGC/HPV+. Pelvic US on 3/22/21 showed endometrium to be 4.2mm thick. 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 \par MRI pelvis on 4/3/21 showed signal abnormality in the posterior cervix 2.5cm x 1.6cm. The lesion does not go beyond the limit of the cervix. \par She saw Dr. Echevarria on 4/14/21 and on his exam, she was noted to have a 4-5cm cervical mass involving at least the middle of the vaginal wall, circumferentially. No parametrial involvement. She was not deemed a surgical candidate. \par Definitive chemoradiation was recommended. She is here to discuss radiation. \par PET/CT: Pending\par \par Pt C/O low back pain, for which she takes Tylenol. Vaginal bleeding is not very excessive. No SOB, Chest pain.\par Pt has a stress test next week\par  [de-identified] : Squamous cell [de-identified] : 5/18/21\par Pt is here for follow up.\par She has completed a PET scan.\par has moderate back pain\par \par 6/9/21\par Pt is here for follow up.\par She has started RT last week. She also recd 1 dose of carboplatin last week.\par She did not have any nausea or vomiting. She has been getting diarrhea which she has been managing with imodium. Her bld sugar was also running high.\par No fever, mouth sores.\par \par 6/16/21\par Pt is here for follow up.\par She is s/p 2 doses of Carbo.\par C/O feeling very fatigued. Had diarrhea which is controlled with imodium.\par No N, V.\par has abdominal discomfort.\par \par 6/23/2021\par Pt is here to follow up for b=cervical cancer, accompanied by daughter\par She is on Carbo s/p 2 cycles, tolerating well\par She feels a lot better today\par She denies abdominal pain, nausea or vomiting\par She gets diarrhea but is managed with Imodium\par \par 7/1/2021\par Pt is here to follow up for cervical cancer, accompanied by daughter\par She is on Carbo s/p 3 cycles, tolerating well\par She feels fine today, appetite is good\par She denies abdominal pain, nausea or vomiting, no vaginal bleeding/discharge\par She gets diarrhea, takes Imodium after having 7-8 episodes then gets constipation the following day\par \par 7/7/2021\par Pt is here to follow up for cervical cancer, accompanied by daughter\par She is on weekly Carbo s/p  4 cycles, tolerating well\par She feels fine today, appetite is good\par She denies nausea or vomiting, no vaginal bleeding/discharge\par She c/o of low abdominal cramping and gets diarrhea 4-5x/day, managed with Imodium then gets constipation the following day\par \par 7/26/21\par Pt is here for follow up,\par She is feeling better. Diarrhea has improved. NO vaginal bleeding\par \par 9/8/21\par Pt is here for follow up. Has completed RT on 8/2/21.\par No vag bleeding, no pelvic pain.\par \par 12/21/21\par Pt is here today for follow up visit for cervical cancer. She has completed chemo (7/2021)/Rad (8/2021).\par Pt denies any vaginal bleeding or abd/pelvic pain.\par She had a PET CT scan on 12/21/21 which showed FDG avid right external iliac lymph node, SUV max 10.0 measuring\par 1.0 cm, consistent with biological tumor activity. She saw Dr. Ragland and agreed to have SBRT since she is not surgical candidate.\par Simulation was done.\par She has an appt with Dr. Echevarria 1/12/22.\par \par 4/25/22\par Pt is here for follow up. She had been C/O rectal bleeding over the past week. She went to ER and was treated with ABX. CT scan abdomen and pelvis reviwed ? proctoclitis.\par Symptoms have improved.Pt has appt with GI.\par \par 12/12/22\par Pt is here for follow up. \par She was recently admitted to hospital in November 2022 when she had severe RLE pain. She underwent CT AP in ED showing necrotic R iliac lymph node that increased in size since 4/2022 with asymmetrically dilated R iliac and feeding veins. She was evaluated by vascular team and she was taken for R iliac vein stent placement and now on eliquis. She was also evaluated by gynonc team as well radiation oncology team, unfortunately cannot offer any intervention at this time.\par She is doing much better since she was discharged from hospital, swelling of RLE significant improved. \par She still experiences R groin pain which she takes Percocet. \par Otherwise, she denies fever, chill, SOB, CP, N/V/D. \par \par 1/12/2023\par Pt presents to clinic for follow up and cycle#2 of Carbo/taxol & Keytruda accompanied by daughter. \par With her first cycle she had diarrhea & fatigue for two days that improved with Imodium.\par She has lost 20 lbs over last months. Her daughters are taking care of her  diet. \par Her leg swelling has been subsiding well and she is requiring lesser pain meds for her leg pain.

## 2023-01-13 NOTE — CONSULT LETTER
[Dear  ___] : Dear  [unfilled], [Consult Letter:] : I had the pleasure of evaluating your patient, [unfilled]. [Please see my note below.] : Please see my note below. [Consult Closing:] : Thank you very much for allowing me to participate in the care of this patient.  If you have any questions, please do not hesitate to contact me. [Sincerely,] : Sincerely, [DrRaya  ___] : Dr. HOLGUIN [FreeTextEntry3] : Marlena Krueger MD\par Professor [employer]\par [employer]\par Attending Physician\par [facility] [place]\par [phone]\par \par

## 2023-01-13 NOTE — PHYSICAL EXAM
[Restricted in physically strenuous activity but ambulatory and able to carry out work of a light or sedentary nature] : Status 1- Restricted in physically strenuous activity but ambulatory and able to carry out work of a light or sedentary nature, e.g., light house work, office work [Normal] : affect appropriate [de-identified] : RLE swelling resolved

## 2023-01-20 ENCOUNTER — APPOINTMENT (OUTPATIENT)
Dept: INFUSION THERAPY | Facility: CLINIC | Age: 83
End: 2023-01-20

## 2023-01-20 ENCOUNTER — LABORATORY RESULT (OUTPATIENT)
Age: 83
End: 2023-01-20

## 2023-01-20 LAB
HCT VFR BLD CALC: 32.5 %
HGB BLD-MCNC: 9.8 G/DL
MCHC RBC-ENTMCNC: 23 PG
MCHC RBC-ENTMCNC: 30.2 G/DL
MCV RBC AUTO: 76.1 FL
PLATELET # BLD AUTO: 177 K/UL
PMV BLD: 12.2 FL
RBC # BLD: 4.27 M/UL
RBC # FLD: 14.6 %
WBC # FLD AUTO: 4.04 K/UL

## 2023-01-20 RX ORDER — FAMOTIDINE 10 MG/ML
20 INJECTION INTRAVENOUS ONCE
Refills: 0 | Status: COMPLETED | OUTPATIENT
Start: 2023-01-20 | End: 2023-01-20

## 2023-01-20 RX ORDER — PACLITAXEL 6 MG/ML
140 INJECTION, SOLUTION, CONCENTRATE INTRAVENOUS ONCE
Refills: 0 | Status: COMPLETED | OUTPATIENT
Start: 2023-01-20 | End: 2023-01-20

## 2023-01-20 RX ORDER — CARBOPLATIN 50 MG
150 VIAL (EA) INTRAVENOUS ONCE
Refills: 0 | Status: COMPLETED | OUTPATIENT
Start: 2023-01-20 | End: 2023-01-20

## 2023-01-20 RX ORDER — DIPHENHYDRAMINE HCL 50 MG
50 CAPSULE ORAL ONCE
Refills: 0 | Status: COMPLETED | OUTPATIENT
Start: 2023-01-20 | End: 2023-01-20

## 2023-01-20 RX ORDER — DEXAMETHASONE 0.5 MG/5ML
10 ELIXIR ORAL ONCE
Refills: 0 | Status: COMPLETED | OUTPATIENT
Start: 2023-01-20 | End: 2023-01-20

## 2023-01-20 RX ADMIN — Medication 102 MILLIGRAM(S): at 09:26

## 2023-01-20 RX ADMIN — Medication 118 MILLIGRAM(S): at 09:25

## 2023-01-20 RX ADMIN — Medication 150 MILLIGRAM(S): at 10:00

## 2023-01-20 RX ADMIN — FAMOTIDINE 104 MILLIGRAM(S): 10 INJECTION INTRAVENOUS at 09:26

## 2023-01-20 RX ADMIN — PACLITAXEL 140 MILLIGRAM(S): 6 INJECTION, SOLUTION, CONCENTRATE INTRAVENOUS at 10:00

## 2023-01-31 ENCOUNTER — OUTPATIENT (OUTPATIENT)
Dept: OUTPATIENT SERVICES | Facility: HOSPITAL | Age: 83
LOS: 1 days | End: 2023-01-31

## 2023-01-31 ENCOUNTER — LABORATORY RESULT (OUTPATIENT)
Age: 83
End: 2023-01-31

## 2023-01-31 ENCOUNTER — APPOINTMENT (OUTPATIENT)
Dept: HEMATOLOGY ONCOLOGY | Facility: CLINIC | Age: 83
End: 2023-01-31
Payer: MEDICARE

## 2023-01-31 VITALS
HEIGHT: 61 IN | BODY MASS INDEX: 28.32 KG/M2 | DIASTOLIC BLOOD PRESSURE: 65 MMHG | TEMPERATURE: 98.3 F | SYSTOLIC BLOOD PRESSURE: 142 MMHG | HEART RATE: 88 BPM | WEIGHT: 150 LBS

## 2023-01-31 DIAGNOSIS — E87.5 HYPERKALEMIA: ICD-10-CM

## 2023-01-31 DIAGNOSIS — Z51.11 ENCOUNTER FOR ANTINEOPLASTIC CHEMOTHERAPY: ICD-10-CM

## 2023-01-31 DIAGNOSIS — R22.40 LOCALIZED SWELLING, MASS AND LUMP, UNSPECIFIED LOWER LIMB: Chronic | ICD-10-CM

## 2023-01-31 DIAGNOSIS — I10 ESSENTIAL (PRIMARY) HYPERTENSION: ICD-10-CM

## 2023-01-31 DIAGNOSIS — I82.409 ACUTE EMBOLISM AND THROMBOSIS OF UNSPECIFIED DEEP VEINS OF UNSPECIFIED LOWER EXTREMITY: ICD-10-CM

## 2023-01-31 DIAGNOSIS — C53.9 MALIGNANT NEOPLASM OF CERVIX UTERI, UNSPECIFIED: ICD-10-CM

## 2023-01-31 DIAGNOSIS — M79.661 PAIN IN RIGHT LOWER LEG: ICD-10-CM

## 2023-01-31 DIAGNOSIS — D49.89 NEOPLASM OF UNSPECIFIED BEHAVIOR OF OTHER SPECIFIED SITES: ICD-10-CM

## 2023-01-31 DIAGNOSIS — Z92.89 PERSONAL HISTORY OF OTHER MEDICAL TREATMENT: Chronic | ICD-10-CM

## 2023-01-31 LAB
HCT VFR BLD CALC: 29.1 %
HGB BLD-MCNC: 8.5 G/DL
MCHC RBC-ENTMCNC: 22.9 PG
MCHC RBC-ENTMCNC: 29.2 G/DL
MCV RBC AUTO: 78.4 FL
PLATELET # BLD AUTO: 258 K/UL
PMV BLD: 9.9 FL
RBC # BLD: 3.71 M/UL
RBC # FLD: 16.2 %
WBC # FLD AUTO: 1.89 K/UL

## 2023-01-31 PROCEDURE — 99215 OFFICE O/P EST HI 40 MIN: CPT

## 2023-01-31 RX ORDER — OXYCODONE 5 MG/1
5 TABLET ORAL EVERY 6 HOURS
Qty: 120 | Refills: 0 | Status: ACTIVE | COMMUNITY
Start: 2023-01-31 | End: 1900-01-01

## 2023-02-01 LAB
ALBUMIN SERPL ELPH-MCNC: 3.8 G/DL
ALP BLD-CCNC: 90 U/L
ALT SERPL-CCNC: 10 U/L
ANION GAP SERPL CALC-SCNC: 9 MMOL/L
AST SERPL-CCNC: 12 U/L
BILIRUB SERPL-MCNC: 0.3 MG/DL
BUN SERPL-MCNC: 14 MG/DL
CALCIUM SERPL-MCNC: 8.9 MG/DL
CHLORIDE SERPL-SCNC: 98 MMOL/L
CO2 SERPL-SCNC: 27 MMOL/L
CREAT SERPL-MCNC: 0.8 MG/DL
EGFR: 73 ML/MIN/1.73M2
GLUCOSE SERPL-MCNC: 346 MG/DL
POTASSIUM SERPL-SCNC: 5 MMOL/L
PROT SERPL-MCNC: 6.1 G/DL
SODIUM SERPL-SCNC: 134 MMOL/L
TSH SERPL-ACNC: 0.57 UIU/ML

## 2023-02-01 NOTE — CONSULT LETTER
[Dear  ___] : Dear  [unfilled], [Consult Letter:] : I had the pleasure of evaluating your patient, [unfilled]. [Please see my note below.] : Please see my note below. [Consult Closing:] : Thank you very much for allowing me to participate in the care of this patient.  If you have any questions, please do not hesitate to contact me. [Sincerely,] : Sincerely, [DrRaya  ___] : Dr. HOLGUIN [FreeTextEntry3] : Marlena Krueger MD\par Professor [employer]\par [employer]\par Attending Physician\par [facility] [place]\par [phone]\par \par

## 2023-02-01 NOTE — HISTORY OF PRESENT ILLNESS
[Disease: _____________________] : Disease: [unfilled] [de-identified] : The patient is an 81 year old woman who presented with post-menopausal vaginal bleeding in March 2021. \par Upon further work up, PAP smear on 3/18/21 showed: LSIL/AGC/HPV+. Pelvic US on 3/22/21 showed endometrium to be 4.2mm thick. 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 \par MRI pelvis on 4/3/21 showed signal abnormality in the posterior cervix 2.5cm x 1.6cm. The lesion does not go beyond the limit of the cervix. \par She saw Dr. Echevarria on 4/14/21 and on his exam, she was noted to have a 4-5cm cervical mass involving at least the middle of the vaginal wall, circumferentially. No parametrial involvement. She was not deemed a surgical candidate. \par Definitive chemoradiation was recommended. She is here to discuss radiation. \par PET/CT: Pending\par \par Pt C/O low back pain, for which she takes Tylenol. Vaginal bleeding is not very excessive. No SOB, Chest pain.\par Pt has a stress test next week\par  [de-identified] : Squamous cell [de-identified] : 5/18/21\par Pt is here for follow up.\par She has completed a PET scan.\par has moderate back pain\par \par 6/9/21\par Pt is here for follow up.\par She has started RT last week. She also recd 1 dose of carboplatin last week.\par She did not have any nausea or vomiting. She has been getting diarrhea which she has been managing with imodium. Her bld sugar was also running high.\par No fever, mouth sores.\par \par 6/16/21\par Pt is here for follow up.\par She is s/p 2 doses of Carbo.\par C/O feeling very fatigued. Had diarrhea which is controlled with imodium.\par No N, V.\par has abdominal discomfort.\par \par 6/23/2021\par Pt is here to follow up for b=cervical cancer, accompanied by daughter\par She is on Carbo s/p 2 cycles, tolerating well\par She feels a lot better today\par She denies abdominal pain, nausea or vomiting\par She gets diarrhea but is managed with Imodium\par \par 7/1/2021\par Pt is here to follow up for cervical cancer, accompanied by daughter\par She is on Carbo s/p 3 cycles, tolerating well\par She feels fine today, appetite is good\par She denies abdominal pain, nausea or vomiting, no vaginal bleeding/discharge\par She gets diarrhea, takes Imodium after having 7-8 episodes then gets constipation the following day\par \par 7/7/2021\par Pt is here to follow up for cervical cancer, accompanied by daughter\par She is on weekly Carbo s/p  4 cycles, tolerating well\par She feels fine today, appetite is good\par She denies nausea or vomiting, no vaginal bleeding/discharge\par She c/o of low abdominal cramping and gets diarrhea 4-5x/day, managed with Imodium then gets constipation the following day\par \par 7/26/21\par Pt is here for follow up,\par She is feeling better. Diarrhea has improved. NO vaginal bleeding\par \par 9/8/21\par Pt is here for follow up. Has completed RT on 8/2/21.\par No vag bleeding, no pelvic pain.\par \par 12/21/21\par Pt is here today for follow up visit for cervical cancer. She has completed chemo (7/2021)/Rad (8/2021).\par Pt denies any vaginal bleeding or abd/pelvic pain.\par She had a PET CT scan on 12/21/21 which showed FDG avid right external iliac lymph node, SUV max 10.0 measuring\par 1.0 cm, consistent with biological tumor activity. She saw Dr. Ragland and agreed to have SBRT since she is not surgical candidate.\par Simulation was done.\par She has an appt with Dr. Echevarria 1/12/22.\par \par 4/25/22\par Pt is here for follow up. She had been C/O rectal bleeding over the past week. She went to ER and was treated with ABX. CT scan abdomen and pelvis reviwed ? proctoclitis.\par Symptoms have improved.Pt has appt with GI.\par \par 12/12/22\par Pt is here for follow up. \par She was recently admitted to hospital in November 2022 when she had severe RLE pain. She underwent CT AP in ED showing necrotic R iliac lymph node that increased in size since 4/2022 with asymmetrically dilated R iliac and feeding veins. She was evaluated by vascular team and she was taken for R iliac vein stent placement and now on eliquis. She was also evaluated by gynonc team as well radiation oncology team, unfortunately cannot offer any intervention at this time.\par She is doing much better since she was discharged from hospital, swelling of RLE significant improved. \par She still experiences R groin pain which she takes Percocet. \par Otherwise, she denies fever, chill, SOB, CP, N/V/D. \par \par 1/12/2023\par Pt presents to clinic for follow up and cycle#2 of Carbo/taxol & Keytruda accompanied by daughter. \par With her first cycle she had diarrhea & fatigue for two days that improved with Imodium.\par She has lost 20 lbs over last months. Her daughters are taking care of her  diet. \par Her leg swelling has been subsiding well and she is requiring lesser pain meds for her leg pain. \par \par 1/31/23\par Pt is here for follow up.\par C/O pain in her back since a week or so. Leg swelling and pain has resolved.\par C/O significant fatigue

## 2023-02-01 NOTE — ASSESSMENT
[FreeTextEntry1] : In summary, Uzma Pop is a 82 year old female who initially diagnosed with squamous cell carcinoma of the cervix, FIGO Stage IIIA, s/p chemo/RT.\par \par Pt was treated with chemoRT.\par \par Given her age and possible cardiac issues (inability to handle large volumes of pre and post chemo hydration). We had decided to use Carboplatin AUC 2 as a radiation sensitizer. She is s/p 5 weekly doses of Carboplatin. The patient received 7000 cGy to the Pelvis/cervix from 6/2/2021 through 8/2/2021\par \par Post definitive chemoRt in 12/21/21, she had a FDG avid right external iliac lymph node, SUV max 10.0 measuring\par 1.0 cm, consistent with biological tumor activity. She is s/p SBRT x 5 to that lesion.\par \par Her PET scan on 6/22 shows residual FDG uptake in the Rt external iliac LN although stable, findings d/w pt and daughter.\par \par Unfortunately, pt was admitted to hospital in November 2022 when she had severe RLE pain. She underwent CT AP in ED showing necrotic R iliac lymph node that increased in size since 4/2022 with asymmetrically dilated R iliac and feeding veins. She was evaluated by vascular team and was taken for R iliac vein stent placement and now on eliquis. She was also evaluated by gynonc team as well radiation oncology team, unfortunately cannot offer any intervention at this time.\par \par She underwent PET scan on 12/2022 shows compared to PET/CT 6/28/2022, interval placement of a right iliac venous stent. Increased FDG uptake in the region of the right pelvic sidewall (SUV 13.3), difficult to delineate on CT or emission imaging if this is related to recent stent placement versus right iliac lymph node. No other definite sites of abnormal FDG uptake to suggest biologic tumor activity.\par \par Given pt has locoregional recurrence disease w/ prior hx of RT, the next best regimen will be systemic therapy. \par \par Since pt's cervical cancer has PDL1 of 70-80%, we offered her carbotaxol + keytruda based on the KEYNOTE-826 trial (pembrolizumab improved median PFS versus placebo (10.4 versus 8.2 months) and OS at 24 months (50% vs 40% in pts w/ PD-L1 CPS of =1). \par \par In the KEYNOTE-826 trial, Bevacizumab was optional (2/3 of pts received) and given pt has possible thrombus in her iliac vein, we will hold off on giving bevacizumab. \par \par We will also modify carboplatin/taxol to weekly dosing due to her age and fraility with carboplatin AUC2 and taxol 80 mg/m2 D1 and D8 every 3 weeks. \par \par PLAN:\par I have prepared the note after I reviewed the previous notes, reviewed the radiological and laboratory studies and have communicated those to the patient\par \par Continue carboplatin AUC2 and taxol 80 mg/m2 D1 and D8 every 3 weeks + Keytruda 200 mg IV D1 every 3 weeks Proceed with Cycle # 3, however will postpone treatment by 1 week as she has significant fatigue and WBC is borderline. Will add Neulasta on day 8. Her Hgb has dropped, advised possibility of PRBCs if HGB < 8 g/dl\par \par ****Side effect profile of carboplatin were discussed with pt including but not limited to: Decreased serum calcium, decreased serum magnesium, decreased serum potassium, decreased serum sodium, gastrointestinal pain, nausea and vomiting, anemia, leukopenia, neutropenia, thrombocytopenia, increased serum alkaline phosphatase, increased serum aspartate aminotransferase, decreased creatinine clearance, increased blood urea nitrogen, alopecia,  constipation, diarrhea, dysgeusia, stomatitis, hypersensitivity reaction, infection, neurotoxicity, peripheral neuropathy, ototoxicity.\par ****Side effect profile of taxol were discussed with pt including but not limited to: Low blood counts, risk for infection, anemia and/or bleeding, hair loss, arthralgias and myalgias, peripheral neuropathy, nausea and vomiting, diarrhea, mouth sores, hypersensitivity reaction.  \par ****Side effect profile of pembrolizumab were discussed with pt including but not limited to: anemia, fatigue, hyperglycemia, hyponatremia, hypoalbuminemia, itching, cough, nausea, rash, decreased appetite, hypertriglyceridemia, increased liver enzymes, hypocalcemia, constipation, diarrhea, upper respiratory tract infection and an immune-mediated reaction. Lab work will check for elevated liver enzymes and thyroid function. \par \par Given pt had carboplatin previously, we advised pt to take zytrec 10 mg qD 3 days before, on the day of  and 3 days after each cycle of chemo. \par Pt will rpt PET scan after 3rd cycle\par \par # R iliac vein dilation secondary to necrotic R iliac lymph node w/ possible thrombosis s/p stent placement\par - follow up with vascular \par - c/w Eliquis \par \par \par Labs ordered \par \par RTC 3 weeks

## 2023-02-01 NOTE — PHYSICAL EXAM
[Restricted in physically strenuous activity but ambulatory and able to carry out work of a light or sedentary nature] : Status 1- Restricted in physically strenuous activity but ambulatory and able to carry out work of a light or sedentary nature, e.g., light house work, office work [Normal] : affect appropriate [de-identified] : RLE swelling resolved [de-identified] : No spinal tenderness

## 2023-02-03 ENCOUNTER — APPOINTMENT (OUTPATIENT)
Dept: INFUSION THERAPY | Facility: CLINIC | Age: 83
End: 2023-02-03

## 2023-02-05 NOTE — ASU DISCHARGE PLAN (ADULT/PEDIATRIC). - BATHING
Please call patient with the following message    Vitamin D is low. Recommend high dose replacement for 8 weeks (50,000 IU once a week). Should stop at home vitamin D if taking one. Then recheck lab between 8-10 weeks. With that lab, I will be able to advise on maintenance dosing.     Rest of labs in normal range.      Jose Francisco Pierre PA-C  
keep neck clean and dry. Okay to shower or sponge bath. Do not soak wound.

## 2023-02-08 ENCOUNTER — LABORATORY RESULT (OUTPATIENT)
Age: 83
End: 2023-02-08

## 2023-02-08 ENCOUNTER — OUTPATIENT (OUTPATIENT)
Dept: OUTPATIENT SERVICES | Facility: HOSPITAL | Age: 83
LOS: 1 days | End: 2023-02-08
Payer: MEDICARE

## 2023-02-08 ENCOUNTER — APPOINTMENT (OUTPATIENT)
Dept: INFUSION THERAPY | Facility: CLINIC | Age: 83
End: 2023-02-08

## 2023-02-08 DIAGNOSIS — Z92.89 PERSONAL HISTORY OF OTHER MEDICAL TREATMENT: Chronic | ICD-10-CM

## 2023-02-08 DIAGNOSIS — I82.409 ACUTE EMBOLISM AND THROMBOSIS OF UNSPECIFIED DEEP VEINS OF UNSPECIFIED LOWER EXTREMITY: ICD-10-CM

## 2023-02-08 DIAGNOSIS — R22.40 LOCALIZED SWELLING, MASS AND LUMP, UNSPECIFIED LOWER LIMB: Chronic | ICD-10-CM

## 2023-02-08 LAB
HCT VFR BLD CALC: 33.5 %
HGB BLD-MCNC: 10 G/DL
MCHC RBC-ENTMCNC: 23.5 PG
MCHC RBC-ENTMCNC: 29.9 G/DL
MCV RBC AUTO: 78.8 FL
PLATELET # BLD AUTO: 183 K/UL
PMV BLD: 11.7 FL
RBC # BLD: 4.25 M/UL
RBC # FLD: 17.2 %
WBC # FLD AUTO: 4.6 K/UL

## 2023-02-08 PROCEDURE — 96367 TX/PROPH/DG ADDL SEQ IV INF: CPT

## 2023-02-08 PROCEDURE — 96365 THER/PROPH/DIAG IV INF INIT: CPT | Mod: 59

## 2023-02-08 PROCEDURE — 96375 TX/PRO/DX INJ NEW DRUG ADDON: CPT

## 2023-02-08 PROCEDURE — 96417 CHEMO IV INFUS EACH ADDL SEQ: CPT

## 2023-02-08 PROCEDURE — 36415 COLL VENOUS BLD VENIPUNCTURE: CPT

## 2023-02-08 PROCEDURE — 80053 COMPREHEN METABOLIC PANEL: CPT

## 2023-02-08 PROCEDURE — 84443 ASSAY THYROID STIM HORMONE: CPT

## 2023-02-08 PROCEDURE — 85027 COMPLETE CBC AUTOMATED: CPT

## 2023-02-08 PROCEDURE — 96413 CHEMO IV INFUSION 1 HR: CPT

## 2023-02-08 RX ORDER — PACLITAXEL 6 MG/ML
140 INJECTION, SOLUTION, CONCENTRATE INTRAVENOUS ONCE
Refills: 0 | Status: COMPLETED | OUTPATIENT
Start: 2023-02-08 | End: 2023-02-08

## 2023-02-08 RX ORDER — PEMBROLIZUMAB 25 MG/ML
200 INJECTION, SOLUTION INTRAVENOUS ONCE
Refills: 0 | Status: COMPLETED | OUTPATIENT
Start: 2023-02-08 | End: 2023-02-08

## 2023-02-08 RX ORDER — DIPHENHYDRAMINE HCL 50 MG
25 CAPSULE ORAL ONCE
Refills: 0 | Status: COMPLETED | OUTPATIENT
Start: 2023-02-08 | End: 2023-02-08

## 2023-02-08 RX ORDER — FAMOTIDINE 10 MG/ML
20 INJECTION INTRAVENOUS ONCE
Refills: 0 | Status: COMPLETED | OUTPATIENT
Start: 2023-02-08 | End: 2023-02-08

## 2023-02-08 RX ORDER — CARBOPLATIN 50 MG
150 VIAL (EA) INTRAVENOUS ONCE
Refills: 0 | Status: COMPLETED | OUTPATIENT
Start: 2023-02-08 | End: 2023-02-08

## 2023-02-08 RX ORDER — DEXAMETHASONE 0.5 MG/5ML
10 ELIXIR ORAL ONCE
Refills: 0 | Status: COMPLETED | OUTPATIENT
Start: 2023-02-08 | End: 2023-02-08

## 2023-02-08 RX ADMIN — Medication 150 MILLIGRAM(S): at 15:45

## 2023-02-08 RX ADMIN — Medication 101 MILLIGRAM(S): at 14:55

## 2023-02-08 RX ADMIN — PEMBROLIZUMAB 200 MILLIGRAM(S): 25 INJECTION, SOLUTION INTRAVENOUS at 15:46

## 2023-02-08 RX ADMIN — Medication 118 MILLIGRAM(S): at 14:55

## 2023-02-08 RX ADMIN — FAMOTIDINE 104 MILLIGRAM(S): 10 INJECTION INTRAVENOUS at 14:55

## 2023-02-08 RX ADMIN — PACLITAXEL 140 MILLIGRAM(S): 6 INJECTION, SOLUTION, CONCENTRATE INTRAVENOUS at 15:45

## 2023-02-09 DIAGNOSIS — I82.409 ACUTE EMBOLISM AND THROMBOSIS OF UNSPECIFIED DEEP VEINS OF UNSPECIFIED LOWER EXTREMITY: ICD-10-CM

## 2023-02-09 LAB
ALBUMIN SERPL ELPH-MCNC: 3.8 G/DL
ALP BLD-CCNC: 87 U/L
ALT SERPL-CCNC: 10 U/L
ANION GAP SERPL CALC-SCNC: 11 MMOL/L
AST SERPL-CCNC: 14 U/L
BILIRUB SERPL-MCNC: 0.3 MG/DL
BUN SERPL-MCNC: 25 MG/DL
CALCIUM SERPL-MCNC: 9.7 MG/DL
CHLORIDE SERPL-SCNC: 97 MMOL/L
CO2 SERPL-SCNC: 28 MMOL/L
CREAT SERPL-MCNC: 1 MG/DL
EGFR: 56 ML/MIN/1.73M2
GLUCOSE SERPL-MCNC: 184 MG/DL
POTASSIUM SERPL-SCNC: 4.3 MMOL/L
PROT SERPL-MCNC: 6.7 G/DL
SODIUM SERPL-SCNC: 136 MMOL/L
TSH SERPL-ACNC: 3.56 UIU/ML

## 2023-02-17 ENCOUNTER — OUTPATIENT (OUTPATIENT)
Dept: OUTPATIENT SERVICES | Facility: HOSPITAL | Age: 83
LOS: 1 days | End: 2023-02-17
Payer: MEDICARE

## 2023-02-17 ENCOUNTER — LABORATORY RESULT (OUTPATIENT)
Age: 83
End: 2023-02-17

## 2023-02-17 ENCOUNTER — APPOINTMENT (OUTPATIENT)
Dept: INFUSION THERAPY | Facility: CLINIC | Age: 83
End: 2023-02-17

## 2023-02-17 DIAGNOSIS — I82.409 ACUTE EMBOLISM AND THROMBOSIS OF UNSPECIFIED DEEP VEINS OF UNSPECIFIED LOWER EXTREMITY: ICD-10-CM

## 2023-02-17 DIAGNOSIS — Z92.89 PERSONAL HISTORY OF OTHER MEDICAL TREATMENT: Chronic | ICD-10-CM

## 2023-02-17 DIAGNOSIS — R22.40 LOCALIZED SWELLING, MASS AND LUMP, UNSPECIFIED LOWER LIMB: Chronic | ICD-10-CM

## 2023-02-17 LAB
HCT VFR BLD CALC: 28.8 %
HGB BLD-MCNC: 8.6 G/DL
MCHC RBC-ENTMCNC: 23.5 PG
MCHC RBC-ENTMCNC: 29.9 G/DL
MCV RBC AUTO: 78.7 FL
PLATELET # BLD AUTO: 187 K/UL
PMV BLD: 11.9 FL
RBC # BLD: 3.66 M/UL
RBC # FLD: 16.8 %
WBC # FLD AUTO: 4.22 K/UL

## 2023-02-17 PROCEDURE — 96365 THER/PROPH/DIAG IV INF INIT: CPT | Mod: 59

## 2023-02-17 PROCEDURE — 96367 TX/PROPH/DG ADDL SEQ IV INF: CPT

## 2023-02-17 PROCEDURE — 96375 TX/PRO/DX INJ NEW DRUG ADDON: CPT

## 2023-02-17 PROCEDURE — 96377 APPLICATON ON-BODY INJECTOR: CPT

## 2023-02-17 PROCEDURE — 85027 COMPLETE CBC AUTOMATED: CPT

## 2023-02-17 PROCEDURE — 96417 CHEMO IV INFUS EACH ADDL SEQ: CPT

## 2023-02-17 PROCEDURE — 96372 THER/PROPH/DIAG INJ SC/IM: CPT

## 2023-02-17 PROCEDURE — 96413 CHEMO IV INFUSION 1 HR: CPT | Mod: 59

## 2023-02-17 RX ORDER — PEGFILGRASTIM-CBQV 6 MG/.6ML
6 INJECTION, SOLUTION SUBCUTANEOUS ONCE
Refills: 0 | Status: COMPLETED | OUTPATIENT
Start: 2023-02-17 | End: 2023-02-17

## 2023-02-17 RX ORDER — PACLITAXEL 6 MG/ML
140 INJECTION, SOLUTION, CONCENTRATE INTRAVENOUS ONCE
Refills: 0 | Status: COMPLETED | OUTPATIENT
Start: 2023-02-17 | End: 2023-02-17

## 2023-02-17 RX ORDER — LOPERAMIDE HCL 2 MG
2 TABLET ORAL ONCE
Refills: 0 | Status: COMPLETED | OUTPATIENT
Start: 2023-02-17 | End: 2023-02-17

## 2023-02-17 RX ORDER — CARBOPLATIN 50 MG
150 VIAL (EA) INTRAVENOUS ONCE
Refills: 0 | Status: COMPLETED | OUTPATIENT
Start: 2023-02-17 | End: 2023-02-17

## 2023-02-17 RX ORDER — DEXAMETHASONE 0.5 MG/5ML
10 ELIXIR ORAL ONCE
Refills: 0 | Status: COMPLETED | OUTPATIENT
Start: 2023-02-17 | End: 2023-02-17

## 2023-02-17 RX ORDER — FAMOTIDINE 10 MG/ML
20 INJECTION INTRAVENOUS ONCE
Refills: 0 | Status: COMPLETED | OUTPATIENT
Start: 2023-02-17 | End: 2023-02-17

## 2023-02-17 RX ORDER — DIPHENHYDRAMINE HCL 50 MG
25 CAPSULE ORAL ONCE
Refills: 0 | Status: COMPLETED | OUTPATIENT
Start: 2023-02-17 | End: 2023-02-17

## 2023-02-17 RX ADMIN — FAMOTIDINE 104 MILLIGRAM(S): 10 INJECTION INTRAVENOUS at 13:18

## 2023-02-17 RX ADMIN — PEGFILGRASTIM-CBQV 6 MILLIGRAM(S): 6 INJECTION, SOLUTION SUBCUTANEOUS at 13:18

## 2023-02-17 RX ADMIN — Medication 118 MILLIGRAM(S): at 13:18

## 2023-02-17 RX ADMIN — Medication 150 MILLIGRAM(S): at 13:56

## 2023-02-17 RX ADMIN — PACLITAXEL 140 MILLIGRAM(S): 6 INJECTION, SOLUTION, CONCENTRATE INTRAVENOUS at 13:57

## 2023-02-17 RX ADMIN — Medication 2 MILLIGRAM(S): at 16:06

## 2023-02-17 RX ADMIN — Medication 101 MILLIGRAM(S): at 13:18

## 2023-02-21 ENCOUNTER — APPOINTMENT (OUTPATIENT)
Dept: VASCULAR SURGERY | Facility: CLINIC | Age: 83
End: 2023-02-21
Payer: MEDICARE

## 2023-02-21 DIAGNOSIS — M79.89 OTHER SPECIFIED SOFT TISSUE DISORDERS: ICD-10-CM

## 2023-02-21 PROCEDURE — 93971 EXTREMITY STUDY: CPT | Mod: RT

## 2023-02-21 PROCEDURE — 99212 OFFICE O/P EST SF 10 MIN: CPT

## 2023-02-21 NOTE — ASSESSMENT
[FreeTextEntry1] : 83 y/o female with RLE DVT and underwent right iliac vein thrombectomy and iliac vein stent on 11/21/22, had enlarged right inguinal lymph nodes, now on Eliquis. She is currently on chemotherapy for recurrence of cervical cancer.\par \par Venous duplex showed patent right iliac vein stent and no evidence of DVT in the right lower extremity.\par \par I recommend she stay on Eliquis for now due active cancer. I will see her back in six months time.\par

## 2023-02-21 NOTE — HISTORY OF PRESENT ILLNESS
[FreeTextEntry1] : 83 y/o female with h/o cervical cancer, and underwent chemo and radiation treatment one year ago, presented with right thigh and leg swelling, found to have DVT and underwent right iliac vein thrombectomy and iliac vein stent on 11/21/22, had enlarged right inguinal lymph nodes, now on Eliquis, denies any new leg swelling or pain.\par \par She has recurrence of cancer and is on chemotherapy currently, c/o extreme fatigue since starting chemotherapy.

## 2023-02-23 ENCOUNTER — OUTPATIENT (OUTPATIENT)
Dept: OUTPATIENT SERVICES | Facility: HOSPITAL | Age: 83
LOS: 1 days | End: 2023-02-23
Payer: MEDICARE

## 2023-02-23 DIAGNOSIS — Z92.89 PERSONAL HISTORY OF OTHER MEDICAL TREATMENT: Chronic | ICD-10-CM

## 2023-02-23 DIAGNOSIS — Z00.8 ENCOUNTER FOR OTHER GENERAL EXAMINATION: ICD-10-CM

## 2023-02-23 DIAGNOSIS — R22.40 LOCALIZED SWELLING, MASS AND LUMP, UNSPECIFIED LOWER LIMB: Chronic | ICD-10-CM

## 2023-02-23 DIAGNOSIS — C53.9 MALIGNANT NEOPLASM OF CERVIX UTERI, UNSPECIFIED: ICD-10-CM

## 2023-02-23 LAB — GLUCOSE BLDC GLUCOMTR-MCNC: 191 MG/DL — HIGH (ref 70–99)

## 2023-02-23 PROCEDURE — 82962 GLUCOSE BLOOD TEST: CPT

## 2023-02-24 ENCOUNTER — APPOINTMENT (OUTPATIENT)
Dept: HEMATOLOGY ONCOLOGY | Facility: CLINIC | Age: 83
End: 2023-02-24

## 2023-02-24 DIAGNOSIS — C53.9 MALIGNANT NEOPLASM OF CERVIX UTERI, UNSPECIFIED: ICD-10-CM

## 2023-03-02 ENCOUNTER — OUTPATIENT (OUTPATIENT)
Dept: OUTPATIENT SERVICES | Facility: HOSPITAL | Age: 83
LOS: 1 days | End: 2023-03-02
Payer: MEDICARE

## 2023-03-02 ENCOUNTER — LABORATORY RESULT (OUTPATIENT)
Age: 83
End: 2023-03-02

## 2023-03-02 ENCOUNTER — APPOINTMENT (OUTPATIENT)
Dept: HEMATOLOGY ONCOLOGY | Facility: CLINIC | Age: 83
End: 2023-03-02
Payer: MEDICARE

## 2023-03-02 VITALS
OXYGEN SATURATION: 97 % | DIASTOLIC BLOOD PRESSURE: 48 MMHG | BODY MASS INDEX: 27.56 KG/M2 | TEMPERATURE: 98.1 F | WEIGHT: 146 LBS | SYSTOLIC BLOOD PRESSURE: 109 MMHG | HEART RATE: 106 BPM | HEIGHT: 61 IN

## 2023-03-02 DIAGNOSIS — I82.409 ACUTE EMBOLISM AND THROMBOSIS OF UNSPECIFIED DEEP VEINS OF UNSPECIFIED LOWER EXTREMITY: ICD-10-CM

## 2023-03-02 DIAGNOSIS — R22.40 LOCALIZED SWELLING, MASS AND LUMP, UNSPECIFIED LOWER LIMB: Chronic | ICD-10-CM

## 2023-03-02 DIAGNOSIS — Z92.89 PERSONAL HISTORY OF OTHER MEDICAL TREATMENT: Chronic | ICD-10-CM

## 2023-03-02 LAB
HCT VFR BLD CALC: 29.2 %
HGB BLD-MCNC: 8.7 G/DL
MCHC RBC-ENTMCNC: 23.7 PG
MCHC RBC-ENTMCNC: 29.8 G/DL
MCV RBC AUTO: 79.6 FL
PLATELET # BLD AUTO: 172 K/UL
PMV BLD: 11.3 FL
RBC # BLD: 3.67 M/UL
RBC # FLD: 19.5 %
WBC # FLD AUTO: 11.62 K/UL

## 2023-03-02 PROCEDURE — 80053 COMPREHEN METABOLIC PANEL: CPT

## 2023-03-02 PROCEDURE — 99215 OFFICE O/P EST HI 40 MIN: CPT

## 2023-03-02 PROCEDURE — 84443 ASSAY THYROID STIM HORMONE: CPT

## 2023-03-02 PROCEDURE — 36415 COLL VENOUS BLD VENIPUNCTURE: CPT

## 2023-03-02 PROCEDURE — 85027 COMPLETE CBC AUTOMATED: CPT

## 2023-03-02 NOTE — HISTORY OF PRESENT ILLNESS
[Disease: _____________________] : Disease: [unfilled] [de-identified] : The patient is an 81 year old woman who presented with post-menopausal vaginal bleeding in March 2021. \par Upon further work up, PAP smear on 3/18/21 showed: LSIL/AGC/HPV+. Pelvic US on 3/22/21 showed endometrium to be 4.2mm thick. 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 \par MRI pelvis on 4/3/21 showed signal abnormality in the posterior cervix 2.5cm x 1.6cm. The lesion does not go beyond the limit of the cervix. \par She saw Dr. Echevarria on 4/14/21 and on his exam, she was noted to have a 4-5cm cervical mass involving at least the middle of the vaginal wall, circumferentially. No parametrial involvement. She was not deemed a surgical candidate. \par Definitive chemoradiation was recommended. She is here to discuss radiation. \par PET/CT: Pending\par \par Pt C/O low back pain, for which she takes Tylenol. Vaginal bleeding is not very excessive. No SOB, Chest pain.\par Pt has a stress test next week\par  [de-identified] : Squamous cell [de-identified] : 5/18/21\par Pt is here for follow up.\par She has completed a PET scan.\par has moderate back pain\par \par 6/9/21\par Pt is here for follow up.\par She has started RT last week. She also recd 1 dose of carboplatin last week.\par She did not have any nausea or vomiting. She has been getting diarrhea which she has been managing with imodium. Her bld sugar was also running high.\par No fever, mouth sores.\par \par 6/16/21\par Pt is here for follow up.\par She is s/p 2 doses of Carbo.\par C/O feeling very fatigued. Had diarrhea which is controlled with imodium.\par No N, V.\par has abdominal discomfort.\par \par 6/23/2021\par Pt is here to follow up for b=cervical cancer, accompanied by daughter\par She is on Carbo s/p 2 cycles, tolerating well\par She feels a lot better today\par She denies abdominal pain, nausea or vomiting\par She gets diarrhea but is managed with Imodium\par \par 7/1/2021\par Pt is here to follow up for cervical cancer, accompanied by daughter\par She is on Carbo s/p 3 cycles, tolerating well\par She feels fine today, appetite is good\par She denies abdominal pain, nausea or vomiting, no vaginal bleeding/discharge\par She gets diarrhea, takes Imodium after having 7-8 episodes then gets constipation the following day\par \par 7/7/2021\par Pt is here to follow up for cervical cancer, accompanied by daughter\par She is on weekly Carbo s/p  4 cycles, tolerating well\par She feels fine today, appetite is good\par She denies nausea or vomiting, no vaginal bleeding/discharge\par She c/o of low abdominal cramping and gets diarrhea 4-5x/day, managed with Imodium then gets constipation the following day\par \par 7/26/21\par Pt is here for follow up,\par She is feeling better. Diarrhea has improved. NO vaginal bleeding\par \par 9/8/21\par Pt is here for follow up. Has completed RT on 8/2/21.\par No vag bleeding, no pelvic pain.\par \par 12/21/21\par Pt is here today for follow up visit for cervical cancer. She has completed chemo (7/2021)/Rad (8/2021).\par Pt denies any vaginal bleeding or abd/pelvic pain.\par She had a PET CT scan on 12/21/21 which showed FDG avid right external iliac lymph node, SUV max 10.0 measuring\par 1.0 cm, consistent with biological tumor activity. She saw Dr. Ragland and agreed to have SBRT since she is not surgical candidate.\par Simulation was done.\par She has an appt with Dr. Echevarria 1/12/22.\par \par 4/25/22\par Pt is here for follow up. She had been C/O rectal bleeding over the past week. She went to ER and was treated with ABX. CT scan abdomen and pelvis reviwed ? proctoclitis.\par Symptoms have improved.Pt has appt with GI.\par \par 12/12/22\par Pt is here for follow up. \par She was recently admitted to hospital in November 2022 when she had severe RLE pain. She underwent CT AP in ED showing necrotic R iliac lymph node that increased in size since 4/2022 with asymmetrically dilated R iliac and feeding veins. She was evaluated by vascular team and she was taken for R iliac vein stent placement and now on eliquis. She was also evaluated by gynonc team as well radiation oncology team, unfortunately cannot offer any intervention at this time.\par She is doing much better since she was discharged from hospital, swelling of RLE significant improved. \par She still experiences R groin pain which she takes Percocet. \par Otherwise, she denies fever, chill, SOB, CP, N/V/D. \par \par 1/12/2023\par Pt presents to clinic for follow up and cycle#2 of Carbo/taxol & Keytruda accompanied by daughter. \par With her first cycle she had diarrhea & fatigue for two days that improved with Imodium.\par She has lost 20 lbs over last months. Her daughters are taking care of her  diet. \par Her leg swelling has been subsiding well and she is requiring lesser pain meds for her leg pain. \par \par 1/31/23\par Pt is here for follow up.\par C/O pain in her back since a week or so. Leg swelling and pain has resolved.\par C/O significant fatigue\par \par 3/2/23\par Pt is here for follow up.\par Feels well mostly except for fatigue.\par Becomes confused post chemo ? d/t steroids.\par Swelling in LE has resolved. Tolerating AC well. Had one episode of rectal bleed which resolved likely d/t hemorrhoids, pt was constipated

## 2023-03-02 NOTE — PHYSICAL EXAM
[Restricted in physically strenuous activity but ambulatory and able to carry out work of a light or sedentary nature] : Status 1- Restricted in physically strenuous activity but ambulatory and able to carry out work of a light or sedentary nature, e.g., light house work, office work [Normal] : affect appropriate [de-identified] : RLE swelling resolved [de-identified] : No spinal tenderness

## 2023-03-02 NOTE — CONSULT LETTER
[Dear  ___] : Dear  [unfilled], [Consult Letter:] : I had the pleasure of evaluating your patient, [unfilled]. [Please see my note below.] : Please see my note below. [Consult Closing:] : Thank you very much for allowing me to participate in the care of this patient.  If you have any questions, please do not hesitate to contact me. [Sincerely,] : Sincerely, [DrRaya  ___] : Dr. HOLGUIN [FreeTextEntry3] : Marlena Krueger MD\par Professor [employer]\par [employer]\par Attending Physician\par [facility] [place]\par [phone]\par \par

## 2023-03-02 NOTE — ASSESSMENT
[FreeTextEntry1] : In summary, Uzma Pop is a 82 year old female who initially diagnosed with squamous cell carcinoma of the cervix, FIGO Stage IIIA, s/p chemo/RT.\par \par Pt was treated with chemoRT.\par \par Given her age and possible cardiac issues (inability to handle large volumes of pre and post chemo hydration). We had decided to use Carboplatin AUC 2 as a radiation sensitizer. She is s/p 5 weekly doses of Carboplatin. The patient received 7000 cGy to the Pelvis/cervix from 6/2/2021 through 8/2/2021\par \par Post definitive chemoRt in 12/21/21, she had a FDG avid right external iliac lymph node, SUV max 10.0 measuring\par 1.0 cm, consistent with biological tumor activity. She is s/p SBRT x 5 to that lesion.\par \par Her PET scan on 6/22 shows residual FDG uptake in the Rt external iliac LN although stable, findings d/w pt and daughter.\par \par Unfortunately, pt was admitted to hospital in November 2022 when she had severe RLE pain. She underwent CT AP in ED showing necrotic R iliac lymph node that increased in size since 4/2022 with asymmetrically dilated R iliac and feeding veins. She was evaluated by vascular team and was taken for R iliac vein stent placement and now on eliquis. She was also evaluated by gynonc team as well radiation oncology team, unfortunately cannot offer any intervention at this time.\par \par She underwent PET scan on 12/2022 shows compared to PET/CT 6/28/2022, interval placement of a right iliac venous stent. Increased FDG uptake in the region of the right pelvic sidewall (SUV 13.3), difficult to delineate on CT or emission imaging if this is related to recent stent placement versus right iliac lymph node. No other definite sites of abnormal FDG uptake to suggest biologic tumor activity.\par \par Given pt has locoregional recurrence disease w/ prior hx of RT, the next best regimen will be systemic therapy. \par \par Since pt's cervical cancer has PDL1 of 70-80%, we offered her carbotaxol + keytruda based on the KEYNOTE-826 trial (pembrolizumab improved median PFS versus placebo (10.4 versus 8.2 months) and OS at 24 months (50% vs 40% in pts w/ PD-L1 CPS of =1). \par \par In the KEYNOTE-826 trial, Bevacizumab was optional (2/3 of pts received) and given pt has possible thrombus in her iliac vein, we will hold off on giving bevacizumab. \par \par We will also modify carboplatin/taxol to weekly dosing due to her age and fraility with carboplatin AUC2 and taxol 80 mg/m2 D1 and D8 every 3 weeks. \par \par PLAN:\par I have prepared the note after I reviewed the previous notes, reviewed the radiological and laboratory studies and have communicated those to the patient\par \par Continue carboplatin AUC2 and taxol 80 mg/m2 D1 and D8 every 3 weeks + Keytruda 200 mg IV D1 every 3 weeks Proceed with Cycle # 4, however will postpone treatment by 1 week as she has significant fatigue and WBC is borderline. Will add Neulasta on day 8. Her Hgb has dropped, advised possibility of PRBCs if HGB < 8 g/dl\par \par ****Side effect profile of carboplatin were discussed with pt including but not limited to: Decreased serum calcium, decreased serum magnesium, decreased serum potassium, decreased serum sodium, gastrointestinal pain, nausea and vomiting, anemia, leukopenia, neutropenia, thrombocytopenia, increased serum alkaline phosphatase, increased serum aspartate aminotransferase, decreased creatinine clearance, increased blood urea nitrogen, alopecia,  constipation, diarrhea, dysgeusia, stomatitis, hypersensitivity reaction, infection, neurotoxicity, peripheral neuropathy, ototoxicity.\par ****Side effect profile of taxol were discussed with pt including but not limited to: Low blood counts, risk for infection, anemia and/or bleeding, hair loss, arthralgias and myalgias, peripheral neuropathy, nausea and vomiting, diarrhea, mouth sores, hypersensitivity reaction.  \par ****Side effect profile of pembrolizumab were discussed with pt including but not limited to: anemia, fatigue, hyperglycemia, hyponatremia, hypoalbuminemia, itching, cough, nausea, rash, decreased appetite, hypertriglyceridemia, increased liver enzymes, hypocalcemia, constipation, diarrhea, upper respiratory tract infection and an immune-mediated reaction. Lab work will check for elevated liver enzymes and thyroid function. \par \par Given pt had carboplatin previously, we advised pt to take zytrec 10 mg qD 3 days before, on the day of  and 3 days after each cycle of chemo. 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

## 2023-03-03 ENCOUNTER — APPOINTMENT (OUTPATIENT)
Dept: INFUSION THERAPY | Facility: CLINIC | Age: 83
End: 2023-03-03

## 2023-03-03 ENCOUNTER — OUTPATIENT (OUTPATIENT)
Dept: OUTPATIENT SERVICES | Facility: HOSPITAL | Age: 83
LOS: 1 days | End: 2023-03-03
Payer: MEDICARE

## 2023-03-03 DIAGNOSIS — I82.409 ACUTE EMBOLISM AND THROMBOSIS OF UNSPECIFIED DEEP VEINS OF UNSPECIFIED LOWER EXTREMITY: ICD-10-CM

## 2023-03-03 DIAGNOSIS — Z92.89 PERSONAL HISTORY OF OTHER MEDICAL TREATMENT: Chronic | ICD-10-CM

## 2023-03-03 DIAGNOSIS — R22.40 LOCALIZED SWELLING, MASS AND LUMP, UNSPECIFIED LOWER LIMB: Chronic | ICD-10-CM

## 2023-03-03 LAB
ALBUMIN SERPL ELPH-MCNC: 3.7 G/DL
ALP BLD-CCNC: 137 U/L
ALT SERPL-CCNC: 9 U/L
ANION GAP SERPL CALC-SCNC: 12 MMOL/L
AST SERPL-CCNC: 12 U/L
BILIRUB SERPL-MCNC: 0.3 MG/DL
BUN SERPL-MCNC: 17 MG/DL
CALCIUM SERPL-MCNC: 9.2 MG/DL
CHLORIDE SERPL-SCNC: 100 MMOL/L
CO2 SERPL-SCNC: 26 MMOL/L
CREAT SERPL-MCNC: 0.8 MG/DL
EGFR: 73 ML/MIN/1.73M2
GLUCOSE SERPL-MCNC: 196 MG/DL
POTASSIUM SERPL-SCNC: 4.4 MMOL/L
PROT SERPL-MCNC: 6.1 G/DL
SODIUM SERPL-SCNC: 138 MMOL/L
TSH SERPL-ACNC: 1.37 UIU/ML

## 2023-03-03 PROCEDURE — 96367 TX/PROPH/DG ADDL SEQ IV INF: CPT

## 2023-03-03 PROCEDURE — 96365 THER/PROPH/DIAG IV INF INIT: CPT | Mod: 59

## 2023-03-03 PROCEDURE — 96417 CHEMO IV INFUS EACH ADDL SEQ: CPT

## 2023-03-03 PROCEDURE — 96413 CHEMO IV INFUSION 1 HR: CPT | Mod: 59

## 2023-03-03 RX ORDER — DEXAMETHASONE 0.5 MG/5ML
10 ELIXIR ORAL ONCE
Refills: 0 | Status: COMPLETED | OUTPATIENT
Start: 2023-03-03 | End: 2023-03-03

## 2023-03-03 RX ORDER — LOPERAMIDE HCL 2 MG
4 TABLET ORAL ONCE
Refills: 0 | Status: COMPLETED | OUTPATIENT
Start: 2023-03-03 | End: 2023-03-03

## 2023-03-03 RX ORDER — DIPHENHYDRAMINE HCL 50 MG
25 CAPSULE ORAL ONCE
Refills: 0 | Status: COMPLETED | OUTPATIENT
Start: 2023-03-03 | End: 2023-03-03

## 2023-03-03 RX ORDER — PACLITAXEL 6 MG/ML
140 INJECTION, SOLUTION, CONCENTRATE INTRAVENOUS ONCE
Refills: 0 | Status: COMPLETED | OUTPATIENT
Start: 2023-03-03 | End: 2023-03-03

## 2023-03-03 RX ORDER — FAMOTIDINE 10 MG/ML
20 INJECTION INTRAVENOUS ONCE
Refills: 0 | Status: COMPLETED | OUTPATIENT
Start: 2023-03-03 | End: 2023-03-03

## 2023-03-03 RX ORDER — PEMBROLIZUMAB 25 MG/ML
200 INJECTION, SOLUTION INTRAVENOUS ONCE
Refills: 0 | Status: COMPLETED | OUTPATIENT
Start: 2023-03-03 | End: 2023-03-03

## 2023-03-03 RX ORDER — CARBOPLATIN 50 MG
150 VIAL (EA) INTRAVENOUS ONCE
Refills: 0 | Status: COMPLETED | OUTPATIENT
Start: 2023-03-03 | End: 2023-03-03

## 2023-03-03 RX ADMIN — Medication 4 MILLIGRAM(S): at 14:21

## 2023-03-03 RX ADMIN — Medication 101 MILLIGRAM(S): at 10:44

## 2023-03-03 RX ADMIN — Medication 150 MILLIGRAM(S): at 11:43

## 2023-03-03 RX ADMIN — FAMOTIDINE 104 MILLIGRAM(S): 10 INJECTION INTRAVENOUS at 10:43

## 2023-03-03 RX ADMIN — Medication 118 MILLIGRAM(S): at 10:43

## 2023-03-03 RX ADMIN — PEMBROLIZUMAB 200 MILLIGRAM(S): 25 INJECTION, SOLUTION INTRAVENOUS at 11:42

## 2023-03-03 RX ADMIN — PACLITAXEL 140 MILLIGRAM(S): 6 INJECTION, SOLUTION, CONCENTRATE INTRAVENOUS at 11:42

## 2023-03-06 DIAGNOSIS — Z51.11 ENCOUNTER FOR ANTINEOPLASTIC CHEMOTHERAPY: ICD-10-CM

## 2023-03-06 DIAGNOSIS — C53.9 MALIGNANT NEOPLASM OF CERVIX UTERI, UNSPECIFIED: ICD-10-CM

## 2023-03-06 DIAGNOSIS — D49.89 NEOPLASM OF UNSPECIFIED BEHAVIOR OF OTHER SPECIFIED SITES: ICD-10-CM

## 2023-03-09 ENCOUNTER — OUTPATIENT (OUTPATIENT)
Dept: OUTPATIENT SERVICES | Facility: HOSPITAL | Age: 83
LOS: 1 days | End: 2023-03-09
Payer: MEDICARE

## 2023-03-09 ENCOUNTER — RESULT REVIEW (OUTPATIENT)
Age: 83
End: 2023-03-09

## 2023-03-09 DIAGNOSIS — Z92.89 PERSONAL HISTORY OF OTHER MEDICAL TREATMENT: Chronic | ICD-10-CM

## 2023-03-09 DIAGNOSIS — R22.40 LOCALIZED SWELLING, MASS AND LUMP, UNSPECIFIED LOWER LIMB: Chronic | ICD-10-CM

## 2023-03-09 DIAGNOSIS — R93.5 ABNORMAL FINDINGS ON DIAGNOSTIC IMAGING OF OTHER ABDOMINAL REGIONS, INCLUDING RETROPERITONEUM: ICD-10-CM

## 2023-03-09 LAB — GLUCOSE BLDC GLUCOMTR-MCNC: 130 MG/DL — HIGH (ref 70–99)

## 2023-03-09 PROCEDURE — 78815 PET IMAGE W/CT SKULL-THIGH: CPT | Mod: 26,PS,MH

## 2023-03-09 PROCEDURE — 78815 PET IMAGE W/CT SKULL-THIGH: CPT | Mod: PS

## 2023-03-09 PROCEDURE — 82962 GLUCOSE BLOOD TEST: CPT

## 2023-03-09 PROCEDURE — A9552: CPT

## 2023-03-10 ENCOUNTER — OUTPATIENT (OUTPATIENT)
Dept: OUTPATIENT SERVICES | Facility: HOSPITAL | Age: 83
LOS: 1 days | End: 2023-03-10
Payer: MEDICARE

## 2023-03-10 ENCOUNTER — LABORATORY RESULT (OUTPATIENT)
Age: 83
End: 2023-03-10

## 2023-03-10 ENCOUNTER — APPOINTMENT (OUTPATIENT)
Dept: INFUSION THERAPY | Facility: CLINIC | Age: 83
End: 2023-03-10

## 2023-03-10 DIAGNOSIS — R93.5 ABNORMAL FINDINGS ON DIAGNOSTIC IMAGING OF OTHER ABDOMINAL REGIONS, INCLUDING RETROPERITONEUM: ICD-10-CM

## 2023-03-10 DIAGNOSIS — I82.409 ACUTE EMBOLISM AND THROMBOSIS OF UNSPECIFIED DEEP VEINS OF UNSPECIFIED LOWER EXTREMITY: ICD-10-CM

## 2023-03-10 DIAGNOSIS — Z92.89 PERSONAL HISTORY OF OTHER MEDICAL TREATMENT: Chronic | ICD-10-CM

## 2023-03-10 DIAGNOSIS — D49.89 NEOPLASM OF UNSPECIFIED BEHAVIOR OF OTHER SPECIFIED SITES: ICD-10-CM

## 2023-03-10 DIAGNOSIS — Z51.11 ENCOUNTER FOR ANTINEOPLASTIC CHEMOTHERAPY: ICD-10-CM

## 2023-03-10 DIAGNOSIS — R22.40 LOCALIZED SWELLING, MASS AND LUMP, UNSPECIFIED LOWER LIMB: Chronic | ICD-10-CM

## 2023-03-10 DIAGNOSIS — C53.9 MALIGNANT NEOPLASM OF CERVIX UTERI, UNSPECIFIED: ICD-10-CM

## 2023-03-10 PROCEDURE — 96415 CHEMO IV INFUSION ADDL HR: CPT

## 2023-03-10 PROCEDURE — 85027 COMPLETE CBC AUTOMATED: CPT

## 2023-03-10 PROCEDURE — 96372 THER/PROPH/DIAG INJ SC/IM: CPT

## 2023-03-10 PROCEDURE — 96413 CHEMO IV INFUSION 1 HR: CPT

## 2023-03-10 PROCEDURE — 96375 TX/PRO/DX INJ NEW DRUG ADDON: CPT

## 2023-03-10 PROCEDURE — 96367 TX/PROPH/DG ADDL SEQ IV INF: CPT

## 2023-03-10 RX ORDER — DIPHENHYDRAMINE HCL 50 MG
25 CAPSULE ORAL ONCE
Refills: 0 | Status: COMPLETED | OUTPATIENT
Start: 2023-03-10 | End: 2023-03-10

## 2023-03-10 RX ORDER — DEXAMETHASONE 0.5 MG/5ML
10 ELIXIR ORAL ONCE
Refills: 0 | Status: COMPLETED | OUTPATIENT
Start: 2023-03-10 | End: 2023-03-10

## 2023-03-10 RX ORDER — PACLITAXEL 6 MG/ML
140 INJECTION, SOLUTION, CONCENTRATE INTRAVENOUS ONCE
Refills: 0 | Status: COMPLETED | OUTPATIENT
Start: 2023-03-10 | End: 2023-03-10

## 2023-03-10 RX ORDER — PEGFILGRASTIM-CBQV 6 MG/.6ML
6 INJECTION, SOLUTION SUBCUTANEOUS ONCE
Refills: 0 | Status: COMPLETED | OUTPATIENT
Start: 2023-03-10 | End: 2023-03-10

## 2023-03-10 RX ORDER — CARBOPLATIN 50 MG
150 VIAL (EA) INTRAVENOUS ONCE
Refills: 0 | Status: COMPLETED | OUTPATIENT
Start: 2023-03-10 | End: 2023-03-10

## 2023-03-10 RX ORDER — FAMOTIDINE 10 MG/ML
20 INJECTION INTRAVENOUS ONCE
Refills: 0 | Status: COMPLETED | OUTPATIENT
Start: 2023-03-10 | End: 2023-03-10

## 2023-03-10 RX ORDER — LOPERAMIDE HCL 2 MG
2 TABLET ORAL ONCE
Refills: 0 | Status: COMPLETED | OUTPATIENT
Start: 2023-03-10 | End: 2023-03-10

## 2023-03-10 RX ADMIN — Medication 150 MILLIGRAM(S): at 12:45

## 2023-03-10 RX ADMIN — Medication 25 MILLIGRAM(S): at 10:55

## 2023-03-10 RX ADMIN — PACLITAXEL 140 MILLIGRAM(S): 6 INJECTION, SOLUTION, CONCENTRATE INTRAVENOUS at 11:40

## 2023-03-10 RX ADMIN — Medication 150 MILLIGRAM(S): at 13:45

## 2023-03-10 RX ADMIN — Medication 118 MILLIGRAM(S): at 10:12

## 2023-03-10 RX ADMIN — Medication 10 MILLIGRAM(S): at 10:30

## 2023-03-10 RX ADMIN — FAMOTIDINE 104 MILLIGRAM(S): 10 INJECTION INTRAVENOUS at 10:30

## 2023-03-10 RX ADMIN — FAMOTIDINE 20 MILLIGRAM(S): 10 INJECTION INTRAVENOUS at 10:55

## 2023-03-10 RX ADMIN — PACLITAXEL 140 MILLIGRAM(S): 6 INJECTION, SOLUTION, CONCENTRATE INTRAVENOUS at 11:37

## 2023-03-10 RX ADMIN — Medication 101 MILLIGRAM(S): at 10:58

## 2023-03-10 RX ADMIN — Medication 2 MILLIGRAM(S): at 12:04

## 2023-03-10 RX ADMIN — PEGFILGRASTIM-CBQV 6 MILLIGRAM(S): 6 INJECTION, SOLUTION SUBCUTANEOUS at 10:12

## 2023-03-13 LAB
HCT VFR BLD CALC: 30.2 %
HGB BLD-MCNC: 9.2 G/DL
MCHC RBC-ENTMCNC: 24 PG
MCHC RBC-ENTMCNC: 30.5 G/DL
MCV RBC AUTO: 78.9 FL
PLATELET # BLD AUTO: 147 K/UL
PMV BLD: 11.6 FL
RBC # BLD: 3.83 M/UL
RBC # FLD: 18.5 %
WBC # FLD AUTO: 3.02 K/UL

## 2023-03-16 ENCOUNTER — RX RENEWAL (OUTPATIENT)
Age: 83
End: 2023-03-16

## 2023-03-16 RX ORDER — PROCHLORPERAZINE MALEATE 10 MG/1
10 TABLET ORAL
Qty: 60 | Refills: 1 | Status: ACTIVE | COMMUNITY
Start: 2021-05-18 | End: 1900-01-01

## 2023-03-20 ENCOUNTER — LABORATORY RESULT (OUTPATIENT)
Age: 83
End: 2023-03-20

## 2023-03-20 ENCOUNTER — APPOINTMENT (OUTPATIENT)
Dept: HEMATOLOGY ONCOLOGY | Facility: CLINIC | Age: 83
End: 2023-03-20
Payer: MEDICARE

## 2023-03-20 ENCOUNTER — OUTPATIENT (OUTPATIENT)
Dept: OUTPATIENT SERVICES | Facility: HOSPITAL | Age: 83
LOS: 1 days | End: 2023-03-20
Payer: MEDICARE

## 2023-03-20 VITALS
HEART RATE: 103 BPM | BODY MASS INDEX: 27.94 KG/M2 | HEIGHT: 61 IN | WEIGHT: 148 LBS | DIASTOLIC BLOOD PRESSURE: 72 MMHG | SYSTOLIC BLOOD PRESSURE: 172 MMHG | TEMPERATURE: 98.4 F

## 2023-03-20 DIAGNOSIS — I82.409 ACUTE EMBOLISM AND THROMBOSIS OF UNSPECIFIED DEEP VEINS OF UNSPECIFIED LOWER EXTREMITY: ICD-10-CM

## 2023-03-20 DIAGNOSIS — C53.9 MALIGNANT NEOPLASM OF CERVIX UTERI, UNSPECIFIED: ICD-10-CM

## 2023-03-20 DIAGNOSIS — D49.89 NEOPLASM OF UNSPECIFIED BEHAVIOR OF OTHER SPECIFIED SITES: ICD-10-CM

## 2023-03-20 DIAGNOSIS — Z51.11 ENCOUNTER FOR ANTINEOPLASTIC CHEMOTHERAPY: ICD-10-CM

## 2023-03-20 DIAGNOSIS — Z92.89 PERSONAL HISTORY OF OTHER MEDICAL TREATMENT: Chronic | ICD-10-CM

## 2023-03-20 DIAGNOSIS — R22.40 LOCALIZED SWELLING, MASS AND LUMP, UNSPECIFIED LOWER LIMB: Chronic | ICD-10-CM

## 2023-03-20 LAB
HCT VFR BLD CALC: 24.8 %
HGB BLD-MCNC: 7.2 G/DL
MCHC RBC-ENTMCNC: 24.3 PG
MCHC RBC-ENTMCNC: 29 G/DL
MCV RBC AUTO: 83.8 FL
PLATELET # BLD AUTO: 121 K/UL
PMV BLD: 12.3 FL
RBC # BLD: 2.96 M/UL
RBC # FLD: 22.3 %
WBC # FLD AUTO: 19.72 K/UL

## 2023-03-20 PROCEDURE — 99215 OFFICE O/P EST HI 40 MIN: CPT

## 2023-03-20 PROCEDURE — 80053 COMPREHEN METABOLIC PANEL: CPT

## 2023-03-20 PROCEDURE — 84443 ASSAY THYROID STIM HORMONE: CPT

## 2023-03-20 PROCEDURE — 85027 COMPLETE CBC AUTOMATED: CPT

## 2023-03-21 LAB
ALBUMIN SERPL ELPH-MCNC: 3.7 G/DL
ALP BLD-CCNC: 156 U/L
ALT SERPL-CCNC: 10 U/L
ANION GAP SERPL CALC-SCNC: 9 MMOL/L
AST SERPL-CCNC: 16 U/L
BILIRUB SERPL-MCNC: 0.4 MG/DL
BUN SERPL-MCNC: 15 MG/DL
CALCIUM SERPL-MCNC: 8.9 MG/DL
CHLORIDE SERPL-SCNC: 99 MMOL/L
CO2 SERPL-SCNC: 28 MMOL/L
CREAT SERPL-MCNC: 0.7 MG/DL
EGFR: 86 ML/MIN/1.73M2
GLUCOSE SERPL-MCNC: 267 MG/DL
POTASSIUM SERPL-SCNC: 4.3 MMOL/L
PROT SERPL-MCNC: 6 G/DL
SODIUM SERPL-SCNC: 136 MMOL/L
TSH SERPL-ACNC: 1.06 UIU/ML

## 2023-03-23 DIAGNOSIS — Z51.12 ENCOUNTER FOR ANTINEOPLASTIC IMMUNOTHERAPY: ICD-10-CM

## 2023-03-23 NOTE — REVIEW OF SYSTEMS
[Abdominal Pain] : abdominal pain [Constipation] : constipation [Diarrhea] : diarrhea [Negative] : Allergic/Immunologic [FreeTextEntry2] : fatigue [FreeTextEntry7] : mild abdominal cramping; rectal bleeding with constipatiion [de-identified] : confused post chemo, now resolved.

## 2023-03-23 NOTE — PHYSICAL EXAM
[Restricted in physically strenuous activity but ambulatory and able to carry out work of a light or sedentary nature] : Status 1- Restricted in physically strenuous activity but ambulatory and able to carry out work of a light or sedentary nature, e.g., light house work, office work [Normal] : affect appropriate [de-identified] : RLE swelling resolved [de-identified] : No spinal tenderness

## 2023-03-23 NOTE — CONSULT LETTER
[Dear  ___] : Dear  [unfilled], [Consult Letter:] : I had the pleasure of evaluating your patient, [unfilled]. [Please see my note below.] : Please see my note below. [Consult Closing:] : Thank you very much for allowing me to participate in the care of this patient.  If you have any questions, please do not hesitate to contact me. [Sincerely,] : Sincerely, [DrRaya  ___] : Dr. HOLGUIN [FreeTextEntry3] : Marlena Krueger MD\par Professor [employer]\par [employer]\par Attending Physician\par [facility] [place]\par [phone]\par \par

## 2023-03-23 NOTE — HISTORY OF PRESENT ILLNESS
[Disease: _____________________] : Disease: [unfilled] [de-identified] : The patient is an 81 year old woman who presented with post-menopausal vaginal bleeding in March 2021. \par Upon further work up, PAP smear on 3/18/21 showed: LSIL/AGC/HPV+. Pelvic US on 3/22/21 showed endometrium to be 4.2mm thick. 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 \par MRI pelvis on 4/3/21 showed signal abnormality in the posterior cervix 2.5cm x 1.6cm. The lesion does not go beyond the limit of the cervix. \par She saw Dr. Echevarria on 4/14/21 and on his exam, she was noted to have a 4-5cm cervical mass involving at least the middle of the vaginal wall, circumferentially. No parametrial involvement. She was not deemed a surgical candidate. \par Definitive chemoradiation was recommended. She is here to discuss radiation. \par PET/CT: Pending\par \par Pt C/O low back pain, for which she takes Tylenol. Vaginal bleeding is not very excessive. No SOB, Chest pain.\par Pt has a stress test next week\par  [de-identified] : Squamous cell [de-identified] : 5/18/21\par Pt is here for follow up.\par She has completed a PET scan.\par has moderate back pain\par \par 6/9/21\par Pt is here for follow up.\par She has started RT last week. She also recd 1 dose of carboplatin last week.\par She did not have any nausea or vomiting. She has been getting diarrhea which she has been managing with imodium. Her bld sugar was also running high.\par No fever, mouth sores.\par \par 6/16/21\par Pt is here for follow up.\par She is s/p 2 doses of Carbo.\par C/O feeling very fatigued. Had diarrhea which is controlled with imodium.\par No N, V.\par has abdominal discomfort.\par \par 6/23/2021\par Pt is here to follow up for b=cervical cancer, accompanied by daughter\par She is on Carbo s/p 2 cycles, tolerating well\par She feels a lot better today\par She denies abdominal pain, nausea or vomiting\par She gets diarrhea but is managed with Imodium\par \par 7/1/2021\par Pt is here to follow up for cervical cancer, accompanied by daughter\par She is on Carbo s/p 3 cycles, tolerating well\par She feels fine today, appetite is good\par She denies abdominal pain, nausea or vomiting, no vaginal bleeding/discharge\par She gets diarrhea, takes Imodium after having 7-8 episodes then gets constipation the following day\par \par 7/7/2021\par Pt is here to follow up for cervical cancer, accompanied by daughter\par She is on weekly Carbo s/p  4 cycles, tolerating well\par She feels fine today, appetite is good\par She denies nausea or vomiting, no vaginal bleeding/discharge\par She c/o of low abdominal cramping and gets diarrhea 4-5x/day, managed with Imodium then gets constipation the following day\par \par 7/26/21\par Pt is here for follow up,\par She is feeling better. Diarrhea has improved. NO vaginal bleeding\par \par 9/8/21\par Pt is here for follow up. Has completed RT on 8/2/21.\par No vag bleeding, no pelvic pain.\par \par 12/21/21\par Pt is here today for follow up visit for cervical cancer. She has completed chemo (7/2021)/Rad (8/2021).\par Pt denies any vaginal bleeding or abd/pelvic pain.\par She had a PET CT scan on 12/21/21 which showed FDG avid right external iliac lymph node, SUV max 10.0 measuring\par 1.0 cm, consistent with biological tumor activity. She saw Dr. Ragland and agreed to have SBRT since she is not surgical candidate.\par Simulation was done.\par She has an appt with Dr. Echevarria 1/12/22.\par \par 4/25/22\par Pt is here for follow up. She had been C/O rectal bleeding over the past week. She went to ER and was treated with ABX. CT scan abdomen and pelvis reviwed ? proctoclitis.\par Symptoms have improved.Pt has appt with GI.\par \par 12/12/22\par Pt is here for follow up. \par She was recently admitted to hospital in November 2022 when she had severe RLE pain. She underwent CT AP in ED showing necrotic R iliac lymph node that increased in size since 4/2022 with asymmetrically dilated R iliac and feeding veins. She was evaluated by vascular team and she was taken for R iliac vein stent placement and now on eliquis. She was also evaluated by gynonc team as well radiation oncology team, unfortunately cannot offer any intervention at this time.\par She is doing much better since she was discharged from hospital, swelling of RLE significant improved. \par She still experiences R groin pain which she takes Percocet. \par Otherwise, she denies fever, chill, SOB, CP, N/V/D. \par \par 1/12/2023\par Pt presents to clinic for follow up and cycle#2 of Carbo/taxol & Keytruda accompanied by daughter. \par With her first cycle she had diarrhea & fatigue for two days that improved with Imodium.\par She has lost 20 lbs over last months. Her daughters are taking care of her  diet. \par Her leg swelling has been subsiding well and she is requiring lesser pain meds for her leg pain. \par \par 1/31/23\par Pt is here for follow up.\par C/O pain in her back since a week or so. Leg swelling and pain has resolved.\par C/O significant fatigue\par \par 3/2/23\par Pt is here for follow up.\par Feels well mostly except for fatigue.\par Becomes confused post chemo ? d/t steroids.\par Swelling in LE has resolved. Tolerating AC well. Had one episode of rectal bleed which resolved likely d/t hemorrhoids, pt was constipated\par \par 3/20/23\par Pt is here for follow up.\par Feels well mostly except for fatigue.\par Becomes confused post chemo ? d/t steroids which persisted for about 3 days post chemo. \par Swelling in LE has resolved. Tolerating AC well. Had one episode of rectal bleed which resolved likely d/t hemorrhoids\par She completed PET scan on 3/9/23. \par She is s/p 4 cycles of Carboplatin/Taxol.Keytruda. \par

## 2023-03-23 NOTE — ASSESSMENT
[FreeTextEntry1] : In summary, Uzma Pop is a 82 year old female who initially diagnosed with squamous cell carcinoma of the cervix, FIGO Stage IIIA, s/p chemo/RT.\par \par Pt was treated with chemoRT.\par \par Given her age and possible cardiac issues (inability to handle large volumes of pre and post chemo hydration). We had decided to use Carboplatin AUC 2 as a radiation sensitizer. She is s/p 5 weekly doses of Carboplatin. The patient received 7000 cGy to the Pelvis/cervix from 6/2/2021 through 8/2/2021\par \par Post definitive chemoRt in 12/21/21, she had a FDG avid right external iliac lymph node, SUV max 10.0 measuring\par 1.0 cm, consistent with biological tumor activity. She is s/p SBRT x 5 to that lesion.\par \par Her PET scan on 6/22 shows residual FDG uptake in the Rt external iliac LN although stable, findings d/w pt and daughter.\par \par Unfortunately, pt was admitted to hospital in November 2022 when she had severe RLE pain. She underwent CT AP in ED showing necrotic R iliac lymph node that increased in size since 4/2022 with asymmetrically dilated R iliac and feeding veins. She was evaluated by vascular team and was taken for R iliac vein stent placement and now on eliquis. She was also evaluated by gynonc team as well radiation oncology team, unfortunately cannot offer any intervention at this time.\par \par She underwent PET scan on 12/2022 shows compared to PET/CT 6/28/2022, interval placement of a right iliac venous stent. Increased FDG uptake in the region of the right pelvic sidewall (SUV 13.3), difficult to delineate on CT or emission imaging if this is related to recent stent placement versus right iliac lymph node. No other definite sites of abnormal FDG uptake to suggest biologic tumor activity.\par \par Given pt has locoregional recurrence disease w/ prior hx of RT, the next best regimen will be systemic therapy. \par \par Since pt's cervical cancer has PDL1 of 70-80%, we offered her carbotaxol + keytruda based on the KEYNOTE-826 trial (pembrolizumab improved median PFS versus placebo (10.4 versus 8.2 months) and OS at 24 months (50% vs 40% in pts w/ PD-L1 CPS of =1). \par \par In the KEYNOTE-826 trial, Bevacizumab was optional (2/3 of pts received) and given pt has possible thrombus in her iliac vein, we will hold off on giving bevacizumab. \par \par We will also modify carboplatin/taxol to weekly dosing due to her age and fraility with carboplatin AUC2 and taxol 80 mg/m2 D1 and D8 every 3 weeks. \par \par Given pt had carboplatin previously, we advised pt to take zytrec 10 mg qD 3 days before, on the day of  and 3 days after each cycle of chemo. \par \par PET scan 3/9/23 after 4 cycles of carbo/Taxol and Keytruda shows good treatment response with no new sites of abnormal FDG uptake, decreased FDG uptake in the region of the right pelvic sidewall (SUV up to 3.5 previously 13.3), wall thickening of the descending colon and rectosigmoid with associated increased FDG uptake suggestive of infectious or inflammatory process.\par \par Given good treatment response, and adverse effects from chemotherapy and premedications causing confusion, we will only continue Keytruda and hold carbo/taxol moving forward. \par \par \par PLAN:\par I have prepared the note after I reviewed the previous notes, reviewed the radiological and laboratory studies and have communicated those to the patient\par \par Proceed with cycle 5 of Keytruda 200 mg IV every 3 weeks (due on 3/24/23), Will DC carbo and taxol as she has been having difficulty tolerating chemo\par   \par ****Side effect profile of pembrolizumab were discussed with pt including but not limited to: anemia, fatigue, hyperglycemia, hyponatremia, hypoalbuminemia, itching, cough, nausea, rash, decreased appetite, hypertriglyceridemia, increased liver enzymes, hypocalcemia, constipation, diarrhea, upper respiratory tract infection and an immune-mediated reaction. Lab work will check for elevated liver enzymes and thyroid function. 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

## 2023-03-24 ENCOUNTER — OUTPATIENT (OUTPATIENT)
Dept: OUTPATIENT SERVICES | Facility: HOSPITAL | Age: 83
LOS: 1 days | End: 2023-03-24
Payer: MEDICARE

## 2023-03-24 ENCOUNTER — APPOINTMENT (OUTPATIENT)
Dept: INFUSION THERAPY | Facility: CLINIC | Age: 83
End: 2023-03-24

## 2023-03-24 ENCOUNTER — LABORATORY RESULT (OUTPATIENT)
Age: 83
End: 2023-03-24

## 2023-03-24 DIAGNOSIS — D49.89 NEOPLASM OF UNSPECIFIED BEHAVIOR OF OTHER SPECIFIED SITES: ICD-10-CM

## 2023-03-24 DIAGNOSIS — R22.40 LOCALIZED SWELLING, MASS AND LUMP, UNSPECIFIED LOWER LIMB: Chronic | ICD-10-CM

## 2023-03-24 DIAGNOSIS — I82.409 ACUTE EMBOLISM AND THROMBOSIS OF UNSPECIFIED DEEP VEINS OF UNSPECIFIED LOWER EXTREMITY: ICD-10-CM

## 2023-03-24 DIAGNOSIS — C53.9 MALIGNANT NEOPLASM OF CERVIX UTERI, UNSPECIFIED: ICD-10-CM

## 2023-03-24 DIAGNOSIS — Z51.11 ENCOUNTER FOR ANTINEOPLASTIC CHEMOTHERAPY: ICD-10-CM

## 2023-03-24 DIAGNOSIS — Z92.89 PERSONAL HISTORY OF OTHER MEDICAL TREATMENT: Chronic | ICD-10-CM

## 2023-03-24 DIAGNOSIS — Z51.12 ENCOUNTER FOR ANTINEOPLASTIC IMMUNOTHERAPY: ICD-10-CM

## 2023-03-24 LAB
ALBUMIN SERPL ELPH-MCNC: 4 G/DL
ALP BLD-CCNC: 139 U/L
ALT SERPL-CCNC: 10 U/L
ANION GAP SERPL CALC-SCNC: 11 MMOL/L
AST SERPL-CCNC: 30 U/L
BILIRUB SERPL-MCNC: 0.5 MG/DL
BUN SERPL-MCNC: 14 MG/DL
CALCIUM SERPL-MCNC: 9.2 MG/DL
CHLORIDE SERPL-SCNC: 101 MMOL/L
CO2 SERPL-SCNC: 25 MMOL/L
CREAT SERPL-MCNC: 0.7 MG/DL
EGFR: 85 ML/MIN/1.73M2
GLUCOSE SERPL-MCNC: 171 MG/DL
HCT VFR BLD CALC: 26.4 %
HGB BLD-MCNC: 8 G/DL
MCHC RBC-ENTMCNC: 25.4 PG
MCHC RBC-ENTMCNC: 30.3 G/DL
MCV RBC AUTO: 83.8 FL
PLATELET # BLD AUTO: 160 K/UL
PMV BLD: 11.9 FL
POTASSIUM SERPL-SCNC: 4.6 MMOL/L
PROT SERPL-MCNC: 6.7 G/DL
RBC # BLD: 3.15 M/UL
RBC # FLD: 22.5 %
SODIUM SERPL-SCNC: 137 MMOL/L
WBC # FLD AUTO: 7.95 K/UL

## 2023-03-24 PROCEDURE — 85027 COMPLETE CBC AUTOMATED: CPT

## 2023-03-24 PROCEDURE — 80053 COMPREHEN METABOLIC PANEL: CPT

## 2023-03-24 PROCEDURE — 82728 ASSAY OF FERRITIN: CPT

## 2023-03-24 PROCEDURE — 36415 COLL VENOUS BLD VENIPUNCTURE: CPT

## 2023-03-24 PROCEDURE — 96413 CHEMO IV INFUSION 1 HR: CPT

## 2023-03-24 PROCEDURE — 83550 IRON BINDING TEST: CPT

## 2023-03-24 PROCEDURE — 83540 ASSAY OF IRON: CPT

## 2023-03-24 RX ORDER — PEMBROLIZUMAB 25 MG/ML
200 INJECTION, SOLUTION INTRAVENOUS ONCE
Refills: 0 | Status: COMPLETED | OUTPATIENT
Start: 2023-03-24 | End: 2023-03-24

## 2023-03-24 RX ADMIN — PEMBROLIZUMAB 200 MILLIGRAM(S): 25 INJECTION, SOLUTION INTRAVENOUS at 12:46

## 2023-03-27 LAB
FERRITIN SERPL-MCNC: 278 NG/ML
IRON SATN MFR SERPL: 26 %
IRON SERPL-MCNC: 107 UG/DL
TIBC SERPL-MCNC: 408 UG/DL
UIBC SERPL-MCNC: 301 UG/DL

## 2023-03-28 ENCOUNTER — APPOINTMENT (OUTPATIENT)
Dept: VASCULAR SURGERY | Facility: CLINIC | Age: 83
End: 2023-03-28

## 2023-03-31 ENCOUNTER — APPOINTMENT (OUTPATIENT)
Dept: INFUSION THERAPY | Facility: CLINIC | Age: 83
End: 2023-03-31

## 2023-04-10 ENCOUNTER — APPOINTMENT (OUTPATIENT)
Dept: INFUSION THERAPY | Facility: CLINIC | Age: 83
End: 2023-04-10

## 2023-04-10 ENCOUNTER — LABORATORY RESULT (OUTPATIENT)
Age: 83
End: 2023-04-10

## 2023-04-10 ENCOUNTER — OUTPATIENT (OUTPATIENT)
Dept: OUTPATIENT SERVICES | Facility: HOSPITAL | Age: 83
LOS: 1 days | End: 2023-04-10
Payer: MEDICARE

## 2023-04-10 ENCOUNTER — APPOINTMENT (OUTPATIENT)
Dept: HEMATOLOGY ONCOLOGY | Facility: CLINIC | Age: 83
End: 2023-04-10
Payer: MEDICARE

## 2023-04-10 VITALS
SYSTOLIC BLOOD PRESSURE: 166 MMHG | TEMPERATURE: 97.6 F | HEART RATE: 74 BPM | DIASTOLIC BLOOD PRESSURE: 70 MMHG | BODY MASS INDEX: 28.89 KG/M2 | HEIGHT: 61 IN | WEIGHT: 153 LBS

## 2023-04-10 DIAGNOSIS — I82.409 ACUTE EMBOLISM AND THROMBOSIS OF UNSPECIFIED DEEP VEINS OF UNSPECIFIED LOWER EXTREMITY: ICD-10-CM

## 2023-04-10 DIAGNOSIS — R22.40 LOCALIZED SWELLING, MASS AND LUMP, UNSPECIFIED LOWER LIMB: Chronic | ICD-10-CM

## 2023-04-10 DIAGNOSIS — Z92.89 PERSONAL HISTORY OF OTHER MEDICAL TREATMENT: Chronic | ICD-10-CM

## 2023-04-10 DIAGNOSIS — Z51.11 ENCOUNTER FOR ANTINEOPLASTIC CHEMOTHERAPY: ICD-10-CM

## 2023-04-10 DIAGNOSIS — Z51.12 ENCOUNTER FOR ANTINEOPLASTIC IMMUNOTHERAPY: ICD-10-CM

## 2023-04-10 DIAGNOSIS — D49.89 NEOPLASM OF UNSPECIFIED BEHAVIOR OF OTHER SPECIFIED SITES: ICD-10-CM

## 2023-04-10 DIAGNOSIS — C53.9 MALIGNANT NEOPLASM OF CERVIX UTERI, UNSPECIFIED: ICD-10-CM

## 2023-04-10 LAB
ALBUMIN SERPL ELPH-MCNC: 4 G/DL
ALP BLD-CCNC: 111 U/L
ALT SERPL-CCNC: 10 U/L
ANION GAP SERPL CALC-SCNC: 11 MMOL/L
AST SERPL-CCNC: 19 U/L
BILIRUB SERPL-MCNC: 0.3 MG/DL
BUN SERPL-MCNC: 14 MG/DL
CALCIUM SERPL-MCNC: 9.3 MG/DL
CHLORIDE SERPL-SCNC: 101 MMOL/L
CO2 SERPL-SCNC: 27 MMOL/L
CREAT SERPL-MCNC: 0.8 MG/DL
EGFR: 73 ML/MIN/1.73M2
GLUCOSE SERPL-MCNC: 92 MG/DL
HCT VFR BLD CALC: 30.6 %
HGB BLD-MCNC: 9.2 G/DL
MCHC RBC-ENTMCNC: 25.9 PG
MCHC RBC-ENTMCNC: 30.1 G/DL
MCV RBC AUTO: 86.2 FL
PLATELET # BLD AUTO: 176 K/UL
PMV BLD: 11 FL
POTASSIUM SERPL-SCNC: 4.4 MMOL/L
PROT SERPL-MCNC: 6.6 G/DL
RBC # BLD: 3.55 M/UL
RBC # FLD: 16.2 %
SODIUM SERPL-SCNC: 139 MMOL/L
T4 FREE SERPL-MCNC: 1 NG/DL
TSH SERPL-ACNC: 1.85 UIU/ML
WBC # FLD AUTO: 3.58 K/UL

## 2023-04-10 PROCEDURE — 80053 COMPREHEN METABOLIC PANEL: CPT

## 2023-04-10 PROCEDURE — 84443 ASSAY THYROID STIM HORMONE: CPT

## 2023-04-10 PROCEDURE — 84439 ASSAY OF FREE THYROXINE: CPT

## 2023-04-10 PROCEDURE — 99215 OFFICE O/P EST HI 40 MIN: CPT

## 2023-04-10 PROCEDURE — 96413 CHEMO IV INFUSION 1 HR: CPT

## 2023-04-10 PROCEDURE — 85027 COMPLETE CBC AUTOMATED: CPT

## 2023-04-10 RX ORDER — PEMBROLIZUMAB 25 MG/ML
200 INJECTION, SOLUTION INTRAVENOUS ONCE
Refills: 0 | Status: COMPLETED | OUTPATIENT
Start: 2023-04-10 | End: 2023-04-10

## 2023-04-10 RX ADMIN — PEMBROLIZUMAB 200 MILLIGRAM(S): 25 INJECTION, SOLUTION INTRAVENOUS at 14:34

## 2023-04-10 RX ADMIN — PEMBROLIZUMAB 200 MILLIGRAM(S): 25 INJECTION, SOLUTION INTRAVENOUS at 15:10

## 2023-04-10 NOTE — HISTORY OF PRESENT ILLNESS
[Disease: _____________________] : Disease: [unfilled] [de-identified] : The patient is an 81 year old woman who presented with post-menopausal vaginal bleeding in March 2021. \par Upon further work up, PAP smear on 3/18/21 showed: LSIL/AGC/HPV+. Pelvic US on 3/22/21 showed endometrium to be 4.2mm thick. 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 \par MRI pelvis on 4/3/21 showed signal abnormality in the posterior cervix 2.5cm x 1.6cm. The lesion does not go beyond the limit of the cervix. \par She saw Dr. Echevarria on 4/14/21 and on his exam, she was noted to have a 4-5cm cervical mass involving at least the middle of the vaginal wall, circumferentially. No parametrial involvement. She was not deemed a surgical candidate. \par Definitive chemoradiation was recommended. She is here to discuss radiation. \par PET/CT: Pending\par \par Pt C/O low back pain, for which she takes Tylenol. Vaginal bleeding is not very excessive. No SOB, Chest pain.\par Pt has a stress test next week\par  [de-identified] : Squamous cell [de-identified] : 5/18/21\par Pt is here for follow up.\par She has completed a PET scan.\par has moderate back pain\par \par 6/9/21\par Pt is here for follow up.\par She has started RT last week. She also recd 1 dose of carboplatin last week.\par She did not have any nausea or vomiting. She has been getting diarrhea which she has been managing with imodium. Her bld sugar was also running high.\par No fever, mouth sores.\par \par 6/16/21\par Pt is here for follow up.\par She is s/p 2 doses of Carbo.\par C/O feeling very fatigued. Had diarrhea which is controlled with imodium.\par No N, V.\par has abdominal discomfort.\par \par 6/23/2021\par Pt is here to follow up for b=cervical cancer, accompanied by daughter\par She is on Carbo s/p 2 cycles, tolerating well\par She feels a lot better today\par She denies abdominal pain, nausea or vomiting\par She gets diarrhea but is managed with Imodium\par \par 7/1/2021\par Pt is here to follow up for cervical cancer, accompanied by daughter\par She is on Carbo s/p 3 cycles, tolerating well\par She feels fine today, appetite is good\par She denies abdominal pain, nausea or vomiting, no vaginal bleeding/discharge\par She gets diarrhea, takes Imodium after having 7-8 episodes then gets constipation the following day\par \par 7/7/2021\par Pt is here to follow up for cervical cancer, accompanied by daughter\par She is on weekly Carbo s/p  4 cycles, tolerating well\par She feels fine today, appetite is good\par She denies nausea or vomiting, no vaginal bleeding/discharge\par She c/o of low abdominal cramping and gets diarrhea 4-5x/day, managed with Imodium then gets constipation the following day\par \par 7/26/21\par Pt is here for follow up,\par She is feeling better. Diarrhea has improved. NO vaginal bleeding\par \par 9/8/21\par Pt is here for follow up. Has completed RT on 8/2/21.\par No vag bleeding, no pelvic pain.\par \par 12/21/21\par Pt is here today for follow up visit for cervical cancer. She has completed chemo (7/2021)/Rad (8/2021).\par Pt denies any vaginal bleeding or abd/pelvic pain.\par She had a PET CT scan on 12/21/21 which showed FDG avid right external iliac lymph node, SUV max 10.0 measuring\par 1.0 cm, consistent with biological tumor activity. She saw Dr. Ragland and agreed to have SBRT since she is not surgical candidate.\par Simulation was done.\par She has an appt with Dr. Echevarria 1/12/22.\par \par 4/25/22\par Pt is here for follow up. She had been C/O rectal bleeding over the past week. She went to ER and was treated with ABX. CT scan abdomen and pelvis reviwed ? proctoclitis.\par Symptoms have improved.Pt has appt with GI.\par \par 12/12/22\par Pt is here for follow up. \par She was recently admitted to hospital in November 2022 when she had severe RLE pain. She underwent CT AP in ED showing necrotic R iliac lymph node that increased in size since 4/2022 with asymmetrically dilated R iliac and feeding veins. She was evaluated by vascular team and she was taken for R iliac vein stent placement and now on eliquis. She was also evaluated by gynonc team as well radiation oncology team, unfortunately cannot offer any intervention at this time.\par She is doing much better since she was discharged from hospital, swelling of RLE significant improved. \par She still experiences R groin pain which she takes Percocet. \par Otherwise, she denies fever, chill, SOB, CP, N/V/D. \par \par 1/12/2023\par Pt presents to clinic for follow up and cycle#2 of Carbo/taxol & Keytruda accompanied by daughter. \par With her first cycle she had diarrhea & fatigue for two days that improved with Imodium.\par She has lost 20 lbs over last months. Her daughters are taking care of her  diet. \par Her leg swelling has been subsiding well and she is requiring lesser pain meds for her leg pain. \par \par 1/31/23\par Pt is here for follow up.\par C/O pain in her back since a week or so. Leg swelling and pain has resolved.\par C/O significant fatigue\par \par 3/2/23\par Pt is here for follow up.\par Feels well mostly except for fatigue.\par Becomes confused post chemo ? d/t steroids.\par Swelling in LE has resolved. Tolerating AC well. Had one episode of rectal bleed which resolved likely d/t hemorrhoids, pt was constipated\par \par 3/20/23\par Pt is here for follow up.\par Feels well mostly except for fatigue.\par Becomes confused post chemo ? d/t steroids which persisted for about 3 days post chemo. \par Swelling in LE has resolved. Tolerating AC well. Had one episode of rectal bleed which resolved likely d/t hemorrhoids\par She completed PET scan on 3/9/23. \par She is s/p 4 cycles of Carboplatin/Taxol.Keytruda. \par \par 4/10/23\par Pt is here for follow up.\par Feels well mostly except for fatigue.\par Swelling in LE has resolved. Tolerating AC well. Had one episode of rectal bleed previously which resolved likely d/t hemorrhoids\par She completed PET scan on 3/9/23. \par She is s/p 4 cycles of Carboplatin/Taxol/Keytruda which was switched to Keytruda alone starting cycle 5. \par She complains of joint pain in b/l hands and left ankle, associated with L ankle swelling. She has hx of RA (was on MTX which has been on hold since chemo started). \par

## 2023-04-10 NOTE — CONSULT LETTER
[Dear  ___] : Dear  [unfilled], [Consult Letter:] : I had the pleasure of evaluating your patient, [unfilled]. [Please see my note below.] : Please see my note below. [Consult Closing:] : Thank you very much for allowing me to participate in the care of this patient.  If you have any questions, please do not hesitate to contact me. [Sincerely,] : Sincerely, [DrRaya  ___] : Dr. HOLGUIN [FreeTextEntry3] : Marlena Krueger MD\par Professor [employer]\par [facility]\par Attending Physician\par [facility]\par [phone]\par \par

## 2023-04-10 NOTE — REVIEW OF SYSTEMS
[Joint Pain] : joint pain [Negative] : Gastrointestinal [FreeTextEntry2] : fatigue [FreeTextEntry9] : b/l hands joint pain. and left ankle pain.  [de-identified] : confused post chemo, now resolved.

## 2023-04-10 NOTE — ASSESSMENT
[FreeTextEntry1] : In summary, Uzma Pop is a 82 year old female who initially diagnosed with squamous cell carcinoma of the cervix, FIGO Stage IIIA, s/p chemo/RT.\par \par Pt was treated with chemoRT.\par \par Given her age and possible cardiac issues (inability to handle large volumes of pre and post chemo hydration). We had decided to use Carboplatin AUC 2 as a radiation sensitizer. She is s/p 5 weekly doses of Carboplatin. The patient received 7000 cGy to the Pelvis/cervix from 6/2/2021 through 8/2/2021\par \par Post definitive chemoRt in 12/21/21, she had a FDG avid right external iliac lymph node, SUV max 10.0 measuring\par 1.0 cm, consistent with biological tumor activity. She is s/p SBRT x 5 to that lesion.\par \par Her PET scan on 6/22 shows residual FDG uptake in the Rt external iliac LN although stable, findings d/w pt and daughter.\par \par Unfortunately, pt was admitted to hospital in November 2022 when she had severe RLE pain. She underwent CT AP in ED showing necrotic R iliac lymph node that increased in size since 4/2022 with asymmetrically dilated R iliac and feeding veins. She was evaluated by vascular team and was taken for R iliac vein stent placement and now on eliquis. She was also evaluated by gynonc team as well radiation oncology team, unfortunately cannot offer any intervention at this time.\par \par She underwent PET scan on 12/2022 shows compared to PET/CT 6/28/2022, interval placement of a right iliac venous stent. Increased FDG uptake in the region of the right pelvic sidewall (SUV 13.3), difficult to delineate on CT or emission imaging if this is related to recent stent placement versus right iliac lymph node. No other definite sites of abnormal FDG uptake to suggest biologic tumor activity.\par \par Given pt has locoregional recurrence disease w/ prior hx of RT, the next best regimen will be systemic therapy. \par \par Since pt's cervical cancer has PDL1 of 70-80%, we offered her carbotaxol + keytruda based on the KEYNOTE-826 trial (pembrolizumab improved median PFS versus placebo (10.4 versus 8.2 months) and OS at 24 months (50% vs 40% in pts w/ PD-L1 CPS of =1). \par \par In the KEYNOTE-826 trial, Bevacizumab was optional (2/3 of pts received) and given pt has possible thrombus in her iliac vein, we will hold off on giving bevacizumab. \par \par We will also modify carboplatin/taxol to weekly dosing due to her age and fraility with carboplatin AUC2 and taxol 80 mg/m2 D1 and D8 every 3 weeks. \par \par Given pt had carboplatin previously, we advised pt to take zytrec 10 mg qD 3 days before, on the day of  and 3 days after each cycle of chemo. \par \par PET scan 3/9/23 after 4 cycles of carbo/Taxol and Keytruda shows good treatment response with no new sites of abnormal FDG uptake, decreased FDG uptake in the region of the right pelvic sidewall (SUV up to 3.5 previously 13.3), wall thickening of the descending colon and rectosigmoid with associated increased FDG uptake suggestive of infectious or inflammatory process.\par \par Given good treatment response, and adverse effects from chemotherapy and premedications causing confusion, we will only continue Keytruda and hold carbo/taxol moving forward. \par \par \par PLAN:\par I have prepared the note after I reviewed the previous notes, reviewed the radiological and laboratory studies and have communicated those to the patient\par \par Proceed with cycle 6 of Keytruda 200 mg IV every 3 weeks (due on 4/10/23)\par   \par ****Side effect profile of pembrolizumab were discussed with pt including but not limited to: anemia, fatigue, hyperglycemia, hyponatremia, hypoalbuminemia, itching, cough, nausea, rash, decreased appetite, hypertriglyceridemia, increased liver enzymes, hypocalcemia, constipation, diarrhea, upper respiratory tract infection and an immune-mediated reaction. Lab work will check for elevated liver enzymes and thyroid function. 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 \par - Advised to follow up with rheumatology to discuss  treatment\par - NSAIDs prn \par \par RTC 3 weeks \par \par Pt seen and case discussed with Dr Krueger.

## 2023-04-10 NOTE — PHYSICAL EXAM
[Restricted in physically strenuous activity but ambulatory and able to carry out work of a light or sedentary nature] : Status 1- Restricted in physically strenuous activity but ambulatory and able to carry out work of a light or sedentary nature, e.g., light house work, office work [Normal] : affect appropriate [de-identified] : RLE swelling resolved [de-identified] : No spinal tenderness

## 2023-04-18 DIAGNOSIS — C53.9 MALIGNANT NEOPLASM OF CERVIX UTERI, UNSPECIFIED: ICD-10-CM

## 2023-04-18 DIAGNOSIS — Z51.11 ENCOUNTER FOR ANTINEOPLASTIC CHEMOTHERAPY: ICD-10-CM

## 2023-04-18 DIAGNOSIS — D49.89 NEOPLASM OF UNSPECIFIED BEHAVIOR OF OTHER SPECIFIED SITES: ICD-10-CM

## 2023-04-18 DIAGNOSIS — Z51.12 ENCOUNTER FOR ANTINEOPLASTIC IMMUNOTHERAPY: ICD-10-CM

## 2023-05-01 ENCOUNTER — APPOINTMENT (OUTPATIENT)
Dept: HEMATOLOGY ONCOLOGY | Facility: CLINIC | Age: 83
End: 2023-05-01
Payer: MEDICARE

## 2023-05-01 ENCOUNTER — APPOINTMENT (OUTPATIENT)
Dept: INFUSION THERAPY | Facility: CLINIC | Age: 83
End: 2023-05-01
Payer: MEDICARE

## 2023-05-01 ENCOUNTER — LABORATORY RESULT (OUTPATIENT)
Age: 83
End: 2023-05-01

## 2023-05-01 ENCOUNTER — OUTPATIENT (OUTPATIENT)
Dept: OUTPATIENT SERVICES | Facility: HOSPITAL | Age: 83
LOS: 1 days | End: 2023-05-01
Payer: MEDICARE

## 2023-05-01 DIAGNOSIS — R22.40 LOCALIZED SWELLING, MASS AND LUMP, UNSPECIFIED LOWER LIMB: Chronic | ICD-10-CM

## 2023-05-01 DIAGNOSIS — I82.409 ACUTE EMBOLISM AND THROMBOSIS OF UNSPECIFIED DEEP VEINS OF UNSPECIFIED LOWER EXTREMITY: ICD-10-CM

## 2023-05-01 DIAGNOSIS — C53.9 MALIGNANT NEOPLASM OF CERVIX UTERI, UNSPECIFIED: ICD-10-CM

## 2023-05-01 DIAGNOSIS — Z51.12 ENCOUNTER FOR ANTINEOPLASTIC IMMUNOTHERAPY: ICD-10-CM

## 2023-05-01 DIAGNOSIS — Z51.11 ENCOUNTER FOR ANTINEOPLASTIC CHEMOTHERAPY: ICD-10-CM

## 2023-05-01 DIAGNOSIS — Z92.89 PERSONAL HISTORY OF OTHER MEDICAL TREATMENT: Chronic | ICD-10-CM

## 2023-05-01 DIAGNOSIS — D49.89 NEOPLASM OF UNSPECIFIED BEHAVIOR OF OTHER SPECIFIED SITES: ICD-10-CM

## 2023-05-01 LAB
ALBUMIN SERPL ELPH-MCNC: 3.6 G/DL
ALP BLD-CCNC: 72 U/L
ALT SERPL-CCNC: 12 U/L
ANION GAP SERPL CALC-SCNC: 11 MMOL/L
AST SERPL-CCNC: 14 U/L
BILIRUB SERPL-MCNC: <0.2 MG/DL
BUN SERPL-MCNC: 12 MG/DL
CALCIUM SERPL-MCNC: 8.1 MG/DL
CHLORIDE SERPL-SCNC: 106 MMOL/L
CO2 SERPL-SCNC: 22 MMOL/L
CREAT SERPL-MCNC: 0.6 MG/DL
EGFR: 88 ML/MIN/1.73M2
GLUCOSE SERPL-MCNC: 170 MG/DL
HCT VFR BLD CALC: 34.7 %
HGB BLD-MCNC: 10.3 G/DL
MAGNESIUM SERPL-MCNC: 1.5 MG/DL
MCHC RBC-ENTMCNC: 24.8 PG
MCHC RBC-ENTMCNC: 29.7 G/DL
MCV RBC AUTO: 83.4 FL
PLATELET # BLD AUTO: 188 K/UL
PMV BLD: 11.3 FL
POTASSIUM SERPL-SCNC: 3.9 MMOL/L
PROT SERPL-MCNC: 5.8 G/DL
RBC # BLD: 4.16 M/UL
RBC # FLD: 13.8 %
SODIUM SERPL-SCNC: 139 MMOL/L
WBC # FLD AUTO: 6.55 K/UL

## 2023-05-01 PROCEDURE — 84443 ASSAY THYROID STIM HORMONE: CPT

## 2023-05-01 PROCEDURE — 36415 COLL VENOUS BLD VENIPUNCTURE: CPT

## 2023-05-01 PROCEDURE — 99215 OFFICE O/P EST HI 40 MIN: CPT

## 2023-05-01 PROCEDURE — 83735 ASSAY OF MAGNESIUM: CPT

## 2023-05-01 PROCEDURE — 96413 CHEMO IV INFUSION 1 HR: CPT

## 2023-05-01 PROCEDURE — 84439 ASSAY OF FREE THYROXINE: CPT

## 2023-05-01 PROCEDURE — 80053 COMPREHEN METABOLIC PANEL: CPT

## 2023-05-01 PROCEDURE — 85027 COMPLETE CBC AUTOMATED: CPT

## 2023-05-01 RX ORDER — PEMBROLIZUMAB 25 MG/ML
200 INJECTION, SOLUTION INTRAVENOUS ONCE
Refills: 0 | Status: COMPLETED | OUTPATIENT
Start: 2023-05-01 | End: 2023-05-01

## 2023-05-01 RX ADMIN — PEMBROLIZUMAB 200 MILLIGRAM(S): 25 INJECTION, SOLUTION INTRAVENOUS at 14:25

## 2023-05-01 RX ADMIN — PEMBROLIZUMAB 200 MILLIGRAM(S): 25 INJECTION, SOLUTION INTRAVENOUS at 13:46

## 2023-05-01 NOTE — PHYSICAL EXAM
[Restricted in physically strenuous activity but ambulatory and able to carry out work of a light or sedentary nature] : Status 1- Restricted in physically strenuous activity but ambulatory and able to carry out work of a light or sedentary nature, e.g., light house work, office work [Normal] : affect appropriate [de-identified] : RLE swelling resolved [de-identified] : No spinal tenderness

## 2023-05-01 NOTE — ASSESSMENT
[FreeTextEntry1] : In summary, Uzma Pop is a 82 year old female who initially diagnosed with squamous cell carcinoma of the cervix, FIGO Stage IIIA, s/p chemo/RT.\par \par Pt was treated with chemoRT.\par \par Given her age and possible cardiac issues (inability to handle large volumes of pre and post chemo hydration). We had decided to use Carboplatin AUC 2 as a radiation sensitizer. She is s/p 5 weekly doses of Carboplatin. The patient received 7000 cGy to the Pelvis/cervix from 6/2/2021 through 8/2/2021\par \par Post definitive chemoRt in 12/21/21, she had a FDG avid right external iliac lymph node, SUV max 10.0 measuring\par 1.0 cm, consistent with biological tumor activity. She is s/p SBRT x 5 to that lesion.\par \par Her PET scan on 6/22 shows residual FDG uptake in the Rt external iliac LN although stable, findings d/w pt and daughter.\par \par Unfortunately, pt was admitted to hospital in November 2022 when she had severe RLE pain. She underwent CT AP in ED showing necrotic R iliac lymph node that increased in size since 4/2022 with asymmetrically dilated R iliac and feeding veins. She was evaluated by vascular team and was taken for R iliac vein stent placement and now on eliquis. She was also evaluated by gynonc team as well radiation oncology team, unfortunately cannot offer any intervention at this time.\par \par She underwent PET scan on 12/2022 shows compared to PET/CT 6/28/2022, interval placement of a right iliac venous stent. Increased FDG uptake in the region of the right pelvic sidewall (SUV 13.3), difficult to delineate on CT or emission imaging if this is related to recent stent placement versus right iliac lymph node. No other definite sites of abnormal FDG uptake to suggest biologic tumor activity.\par \par Given pt has locoregional recurrence disease w/ prior hx of RT, the next best regimen will be systemic therapy. \par \par Since pt's cervical cancer has PDL1 of 70-80%, we offered her carbotaxol + keytruda based on the KEYNOTE-826 trial (pembrolizumab improved median PFS versus placebo (10.4 versus 8.2 months) and OS at 24 months (50% vs 40% in pts w/ PD-L1 CPS of =1). \par \par In the KEYNOTE-826 trial, Bevacizumab was optional (2/3 of pts received) and given pt has possible thrombus in her iliac vein, we will hold off on giving bevacizumab. \par \par We will also modify carboplatin/taxol to weekly dosing due to her age and fraility with carboplatin AUC2 and taxol 80 mg/m2 D1 and D8 every 3 weeks. \par \par Given pt had carboplatin previously, we advised pt to take zytrec 10 mg qD 3 days before, on the day of  and 3 days after each cycle of chemo. \par \par PET scan 3/9/23 after 4 cycles of carbo/Taxol and Keytruda shows good treatment response with no new sites of abnormal FDG uptake, decreased FDG uptake in the region of the right pelvic sidewall (SUV up to 3.5 previously 13.3), wall thickening of the descending colon and rectosigmoid with associated increased FDG uptake suggestive of infectious or inflammatory process.\par \par Given good treatment response, and adverse effects from chemotherapy and premedications causing confusion, we will only continue Keytruda and hold carbo/taxol moving forward. \par \par \par PLAN:\par I have prepared the note after I reviewed the previous notes, reviewed the radiological and laboratory studies and have communicated those to the patient\par \par Proceed with cycle 6 of Keytruda 200 mg IV every 3 weeks (due on 5/1/23)\par   \par ****Side effect profile of pembrolizumab were discussed with pt including but not limited to: anemia, fatigue, hyperglycemia, hyponatremia, hypoalbuminemia, itching, cough, nausea, rash, decreased appetite, hypertriglyceridemia, increased liver enzymes, hypocalcemia, constipation, diarrhea, upper respiratory tract infection and an immune-mediated reaction. Lab work will check for elevated liver enzymes and thyroid function. 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 Currently she is on Prednisone 10mg, Plaquenil and Traamdol but symptoms are uncontrolled. \par - Advised to follow up with rheum to see if Mtx can be restarted. No issues anticipated with Mtx while on Keytruda\par - Follow up MRI spine results. She will have it faxed to us. \par \par RTC 3 weeks \par \par Pt seen and case discussed with Dr Krueger.

## 2023-05-01 NOTE — HISTORY OF PRESENT ILLNESS
[Disease: _____________________] : Disease: [unfilled] [de-identified] : The patient is an 81 year old woman who presented with post-menopausal vaginal bleeding in March 2021. \par Upon further work up, PAP smear on 3/18/21 showed: LSIL/AGC/HPV+. Pelvic US on 3/22/21 showed endometrium to be 4.2mm thick. 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 \par MRI pelvis on 4/3/21 showed signal abnormality in the posterior cervix 2.5cm x 1.6cm. The lesion does not go beyond the limit of the cervix. \par She saw Dr. Echevarria on 4/14/21 and on his exam, she was noted to have a 4-5cm cervical mass involving at least the middle of the vaginal wall, circumferentially. No parametrial involvement. She was not deemed a surgical candidate. \par Definitive chemoradiation was recommended. She is here to discuss radiation. \par PET/CT: Pending\par \par Pt C/O low back pain, for which she takes Tylenol. Vaginal bleeding is not very excessive. No SOB, Chest pain.\par Pt has a stress test next week\par  [de-identified] : Squamous cell [de-identified] : 5/18/21\par Pt is here for follow up.\par She has completed a PET scan.\par has moderate back pain\par \par 6/9/21\par Pt is here for follow up.\par She has started RT last week. She also recd 1 dose of carboplatin last week.\par She did not have any nausea or vomiting. She has been getting diarrhea which she has been managing with imodium. Her bld sugar was also running high.\par No fever, mouth sores.\par \par 6/16/21\par Pt is here for follow up.\par She is s/p 2 doses of Carbo.\par C/O feeling very fatigued. Had diarrhea which is controlled with imodium.\par No N, V.\par has abdominal discomfort.\par \par 6/23/2021\par Pt is here to follow up for b=cervical cancer, accompanied by daughter\par She is on Carbo s/p 2 cycles, tolerating well\par She feels a lot better today\par She denies abdominal pain, nausea or vomiting\par She gets diarrhea but is managed with Imodium\par \par 7/1/2021\par Pt is here to follow up for cervical cancer, accompanied by daughter\par She is on Carbo s/p 3 cycles, tolerating well\par She feels fine today, appetite is good\par She denies abdominal pain, nausea or vomiting, no vaginal bleeding/discharge\par She gets diarrhea, takes Imodium after having 7-8 episodes then gets constipation the following day\par \par 7/7/2021\par Pt is here to follow up for cervical cancer, accompanied by daughter\par She is on weekly Carbo s/p  4 cycles, tolerating well\par She feels fine today, appetite is good\par She denies nausea or vomiting, no vaginal bleeding/discharge\par She c/o of low abdominal cramping and gets diarrhea 4-5x/day, managed with Imodium then gets constipation the following day\par \par 7/26/21\par Pt is here for follow up,\par She is feeling better. Diarrhea has improved. NO vaginal bleeding\par \par 9/8/21\par Pt is here for follow up. Has completed RT on 8/2/21.\par No vag bleeding, no pelvic pain.\par \par 12/21/21\par Pt is here today for follow up visit for cervical cancer. She has completed chemo (7/2021)/Rad (8/2021).\par Pt denies any vaginal bleeding or abd/pelvic pain.\par She had a PET CT scan on 12/21/21 which showed FDG avid right external iliac lymph node, SUV max 10.0 measuring\par 1.0 cm, consistent with biological tumor activity. She saw Dr. Ragland and agreed to have SBRT since she is not surgical candidate.\par Simulation was done.\par She has an appt with Dr. Echevarria 1/12/22.\par \par 4/25/22\par Pt is here for follow up. She had been C/O rectal bleeding over the past week. She went to ER and was treated with ABX. CT scan abdomen and pelvis reviwed ? proctoclitis.\par Symptoms have improved.Pt has appt with GI.\par \par 12/12/22\par Pt is here for follow up. \par She was recently admitted to hospital in November 2022 when she had severe RLE pain. She underwent CT AP in ED showing necrotic R iliac lymph node that increased in size since 4/2022 with asymmetrically dilated R iliac and feeding veins. She was evaluated by vascular team and she was taken for R iliac vein stent placement and now on eliquis. She was also evaluated by gynonc team as well radiation oncology team, unfortunately cannot offer any intervention at this time.\par She is doing much better since she was discharged from hospital, swelling of RLE significant improved. \par She still experiences R groin pain which she takes Percocet. \par Otherwise, she denies fever, chill, SOB, CP, N/V/D. \par \par 1/12/2023\par Pt presents to clinic for follow up and cycle#2 of Carbo/taxol & Keytruda accompanied by daughter. \par With her first cycle she had diarrhea & fatigue for two days that improved with Imodium.\par She has lost 20 lbs over last months. Her daughters are taking care of her  diet. \par Her leg swelling has been subsiding well and she is requiring lesser pain meds for her leg pain. \par \par 1/31/23\par Pt is here for follow up.\par C/O pain in her back since a week or so. Leg swelling and pain has resolved.\par C/O significant fatigue\par \par 3/2/23\par Pt is here for follow up.\par Feels well mostly except for fatigue.\par Becomes confused post chemo ? d/t steroids.\par Swelling in LE has resolved. Tolerating AC well. Had one episode of rectal bleed which resolved likely d/t hemorrhoids, pt was constipated\par \par 3/20/23\par Pt is here for follow up.\par Feels well mostly except for fatigue.\par Becomes confused post chemo ? d/t steroids which persisted for about 3 days post chemo. \par Swelling in LE has resolved. Tolerating AC well. Had one episode of rectal bleed which resolved likely d/t hemorrhoids\par She completed PET scan on 3/9/23. \par She is s/p 4 cycles of Carboplatin/Taxol.Keytruda. \par \par 4/10/23\par Pt is here for follow up.\par Feels well mostly except for fatigue.\par Swelling in LE has resolved. Tolerating AC well. Had one episode of rectal bleed previously which resolved likely d/t hemorrhoids\par She completed PET scan on 3/9/23. \par She is s/p 4 cycles of Carboplatin/Taxol/Keytruda which was switched to Keytruda alone starting cycle 5. \par She complains of joint pain in b/l hands and left ankle, associated with L ankle swelling. She has hx of RA (was on MTX which has been on hold since chemo started). \par \par 5/1/23\par Pt is here for follow up.\par Swelling in LE has resolved. Tolerating AC well. Had one episode of rectal bleed previously which resolved likely d/t hemorrhoids\par She completed PET scan on 3/9/23. \par She is s/p 4 cycles of Carboplatin/Taxol/Keytruda which was switched to Keytruda alone starting cycle 5. \par She complains of joint pain in b/l hands and left ankle, associated with L ankle swelling. She has hx of RA (was on MTX which has been on hold since chemo started). She\par still has persistent joint pain, stiffness and is affecting her quality of life. \par She saw Dr Grant (Rhe[facility]) and was started on Prednisone 10mg qd, Hydroxychlorquine 400mg daily, and Tramadol prn. She feels symptoms are still uncontrolled. \par

## 2023-05-01 NOTE — REVIEW OF SYSTEMS
[Joint Pain] : joint pain [Negative] : Allergic/Immunologic [FreeTextEntry2] : fatigue [FreeTextEntry9] : b/l hands joint pain and diffuse joint pains and back pain.  [de-identified] : confused post chemo, now resolved.

## 2023-05-01 NOTE — CONSULT LETTER
[Dear  ___] : Dear  [unfilled], [Consult Letter:] : I had the pleasure of evaluating your patient, [unfilled]. [Please see my note below.] : Please see my note below. [Consult Closing:] : Thank you very much for allowing me to participate in the care of this patient.  If you have any questions, please do not hesitate to contact me. [Sincerely,] : Sincerely, [DrRaya  ___] : Dr. OHLGUIN [DrRaya ___] : Dr. HOLGUIN [FreeTextEntry3] : Marlena Krueger MD\par Professor [employer]\par [employer]\par Attending Physician\par [facility] [place]\par [phone]\par \par

## 2023-05-02 LAB
T4 FREE SERPL-MCNC: 1.3 NG/DL
TSH SERPL-ACNC: 0.92 UIU/ML

## 2023-05-22 ENCOUNTER — LABORATORY RESULT (OUTPATIENT)
Age: 83
End: 2023-05-22

## 2023-05-22 ENCOUNTER — OUTPATIENT (OUTPATIENT)
Dept: OUTPATIENT SERVICES | Facility: HOSPITAL | Age: 83
LOS: 1 days | End: 2023-05-22
Payer: MEDICARE

## 2023-05-22 ENCOUNTER — APPOINTMENT (OUTPATIENT)
Dept: HEMATOLOGY ONCOLOGY | Facility: CLINIC | Age: 83
End: 2023-05-22
Payer: MEDICARE

## 2023-05-22 ENCOUNTER — APPOINTMENT (OUTPATIENT)
Dept: INFUSION THERAPY | Facility: CLINIC | Age: 83
End: 2023-05-22
Payer: MEDICARE

## 2023-05-22 VITALS
BODY MASS INDEX: 28.13 KG/M2 | TEMPERATURE: 98.3 F | HEART RATE: 109 BPM | DIASTOLIC BLOOD PRESSURE: 70 MMHG | WEIGHT: 149 LBS | HEIGHT: 61 IN | SYSTOLIC BLOOD PRESSURE: 158 MMHG

## 2023-05-22 DIAGNOSIS — Z92.89 PERSONAL HISTORY OF OTHER MEDICAL TREATMENT: Chronic | ICD-10-CM

## 2023-05-22 DIAGNOSIS — Z51.12 ENCOUNTER FOR ANTINEOPLASTIC IMMUNOTHERAPY: ICD-10-CM

## 2023-05-22 DIAGNOSIS — D49.89 NEOPLASM OF UNSPECIFIED BEHAVIOR OF OTHER SPECIFIED SITES: ICD-10-CM

## 2023-05-22 DIAGNOSIS — R22.40 LOCALIZED SWELLING, MASS AND LUMP, UNSPECIFIED LOWER LIMB: Chronic | ICD-10-CM

## 2023-05-22 DIAGNOSIS — Z51.11 ENCOUNTER FOR ANTINEOPLASTIC CHEMOTHERAPY: ICD-10-CM

## 2023-05-22 DIAGNOSIS — C53.9 MALIGNANT NEOPLASM OF CERVIX UTERI, UNSPECIFIED: ICD-10-CM

## 2023-05-22 DIAGNOSIS — I82.409 ACUTE EMBOLISM AND THROMBOSIS OF UNSPECIFIED DEEP VEINS OF UNSPECIFIED LOWER EXTREMITY: ICD-10-CM

## 2023-05-22 LAB
ALBUMIN SERPL ELPH-MCNC: 4.4 G/DL
ALP BLD-CCNC: 75 U/L
ALT SERPL-CCNC: 14 U/L
ANION GAP SERPL CALC-SCNC: 15 MMOL/L
AST SERPL-CCNC: 15 U/L
BILIRUB SERPL-MCNC: 0.2 MG/DL
BUN SERPL-MCNC: 21 MG/DL
CALCIUM SERPL-MCNC: 9.9 MG/DL
CHLORIDE SERPL-SCNC: 96 MMOL/L
CO2 SERPL-SCNC: 26 MMOL/L
CREAT SERPL-MCNC: 0.9 MG/DL
EGFR: 63 ML/MIN/1.73M2
GLUCOSE SERPL-MCNC: 283 MG/DL
HCT VFR BLD CALC: 37.1 %
HGB BLD-MCNC: 11.3 G/DL
MCHC RBC-ENTMCNC: 24.4 PG
MCHC RBC-ENTMCNC: 30.5 G/DL
MCV RBC AUTO: 80 FL
PLATELET # BLD AUTO: 227 K/UL
PMV BLD: 10.1 FL
POTASSIUM SERPL-SCNC: 5.2 MMOL/L
PROT SERPL-MCNC: 7 G/DL
RBC # BLD: 4.64 M/UL
RBC # FLD: 14.1 %
SODIUM SERPL-SCNC: 137 MMOL/L
TSH SERPL-ACNC: 0.87 UIU/ML
WBC # FLD AUTO: 7.48 K/UL

## 2023-05-22 PROCEDURE — 99215 OFFICE O/P EST HI 40 MIN: CPT

## 2023-05-22 PROCEDURE — 85027 COMPLETE CBC AUTOMATED: CPT

## 2023-05-22 PROCEDURE — 84443 ASSAY THYROID STIM HORMONE: CPT

## 2023-05-22 PROCEDURE — 80053 COMPREHEN METABOLIC PANEL: CPT

## 2023-05-22 NOTE — REVIEW OF SYSTEMS
[Joint Pain] : joint pain [Negative] : Allergic/Immunologic [Joint Stiffness] : joint stiffness [FreeTextEntry2] : fatigue [FreeTextEntry9] : b/l hands joint pain and diffuse joint pains and back pain.  [de-identified] : confused post chemo, now resolved.

## 2023-05-22 NOTE — HISTORY OF PRESENT ILLNESS
[Disease: _____________________] : Disease: [unfilled] [de-identified] : The patient is an 81 year old woman who presented with post-menopausal vaginal bleeding in March 2021. \par Upon further work up, PAP smear on 3/18/21 showed: LSIL/AGC/HPV+. Pelvic US on 3/22/21 showed endometrium to be 4.2mm thick. 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 \par MRI pelvis on 4/3/21 showed signal abnormality in the posterior cervix 2.5cm x 1.6cm. The lesion does not go beyond the limit of the cervix. \par She saw Dr. Echevarria on 4/14/21 and on his exam, she was noted to have a 4-5cm cervical mass involving at least the middle of the vaginal wall, circumferentially. No parametrial involvement. She was not deemed a surgical candidate. \par Definitive chemoradiation was recommended. She is here to discuss radiation. \par PET/CT: Pending\par \par Pt C/O low back pain, for which she takes Tylenol. Vaginal bleeding is not very excessive. No SOB, Chest pain.\par Pt has a stress test next week\par  [de-identified] : Squamous cell [de-identified] : 5/18/21\par Pt is here for follow up.\par She has completed a PET scan.\par has moderate back pain\par \par 6/9/21\par Pt is here for follow up.\par She has started RT last week. She also recd 1 dose of carboplatin last week.\par She did not have any nausea or vomiting. She has been getting diarrhea which she has been managing with imodium. Her bld sugar was also running high.\par No fever, mouth sores.\par \par 6/16/21\par Pt is here for follow up.\par She is s/p 2 doses of Carbo.\par C/O feeling very fatigued. Had diarrhea which is controlled with imodium.\par No N, V.\par has abdominal discomfort.\par \par 6/23/2021\par Pt is here to follow up for b=cervical cancer, accompanied by daughter\par She is on Carbo s/p 2 cycles, tolerating well\par She feels a lot better today\par She denies abdominal pain, nausea or vomiting\par She gets diarrhea but is managed with Imodium\par \par 7/1/2021\par Pt is here to follow up for cervical cancer, accompanied by daughter\par She is on Carbo s/p 3 cycles, tolerating well\par She feels fine today, appetite is good\par She denies abdominal pain, nausea or vomiting, no vaginal bleeding/discharge\par She gets diarrhea, takes Imodium after having 7-8 episodes then gets constipation the following day\par \par 7/7/2021\par Pt is here to follow up for cervical cancer, accompanied by daughter\par She is on weekly Carbo s/p  4 cycles, tolerating well\par She feels fine today, appetite is good\par She denies nausea or vomiting, no vaginal bleeding/discharge\par She c/o of low abdominal cramping and gets diarrhea 4-5x/day, managed with Imodium then gets constipation the following day\par \par 7/26/21\par Pt is here for follow up,\par She is feeling better. Diarrhea has improved. NO vaginal bleeding\par \par 9/8/21\par Pt is here for follow up. Has completed RT on 8/2/21.\par No vag bleeding, no pelvic pain.\par \par 12/21/21\par Pt is here today for follow up visit for cervical cancer. She has completed chemo (7/2021)/Rad (8/2021).\par Pt denies any vaginal bleeding or abd/pelvic pain.\par She had a PET CT scan on 12/21/21 which showed FDG avid right external iliac lymph node, SUV max 10.0 measuring\par 1.0 cm, consistent with biological tumor activity. She saw Dr. Ragland and agreed to have SBRT since she is not surgical candidate.\par Simulation was done.\par She has an appt with Dr. Echevarria 1/12/22.\par \par 4/25/22\par Pt is here for follow up. She had been C/O rectal bleeding over the past week. She went to ER and was treated with ABX. CT scan abdomen and pelvis reviwed ? proctoclitis.\par Symptoms have improved.Pt has appt with GI.\par \par 12/12/22\par Pt is here for follow up. \par She was recently admitted to hospital in November 2022 when she had severe RLE pain. She underwent CT AP in ED showing necrotic R iliac lymph node that increased in size since 4/2022 with asymmetrically dilated R iliac and feeding veins. She was evaluated by vascular team and she was taken for R iliac vein stent placement and now on eliquis. She was also evaluated by gynonc team as well radiation oncology team, unfortunately cannot offer any intervention at this time.\par She is doing much better since she was discharged from hospital, swelling of RLE significant improved. \par She still experiences R groin pain which she takes Percocet. \par Otherwise, she denies fever, chill, SOB, CP, N/V/D. \par \par 1/12/2023\par Pt presents to clinic for follow up and cycle#2 of Carbo/taxol & Keytruda accompanied by daughter. \par With her first cycle she had diarrhea & fatigue for two days that improved with Imodium.\par She has lost 20 lbs over last months. Her daughters are taking care of her  diet. \par Her leg swelling has been subsiding well and she is requiring lesser pain meds for her leg pain. \par \par 1/31/23\par Pt is here for follow up.\par C/O pain in her back since a week or so. Leg swelling and pain has resolved.\par C/O significant fatigue\par \par 3/2/23\par Pt is here for follow up.\par Feels well mostly except for fatigue.\par Becomes confused post chemo ? d/t steroids.\par Swelling in LE has resolved. Tolerating AC well. Had one episode of rectal bleed which resolved likely d/t hemorrhoids, pt was constipated\par \par 3/20/23\par Pt is here for follow up.\par Feels well mostly except for fatigue.\par Becomes confused post chemo ? d/t steroids which persisted for about 3 days post chemo. \par Swelling in LE has resolved. Tolerating AC well. Had one episode of rectal bleed which resolved likely d/t hemorrhoids\par She completed PET scan on 3/9/23. \par She is s/p 4 cycles of Carboplatin/Taxol.Keytruda. \par \par 4/10/23\par Pt is here for follow up.\par Feels well mostly except for fatigue.\par Swelling in LE has resolved. Tolerating AC well. Had one episode of rectal bleed previously which resolved likely d/t hemorrhoids\par She completed PET scan on 3/9/23. \par She is s/p 4 cycles of Carboplatin/Taxol/Keytruda which was switched to Keytruda alone starting cycle 5. \par She complains of joint pain in b/l hands and left ankle, associated with L ankle swelling. She has hx of RA (was on MTX which has been on hold since chemo started). \par \par 5/1/23\par Pt is here for follow up.\par Swelling in LE has resolved. Tolerating AC well. Had one episode of rectal bleed previously which resolved likely d/t hemorrhoids\par She completed PET scan on 3/9/23. \par She is s/p 4 cycles of Carboplatin/Taxol/Keytruda which was switched to Keytruda alone starting cycle 5. \par She complains of joint pain in b/l hands and left ankle, associated with L ankle swelling. She has hx of RA (was on MTX which has been on hold since chemo started). She\par still has persistent joint pain, stiffness and is affecting her quality of life. \par She saw Dr Grant (Rheum) and was started on Prednisone 10mg qd, Hydroxychlorquine 400mg daily, and Tramadol prn. She feels symptoms are still uncontrolled. \par \par 5/22/23\par Patient presents to clinic for follow up accompanied by her daughter.\par She appears to be in acute RA flare. She has stiffness and pain in her back, shoulders and small joints of hands.\par Daughter reported that she had a MRI done of cervical spine that showed nerve impingement in the cervical spine\par She is taking MTX, Plaquenil and advil 200 mg TiD with minimal relief.\par We spoke to her about giving her a break from immunotherapy till her pain improves and she agreed.\par

## 2023-05-22 NOTE — ASSESSMENT
[FreeTextEntry1] : In summary, Uzma Pop is a 82 year old female who initially diagnosed with squamous cell carcinoma of the cervix, FIGO Stage IIIA, s/p chemo/RT.\par \par Pt was treated with chemoRT.\par \par Given her age and possible cardiac issues (inability to handle large volumes of pre and post chemo hydration). We had decided to use Carboplatin AUC 2 as a radiation sensitizer. She is s/p 5 weekly doses of Carboplatin. The patient received 7000 cGy to the Pelvis/cervix from 6/2/2021 through 8/2/2021\par \par Post definitive chemoRt in 12/21/21, she had a FDG avid right external iliac lymph node, SUV max 10.0 measuring\par 1.0 cm, consistent with biological tumor activity. She is s/p SBRT x 5 to that lesion.\par \par Her PET scan on 6/22 shows residual FDG uptake in the Rt external iliac LN although stable, findings d/w pt and daughter.\par \par Unfortunately, pt was admitted to hospital in November 2022 when she had severe RLE pain. She underwent CT AP in ED showing necrotic R iliac lymph node that increased in size since 4/2022 with asymmetrically dilated R iliac and feeding veins. She was evaluated by vascular team and was taken for R iliac vein stent placement and now on eliquis. She was also evaluated by gynonc team as well radiation oncology team, unfortunately cannot offer any intervention at this time.\par \par She underwent PET scan on 12/2022 shows compared to PET/CT 6/28/2022, interval placement of a right iliac venous stent. Increased FDG uptake in the region of the right pelvic sidewall (SUV 13.3), difficult to delineate on CT or emission imaging if this is related to recent stent placement versus right iliac lymph node. No other definite sites of abnormal FDG uptake to suggest biologic tumor activity.\par \par Given pt has locoregional recurrence disease w/ prior hx of RT, the next best regimen will be systemic therapy. \par \par Since pt's cervical cancer has PDL1 of 70-80%, we offered her carbotaxol + keytruda based on the KEYNOTE-826 trial (pembrolizumab improved median PFS versus placebo (10.4 versus 8.2 months) and OS at 24 months (50% vs 40% in pts w/ PD-L1 CPS of =1). \par \par In the KEYNOTE-826 trial, Bevacizumab was optional (2/3 of pts received) and given pt has possible thrombus in her iliac vein, we will hold off on giving bevacizumab. \par \par We will also modify carboplatin/taxol to weekly dosing due to her age and fraility with carboplatin AUC2 and taxol 80 mg/m2 D1 and D8 every 3 weeks. \par \par Given pt had carboplatin previously, we advised pt to take zytrec 10 mg qD 3 days before, on the day of  and 3 days after each cycle of chemo. \par \par PET scan 3/9/23 after 4 cycles of carbo/Taxol and Keytruda shows good treatment response with no new sites of abnormal FDG uptake, decreased FDG uptake in the region of the right pelvic sidewall (SUV up to 3.5 previously 13.3), wall thickening of the descending colon and rectosigmoid with associated increased FDG uptake suggestive of infectious or inflammatory process.\par \par Given good treatment response, and adverse effects from chemotherapy and premedications causing confusion, we will only continue Keytruda and hold carbo/taxol moving forward. \par \par \par PLAN:\par I have prepared the note after I reviewed the previous notes, reviewed the radiological and laboratory studies and have communicated those to the patient\par \par s/p  cycle 6 of Keytruda 200 mg IV every 3 weeks (as of 5/1/23)\par \par Given her acute RA flare we will hold the treatment today (5/22/23) and see her in clinic in 3 weeks to assess her for restarting the treatment.\par   \par ****Side effect profile of pembrolizumab were discussed with pt including but not limited to: anemia, fatigue, hyperglycemia, hyponatremia, hypoalbuminemia, itching, cough, nausea, rash, decreased appetite, hypertriglyceridemia, increased liver enzymes, hypocalcemia, constipation, diarrhea, upper respiratory tract infection and an immune-mediated reaction. Lab work will check for elevated liver enzymes and thyroid function. 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 Currently she is on Prednisone 5mg, Plaquenil and hydrocodonel but symptoms are uncontrolled. \par - She was recently started on MTX weekly by her rheumatologist. \par - Follow up MRI spine results. She will have it faxed to us. \par \par RTC 3 weeks \par \par Pt seen and case discussed with Dr Krueger.

## 2023-05-22 NOTE — CONSULT LETTER
[Dear  ___] : Dear  [unfilled], [Consult Letter:] : I had the pleasure of evaluating your patient, [unfilled]. [Please see my note below.] : Please see my note below. [Consult Closing:] : Thank you very much for allowing me to participate in the care of this patient.  If you have any questions, please do not hesitate to contact me. [Sincerely,] : Sincerely, [DrRaya  ___] : Dr. HOLGUIN [DrRaya ___] : Dr. HOLGUIN [FreeTextEntry3] : Marlena Krueger MD\par Professor [employer]\par [employer]\par Attending Physician\par [facility] [place]\par [phone]\par \par

## 2023-05-22 NOTE — PHYSICAL EXAM
[Restricted in physically strenuous activity but ambulatory and able to carry out work of a light or sedentary nature] : Status 1- Restricted in physically strenuous activity but ambulatory and able to carry out work of a light or sedentary nature, e.g., light house work, office work [Normal] : affect appropriate [de-identified] : RLE swelling resolved [de-identified] : No spinal tenderness

## 2023-06-09 ENCOUNTER — OUTPATIENT (OUTPATIENT)
Dept: OUTPATIENT SERVICES | Facility: HOSPITAL | Age: 83
LOS: 1 days | End: 2023-06-09
Payer: MEDICARE

## 2023-06-09 ENCOUNTER — RESULT REVIEW (OUTPATIENT)
Age: 83
End: 2023-06-09

## 2023-06-09 DIAGNOSIS — Z51.11 ENCOUNTER FOR ANTINEOPLASTIC CHEMOTHERAPY: ICD-10-CM

## 2023-06-09 DIAGNOSIS — C53.9 MALIGNANT NEOPLASM OF CERVIX UTERI, UNSPECIFIED: ICD-10-CM

## 2023-06-09 DIAGNOSIS — Z92.89 PERSONAL HISTORY OF OTHER MEDICAL TREATMENT: Chronic | ICD-10-CM

## 2023-06-09 LAB — GLUCOSE BLDC GLUCOMTR-MCNC: 139 MG/DL — HIGH (ref 70–99)

## 2023-06-09 PROCEDURE — 78815 PET IMAGE W/CT SKULL-THIGH: CPT | Mod: 26,PS,MH

## 2023-06-09 PROCEDURE — 82962 GLUCOSE BLOOD TEST: CPT

## 2023-06-09 PROCEDURE — 78815 PET IMAGE W/CT SKULL-THIGH: CPT | Mod: PS

## 2023-06-09 PROCEDURE — A9552: CPT

## 2023-06-10 DIAGNOSIS — C53.9 MALIGNANT NEOPLASM OF CERVIX UTERI, UNSPECIFIED: ICD-10-CM

## 2023-06-12 ENCOUNTER — OUTPATIENT (OUTPATIENT)
Dept: OUTPATIENT SERVICES | Facility: HOSPITAL | Age: 83
LOS: 1 days | End: 2023-06-12
Payer: MEDICARE

## 2023-06-12 ENCOUNTER — APPOINTMENT (OUTPATIENT)
Dept: HEMATOLOGY ONCOLOGY | Facility: CLINIC | Age: 83
End: 2023-06-12
Payer: MEDICARE

## 2023-06-12 ENCOUNTER — APPOINTMENT (OUTPATIENT)
Dept: INFUSION THERAPY | Facility: CLINIC | Age: 83
End: 2023-06-12
Payer: MEDICARE

## 2023-06-12 ENCOUNTER — LABORATORY RESULT (OUTPATIENT)
Age: 83
End: 2023-06-12

## 2023-06-12 VITALS
SYSTOLIC BLOOD PRESSURE: 127 MMHG | WEIGHT: 149 LBS | TEMPERATURE: 97.7 F | HEART RATE: 101 BPM | DIASTOLIC BLOOD PRESSURE: 60 MMHG | BODY MASS INDEX: 28.13 KG/M2 | HEIGHT: 61 IN

## 2023-06-12 DIAGNOSIS — C53.9 MALIGNANT NEOPLASM OF CERVIX UTERI, UNSPECIFIED: ICD-10-CM

## 2023-06-12 DIAGNOSIS — Z51.11 ENCOUNTER FOR ANTINEOPLASTIC CHEMOTHERAPY: ICD-10-CM

## 2023-06-12 DIAGNOSIS — I82.409 ACUTE EMBOLISM AND THROMBOSIS OF UNSPECIFIED DEEP VEINS OF UNSPECIFIED LOWER EXTREMITY: ICD-10-CM

## 2023-06-12 DIAGNOSIS — Z92.89 PERSONAL HISTORY OF OTHER MEDICAL TREATMENT: Chronic | ICD-10-CM

## 2023-06-12 DIAGNOSIS — Z51.12 ENCOUNTER FOR ANTINEOPLASTIC IMMUNOTHERAPY: ICD-10-CM

## 2023-06-12 DIAGNOSIS — D49.89 NEOPLASM OF UNSPECIFIED BEHAVIOR OF OTHER SPECIFIED SITES: ICD-10-CM

## 2023-06-12 DIAGNOSIS — R22.40 LOCALIZED SWELLING, MASS AND LUMP, UNSPECIFIED LOWER LIMB: Chronic | ICD-10-CM

## 2023-06-12 LAB
ALBUMIN SERPL ELPH-MCNC: 4.2 G/DL
ALP BLD-CCNC: 69 U/L
ALT SERPL-CCNC: 15 U/L
ANION GAP SERPL CALC-SCNC: 12 MMOL/L
AST SERPL-CCNC: 17 U/L
BILIRUB SERPL-MCNC: <0.2 MG/DL
BUN SERPL-MCNC: 20 MG/DL
CALCIUM SERPL-MCNC: 9.9 MG/DL
CHLORIDE SERPL-SCNC: 103 MMOL/L
CO2 SERPL-SCNC: 25 MMOL/L
CREAT SERPL-MCNC: 0.9 MG/DL
EGFR: 63 ML/MIN/1.73M2
GLUCOSE SERPL-MCNC: 104 MG/DL
HCT VFR BLD CALC: 36.7 %
HGB BLD-MCNC: 11.1 G/DL
MAGNESIUM SERPL-MCNC: 1.6 MG/DL
MCHC RBC-ENTMCNC: 24.2 PG
MCHC RBC-ENTMCNC: 30.2 G/DL
MCV RBC AUTO: 80.1 FL
PLATELET # BLD AUTO: 211 K/UL
PMV BLD: 10.5 FL
POTASSIUM SERPL-SCNC: 4.8 MMOL/L
PROT SERPL-MCNC: 6.8 G/DL
RBC # BLD: 4.58 M/UL
RBC # FLD: 14.6 %
SODIUM SERPL-SCNC: 140 MMOL/L
WBC # FLD AUTO: 7.8 K/UL

## 2023-06-12 PROCEDURE — 36415 COLL VENOUS BLD VENIPUNCTURE: CPT

## 2023-06-12 PROCEDURE — 99215 OFFICE O/P EST HI 40 MIN: CPT

## 2023-06-12 PROCEDURE — 96413 CHEMO IV INFUSION 1 HR: CPT

## 2023-06-12 PROCEDURE — 85027 COMPLETE CBC AUTOMATED: CPT

## 2023-06-12 PROCEDURE — 84439 ASSAY OF FREE THYROXINE: CPT

## 2023-06-12 PROCEDURE — 84443 ASSAY THYROID STIM HORMONE: CPT

## 2023-06-12 PROCEDURE — 80053 COMPREHEN METABOLIC PANEL: CPT

## 2023-06-12 PROCEDURE — 83735 ASSAY OF MAGNESIUM: CPT

## 2023-06-12 RX ORDER — PEMBROLIZUMAB 25 MG/ML
200 INJECTION, SOLUTION INTRAVENOUS ONCE
Refills: 0 | Status: COMPLETED | OUTPATIENT
Start: 2023-06-12 | End: 2023-06-12

## 2023-06-12 RX ADMIN — PEMBROLIZUMAB 200 MILLIGRAM(S): 25 INJECTION, SOLUTION INTRAVENOUS at 13:45

## 2023-06-12 RX ADMIN — PEMBROLIZUMAB 200 MILLIGRAM(S): 25 INJECTION, SOLUTION INTRAVENOUS at 14:20

## 2023-06-13 ENCOUNTER — APPOINTMENT (OUTPATIENT)
Dept: HEMATOLOGY ONCOLOGY | Facility: CLINIC | Age: 83
End: 2023-06-13

## 2023-06-13 LAB
T4 FREE SERPL-MCNC: 1.2 NG/DL
TSH SERPL-ACNC: 0.82 UIU/ML

## 2023-06-13 NOTE — CONSULT LETTER
[Dear  ___] : Dear  [unfilled], [Consult Letter:] : I had the pleasure of evaluating your patient, [unfilled]. [Please see my note below.] : Please see my note below. [Consult Closing:] : Thank you very much for allowing me to participate in the care of this patient.  If you have any questions, please do not hesitate to contact me. [Sincerely,] : Sincerely, [DrRaya  ___] : Dr. HOLGUIN [DrRaya ___] : Dr. HOLGUIN [FreeTextEntry3] : Marlena Krueger MD\par Professor [employer]\par [employer]\par Attending Physician\par [facility] [place]\par [phone]\par \par

## 2023-06-13 NOTE — REVIEW OF SYSTEMS
[Joint Pain] : joint pain [Joint Stiffness] : joint stiffness [Negative] : Allergic/Immunologic [FreeTextEntry9] : b/l hands joint pain , and neuropathy [FreeTextEntry2] : fatigue [de-identified] : intermittent confusion reported by her daughter

## 2023-06-13 NOTE — HISTORY OF PRESENT ILLNESS
[Disease: _____________________] : Disease: [unfilled] [de-identified] : The patient is an 81 year old woman who presented with post-menopausal vaginal bleeding in March 2021. \par Upon further work up, PAP smear on 3/18/21 showed: LSIL/AGC/HPV+. Pelvic US on 3/22/21 showed endometrium to be 4.2mm thick. 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 \par MRI pelvis on 4/3/21 showed signal abnormality in the posterior cervix 2.5cm x 1.6cm. The lesion does not go beyond the limit of the cervix. \par She saw Dr. Echevarria on 4/14/21 and on his exam, she was noted to have a 4-5cm cervical mass involving at least the middle of the vaginal wall, circumferentially. No parametrial involvement. She was not deemed a surgical candidate. \par Definitive chemoradiation was recommended. She is here to discuss radiation. \par PET/CT: Pending\par \par Pt C/O low back pain, for which she takes Tylenol. Vaginal bleeding is not very excessive. No SOB, Chest pain.\par Pt has a stress test next week\par  [de-identified] : Squamous cell [de-identified] : 5/18/21\par Pt is here for follow up.\par She has completed a PET scan.\par has moderate back pain\par \par 6/9/21\par Pt is here for follow up.\par She has started RT last week. She also recd 1 dose of carboplatin last week.\par She did not have any nausea or vomiting. She has been getting diarrhea which she has been managing with imodium. Her bld sugar was also running high.\par No fever, mouth sores.\par \par 6/16/21\par Pt is here for follow up.\par She is s/p 2 doses of Carbo.\par C/O feeling very fatigued. Had diarrhea which is controlled with imodium.\par No N, V.\par has abdominal discomfort.\par \par 6/23/2021\par Pt is here to follow up for b=cervical cancer, accompanied by daughter\par She is on Carbo s/p 2 cycles, tolerating well\par She feels a lot better today\par She denies abdominal pain, nausea or vomiting\par She gets diarrhea but is managed with Imodium\par \par 7/1/2021\par Pt is here to follow up for cervical cancer, accompanied by daughter\par She is on Carbo s/p 3 cycles, tolerating well\par She feels fine today, appetite is good\par She denies abdominal pain, nausea or vomiting, no vaginal bleeding/discharge\par She gets diarrhea, takes Imodium after having 7-8 episodes then gets constipation the following day\par \par 7/7/2021\par Pt is here to follow up for cervical cancer, accompanied by daughter\par She is on weekly Carbo s/p  4 cycles, tolerating well\par She feels fine today, appetite is good\par She denies nausea or vomiting, no vaginal bleeding/discharge\par She c/o of low abdominal cramping and gets diarrhea 4-5x/day, managed with Imodium then gets constipation the following day\par \par 7/26/21\par Pt is here for follow up,\par She is feeling better. Diarrhea has improved. NO vaginal bleeding\par \par 9/8/21\par Pt is here for follow up. Has completed RT on 8/2/21.\par No vag bleeding, no pelvic pain.\par \par 12/21/21\par Pt is here today for follow up visit for cervical cancer. She has completed chemo (7/2021)/Rad (8/2021).\par Pt denies any vaginal bleeding or abd/pelvic pain.\par She had a PET CT scan on 12/21/21 which showed FDG avid right external iliac lymph node, SUV max 10.0 measuring\par 1.0 cm, consistent with biological tumor activity. She saw Dr. Ragland and agreed to have SBRT since she is not surgical candidate.\par Simulation was done.\par She has an appt with Dr. Echevarria 1/12/22.\par \par 4/25/22\par Pt is here for follow up. She had been C/O rectal bleeding over the past week. She went to ER and was treated with ABX. CT scan abdomen and pelvis reviwed ? proctoclitis.\par Symptoms have improved.Pt has appt with GI.\par \par 12/12/22\par Pt is here for follow up. \par She was recently admitted to hospital in November 2022 when she had severe RLE pain. She underwent CT AP in ED showing necrotic R iliac lymph node that increased in size since 4/2022 with asymmetrically dilated R iliac and feeding veins. She was evaluated by vascular team and she was taken for R iliac vein stent placement and now on eliquis. She was also evaluated by gynonc team as well radiation oncology team, unfortunately cannot offer any intervention at this time.\par She is doing much better since she was discharged from hospital, swelling of RLE significant improved. \par She still experiences R groin pain which she takes Percocet. \par Otherwise, she denies fever, chill, SOB, CP, N/V/D. \par \par 1/12/2023\par Pt presents to clinic for follow up and cycle#2 of Carbo/taxol & Keytruda accompanied by daughter. \par With her first cycle she had diarrhea & fatigue for two days that improved with Imodium.\par She has lost 20 lbs over last months. Her daughters are taking care of her  diet. \par Her leg swelling has been subsiding well and she is requiring lesser pain meds for her leg pain. \par \par 1/31/23\par Pt is here for follow up.\par C/O pain in her back since a week or so. Leg swelling and pain has resolved.\par C/O significant fatigue\par \par 3/2/23\par Pt is here for follow up.\par Feels well mostly except for fatigue.\par Becomes confused post chemo ? d/t steroids.\par Swelling in LE has resolved. Tolerating AC well. Had one episode of rectal bleed which resolved likely d/t hemorrhoids, pt was constipated\par \par 3/20/23\par Pt is here for follow up.\par Feels well mostly except for fatigue.\par Becomes confused post chemo ? d/t steroids which persisted for about 3 days post chemo. \par Swelling in LE has resolved. Tolerating AC well. Had one episode of rectal bleed which resolved likely d/t hemorrhoids\par She completed PET scan on 3/9/23. \par She is s/p 4 cycles of Carboplatin/Taxol.Keytruda. \par \par 4/10/23\par Pt is here for follow up.\par Feels well mostly except for fatigue.\par Swelling in LE has resolved. Tolerating AC well. Had one episode of rectal bleed previously which resolved likely d/t hemorrhoids\par She completed PET scan on 3/9/23. \par She is s/p 4 cycles of Carboplatin/Taxol/Keytruda which was switched to Keytruda alone starting cycle 5. \par She complains of joint pain in b/l hands and left ankle, associated with L ankle swelling. She has hx of RA (was on MTX which has been on hold since chemo started). \par \par 5/1/23\par Pt is here for follow up.\par Swelling in LE has resolved. Tolerating AC well. Had one episode of rectal bleed previously which resolved likely d/t hemorrhoids\par She completed PET scan on 3/9/23. \par She is s/p 4 cycles of Carboplatin/Taxol/Keytruda which was switched to Keytruda alone starting cycle 5. \par She complains of joint pain in b/l hands and left ankle, associated with L ankle swelling. She has hx of RA (was on MTX which has been on hold since chemo started). She\par still has persistent joint pain, stiffness and is affecting her quality of life. \par She saw Dr Grant (Rheum) and was started on Prednisone 10mg qd, Hydroxychlorquine 400mg daily, and Tramadol prn. She feels symptoms are still uncontrolled. \par \par 5/22/23\par Patient presents to clinic for follow up accompanied by her daughter.\par She appears to be in acute RA flare. She has stiffness and pain in her back, shoulders and small joints of hands.\par Daughter reported that she had a MRI done of cervical spine that showed nerve impingement in the cervical spine\par She is taking MTX, Plaquenil and advil 200 mg TiD with minimal relief.\par We spoke to her about giving her a break from immunotherapy till her pain improves and she agreed.\par \par 6/12/23\par Patient presents to clinic for follow up accompanied by her daughter.\par Daughter reported that she had a MRI done of cervical spine that showed nerve impingement in the cervical spine\par She is taking MTX, Plaquenil and advil 200 mg TiD, and prednisone. \par Her Keytruda was held last cycle, due to her RA flare. She is due for C#8 today. \par She feels much better today, only complains of joint pain in b/l hands and neuropathy. \par She also feels fatigued. \par She completed PET scan on 6/9/23. \par

## 2023-06-13 NOTE — ASSESSMENT
[FreeTextEntry1] : In summary, Uzma Pop is a 82 year old female who initially diagnosed with squamous cell carcinoma of the cervix, FIGO Stage IIIA, s/p chemo/RT.\par \par Pt was treated with chemoRT.\par \par Given her age and possible cardiac issues (inability to handle large volumes of pre and post chemo hydration). We had decided to use Carboplatin AUC 2 as a radiation sensitizer. She is s/p 5 weekly doses of Carboplatin. The patient received 7000 cGy to the Pelvis/cervix from 6/2/2021 through 8/2/2021\par \par Post definitive chemoRt in 12/21/21, she had a FDG avid right external iliac lymph node, SUV max 10.0 measuring\par 1.0 cm, consistent with biological tumor activity. She is s/p SBRT x 5 to that lesion.\par \par Her PET scan on 6/22 shows residual FDG uptake in the Rt external iliac LN although stable, findings d/w pt and daughter.\par \par Unfortunately, pt was admitted to hospital in November 2022 when she had severe RLE pain. She underwent CT AP in ED showing necrotic R iliac lymph node that increased in size since 4/2022 with asymmetrically dilated R iliac and feeding veins. She was evaluated by vascular team and was taken for R iliac vein stent placement and now on eliquis. She was also evaluated by gynonc team as well radiation oncology team, unfortunately cannot offer any intervention at this time.\par \par She underwent PET scan on 12/2022 shows compared to PET/CT 6/28/2022, interval placement of a right iliac venous stent. Increased FDG uptake in the region of the right pelvic sidewall (SUV 13.3), difficult to delineate on CT or emission imaging if this is related to recent stent placement versus right iliac lymph node. No other definite sites of abnormal FDG uptake to suggest biologic tumor activity.\par \par Given pt has locoregional recurrence disease w/ prior hx of RT, the next best regimen will be systemic therapy. \par \par Since pt's cervical cancer has PDL1 of 70-80%, we offered her carbotaxol + keytruda based on the KEYNOTE-826 trial (pembrolizumab improved median PFS versus placebo (10.4 versus 8.2 months) and OS at 24 months (50% vs 40% in pts w/ PD-L1 CPS of =1). \par \par In the KEYNOTE-826 trial, Bevacizumab was optional (2/3 of pts received) and given pt has possible thrombus in her iliac vein, we will hold off on giving bevacizumab. \par \par We will also modify carboplatin/taxol to weekly dosing due to her age and fraility with carboplatin AUC2 and taxol 80 mg/m2 D1 and D8 every 3 weeks. \par \par Given pt had carboplatin previously, we advised pt to take zytrec 10 mg qD 3 days before, on the day of  and 3 days after each cycle of chemo. \par \par PET scan 3/9/23 after 4 cycles of carbo/Taxol and Keytruda shows good treatment response with no new sites of abnormal FDG uptake, decreased FDG uptake in the region of the right pelvic sidewall (SUV up to 3.5 previously 13.3), wall thickening of the descending colon and rectosigmoid with associated increased FDG uptake suggestive of infectious or inflammatory process.\par \par Given good treatment response, and adverse effects from chemotherapy and premedications causing confusion, we will only continue Keytruda and hold carbo/taxol moving forward. \par Her Keytruda was held on 5/22 due to RA flare. \par \par PET scan 6/9/23: Persistent FDG uptake in right pelvic sidewall, with no other sites. \par \par \par PLAN:\par I have prepared the note after I reviewed the previous notes, reviewed the radiological and laboratory studies and have communicated those to the patient\par \par --Continue/resume cycle 8 of Keytruda today. CBC reviewed and is adequate. \par   \par ****Side effect profile of pembrolizumab were discussed with pt including but not limited to: anemia, fatigue, hyperglycemia, hyponatremia, hypoalbuminemia, itching, cough, nausea, rash, decreased appetite, hypertriglyceridemia, increased liver enzymes, hypocalcemia, constipation, diarrhea, upper respiratory tract infection and an immune-mediated reaction. Lab work will check for elevated liver enzymes and thyroid function. 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 Currently she is on Prednisone 5mg, Plaquenil and hydrocodone, and MTX which was recently started by her rheumatologist. Symptoms improved now. \par - Follow up MRI spine results. She will have it faxed to us. \par \par RTC 3 weeks \par \par Pt seen and case discussed with Dr Krueger.

## 2023-06-13 NOTE — PHYSICAL EXAM
[Restricted in physically strenuous activity but ambulatory and able to carry out work of a light or sedentary nature] : Status 1- Restricted in physically strenuous activity but ambulatory and able to carry out work of a light or sedentary nature, e.g., light house work, office work [Normal] : affect appropriate [de-identified] : RLE swelling resolved [de-identified] : No spinal tenderness

## 2023-07-03 ENCOUNTER — APPOINTMENT (OUTPATIENT)
Dept: INFUSION THERAPY | Facility: CLINIC | Age: 83
End: 2023-07-03
Payer: MEDICARE

## 2023-07-03 ENCOUNTER — OUTPATIENT (OUTPATIENT)
Dept: OUTPATIENT SERVICES | Facility: HOSPITAL | Age: 83
LOS: 1 days | End: 2023-07-03
Payer: MEDICARE

## 2023-07-03 ENCOUNTER — APPOINTMENT (OUTPATIENT)
Dept: HEMATOLOGY ONCOLOGY | Facility: CLINIC | Age: 83
End: 2023-07-03
Payer: MEDICARE

## 2023-07-03 ENCOUNTER — LABORATORY RESULT (OUTPATIENT)
Age: 83
End: 2023-07-03

## 2023-07-03 VITALS
RESPIRATION RATE: 14 BRPM | SYSTOLIC BLOOD PRESSURE: 147 MMHG | HEIGHT: 61 IN | DIASTOLIC BLOOD PRESSURE: 65 MMHG | TEMPERATURE: 97.1 F | HEART RATE: 96 BPM | WEIGHT: 148 LBS | BODY MASS INDEX: 27.94 KG/M2

## 2023-07-03 DIAGNOSIS — C53.9 MALIGNANT NEOPLASM OF CERVIX UTERI, UNSPECIFIED: ICD-10-CM

## 2023-07-03 DIAGNOSIS — R22.40 LOCALIZED SWELLING, MASS AND LUMP, UNSPECIFIED LOWER LIMB: Chronic | ICD-10-CM

## 2023-07-03 DIAGNOSIS — Z92.89 PERSONAL HISTORY OF OTHER MEDICAL TREATMENT: Chronic | ICD-10-CM

## 2023-07-03 LAB
ALBUMIN SERPL ELPH-MCNC: 4.2 G/DL
ALP BLD-CCNC: 60 U/L
ALT SERPL-CCNC: 14 U/L
ANION GAP SERPL CALC-SCNC: 12 MMOL/L
AST SERPL-CCNC: 16 U/L
BILIRUB SERPL-MCNC: 0.3 MG/DL
BUN SERPL-MCNC: 16 MG/DL
CALCIUM SERPL-MCNC: 9.3 MG/DL
CHLORIDE SERPL-SCNC: 98 MMOL/L
CO2 SERPL-SCNC: 24 MMOL/L
CREAT SERPL-MCNC: 1 MG/DL
EGFR: 56 ML/MIN/1.73M2
GLUCOSE SERPL-MCNC: 357 MG/DL
HCT VFR BLD CALC: 36.2 %
HGB BLD-MCNC: 11.1 G/DL
MAGNESIUM SERPL-MCNC: 1.7 MG/DL
MCHC RBC-ENTMCNC: 23.9 PG
MCHC RBC-ENTMCNC: 30.7 G/DL
MCV RBC AUTO: 77.8 FL
PLATELET # BLD AUTO: 210 K/UL
PMV BLD: 10.4 FL
POTASSIUM SERPL-SCNC: 5.3 MMOL/L
PROT SERPL-MCNC: 6.7 G/DL
RBC # BLD: 4.65 M/UL
RBC # FLD: 15.5 %
SODIUM SERPL-SCNC: 134 MMOL/L
T4 FREE SERPL-MCNC: 1.2 NG/DL
TSH SERPL-ACNC: 0.63 UIU/ML
WBC # FLD AUTO: 6.79 K/UL

## 2023-07-03 PROCEDURE — 99215 OFFICE O/P EST HI 40 MIN: CPT

## 2023-07-03 PROCEDURE — 84443 ASSAY THYROID STIM HORMONE: CPT

## 2023-07-03 PROCEDURE — 80053 COMPREHEN METABOLIC PANEL: CPT

## 2023-07-03 PROCEDURE — 85027 COMPLETE CBC AUTOMATED: CPT

## 2023-07-03 PROCEDURE — 84439 ASSAY OF FREE THYROXINE: CPT

## 2023-07-03 PROCEDURE — 36415 COLL VENOUS BLD VENIPUNCTURE: CPT

## 2023-07-03 PROCEDURE — 83735 ASSAY OF MAGNESIUM: CPT

## 2023-07-03 PROCEDURE — 96413 CHEMO IV INFUSION 1 HR: CPT

## 2023-07-03 RX ORDER — PEMBROLIZUMAB 25 MG/ML
200 INJECTION, SOLUTION INTRAVENOUS ONCE
Refills: 0 | Status: COMPLETED | OUTPATIENT
Start: 2023-07-03 | End: 2023-07-03

## 2023-07-03 RX ADMIN — PEMBROLIZUMAB 200 MILLIGRAM(S): 25 INJECTION, SOLUTION INTRAVENOUS at 15:51

## 2023-07-03 RX ADMIN — PEMBROLIZUMAB 200 MILLIGRAM(S): 25 INJECTION, SOLUTION INTRAVENOUS at 15:20

## 2023-07-04 DIAGNOSIS — C53.9 MALIGNANT NEOPLASM OF CERVIX UTERI, UNSPECIFIED: ICD-10-CM

## 2023-07-04 NOTE — REVIEW OF SYSTEMS
[Joint Pain] : joint pain [Joint Stiffness] : joint stiffness [Negative] : Allergic/Immunologic [FreeTextEntry2] : fatigue [FreeTextEntry9] : b/l hands joint pain , and neuropathy [de-identified] : intermittent confusion reported by her daughter

## 2023-07-04 NOTE — ASSESSMENT
[FreeTextEntry1] : In summary, Uzma Pop is a 82 year old female who initially diagnosed with squamous cell carcinoma of the cervix, FIGO Stage IIIA, s/p chemo/RT.\par \par Pt was treated with chemoRT.\par \par Given her age and possible cardiac issues (inability to handle large volumes of pre and post chemo hydration). We had decided to use Carboplatin AUC 2 as a radiation sensitizer. She is s/p 5 weekly doses of Carboplatin. The patient received 7000 cGy to the Pelvis/cervix from 6/2/2021 through 8/2/2021\par \par Post definitive chemoRt in 12/21/21, she had a FDG avid right external iliac lymph node, SUV max 10.0 measuring\par 1.0 cm, consistent with biological tumor activity. She is s/p SBRT x 5 to that lesion.\par \par Her PET scan on 6/22 shows residual FDG uptake in the Rt external iliac LN although stable, findings d/w pt and daughter.\par \par Unfortunately, pt was admitted to hospital in November 2022 when she had severe RLE pain. She underwent CT AP in ED showing necrotic R iliac lymph node that increased in size since 4/2022 with asymmetrically dilated R iliac and feeding veins. She was evaluated by vascular team and was taken for R iliac vein stent placement and now on eliquis. She was also evaluated by gynonc team as well radiation oncology team, unfortunately cannot offer any intervention at this time.\par \par She underwent PET scan on 12/2022 shows compared to PET/CT 6/28/2022, interval placement of a right iliac venous stent. Increased FDG uptake in the region of the right pelvic sidewall (SUV 13.3), difficult to delineate on CT or emission imaging if this is related to recent stent placement versus right iliac lymph node. No other definite sites of abnormal FDG uptake to suggest biologic tumor activity.\par \par Given pt has locoregional recurrence disease w/ prior hx of RT, the next best regimen will be systemic therapy. \par \par Since pt's cervical cancer has PDL1 of 70-80%, we offered her carbotaxol + keytruda based on the KEYNOTE-826 trial (pembrolizumab improved median PFS versus placebo (10.4 versus 8.2 months) and OS at 24 months (50% vs 40% in pts w/ PD-L1 CPS of =1). \par \par In the KEYNOTE-826 trial, Bevacizumab was optional (2/3 of pts received) and given pt has possible thrombus in her iliac vein, we will hold off on giving bevacizumab. \par \par We will also modified carboplatin/taxol to weekly dosing due to her age and fraility with carboplatin AUC2 and taxol 80 mg/m2 D1 and D8 every 3 weeks. \par \par Given pt had carboplatin previously, we advised pt to take zytrec 10 mg qD 3 days before, on the day of  and 3 days after each cycle of chemo. \par \par PET scan 3/9/23 after 4 cycles of carbo/Taxol and Keytruda shows good treatment response with no new sites of abnormal FDG uptake, decreased FDG uptake in the region of the right pelvic sidewall (SUV up to 3.5 previously 13.3), wall thickening of the descending colon and rectosigmoid with associated increased FDG uptake suggestive of infectious or inflammatory process.\par \par Given good treatment response, and adverse effects from chemotherapy and premedications causing confusion, we will only continue Keytruda and hold carbo/taxol moving forward. \par Her Keytruda was held on 5/22 due to RA flare. \par \par PET scan 6/9/23: Persistent FDG uptake in right pelvic sidewall, with no other sites. \par \par \par PLAN:\par I have prepared the note after I reviewed the previous notes, reviewed the radiological and laboratory studies and have communicated those to the patient\par \par --Continue with  cycle 9 of Keytruda today. CBC reviewed and is adequate. \par   \par ****Side effect profile of pembrolizumab were discussed with pt including but not limited to: anemia, fatigue, hyperglycemia, hyponatremia, hypoalbuminemia, itching, cough, nausea, rash, decreased appetite, hypertriglyceridemia, increased liver enzymes, hypocalcemia, constipation, diarrhea, upper respiratory tract infection and an immune-mediated reaction. Lab work will check for elevated liver enzymes and thyroid function. 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 Currently she is on Prednisone 5mg, Plaquenil and hydrocodone, and MTX which was recently started by her rheumatologist. Symptoms improved now. \par - Follow up MRI spine results. She will have it faxed to us. \par \par RTC 3 weeks \par \par

## 2023-07-04 NOTE — PHYSICAL EXAM
[Restricted in physically strenuous activity but ambulatory and able to carry out work of a light or sedentary nature] : Status 1- Restricted in physically strenuous activity but ambulatory and able to carry out work of a light or sedentary nature, e.g., light house work, office work [Normal] : affect appropriate [de-identified] : RLE swelling resolved [de-identified] : No spinal tenderness

## 2023-07-04 NOTE — CONSULT LETTER
[Dear  ___] : Dear  [unfilled], [Consult Letter:] : I had the pleasure of evaluating your patient, [unfilled]. [Please see my note below.] : Please see my note below. [Consult Closing:] : Thank you very much for allowing me to participate in the care of this patient.  If you have any questions, please do not hesitate to contact me. [Sincerely,] : Sincerely, [DrRaya  ___] : Dr. HOLGUIN [DrRaya ___] : Dr. HOLGUIN [FreeTextEntry3] : Marlena Krueger MD\par Professor [employer]\par [facility]\par Attending Physician\par [facility]\par [phone]\par \par

## 2023-07-24 ENCOUNTER — APPOINTMENT (OUTPATIENT)
Dept: HEMATOLOGY ONCOLOGY | Facility: CLINIC | Age: 83
End: 2023-07-24
Payer: MEDICARE

## 2023-07-24 ENCOUNTER — LABORATORY RESULT (OUTPATIENT)
Age: 83
End: 2023-07-24

## 2023-07-24 ENCOUNTER — OUTPATIENT (OUTPATIENT)
Dept: OUTPATIENT SERVICES | Facility: HOSPITAL | Age: 83
LOS: 1 days | End: 2023-07-24
Payer: MEDICARE

## 2023-07-24 ENCOUNTER — APPOINTMENT (OUTPATIENT)
Dept: INFUSION THERAPY | Facility: CLINIC | Age: 83
End: 2023-07-24
Payer: MEDICARE

## 2023-07-24 VITALS
HEART RATE: 90 BPM | SYSTOLIC BLOOD PRESSURE: 150 MMHG | HEIGHT: 61 IN | DIASTOLIC BLOOD PRESSURE: 68 MMHG | BODY MASS INDEX: 28.7 KG/M2 | TEMPERATURE: 97.8 F | WEIGHT: 152 LBS | RESPIRATION RATE: 18 BRPM

## 2023-07-24 DIAGNOSIS — R22.40 LOCALIZED SWELLING, MASS AND LUMP, UNSPECIFIED LOWER LIMB: Chronic | ICD-10-CM

## 2023-07-24 DIAGNOSIS — C53.9 MALIGNANT NEOPLASM OF CERVIX UTERI, UNSPECIFIED: ICD-10-CM

## 2023-07-24 DIAGNOSIS — Z92.89 PERSONAL HISTORY OF OTHER MEDICAL TREATMENT: Chronic | ICD-10-CM

## 2023-07-24 PROCEDURE — 96413 CHEMO IV INFUSION 1 HR: CPT

## 2023-07-24 PROCEDURE — 36415 COLL VENOUS BLD VENIPUNCTURE: CPT

## 2023-07-24 PROCEDURE — 80053 COMPREHEN METABOLIC PANEL: CPT

## 2023-07-24 PROCEDURE — 99214 OFFICE O/P EST MOD 30 MIN: CPT

## 2023-07-24 PROCEDURE — 85027 COMPLETE CBC AUTOMATED: CPT

## 2023-07-24 PROCEDURE — 84443 ASSAY THYROID STIM HORMONE: CPT

## 2023-07-24 PROCEDURE — 83735 ASSAY OF MAGNESIUM: CPT

## 2023-07-24 RX ORDER — PEMBROLIZUMAB 25 MG/ML
200 INJECTION, SOLUTION INTRAVENOUS ONCE
Refills: 0 | Status: COMPLETED | OUTPATIENT
Start: 2023-07-24 | End: 2023-07-24

## 2023-07-24 RX ADMIN — PEMBROLIZUMAB 200 MILLIGRAM(S): 25 INJECTION, SOLUTION INTRAVENOUS at 14:23

## 2023-07-24 RX ADMIN — PEMBROLIZUMAB 200 MILLIGRAM(S): 25 INJECTION, SOLUTION INTRAVENOUS at 14:55

## 2023-07-26 LAB
ALBUMIN SERPL ELPH-MCNC: 4.3 G/DL
ALP BLD-CCNC: 61 U/L
ALT SERPL-CCNC: 14 U/L
ANION GAP SERPL CALC-SCNC: 12 MMOL/L
AST SERPL-CCNC: 17 U/L
BILIRUB SERPL-MCNC: 0.3 MG/DL
BUN SERPL-MCNC: 18 MG/DL
CALCIUM SERPL-MCNC: 9.4 MG/DL
CHLORIDE SERPL-SCNC: 100 MMOL/L
CO2 SERPL-SCNC: 25 MMOL/L
CREAT SERPL-MCNC: 0.9 MG/DL
EGFR: 63 ML/MIN/1.73M2
GLUCOSE SERPL-MCNC: 113 MG/DL
HCT VFR BLD CALC: 34.9 %
HGB BLD-MCNC: 10.7 G/DL
MAGNESIUM SERPL-MCNC: 1.8 MG/DL
MCHC RBC-ENTMCNC: 23.3 PG
MCHC RBC-ENTMCNC: 30.7 G/DL
MCV RBC AUTO: 75.9 FL
PLATELET # BLD AUTO: 213 K/UL
PMV BLD: 11 FL
POTASSIUM SERPL-SCNC: 4.7 MMOL/L
PROT SERPL-MCNC: 6.4 G/DL
RBC # BLD: 4.6 M/UL
RBC # FLD: 16.5 %
SODIUM SERPL-SCNC: 137 MMOL/L
TSH SERPL-ACNC: 0.81 UIU/ML
WBC # FLD AUTO: 7.03 K/UL

## 2023-07-26 NOTE — ASSESSMENT
[FreeTextEntry1] : In summary, Uzma Pop is a 82 year old female who initially diagnosed with squamous cell carcinoma of the cervix, FIGO Stage IIIA, s/p chemo/RT.\par \par Pt was treated with chemoRT.\par \par Given her age and possible cardiac issues (inability to handle large volumes of pre and post chemo hydration). We had decided to use Carboplatin AUC 2 as a radiation sensitizer. She is s/p 5 weekly doses of Carboplatin. The patient received 7000 cGy to the Pelvis/cervix from 6/2/2021 through 8/2/2021\par \par Post definitive chemoRt in 12/21/21, she had a FDG avid right external iliac lymph node, SUV max 10.0 measuring\par 1.0 cm, consistent with biological tumor activity. She is s/p SBRT x 5 to that lesion.\par \par Her PET scan on 6/22 shows residual FDG uptake in the Rt external iliac LN although stable, findings d/w pt and daughter.\par \par Unfortunately, pt was admitted to hospital in November 2022 when she had severe RLE pain. She underwent CT AP in ED showing necrotic R iliac lymph node that increased in size since 4/2022 with asymmetrically dilated R iliac and feeding veins. She was evaluated by vascular team and was taken for R iliac vein stent placement and now on Eliquis. She was also evaluated by gynecologic oncology team as well radiation oncology team, unfortunately no intervention could be offered at this time.\par \par She underwent PET scan on 12/2022 shows compared to PET/CT 6/28/2022, interval placement of a right iliac venous stent. Increased FDG uptake in the region of the right pelvic sidewall (SUV 13.3), difficult to delineate on CT or emission imaging if this is related to recent stent placement versus right iliac lymph node. No other definite sites of abnormal FDG uptake to suggest biologic tumor activity.\par \par Given pt has locoregional recurrence disease w/ prior hx of RT, the next best regimen will be systemic therapy. \par \par Since pt's cervical cancer has PDL1 of 70-80%, we offered her carbotaxol + keytruda based on the KEYNOTE-826 trial (pembrolizumab improved median PFS versus placebo (10.4 versus 8.2 months) and OS at 24 months (50% vs 40% in pts w/ PD-L1 CPS of =1). \par \par In the KEYNOTE-826 trial, Bevacizumab was optional (2/3 of pts received) and given pt has possible thrombus in her iliac vein, we will hold off on giving bevacizumab. \par \par We will also modified carboplatin/taxol to weekly dosing due to her age and fraility with carboplatin AUC2 and taxol 80 mg/m2 D1 and D8 every 3 weeks. \par \par Given pt had carboplatin previously, we advised pt to take zytrec 10 mg qD 3 days before, on the day of  and 3 days after each cycle of chemo. \par \par PET scan 3/9/23 after 4 cycles of carbo/Taxol and Keytruda shows good treatment response with no new sites of abnormal FDG uptake, decreased FDG uptake in the region of the right pelvic sidewall (SUV up to 3.5 previously 13.3), wall thickening of the descending colon and rectosigmoid with associated increased FDG uptake suggestive of infectious or inflammatory process.\par \par Given good treatment response, and adverse effects from chemotherapy and premedications causing confusion, we will only continue Keytruda and hold carbo/Taxol moving forward. \par Her Keytruda was held on 5/22 due to RA flare. \par \par PET scan 6/9/23: Persistent FDG uptake in right pelvic sidewall, with no other sites. \par \par \par PLAN:\par I have prepared the note after I reviewed the previous notes, reviewed the radiological and laboratory studies and have communicated those to the patient\par \par --Continue with  cycle 10 of Keytruda today. CBC reviewed and is adequate. \par   \par ****Side effect profile of pembrolizumab were discussed with pt including but not limited to: anemia, fatigue, hyperglycemia, hyponatremia, hypoalbuminemia, itching, cough, nausea, rash, decreased appetite, hypertriglyceridemia, increased liver enzymes, hypocalcemia, constipation, diarrhea, upper respiratory tract infection and an immune-mediated reaction. Lab work will check for elevated liver enzymes and thyroid function. 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 Currently she is on Prednisone 7.5mg, Plaquenil, hydrocodone and MTX which was recently started by her rheumatologist. Symptoms improved now. \par - Follow up MRI spine results. She will have it faxed to us. \par \par RTC 3 weeks \par \par

## 2023-07-26 NOTE — REVIEW OF SYSTEMS
[Joint Pain] : joint pain [Joint Stiffness] : joint stiffness [Negative] : Allergic/Immunologic [Fatigue] : fatigue [FreeTextEntry9] : B/L hands joint pain and neuropathy [de-identified] : Intermittent confusion reported by her daughter

## 2023-07-26 NOTE — HISTORY OF PRESENT ILLNESS
[Disease: _____________________] : Disease: [unfilled] [de-identified] : The patient is an 81 year old woman who presented with post-menopausal vaginal bleeding in March 2021. \par Upon further work up, PAP smear on 3/18/21 showed: LSIL/AGC/HPV+. Pelvic US on 3/22/21 showed endometrium to be 4.2mm thick. 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 \par MRI pelvis on 4/3/21 showed signal abnormality in the posterior cervix 2.5cm x 1.6cm. The lesion does not go beyond the limit of the cervix. \par She saw Dr. Echevarria on 4/14/21 and on his exam, she was noted to have a 4-5cm cervical mass involving at least the middle of the vaginal wall, circumferentially. No parametrial involvement. She was not deemed a surgical candidate. \par Definitive chemoradiation was recommended. She is here to discuss radiation. \par PET/CT: Pending\par \par Pt C/O low back pain, for which she takes Tylenol. Vaginal bleeding is not very excessive. No SOB, Chest pain.\par Pt has a stress test next week\par  [de-identified] : Squamous cell [de-identified] : 5/18/21\par Pt is here for follow up.\par She has completed a PET scan.\par has moderate back pain\par \par 6/9/21\par Pt is here for follow up.\par She has started RT last week. She also recd 1 dose of carboplatin last week.\par She did not have any nausea or vomiting. She has been getting diarrhea which she has been managing with imodium. Her bld sugar was also running high.\par No fever, mouth sores.\par \par 6/16/21\par Pt is here for follow up.\par She is s/p 2 doses of Carbo.\par C/O feeling very fatigued. Had diarrhea which is controlled with imodium.\par No N, V.\par has abdominal discomfort.\par \par 6/23/2021\par Pt is here to follow up for b=cervical cancer, accompanied by daughter\par She is on Carbo s/p 2 cycles, tolerating well\par She feels a lot better today\par She denies abdominal pain, nausea or vomiting\par She gets diarrhea but is managed with Imodium\par \par 7/1/2021\par Pt is here to follow up for cervical cancer, accompanied by daughter\par She is on Carbo s/p 3 cycles, tolerating well\par She feels fine today, appetite is good\par She denies abdominal pain, nausea or vomiting, no vaginal bleeding/discharge\par She gets diarrhea, takes Imodium after having 7-8 episodes then gets constipation the following day\par \par 7/7/2021\par Pt is here to follow up for cervical cancer, accompanied by daughter\par She is on weekly Carbo s/p  4 cycles, tolerating well\par She feels fine today, appetite is good\par She denies nausea or vomiting, no vaginal bleeding/discharge\par She c/o of low abdominal cramping and gets diarrhea 4-5x/day, managed with Imodium then gets constipation the following day\par \par 7/26/21\par Pt is here for follow up,\par She is feeling better. Diarrhea has improved. NO vaginal bleeding\par \par 9/8/21\par Pt is here for follow up. Has completed RT on 8/2/21.\par No vag bleeding, no pelvic pain.\par \par 12/21/21\par Pt is here today for follow up visit for cervical cancer. She has completed chemo (7/2021)/Rad (8/2021).\par Pt denies any vaginal bleeding or abd/pelvic pain.\par She had a PET CT scan on 12/21/21 which showed FDG avid right external iliac lymph node, SUV max 10.0 measuring\par 1.0 cm, consistent with biological tumor activity. She saw Dr. Ragland and agreed to have SBRT since she is not surgical candidate.\par Simulation was done.\par She has an appt with Dr. Echevarria 1/12/22.\par \par 4/25/22\par Pt is here for follow up. She had been C/O rectal bleeding over the past week. She went to ER and was treated with ABX. CT scan abdomen and pelvis reviwed ? proctoclitis.\par Symptoms have improved.Pt has appt with GI.\par \par 12/12/22\par Pt is here for follow up. \par She was recently admitted to hospital in November 2022 when she had severe RLE pain. She underwent CT AP in ED showing necrotic R iliac lymph node that increased in size since 4/2022 with asymmetrically dilated R iliac and feeding veins. She was evaluated by vascular team and she was taken for R iliac vein stent placement and now on eliquis. She was also evaluated by gynonc team as well radiation oncology team, unfortunately cannot offer any intervention at this time.\par She is doing much better since she was discharged from hospital, swelling of RLE significant improved. \par She still experiences R groin pain which she takes Percocet. \par Otherwise, she denies fever, chill, SOB, CP, N/V/D. \par \par 1/12/2023\par Pt presents to clinic for follow up and cycle#2 of Carbo/taxol & Keytruda accompanied by daughter. \par With her first cycle she had diarrhea & fatigue for two days that improved with Imodium.\par She has lost 20 lbs over last months. Her daughters are taking care of her  diet. \par Her leg swelling has been subsiding well and she is requiring lesser pain meds for her leg pain. \par \par 1/31/23\par Pt is here for follow up.\par C/O pain in her back since a week or so. Leg swelling and pain has resolved.\par C/O significant fatigue\par \par 3/2/23\par Pt is here for follow up.\par Feels well mostly except for fatigue.\par Becomes confused post chemo ? d/t steroids.\par Swelling in LE has resolved. Tolerating AC well. Had one episode of rectal bleed which resolved likely d/t hemorrhoids, pt was constipated\par \par 3/20/23\par Pt is here for follow up.\par Feels well mostly except for fatigue.\par Becomes confused post chemo ? d/t steroids which persisted for about 3 days post chemo. \par Swelling in LE has resolved. Tolerating AC well. Had one episode of rectal bleed which resolved likely d/t hemorrhoids\par She completed PET scan on 3/9/23. \par She is s/p 4 cycles of Carboplatin/Taxol.Keytruda. \par \par 4/10/23\par Pt is here for follow up.\par Feels well mostly except for fatigue.\par Swelling in LE has resolved. Tolerating AC well. Had one episode of rectal bleed previously which resolved likely d/t hemorrhoids\par She completed PET scan on 3/9/23. \par She is s/p 4 cycles of Carboplatin/Taxol/Keytruda which was switched to Keytruda alone starting cycle 5. \par She complains of joint pain in b/l hands and left ankle, associated with L ankle swelling. She has hx of RA (was on MTX which has been on hold since chemo started). \par \par 5/1/23\par Pt is here for follow up.\par Swelling in LE has resolved. Tolerating AC well. Had one episode of rectal bleed previously which resolved likely d/t hemorrhoids\par She completed PET scan on 3/9/23. \par She is s/p 4 cycles of Carboplatin/Taxol/Keytruda which was switched to Keytruda alone starting cycle 5. \par She complains of joint pain in b/l hands and left ankle, associated with L ankle swelling. She has hx of RA (was on MTX which has been on hold since chemo started). She\par still has persistent joint pain, stiffness and is affecting her quality of life. \par She saw Dr Grant (Rheum) and was started on Prednisone 10mg qd, Hydroxychlorquine 400mg daily, and Tramadol prn. She feels symptoms are still uncontrolled. \par \par 5/22/23\par Patient presents to clinic for follow up accompanied by her daughter.\par She appears to be in acute RA flare. She has stiffness and pain in her back, shoulders and small joints of hands.\par Daughter reported that she had a MRI done of cervical spine that showed nerve impingement in the cervical spine\par She is taking MTX, Plaquenil and advil 200 mg TiD with minimal relief.\par We spoke to her about giving her a break from immunotherapy till her pain improves and she agreed.\par \par 6/12/23\par Patient presents to clinic for follow up accompanied by her daughter.\par Daughter reported that she had a MRI done of cervical spine that showed nerve impingement in the cervical spine\par She is taking MTX, Plaquenil and advil 200 mg TiD, and prednisone. \par Her Keytruda was held last cycle, due to her RA flare. She is due for C#8 today. \par She feels much better today, only complains of joint pain in b/l hands and neuropathy. \par She also feels fatigued. \par She completed PET scan on 6/9/23. \par \par 7/3/23\par Pt is here for follow up. Feels well. \par The joint pain is better\par \par 7/24/23\par \par Patient presents for follow up accompanied by her daughter\par She is feeling well. Her RA has improved on medications. She is taking MTX, Plaquenil and prednisone \par She feels well and is scheduled for cycle # 10 of Keytruda today.

## 2023-07-26 NOTE — PHYSICAL EXAM
[Restricted in physically strenuous activity but ambulatory and able to carry out work of a light or sedentary nature] : Status 1- Restricted in physically strenuous activity but ambulatory and able to carry out work of a light or sedentary nature, e.g., light house work, office work [Normal] : affect appropriate [de-identified] : RLE swelling resolved [de-identified] : No spinal tenderness

## 2023-08-14 ENCOUNTER — APPOINTMENT (OUTPATIENT)
Dept: INFUSION THERAPY | Facility: CLINIC | Age: 83
End: 2023-08-14
Payer: MEDICARE

## 2023-08-14 ENCOUNTER — APPOINTMENT (OUTPATIENT)
Dept: HEMATOLOGY ONCOLOGY | Facility: CLINIC | Age: 83
End: 2023-08-14
Payer: MEDICARE

## 2023-08-14 ENCOUNTER — LABORATORY RESULT (OUTPATIENT)
Age: 83
End: 2023-08-14

## 2023-08-14 ENCOUNTER — OUTPATIENT (OUTPATIENT)
Dept: OUTPATIENT SERVICES | Facility: HOSPITAL | Age: 83
LOS: 1 days | End: 2023-08-14
Payer: MEDICARE

## 2023-08-14 DIAGNOSIS — C53.9 MALIGNANT NEOPLASM OF CERVIX UTERI, UNSPECIFIED: ICD-10-CM

## 2023-08-14 DIAGNOSIS — R22.40 LOCALIZED SWELLING, MASS AND LUMP, UNSPECIFIED LOWER LIMB: Chronic | ICD-10-CM

## 2023-08-14 DIAGNOSIS — Z92.89 PERSONAL HISTORY OF OTHER MEDICAL TREATMENT: Chronic | ICD-10-CM

## 2023-08-14 LAB
HCT VFR BLD CALC: 35.5 %
HGB BLD-MCNC: 11 G/DL
MCHC RBC-ENTMCNC: 23.7 PG
MCHC RBC-ENTMCNC: 31 G/DL
MCV RBC AUTO: 76.5 FL
PLATELET # BLD AUTO: 188 K/UL
PMV BLD: 11.6 FL
RBC # BLD: 4.64 M/UL
RBC # FLD: 17.1 %
WBC # FLD AUTO: 8.57 K/UL

## 2023-08-14 PROCEDURE — 96413 CHEMO IV INFUSION 1 HR: CPT

## 2023-08-14 PROCEDURE — 99215 OFFICE O/P EST HI 40 MIN: CPT

## 2023-08-14 PROCEDURE — 85027 COMPLETE CBC AUTOMATED: CPT

## 2023-08-14 PROCEDURE — 84443 ASSAY THYROID STIM HORMONE: CPT

## 2023-08-14 PROCEDURE — 80053 COMPREHEN METABOLIC PANEL: CPT

## 2023-08-14 PROCEDURE — 36415 COLL VENOUS BLD VENIPUNCTURE: CPT

## 2023-08-14 RX ORDER — PEMBROLIZUMAB 25 MG/ML
200 INJECTION, SOLUTION INTRAVENOUS ONCE
Refills: 0 | Status: COMPLETED | OUTPATIENT
Start: 2023-08-14 | End: 2023-08-14

## 2023-08-14 RX ADMIN — PEMBROLIZUMAB 200 MILLIGRAM(S): 25 INJECTION, SOLUTION INTRAVENOUS at 15:08

## 2023-08-14 RX ADMIN — PEMBROLIZUMAB 200 MILLIGRAM(S): 25 INJECTION, SOLUTION INTRAVENOUS at 14:40

## 2023-08-14 NOTE — REVIEW OF SYSTEMS
[Fatigue] : fatigue [Joint Pain] : joint pain [Joint Stiffness] : joint stiffness [Negative] : Allergic/Immunologic [FreeTextEntry9] : B/L hands joint pain and neuropathy [de-identified] : Intermittent confusion reported by her daughter

## 2023-08-14 NOTE — ASSESSMENT
[FreeTextEntry1] : In summary, Uzma Pop is a 82 year old female who initially diagnosed with squamous cell carcinoma of the cervix, FIGO Stage IIIA, s/p chemo/RT.  Pt was treated with chemoRT.  Given her age and possible cardiac issues (inability to handle large volumes of pre and post chemo hydration). We had decided to use Carboplatin AUC 2 as a radiation sensitizer. She is s/p 5 weekly doses of Carboplatin. The patient received 7000 cGy to the Pelvis/cervix from 6/2/2021 through 8/2/2021  Post definitive chemoRt in 12/21/21, she had a FDG avid right external iliac lymph node, SUV max 10.0 measuring 1.0 cm, consistent with biological tumor activity. She is s/p SBRT x 5 to that lesion.  Her PET scan on 6/22 shows residual FDG uptake in the Rt external iliac LN although stable, findings d/w pt and daughter.  Unfortunately, pt was admitted to hospital in November 2022 when she had severe RLE pain. She underwent CT AP in ED showing necrotic R iliac lymph node that increased in size since 4/2022 with asymmetrically dilated R iliac and feeding veins. She was evaluated by vascular team and was taken for R iliac vein stent placement and now on Eliquis. She was also evaluated by gynecologic oncology team as well radiation oncology team, unfortunately no intervention could be offered at this time.  She underwent PET scan on 12/2022 shows compared to PET/CT 6/28/2022, interval placement of a right iliac venous stent. Increased FDG uptake in the region of the right pelvic sidewall (SUV 13.3), difficult to delineate on CT or emission imaging if this is related to recent stent placement versus right iliac lymph node. No other definite sites of abnormal FDG uptake to suggest biologic tumor activity.  Given pt has locoregional recurrence disease w/ prior hx of RT, the next best regimen will be systemic therapy.   Since pt's cervical cancer has PDL1 of 70-80%, we offered her carbotaxol + keytruda based on the KEYNOTE-826 trial (pembrolizumab improved median PFS versus placebo (10.4 versus 8.2 months) and OS at 24 months (50% vs 40% in pts w/ PD-L1 CPS of =1).   In the KEYNOTE-826 trial, Bevacizumab was optional (2/3 of pts received) and given pt has possible thrombus in her iliac vein, we will hold off on giving bevacizumab.   We will also modified carboplatin/taxol to weekly dosing due to her age and fraility with carboplatin AUC2 and taxol 80 mg/m2 D1 and D8 every 3 weeks.   Given pt had carboplatin previously, we advised pt to take zytrec 10 mg qD 3 days before, on the day of  and 3 days after each cycle of chemo.   PET scan 3/9/23 after 4 cycles of carbo/Taxol and Keytruda shows good treatment response with no new sites of abnormal FDG uptake, decreased FDG uptake in the region of the right pelvic sidewall (SUV up to 3.5 previously 13.3), wall thickening of the descending colon and rectosigmoid with associated increased FDG uptake suggestive of infectious or inflammatory process.  Given good treatment response, and adverse effects from chemotherapy and premedications causing confusion, we will only continue Keytruda and hold carbo/Taxol moving forward.  Her Keytruda was held on 5/22 due to RA flare.   PET scan 6/9/23: Persistent FDG uptake in right pelvic sidewall, with no other sites.    PLAN: I have prepared the note after I reviewed the previous notes, reviewed the radiological and laboratory studies and have communicated those to the patient  --Continue with  cycle 11 of Keytruda today. CBC reviewed and is adequate.     ****Side effect profile of pembrolizumab were discussed with pt including but not limited to: anemia, fatigue, hyperglycemia, hyponatremia, hypoalbuminemia, itching, cough, nausea, rash, decreased appetite, hypertriglyceridemia, increased liver enzymes, hypocalcemia, constipation, diarrhea, upper respiratory tract infection and an immune-mediated reaction. Lab work will check for elevated liver enzymes and thyroid function.   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 Currently she is on Prednisone 7.5mg, Plaquenil, hydrocodone and MTX which was recently started by her rheumatologist. Symptoms improved now.    Will order PET scan at next visit to be completed in 10/23  [facility] 3 weeks

## 2023-08-14 NOTE — PHYSICAL EXAM
[Restricted in physically strenuous activity but ambulatory and able to carry out work of a light or sedentary nature] : Status 1- Restricted in physically strenuous activity but ambulatory and able to carry out work of a light or sedentary nature, e.g., light house work, office work [Normal] : affect appropriate [de-identified] : RLE swelling resolved [de-identified] : No spinal tenderness

## 2023-08-14 NOTE — HISTORY OF PRESENT ILLNESS
[Disease: _____________________] : Disease: [unfilled] [de-identified] : The patient is an 81 year old woman who presented with post-menopausal vaginal bleeding in March 2021. \par  Upon further work up, PAP smear on 3/18/21 showed: LSIL/AGC/HPV+. Pelvic US on 3/22/21 showed endometrium to be 4.2mm thick. 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 \par  MRI pelvis on 4/3/21 showed signal abnormality in the posterior cervix 2.5cm x 1.6cm. The lesion does not go beyond the limit of the cervix. \par  She saw Dr. Echevarria on 4/14/21 and on his exam, she was noted to have a 4-5cm cervical mass involving at least the middle of the vaginal wall, circumferentially. No parametrial involvement. She was not deemed a surgical candidate. \par  Definitive chemoradiation was recommended. She is here to discuss radiation. \par  PET/CT: Pending\par  \par  Pt C/O low back pain, for which she takes Tylenol. Vaginal bleeding is not very excessive. No SOB, Chest pain.\par  Pt has a stress test next week\par   [de-identified] : Squamous cell [de-identified] : 5/18/21 Pt is here for follow up. She has completed a PET scan. has moderate back pain  6/9/21 Pt is here for follow up. She has started RT last week. She also recd 1 dose of carboplatin last week. She did not have any nausea or vomiting. She has been getting diarrhea which she has been managing with imodium. Her bld sugar was also running high. No fever, mouth sores.  6/16/21 Pt is here for follow up. She is s/p 2 doses of Carbo. C/O feeling very fatigued. Had diarrhea which is controlled with imodium. No N, V. has abdominal discomfort.  6/23/2021 Pt is here to follow up for b=cervical cancer, accompanied by daughter She is on Carbo s/p 2 cycles, tolerating well She feels a lot better today She denies abdominal pain, nausea or vomiting She gets diarrhea but is managed with Imodium  7/1/2021 Pt is here to follow up for cervical cancer, accompanied by daughter She is on Carbo s/p 3 cycles, tolerating well She feels fine today, appetite is good She denies abdominal pain, nausea or vomiting, no vaginal bleeding/discharge She gets diarrhea, takes Imodium after having 7-8 episodes then gets constipation the following day  7/7/2021 Pt is here to follow up for cervical cancer, accompanied by daughter She is on weekly Carbo s/p  4 cycles, tolerating well She feels fine today, appetite is good She denies nausea or vomiting, no vaginal bleeding/discharge She c/o of low abdominal cramping and gets diarrhea 4-5x/day, managed with Imodium then gets constipation the following day  7/26/21 Pt is here for follow up, She is feeling better. Diarrhea has improved. NO vaginal bleeding  9/8/21 Pt is here for follow up. Has completed RT on 8/2/21. No vag bleeding, no pelvic pain.  12/21/21 Pt is here today for follow up visit for cervical cancer. She has completed chemo (7/2021)/Rad (8/2021). Pt denies any vaginal bleeding or abd/pelvic pain. She had a PET CT scan on 12/21/21 which showed FDG avid right external iliac lymph node, SUV max 10.0 measuring 1.0 cm, consistent with biological tumor activity. She saw Dr. Ragland and agreed to have SBRT since she is not surgical candidate. Simulation was done. She has an appt with Dr. Echevarria 1/12/22.  4/25/22 Pt is here for follow up. She had been C/O rectal bleeding over the past week. She went to ER and was treated with ABX. CT scan abdomen and pelvis reviwed ? proctoclitis. Symptoms have improved.Pt has appt with GI.  12/12/22 Pt is here for follow up.  She was recently admitted to hospital in November 2022 when she had severe RLE pain. She underwent CT AP in ED showing necrotic R iliac lymph node that increased in size since 4/2022 with asymmetrically dilated R iliac and feeding veins. She was evaluated by vascular team and she was taken for R iliac vein stent placement and now on eliquis. She was also evaluated by gynonc team as well radiation oncology team, unfortunately cannot offer any intervention at this time. She is doing much better since she was discharged from hospital, swelling of RLE significant improved.  She still experiences R groin pain which she takes Percocet.  Otherwise, she denies fever, chill, SOB, CP, N/V/D.   1/12/2023 Pt presents to clinic for follow up and cycle#2 of Carbo/taxol & Keytruda accompanied by daughter.  With her first cycle she had diarrhea & fatigue for two days that improved with Imodium. She has lost 20 lbs over last months. Her daughters are taking care of her  diet.  Her leg swelling has been subsiding well and she is requiring lesser pain meds for her leg pain.   1/31/23 Pt is here for follow up. C/O pain in her back since a week or so. Leg swelling and pain has resolved. C/O significant fatigue  3/2/23 Pt is here for follow up. Feels well mostly except for fatigue. Becomes confused post chemo ? d/t steroids. Swelling in LE has resolved. Tolerating AC well. Had one episode of rectal bleed which resolved likely d/t hemorrhoids, pt was constipated  3/20/23 Pt is here for follow up. Feels well mostly except for fatigue. Becomes confused post chemo ? d/t steroids which persisted for about 3 days post chemo.  Swelling in LE has resolved. Tolerating AC well. Had one episode of rectal bleed which resolved likely d/t hemorrhoids She completed PET scan on 3/9/23.  She is s/p 4 cycles of Carboplatin/Taxol.Keytruda.   4/10/23 Pt is here for follow up. Feels well mostly except for fatigue. Swelling in LE has resolved. Tolerating AC well. Had one episode of rectal bleed previously which resolved likely d/t hemorrhoids She completed PET scan on 3/9/23.  She is s/p 4 cycles of Carboplatin/Taxol/Keytruda which was switched to Keytruda alone starting cycle 5.  She complains of joint pain in b/l hands and left ankle, associated with L ankle swelling. She has hx of RA (was on MTX which has been on hold since chemo started).   5/1/23 Pt is here for follow up. Swelling in LE has resolved. Tolerating AC well. Had one episode of rectal bleed previously which resolved likely d/t hemorrhoids She completed PET scan on 3/9/23.  She is s/p 4 cycles of Carboplatin/Taxol/Keytruda which was switched to Keytruda alone starting cycle 5.  She complains of joint pain in b/l hands and left ankle, associated with L ankle swelling. She has hx of RA (was on MTX which has been on hold since chemo started). She still has persistent joint pain, stiffness and is affecting her quality of life.  She saw Dr Grant (Rhe[facility]) and was started on Prednisone 10mg qd, Hydroxychlorquine 400mg daily, and Tramadol prn. She feels symptoms are still uncontrolled.   5/22/23 Patient presents to clinic for follow up accompanied by her daughter. She appears to be in acute RA flare. She has stiffness and pain in her back, shoulders and small joints of hands. Daughter reported that she had a MRI done of cervical spine that showed nerve impingement in the cervical spine She is taking MTX, Plaquenil and advil 200 mg TiD with minimal relief. We spoke to her about giving her a break from immunotherapy till her pain improves and she agreed.  6/12/23 Patient presents to clinic for follow up accompanied by her daughter. Daughter reported that she had a MRI done of cervical spine that showed nerve impingement in the cervical spine She is taking MTX, Plaquenil and advil 200 mg TiD, and prednisone.  Her Keytruda was held last cycle, due to her RA flare. She is due for C#8 today.  She feels much better today, only complains of joint pain in b/l hands and neuropathy.  She also feels fatigued.  She completed PET scan on 6/9/23.   7/3/23 Pt is here for follow up. Feels well.  The joint pain is better  7/24/23  Patient presents for follow up accompanied by her daughter She is feeling well. Her RA has improved on medications. She is taking MTX, Plaquenil and prednisone  She feels well and is scheduled for cycle # 10 of Keytruda today.   8/14/23 Pt is here for follow up. Feels fairly well overall. Has C/O diarrhea occasionally and sometimes she is constipated. No pelvic pain or vag bleeding compliant with AC

## 2023-08-15 DIAGNOSIS — C53.9 MALIGNANT NEOPLASM OF CERVIX UTERI, UNSPECIFIED: ICD-10-CM

## 2023-08-15 LAB
ALBUMIN SERPL ELPH-MCNC: 3.9 G/DL
ALP BLD-CCNC: 61 U/L
ALT SERPL-CCNC: 14 U/L
ANION GAP SERPL CALC-SCNC: 12 MMOL/L
AST SERPL-CCNC: 18 U/L
BILIRUB SERPL-MCNC: 0.4 MG/DL
BUN SERPL-MCNC: 17 MG/DL
CALCIUM SERPL-MCNC: 8.7 MG/DL
CHLORIDE SERPL-SCNC: 99 MMOL/L
CO2 SERPL-SCNC: 23 MMOL/L
CREAT SERPL-MCNC: 1 MG/DL
EGFR: 56 ML/MIN/1.73M2
GLUCOSE SERPL-MCNC: 247 MG/DL
POTASSIUM SERPL-SCNC: 4.8 MMOL/L
PROT SERPL-MCNC: 6.1 G/DL
SODIUM SERPL-SCNC: 134 MMOL/L
TSH SERPL-ACNC: 0.69 UIU/ML

## 2023-09-05 ENCOUNTER — OUTPATIENT (OUTPATIENT)
Dept: OUTPATIENT SERVICES | Facility: HOSPITAL | Age: 83
LOS: 1 days | End: 2023-09-05
Payer: MEDICARE

## 2023-09-05 ENCOUNTER — APPOINTMENT (OUTPATIENT)
Dept: HEMATOLOGY ONCOLOGY | Facility: CLINIC | Age: 83
End: 2023-09-05
Payer: MEDICARE

## 2023-09-05 ENCOUNTER — APPOINTMENT (OUTPATIENT)
Dept: INFUSION THERAPY | Facility: CLINIC | Age: 83
End: 2023-09-05
Payer: MEDICARE

## 2023-09-05 ENCOUNTER — LABORATORY RESULT (OUTPATIENT)
Age: 83
End: 2023-09-05

## 2023-09-05 VITALS
TEMPERATURE: 97.6 F | WEIGHT: 150 LBS | DIASTOLIC BLOOD PRESSURE: 70 MMHG | SYSTOLIC BLOOD PRESSURE: 150 MMHG | HEIGHT: 61 IN | HEART RATE: 88 BPM | BODY MASS INDEX: 28.32 KG/M2

## 2023-09-05 DIAGNOSIS — Z92.89 PERSONAL HISTORY OF OTHER MEDICAL TREATMENT: Chronic | ICD-10-CM

## 2023-09-05 DIAGNOSIS — C53.9 MALIGNANT NEOPLASM OF CERVIX UTERI, UNSPECIFIED: ICD-10-CM

## 2023-09-05 DIAGNOSIS — R22.40 LOCALIZED SWELLING, MASS AND LUMP, UNSPECIFIED LOWER LIMB: Chronic | ICD-10-CM

## 2023-09-05 LAB
ALBUMIN SERPL ELPH-MCNC: 4.1 G/DL
ALP BLD-CCNC: 69 U/L
ALT SERPL-CCNC: 13 U/L
ANION GAP SERPL CALC-SCNC: 12 MMOL/L
AST SERPL-CCNC: 16 U/L
BILIRUB SERPL-MCNC: 0.4 MG/DL
BUN SERPL-MCNC: 16 MG/DL
CALCIUM SERPL-MCNC: 9.3 MG/DL
CHLORIDE SERPL-SCNC: 97 MMOL/L
CO2 SERPL-SCNC: 25 MMOL/L
CREAT SERPL-MCNC: 1 MG/DL
EGFR: 56 ML/MIN/1.73M2
GLUCOSE SERPL-MCNC: 269 MG/DL
HCT VFR BLD CALC: 33.9 %
HGB BLD-MCNC: 10.5 G/DL
MCHC RBC-ENTMCNC: 23.9 PG
MCHC RBC-ENTMCNC: 31 G/DL
MCV RBC AUTO: 77 FL
PLATELET # BLD AUTO: 183 K/UL
PMV BLD: 11.3 FL
POTASSIUM SERPL-SCNC: 4.9 MMOL/L
PROT SERPL-MCNC: 6.3 G/DL
RBC # BLD: 4.4 M/UL
RBC # FLD: 16.1 %
SODIUM SERPL-SCNC: 134 MMOL/L
WBC # FLD AUTO: 6.09 K/UL

## 2023-09-05 PROCEDURE — 99215 OFFICE O/P EST HI 40 MIN: CPT

## 2023-09-05 PROCEDURE — 96413 CHEMO IV INFUSION 1 HR: CPT

## 2023-09-05 PROCEDURE — 80053 COMPREHEN METABOLIC PANEL: CPT

## 2023-09-05 PROCEDURE — 85027 COMPLETE CBC AUTOMATED: CPT

## 2023-09-05 PROCEDURE — 36415 COLL VENOUS BLD VENIPUNCTURE: CPT

## 2023-09-05 PROCEDURE — 84443 ASSAY THYROID STIM HORMONE: CPT

## 2023-09-05 RX ORDER — PEMBROLIZUMAB 25 MG/ML
200 INJECTION, SOLUTION INTRAVENOUS ONCE
Refills: 0 | Status: COMPLETED | OUTPATIENT
Start: 2023-09-05 | End: 2023-09-05

## 2023-09-05 RX ADMIN — PEMBROLIZUMAB 200 MILLIGRAM(S): 25 INJECTION, SOLUTION INTRAVENOUS at 12:19

## 2023-09-06 DIAGNOSIS — C53.9 MALIGNANT NEOPLASM OF CERVIX UTERI, UNSPECIFIED: ICD-10-CM

## 2023-09-06 LAB — TSH SERPL-ACNC: 0.55 UIU/ML

## 2023-09-08 NOTE — REVIEW OF SYSTEMS
[Fatigue] : fatigue [Joint Pain] : joint pain [Joint Stiffness] : joint stiffness [Negative] : Allergic/Immunologic [FreeTextEntry9] : B/L hands joint pain and neuropathy [de-identified] : Intermittent confusion reported by her daughter

## 2023-09-08 NOTE — HISTORY OF PRESENT ILLNESS
[Disease: _____________________] : Disease: [unfilled] [de-identified] : The patient is an 81 year old woman who presented with post-menopausal vaginal bleeding in March 2021. \par  Upon further work up, PAP smear on 3/18/21 showed: LSIL/AGC/HPV+. Pelvic US on 3/22/21 showed endometrium to be 4.2mm thick. 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 \par  MRI pelvis on 4/3/21 showed signal abnormality in the posterior cervix 2.5cm x 1.6cm. The lesion does not go beyond the limit of the cervix. \par  She saw Dr. Echevarria on 4/14/21 and on his exam, she was noted to have a 4-5cm cervical mass involving at least the middle of the vaginal wall, circumferentially. No parametrial involvement. She was not deemed a surgical candidate. \par  Definitive chemoradiation was recommended. She is here to discuss radiation. \par  PET/CT: Pending\par  \par  Pt C/O low back pain, for which she takes Tylenol. Vaginal bleeding is not very excessive. No SOB, Chest pain.\par  Pt has a stress test next week\par   [de-identified] : Squamous cell [de-identified] : 5/18/21 Pt is here for follow up. She has completed a PET scan. has moderate back pain  6/9/21 Pt is here for follow up. She has started RT last week. She also recd 1 dose of carboplatin last week. She did not have any nausea or vomiting. She has been getting diarrhea which she has been managing with imodium. Her bld sugar was also running high. No fever, mouth sores.  6/16/21 Pt is here for follow up. She is s/p 2 doses of Carbo. C/O feeling very fatigued. Had diarrhea which is controlled with imodium. No N, V. has abdominal discomfort.  6/23/2021 Pt is here to follow up for b=cervical cancer, accompanied by daughter She is on Carbo s/p 2 cycles, tolerating well She feels a lot better today She denies abdominal pain, nausea or vomiting She gets diarrhea but is managed with Imodium  7/1/2021 Pt is here to follow up for cervical cancer, accompanied by daughter She is on Carbo s/p 3 cycles, tolerating well She feels fine today, appetite is good She denies abdominal pain, nausea or vomiting, no vaginal bleeding/discharge She gets diarrhea, takes Imodium after having 7-8 episodes then gets constipation the following day  7/7/2021 Pt is here to follow up for cervical cancer, accompanied by daughter She is on weekly Carbo s/p  4 cycles, tolerating well She feels fine today, appetite is good She denies nausea or vomiting, no vaginal bleeding/discharge She c/o of low abdominal cramping and gets diarrhea 4-5x/day, managed with Imodium then gets constipation the following day  7/26/21 Pt is here for follow up, She is feeling better. Diarrhea has improved. NO vaginal bleeding  9/8/21 Pt is here for follow up. Has completed RT on 8/2/21. No vag bleeding, no pelvic pain.  12/21/21 Pt is here today for follow up visit for cervical cancer. She has completed chemo (7/2021)/Rad (8/2021). Pt denies any vaginal bleeding or abd/pelvic pain. She had a PET CT scan on 12/21/21 which showed FDG avid right external iliac lymph node, SUV max 10.0 measuring 1.0 cm, consistent with biological tumor activity. She saw Dr. Ragland and agreed to have SBRT since she is not surgical candidate. Simulation was done. She has an appt with Dr. Echevarria 1/12/22.  4/25/22 Pt is here for follow up. She had been C/O rectal bleeding over the past week. She went to ER and was treated with ABX. CT scan abdomen and pelvis reviwed ? proctoclitis. Symptoms have improved.Pt has appt with GI.  12/12/22 Pt is here for follow up.  She was recently admitted to hospital in November 2022 when she had severe RLE pain. She underwent CT AP in ED showing necrotic R iliac lymph node that increased in size since 4/2022 with asymmetrically dilated R iliac and feeding veins. She was evaluated by vascular team and she was taken for R iliac vein stent placement and now on eliquis. She was also evaluated by gynonc team as well radiation oncology team, unfortunately cannot offer any intervention at this time. She is doing much better since she was discharged from hospital, swelling of RLE significant improved.  She still experiences R groin pain which she takes Percocet.  Otherwise, she denies fever, chill, SOB, CP, N/V/D.   1/12/2023 Pt presents to clinic for follow up and cycle#2 of Carbo/taxol & Keytruda accompanied by daughter.  With her first cycle she had diarrhea & fatigue for two days that improved with Imodium. She has lost 20 lbs over last months. Her daughters are taking care of her  diet.  Her leg swelling has been subsiding well and she is requiring lesser pain meds for her leg pain.   1/31/23 Pt is here for follow up. C/O pain in her back since a week or so. Leg swelling and pain has resolved. C/O significant fatigue  3/2/23 Pt is here for follow up. Feels well mostly except for fatigue. Becomes confused post chemo ? d/t steroids. Swelling in LE has resolved. Tolerating AC well. Had one episode of rectal bleed which resolved likely d/t hemorrhoids, pt was constipated  3/20/23 Pt is here for follow up. Feels well mostly except for fatigue. Becomes confused post chemo ? d/t steroids which persisted for about 3 days post chemo.  Swelling in LE has resolved. Tolerating AC well. Had one episode of rectal bleed which resolved likely d/t hemorrhoids She completed PET scan on 3/9/23.  She is s/p 4 cycles of Carboplatin/Taxol.Keytruda.   4/10/23 Pt is here for follow up. Feels well mostly except for fatigue. Swelling in LE has resolved. Tolerating AC well. Had one episode of rectal bleed previously which resolved likely d/t hemorrhoids She completed PET scan on 3/9/23.  She is s/p 4 cycles of Carboplatin/Taxol/Keytruda which was switched to Keytruda alone starting cycle 5.  She complains of joint pain in b/l hands and left ankle, associated with L ankle swelling. She has hx of RA (was on MTX which has been on hold since chemo started).   5/1/23 Pt is here for follow up. Swelling in LE has resolved. Tolerating AC well. Had one episode of rectal bleed previously which resolved likely d/t hemorrhoids She completed PET scan on 3/9/23.  She is s/p 4 cycles of Carboplatin/Taxol/Keytruda which was switched to Keytruda alone starting cycle 5.  She complains of joint pain in b/l hands and left ankle, associated with L ankle swelling. She has hx of RA (was on MTX which has been on hold since chemo started). She still has persistent joint pain, stiffness and is affecting her quality of life.  She saw Dr Grant (Rheum) and was started on Prednisone 10mg qd, Hydroxychlorquine 400mg daily, and Tramadol prn. She feels symptoms are still uncontrolled.   5/22/23 Patient presents to clinic for follow up accompanied by her daughter. She appears to be in acute RA flare. She has stiffness and pain in her back, shoulders and small joints of hands. Daughter reported that she had a MRI done of cervical spine that showed nerve impingement in the cervical spine She is taking MTX, Plaquenil and advil 200 mg TiD with minimal relief. We spoke to her about giving her a break from immunotherapy till her pain improves and she agreed.  6/12/23 Patient presents to clinic for follow up accompanied by her daughter. Daughter reported that she had a MRI done of cervical spine that showed nerve impingement in the cervical spine She is taking MTX, Plaquenil and advil 200 mg TiD, and prednisone.  Her Keytruda was held last cycle, due to her RA flare. She is due for C#8 today.  She feels much better today, only complains of joint pain in b/l hands and neuropathy.  She also feels fatigued.  She completed PET scan on 6/9/23.   7/3/23 Pt is here for follow up. Feels well.  The joint pain is better  7/24/23  Patient presents for follow up accompanied by her daughter She is feeling well. Her RA has improved on medications. She is taking MTX, Plaquenil and prednisone  She feels well and is scheduled for cycle # 10 of Keytruda today.   8/14/23 Pt is here for follow up. Feels fairly well overall. Has C/O diarrhea occasionally and sometimes she is constipated. No pelvic pain or vag bleeding compliant with AC  9/5/23 Pt is here for follow up. Feels well. Np pain. C/O polyuria especially at night. Reviewed possible causes likely elev blood sugar. No dysuria or fever.

## 2023-09-08 NOTE — PHYSICAL EXAM
[Restricted in physically strenuous activity but ambulatory and able to carry out work of a light or sedentary nature] : Status 1- Restricted in physically strenuous activity but ambulatory and able to carry out work of a light or sedentary nature, e.g., light house work, office work [Normal] : affect appropriate [de-identified] : RLE swelling resolved [de-identified] : No spinal tenderness

## 2023-09-08 NOTE — ASSESSMENT
[FreeTextEntry1] : In summary, Uzma Pop is a 83 year old female who initially diagnosed with squamous cell carcinoma of the cervix, FIGO Stage IIIA, s/p chemo/RT.  Pt was treated with chemoRT.  Given her age and possible cardiac issues (inability to handle large volumes of pre and post chemo hydration). We had decided to use Carboplatin AUC 2 as a radiation sensitizer. She is s/p 5 weekly doses of Carboplatin. The patient received 7000 cGy to the Pelvis/cervix from 6/2/2021 through 8/2/2021  Post definitive chemoRt in 12/21/21, she had a FDG avid right external iliac lymph node, SUV max 10.0 measuring 1.0 cm, consistent with biological tumor activity. She is s/p SBRT x 5 to that lesion.  Her PET scan on 6/22 shows residual FDG uptake in the Rt external iliac LN although stable, findings d/w pt and daughter.  Unfortunately, pt was admitted to hospital in November 2022 when she had severe RLE pain. She underwent CT AP in ED showing necrotic R iliac lymph node that increased in size since 4/2022 with asymmetrically dilated R iliac and feeding veins. She was evaluated by vascular team and was taken for R iliac vein stent placement and now on Eliquis. She was also evaluated by gynecologic oncology team as well radiation oncology team, unfortunately no intervention could be offered at this time.  She underwent PET scan on 12/2022 shows compared to PET/CT 6/28/2022, interval placement of a right iliac venous stent. Increased FDG uptake in the region of the right pelvic sidewall (SUV 13.3), difficult to delineate on CT or emission imaging if this is related to recent stent placement versus right iliac lymph node. No other definite sites of abnormal FDG uptake to suggest biologic tumor activity.  Given pt has locoregional recurrence disease w/ prior hx of RT, the next best regimen will be systemic therapy.   Since pt's cervical cancer has PDL1 of 70-80%, we offered her carbotaxol + keytruda based on the KEYNOTE-826 trial (pembrolizumab improved median PFS versus placebo (10.4 versus 8.2 months) and OS at 24 months (50% vs 40% in pts w/ PD-L1 CPS of =1).   In the KEYNOTE-826 trial, Bevacizumab was optional (2/3 of pts received) and given pt has possible thrombus in her iliac vein, we will hold off on giving bevacizumab.   We also modified carboplatin/taxol to weekly dosing due to her age and fraility with carboplatin AUC2 and taxol 80 mg/m2 D1 and D8 every 3 weeks.   Given pt had carboplatin previously, we advised pt to take zytrec 10 mg qD 3 days before, on the day of  and 3 days after each cycle of chemo.   PET scan 3/9/23 after 4 cycles of carbo/Taxol and Keytruda shows good treatment response with no new sites of abnormal FDG uptake, decreased FDG uptake in the region of the right pelvic sidewall (SUV up to 3.5 previously 13.3), wall thickening of the descending colon and rectosigmoid with associated increased FDG uptake suggestive of infectious or inflammatory process.  Given good treatment response, and adverse effects from chemotherapy and premedications causing confusion, we will only continue Keytruda and hold carbo/Taxol moving forward.  Her Keytruda was held on 5/22 due to RA flare.   PET scan 6/9/23: Persistent FDG uptake in right pelvic sidewall, with no other sites.    PLAN: I have prepared the note after I reviewed the previous notes, reviewed the radiological and laboratory studies and have communicated those to the patient  --Continue with Keytruda q 3 weeks today. CBC reviewed and is adequate.     ****Side effect profile of pembrolizumab were discussed with pt including but not limited to: anemia, fatigue, hyperglycemia, hyponatremia, hypoalbuminemia, itching, cough, nausea, rash, decreased appetite, hypertriglyceridemia, increased liver enzymes, hypocalcemia, constipation, diarrhea, upper respiratory tract infection and an immune-mediated reaction. Lab work will check for elevated liver enzymes and thyroid function.   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 Currently she is on Prednisone 7.5mg, Plaquenil, hydrocodone and MTX which was recently started by her rheumatologist. Symptoms improved now.    Will order PET scan at next visit to be completed in 10/23  RT 3 weeks

## 2023-09-19 ENCOUNTER — APPOINTMENT (OUTPATIENT)
Dept: SURGERY | Facility: CLINIC | Age: 83
End: 2023-09-19

## 2023-09-26 ENCOUNTER — OUTPATIENT (OUTPATIENT)
Dept: OUTPATIENT SERVICES | Facility: HOSPITAL | Age: 83
LOS: 1 days | End: 2023-09-26
Payer: MEDICARE

## 2023-09-26 ENCOUNTER — LABORATORY RESULT (OUTPATIENT)
Age: 83
End: 2023-09-26

## 2023-09-26 ENCOUNTER — APPOINTMENT (OUTPATIENT)
Dept: RADIATION ONCOLOGY | Facility: HOSPITAL | Age: 83
End: 2023-09-26

## 2023-09-26 ENCOUNTER — APPOINTMENT (OUTPATIENT)
Dept: HEMATOLOGY ONCOLOGY | Facility: CLINIC | Age: 83
End: 2023-09-26
Payer: MEDICARE

## 2023-09-26 ENCOUNTER — APPOINTMENT (OUTPATIENT)
Dept: INFUSION THERAPY | Facility: CLINIC | Age: 83
End: 2023-09-26
Payer: MEDICARE

## 2023-09-26 ENCOUNTER — APPOINTMENT (OUTPATIENT)
Dept: RADIATION ONCOLOGY | Facility: HOSPITAL | Age: 83
End: 2023-09-26
Payer: MEDICARE

## 2023-09-26 VITALS
BODY MASS INDEX: 29.27 KG/M2 | TEMPERATURE: 97.4 F | WEIGHT: 155 LBS | RESPIRATION RATE: 16 BRPM | HEART RATE: 89 BPM | DIASTOLIC BLOOD PRESSURE: 66 MMHG | HEIGHT: 61 IN | SYSTOLIC BLOOD PRESSURE: 149 MMHG

## 2023-09-26 VITALS
OXYGEN SATURATION: 99 % | BODY MASS INDEX: 29.36 KG/M2 | HEART RATE: 83 BPM | WEIGHT: 155.38 LBS | TEMPERATURE: 96.7 F | SYSTOLIC BLOOD PRESSURE: 144 MMHG | RESPIRATION RATE: 16 BRPM | DIASTOLIC BLOOD PRESSURE: 69 MMHG

## 2023-09-26 DIAGNOSIS — Z92.89 PERSONAL HISTORY OF OTHER MEDICAL TREATMENT: Chronic | ICD-10-CM

## 2023-09-26 DIAGNOSIS — C53.9 MALIGNANT NEOPLASM OF CERVIX UTERI, UNSPECIFIED: ICD-10-CM

## 2023-09-26 DIAGNOSIS — C53.0 MALIGNANT NEOPLASM OF ENDOCERVIX: ICD-10-CM

## 2023-09-26 DIAGNOSIS — R22.40 LOCALIZED SWELLING, MASS AND LUMP, UNSPECIFIED LOWER LIMB: Chronic | ICD-10-CM

## 2023-09-26 DIAGNOSIS — Z92.3 PERSONAL HISTORY OF IRRADIATION: ICD-10-CM

## 2023-09-26 LAB
HCT VFR BLD CALC: 33.9 %
HGB BLD-MCNC: 10.4 G/DL
MCHC RBC-ENTMCNC: 23.9 PG
MCHC RBC-ENTMCNC: 30.7 G/DL
MCV RBC AUTO: 77.9 FL
PLATELET # BLD AUTO: 185 K/UL
PMV BLD: 10.5 FL
RBC # BLD: 4.35 M/UL
RBC # FLD: 15.2 %
WBC # FLD AUTO: 5.89 K/UL

## 2023-09-26 PROCEDURE — 83540 ASSAY OF IRON: CPT

## 2023-09-26 PROCEDURE — 99214 OFFICE O/P EST MOD 30 MIN: CPT

## 2023-09-26 PROCEDURE — 80053 COMPREHEN METABOLIC PANEL: CPT

## 2023-09-26 PROCEDURE — 36415 COLL VENOUS BLD VENIPUNCTURE: CPT

## 2023-09-26 PROCEDURE — 82728 ASSAY OF FERRITIN: CPT

## 2023-09-26 PROCEDURE — 84443 ASSAY THYROID STIM HORMONE: CPT

## 2023-09-26 PROCEDURE — 99213 OFFICE O/P EST LOW 20 MIN: CPT

## 2023-09-26 PROCEDURE — 83735 ASSAY OF MAGNESIUM: CPT

## 2023-09-26 PROCEDURE — 96413 CHEMO IV INFUSION 1 HR: CPT

## 2023-09-26 PROCEDURE — 85027 COMPLETE CBC AUTOMATED: CPT

## 2023-09-26 PROCEDURE — 83550 IRON BINDING TEST: CPT

## 2023-09-26 RX ORDER — SODIUM ZIRCONIUM CYCLOSILICATE 10 G/10G
10 POWDER, FOR SUSPENSION ORAL DAILY
Qty: 3 | Refills: 0 | Status: DISCONTINUED | COMMUNITY
Start: 2022-12-12 | End: 2023-09-26

## 2023-09-26 RX ORDER — PEMBROLIZUMAB 25 MG/ML
200 INJECTION, SOLUTION INTRAVENOUS ONCE
Refills: 0 | Status: COMPLETED | OUTPATIENT
Start: 2023-09-26 | End: 2023-09-26

## 2023-09-26 RX ORDER — VITAMIN E (DL,TOCOPHERYL ACET) 180 MG
500 CAPSULE ORAL
Qty: 10 | Refills: 0 | Status: DISCONTINUED | COMMUNITY
Start: 2021-06-23 | End: 2023-09-26

## 2023-09-26 RX ORDER — THIAMINE HCL 100 MG
500 TABLET ORAL
Qty: 7 | Refills: 1 | Status: DISCONTINUED | COMMUNITY
Start: 2021-07-08 | End: 2023-09-26

## 2023-09-26 RX ORDER — ONDANSETRON 8 MG/1
8 TABLET ORAL EVERY 8 HOURS
Qty: 60 | Refills: 1 | Status: DISCONTINUED | COMMUNITY
Start: 2021-05-18 | End: 2023-09-26

## 2023-09-26 RX ORDER — OMEPRAZOLE 20 MG/1
20 CAPSULE, DELAYED RELEASE ORAL
Qty: 30 | Refills: 1 | Status: DISCONTINUED | COMMUNITY
Start: 2021-05-18 | End: 2023-09-26

## 2023-09-26 RX ADMIN — PEMBROLIZUMAB 200 MILLIGRAM(S): 25 INJECTION, SOLUTION INTRAVENOUS at 14:07

## 2023-09-26 RX ADMIN — PEMBROLIZUMAB 200 MILLIGRAM(S): 25 INJECTION, SOLUTION INTRAVENOUS at 13:36

## 2023-09-27 DIAGNOSIS — Z91.89 OTHER SPECIFIED PERSONAL RISK FACTORS, NOT ELSEWHERE CLASSIFIED: ICD-10-CM

## 2023-09-27 DIAGNOSIS — C53.0 MALIGNANT NEOPLASM OF ENDOCERVIX: ICD-10-CM

## 2023-09-27 LAB
ALBUMIN SERPL ELPH-MCNC: 4.2 G/DL
ALP BLD-CCNC: 65 U/L
ALT SERPL-CCNC: 13 U/L
ANION GAP SERPL CALC-SCNC: 9 MMOL/L
AST SERPL-CCNC: 16 U/L
BILIRUB SERPL-MCNC: 0.3 MG/DL
BUN SERPL-MCNC: 13 MG/DL
CALCIUM SERPL-MCNC: 9.2 MG/DL
CHLORIDE SERPL-SCNC: 99 MMOL/L
CO2 SERPL-SCNC: 26 MMOL/L
CREAT SERPL-MCNC: 0.8 MG/DL
EGFR: 73 ML/MIN/1.73M2
FERRITIN SERPL-MCNC: 81 NG/ML
GLUCOSE SERPL-MCNC: 239 MG/DL
IRON SATN MFR SERPL: 17 %
IRON SERPL-MCNC: 64 UG/DL
MAGNESIUM SERPL-MCNC: 1.7 MG/DL
POTASSIUM SERPL-SCNC: 4.5 MMOL/L
PROT SERPL-MCNC: 6.6 G/DL
SODIUM SERPL-SCNC: 134 MMOL/L
TIBC SERPL-MCNC: 367 UG/DL
TSH SERPL-ACNC: 1.21 UIU/ML
UIBC SERPL-MCNC: 303 UG/DL

## 2023-09-28 ENCOUNTER — APPOINTMENT (OUTPATIENT)
Dept: VASCULAR SURGERY | Facility: CLINIC | Age: 83
End: 2023-09-28
Payer: MEDICARE

## 2023-09-28 VITALS
WEIGHT: 155 LBS | DIASTOLIC BLOOD PRESSURE: 79 MMHG | BODY MASS INDEX: 30.43 KG/M2 | SYSTOLIC BLOOD PRESSURE: 141 MMHG | HEIGHT: 60 IN

## 2023-09-28 PROCEDURE — 93971 EXTREMITY STUDY: CPT

## 2023-09-28 PROCEDURE — 99214 OFFICE O/P EST MOD 30 MIN: CPT

## 2023-10-02 RX ORDER — ATORVASTATIN CALCIUM 10 MG/1
10 TABLET, FILM COATED ORAL
Refills: 0 | Status: DISCONTINUED | COMMUNITY
End: 2023-10-02

## 2023-10-02 RX ORDER — LISINOPRIL 10 MG/1
10 TABLET ORAL
Refills: 0 | Status: DISCONTINUED | COMMUNITY
End: 2023-10-02

## 2023-10-02 RX ORDER — ASPIRIN 81 MG
81 TABLET,CHEWABLE ORAL
Refills: 0 | Status: DISCONTINUED | COMMUNITY
End: 2023-10-02

## 2023-10-06 NOTE — ED ADULT TRIAGE NOTE - NS ED NURSE BANDS TYPE
Name band; Topical Sulfur Applications Counseling: Topical Sulfur Counseling: Patient counseled that this medication may cause skin irritation or allergic reactions.  In the event of skin irritation, the patient was advised to reduce the amount of the drug applied or use it less frequently.   The patient verbalized understanding of the proper use and possible adverse effects of topical sulfur application.  All of the patient's questions and concerns were addressed.

## 2023-10-11 ENCOUNTER — RESULT REVIEW (OUTPATIENT)
Age: 83
End: 2023-10-11

## 2023-10-11 ENCOUNTER — OUTPATIENT (OUTPATIENT)
Dept: OUTPATIENT SERVICES | Facility: HOSPITAL | Age: 83
LOS: 1 days | End: 2023-10-11
Payer: MEDICARE

## 2023-10-11 DIAGNOSIS — C77.9 SECONDARY AND UNSPECIFIED MALIGNANT NEOPLASM OF LYMPH NODE, UNSPECIFIED: ICD-10-CM

## 2023-10-11 DIAGNOSIS — R93.7 ABNORMAL FINDINGS ON DIAGNOSTIC IMAGING OF OTHER PARTS OF MUSCULOSKELETAL SYSTEM: ICD-10-CM

## 2023-10-11 DIAGNOSIS — Z92.89 PERSONAL HISTORY OF OTHER MEDICAL TREATMENT: Chronic | ICD-10-CM

## 2023-10-11 DIAGNOSIS — C53.9 MALIGNANT NEOPLASM OF CERVIX UTERI, UNSPECIFIED: ICD-10-CM

## 2023-10-11 DIAGNOSIS — R22.40 LOCALIZED SWELLING, MASS AND LUMP, UNSPECIFIED LOWER LIMB: Chronic | ICD-10-CM

## 2023-10-11 LAB — GLUCOSE BLDC GLUCOMTR-MCNC: 66 MG/DL — LOW (ref 70–99)

## 2023-10-11 PROCEDURE — 78815 PET IMAGE W/CT SKULL-THIGH: CPT | Mod: 26,PS,MH

## 2023-10-11 PROCEDURE — A9552: CPT

## 2023-10-11 PROCEDURE — 78815 PET IMAGE W/CT SKULL-THIGH: CPT | Mod: PS

## 2023-10-11 PROCEDURE — 82962 GLUCOSE BLOOD TEST: CPT

## 2023-10-12 DIAGNOSIS — R93.7 ABNORMAL FINDINGS ON DIAGNOSTIC IMAGING OF OTHER PARTS OF MUSCULOSKELETAL SYSTEM: ICD-10-CM

## 2023-10-17 ENCOUNTER — APPOINTMENT (OUTPATIENT)
Dept: HEMATOLOGY ONCOLOGY | Facility: CLINIC | Age: 83
End: 2023-10-17
Payer: MEDICARE

## 2023-10-17 ENCOUNTER — LABORATORY RESULT (OUTPATIENT)
Age: 83
End: 2023-10-17

## 2023-10-17 ENCOUNTER — OUTPATIENT (OUTPATIENT)
Dept: OUTPATIENT SERVICES | Facility: HOSPITAL | Age: 83
LOS: 1 days | End: 2023-10-17
Payer: MEDICARE

## 2023-10-17 ENCOUNTER — APPOINTMENT (OUTPATIENT)
Dept: INFUSION THERAPY | Facility: CLINIC | Age: 83
End: 2023-10-17
Payer: MEDICARE

## 2023-10-17 VITALS
WEIGHT: 158 LBS | BODY MASS INDEX: 31.02 KG/M2 | TEMPERATURE: 97.2 F | SYSTOLIC BLOOD PRESSURE: 168 MMHG | HEIGHT: 60 IN | DIASTOLIC BLOOD PRESSURE: 59 MMHG | HEART RATE: 87 BPM

## 2023-10-17 DIAGNOSIS — C77.9 SECONDARY AND UNSPECIFIED MALIGNANT NEOPLASM OF LYMPH NODE, UNSPECIFIED: ICD-10-CM

## 2023-10-17 DIAGNOSIS — R22.40 LOCALIZED SWELLING, MASS AND LUMP, UNSPECIFIED LOWER LIMB: Chronic | ICD-10-CM

## 2023-10-17 DIAGNOSIS — Z92.89 PERSONAL HISTORY OF OTHER MEDICAL TREATMENT: Chronic | ICD-10-CM

## 2023-10-17 LAB
HCT VFR BLD CALC: 33.3 %
HGB BLD-MCNC: 10.2 G/DL
MCHC RBC-ENTMCNC: 24.2 PG
MCHC RBC-ENTMCNC: 30.6 G/DL
MCV RBC AUTO: 79.1 FL
PLATELET # BLD AUTO: 214 K/UL
PMV BLD: 11.4 FL
RBC # BLD: 4.21 M/UL
RBC # FLD: 15 %
WBC # FLD AUTO: 5.85 K/UL

## 2023-10-17 PROCEDURE — 96413 CHEMO IV INFUSION 1 HR: CPT

## 2023-10-17 PROCEDURE — 84443 ASSAY THYROID STIM HORMONE: CPT

## 2023-10-17 PROCEDURE — 99214 OFFICE O/P EST MOD 30 MIN: CPT

## 2023-10-17 PROCEDURE — 80053 COMPREHEN METABOLIC PANEL: CPT

## 2023-10-17 PROCEDURE — 83735 ASSAY OF MAGNESIUM: CPT

## 2023-10-17 PROCEDURE — 36415 COLL VENOUS BLD VENIPUNCTURE: CPT

## 2023-10-17 PROCEDURE — 85027 COMPLETE CBC AUTOMATED: CPT

## 2023-10-17 RX ORDER — PEMBROLIZUMAB 25 MG/ML
200 INJECTION, SOLUTION INTRAVENOUS ONCE
Refills: 0 | Status: COMPLETED | OUTPATIENT
Start: 2023-10-17 | End: 2023-10-17

## 2023-10-17 RX ADMIN — PEMBROLIZUMAB 200 MILLIGRAM(S): 25 INJECTION, SOLUTION INTRAVENOUS at 15:48

## 2023-10-18 DIAGNOSIS — C77.9 SECONDARY AND UNSPECIFIED MALIGNANT NEOPLASM OF LYMPH NODE, UNSPECIFIED: ICD-10-CM

## 2023-10-18 LAB
ALBUMIN SERPL ELPH-MCNC: 4.3 G/DL
ALP BLD-CCNC: 71 U/L
ALT SERPL-CCNC: 13 U/L
ANION GAP SERPL CALC-SCNC: 9 MMOL/L
AST SERPL-CCNC: 17 U/L
BILIRUB SERPL-MCNC: <0.2 MG/DL
BUN SERPL-MCNC: 19 MG/DL
CALCIUM SERPL-MCNC: 9.9 MG/DL
CHLORIDE SERPL-SCNC: 99 MMOL/L
CO2 SERPL-SCNC: 28 MMOL/L
CREAT SERPL-MCNC: 0.9 MG/DL
EGFR: 63 ML/MIN/1.73M2
GLUCOSE SERPL-MCNC: 180 MG/DL
MAGNESIUM SERPL-MCNC: 1.7 MG/DL
POTASSIUM SERPL-SCNC: 4.1 MMOL/L
PROT SERPL-MCNC: 6.8 G/DL
SODIUM SERPL-SCNC: 136 MMOL/L
TSH SERPL-ACNC: 1.23 UIU/ML

## 2023-11-06 ENCOUNTER — OUTPATIENT (OUTPATIENT)
Dept: OUTPATIENT SERVICES | Facility: HOSPITAL | Age: 83
LOS: 1 days | Discharge: HOME | End: 2023-11-06
Payer: MEDICARE

## 2023-11-06 ENCOUNTER — TRANSCRIPTION ENCOUNTER (OUTPATIENT)
Age: 83
End: 2023-11-06

## 2023-11-06 VITALS
DIASTOLIC BLOOD PRESSURE: 93 MMHG | RESPIRATION RATE: 18 BRPM | WEIGHT: 149.91 LBS | TEMPERATURE: 96 F | SYSTOLIC BLOOD PRESSURE: 144 MMHG | HEART RATE: 74 BPM | OXYGEN SATURATION: 99 % | HEIGHT: 64 IN

## 2023-11-06 VITALS — DIASTOLIC BLOOD PRESSURE: 86 MMHG | SYSTOLIC BLOOD PRESSURE: 128 MMHG | RESPIRATION RATE: 17 BRPM | HEART RATE: 84 BPM

## 2023-11-06 DIAGNOSIS — R22.40 LOCALIZED SWELLING, MASS AND LUMP, UNSPECIFIED LOWER LIMB: Chronic | ICD-10-CM

## 2023-11-06 DIAGNOSIS — Z92.89 PERSONAL HISTORY OF OTHER MEDICAL TREATMENT: Chronic | ICD-10-CM

## 2023-11-06 DIAGNOSIS — H25.11 AGE-RELATED NUCLEAR CATARACT, RIGHT EYE: ICD-10-CM

## 2023-11-06 LAB
GLUCOSE BLDC GLUCOMTR-MCNC: 263 MG/DL — HIGH (ref 70–99)
GLUCOSE BLDC GLUCOMTR-MCNC: 263 MG/DL — HIGH (ref 70–99)

## 2023-11-06 PROCEDURE — 82962 GLUCOSE BLOOD TEST: CPT

## 2023-11-06 PROCEDURE — V2632: CPT

## 2023-11-06 RX ORDER — INSULIN NPH HUM/REG INSULIN HM 70-30/ML
0 VIAL (ML) SUBCUTANEOUS
Qty: 0 | Refills: 0 | DISCHARGE

## 2023-11-06 RX ORDER — HYDROXYCHLOROQUINE SULFATE 200 MG
1 TABLET ORAL
Refills: 0 | DISCHARGE

## 2023-11-06 NOTE — ASU PATIENT PROFILE, ADULT - FALL HARM RISK - RISK INTERVENTIONS

## 2023-11-07 ENCOUNTER — APPOINTMENT (OUTPATIENT)
Dept: HEMATOLOGY ONCOLOGY | Facility: CLINIC | Age: 83
End: 2023-11-07

## 2023-11-07 ENCOUNTER — APPOINTMENT (OUTPATIENT)
Dept: INFUSION THERAPY | Facility: CLINIC | Age: 83
End: 2023-11-07

## 2023-11-09 ENCOUNTER — APPOINTMENT (OUTPATIENT)
Dept: INFUSION THERAPY | Facility: CLINIC | Age: 83
End: 2023-11-09
Payer: MEDICARE

## 2023-11-09 ENCOUNTER — APPOINTMENT (OUTPATIENT)
Dept: HEMATOLOGY ONCOLOGY | Facility: CLINIC | Age: 83
End: 2023-11-09
Payer: MEDICARE

## 2023-11-09 ENCOUNTER — OUTPATIENT (OUTPATIENT)
Dept: OUTPATIENT SERVICES | Facility: HOSPITAL | Age: 83
LOS: 1 days | End: 2023-11-09
Payer: MEDICARE

## 2023-11-09 ENCOUNTER — LABORATORY RESULT (OUTPATIENT)
Age: 83
End: 2023-11-09

## 2023-11-09 VITALS — HEART RATE: 99 BPM | TEMPERATURE: 98.2 F | DIASTOLIC BLOOD PRESSURE: 67 MMHG | SYSTOLIC BLOOD PRESSURE: 142 MMHG

## 2023-11-09 VITALS
HEART RATE: 99 BPM | SYSTOLIC BLOOD PRESSURE: 142 MMHG | WEIGHT: 153 LBS | RESPIRATION RATE: 16 BRPM | TEMPERATURE: 98.2 F | OXYGEN SATURATION: 100 % | DIASTOLIC BLOOD PRESSURE: 67 MMHG | BODY MASS INDEX: 30.04 KG/M2 | HEIGHT: 60 IN

## 2023-11-09 DIAGNOSIS — C77.9 SECONDARY AND UNSPECIFIED MALIGNANT NEOPLASM OF LYMPH NODE, UNSPECIFIED: ICD-10-CM

## 2023-11-09 DIAGNOSIS — E87.1 HYPO-OSMOLALITY AND HYPONATREMIA: ICD-10-CM

## 2023-11-09 DIAGNOSIS — R19.7 DIARRHEA, UNSPECIFIED: ICD-10-CM

## 2023-11-09 DIAGNOSIS — M06.9 RHEUMATOID ARTHRITIS, UNSPECIFIED: ICD-10-CM

## 2023-11-09 DIAGNOSIS — Z92.89 PERSONAL HISTORY OF OTHER MEDICAL TREATMENT: Chronic | ICD-10-CM

## 2023-11-09 DIAGNOSIS — R22.40 LOCALIZED SWELLING, MASS AND LUMP, UNSPECIFIED LOWER LIMB: Chronic | ICD-10-CM

## 2023-11-09 PROCEDURE — 80053 COMPREHEN METABOLIC PANEL: CPT

## 2023-11-09 PROCEDURE — 36415 COLL VENOUS BLD VENIPUNCTURE: CPT

## 2023-11-09 PROCEDURE — 99214 OFFICE O/P EST MOD 30 MIN: CPT

## 2023-11-09 PROCEDURE — 84443 ASSAY THYROID STIM HORMONE: CPT

## 2023-11-09 PROCEDURE — 83735 ASSAY OF MAGNESIUM: CPT

## 2023-11-09 PROCEDURE — 85027 COMPLETE CBC AUTOMATED: CPT

## 2023-11-09 PROCEDURE — 96367 TX/PROPH/DG ADDL SEQ IV INF: CPT

## 2023-11-09 PROCEDURE — 96413 CHEMO IV INFUSION 1 HR: CPT

## 2023-11-09 RX ORDER — PEMBROLIZUMAB 25 MG/ML
200 INJECTION, SOLUTION INTRAVENOUS ONCE
Refills: 0 | Status: COMPLETED | OUTPATIENT
Start: 2023-11-09 | End: 2023-11-09

## 2023-11-09 RX ORDER — MAGNESIUM SULFATE 500 MG/ML
2 VIAL (ML) INJECTION ONCE
Refills: 0 | Status: COMPLETED | OUTPATIENT
Start: 2023-11-09 | End: 2023-11-09

## 2023-11-09 RX ADMIN — PEMBROLIZUMAB 200 MILLIGRAM(S): 25 INJECTION, SOLUTION INTRAVENOUS at 14:15

## 2023-11-09 RX ADMIN — Medication 2 GRAM(S): at 14:13

## 2023-11-09 RX ADMIN — Medication 25 GRAM(S): at 13:13

## 2023-11-10 DIAGNOSIS — H25.11 AGE-RELATED NUCLEAR CATARACT, RIGHT EYE: ICD-10-CM

## 2023-11-10 DIAGNOSIS — E11.36 TYPE 2 DIABETES MELLITUS WITH DIABETIC CATARACT: ICD-10-CM

## 2023-11-10 DIAGNOSIS — M06.9 RHEUMATOID ARTHRITIS, UNSPECIFIED: ICD-10-CM

## 2023-11-10 DIAGNOSIS — F32.A DEPRESSION, UNSPECIFIED: ICD-10-CM

## 2023-11-10 DIAGNOSIS — C77.9 SECONDARY AND UNSPECIFIED MALIGNANT NEOPLASM OF LYMPH NODE, UNSPECIFIED: ICD-10-CM

## 2023-11-10 DIAGNOSIS — F41.9 ANXIETY DISORDER, UNSPECIFIED: ICD-10-CM

## 2023-11-10 DIAGNOSIS — I10 ESSENTIAL (PRIMARY) HYPERTENSION: ICD-10-CM

## 2023-11-10 DIAGNOSIS — Z79.4 LONG TERM (CURRENT) USE OF INSULIN: ICD-10-CM

## 2023-11-16 LAB
ALBUMIN SERPL ELPH-MCNC: 4.4 G/DL
ALP BLD-CCNC: 81 U/L
ALT SERPL-CCNC: 12 U/L
ANION GAP SERPL CALC-SCNC: 13 MMOL/L
AST SERPL-CCNC: 31 U/L
BILIRUB SERPL-MCNC: 0.4 MG/DL
BUN SERPL-MCNC: 17 MG/DL
CALCIUM SERPL-MCNC: 9.8 MG/DL
CHLORIDE SERPL-SCNC: 97 MMOL/L
CO2 SERPL-SCNC: 24 MMOL/L
CREAT SERPL-MCNC: 1 MG/DL
EGFR: 56 ML/MIN/1.73M2
GLUCOSE SERPL-MCNC: 159 MG/DL
MAGNESIUM SERPL-MCNC: 1.6 MG/DL
POTASSIUM SERPL-SCNC: 5.2 MMOL/L
PROT SERPL-MCNC: 7 G/DL
SODIUM SERPL-SCNC: 134 MMOL/L
TSH SERPL-ACNC: 1.69 UIU/ML

## 2023-11-17 LAB
HCT VFR BLD CALC: 37.9 %
HGB BLD-MCNC: 11.9 G/DL
MCHC RBC-ENTMCNC: 24.1 PG
MCHC RBC-ENTMCNC: 31.4 G/DL
MCV RBC AUTO: 76.7 FL
PLATELET # BLD AUTO: 177 K/UL
PMV BLD: 10.5 FL
RBC # BLD: 4.94 M/UL
RBC # FLD: 14.9 %
WBC # FLD AUTO: 6.32 K/UL

## 2023-11-30 ENCOUNTER — APPOINTMENT (OUTPATIENT)
Dept: HEMATOLOGY ONCOLOGY | Facility: CLINIC | Age: 83
End: 2023-11-30

## 2023-11-30 ENCOUNTER — APPOINTMENT (OUTPATIENT)
Dept: INFUSION THERAPY | Facility: CLINIC | Age: 83
End: 2023-11-30

## 2023-12-26 ENCOUNTER — APPOINTMENT (OUTPATIENT)
Dept: INFUSION THERAPY | Facility: CLINIC | Age: 83
End: 2023-12-26
Payer: MEDICARE

## 2023-12-26 ENCOUNTER — OUTPATIENT (OUTPATIENT)
Dept: OUTPATIENT SERVICES | Facility: HOSPITAL | Age: 83
LOS: 1 days | End: 2023-12-26
Payer: MEDICARE

## 2023-12-26 ENCOUNTER — APPOINTMENT (OUTPATIENT)
Dept: HEMATOLOGY ONCOLOGY | Facility: CLINIC | Age: 83
End: 2023-12-26
Payer: MEDICARE

## 2023-12-26 ENCOUNTER — LABORATORY RESULT (OUTPATIENT)
Age: 83
End: 2023-12-26

## 2023-12-26 VITALS
DIASTOLIC BLOOD PRESSURE: 74 MMHG | RESPIRATION RATE: 16 BRPM | SYSTOLIC BLOOD PRESSURE: 135 MMHG | HEART RATE: 82 BPM | TEMPERATURE: 97.3 F

## 2023-12-26 DIAGNOSIS — R22.40 LOCALIZED SWELLING, MASS AND LUMP, UNSPECIFIED LOWER LIMB: Chronic | ICD-10-CM

## 2023-12-26 DIAGNOSIS — E83.42 HYPOMAGNESEMIA: ICD-10-CM

## 2023-12-26 DIAGNOSIS — C77.9 SECONDARY AND UNSPECIFIED MALIGNANT NEOPLASM OF LYMPH NODE, UNSPECIFIED: ICD-10-CM

## 2023-12-26 DIAGNOSIS — Z92.89 PERSONAL HISTORY OF OTHER MEDICAL TREATMENT: Chronic | ICD-10-CM

## 2023-12-26 LAB
HCT VFR BLD CALC: 34.3 %
HGB BLD-MCNC: 10.8 G/DL
MCHC RBC-ENTMCNC: 23.7 PG
MCHC RBC-ENTMCNC: 31.5 G/DL
MCV RBC AUTO: 75.4 FL
PLATELET # BLD AUTO: 197 K/UL
PMV BLD: 10.2 FL
RBC # BLD: 4.55 M/UL
RBC # FLD: 15.1 %
WBC # FLD AUTO: 6.95 K/UL

## 2023-12-26 PROCEDURE — 85027 COMPLETE CBC AUTOMATED: CPT

## 2023-12-26 PROCEDURE — 96413 CHEMO IV INFUSION 1 HR: CPT

## 2023-12-26 PROCEDURE — 83550 IRON BINDING TEST: CPT

## 2023-12-26 PROCEDURE — 82728 ASSAY OF FERRITIN: CPT

## 2023-12-26 PROCEDURE — 83540 ASSAY OF IRON: CPT

## 2023-12-26 PROCEDURE — 83735 ASSAY OF MAGNESIUM: CPT

## 2023-12-26 PROCEDURE — 99214 OFFICE O/P EST MOD 30 MIN: CPT

## 2023-12-26 PROCEDURE — 80053 COMPREHEN METABOLIC PANEL: CPT

## 2023-12-26 PROCEDURE — 36415 COLL VENOUS BLD VENIPUNCTURE: CPT

## 2023-12-26 PROCEDURE — 84443 ASSAY THYROID STIM HORMONE: CPT

## 2023-12-26 RX ORDER — PEMBROLIZUMAB 25 MG/ML
200 INJECTION, SOLUTION INTRAVENOUS ONCE
Refills: 0 | Status: COMPLETED | OUTPATIENT
Start: 2023-12-26 | End: 2023-12-26

## 2023-12-26 RX ADMIN — PEMBROLIZUMAB 200 MILLIGRAM(S): 25 INJECTION, SOLUTION INTRAVENOUS at 12:28

## 2023-12-26 NOTE — ASSESSMENT
[FreeTextEntry1] : In summary, Uzma Pop is an 83 year old female who initially diagnosed with squamous cell carcinoma of the cervix, FIGO Stage IIIA, s/p chemo/RT.  Pt was treated with chemoRT.  Given her age and possible cardiac issues (inability to handle large volumes of pre and post chemo hydration). We had decided to use Carboplatin AUC 2 as a radiation sensitizer. She is s/p 5 weekly doses of Carboplatin. The patient received 7000 cGy to the Pelvis/cervix from 6/2/2021 through 8/2/2021  Post definitive chemoRT in 12/21/21, she had a FDG avid right external iliac lymph node, SUV max 10.0 measuring 1.0 cm, consistent with biological tumor activity. She is s/p SBRT x 5 to that lesion.  Her PET scan on 6/22 shows residual FDG uptake in the Rt external iliac LN although stable, findings d/w pt and daughter.  Unfortunately, pt was admitted to hospital in November 2022 when she had severe RLE pain. She underwent CT AP in ED showing necrotic R iliac lymph node that increased in size since 4/2022 with asymmetrically dilated R iliac and feeding veins. She was evaluated by vascular team and was taken for R iliac vein stent placement and now on Eliquis. She was also evaluated by gynecologic oncology team as well radiation oncology team, unfortunately no intervention could be offered at this time.  She underwent PET scan on 12/2022 shows compared to PET/CT 6/28/2022, interval placement of a right iliac venous stent. Increased FDG uptake in the region of the right pelvic sidewall (SUV 13.3), difficult to delineate on CT or emission imaging if this is related to recent stent placement versus right iliac lymph node. No other definite sites of abnormal FDG uptake to suggest biologic tumor activity.  Given pt has locoregional recurrence disease w/ prior hx of RT, the next best regimen will be systemic therapy.  Since pt's cervical cancer has PDL1 of 70-80%, we offered her Carbo/Taxol + Keytruda based on the KEYNOTE-826 trial (pembrolizumab improved median PFS versus placebo (10.4 versus 8.2 months) and OS at 24 months (50% vs 40% in pts w/ PD-L1 CPS of =1).  In the KEYNOTE-826 trial, Bevacizumab was optional (2/3 of pts received) and given pt has possible thrombus in her iliac vein, we will hold off on giving bevacizumab.  We will also modified Carboplatin/Taxol to weekly dosing due to her age and fraility with carboplatin AUC2 and Taxol 80 mg/m2 D1 and D8 every 3 weeks.  Given pt had Carboplatin previously, we advised pt to take Zytrec 10 mg qD 3 days before, on the day of and 3 days after each cycle of chemo.  PET scan 3/9/23 after 4 cycles of carbo/Taxol and Keytruda shows good treatment response with no new sites of abnormal FDG uptake, decreased FDG uptake in the region of the right pelvic sidewall (SUV up to 3.5 previously 13.3), wall thickening of the descending colon and rectosigmoid with associated increased FDG uptake suggestive of infectious or inflammatory process.  Given good treatment response, and adverse effects from chemotherapy and premedications causing confusion, we will only continue Keytruda and hold carbo/Taxol moving forward. Her Keytruda was held on 5/22 due to RA flare.  PET scan 6/9/23: Persistent FDG uptake in right pelvic sidewall, with no other sites. PET scan (10/11/2023) is stable.  PLAN: Previous notes reviewed and all relevant laboratory and radiology result and were communicated to the patient and her daughter.  --Continue with cycle #15 of Keytruda today. CBC today reviewed and is adequate. Mild anemia is trending down to 10.8 gm/dL. ****Side effect profile of pembrolizumab were discussed with pt including but not limited to: anemia, fatigue, hyperglycemia, hyponatremia, hypoalbuminemia, itching, cough, nausea, rash, decreased appetite, hypertriglyceridemia, increased liver enzymes, hypocalcemia, constipation, diarrhea, upper respiratory tract infection and an immune-mediated reaction. Lab work will check for elevated liver enzymes and thyroid function. -- Will check CMP, Mg, TSH. -- Follow up with PCP for further management of diabetes.  #Microcytic anemia -- Will do Ferritin and TIBC. -- Advised to minimize high carb diet and encouraged regular exercise as tolerated.  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 - Continue Eliquis.  # Rheumatoid arthritis - Had been on MTX since starting chemotherapy. Currently she is on Prednisone 5 mg, Plaquenil, Hydrocodone and MTX as per Rheumatologist.  RTC 3 weeks to see Dr Salinas

## 2023-12-26 NOTE — REVIEW OF SYSTEMS
[Joint Pain] : joint pain [Joint Stiffness] : joint stiffness [Negative] : Allergic/Immunologic [Fatigue] : no fatigue [FreeTextEntry9] : B/L hands joint pain and neuropathy

## 2023-12-26 NOTE — HISTORY OF PRESENT ILLNESS
[Disease: _____________________] : Disease: [unfilled] [de-identified] : The patient is an 81 year old woman who presented with post-menopausal vaginal bleeding in March 2021. \par  Upon further work up, PAP smear on 3/18/21 showed: LSIL/AGC/HPV+. Pelvic US on 3/22/21 showed endometrium to be 4.2mm thick. 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 \par  MRI pelvis on 4/3/21 showed signal abnormality in the posterior cervix 2.5cm x 1.6cm. The lesion does not go beyond the limit of the cervix. \par  She saw Dr. Echevarria on 4/14/21 and on his exam, she was noted to have a 4-5cm cervical mass involving at least the middle of the vaginal wall, circumferentially. No parametrial involvement. She was not deemed a surgical candidate. \par  Definitive chemoradiation was recommended. She is here to discuss radiation. \par  PET/CT: Pending\par  \par  Pt C/O low back pain, for which she takes Tylenol. Vaginal bleeding is not very excessive. No SOB, Chest pain.\par  Pt has a stress test next week\par   [de-identified] : Squamous cell [de-identified] : 5/18/21 Pt is here for follow up. She has completed a PET scan. has moderate back pain  6/9/21 Pt is here for follow up. She has started RT last week. She also recd 1 dose of carboplatin last week. She did not have any nausea or vomiting. She has been getting diarrhea which she has been managing with imodium. Her bld sugar was also running high. No fever, mouth sores.  6/16/21 Pt is here for follow up. She is s/p 2 doses of Carbo. C/O feeling very fatigued. Had diarrhea which is controlled with imodium. No N, V. has abdominal discomfort.  6/23/2021 Pt is here to follow up for b=cervical cancer, accompanied by daughter She is on Carbo s/p 2 cycles, tolerating well She feels a lot better today She denies abdominal pain, nausea or vomiting She gets diarrhea but is managed with Imodium  7/1/2021 Pt is here to follow up for cervical cancer, accompanied by daughter She is on Carbo s/p 3 cycles, tolerating well She feels fine today, appetite is good She denies abdominal pain, nausea or vomiting, no vaginal bleeding/discharge She gets diarrhea, takes Imodium after having 7-8 episodes then gets constipation the following day  7/7/2021 Pt is here to follow up for cervical cancer, accompanied by daughter She is on weekly Carbo s/p  4 cycles, tolerating well She feels fine today, appetite is good She denies nausea or vomiting, no vaginal bleeding/discharge She c/o of low abdominal cramping and gets diarrhea 4-5x/day, managed with Imodium then gets constipation the following day  7/26/21 Pt is here for follow up, She is feeling better. Diarrhea has improved. NO vaginal bleeding  9/8/21 Pt is here for follow up. Has completed RT on 8/2/21. No vag bleeding, no pelvic pain.  12/21/21 Pt is here today for follow up visit for cervical cancer. She has completed chemo (7/2021)/Rad (8/2021). Pt denies any vaginal bleeding or abd/pelvic pain. She had a PET CT scan on 12/21/21 which showed FDG avid right external iliac lymph node, SUV max 10.0 measuring 1.0 cm, consistent with biological tumor activity. She saw Dr. Ragland and agreed to have SBRT since she is not surgical candidate. Simulation was done. She has an appt with Dr. Echevarria 1/12/22.  4/25/22 Pt is here for follow up. She had been C/O rectal bleeding over the past week. She went to ER and was treated with ABX. CT scan abdomen and pelvis reviwed ? proctoclitis. Symptoms have improved.Pt has appt with GI.  12/12/22 Pt is here for follow up.  She was recently admitted to hospital in November 2022 when she had severe RLE pain. She underwent CT AP in ED showing necrotic R iliac lymph node that increased in size since 4/2022 with asymmetrically dilated R iliac and feeding veins. She was evaluated by vascular team and she was taken for R iliac vein stent placement and now on eliquis. She was also evaluated by gynonc team as well radiation oncology team, unfortunately cannot offer any intervention at this time. She is doing much better since she was discharged from hospital, swelling of RLE significant improved.  She still experiences R groin pain which she takes Percocet.  Otherwise, she denies fever, chill, SOB, CP, N/V/D.   1/12/2023 Pt presents to clinic for follow up and cycle#2 of Carbo/taxol & Keytruda accompanied by daughter.  With her first cycle she had diarrhea & fatigue for two days that improved with Imodium. She has lost 20 lbs over last months. Her daughters are taking care of her  diet.  Her leg swelling has been subsiding well and she is requiring lesser pain meds for her leg pain.   1/31/23 Pt is here for follow up. C/O pain in her back since a week or so. Leg swelling and pain has resolved. C/O significant fatigue  3/2/23 Pt is here for follow up. Feels well mostly except for fatigue. Becomes confused post chemo ? d/t steroids. Swelling in LE has resolved. Tolerating AC well. Had one episode of rectal bleed which resolved likely d/t hemorrhoids, pt was constipated  3/20/23 Pt is here for follow up. Feels well mostly except for fatigue. Becomes confused post chemo ? d/t steroids which persisted for about 3 days post chemo.  Swelling in LE has resolved. Tolerating AC well. Had one episode of rectal bleed which resolved likely d/t hemorrhoids She completed PET scan on 3/9/23.  She is s/p 4 cycles of Carboplatin/Taxol.Keytruda.   4/10/23 Pt is here for follow up. Feels well mostly except for fatigue. Swelling in LE has resolved. Tolerating AC well. Had one episode of rectal bleed previously which resolved likely d/t hemorrhoids She completed PET scan on 3/9/23.  She is s/p 4 cycles of Carboplatin/Taxol/Keytruda which was switched to Keytruda alone starting cycle 5.  She complains of joint pain in b/l hands and left ankle, associated with L ankle swelling. She has hx of RA (was on MTX which has been on hold since chemo started).   5/1/23 Pt is here for follow up. Swelling in LE has resolved. Tolerating AC well. Had one episode of rectal bleed previously which resolved likely d/t hemorrhoids She completed PET scan on 3/9/23.  She is s/p 4 cycles of Carboplatin/Taxol/Keytruda which was switched to Keytruda alone starting cycle 5.  She complains of joint pain in b/l hands and left ankle, associated with L ankle swelling. She has hx of RA (was on MTX which has been on hold since chemo started). She still has persistent joint pain, stiffness and is affecting her quality of life.  She saw Dr Grant (Rheum) and was started on Prednisone 10mg qd, Hydroxychlorquine 400mg daily, and Tramadol prn. She feels symptoms are still uncontrolled.   5/22/23 Patient presents to clinic for follow up accompanied by her daughter. She appears to be in acute RA flare. She has stiffness and pain in her back, shoulders and small joints of hands. Daughter reported that she had a MRI done of cervical spine that showed nerve impingement in the cervical spine She is taking MTX, Plaquenil and advil 200 mg TiD with minimal relief. We spoke to her about giving her a break from immunotherapy till her pain improves and she agreed.  6/12/23 Patient presents to clinic for follow up accompanied by her daughter. Daughter reported that she had a MRI done of cervical spine that showed nerve impingement in the cervical spine She is taking MTX, Plaquenil and advil 200 mg TiD, and prednisone.  Her Keytruda was held last cycle, due to her RA flare. She is due for C#8 today.  She feels much better today, only complains of joint pain in b/l hands and neuropathy.  She also feels fatigued.  She completed PET scan on 6/9/23.   7/3/23 Pt is here for follow up. Feels well.  The joint pain is better  7/24/23 Patient presents for follow up accompanied by her daughter She is feeling well. Her RA has improved on medications. She is taking MTX, Plaquenil and prednisone  She feels well and is scheduled for cycle # 10 of Keytruda today.   8/14/23 Pt is here for follow up. Feels fairly well overall. Has C/O diarrhea occasionally and sometimes she is constipated. No pelvic pain or vag bleeding compliant with AC  9/26/23 Patient is here to follow up for cervical cancer, accompanied by her daughter. She is on Keytruda, tolerating well. She feels well, appetite is good. She denies abdominal pain, nausea, vomiting or abnormal vaginal bleeding/discharge. She is compliant with AC. She had seen Dr Ragland today.  10/17/23 Patient is here to follow up for cervical cancer, accompanied by her daughter. She is on Keytruda, tolerating well. She feels well, appetite is good. She denies abdominal pain, nausea, vomiting or abnormal vaginal bleeding/discharge. Her diarrhea is manageable with Imodium. She is compliant with AC.  11/09/23 Patient is here to follow up for cervical cancer, accompanied by her daughter. She is on Keytruda, tolerating well. She feels well, appetite is good., but endorses feeling tired s/p right eye cataract surgery on 11/7/2023 She denies abdominal pain, nausea, vomiting or abnormal vaginal bleeding/discharge. Her diarrhea is manageable with Imodium, no episodes for 3 days now. She is compliant with AC.  12/26/23 Patient is here to follow up for cervical cancer, accompanied by her daughter. She is on Keytruda, tolerating well. She is not feeling well as her blood glucose has been elevated. She denies abdominal pain, nausea, vomiting or abnormal vaginal bleeding/discharge, no shortness of breath, chest pain, headache of dizziness. She had received her flu vaccine. She is compliant with Eliquis, denies any bleeding or bruising. She is compliant with her RA meds, on prednisone 5 mg daily and MTX.

## 2023-12-27 DIAGNOSIS — C77.9 SECONDARY AND UNSPECIFIED MALIGNANT NEOPLASM OF LYMPH NODE, UNSPECIFIED: ICD-10-CM

## 2023-12-27 LAB
ALBUMIN SERPL ELPH-MCNC: 4.1 G/DL
ALP BLD-CCNC: 79 U/L
ALT SERPL-CCNC: 11 U/L
ANION GAP SERPL CALC-SCNC: 12 MMOL/L
AST SERPL-CCNC: 15 U/L
BILIRUB SERPL-MCNC: 0.4 MG/DL
BUN SERPL-MCNC: 22 MG/DL
CALCIUM SERPL-MCNC: 9.5 MG/DL
CHLORIDE SERPL-SCNC: 95 MMOL/L
CO2 SERPL-SCNC: 24 MMOL/L
CREAT SERPL-MCNC: 1.1 MG/DL
EGFR: 50 ML/MIN/1.73M2
FERRITIN SERPL-MCNC: 61 NG/ML
GLUCOSE SERPL-MCNC: 386 MG/DL
IRON SATN MFR SERPL: 17 %
IRON SERPL-MCNC: 64 UG/DL
MAGNESIUM SERPL-MCNC: 1.7 MG/DL
POTASSIUM SERPL-SCNC: 4.7 MMOL/L
PROT SERPL-MCNC: 6.6 G/DL
SODIUM SERPL-SCNC: 131 MMOL/L
TIBC SERPL-MCNC: 380 UG/DL
TSH SERPL-ACNC: 1.33 UIU/ML
UIBC SERPL-MCNC: 316 UG/DL

## 2024-01-11 ENCOUNTER — APPOINTMENT (OUTPATIENT)
Dept: INFUSION THERAPY | Facility: CLINIC | Age: 84
End: 2024-01-11

## 2024-01-11 ENCOUNTER — APPOINTMENT (OUTPATIENT)
Dept: HEMATOLOGY ONCOLOGY | Facility: CLINIC | Age: 84
End: 2024-01-11

## 2024-01-16 ENCOUNTER — APPOINTMENT (OUTPATIENT)
Dept: HEMATOLOGY ONCOLOGY | Facility: CLINIC | Age: 84
End: 2024-01-16
Payer: MEDICARE

## 2024-01-16 ENCOUNTER — OUTPATIENT (OUTPATIENT)
Dept: OUTPATIENT SERVICES | Facility: HOSPITAL | Age: 84
LOS: 1 days | End: 2024-01-16
Payer: MEDICARE

## 2024-01-16 ENCOUNTER — LABORATORY RESULT (OUTPATIENT)
Age: 84
End: 2024-01-16

## 2024-01-16 ENCOUNTER — APPOINTMENT (OUTPATIENT)
Dept: INFUSION THERAPY | Facility: CLINIC | Age: 84
End: 2024-01-16
Payer: MEDICARE

## 2024-01-16 VITALS
WEIGHT: 156 LBS | DIASTOLIC BLOOD PRESSURE: 74 MMHG | SYSTOLIC BLOOD PRESSURE: 147 MMHG | HEART RATE: 75 BPM | BODY MASS INDEX: 30.63 KG/M2 | HEIGHT: 60 IN | TEMPERATURE: 97.3 F

## 2024-01-16 VITALS — TEMPERATURE: 97 F | HEART RATE: 75 BPM | SYSTOLIC BLOOD PRESSURE: 147 MMHG | DIASTOLIC BLOOD PRESSURE: 74 MMHG

## 2024-01-16 DIAGNOSIS — R22.40 LOCALIZED SWELLING, MASS AND LUMP, UNSPECIFIED LOWER LIMB: Chronic | ICD-10-CM

## 2024-01-16 DIAGNOSIS — Z92.89 PERSONAL HISTORY OF OTHER MEDICAL TREATMENT: Chronic | ICD-10-CM

## 2024-01-16 DIAGNOSIS — I82.409 ACUTE EMBOLISM AND THROMBOSIS OF UNSPECIFIED DEEP VEINS OF UNSPECIFIED LOWER EXTREMITY: ICD-10-CM

## 2024-01-16 LAB
HCT VFR BLD CALC: 32.4 %
HGB BLD-MCNC: 10.3 G/DL
MCHC RBC-ENTMCNC: 24.3 PG
MCHC RBC-ENTMCNC: 31.8 G/DL
MCV RBC AUTO: 76.4 FL
PLATELET # BLD AUTO: 162 K/UL
PMV BLD: 11.1 FL
RBC # BLD: 4.24 M/UL
RBC # FLD: 15.1 %
WBC # FLD AUTO: 6.8 K/UL

## 2024-01-16 PROCEDURE — 99215 OFFICE O/P EST HI 40 MIN: CPT

## 2024-01-16 PROCEDURE — 96413 CHEMO IV INFUSION 1 HR: CPT

## 2024-01-16 PROCEDURE — G2211 COMPLEX E/M VISIT ADD ON: CPT

## 2024-01-16 PROCEDURE — 85027 COMPLETE CBC AUTOMATED: CPT

## 2024-01-16 PROCEDURE — 36415 COLL VENOUS BLD VENIPUNCTURE: CPT

## 2024-01-16 PROCEDURE — 84443 ASSAY THYROID STIM HORMONE: CPT

## 2024-01-16 PROCEDURE — 80053 COMPREHEN METABOLIC PANEL: CPT

## 2024-01-16 RX ORDER — PEMBROLIZUMAB 25 MG/ML
200 INJECTION, SOLUTION INTRAVENOUS ONCE
Refills: 0 | Status: COMPLETED | OUTPATIENT
Start: 2024-01-16 | End: 2024-01-16

## 2024-01-16 RX ADMIN — PEMBROLIZUMAB 200 MILLIGRAM(S): 25 INJECTION, SOLUTION INTRAVENOUS at 13:30

## 2024-01-16 RX ADMIN — PEMBROLIZUMAB 200 MILLIGRAM(S): 25 INJECTION, SOLUTION INTRAVENOUS at 12:57

## 2024-01-17 DIAGNOSIS — I82.409 ACUTE EMBOLISM AND THROMBOSIS OF UNSPECIFIED DEEP VEINS OF UNSPECIFIED LOWER EXTREMITY: ICD-10-CM

## 2024-01-17 LAB
ALBUMIN SERPL ELPH-MCNC: 4.2 G/DL
ALP BLD-CCNC: 72 U/L
ALT SERPL-CCNC: 12 U/L
ANION GAP SERPL CALC-SCNC: 12 MMOL/L
AST SERPL-CCNC: 13 U/L
BILIRUB SERPL-MCNC: 0.3 MG/DL
BUN SERPL-MCNC: 14 MG/DL
CALCIUM SERPL-MCNC: 9.4 MG/DL
CHLORIDE SERPL-SCNC: 97 MMOL/L
CO2 SERPL-SCNC: 26 MMOL/L
CREAT SERPL-MCNC: 0.9 MG/DL
EGFR: 63 ML/MIN/1.73M2
GLUCOSE SERPL-MCNC: 159 MG/DL
POTASSIUM SERPL-SCNC: 4.4 MMOL/L
PROT SERPL-MCNC: 6.5 G/DL
SODIUM SERPL-SCNC: 135 MMOL/L
TSH SERPL-ACNC: 1.32 UIU/ML

## 2024-01-19 NOTE — ASSESSMENT
[FreeTextEntry1] : In summary, Uzma Pop is an 83 year old female who initially diagnosed with squamous cell carcinoma of the cervix, FIGO Stage IIIA, s/p chemo/RT.  Pt was treated with chemoRT.  Given her age and possible cardiac issues (inability to handle large volumes of pre and post chemo hydration). We had decided to use Carboplatin AUC 2 as a radiation sensitizer. She is s/p 5 weekly doses of Carboplatin. The patient received 7000 cGy to the Pelvis/cervix from 6/2/2021 through 8/2/2021  Post definitive chemoRT in 12/21/21, she had a FDG avid right external iliac lymph node, SUV max 10.0 measuring 1.0 cm, consistent with biological tumor activity. She is s/p SBRT x 5 to that lesion.  Her PET scan on 6/22 shows residual FDG uptake in the Rt external iliac LN although stable, findings d/w pt and daughter.  Unfortunately, pt was admitted to hospital in November 2022 when she had severe RLE pain. She underwent CT AP in ED showing necrotic R iliac lymph node that increased in size since 4/2022 with asymmetrically dilated R iliac and feeding veins. She was evaluated by vascular team and was taken for R iliac vein stent placement and now on Eliquis. She was also evaluated by gynecologic oncology team as well radiation oncology team, unfortunately no intervention could be offered at this time.  She underwent PET scan on 12/2022 shows compared to PET/CT 6/28/2022, interval placement of a right iliac venous stent. Increased FDG uptake in the region of the right pelvic sidewall (SUV 13.3), difficult to delineate on CT or emission imaging if this is related to recent stent placement versus right iliac lymph node. No other definite sites of abnormal FDG uptake to suggest biologic tumor activity.  Given pt has locoregional recurrence disease w/ prior hx of RT, the next best regimen will be systemic therapy.  Since pt's cervical cancer has PDL1 of 70-80%, we offered her Carbo/Taxol + Keytruda based on the KEYNOTE-826 trial (pembrolizumab improved median PFS versus placebo (10.4 versus 8.2 months) and OS at 24 months (50% vs 40% in pts w/ PD-L1 CPS of =1).  In the KEYNOTE-826 trial, Bevacizumab was optional (2/3 of pts received) and given pt has possible thrombus in her iliac vein, we will hold off on giving bevacizumab.  We will also modified Carboplatin/Taxol to weekly dosing due to her age and fraility with carboplatin AUC2 and Taxol 80 mg/m2 D1 and D8 every 3 weeks.  Given pt had Carboplatin previously, we advised pt to take Zytrec 10 mg qD 3 days before, on the day of and 3 days after each cycle of chemo.  PET scan 3/9/23 after 4 cycles of carbo/Taxol and Keytruda shows good treatment response with no new sites of abnormal FDG uptake, decreased FDG uptake in the region of the right pelvic sidewall (SUV up to 3.5 previously 13.3), wall thickening of the descending colon and rectosigmoid with associated increased FDG uptake suggestive of infectious or inflammatory process.  Given good treatment response, and adverse effects from chemotherapy and premedications causing confusion, we will only continue Keytruda and hold carbo/Taxol moving forward. Her Keytruda was held on 5/22 due to RA flare.  PET scan 6/9/23: Persistent FDG uptake in right pelvic sidewall, with no other sites. PET scan (10/11/2023) is stable.  PLAN: Previous notes reviewed and all relevant laboratory and radiology result and were communicated to the patient and her daughter.  --Continue with cycle #16 of Keytruda today. CBC today reviewed and is adequate. Mild anemia is trending down to 10.8 gm/dL. ****Side effect profile of pembrolizumab were discussed with pt including but not limited to: anemia, fatigue, hyperglycemia, hyponatremia, hypoalbuminemia, itching, cough, nausea, rash, decreased appetite, hypertriglyceridemia, increased liver enzymes, hypocalcemia, constipation, diarrhea, upper respiratory tract infection and an immune-mediated reaction. Lab work will check for elevated liver enzymes and thyroid function. -- Will check CMP, Mg, TSH. -- Follow up with PCP for further management of diabetes.  #Microcytic anemia -- Will do Ferritin and TIBC. -- Advised to minimize high carb diet and encouraged regular exercise as tolerated.  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 - Continue Eliquis.  # Rheumatoid arthritis - Had been on MTX since starting chemotherapy. Currently she is on Prednisone 1 mg, Plaquenil, Hydrocodone and MTX as per Rheumatologist.  RTC 3 weeks

## 2024-01-19 NOTE — HISTORY OF PRESENT ILLNESS
[Disease: _____________________] : Disease: [unfilled] [de-identified] : The patient is an 81 year old woman who presented with post-menopausal vaginal bleeding in March 2021. \par  Upon further work up, PAP smear on 3/18/21 showed: LSIL/AGC/HPV+. Pelvic US on 3/22/21 showed endometrium to be 4.2mm thick. 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 \par  MRI pelvis on 4/3/21 showed signal abnormality in the posterior cervix 2.5cm x 1.6cm. The lesion does not go beyond the limit of the cervix. \par  She saw Dr. Echevarria on 4/14/21 and on his exam, she was noted to have a 4-5cm cervical mass involving at least the middle of the vaginal wall, circumferentially. No parametrial involvement. She was not deemed a surgical candidate. \par  Definitive chemoradiation was recommended. She is here to discuss radiation. \par  PET/CT: Pending\par  \par  Pt C/O low back pain, for which she takes Tylenol. Vaginal bleeding is not very excessive. No SOB, Chest pain.\par  Pt has a stress test next week\par   [de-identified] : Squamous cell [de-identified] : 5/18/21 Pt is here for follow up. She has completed a PET scan. has moderate back pain  6/9/21 Pt is here for follow up. She has started RT last week. She also recd 1 dose of carboplatin last week. She did not have any nausea or vomiting. She has been getting diarrhea which she has been managing with imodium. Her bld sugar was also running high. No fever, mouth sores.  6/16/21 Pt is here for follow up. She is s/p 2 doses of Carbo. C/O feeling very fatigued. Had diarrhea which is controlled with imodium. No N, V. has abdominal discomfort.  6/23/2021 Pt is here to follow up for b=cervical cancer, accompanied by daughter She is on Carbo s/p 2 cycles, tolerating well She feels a lot better today She denies abdominal pain, nausea or vomiting She gets diarrhea but is managed with Imodium  7/1/2021 Pt is here to follow up for cervical cancer, accompanied by daughter She is on Carbo s/p 3 cycles, tolerating well She feels fine today, appetite is good She denies abdominal pain, nausea or vomiting, no vaginal bleeding/discharge She gets diarrhea, takes Imodium after having 7-8 episodes then gets constipation the following day  7/7/2021 Pt is here to follow up for cervical cancer, accompanied by daughter She is on weekly Carbo s/p  4 cycles, tolerating well She feels fine today, appetite is good She denies nausea or vomiting, no vaginal bleeding/discharge She c/o of low abdominal cramping and gets diarrhea 4-5x/day, managed with Imodium then gets constipation the following day  7/26/21 Pt is here for follow up, She is feeling better. Diarrhea has improved. NO vaginal bleeding  9/8/21 Pt is here for follow up. Has completed RT on 8/2/21. No vag bleeding, no pelvic pain.  12/21/21 Pt is here today for follow up visit for cervical cancer. She has completed chemo (7/2021)/Rad (8/2021). Pt denies any vaginal bleeding or abd/pelvic pain. She had a PET CT scan on 12/21/21 which showed FDG avid right external iliac lymph node, SUV max 10.0 measuring 1.0 cm, consistent with biological tumor activity. She saw Dr. Ragland and agreed to have SBRT since she is not surgical candidate. Simulation was done. She has an appt with Dr. Echevarria 1/12/22.  4/25/22 Pt is here for follow up. She had been C/O rectal bleeding over the past week. She went to ER and was treated with ABX. CT scan abdomen and pelvis reviwed ? proctoclitis. Symptoms have improved.Pt has appt with GI.  12/12/22 Pt is here for follow up.  She was recently admitted to hospital in November 2022 when she had severe RLE pain. She underwent CT AP in ED showing necrotic R iliac lymph node that increased in size since 4/2022 with asymmetrically dilated R iliac and feeding veins. She was evaluated by vascular team and she was taken for R iliac vein stent placement and now on eliquis. She was also evaluated by gynonc team as well radiation oncology team, unfortunately cannot offer any intervention at this time. She is doing much better since she was discharged from hospital, swelling of RLE significant improved.  She still experiences R groin pain which she takes Percocet.  Otherwise, she denies fever, chill, SOB, CP, N/V/D.   1/12/2023 Pt presents to clinic for follow up and cycle#2 of Carbo/taxol & Keytruda accompanied by daughter.  With her first cycle she had diarrhea & fatigue for two days that improved with Imodium. She has lost 20 lbs over last months. Her daughters are taking care of her  diet.  Her leg swelling has been subsiding well and she is requiring lesser pain meds for her leg pain.   1/31/23 Pt is here for follow up. C/O pain in her back since a week or so. Leg swelling and pain has resolved. C/O significant fatigue  3/2/23 Pt is here for follow up. Feels well mostly except for fatigue. Becomes confused post chemo ? d/t steroids. Swelling in LE has resolved. Tolerating AC well. Had one episode of rectal bleed which resolved likely d/t hemorrhoids, pt was constipated  3/20/23 Pt is here for follow up. Feels well mostly except for fatigue. Becomes confused post chemo ? d/t steroids which persisted for about 3 days post chemo.  Swelling in LE has resolved. Tolerating AC well. Had one episode of rectal bleed which resolved likely d/t hemorrhoids She completed PET scan on 3/9/23.  She is s/p 4 cycles of Carboplatin/Taxol.Keytruda.   4/10/23 Pt is here for follow up. Feels well mostly except for fatigue. Swelling in LE has resolved. Tolerating AC well. Had one episode of rectal bleed previously which resolved likely d/t hemorrhoids She completed PET scan on 3/9/23.  She is s/p 4 cycles of Carboplatin/Taxol/Keytruda which was switched to Keytruda alone starting cycle 5.  She complains of joint pain in b/l hands and left ankle, associated with L ankle swelling. She has hx of RA (was on MTX which has been on hold since chemo started).   5/1/23 Pt is here for follow up. Swelling in LE has resolved. Tolerating AC well. Had one episode of rectal bleed previously which resolved likely d/t hemorrhoids She completed PET scan on 3/9/23.  She is s/p 4 cycles of Carboplatin/Taxol/Keytruda which was switched to Keytruda alone starting cycle 5.  She complains of joint pain in b/l hands and left ankle, associated with L ankle swelling. She has hx of RA (was on MTX which has been on hold since chemo started). She still has persistent joint pain, stiffness and is affecting her quality of life.  She saw Dr Grant ([facility]) and was started on Prednisone 10mg qd, Hydroxychlorquine 400mg daily, and Tramadol prn. She feels symptoms are still uncontrolled.   5/22/23 Patient presents to clinic for follow up accompanied by her daughter. She appears to be in acute RA flare. She has stiffness and pain in her back, shoulders and small joints of hands. Daughter reported that she had a MRI done of cervical spine that showed nerve impingement in the cervical spine She is taking MTX, Plaquenil and advil 200 mg TiD with minimal relief. We spoke to her about giving her a break from immunotherapy till her pain improves and she agreed.  6/12/23 Patient presents to clinic for follow up accompanied by her daughter. Daughter reported that she had a MRI done of cervical spine that showed nerve impingement in the cervical spine She is taking MTX, Plaquenil and advil 200 mg TiD, and prednisone.  Her Keytruda was held last cycle, due to her RA flare. She is due for C#8 today.  She feels much better today, only complains of joint pain in b/l hands and neuropathy.  She also feels fatigued.  She completed PET scan on 6/9/23.   7/3/23 Pt is here for follow up. Feels well.  The joint pain is better  7/24/23 Patient presents for follow up accompanied by her daughter She is feeling well. Her RA has improved on medications. She is taking MTX, Plaquenil and prednisone  She feels well and is scheduled for cycle # 10 of Keytruda today.   8/14/23 Pt is here for follow up. Feels fairly well overall. Has C/O diarrhea occasionally and sometimes she is constipated. No pelvic pain or vag bleeding compliant with AC  9/26/23 Patient is here to follow up for cervical cancer, accompanied by her daughter. She is on Keytruda, tolerating well. She feels well, appetite is good. She denies abdominal pain, nausea, vomiting or abnormal vaginal bleeding/discharge. She is compliant with AC. She had seen Dr Ragland today.  10/17/23 Patient is here to follow up for cervical cancer, accompanied by her daughter. She is on Keytruda, tolerating well. She feels well, appetite is good. She denies abdominal pain, nausea, vomiting or abnormal vaginal bleeding/discharge. Her diarrhea is manageable with Imodium. She is compliant with AC.  11/09/23 Patient is here to follow up for cervical cancer, accompanied by her daughter. She is on Keytruda, tolerating well. She feels well, appetite is good., but endorses feeling tired s/p right eye cataract surgery on 11/7/2023 She denies abdominal pain, nausea, vomiting or abnormal vaginal bleeding/discharge. Her diarrhea is manageable with Imodium, no episodes for 3 days now. She is compliant with AC.  12/26/23 Patient is here to follow up for cervical cancer, accompanied by her daughter. She is on Keytruda, tolerating well. She is not feeling well as her blood glucose has been elevated. She denies abdominal pain, nausea, vomiting or abnormal vaginal bleeding/discharge, no shortness of breath, chest pain, headache of dizziness. She had received her flu vaccine. She is compliant with Eliquis, denies any bleeding or bruising. She is compliant with her RA meds, on prednisone 5 mg daily and MTX.  1/16/24 Pt is here for follow up. Feels well. RA controlled on current meds

## 2024-02-06 ENCOUNTER — APPOINTMENT (OUTPATIENT)
Dept: INFUSION THERAPY | Facility: CLINIC | Age: 84
End: 2024-02-06
Payer: MEDICARE

## 2024-02-06 ENCOUNTER — APPOINTMENT (OUTPATIENT)
Dept: HEMATOLOGY ONCOLOGY | Facility: CLINIC | Age: 84
End: 2024-02-06
Payer: MEDICARE

## 2024-02-06 ENCOUNTER — OUTPATIENT (OUTPATIENT)
Dept: OUTPATIENT SERVICES | Facility: HOSPITAL | Age: 84
LOS: 1 days | End: 2024-02-06
Payer: MEDICARE

## 2024-02-06 ENCOUNTER — LABORATORY RESULT (OUTPATIENT)
Age: 84
End: 2024-02-06

## 2024-02-06 VITALS
RESPIRATION RATE: 16 BRPM | BODY MASS INDEX: 31.41 KG/M2 | TEMPERATURE: 98.3 F | HEART RATE: 87 BPM | HEIGHT: 60 IN | WEIGHT: 160 LBS | DIASTOLIC BLOOD PRESSURE: 91 MMHG | SYSTOLIC BLOOD PRESSURE: 178 MMHG

## 2024-02-06 VITALS — SYSTOLIC BLOOD PRESSURE: 140 MMHG | TEMPERATURE: 97 F | DIASTOLIC BLOOD PRESSURE: 78 MMHG | HEART RATE: 88 BPM

## 2024-02-06 DIAGNOSIS — Z92.89 PERSONAL HISTORY OF OTHER MEDICAL TREATMENT: Chronic | ICD-10-CM

## 2024-02-06 DIAGNOSIS — C53.9 MALIGNANT NEOPLASM OF CERVIX UTERI, UNSPECIFIED: ICD-10-CM

## 2024-02-06 DIAGNOSIS — R22.40 LOCALIZED SWELLING, MASS AND LUMP, UNSPECIFIED LOWER LIMB: Chronic | ICD-10-CM

## 2024-02-06 LAB
HCT VFR BLD CALC: 33.8 %
HGB BLD-MCNC: 10.8 G/DL
MAGNESIUM SERPL-MCNC: 1.8 MG/DL
MCHC RBC-ENTMCNC: 24.7 PG
MCHC RBC-ENTMCNC: 32 G/DL
MCV RBC AUTO: 77.3 FL
PLATELET # BLD AUTO: 214 K/UL
PMV BLD: 10.7 FL
RBC # BLD: 4.37 M/UL
RBC # FLD: 15.2 %
WBC # FLD AUTO: 7.05 K/UL

## 2024-02-06 PROCEDURE — 99215 OFFICE O/P EST HI 40 MIN: CPT

## 2024-02-06 PROCEDURE — 80053 COMPREHEN METABOLIC PANEL: CPT

## 2024-02-06 PROCEDURE — 85027 COMPLETE CBC AUTOMATED: CPT

## 2024-02-06 PROCEDURE — G2211 COMPLEX E/M VISIT ADD ON: CPT

## 2024-02-06 PROCEDURE — 83735 ASSAY OF MAGNESIUM: CPT

## 2024-02-06 PROCEDURE — 96413 CHEMO IV INFUSION 1 HR: CPT

## 2024-02-06 PROCEDURE — 36415 COLL VENOUS BLD VENIPUNCTURE: CPT

## 2024-02-06 PROCEDURE — 84443 ASSAY THYROID STIM HORMONE: CPT

## 2024-02-06 RX ORDER — PEMBROLIZUMAB 25 MG/ML
200 INJECTION, SOLUTION INTRAVENOUS ONCE
Refills: 0 | Status: COMPLETED | OUTPATIENT
Start: 2024-02-06 | End: 2024-02-06

## 2024-02-06 RX ADMIN — PEMBROLIZUMAB 200 MILLIGRAM(S): 25 INJECTION, SOLUTION INTRAVENOUS at 14:02

## 2024-02-06 RX ADMIN — PEMBROLIZUMAB 200 MILLIGRAM(S): 25 INJECTION, SOLUTION INTRAVENOUS at 14:33

## 2024-02-07 DIAGNOSIS — C53.9 MALIGNANT NEOPLASM OF CERVIX UTERI, UNSPECIFIED: ICD-10-CM

## 2024-02-07 LAB
ALBUMIN SERPL ELPH-MCNC: 4.2 G/DL
ALP BLD-CCNC: 74 U/L
ALT SERPL-CCNC: 14 U/L
ANION GAP SERPL CALC-SCNC: 12 MMOL/L
AST SERPL-CCNC: 19 U/L
BILIRUB SERPL-MCNC: 0.3 MG/DL
BUN SERPL-MCNC: 15 MG/DL
CALCIUM SERPL-MCNC: 9.7 MG/DL
CHLORIDE SERPL-SCNC: 101 MMOL/L
CO2 SERPL-SCNC: 26 MMOL/L
CREAT SERPL-MCNC: 0.9 MG/DL
EGFR: 63 ML/MIN/1.73M2
GLUCOSE SERPL-MCNC: 141 MG/DL
POTASSIUM SERPL-SCNC: 4.4 MMOL/L
PROT SERPL-MCNC: 6.6 G/DL
SODIUM SERPL-SCNC: 139 MMOL/L
TSH SERPL-ACNC: 1.23 UIU/ML

## 2024-02-07 NOTE — ASSESSMENT
[FreeTextEntry1] : In summary, Uzma Pop is an 83 year old female who initially diagnosed with squamous cell carcinoma of the cervix, FIGO Stage IIIA, s/p chemo/RT.  Given her age and possible cardiac issues (inability to handle large volumes of pre and post chemo hydration). We had decided to use Carboplatin AUC 2 as a radiation sensitizer. She is s/p 5 weekly doses of Carboplatin. The patient received 7000 cGy to the Pelvis/cervix from 6/2/2021 through 8/2/2021  Post definitive chemoRT in 12/21/21, she had a FDG avid right external iliac lymph node, SUV max 10.0 measuring 1.0 cm, consistent with biological tumor activity. She is s/p SBRT x 5 to that lesion.  Her PET scan on 6/22 shows residual FDG uptake in the Rt external iliac LN although stable, findings d/w pt and daughter.  Unfortunately, pt was admitted to hospital in November 2022 when she had severe RLE pain. She underwent CT AP in ED showing necrotic R iliac lymph node that increased in size since 4/2022 with asymmetrically dilated R iliac and feeding veins. She was evaluated by vascular team and was taken for R iliac vein stent placement and now on Eliquis. She was also evaluated by gynecologic oncology team as well radiation oncology team, unfortunately no intervention could be offered at this time.  She underwent PET scan on 12/2022 shows compared to PET/CT 6/28/2022, interval placement of a right iliac venous stent. Increased FDG uptake in the region of the right pelvic sidewall (SUV 13.3), difficult to delineate on CT or emission imaging if this is related to recent stent placement versus right iliac lymph node. No other definite sites of abnormal FDG uptake to suggest biologic tumor activity.  Given pt has locoregional recurrence disease w/ prior hx of RT, the next best regimen will be systemic therapy.  Since pt's cervical cancer has PDL1 of 70-80%, we offered her Carbo/Taxol + Keytruda based on the KEYNOTE-826 trial (pembrolizumab improved median PFS versus placebo (10.4 versus 8.2 months) and OS at 24 months (50% vs 40% in pts w/ PD-L1 CPS of =1).  In the KEYNOTE-826 trial, Bevacizumab was optional (2/3 of pts received) and given pt has possible thrombus in her iliac vein, we will hold off on giving bevacizumab.  We will also modified Carboplatin/Taxol to weekly dosing due to her age and fraility with carboplatin AUC2 and Taxol 80 mg/m2 D1 and D8 every 3 weeks.   Currently maintained on Keytruda  PLAN: Previous notes reviewed and all relevant laboratory and radiology result and were communicated to the patient and her daughter.  --Continue with cycle #17 of Keytruda today. CBC today reviewed and is adequate. Mild anemia is trending down to 10.8 gm/dL. ****Side effect profile of pembrolizumab were discussed with pt including but not limited to: anemia, fatigue, hyperglycemia, hyponatremia, hypoalbuminemia, itching, cough, nausea, rash, decreased appetite, hypertriglyceridemia, increased liver enzymes, hypocalcemia, constipation, diarrhea, upper respiratory tract infection and an immune-mediated reaction. Lab work will check for elevated liver enzymes and thyroid function. -- Will check CMP, Mg, TSH. -- Follow up with PCP for further management of diabetes.  #Microcytic anemia: stable ? related to chronic disease - # R iliac vein dilation secondary to necrotic R iliac lymph node w/ possible thrombosis s/p stent placement - Follow up with vascular as recommended. - Continue Eliquis.  # Rheumatoid arthritis - Had been on MTX since starting chemotherapy. Currently she is on Prednisone 1 mg, Plaquenil, Hydrocodone and MTX as per Rheumatologist.  RTC 3 weeks

## 2024-02-07 NOTE — HISTORY OF PRESENT ILLNESS
[Disease: _____________________] : Disease: [unfilled] [de-identified] : The patient is an 81 year old woman who presented with post-menopausal vaginal bleeding in March 2021. \par  Upon further work up, PAP smear on 3/18/21 showed: LSIL/AGC/HPV+. Pelvic US on 3/22/21 showed endometrium to be 4.2mm thick. 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 \par  MRI pelvis on 4/3/21 showed signal abnormality in the posterior cervix 2.5cm x 1.6cm. The lesion does not go beyond the limit of the cervix. \par  She saw Dr. Echevarria on 4/14/21 and on his exam, she was noted to have a 4-5cm cervical mass involving at least the middle of the vaginal wall, circumferentially. No parametrial involvement. She was not deemed a surgical candidate. \par  Definitive chemoradiation was recommended. She is here to discuss radiation. \par  PET/CT: Pending\par  \par  Pt C/O low back pain, for which she takes Tylenol. Vaginal bleeding is not very excessive. No SOB, Chest pain.\par  Pt has a stress test next week\par   [de-identified] : Squamous cell [de-identified] : 5/18/21 Pt is here for follow up. She has completed a PET scan. has moderate back pain  6/9/21 Pt is here for follow up. She has started RT last week. She also recd 1 dose of carboplatin last week. She did not have any nausea or vomiting. She has been getting diarrhea which she has been managing with imodium. Her bld sugar was also running high. No fever, mouth sores.  6/16/21 Pt is here for follow up. She is s/p 2 doses of Carbo. C/O feeling very fatigued. Had diarrhea which is controlled with imodium. No N, V. has abdominal discomfort.  6/23/2021 Pt is here to follow up for b=cervical cancer, accompanied by daughter She is on Carbo s/p 2 cycles, tolerating well She feels a lot better today She denies abdominal pain, nausea or vomiting She gets diarrhea but is managed with Imodium  7/1/2021 Pt is here to follow up for cervical cancer, accompanied by daughter She is on Carbo s/p 3 cycles, tolerating well She feels fine today, appetite is good She denies abdominal pain, nausea or vomiting, no vaginal bleeding/discharge She gets diarrhea, takes Imodium after having 7-8 episodes then gets constipation the following day  7/7/2021 Pt is here to follow up for cervical cancer, accompanied by daughter She is on weekly Carbo s/p  4 cycles, tolerating well She feels fine today, appetite is good She denies nausea or vomiting, no vaginal bleeding/discharge She c/o of low abdominal cramping and gets diarrhea 4-5x/day, managed with Imodium then gets constipation the following day  7/26/21 Pt is here for follow up, She is feeling better. Diarrhea has improved. NO vaginal bleeding  9/8/21 Pt is here for follow up. Has completed RT on 8/2/21. No vag bleeding, no pelvic pain.  12/21/21 Pt is here today for follow up visit for cervical cancer. She has completed chemo (7/2021)/Rad (8/2021). Pt denies any vaginal bleeding or abd/pelvic pain. She had a PET CT scan on 12/21/21 which showed FDG avid right external iliac lymph node, SUV max 10.0 measuring 1.0 cm, consistent with biological tumor activity. She saw Dr. Ragland and agreed to have SBRT since she is not surgical candidate. Simulation was done. She has an appt with Dr. Echevarria 1/12/22.  4/25/22 Pt is here for follow up. She had been C/O rectal bleeding over the past week. She went to ER and was treated with ABX. CT scan abdomen and pelvis reviwed ? proctoclitis. Symptoms have improved.Pt has appt with GI.  12/12/22 Pt is here for follow up.  She was recently admitted to hospital in November 2022 when she had severe RLE pain. She underwent CT AP in ED showing necrotic R iliac lymph node that increased in size since 4/2022 with asymmetrically dilated R iliac and feeding veins. She was evaluated by vascular team and she was taken for R iliac vein stent placement and now on eliquis. She was also evaluated by gynonc team as well radiation oncology team, unfortunately cannot offer any intervention at this time. She is doing much better since she was discharged from hospital, swelling of RLE significant improved.  She still experiences R groin pain which she takes Percocet.  Otherwise, she denies fever, chill, SOB, CP, N/V/D.   1/12/2023 Pt presents to clinic for follow up and cycle#2 of Carbo/taxol & Keytruda accompanied by daughter.  With her first cycle she had diarrhea & fatigue for two days that improved with Imodium. She has lost 20 lbs over last months. Her daughters are taking care of her  diet.  Her leg swelling has been subsiding well and she is requiring lesser pain meds for her leg pain.   1/31/23 Pt is here for follow up. C/O pain in her back since a week or so. Leg swelling and pain has resolved. C/O significant fatigue  3/2/23 Pt is here for follow up. Feels well mostly except for fatigue. Becomes confused post chemo ? d/t steroids. Swelling in LE has resolved. Tolerating AC well. Had one episode of rectal bleed which resolved likely d/t hemorrhoids, pt was constipated  3/20/23 Pt is here for follow up. Feels well mostly except for fatigue. Becomes confused post chemo ? d/t steroids which persisted for about 3 days post chemo.  Swelling in LE has resolved. Tolerating AC well. Had one episode of rectal bleed which resolved likely d/t hemorrhoids She completed PET scan on 3/9/23.  She is s/p 4 cycles of Carboplatin/Taxol.Keytruda.   4/10/23 Pt is here for follow up. Feels well mostly except for fatigue. Swelling in LE has resolved. Tolerating AC well. Had one episode of rectal bleed previously which resolved likely d/t hemorrhoids She completed PET scan on 3/9/23.  She is s/p 4 cycles of Carboplatin/Taxol/Keytruda which was switched to Keytruda alone starting cycle 5.  She complains of joint pain in b/l hands and left ankle, associated with L ankle swelling. She has hx of RA (was on MTX which has been on hold since chemo started).   5/1/23 Pt is here for follow up. Swelling in LE has resolved. Tolerating AC well. Had one episode of rectal bleed previously which resolved likely d/t hemorrhoids She completed PET scan on 3/9/23.  She is s/p 4 cycles of Carboplatin/Taxol/Keytruda which was switched to Keytruda alone starting cycle 5.  She complains of joint pain in b/l hands and left ankle, associated with L ankle swelling. She has hx of RA (was on MTX which has been on hold since chemo started). She still has persistent joint pain, stiffness and is affecting her quality of life.  She saw Dr Grant ([facility]) and was started on Prednisone 10mg qd, Hydroxychlorquine 400mg daily, and Tramadol prn. She feels symptoms are still uncontrolled.   5/22/23 Patient presents to clinic for follow up accompanied by her daughter. She appears to be in acute RA flare. She has stiffness and pain in her back, shoulders and small joints of hands. Daughter reported that she had a MRI done of cervical spine that showed nerve impingement in the cervical spine She is taking MTX, Plaquenil and advil 200 mg TiD with minimal relief. We spoke to her about giving her a break from immunotherapy till her pain improves and she agreed.  6/12/23 Patient presents to clinic for follow up accompanied by her daughter. Daughter reported that she had a MRI done of cervical spine that showed nerve impingement in the cervical spine She is taking MTX, Plaquenil and advil 200 mg TiD, and prednisone.  Her Keytruda was held last cycle, due to her RA flare. She is due for C#8 today.  She feels much better today, only complains of joint pain in b/l hands and neuropathy.  She also feels fatigued.  She completed PET scan on 6/9/23.   7/3/23 Pt is here for follow up. Feels well.  The joint pain is better  7/24/23 Patient presents for follow up accompanied by her daughter She is feeling well. Her RA has improved on medications. She is taking MTX, Plaquenil and prednisone  She feels well and is scheduled for cycle # 10 of Keytruda today.   8/14/23 Pt is here for follow up. Feels fairly well overall. Has C/O diarrhea occasionally and sometimes she is constipated. No pelvic pain or vag bleeding compliant with AC  9/26/23 Patient is here to follow up for cervical cancer, accompanied by her daughter. She is on Keytruda, tolerating well. She feels well, appetite is good. She denies abdominal pain, nausea, vomiting or abnormal vaginal bleeding/discharge. She is compliant with AC. She had seen Dr Ragland today.  10/17/23 Patient is here to follow up for cervical cancer, accompanied by her daughter. She is on Keytruda, tolerating well. She feels well, appetite is good. She denies abdominal pain, nausea, vomiting or abnormal vaginal bleeding/discharge. Her diarrhea is manageable with Imodium. She is compliant with AC.  11/09/23 Patient is here to follow up for cervical cancer, accompanied by her daughter. She is on Keytruda, tolerating well. She feels well, appetite is good., but endorses feeling tired s/p right eye cataract surgery on 11/7/2023 She denies abdominal pain, nausea, vomiting or abnormal vaginal bleeding/discharge. Her diarrhea is manageable with Imodium, no episodes for 3 days now. She is compliant with AC.  12/26/23 Patient is here to follow up for cervical cancer, accompanied by her daughter. She is on Keytruda, tolerating well. She is not feeling well as her blood glucose has been elevated. She denies abdominal pain, nausea, vomiting or abnormal vaginal bleeding/discharge, no shortness of breath, chest pain, headache of dizziness. She had received her flu vaccine. She is compliant with Eliquis, denies any bleeding or bruising. She is compliant with her RA meds, on prednisone 5 mg daily and MTX.  1/16/24 Pt is here for follow up. Feels well. RA controlled on current meds  2/6/24 Pt is here for follow up. Feels well. No diarrhea, rash,

## 2024-02-27 ENCOUNTER — APPOINTMENT (OUTPATIENT)
Dept: HEMATOLOGY ONCOLOGY | Facility: CLINIC | Age: 84
End: 2024-02-27
Payer: MEDICARE

## 2024-02-27 ENCOUNTER — APPOINTMENT (OUTPATIENT)
Dept: INFUSION THERAPY | Facility: CLINIC | Age: 84
End: 2024-02-27
Payer: MEDICARE

## 2024-02-27 ENCOUNTER — OUTPATIENT (OUTPATIENT)
Dept: OUTPATIENT SERVICES | Facility: HOSPITAL | Age: 84
LOS: 1 days | End: 2024-02-27
Payer: MEDICARE

## 2024-02-27 VITALS
HEIGHT: 60 IN | HEART RATE: 89 BPM | TEMPERATURE: 97.9 F | SYSTOLIC BLOOD PRESSURE: 154 MMHG | WEIGHT: 160 LBS | DIASTOLIC BLOOD PRESSURE: 65 MMHG | BODY MASS INDEX: 31.41 KG/M2

## 2024-02-27 DIAGNOSIS — C53.9 MALIGNANT NEOPLASM OF CERVIX UTERI, UNSPECIFIED: ICD-10-CM

## 2024-02-27 DIAGNOSIS — Z92.89 PERSONAL HISTORY OF OTHER MEDICAL TREATMENT: Chronic | ICD-10-CM

## 2024-02-27 DIAGNOSIS — R22.40 LOCALIZED SWELLING, MASS AND LUMP, UNSPECIFIED LOWER LIMB: Chronic | ICD-10-CM

## 2024-02-27 LAB
BASOPHILS # BLD AUTO: 0.01 K/UL
BASOPHILS NFR BLD AUTO: 0.2 %
EOSINOPHIL # BLD AUTO: 0.13 K/UL
EOSINOPHIL NFR BLD AUTO: 2.5 %
HCT VFR BLD CALC: 32.8 %
HGB BLD-MCNC: 10.1 G/DL
IMM GRANULOCYTES NFR BLD AUTO: 0.4 %
LYMPHOCYTES # BLD AUTO: 0.56 K/UL
LYMPHOCYTES NFR BLD AUTO: 10.7 %
MAN DIFF?: NORMAL
MCHC RBC-ENTMCNC: 23.8 PG
MCHC RBC-ENTMCNC: 30.8 G/DL
MCV RBC AUTO: 77.4 FL
MONOCYTES # BLD AUTO: 0.51 K/UL
MONOCYTES NFR BLD AUTO: 9.8 %
NEUTROPHILS # BLD AUTO: 3.98 K/UL
NEUTROPHILS NFR BLD AUTO: 76.4 %
PLATELET # BLD AUTO: 203 K/UL
PMV BLD AUTO: 0 /100 WBCS
RBC # BLD: 4.24 M/UL
RBC # FLD: 15.6 %
WBC # FLD AUTO: 5.21 K/UL

## 2024-02-27 PROCEDURE — 36415 COLL VENOUS BLD VENIPUNCTURE: CPT

## 2024-02-27 PROCEDURE — 99215 OFFICE O/P EST HI 40 MIN: CPT

## 2024-02-27 PROCEDURE — G2211 COMPLEX E/M VISIT ADD ON: CPT

## 2024-02-27 PROCEDURE — 84443 ASSAY THYROID STIM HORMONE: CPT

## 2024-02-27 PROCEDURE — 80053 COMPREHEN METABOLIC PANEL: CPT

## 2024-02-27 PROCEDURE — 83735 ASSAY OF MAGNESIUM: CPT

## 2024-02-27 PROCEDURE — 85027 COMPLETE CBC AUTOMATED: CPT

## 2024-02-27 PROCEDURE — 96413 CHEMO IV INFUSION 1 HR: CPT

## 2024-02-27 RX ORDER — PEMBROLIZUMAB 25 MG/ML
200 INJECTION, SOLUTION INTRAVENOUS ONCE
Refills: 0 | Status: COMPLETED | OUTPATIENT
Start: 2024-02-27 | End: 2024-02-27

## 2024-02-27 RX ADMIN — PEMBROLIZUMAB 200 MILLIGRAM(S): 25 INJECTION, SOLUTION INTRAVENOUS at 15:50

## 2024-02-28 LAB
ALBUMIN SERPL ELPH-MCNC: 4.2 G/DL
ALP BLD-CCNC: 75 U/L
ALT SERPL-CCNC: 15 U/L
ANION GAP SERPL CALC-SCNC: 12 MMOL/L
AST SERPL-CCNC: 18 U/L
BILIRUB SERPL-MCNC: 0.3 MG/DL
BUN SERPL-MCNC: 15 MG/DL
CALCIUM SERPL-MCNC: 9.7 MG/DL
CHLORIDE SERPL-SCNC: 101 MMOL/L
CO2 SERPL-SCNC: 25 MMOL/L
CREAT SERPL-MCNC: 0.9 MG/DL
EGFR: 63 ML/MIN/1.73M2
GLUCOSE SERPL-MCNC: 64 MG/DL
MAGNESIUM SERPL-MCNC: 2 MG/DL
POTASSIUM SERPL-SCNC: 4.5 MMOL/L
PROT SERPL-MCNC: 6.9 G/DL
SODIUM SERPL-SCNC: 138 MMOL/L
TSH SERPL-ACNC: 0.89 UIU/ML

## 2024-03-01 NOTE — REVIEW OF SYSTEMS
[Joint Stiffness] : joint stiffness [Joint Pain] : joint pain [Negative] : Allergic/Immunologic [Fatigue] : no fatigue [FreeTextEntry9] : B/L hands joint pain and neuropathy

## 2024-03-01 NOTE — ASSESSMENT
[FreeTextEntry1] : In summary, Uzma Pop is an 83 year old female who initially diagnosed with squamous cell carcinoma of the cervix, FIGO Stage IIIA, s/p chemo/RT.  Given her age and possible cardiac issues (inability to handle large volumes of pre and post chemo hydration). We had decided to use Carboplatin AUC 2 as a radiation sensitizer. She is s/p 5 weekly doses of Carboplatin. The patient received 7000 cGy to the Pelvis/cervix from 6/2/2021 through 8/2/2021  Post definitive chemoRT in 12/21/21, she had a FDG avid right external iliac lymph node, SUV max 10.0 measuring 1.0 cm, consistent with biological tumor activity. She is s/p SBRT x 5 to that lesion.  Her PET scan on 6/22 shows residual FDG uptake in the Rt external iliac LN although stable, findings d/w pt and daughter.  Unfortunately, pt was admitted to hospital in November 2022 when she had severe RLE pain. She underwent CT AP in ED showing necrotic R iliac lymph node that increased in size since 4/2022 with asymmetrically dilated R iliac and feeding veins. She was evaluated by vascular team and was taken for R iliac vein stent placement and now on Eliquis. She was also evaluated by gynecologic oncology team as well radiation oncology team, unfortunately no intervention could be offered at this time.  She underwent PET scan on 12/2022 shows compared to PET/CT 6/28/2022, interval placement of a right iliac venous stent. Increased FDG uptake in the region of the right pelvic sidewall (SUV 13.3), difficult to delineate on CT or emission imaging if this is related to recent stent placement versus right iliac lymph node. No other definite sites of abnormal FDG uptake to suggest biologic tumor activity.  Given pt has locoregional recurrence disease w/ prior hx of RT, the next best regimen will be systemic therapy.  Since pt's cervical cancer has PDL1 of 70-80%, we offered her Carbo/Taxol + Keytruda based on the KEYNOTE-826 trial (pembrolizumab improved median PFS versus placebo (10.4 versus 8.2 months) and OS at 24 months (50% vs 40% in pts w/ PD-L1 CPS of =1).  In the KEYNOTE-826 trial, Bevacizumab was optional (2/3 of pts received) and given pt has possible thrombus in her iliac vein, we will hold off on giving bevacizumab.  We will also modified Carboplatin/Taxol to weekly dosing due to her age and fraility with carboplatin AUC2 and Taxol 80 mg/m2 D1 and D8 every 3 weeks.   Currently maintained on Keytruda  PLAN: Previous notes reviewed and all relevant laboratory and radiology result and were communicated to the patient and her daughter.  --Continue with cycle #18 of Keytruda today. CBC today reviewed and is adequate. Mild anemia stable. ****Side effect profile of pembrolizumab were discussed with pt including but not limited to: anemia, fatigue, hyperglycemia, hyponatremia, hypoalbuminemia, itching, cough, nausea, rash, decreased appetite, hypertriglyceridemia, increased liver enzymes, hypocalcemia, constipation, diarrhea, upper respiratory tract infection and an immune-mediated reaction. Lab work will check for elevated liver enzymes and thyroid function. -- Will check CMP, Mg, TSH. -- Follow up with PCP for further management of diabetes.  #Microcytic anemia: stable, related to chronic disease - # R iliac vein dilation secondary to necrotic R iliac lymph node w/ possible thrombosis s/p stent placement - Follow up with vascular as recommended. - Continue Eliquis.  # Rheumatoid arthritis - Had been on MTX since starting chemotherapy. Off of Prednisone now, remains on  Plaquenil, Hydrocodone and MTX as per Rheumatologist.  RTC 3 weeks

## 2024-03-01 NOTE — HISTORY OF PRESENT ILLNESS
[Disease: _____________________] : Disease: [unfilled] [de-identified] : The patient is an 81 year old woman who presented with post-menopausal vaginal bleeding in March 2021. \par  Upon further work up, PAP smear on 3/18/21 showed: LSIL/AGC/HPV+. Pelvic US on 3/22/21 showed endometrium to be 4.2mm thick. 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 \par  MRI pelvis on 4/3/21 showed signal abnormality in the posterior cervix 2.5cm x 1.6cm. The lesion does not go beyond the limit of the cervix. \par  She saw Dr. Echevarria on 4/14/21 and on his exam, she was noted to have a 4-5cm cervical mass involving at least the middle of the vaginal wall, circumferentially. No parametrial involvement. She was not deemed a surgical candidate. \par  Definitive chemoradiation was recommended. She is here to discuss radiation. \par  PET/CT: Pending\par  \par  Pt C/O low back pain, for which she takes Tylenol. Vaginal bleeding is not very excessive. No SOB, Chest pain.\par  Pt has a stress test next week\par   [de-identified] : Squamous cell [de-identified] : 5/18/21 Pt is here for follow up. She has completed a PET scan. has moderate back pain  6/9/21 Pt is here for follow up. She has started RT last week. She also recd 1 dose of carboplatin last week. She did not have any nausea or vomiting. She has been getting diarrhea which she has been managing with imodium. Her bld sugar was also running high. No fever, mouth sores.  6/16/21 Pt is here for follow up. She is s/p 2 doses of Carbo. C/O feeling very fatigued. Had diarrhea which is controlled with imodium. No N, V. has abdominal discomfort.  6/23/2021 Pt is here to follow up for b=cervical cancer, accompanied by daughter She is on Carbo s/p 2 cycles, tolerating well She feels a lot better today She denies abdominal pain, nausea or vomiting She gets diarrhea but is managed with Imodium  7/1/2021 Pt is here to follow up for cervical cancer, accompanied by daughter She is on Carbo s/p 3 cycles, tolerating well She feels fine today, appetite is good She denies abdominal pain, nausea or vomiting, no vaginal bleeding/discharge She gets diarrhea, takes Imodium after having 7-8 episodes then gets constipation the following day  7/7/2021 Pt is here to follow up for cervical cancer, accompanied by daughter She is on weekly Carbo s/p  4 cycles, tolerating well She feels fine today, appetite is good She denies nausea or vomiting, no vaginal bleeding/discharge She c/o of low abdominal cramping and gets diarrhea 4-5x/day, managed with Imodium then gets constipation the following day  7/26/21 Pt is here for follow up, She is feeling better. Diarrhea has improved. NO vaginal bleeding  9/8/21 Pt is here for follow up. Has completed RT on 8/2/21. No vag bleeding, no pelvic pain.  12/21/21 Pt is here today for follow up visit for cervical cancer. She has completed chemo (7/2021)/Rad (8/2021). Pt denies any vaginal bleeding or abd/pelvic pain. She had a PET CT scan on 12/21/21 which showed FDG avid right external iliac lymph node, SUV max 10.0 measuring 1.0 cm, consistent with biological tumor activity. She saw Dr. Ragland and agreed to have SBRT since she is not surgical candidate. Simulation was done. She has an appt with Dr. Echevarria 1/12/22.  4/25/22 Pt is here for follow up. She had been C/O rectal bleeding over the past week. She went to ER and was treated with ABX. CT scan abdomen and pelvis reviwed ? proctoclitis. Symptoms have improved.Pt has appt with GI.  12/12/22 Pt is here for follow up.  She was recently admitted to hospital in November 2022 when she had severe RLE pain. She underwent CT AP in ED showing necrotic R iliac lymph node that increased in size since 4/2022 with asymmetrically dilated R iliac and feeding veins. She was evaluated by vascular team and she was taken for R iliac vein stent placement and now on eliquis. She was also evaluated by gynonc team as well radiation oncology team, unfortunately cannot offer any intervention at this time. She is doing much better since she was discharged from hospital, swelling of RLE significant improved.  She still experiences R groin pain which she takes Percocet.  Otherwise, she denies fever, chill, SOB, CP, N/V/D.   1/12/2023 Pt presents to clinic for follow up and cycle#2 of Carbo/taxol & Keytruda accompanied by daughter.  With her first cycle she had diarrhea & fatigue for two days that improved with Imodium. She has lost 20 lbs over last months. Her daughters are taking care of her  diet.  Her leg swelling has been subsiding well and she is requiring lesser pain meds for her leg pain.   1/31/23 Pt is here for follow up. C/O pain in her back since a week or so. Leg swelling and pain has resolved. C/O significant fatigue  3/2/23 Pt is here for follow up. Feels well mostly except for fatigue. Becomes confused post chemo ? d/t steroids. Swelling in LE has resolved. Tolerating AC well. Had one episode of rectal bleed which resolved likely d/t hemorrhoids, pt was constipated  3/20/23 Pt is here for follow up. Feels well mostly except for fatigue. Becomes confused post chemo ? d/t steroids which persisted for about 3 days post chemo.  Swelling in LE has resolved. Tolerating AC well. Had one episode of rectal bleed which resolved likely d/t hemorrhoids She completed PET scan on 3/9/23.  She is s/p 4 cycles of Carboplatin/Taxol.Keytruda.   4/10/23 Pt is here for follow up. Feels well mostly except for fatigue. Swelling in LE has resolved. Tolerating AC well. Had one episode of rectal bleed previously which resolved likely d/t hemorrhoids She completed PET scan on 3/9/23.  She is s/p 4 cycles of Carboplatin/Taxol/Keytruda which was switched to Keytruda alone starting cycle 5.  She complains of joint pain in b/l hands and left ankle, associated with L ankle swelling. She has hx of RA (was on MTX which has been on hold since chemo started).   5/1/23 Pt is here for follow up. Swelling in LE has resolved. Tolerating AC well. Had one episode of rectal bleed previously which resolved likely d/t hemorrhoids She completed PET scan on 3/9/23.  She is s/p 4 cycles of Carboplatin/Taxol/Keytruda which was switched to Keytruda alone starting cycle 5.  She complains of joint pain in b/l hands and left ankle, associated with L ankle swelling. She has hx of RA (was on MTX which has been on hold since chemo started). She still has persistent joint pain, stiffness and is affecting her quality of life.  She saw Dr Grant (Rhe[facility]) and was started on Prednisone 10mg qd, Hydroxychlorquine 400mg daily, and Tramadol prn. She feels symptoms are still uncontrolled.   5/22/23 Patient presents to clinic for follow up accompanied by her daughter. She appears to be in acute RA flare. She has stiffness and pain in her back, shoulders and small joints of hands. Daughter reported that she had a MRI done of cervical spine that showed nerve impingement in the cervical spine She is taking MTX, Plaquenil and advil 200 mg TiD with minimal relief. We spoke to her about giving her a break from immunotherapy till her pain improves and she agreed.  6/12/23 Patient presents to clinic for follow up accompanied by her daughter. Daughter reported that she had a MRI done of cervical spine that showed nerve impingement in the cervical spine She is taking MTX, Plaquenil and advil 200 mg TiD, and prednisone.  Her Keytruda was held last cycle, due to her RA flare. She is due for C#8 today.  She feels much better today, only complains of joint pain in b/l hands and neuropathy.  She also feels fatigued.  She completed PET scan on 6/9/23.   7/3/23 Pt is here for follow up. Feels well.  The joint pain is better  7/24/23 Patient presents for follow up accompanied by her daughter She is feeling well. Her RA has improved on medications. She is taking MTX, Plaquenil and prednisone  She feels well and is scheduled for cycle # 10 of Keytruda today.   8/14/23 Pt is here for follow up. Feels fairly well overall. Has C/O diarrhea occasionally and sometimes she is constipated. No pelvic pain or vag bleeding compliant with AC  9/26/23 Patient is here to follow up for cervical cancer, accompanied by her daughter. She is on Keytruda, tolerating well. She feels well, appetite is good. She denies abdominal pain, nausea, vomiting or abnormal vaginal bleeding/discharge. She is compliant with AC. She had seen Dr Ragland today.  10/17/23 Patient is here to follow up for cervical cancer, accompanied by her daughter. She is on Keytruda, tolerating well. She feels well, appetite is good. She denies abdominal pain, nausea, vomiting or abnormal vaginal bleeding/discharge. Her diarrhea is manageable with Imodium. She is compliant with AC.  11/09/23 Patient is here to follow up for cervical cancer, accompanied by her daughter. She is on Keytruda, tolerating well. She feels well, appetite is good., but endorses feeling tired s/p right eye cataract surgery on 11/7/2023 She denies abdominal pain, nausea, vomiting or abnormal vaginal bleeding/discharge. Her diarrhea is manageable with Imodium, no episodes for 3 days now. She is compliant with AC.  12/26/23 Patient is here to follow up for cervical cancer, accompanied by her daughter. She is on Keytruda, tolerating well. She is not feeling well as her blood glucose has been elevated. She denies abdominal pain, nausea, vomiting or abnormal vaginal bleeding/discharge, no shortness of breath, chest pain, headache of dizziness. She had received her flu vaccine. She is compliant with Eliquis, denies any bleeding or bruising. She is compliant with her RA meds, on prednisone 5 mg daily and MTX.  1/16/24 Pt is here for follow up. Feels well. RA controlled on current meds  2/6/24 Pt is here for follow up. Feels well. No diarrhea, rash,  2/27/24 Pt is here for follow up. Feels well except some fatigue. Remains on MTX, Dced prednisone. Compliant with AC

## 2024-03-14 ENCOUNTER — OUTPATIENT (OUTPATIENT)
Dept: OUTPATIENT SERVICES | Facility: HOSPITAL | Age: 84
LOS: 1 days | End: 2024-03-14
Payer: MEDICARE

## 2024-03-14 ENCOUNTER — RESULT REVIEW (OUTPATIENT)
Age: 84
End: 2024-03-14

## 2024-03-14 DIAGNOSIS — R22.40 LOCALIZED SWELLING, MASS AND LUMP, UNSPECIFIED LOWER LIMB: Chronic | ICD-10-CM

## 2024-03-14 DIAGNOSIS — Z00.8 ENCOUNTER FOR OTHER GENERAL EXAMINATION: ICD-10-CM

## 2024-03-14 DIAGNOSIS — Z92.89 PERSONAL HISTORY OF OTHER MEDICAL TREATMENT: Chronic | ICD-10-CM

## 2024-03-14 DIAGNOSIS — C53.9 MALIGNANT NEOPLASM OF CERVIX UTERI, UNSPECIFIED: ICD-10-CM

## 2024-03-14 LAB — GLUCOSE BLDC GLUCOMTR-MCNC: 126 MG/DL — HIGH (ref 70–99)

## 2024-03-14 PROCEDURE — 78815 PET IMAGE W/CT SKULL-THIGH: CPT | Mod: PS

## 2024-03-14 PROCEDURE — 78815 PET IMAGE W/CT SKULL-THIGH: CPT | Mod: 26,PS,MH

## 2024-03-14 PROCEDURE — 82962 GLUCOSE BLOOD TEST: CPT

## 2024-03-14 PROCEDURE — A9552: CPT

## 2024-03-15 DIAGNOSIS — C53.9 MALIGNANT NEOPLASM OF CERVIX UTERI, UNSPECIFIED: ICD-10-CM

## 2024-03-20 ENCOUNTER — APPOINTMENT (OUTPATIENT)
Dept: HEMATOLOGY ONCOLOGY | Facility: CLINIC | Age: 84
End: 2024-03-20
Payer: MEDICARE

## 2024-03-20 ENCOUNTER — OUTPATIENT (OUTPATIENT)
Dept: OUTPATIENT SERVICES | Facility: HOSPITAL | Age: 84
LOS: 1 days | End: 2024-03-20
Payer: MEDICARE

## 2024-03-20 ENCOUNTER — APPOINTMENT (OUTPATIENT)
Dept: INFUSION THERAPY | Facility: CLINIC | Age: 84
End: 2024-03-20
Payer: MEDICARE

## 2024-03-20 VITALS — TEMPERATURE: 97.7 F | HEART RATE: 72 BPM | DIASTOLIC BLOOD PRESSURE: 60 MMHG | SYSTOLIC BLOOD PRESSURE: 133 MMHG

## 2024-03-20 DIAGNOSIS — C77.9 SECONDARY AND UNSPECIFIED MALIGNANT NEOPLASM OF LYMPH NODE, UNSPECIFIED: ICD-10-CM

## 2024-03-20 DIAGNOSIS — R22.40 LOCALIZED SWELLING, MASS AND LUMP, UNSPECIFIED LOWER LIMB: Chronic | ICD-10-CM

## 2024-03-20 DIAGNOSIS — Z92.89 PERSONAL HISTORY OF OTHER MEDICAL TREATMENT: Chronic | ICD-10-CM

## 2024-03-20 PROCEDURE — G2211 COMPLEX E/M VISIT ADD ON: CPT

## 2024-03-20 PROCEDURE — 99215 OFFICE O/P EST HI 40 MIN: CPT

## 2024-03-20 PROCEDURE — 83735 ASSAY OF MAGNESIUM: CPT

## 2024-03-20 PROCEDURE — 80053 COMPREHEN METABOLIC PANEL: CPT

## 2024-03-20 PROCEDURE — 85027 COMPLETE CBC AUTOMATED: CPT

## 2024-03-20 PROCEDURE — 36415 COLL VENOUS BLD VENIPUNCTURE: CPT

## 2024-03-20 PROCEDURE — 84443 ASSAY THYROID STIM HORMONE: CPT

## 2024-03-20 PROCEDURE — 96413 CHEMO IV INFUSION 1 HR: CPT

## 2024-03-20 RX ORDER — PEMBROLIZUMAB 25 MG/ML
200 INJECTION, SOLUTION INTRAVENOUS ONCE
Refills: 0 | Status: COMPLETED | OUTPATIENT
Start: 2024-03-20 | End: 2024-03-20

## 2024-03-20 RX ADMIN — PEMBROLIZUMAB 200 MILLIGRAM(S): 25 INJECTION, SOLUTION INTRAVENOUS at 14:55

## 2024-03-20 RX ADMIN — PEMBROLIZUMAB 200 MILLIGRAM(S): 25 INJECTION, SOLUTION INTRAVENOUS at 14:25

## 2024-03-21 DIAGNOSIS — C77.9 SECONDARY AND UNSPECIFIED MALIGNANT NEOPLASM OF LYMPH NODE, UNSPECIFIED: ICD-10-CM

## 2024-03-21 LAB
ALBUMIN SERPL ELPH-MCNC: 4.2 G/DL
ALP BLD-CCNC: 78 U/L
ALT SERPL-CCNC: 15 U/L
ANION GAP SERPL CALC-SCNC: 10 MMOL/L
AST SERPL-CCNC: 17 U/L
BASOPHILS # BLD AUTO: 0.02 K/UL
BASOPHILS NFR BLD AUTO: 0.3 %
BILIRUB SERPL-MCNC: 0.2 MG/DL
BUN SERPL-MCNC: 19 MG/DL
CALCIUM SERPL-MCNC: 9.8 MG/DL
CHLORIDE SERPL-SCNC: 101 MMOL/L
CO2 SERPL-SCNC: 27 MMOL/L
CREAT SERPL-MCNC: 1.1 MG/DL
EGFR: 50 ML/MIN/1.73M2
EOSINOPHIL # BLD AUTO: 0.06 K/UL
EOSINOPHIL NFR BLD AUTO: 1 %
GLUCOSE SERPL-MCNC: 138 MG/DL
HCT VFR BLD CALC: 37.5 %
HGB BLD-MCNC: 11.4 G/DL
IMM GRANULOCYTES NFR BLD AUTO: 0.5 %
LYMPHOCYTES # BLD AUTO: 0.66 K/UL
LYMPHOCYTES NFR BLD AUTO: 11.2 %
MAGNESIUM SERPL-MCNC: 1.8 MG/DL
MAN DIFF?: NORMAL
MCHC RBC-ENTMCNC: 23.9 PG
MCHC RBC-ENTMCNC: 30.4 G/DL
MCV RBC AUTO: 78.8 FL
MONOCYTES # BLD AUTO: 0.7 K/UL
MONOCYTES NFR BLD AUTO: 11.9 %
NEUTROPHILS # BLD AUTO: 4.43 K/UL
NEUTROPHILS NFR BLD AUTO: 75.1 %
PLATELET # BLD AUTO: 213 K/UL
PMV BLD AUTO: 0 /100 WBCS
POTASSIUM SERPL-SCNC: 5 MMOL/L
PROT SERPL-MCNC: 6.6 G/DL
RBC # BLD: 4.76 M/UL
RBC # FLD: 15.8 %
SODIUM SERPL-SCNC: 138 MMOL/L
TSH SERPL-ACNC: 1.48 UIU/ML
WBC # FLD AUTO: 5.9 K/UL

## 2024-03-29 NOTE — ASSESSMENT
[FreeTextEntry1] : In summary, Uzma Pop is an 83 year old female who initially diagnosed with squamous cell carcinoma of the cervix, FIGO Stage IIIA, s/p chemo/RT.  Given her age and possible cardiac issues (inability to handle large volumes of pre and post chemo hydration). We had decided to use Carboplatin AUC 2 as a radiation sensitizer. She is s/p 5 weekly doses of Carboplatin. The patient received 7000 cGy to the Pelvis/cervix from 6/2/2021 through 8/2/2021  Post definitive chemoRT in 12/21/21, she had a FDG avid right external iliac lymph node, SUV max 10.0 measuring 1.0 cm, consistent with biological tumor activity. She is s/p SBRT x 5 to that lesion.  Her PET scan on 6/22 shows residual FDG uptake in the Rt external iliac LN although stable, findings d/w pt and daughter.  Unfortunately, pt was admitted to hospital in November 2022 when she had severe RLE pain. She underwent CT AP in ED showing necrotic R iliac lymph node that increased in size since 4/2022 with asymmetrically dilated R iliac and feeding veins. She was evaluated by vascular team and was taken for R iliac vein stent placement and now on Eliquis. She was also evaluated by gynecologic oncology team as well radiation oncology team, unfortunately no intervention could be offered at this time.  She underwent PET scan on 12/2022 shows compared to PET/CT 6/28/2022, interval placement of a right iliac venous stent. Increased FDG uptake in the region of the right pelvic sidewall (SUV 13.3), difficult to delineate on CT or emission imaging if this is related to recent stent placement versus right iliac lymph node. No other definite sites of abnormal FDG uptake to suggest biologic tumor activity.  Given pt has locoregional recurrence disease w/ prior hx of RT, the next best regimen will be systemic therapy.  Since pt's cervical cancer has PDL1 of 70-80%, we offered her Carbo/Taxol + Keytruda based on the KEYNOTE-826 trial (pembrolizumab improved median PFS versus placebo (10.4 versus 8.2 months) and OS at 24 months (50% vs 40% in pts w/ PD-L1 CPS of =1).  In the KEYNOTE-826 trial, Bevacizumab was optional (2/3 of pts received) and given pt has possible thrombus in her iliac vein, we will hold off on giving bevacizumab.  We will also modified Carboplatin/Taxol to weekly dosing due to her age and fraility with carboplatin AUC2 and Taxol 80 mg/m2 D1 and D8 every 3 weeks.   Currently maintained on Keytruda  PLAN: Previous notes reviewed and all relevant laboratory and radiology result and were communicated to the patient and her daughter. PET scan 3/24 shows good response --Continue with cycle #19 of Keytruda today. CBC today reviewed and is adequate. Mild anemia stable. ****Side effect profile of pembrolizumab were discussed with pt including but not limited to: anemia, fatigue, hyperglycemia, hyponatremia, hypoalbuminemia, itching, cough, nausea, rash, decreased appetite, hypertriglyceridemia, increased liver enzymes, hypocalcemia, constipation, diarrhea, upper respiratory tract infection and an immune-mediated reaction. Lab work will check for elevated liver enzymes and thyroid function. -- Will check CMP, Mg, TSH.   #Microcytic anemia: stable, related to chronic disease - # R iliac vein dilation secondary to necrotic R iliac lymph node w/ possible thrombosis s/p stent placement - Follow up with vascular as recommended. - Continue Eliquis.  # Rheumatoid arthritis - Had been on MTX since starting chemotherapy. Reports taking prednisone, remains on  Plaquenil, Hydrocodone and MTX as per Rheumatologist.  RTC 3 weeks

## 2024-03-29 NOTE — HISTORY OF PRESENT ILLNESS
[Disease: _____________________] : Disease: [unfilled] [de-identified] : The patient is an 81 year old woman who presented with post-menopausal vaginal bleeding in March 2021.  Upon further work up, PAP smear on 3/18/21 showed: LSIL/AGC/HPV+. Pelvic US on 3/22/21 showed endometrium to be 4.2mm thick. 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  MRI pelvis on 4/3/21 showed signal abnormality in the posterior cervix 2.5cm x 1.6cm. The lesion does not go beyond the limit of the cervix.  She saw Dr. Echevarria on 4/14/21 and on his exam, she was noted to have a 4-5cm cervical mass involving at least the middle of the vaginal wall, circumferentially. No parametrial involvement. She was not deemed a surgical candidate.  Definitive chemoradiation was recommended. She is here to discuss radiation.  PET/CT: Pending  Pt C/O low back pain, for which she takes Tylenol. Vaginal bleeding is not very excessive. No SOB, Chest pain. Pt has a stress test next week  Patient feels some fatigue. Discussed the benefit vs risk of continuing vs stopping treatments as brought up by patient. [de-identified] : Squamous cell [de-identified] : 5/18/21 Pt is here for follow up. She has completed a PET scan. has moderate back pain  6/9/21 Pt is here for follow up. She has started RT last week. She also recd 1 dose of carboplatin last week. She did not have any nausea or vomiting. She has been getting diarrhea which she has been managing with imodium. Her bld sugar was also running high. No fever, mouth sores.  6/16/21 Pt is here for follow up. She is s/p 2 doses of Carbo. C/O feeling very fatigued. Had diarrhea which is controlled with imodium. No N, V. has abdominal discomfort.  6/23/2021 Pt is here to follow up for b=cervical cancer, accompanied by daughter She is on Carbo s/p 2 cycles, tolerating well She feels a lot better today She denies abdominal pain, nausea or vomiting She gets diarrhea but is managed with Imodium  7/1/2021 Pt is here to follow up for cervical cancer, accompanied by daughter She is on Carbo s/p 3 cycles, tolerating well She feels fine today, appetite is good She denies abdominal pain, nausea or vomiting, no vaginal bleeding/discharge She gets diarrhea, takes Imodium after having 7-8 episodes then gets constipation the following day  7/7/2021 Pt is here to follow up for cervical cancer, accompanied by daughter She is on weekly Carbo s/p  4 cycles, tolerating well She feels fine today, appetite is good She denies nausea or vomiting, no vaginal bleeding/discharge She c/o of low abdominal cramping and gets diarrhea 4-5x/day, managed with Imodium then gets constipation the following day  7/26/21 Pt is here for follow up, She is feeling better. Diarrhea has improved. NO vaginal bleeding  9/8/21 Pt is here for follow up. Has completed RT on 8/2/21. No vag bleeding, no pelvic pain.  12/21/21 Pt is here today for follow up visit for cervical cancer. She has completed chemo (7/2021)/Rad (8/2021). Pt denies any vaginal bleeding or abd/pelvic pain. She had a PET CT scan on 12/21/21 which showed FDG avid right external iliac lymph node, SUV max 10.0 measuring 1.0 cm, consistent with biological tumor activity. She saw Dr. Ragland and agreed to have SBRT since she is not surgical candidate. Simulation was done. She has an appt with Dr. Echevarria 1/12/22.  4/25/22 Pt is here for follow up. She had been C/O rectal bleeding over the past week. She went to ER and was treated with ABX. CT scan abdomen and pelvis reviwed ? proctoclitis. Symptoms have improved.Pt has appt with GI.  12/12/22 Pt is here for follow up.  She was recently admitted to hospital in November 2022 when she had severe RLE pain. She underwent CT AP in ED showing necrotic R iliac lymph node that increased in size since 4/2022 with asymmetrically dilated R iliac and feeding veins. She was evaluated by vascular team and she was taken for R iliac vein stent placement and now on eliquis. She was also evaluated by gynonc team as well radiation oncology team, unfortunately cannot offer any intervention at this time. She is doing much better since she was discharged from hospital, swelling of RLE significant improved.  She still experiences R groin pain which she takes Percocet.  Otherwise, she denies fever, chill, SOB, CP, N/V/D.   1/12/2023 Pt presents to clinic for follow up and cycle#2 of Carbo/taxol & Keytruda accompanied by daughter.  With her first cycle she had diarrhea & fatigue for two days that improved with Imodium. She has lost 20 lbs over last months. Her daughters are taking care of her  diet.  Her leg swelling has been subsiding well and she is requiring lesser pain meds for her leg pain.   1/31/23 Pt is here for follow up. C/O pain in her back since a week or so. Leg swelling and pain has resolved. C/O significant fatigue  3/2/23 Pt is here for follow up. Feels well mostly except for fatigue. Becomes confused post chemo ? d/t steroids. Swelling in LE has resolved. Tolerating AC well. Had one episode of rectal bleed which resolved likely d/t hemorrhoids, pt was constipated  3/20/23 Pt is here for follow up. Feels well mostly except for fatigue. Becomes confused post chemo ? d/t steroids which persisted for about 3 days post chemo.  Swelling in LE has resolved. Tolerating AC well. Had one episode of rectal bleed which resolved likely d/t hemorrhoids She completed PET scan on 3/9/23.  She is s/p 4 cycles of Carboplatin/Taxol.Keytruda.   4/10/23 Pt is here for follow up. Feels well mostly except for fatigue. Swelling in LE has resolved. Tolerating AC well. Had one episode of rectal bleed previously which resolved likely d/t hemorrhoids She completed PET scan on 3/9/23.  She is s/p 4 cycles of Carboplatin/Taxol/Keytruda which was switched to Keytruda alone starting cycle 5.  She complains of joint pain in b/l hands and left ankle, associated with L ankle swelling. She has hx of RA (was on MTX which has been on hold since chemo started).   5/1/23 Pt is here for follow up. Swelling in LE has resolved. Tolerating AC well. Had one episode of rectal bleed previously which resolved likely d/t hemorrhoids She completed PET scan on 3/9/23.  She is s/p 4 cycles of Carboplatin/Taxol/Keytruda which was switched to Keytruda alone starting cycle 5.  She complains of joint pain in b/l hands and left ankle, associated with L ankle swelling. She has hx of RA (was on MTX which has been on hold since chemo started). She still has persistent joint pain, stiffness and is affecting her quality of life.  She saw Dr Grant ([facility]) and was started on Prednisone 10mg qd, Hydroxychlorquine 400mg daily, and Tramadol prn. She feels symptoms are still uncontrolled.   5/22/23 Patient presents to clinic for follow up accompanied by her daughter. She appears to be in acute RA flare. She has stiffness and pain in her back, shoulders and small joints of hands. Daughter reported that she had a MRI done of cervical spine that showed nerve impingement in the cervical spine She is taking MTX, Plaquenil and advil 200 mg TiD with minimal relief. We spoke to her about giving her a break from immunotherapy till her pain improves and she agreed.  6/12/23 Patient presents to clinic for follow up accompanied by her daughter. Daughter reported that she had a MRI done of cervical spine that showed nerve impingement in the cervical spine She is taking MTX, Plaquenil and advil 200 mg TiD, and prednisone.  Her Keytruda was held last cycle, due to her RA flare. She is due for C#8 today.  She feels much better today, only complains of joint pain in b/l hands and neuropathy.  She also feels fatigued.  She completed PET scan on 6/9/23.   7/3/23 Pt is here for follow up. Feels well.  The joint pain is better  7/24/23 Patient presents for follow up accompanied by her daughter She is feeling well. Her RA has improved on medications. She is taking MTX, Plaquenil and prednisone  She feels well and is scheduled for cycle # 10 of Keytruda today.   8/14/23 Pt is here for follow up. Feels fairly well overall. Has C/O diarrhea occasionally and sometimes she is constipated. No pelvic pain or vag bleeding compliant with AC  9/26/23 Patient is here to follow up for cervical cancer, accompanied by her daughter. She is on Keytruda, tolerating well. She feels well, appetite is good. She denies abdominal pain, nausea, vomiting or abnormal vaginal bleeding/discharge. She is compliant with AC. She had seen Dr Ragland today.  10/17/23 Patient is here to follow up for cervical cancer, accompanied by her daughter. She is on Keytruda, tolerating well. She feels well, appetite is good. She denies abdominal pain, nausea, vomiting or abnormal vaginal bleeding/discharge. Her diarrhea is manageable with Imodium. She is compliant with AC.  11/09/23 Patient is here to follow up for cervical cancer, accompanied by her daughter. She is on Keytruda, tolerating well. She feels well, appetite is good., but endorses feeling tired s/p right eye cataract surgery on 11/7/2023 She denies abdominal pain, nausea, vomiting or abnormal vaginal bleeding/discharge. Her diarrhea is manageable with Imodium, no episodes for 3 days now. She is compliant with AC.  12/26/23 Patient is here to follow up for cervical cancer, accompanied by her daughter. She is on Keytruda, tolerating well. She is not feeling well as her blood glucose has been elevated. She denies abdominal pain, nausea, vomiting or abnormal vaginal bleeding/discharge, no shortness of breath, chest pain, headache of dizziness. She had received her flu vaccine. She is compliant with Eliquis, denies any bleeding or bruising. She is compliant with her RA meds, on prednisone 5 mg daily and MTX.  1/16/24 Pt is here for follow up. Feels well. RA controlled on current meds  2/6/24 Pt is here for follow up. Feels well. No diarrhea, rash,  2/27/24 Pt is here for follow up. Feels well except some fatigue. Remains on MTX, Dced prednisone. Compliant with AC  3/20/24 Patient feels some fatigue Family member reports patient is taking MTX and prednisone. Patient wanted to discuss risks and benefits of stopping therapy, chose to continue

## 2024-04-11 ENCOUNTER — APPOINTMENT (OUTPATIENT)
Age: 84
End: 2024-04-11

## 2024-04-15 NOTE — ED ADULT NURSE NOTE - PT NEEDS ASSIST
I called Mr. Wray regarding his appointment scheduled at [facility] today. He previously saw genetics in 2020, which were negative ([facility] panel). No updated testing is indicated at this time. He does, however, qualify for pancreas cancer screening. He agreed that his understanding of the referral was to continue with pancreas screening. Dicussed option of moving visit to [facility], patient agreeable. Appt changed to [facility] on 4/30   no

## 2024-04-24 ENCOUNTER — RX RENEWAL (OUTPATIENT)
Age: 84
End: 2024-04-24

## 2024-04-24 RX ORDER — CHLORHEXIDINE GLUCONATE 4 %
400 (240 MG) LIQUID (ML) TOPICAL
Qty: 30 | Refills: 1 | Status: ACTIVE | COMMUNITY
Start: 2024-04-24 | End: 1900-01-01

## 2024-04-26 ENCOUNTER — LABORATORY RESULT (OUTPATIENT)
Age: 84
End: 2024-04-26

## 2024-04-26 ENCOUNTER — APPOINTMENT (OUTPATIENT)
Age: 84
End: 2024-04-26
Payer: MEDICARE

## 2024-04-26 ENCOUNTER — OUTPATIENT (OUTPATIENT)
Dept: OUTPATIENT SERVICES | Facility: HOSPITAL | Age: 84
LOS: 1 days | End: 2024-04-26
Payer: MEDICARE

## 2024-04-26 VITALS
HEART RATE: 88 BPM | BODY MASS INDEX: 31.51 KG/M2 | SYSTOLIC BLOOD PRESSURE: 130 MMHG | WEIGHT: 160.5 LBS | RESPIRATION RATE: 17 BRPM | DIASTOLIC BLOOD PRESSURE: 70 MMHG | OXYGEN SATURATION: 100 % | TEMPERATURE: 98 F | HEIGHT: 60 IN

## 2024-04-26 VITALS — HEART RATE: 88 BPM | SYSTOLIC BLOOD PRESSURE: 130 MMHG | TEMPERATURE: 98 F | DIASTOLIC BLOOD PRESSURE: 70 MMHG

## 2024-04-26 DIAGNOSIS — I82.409 ACUTE EMBOLISM AND THROMBOSIS OF UNSPECIFIED DEEP VEINS OF UNSPECIFIED LOWER EXTREMITY: ICD-10-CM

## 2024-04-26 DIAGNOSIS — R22.40 LOCALIZED SWELLING, MASS AND LUMP, UNSPECIFIED LOWER LIMB: Chronic | ICD-10-CM

## 2024-04-26 DIAGNOSIS — Z92.89 PERSONAL HISTORY OF OTHER MEDICAL TREATMENT: Chronic | ICD-10-CM

## 2024-04-26 LAB
HCT VFR BLD CALC: 33.9 %
HGB BLD-MCNC: 10.6 G/DL
MCHC RBC-ENTMCNC: 23.8 PG
MCHC RBC-ENTMCNC: 31.3 G/DL
MCV RBC AUTO: 76.2 FL
PLATELET # BLD AUTO: 168 K/UL
PMV BLD: 11.1 FL
RBC # BLD: 4.45 M/UL
RBC # FLD: 15.6 %
WBC # FLD AUTO: 6.69 K/UL

## 2024-04-26 PROCEDURE — 84443 ASSAY THYROID STIM HORMONE: CPT

## 2024-04-26 PROCEDURE — 83735 ASSAY OF MAGNESIUM: CPT

## 2024-04-26 PROCEDURE — 80053 COMPREHEN METABOLIC PANEL: CPT

## 2024-04-26 PROCEDURE — 99214 OFFICE O/P EST MOD 30 MIN: CPT

## 2024-04-26 PROCEDURE — 96413 CHEMO IV INFUSION 1 HR: CPT

## 2024-04-26 PROCEDURE — 85027 COMPLETE CBC AUTOMATED: CPT

## 2024-04-26 PROCEDURE — 36415 COLL VENOUS BLD VENIPUNCTURE: CPT

## 2024-04-26 RX ORDER — PEMBROLIZUMAB 25 MG/ML
200 INJECTION, SOLUTION INTRAVENOUS ONCE
Refills: 0 | Status: COMPLETED | OUTPATIENT
Start: 2024-04-26 | End: 2024-04-26

## 2024-04-26 RX ORDER — PREDNISONE 1 MG/1
1 TABLET ORAL
Refills: 0 | Status: ACTIVE | COMMUNITY

## 2024-04-26 RX ADMIN — PEMBROLIZUMAB 200 MILLIGRAM(S): 25 INJECTION, SOLUTION INTRAVENOUS at 15:46

## 2024-04-26 RX ADMIN — PEMBROLIZUMAB 200 MILLIGRAM(S): 25 INJECTION, SOLUTION INTRAVENOUS at 16:20

## 2024-04-26 NOTE — REVIEW OF SYSTEMS
[Joint Pain] : joint pain [Joint Stiffness] : joint stiffness [Fatigue] : no fatigue [Recent Change In Weight] : ~T no recent weight change [Negative] : Constitutional [FreeTextEntry7] : reports occasional diarrhea  [FreeTextEntry9] : B/L hands joint pain and neuropathy

## 2024-04-26 NOTE — HISTORY OF PRESENT ILLNESS
[Disease: _____________________] : Disease: [unfilled] [de-identified] : The patient is an 81 year old woman who presented with post-menopausal vaginal bleeding in March 2021.  Upon further work up, PAP smear on 3/18/21 showed: LSIL/AGC/HPV+. Pelvic US on 3/22/21 showed endometrium to be 4.2mm thick. 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  MRI pelvis on 4/3/21 showed signal abnormality in the posterior cervix 2.5cm x 1.6cm. The lesion does not go beyond the limit of the cervix.  She saw Dr. Echevarria on 4/14/21 and on his exam, she was noted to have a 4-5cm cervical mass involving at least the middle of the vaginal wall, circumferentially. No parametrial involvement. She was not deemed a surgical candidate.  Definitive chemoradiation was recommended. She is here to discuss radiation.  PET/CT: Pending  Pt C/O low back pain, for which she takes Tylenol. Vaginal bleeding is not very excessive. No SOB, Chest pain. Pt has a stress test next week  Patient feels some fatigue. Discussed the benefit vs risk of continuing vs stopping treatments as brought up by patient. [de-identified] : Squamous cell [de-identified] : 5/18/21 Pt is here for follow up. She has completed a PET scan. has moderate back pain  6/9/21 Pt is here for follow up. She has started RT last week. She also recd 1 dose of carboplatin last week. She did not have any nausea or vomiting. She has been getting diarrhea which she has been managing with imodium. Her bld sugar was also running high. No fever, mouth sores.  6/16/21 Pt is here for follow up. She is s/p 2 doses of Carbo. C/O feeling very fatigued. Had diarrhea which is controlled with imodium. No N, V. has abdominal discomfort.  6/23/2021 Pt is here to follow up for b=cervical cancer, accompanied by daughter She is on Carbo s/p 2 cycles, tolerating well She feels a lot better today She denies abdominal pain, nausea or vomiting She gets diarrhea but is managed with Imodium  7/1/2021 Pt is here to follow up for cervical cancer, accompanied by daughter She is on Carbo s/p 3 cycles, tolerating well She feels fine today, appetite is good She denies abdominal pain, nausea or vomiting, no vaginal bleeding/discharge She gets diarrhea, takes Imodium after having 7-8 episodes then gets constipation the following day  7/7/2021 Pt is here to follow up for cervical cancer, accompanied by daughter She is on weekly Carbo s/p  4 cycles, tolerating well She feels fine today, appetite is good She denies nausea or vomiting, no vaginal bleeding/discharge She c/o of low abdominal cramping and gets diarrhea 4-5x/day, managed with Imodium then gets constipation the following day  7/26/21 Pt is here for follow up, She is feeling better. Diarrhea has improved. NO vaginal bleeding  9/8/21 Pt is here for follow up. Has completed RT on 8/2/21. No vag bleeding, no pelvic pain.  12/21/21 Pt is here today for follow up visit for cervical cancer. She has completed chemo (7/2021)/Rad (8/2021). Pt denies any vaginal bleeding or abd/pelvic pain. She had a PET CT scan on 12/21/21 which showed FDG avid right external iliac lymph node, SUV max 10.0 measuring 1.0 cm, consistent with biological tumor activity. She saw Dr. Ragland and agreed to have SBRT since she is not surgical candidate. Simulation was done. She has an appt with Dr. Echevarria 1/12/22.  4/25/22 Pt is here for follow up. She had been C/O rectal bleeding over the past week. She went to ER and was treated with ABX. CT scan abdomen and pelvis reviwed ? proctoclitis. Symptoms have improved.Pt has appt with GI.  12/12/22 Pt is here for follow up.  She was recently admitted to hospital in November 2022 when she had severe RLE pain. She underwent CT AP in ED showing necrotic R iliac lymph node that increased in size since 4/2022 with asymmetrically dilated R iliac and feeding veins. She was evaluated by vascular team and she was taken for R iliac vein stent placement and now on eliquis. She was also evaluated by gynonc team as well radiation oncology team, unfortunately cannot offer any intervention at this time. She is doing much better since she was discharged from hospital, swelling of RLE significant improved.  She still experiences R groin pain which she takes Percocet.  Otherwise, she denies fever, chill, SOB, CP, N/V/D.   1/12/2023 Pt presents to clinic for follow up and cycle#2 of Carbo/taxol & Keytruda accompanied by daughter.  With her first cycle she had diarrhea & fatigue for two days that improved with Imodium. She has lost 20 lbs over last months. Her daughters are taking care of her  diet.  Her leg swelling has been subsiding well and she is requiring lesser pain meds for her leg pain.   1/31/23 Pt is here for follow up. C/O pain in her back since a week or so. Leg swelling and pain has resolved. C/O significant fatigue  3/2/23 Pt is here for follow up. Feels well mostly except for fatigue. Becomes confused post chemo ? d/t steroids. Swelling in LE has resolved. Tolerating AC well. Had one episode of rectal bleed which resolved likely d/t hemorrhoids, pt was constipated  3/20/23 Pt is here for follow up. Feels well mostly except for fatigue. Becomes confused post chemo ? d/t steroids which persisted for about 3 days post chemo.  Swelling in LE has resolved. Tolerating AC well. Had one episode of rectal bleed which resolved likely d/t hemorrhoids She completed PET scan on 3/9/23.  She is s/p 4 cycles of Carboplatin/Taxol.Keytruda.   4/10/23 Pt is here for follow up. Feels well mostly except for fatigue. Swelling in LE has resolved. Tolerating AC well. Had one episode of rectal bleed previously which resolved likely d/t hemorrhoids She completed PET scan on 3/9/23.  She is s/p 4 cycles of Carboplatin/Taxol/Keytruda which was switched to Keytruda alone starting cycle 5.  She complains of joint pain in b/l hands and left ankle, associated with L ankle swelling. She has hx of RA (was on MTX which has been on hold since chemo started).   5/1/23 Pt is here for follow up. Swelling in LE has resolved. Tolerating AC well. Had one episode of rectal bleed previously which resolved likely d/t hemorrhoids She completed PET scan on 3/9/23.  She is s/p 4 cycles of Carboplatin/Taxol/Keytruda which was switched to Keytruda alone starting cycle 5.  She complains of joint pain in b/l hands and left ankle, associated with L ankle swelling. She has hx of RA (was on MTX which has been on hold since chemo started). She still has persistent joint pain, stiffness and is affecting her quality of life.  She saw Dr Grant ([facility]) and was started on Prednisone 10mg qd, Hydroxychlorquine 400mg daily, and Tramadol prn. She feels symptoms are still uncontrolled.   5/22/23 Patient presents to clinic for follow up accompanied by her daughter. She appears to be in acute RA flare. She has stiffness and pain in her back, shoulders and small joints of hands. Daughter reported that she had a MRI done of cervical spine that showed nerve impingement in the cervical spine She is taking MTX, Plaquenil and advil 200 mg TiD with minimal relief. We spoke to her about giving her a break from immunotherapy till her pain improves and she agreed.  6/12/23 Patient presents to clinic for follow up accompanied by her daughter. Daughter reported that she had a MRI done of cervical spine that showed nerve impingement in the cervical spine She is taking MTX, Plaquenil and advil 200 mg TiD, and prednisone.  Her Keytruda was held last cycle, due to her RA flare. She is due for C#8 today.  She feels much better today, only complains of joint pain in b/l hands and neuropathy.  She also feels fatigued.  She completed PET scan on 6/9/23.   7/3/23 Pt is here for follow up. Feels well.  The joint pain is better  7/24/23 Patient presents for follow up accompanied by her daughter She is feeling well. Her RA has improved on medications. She is taking MTX, Plaquenil and prednisone  She feels well and is scheduled for cycle # 10 of Keytruda today.   8/14/23 Pt is here for follow up. Feels fairly well overall. Has C/O diarrhea occasionally and sometimes she is constipated. No pelvic pain or vag bleeding compliant with AC  9/26/23 Patient is here to follow up for cervical cancer, accompanied by her daughter. She is on Keytruda, tolerating well. She feels well, appetite is good. She denies abdominal pain, nausea, vomiting or abnormal vaginal bleeding/discharge. She is compliant with AC. She had seen Dr Ragland today.  10/17/23 Patient is here to follow up for cervical cancer, accompanied by her daughter. She is on Keytruda, tolerating well. She feels well, appetite is good. She denies abdominal pain, nausea, vomiting or abnormal vaginal bleeding/discharge. Her diarrhea is manageable with Imodium. She is compliant with AC.  11/09/23 Patient is here to follow up for cervical cancer, accompanied by her daughter. She is on Keytruda, tolerating well. She feels well, appetite is good., but endorses feeling tired s/p right eye cataract surgery on 11/7/2023 She denies abdominal pain, nausea, vomiting or abnormal vaginal bleeding/discharge. Her diarrhea is manageable with Imodium, no episodes for 3 days now. She is compliant with AC.  12/26/23 Patient is here to follow up for cervical cancer, accompanied by her daughter. She is on Keytruda, tolerating well. She is not feeling well as her blood glucose has been elevated. She denies abdominal pain, nausea, vomiting or abnormal vaginal bleeding/discharge, no shortness of breath, chest pain, headache of dizziness. She had received her flu vaccine. She is compliant with Eliquis, denies any bleeding or bruising. She is compliant with her RA meds, on prednisone 5 mg daily and MTX.  1/16/24 Pt is here for follow up. Feels well. RA controlled on current meds  2/6/24 Pt is here for follow up. Feels well. No diarrhea, rash,  2/27/24 Pt is here for follow up. Feels well except some fatigue. Remains on MTX, Dced prednisone. Compliant with AC  3/20/24 Patient feels some fatigue Family member reports patient is taking MTX and prednisone. Patient wanted to discuss risks and benefits of stopping therapy, chose to continue  4/26/24 Patient is here to follow up for cervical cancer, accompanied by her daughter. She was recently in ER due to urinary discomfort; discovered to have UTI and completing abx.   She remains compliant with Eliquis every 12 hours.   Reviewed most recent CBC, which shows slightly microcytic anemia with hgb 10.6g/dL.   Patient denies skin rash, abdominal/pelvic discomfort, new cough, persistent diarrhea, dyspnea or bleeding. She is due for cycle 21 of Keytruda.

## 2024-04-26 NOTE — CONSULT LETTER
[Dear  ___] : Dear  [unfilled], [Please see my note below.] : Please see my note below. [Sincerely,] : Sincerely, [FreeTextEntry3] : Jonah Sherman NP

## 2024-04-26 NOTE — ASSESSMENT
[FreeTextEntry1] : In summary, Uzma Pop is an 83 year old female who initially diagnosed with squamous cell carcinoma of the cervix, FIGO Stage IIIA, s/p chemo/RT.  Given her age and possible cardiac issues (inability to handle large volumes of pre and post chemo hydration). We had decided to use Carboplatin AUC 2 as a radiation sensitizer. She is s/p 5 weekly doses of Carboplatin. The patient received 7000 cGy to the Pelvis/cervix from 6/2/2021 through 8/2/2021  Post definitive chemoRT in 12/21/21, she had a FDG avid right external iliac lymph node, SUV max 10.0 measuring 1.0 cm, consistent with biological tumor activity. She is s/p SBRT x 5 to that lesion.  Her PET scan on 6/22 shows residual FDG uptake in the Rt external iliac LN although stable, findings d/w pt and daughter.  Unfortunately, pt was admitted to hospital in November 2022 when she had severe RLE pain. She underwent CT AP in ED showing necrotic R iliac lymph node that increased in size since 4/2022 with asymmetrically dilated R iliac and feeding veins. She was evaluated by vascular team and was taken for R iliac vein stent placement and now on Eliquis. She was also evaluated by gynecologic oncology team as well radiation oncology team, unfortunately no intervention could be offered at this time.  She underwent PET scan on 12/2022 shows compared to PET/CT 6/28/2022, interval placement of a right iliac venous stent. Increased FDG uptake in the region of the right pelvic sidewall (SUV 13.3), difficult to delineate on CT or emission imaging if this is related to recent stent placement versus right iliac lymph node. No other definite sites of abnormal FDG uptake to suggest biologic tumor activity.  Given pt has locoregional recurrence disease w/ prior hx of RT, the next best regimen will be systemic therapy.  Since pt's cervical cancer has PDL1 of 70-80%, we offered her Carbo/Taxol + Keytruda based on the KEYNOTE-826 trial (pembrolizumab improved median PFS versus placebo (10.4 versus 8.2 months) and OS at 24 months (50% vs 40% in pts w/ PD-L1 CPS of =1).  In the KEYNOTE-826 trial, Bevacizumab was optional (2/3 of pts received) and given pt has possible thrombus in her iliac vein, we will hold off on giving bevacizumab.  We will also modified Carboplatin/Taxol to weekly dosing due to her age and fraility with carboplatin AUC2 and Taxol 80 mg/m2 D1 and D8 every 3 weeks.  Currently maintained on Keytruda  PLAN: Previous notes reviewed and all relevant laboratory and radiology result and were communicated to the patient and her daughter. - PET scan 3/24 shows good response - Continue with cycle #21 of Keytruda today. CBC today reviewed and is adequate. Mild anemia essentially stable. ****Side effect profile of pembrolizumab were discussed with pt including but not limited to: anemia, fatigue, hyperglycemia, hyponatremia, hypoalbuminemia, itching, cough, nausea, rash, decreased appetite, hypertriglyceridemia, increased liver enzymes, hypocalcemia, constipation, diarrhea, upper respiratory tract infection and an immune-mediated reaction. Lab work will check for elevated liver enzymes and thyroid function. 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  - Continue Eliquis 5mg every 12 hours   # Rheumatoid arthritis - Had been on MTX since starting chemotherapy. Reports taking prednisone, remains on  Plaquenil, Hydrocodone and MTX as per Rheumatologist.   RTC 3 weeks with Dr. Krueger with CBC, CMP, TSH, Mg level prior

## 2024-04-27 DIAGNOSIS — I82.409 ACUTE EMBOLISM AND THROMBOSIS OF UNSPECIFIED DEEP VEINS OF UNSPECIFIED LOWER EXTREMITY: ICD-10-CM

## 2024-04-29 LAB
ALBUMIN SERPL ELPH-MCNC: 4.3 G/DL
ALP BLD-CCNC: 76 U/L
ALT SERPL-CCNC: 15 U/L
ANION GAP SERPL CALC-SCNC: 11 MMOL/L
AST SERPL-CCNC: 15 U/L
BILIRUB SERPL-MCNC: 0.3 MG/DL
BUN SERPL-MCNC: 14 MG/DL
CALCIUM SERPL-MCNC: 9.6 MG/DL
CHLORIDE SERPL-SCNC: 97 MMOL/L
CO2 SERPL-SCNC: 25 MMOL/L
CREAT SERPL-MCNC: 1 MG/DL
EGFR: 55 ML/MIN/1.73M2
GLUCOSE SERPL-MCNC: 47 MG/DL
MAGNESIUM SERPL-MCNC: 2 MG/DL
POTASSIUM SERPL-SCNC: 4.6 MMOL/L
PROT SERPL-MCNC: 6.9 G/DL
SODIUM SERPL-SCNC: 133 MMOL/L
TSH SERPL-ACNC: 1.6 UIU/ML

## 2024-05-05 ENCOUNTER — EMERGENCY (EMERGENCY)
Facility: HOSPITAL | Age: 84
LOS: 0 days | Discharge: HOME | End: 2024-05-05
Attending: EMERGENCY MEDICINE
Payer: MEDICARE

## 2024-05-05 VITALS
SYSTOLIC BLOOD PRESSURE: 137 MMHG | DIASTOLIC BLOOD PRESSURE: 64 MMHG | RESPIRATION RATE: 18 BRPM | TEMPERATURE: 96 F | OXYGEN SATURATION: 98 % | WEIGHT: 160.06 LBS | HEART RATE: 78 BPM

## 2024-05-05 VITALS
RESPIRATION RATE: 18 BRPM | TEMPERATURE: 98 F | OXYGEN SATURATION: 100 % | SYSTOLIC BLOOD PRESSURE: 147 MMHG | HEART RATE: 70 BPM | DIASTOLIC BLOOD PRESSURE: 80 MMHG

## 2024-05-05 DIAGNOSIS — R10.32 LEFT LOWER QUADRANT PAIN: ICD-10-CM

## 2024-05-05 DIAGNOSIS — Z92.89 PERSONAL HISTORY OF OTHER MEDICAL TREATMENT: Chronic | ICD-10-CM

## 2024-05-05 DIAGNOSIS — R22.40 LOCALIZED SWELLING, MASS AND LUMP, UNSPECIFIED LOWER LIMB: Chronic | ICD-10-CM

## 2024-05-05 LAB
ALBUMIN SERPL ELPH-MCNC: 4.2 G/DL — SIGNIFICANT CHANGE UP (ref 3.5–5.2)
ALP SERPL-CCNC: 78 U/L — SIGNIFICANT CHANGE UP (ref 30–115)
ALT FLD-CCNC: 17 U/L — SIGNIFICANT CHANGE UP (ref 0–41)
ANION GAP SERPL CALC-SCNC: 10 MMOL/L — SIGNIFICANT CHANGE UP (ref 7–14)
APPEARANCE UR: CLEAR — SIGNIFICANT CHANGE UP
APTT BLD: 39.4 SEC — HIGH (ref 27–39.2)
AST SERPL-CCNC: 21 U/L — SIGNIFICANT CHANGE UP (ref 0–41)
BASOPHILS # BLD AUTO: 0.01 K/UL — SIGNIFICANT CHANGE UP (ref 0–0.2)
BASOPHILS NFR BLD AUTO: 0.2 % — SIGNIFICANT CHANGE UP (ref 0–1)
BILIRUB SERPL-MCNC: 0.3 MG/DL — SIGNIFICANT CHANGE UP (ref 0.2–1.2)
BILIRUB UR-MCNC: NEGATIVE — SIGNIFICANT CHANGE UP
BUN SERPL-MCNC: 18 MG/DL — SIGNIFICANT CHANGE UP (ref 10–20)
CALCIUM SERPL-MCNC: 9.2 MG/DL — SIGNIFICANT CHANGE UP (ref 8.4–10.5)
CHLORIDE SERPL-SCNC: 97 MMOL/L — LOW (ref 98–110)
CO2 SERPL-SCNC: 28 MMOL/L — SIGNIFICANT CHANGE UP (ref 17–32)
COLOR SPEC: YELLOW — SIGNIFICANT CHANGE UP
CREAT SERPL-MCNC: 1 MG/DL — SIGNIFICANT CHANGE UP (ref 0.7–1.5)
DIFF PNL FLD: NEGATIVE — SIGNIFICANT CHANGE UP
EGFR: 55 ML/MIN/1.73M2 — LOW
EOSINOPHIL # BLD AUTO: 0.04 K/UL — SIGNIFICANT CHANGE UP (ref 0–0.7)
EOSINOPHIL NFR BLD AUTO: 1 % — SIGNIFICANT CHANGE UP (ref 0–8)
GLUCOSE SERPL-MCNC: 360 MG/DL — HIGH (ref 70–99)
GLUCOSE UR QL: >=1000 MG/DL
HCT VFR BLD CALC: 36.6 % — LOW (ref 37–47)
HGB BLD-MCNC: 11.6 G/DL — LOW (ref 12–16)
IMM GRANULOCYTES NFR BLD AUTO: 0.5 % — HIGH (ref 0.1–0.3)
INR BLD: 1.38 RATIO — HIGH (ref 0.65–1.3)
KETONES UR-MCNC: NEGATIVE MG/DL — SIGNIFICANT CHANGE UP
LEUKOCYTE ESTERASE UR-ACNC: NEGATIVE — SIGNIFICANT CHANGE UP
LIDOCAIN IGE QN: 31 U/L — SIGNIFICANT CHANGE UP (ref 7–60)
LYMPHOCYTES # BLD AUTO: 0.44 K/UL — LOW (ref 1.2–3.4)
LYMPHOCYTES # BLD AUTO: 10.8 % — LOW (ref 20.5–51.1)
MCHC RBC-ENTMCNC: 24.7 PG — LOW (ref 27–31)
MCHC RBC-ENTMCNC: 31.7 G/DL — LOW (ref 32–37)
MCV RBC AUTO: 77.9 FL — LOW (ref 81–99)
MONOCYTES # BLD AUTO: 0.32 K/UL — SIGNIFICANT CHANGE UP (ref 0.1–0.6)
MONOCYTES NFR BLD AUTO: 7.8 % — SIGNIFICANT CHANGE UP (ref 1.7–9.3)
NEUTROPHILS # BLD AUTO: 3.25 K/UL — SIGNIFICANT CHANGE UP (ref 1.4–6.5)
NEUTROPHILS NFR BLD AUTO: 79.7 % — HIGH (ref 42.2–75.2)
NITRITE UR-MCNC: NEGATIVE — SIGNIFICANT CHANGE UP
NRBC # BLD: 0 /100 WBCS — SIGNIFICANT CHANGE UP (ref 0–0)
PH UR: 7 — SIGNIFICANT CHANGE UP (ref 5–8)
PLATELET # BLD AUTO: 280 K/UL — SIGNIFICANT CHANGE UP (ref 130–400)
PMV BLD: 11.4 FL — HIGH (ref 7.4–10.4)
POTASSIUM SERPL-MCNC: 4.8 MMOL/L — SIGNIFICANT CHANGE UP (ref 3.5–5)
POTASSIUM SERPL-SCNC: 4.8 MMOL/L — SIGNIFICANT CHANGE UP (ref 3.5–5)
PROT SERPL-MCNC: 6.8 G/DL — SIGNIFICANT CHANGE UP (ref 6–8)
PROT UR-MCNC: NEGATIVE MG/DL — SIGNIFICANT CHANGE UP
PROTHROM AB SERPL-ACNC: 15.8 SEC — HIGH (ref 9.95–12.87)
RBC # BLD: 4.7 M/UL — SIGNIFICANT CHANGE UP (ref 4.2–5.4)
RBC # FLD: 15.4 % — HIGH (ref 11.5–14.5)
SODIUM SERPL-SCNC: 135 MMOL/L — SIGNIFICANT CHANGE UP (ref 135–146)
SP GR SPEC: 1.01 — SIGNIFICANT CHANGE UP (ref 1–1.03)
UROBILINOGEN FLD QL: 0.2 MG/DL — SIGNIFICANT CHANGE UP (ref 0.2–1)
WBC # BLD: 4.08 K/UL — LOW (ref 4.8–10.8)
WBC # FLD AUTO: 4.08 K/UL — LOW (ref 4.8–10.8)

## 2024-05-05 PROCEDURE — 74177 CT ABD & PELVIS W/CONTRAST: CPT | Mod: 26,MC

## 2024-05-05 PROCEDURE — 87086 URINE CULTURE/COLONY COUNT: CPT

## 2024-05-05 PROCEDURE — 36415 COLL VENOUS BLD VENIPUNCTURE: CPT

## 2024-05-05 PROCEDURE — 81003 URINALYSIS AUTO W/O SCOPE: CPT

## 2024-05-05 PROCEDURE — 99284 EMERGENCY DEPT VISIT MOD MDM: CPT | Mod: 25

## 2024-05-05 PROCEDURE — 74177 CT ABD & PELVIS W/CONTRAST: CPT | Mod: MC

## 2024-05-05 PROCEDURE — 36000 PLACE NEEDLE IN VEIN: CPT | Mod: XU

## 2024-05-05 PROCEDURE — 85025 COMPLETE CBC W/AUTO DIFF WBC: CPT

## 2024-05-05 PROCEDURE — 85610 PROTHROMBIN TIME: CPT

## 2024-05-05 PROCEDURE — 80053 COMPREHEN METABOLIC PANEL: CPT

## 2024-05-05 PROCEDURE — 99285 EMERGENCY DEPT VISIT HI MDM: CPT | Mod: FS

## 2024-05-05 PROCEDURE — 83690 ASSAY OF LIPASE: CPT

## 2024-05-05 PROCEDURE — 85730 THROMBOPLASTIN TIME PARTIAL: CPT

## 2024-05-05 RX ORDER — SODIUM CHLORIDE 9 MG/ML
1000 INJECTION INTRAMUSCULAR; INTRAVENOUS; SUBCUTANEOUS ONCE
Refills: 0 | Status: COMPLETED | OUTPATIENT
Start: 2024-05-05 | End: 2024-05-05

## 2024-05-05 RX ORDER — KETOROLAC TROMETHAMINE 30 MG/ML
15 SYRINGE (ML) INJECTION ONCE
Refills: 0 | Status: DISCONTINUED | OUTPATIENT
Start: 2024-05-05 | End: 2024-05-05

## 2024-05-05 RX ORDER — ACETAMINOPHEN 500 MG
650 TABLET ORAL ONCE
Refills: 0 | Status: COMPLETED | OUTPATIENT
Start: 2024-05-05 | End: 2024-05-05

## 2024-05-05 RX ORDER — MORPHINE SULFATE 50 MG/1
4 CAPSULE, EXTENDED RELEASE ORAL ONCE
Refills: 0 | Status: DISCONTINUED | OUTPATIENT
Start: 2024-05-05 | End: 2024-05-05

## 2024-05-05 RX ADMIN — Medication 650 MILLIGRAM(S): at 14:51

## 2024-05-05 RX ADMIN — SODIUM CHLORIDE 1000 MILLILITER(S): 9 INJECTION INTRAMUSCULAR; INTRAVENOUS; SUBCUTANEOUS at 12:05

## 2024-05-05 NOTE — ED ADULT NURSE NOTE - NSFALLHARMRISKINTERV_ED_ALL_ED

## 2024-05-05 NOTE — ED PROVIDER NOTE - PRO INTERPRETER NEED 2
Patient called stating she had a tick on her Monday 5/4/20 and was able to remove the tick. Patient states there is still a red spot and wondering if she should get antibiotics.     Please call patient back to discuss.    F[place]ian

## 2024-05-05 NOTE — ED PROVIDER NOTE - CLINICAL SUMMARY MEDICAL DECISION MAKING FREE TEXT BOX
Patient here with 1 week abdominal pain primarily left lower quadrant.  Agree with above history and exam.  Here labs CT imaging unremarkable.  Patient stable for discharge.

## 2024-05-05 NOTE — ED PROVIDER NOTE - OBJECTIVE STATEMENT
84-year-old female presents to the ED For evaluation of abdominal pain which started 1 week ago.  Patient states pain has been tolerable with acetaminophen.  Patient does report that since last night pain has been more severe..  Patient denies any nausea or vomiting.  Patient denies any diarrhea and/or constipation.

## 2024-05-05 NOTE — ED PROVIDER NOTE - NSFOLLOWUPINSTRUCTIONS_ED_ALL_ED_FT
Acute Abdominal Pain    WHAT YOU NEED TO KNOW:    The cause of your abdominal pain may not be found. If a cause is found, treatment will depend on what the cause is.     DISCHARGE INSTRUCTIONS:    Return to the emergency department if:     You vomit blood or cannot stop vomiting.      You have blood in your bowel movement or it looks like tar.       You have bleeding from your rectum.       Your abdomen is larger than usual, more painful, and hard.       You have severe pain in your abdomen.       You stop passing gas and having bowel movements.       You feel weak, dizzy, or faint.    Contact your healthcare provider if:     You have a fever.      You have new signs and symptoms.      Your symptoms do not get better with treatment.       You have questions or concerns about your condition or care.    Medicines may be given to decrease pain, treat an infection, and manage your symptoms. Take your medicine as directed. Call your healthcare provider if you think your medicine is not helping or if you have side effects. Tell him if you are allergic to any medicine. Keep a list of the medicines, vitamins, and herbs you take. Include the amounts, and when and why you take them. Bring the list or the pill bottles to follow-up visits. Carry your medicine list with you in case of an emergency.    Manage your symptoms:     Apply heat on your abdomen for 20 to 30 minutes every 2 hours for as many days as directed. Heat helps decrease pain and muscle spasms.       Manage your stress. Stress may cause abdominal pain. Your healthcare provider may recommend relaxation techniques and deep breathing exercises to help decrease your stress. Your healthcare provider may recommend you talk to someone about your stress or anxiety, such as a counselor or a trusted friend. Get plenty of sleep and exercise regularly.       Limit or do not drink alcohol. Alcohol can make your abdominal pain worse. Ask your healthcare provider if it is safe for you to drink alcohol. Also ask how much is safe for you to drink.       Do not smoke. Nicotine and other chemicals in cigarettes can damage your esophagus and stomach. Ask your healthcare provider for information if you currently smoke and need help to quit. E-cigarettes or smokeless tobacco still contain nicotine. Talk to your healthcare provider before you use these products.     Make changes to the food you eat as directed: Do not eat foods that cause abdominal pain or other symptoms. Eat small meals more often.     Eat more high-fiber foods if you are constipated. High-fiber foods include fruits, vegetables, whole-grain foods, and legumes.       Do not eat foods that cause gas if you have bloating. Examples include broccoli, cabbage, and cauliflower. Do not drink soda or carbonated drinks, because these may also cause gas.       Do not eat foods or drinks that contain sorbitol or fructose if you have diarrhea and bloating. Some examples are fruit juices, candy, jelly, and sugar-free gum.       Do not eat high-fat foods, such as fried foods, cheeseburgers, hot dogs, and desserts.      Limit or do not drink caffeine. Caffeine may make symptoms, such as heart burn or nausea, worse.       Drink plenty of liquids to prevent dehydration from diarrhea or vomiting. Ask your healthcare provider how much liquid to drink each day and which liquids are best for you.     Follow up with your healthcare provider as directed: Write down your questions so you remember to ask them during your visits.       © Copyright [employer] 2018 All illustrations and images included in CareNotes are the copyrighted property of [employer] or [employer].

## 2024-05-05 NOTE — ED PROVIDER NOTE - PATIENT PORTAL LINK FT
You can access the FollowMyHealth Patient Portal offered by [facility] by registering at the following website: [email or web address]. By joining [facility]’s FollowMyHealth portal, you will also be able to view your health information using other applications (apps) compatible with our system.

## 2024-05-05 NOTE — ED PROVIDER NOTE - PHYSICAL EXAMINATION
--EXAM--  VITAL SIGNS: I have reviewed vs documented at present.  CONSTITUTIONAL: Well-developed; well-nourished; in no acute distress.   SKIN: Warm and dry, no acute rash.   NECK: Supple; non tender.  CARD: S1, S2, Regular rate and rhythm.   RESP: No wheezes, rales or rhonchi.  ABD: Normal bowel sounds; soft; non-distended; tenderness to left lower quadrant

## 2024-05-05 NOTE — ED ADULT NURSE REASSESSMENT NOTE - NS ED NURSE REASSESS COMMENT FT1
pt and family refusing morphine and toradol at this time, stating her  " of morphine", reports pain of 5 out of 10, requesting regular tylenol. MERARI Martinez made aware.

## 2024-05-05 NOTE — ED ADULT TRIAGE NOTE - CHIEF COMPLAINT QUOTE
pt here with daughter complaining of lower abdominal pain and lower back pain and frequent urination since last night

## 2024-05-06 LAB
CULTURE RESULTS: SIGNIFICANT CHANGE UP
SPECIMEN SOURCE: SIGNIFICANT CHANGE UP

## 2024-05-08 ENCOUNTER — RX RENEWAL (OUTPATIENT)
Age: 84
End: 2024-05-08

## 2024-05-08 RX ORDER — APIXABAN 5 MG/1
5 TABLET, FILM COATED ORAL TWICE DAILY
Qty: 180 | Refills: 3 | Status: ACTIVE | COMMUNITY
Start: 2023-12-11 | End: 1900-01-01

## 2024-05-16 ENCOUNTER — OUTPATIENT (OUTPATIENT)
Dept: OUTPATIENT SERVICES | Facility: HOSPITAL | Age: 84
LOS: 1 days | End: 2024-05-16
Payer: MEDICARE

## 2024-05-16 ENCOUNTER — LABORATORY RESULT (OUTPATIENT)
Age: 84
End: 2024-05-16

## 2024-05-16 ENCOUNTER — APPOINTMENT (OUTPATIENT)
Age: 84
End: 2024-05-16
Payer: MEDICARE

## 2024-05-16 VITALS
HEART RATE: 98 BPM | WEIGHT: 160 LBS | BODY MASS INDEX: 31.41 KG/M2 | TEMPERATURE: 97.6 F | DIASTOLIC BLOOD PRESSURE: 70 MMHG | HEIGHT: 60 IN | SYSTOLIC BLOOD PRESSURE: 137 MMHG

## 2024-05-16 VITALS — SYSTOLIC BLOOD PRESSURE: 137 MMHG | TEMPERATURE: 98 F | HEART RATE: 98 BPM | DIASTOLIC BLOOD PRESSURE: 70 MMHG

## 2024-05-16 DIAGNOSIS — D50.9 IRON DEFICIENCY ANEMIA, UNSPECIFIED: ICD-10-CM

## 2024-05-16 DIAGNOSIS — Z63.4 DISAPPEARANCE AND DEATH OF FAMILY MEMBER: ICD-10-CM

## 2024-05-16 DIAGNOSIS — C77.9 SECONDARY AND UNSPECIFIED MALIGNANT NEOPLASM OF LYMPH NODE, UNSPECIFIED: ICD-10-CM

## 2024-05-16 DIAGNOSIS — Z51.12 ENCOUNTER FOR ANTINEOPLASTIC IMMUNOTHERAPY: ICD-10-CM

## 2024-05-16 DIAGNOSIS — Z80.3 FAMILY HISTORY OF MALIGNANT NEOPLASM OF BREAST: ICD-10-CM

## 2024-05-16 DIAGNOSIS — C53.9 MALIGNANT NEOPLASM OF CERVIX UTERI, UNSPECIFIED: ICD-10-CM

## 2024-05-16 DIAGNOSIS — Z51.11 ENCOUNTER FOR ANTINEOPLASTIC CHEMOTHERAPY: ICD-10-CM

## 2024-05-16 DIAGNOSIS — Z87.891 PERSONAL HISTORY OF NICOTINE DEPENDENCE: ICD-10-CM

## 2024-05-16 DIAGNOSIS — D49.89 NEOPLASM OF UNSPECIFIED BEHAVIOR OF OTHER SPECIFIED SITES: ICD-10-CM

## 2024-05-16 DIAGNOSIS — R22.40 LOCALIZED SWELLING, MASS AND LUMP, UNSPECIFIED LOWER LIMB: Chronic | ICD-10-CM

## 2024-05-16 DIAGNOSIS — Z92.89 PERSONAL HISTORY OF OTHER MEDICAL TREATMENT: Chronic | ICD-10-CM

## 2024-05-16 PROCEDURE — 99214 OFFICE O/P EST MOD 30 MIN: CPT

## 2024-05-16 PROCEDURE — 85027 COMPLETE CBC AUTOMATED: CPT

## 2024-05-16 PROCEDURE — 84439 ASSAY OF FREE THYROXINE: CPT

## 2024-05-16 PROCEDURE — 96413 CHEMO IV INFUSION 1 HR: CPT

## 2024-05-16 PROCEDURE — 80053 COMPREHEN METABOLIC PANEL: CPT

## 2024-05-16 PROCEDURE — 83735 ASSAY OF MAGNESIUM: CPT

## 2024-05-16 PROCEDURE — 84443 ASSAY THYROID STIM HORMONE: CPT

## 2024-05-16 PROCEDURE — 36415 COLL VENOUS BLD VENIPUNCTURE: CPT

## 2024-05-16 RX ORDER — PEMBROLIZUMAB 25 MG/ML
200 INJECTION, SOLUTION INTRAVENOUS ONCE
Refills: 0 | Status: COMPLETED | OUTPATIENT
Start: 2024-05-16 | End: 2024-05-16

## 2024-05-16 RX ADMIN — PEMBROLIZUMAB 200 MILLIGRAM(S): 25 INJECTION, SOLUTION INTRAVENOUS at 16:04

## 2024-05-16 RX ADMIN — PEMBROLIZUMAB 200 MILLIGRAM(S): 25 INJECTION, SOLUTION INTRAVENOUS at 16:34

## 2024-05-16 SDOH — SOCIAL STABILITY - SOCIAL INSECURITY: DISSAPEARANCE AND DEATH OF FAMILY MEMBER: Z63.4

## 2024-05-17 DIAGNOSIS — C77.9 SECONDARY AND UNSPECIFIED MALIGNANT NEOPLASM OF LYMPH NODE, UNSPECIFIED: ICD-10-CM

## 2024-05-17 PROBLEM — Z87.891 FORMER SMOKER: Status: ACTIVE | Noted: 2018-05-21

## 2024-05-17 PROBLEM — D49.89: Status: ACTIVE | Noted: 2022-01-10

## 2024-05-17 PROBLEM — Z51.12 ENCOUNTER FOR ANTINEOPLASTIC IMMUNOTHERAPY: Status: ACTIVE | Noted: 2023-03-21

## 2024-05-17 PROBLEM — Z63.4 WIDOWED: Status: ACTIVE | Noted: 2023-07-26

## 2024-05-17 PROBLEM — Z51.11 ENCOUNTER FOR ANTINEOPLASTIC CHEMOTHERAPY: Status: ACTIVE | Noted: 2021-05-10

## 2024-05-17 PROBLEM — Z80.3 FAMILY HISTORY OF MALIGNANT NEOPLASM OF BREAST: Status: ACTIVE | Noted: 2021-05-10

## 2024-05-17 PROBLEM — C53.9 SQUAMOUS CELL CARCINOMA OF CERVIX: Status: ACTIVE | Noted: 2021-05-05

## 2024-05-17 PROBLEM — D50.9 MICROCYTIC ANEMIA: Status: ACTIVE | Noted: 2023-09-26

## 2024-05-17 LAB — T4 FREE SERPL-MCNC: 1 NG/DL

## 2024-05-17 NOTE — HISTORY OF PRESENT ILLNESS
[Disease: _____________________] : Disease: [unfilled] [de-identified] : The patient is an 81 year old woman who presented with post-menopausal vaginal bleeding in March 2021.  Upon further work up, PAP smear on 3/18/21 showed: LSIL/AGC/HPV+. Pelvic US on 3/22/21 showed endometrium to be 4.2mm thick. 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  MRI pelvis on 4/3/21 showed signal abnormality in the posterior cervix 2.5cm x 1.6cm. The lesion does not go beyond the limit of the cervix.  She saw Dr. Echevarria on 4/14/21 and on his exam, she was noted to have a 4-5cm cervical mass involving at least the middle of the vaginal wall, circumferentially. No parametrial involvement. She was not deemed a surgical candidate.  Definitive chemoradiation was recommended. She is here to discuss radiation.  PET/CT: Pending  Pt C/O low back pain, for which she takes Tylenol. Vaginal bleeding is not very excessive. No SOB, Chest pain. Pt has a stress test next week  Patient feels some fatigue. Discussed the benefit vs risk of continuing vs stopping treatments as brought up by patient. [de-identified] : Squamous cell [de-identified] : 5/18/21 Pt is here for follow up. She has completed a PET scan. has moderate back pain  6/9/21 Pt is here for follow up. She has started RT last week. She also recd 1 dose of carboplatin last week. She did not have any nausea or vomiting. She has been getting diarrhea which she has been managing with imodium. Her bld sugar was also running high. No fever, mouth sores.  6/16/21 Pt is here for follow up. She is s/p 2 doses of Carbo. C/O feeling very fatigued. Had diarrhea which is controlled with imodium. No N, V. has abdominal discomfort.  6/23/2021 Pt is here to follow up for b=cervical cancer, accompanied by daughter She is on Carbo s/p 2 cycles, tolerating well She feels a lot better today She denies abdominal pain, nausea or vomiting She gets diarrhea but is managed with Imodium  7/1/2021 Pt is here to follow up for cervical cancer, accompanied by daughter She is on Carbo s/p 3 cycles, tolerating well She feels fine today, appetite is good She denies abdominal pain, nausea or vomiting, no vaginal bleeding/discharge She gets diarrhea, takes Imodium after having 7-8 episodes then gets constipation the following day  7/7/2021 Pt is here to follow up for cervical cancer, accompanied by daughter She is on weekly Carbo s/p  4 cycles, tolerating well She feels fine today, appetite is good She denies nausea or vomiting, no vaginal bleeding/discharge She c/o of low abdominal cramping and gets diarrhea 4-5x/day, managed with Imodium then gets constipation the following day  7/26/21 Pt is here for follow up, She is feeling better. Diarrhea has improved. NO vaginal bleeding  9/8/21 Pt is here for follow up. Has completed RT on 8/2/21. No vag bleeding, no pelvic pain.  12/21/21 Pt is here today for follow up visit for cervical cancer. She has completed chemo (7/2021)/Rad (8/2021). Pt denies any vaginal bleeding or abd/pelvic pain. She had a PET CT scan on 12/21/21 which showed FDG avid right external iliac lymph node, SUV max 10.0 measuring 1.0 cm, consistent with biological tumor activity. She saw Dr. Ragland and agreed to have SBRT since she is not surgical candidate. Simulation was done. She has an appt with Dr. Echevarria 1/12/22.  4/25/22 Pt is here for follow up. She had been C/O rectal bleeding over the past week. She went to ER and was treated with ABX. CT scan abdomen and pelvis reviwed ? proctoclitis. Symptoms have improved.Pt has appt with GI.  12/12/22 Pt is here for follow up.  She was recently admitted to hospital in November 2022 when she had severe RLE pain. She underwent CT AP in ED showing necrotic R iliac lymph node that increased in size since 4/2022 with asymmetrically dilated R iliac and feeding veins. She was evaluated by vascular team and she was taken for R iliac vein stent placement and now on eliquis. She was also evaluated by gynonc team as well radiation oncology team, unfortunately cannot offer any intervention at this time. She is doing much better since she was discharged from hospital, swelling of RLE significant improved.  She still experiences R groin pain which she takes Percocet.  Otherwise, she denies fever, chill, SOB, CP, N/V/D.   1/12/2023 Pt presents to clinic for follow up and cycle#2 of Carbo/taxol & Keytruda accompanied by daughter.  With her first cycle she had diarrhea & fatigue for two days that improved with Imodium. She has lost 20 lbs over last months. Her daughters are taking care of her  diet.  Her leg swelling has been subsiding well and she is requiring lesser pain meds for her leg pain.   1/31/23 Pt is here for follow up. C/O pain in her back since a week or so. Leg swelling and pain has resolved. C/O significant fatigue  3/2/23 Pt is here for follow up. Feels well mostly except for fatigue. Becomes confused post chemo ? d/t steroids. Swelling in LE has resolved. Tolerating AC well. Had one episode of rectal bleed which resolved likely d/t hemorrhoids, pt was constipated  3/20/23 Pt is here for follow up. Feels well mostly except for fatigue. Becomes confused post chemo ? d/t steroids which persisted for about 3 days post chemo.  Swelling in LE has resolved. Tolerating AC well. Had one episode of rectal bleed which resolved likely d/t hemorrhoids She completed PET scan on 3/9/23.  She is s/p 4 cycles of Carboplatin/Taxol.Keytruda.   4/10/23 Pt is here for follow up. Feels well mostly except for fatigue. Swelling in LE has resolved. Tolerating AC well. Had one episode of rectal bleed previously which resolved likely d/t hemorrhoids She completed PET scan on 3/9/23.  She is s/p 4 cycles of Carboplatin/Taxol/Keytruda which was switched to Keytruda alone starting cycle 5.  She complains of joint pain in b/l hands and left ankle, associated with L ankle swelling. She has hx of RA (was on MTX which has been on hold since chemo started).   5/1/23 Pt is here for follow up. Swelling in LE has resolved. Tolerating AC well. Had one episode of rectal bleed previously which resolved likely d/t hemorrhoids She completed PET scan on 3/9/23.  She is s/p 4 cycles of Carboplatin/Taxol/Keytruda which was switched to Keytruda alone starting cycle 5.  She complains of joint pain in b/l hands and left ankle, associated with L ankle swelling. She has hx of RA (was on MTX which has been on hold since chemo started). She still has persistent joint pain, stiffness and is affecting her quality of life.  She saw Dr Grant (Rheum) and was started on Prednisone 10mg qd, Hydroxychlorquine 400mg daily, and Tramadol prn. She feels symptoms are still uncontrolled.   5/22/23 Patient presents to clinic for follow up accompanied by her daughter. She appears to be in acute RA flare. She has stiffness and pain in her back, shoulders and small joints of hands. Daughter reported that she had a MRI done of cervical spine that showed nerve impingement in the cervical spine She is taking MTX, Plaquenil and advil 200 mg TiD with minimal relief. We spoke to her about giving her a break from immunotherapy till her pain improves and she agreed.  6/12/23 Patient presents to clinic for follow up accompanied by her daughter. Daughter reported that she had a MRI done of cervical spine that showed nerve impingement in the cervical spine She is taking MTX, Plaquenil and advil 200 mg TiD, and prednisone.  Her Keytruda was held last cycle, due to her RA flare. She is due for C#8 today.  She feels much better today, only complains of joint pain in b/l hands and neuropathy.  She also feels fatigued.  She completed PET scan on 6/9/23.   7/3/23 Pt is here for follow up. Feels well.  The joint pain is better  7/24/23 Patient presents for follow up accompanied by her daughter She is feeling well. Her RA has improved on medications. She is taking MTX, Plaquenil and prednisone  She feels well and is scheduled for cycle # 10 of Keytruda today.   8/14/23 Pt is here for follow up. Feels fairly well overall. Has C/O diarrhea occasionally and sometimes she is constipated. No pelvic pain or vag bleeding compliant with AC  9/26/23 Patient is here to follow up for cervical cancer, accompanied by her daughter. She is on Keytruda, tolerating well. She feels well, appetite is good. She denies abdominal pain, nausea, vomiting or abnormal vaginal bleeding/discharge. She is compliant with AC. She had seen Dr Ragland today.  10/17/23 Patient is here to follow up for cervical cancer, accompanied by her daughter. She is on Keytruda, tolerating well. She feels well, appetite is good. She denies abdominal pain, nausea, vomiting or abnormal vaginal bleeding/discharge. Her diarrhea is manageable with Imodium. She is compliant with AC.  11/09/23 Patient is here to follow up for cervical cancer, accompanied by her daughter. She is on Keytruda, tolerating well. She feels well, appetite is good., but endorses feeling tired s/p right eye cataract surgery on 11/7/2023 She denies abdominal pain, nausea, vomiting or abnormal vaginal bleeding/discharge. Her diarrhea is manageable with Imodium, no episodes for 3 days now. She is compliant with AC.  12/26/23 Patient is here to follow up for cervical cancer, accompanied by her daughter. She is on Keytruda, tolerating well. She is not feeling well as her blood glucose has been elevated. She denies abdominal pain, nausea, vomiting or abnormal vaginal bleeding/discharge, no shortness of breath, chest pain, headache of dizziness. She had received her flu vaccine. She is compliant with Eliquis, denies any bleeding or bruising. She is compliant with her RA meds, on prednisone 5 mg daily and MTX.  1/16/24 Pt is here for follow up. Feels well. RA controlled on current meds  2/6/24 Pt is here for follow up. Feels well. No diarrhea, rash,  2/27/24 Pt is here for follow up. Feels well except some fatigue. Remains on MTX, Dced prednisone. Compliant with AC  3/20/24 Patient feels some fatigue Family member reports patient is taking MTX and prednisone. Patient wanted to discuss risks and benefits of stopping therapy, chose to continue  4/26/24 Patient is here to follow up for cervical cancer, accompanied by her daughter. She was recently in ER due to urinary discomfort; discovered to have UTI and completing abx.   She remains compliant with Eliquis every 12 hours.   Reviewed most recent CBC, which shows slightly microcytic anemia with hgb 10.6g/dL.   Patient denies skin rash, abdominal/pelvic discomfort, new cough, persistent diarrhea, dyspnea or bleeding. She is due for cycle 21 of Keytruda.    5/16/24: Pt is here today for follow up visit for cervical cancer.  Pt denies any vaginal bleeding or abd/pelvic pain, skin rash, new cough, persistent diarrhea or dyspnea. CBC reviewed with stable HGB/ HCT is 11.2/35.5, Low MCV 76.2. Patient will proceed with Opdivo today.

## 2024-05-17 NOTE — ASSESSMENT
[FreeTextEntry1] : In summary, Uzma Pop is an 83 year old female who initially diagnosed with squamous cell carcinoma of the cervix, FIGO Stage IIIA, s/p chemo/RT.  Given her age and possible cardiac issues (inability to handle large volumes of pre and post chemo hydration). We had decided to use Carboplatin AUC 2 as a radiation sensitizer. She is s/p 5 weekly doses of Carboplatin. The patient received 7000 cGy to the Pelvis/cervix from 6/2/2021 through 8/2/2021  Post definitive chemoRT in 12/21/21, she had a FDG avid right external iliac lymph node, SUV max 10.0 measuring 1.0 cm, consistent with biological tumor activity. She is s/p SBRT x 5 to that lesion.  Her PET scan on 6/22 shows residual FDG uptake in the Rt external iliac LN although stable, findings d/w pt and daughter.  Unfortunately, pt was admitted to hospital in November 2022 when she had severe RLE pain. She underwent CT AP in ED showing necrotic R iliac lymph node that increased in size since 4/2022 with asymmetrically dilated R iliac and feeding veins. She was evaluated by vascular team and was taken for R iliac vein stent placement and now on Eliquis. She was also evaluated by gynecologic oncology team as well radiation oncology team, unfortunately no intervention could be offered at this time.  She underwent PET scan on 12/2022 shows compared to PET/CT 6/28/2022, interval placement of a right iliac venous stent. Increased FDG uptake in the region of the right pelvic sidewall (SUV 13.3), difficult to delineate on CT or emission imaging if this is related to recent stent placement versus right iliac lymph node. No other definite sites of abnormal FDG uptake to suggest biologic tumor activity.  Given pt has locoregional recurrence disease w/ prior hx of RT, the next best regimen will be systemic therapy.  Since pt's cervical cancer has PDL1 of 70-80%, we offered her Carbo/Taxol + Keytruda based on the KEYNOTE-826 trial (pembrolizumab improved median PFS versus placebo (10.4 versus 8.2 months) and OS at 24 months (50% vs 40% in pts w/ PD-L1 CPS of =1).  In the KEYNOTE-826 trial, Bevacizumab was optional (2/3 of pts received) and given pt has possible thrombus in her iliac vein, we will hold off on giving bevacizumab.  We will also modified Carboplatin/Taxol to weekly dosing due to her age and fraility with carboplatin AUC2 and Taxol 80 mg/m2 D1 and D8 every 3 weeks.  Currently maintained on Keytruda  PLAN: Previous notes reviewed and all relevant laboratory and radiology result and were communicated to the patient and her daughter. - PET scan 3/14 shows good response - Continue with cycle #22 of Keytruda today. CBC today reviewed and is adequate. Mild anemia essentially stable. ****Side effect profile of pembrolizumab were discussed with pt including but not limited to: anemia, fatigue, hyperglycemia, hyponatremia, hypoalbuminemia, itching, cough, nausea, rash, decreased appetite, hypertriglyceridemia, increased liver enzymes, hypocalcemia, constipation, diarrhea, upper respiratory tract infection and an immune-mediated reaction. Lab work will check for elevated liver enzymes and thyroid function. 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  - Continue Eliquis 5mg every 12 hours   # Rheumatoid arthritis - Had been on MTX since starting chemotherapy. Reports taking prednisone, remains on  Plaquenil, Hydrocodone and MTX as per Rheumatologist.   RTC 3 weeks with Dr. Krueger with CBC, CMP, TSH, Mg level prior

## 2024-05-17 NOTE — REVIEW OF SYSTEMS
[Joint Pain] : joint pain [Joint Stiffness] : joint stiffness [Negative] : Allergic/Immunologic [Fatigue] : no fatigue [Recent Change In Weight] : ~T no recent weight change [FreeTextEntry7] : reports occasional diarrhea  [FreeTextEntry9] : B/L hands joint pain and neuropathy

## 2024-05-22 RX ORDER — OMEGA-3/DHA/EPA/FISH OIL 300-1000MG
400 CAPSULE ORAL
Qty: 30 | Refills: 1 | Status: ACTIVE | COMMUNITY
Start: 2023-06-13 | End: 1900-01-01

## 2024-05-23 LAB
ALBUMIN SERPL ELPH-MCNC: 4.2 G/DL
ALP BLD-CCNC: 73 U/L
ALT SERPL-CCNC: 17 U/L
ANION GAP SERPL CALC-SCNC: 11 MMOL/L
AST SERPL-CCNC: 19 U/L
BILIRUB SERPL-MCNC: 0.2 MG/DL
BUN SERPL-MCNC: 19 MG/DL
CALCIUM SERPL-MCNC: 9.4 MG/DL
CHLORIDE SERPL-SCNC: 94 MMOL/L
CO2 SERPL-SCNC: 27 MMOL/L
CREAT SERPL-MCNC: 1.1 MG/DL
EGFR: 49 ML/MIN/1.73M2
GLUCOSE SERPL-MCNC: 300 MG/DL
HCT VFR BLD CALC: 35.5 %
HGB BLD-MCNC: 11.2 G/DL
MAGNESIUM SERPL-MCNC: 1.9 MG/DL
MCHC RBC-ENTMCNC: 24 PG
MCHC RBC-ENTMCNC: 31.5 G/DL
MCV RBC AUTO: 76.2 FL
PLATELET # BLD AUTO: 186 K/UL
PMV BLD: 10.6 FL
POTASSIUM SERPL-SCNC: 4.6 MMOL/L
PROT SERPL-MCNC: 6.7 G/DL
RBC # BLD: 4.66 M/UL
RBC # FLD: 15.8 %
SODIUM SERPL-SCNC: 132 MMOL/L
TSH SERPL-ACNC: 1 UIU/ML
WBC # FLD AUTO: 7.95 K/UL

## 2024-06-06 ENCOUNTER — APPOINTMENT (OUTPATIENT)
Age: 84
End: 2024-06-06

## 2024-06-12 ENCOUNTER — EMERGENCY (EMERGENCY)
Facility: HOSPITAL | Age: 84
LOS: 0 days | Discharge: HOME | End: 2024-06-12
Attending: STUDENT IN AN ORGANIZED HEALTH CARE EDUCATION/TRAINING PROGRAM
Payer: MEDICARE

## 2024-06-12 VITALS
TEMPERATURE: 98 F | HEART RATE: 80 BPM | RESPIRATION RATE: 18 BRPM | OXYGEN SATURATION: 99 % | SYSTOLIC BLOOD PRESSURE: 126 MMHG | DIASTOLIC BLOOD PRESSURE: 66 MMHG

## 2024-06-12 VITALS
TEMPERATURE: 98 F | DIASTOLIC BLOOD PRESSURE: 70 MMHG | WEIGHT: 154.98 LBS | RESPIRATION RATE: 16 BRPM | HEART RATE: 93 BPM | SYSTOLIC BLOOD PRESSURE: 132 MMHG | OXYGEN SATURATION: 98 %

## 2024-06-12 DIAGNOSIS — M79.2 NEURALGIA AND NEURITIS, UNSPECIFIED: ICD-10-CM

## 2024-06-12 DIAGNOSIS — B02.9 ZOSTER WITHOUT COMPLICATIONS: ICD-10-CM

## 2024-06-12 DIAGNOSIS — R22.40 LOCALIZED SWELLING, MASS AND LUMP, UNSPECIFIED LOWER LIMB: Chronic | ICD-10-CM

## 2024-06-12 DIAGNOSIS — R19.7 DIARRHEA, UNSPECIFIED: ICD-10-CM

## 2024-06-12 DIAGNOSIS — R11.2 NAUSEA WITH VOMITING, UNSPECIFIED: ICD-10-CM

## 2024-06-12 DIAGNOSIS — N39.0 URINARY TRACT INFECTION, SITE NOT SPECIFIED: ICD-10-CM

## 2024-06-12 DIAGNOSIS — Z92.89 PERSONAL HISTORY OF OTHER MEDICAL TREATMENT: Chronic | ICD-10-CM

## 2024-06-12 LAB
ALBUMIN SERPL ELPH-MCNC: 4.2 G/DL — SIGNIFICANT CHANGE UP (ref 3.5–5.2)
ALP SERPL-CCNC: 92 U/L — SIGNIFICANT CHANGE UP (ref 30–115)
ALT FLD-CCNC: 14 U/L — SIGNIFICANT CHANGE UP (ref 0–41)
ANION GAP SERPL CALC-SCNC: 8 MMOL/L — SIGNIFICANT CHANGE UP (ref 7–14)
APPEARANCE UR: CLEAR — SIGNIFICANT CHANGE UP
AST SERPL-CCNC: 11 U/L — SIGNIFICANT CHANGE UP (ref 0–41)
B-OH-BUTYR SERPL-SCNC: <0.2 MMOL/L — SIGNIFICANT CHANGE UP
BACTERIA # UR AUTO: ABNORMAL /HPF
BASE EXCESS BLDV CALC-SCNC: 4.2 MMOL/L — HIGH (ref -2–3)
BASOPHILS # BLD AUTO: 0.02 K/UL — SIGNIFICANT CHANGE UP (ref 0–0.2)
BASOPHILS NFR BLD AUTO: 0.3 % — SIGNIFICANT CHANGE UP (ref 0–1)
BILIRUB SERPL-MCNC: 0.4 MG/DL — SIGNIFICANT CHANGE UP (ref 0.2–1.2)
BILIRUB UR-MCNC: NEGATIVE — SIGNIFICANT CHANGE UP
BUN SERPL-MCNC: 21 MG/DL — HIGH (ref 10–20)
CA-I SERPL-SCNC: 1.23 MMOL/L — SIGNIFICANT CHANGE UP (ref 1.15–1.33)
CALCIUM SERPL-MCNC: 9.1 MG/DL — SIGNIFICANT CHANGE UP (ref 8.4–10.5)
CHLORIDE SERPL-SCNC: 93 MMOL/L — LOW (ref 98–110)
CO2 SERPL-SCNC: 28 MMOL/L — SIGNIFICANT CHANGE UP (ref 17–32)
COLOR SPEC: YELLOW — SIGNIFICANT CHANGE UP
CREAT SERPL-MCNC: 1.1 MG/DL — SIGNIFICANT CHANGE UP (ref 0.7–1.5)
DIFF PNL FLD: NEGATIVE — SIGNIFICANT CHANGE UP
EGFR: 49 ML/MIN/1.73M2 — LOW
EOSINOPHIL # BLD AUTO: 0.01 K/UL — SIGNIFICANT CHANGE UP (ref 0–0.7)
EOSINOPHIL NFR BLD AUTO: 0.2 % — SIGNIFICANT CHANGE UP (ref 0–8)
EPI CELLS # UR: PRESENT
GAS PNL BLDV: 128 MMOL/L — LOW (ref 136–145)
GAS PNL BLDV: SIGNIFICANT CHANGE UP
GLUCOSE BLDC GLUCOMTR-MCNC: 341 MG/DL — HIGH (ref 70–99)
GLUCOSE SERPL-MCNC: 465 MG/DL — CRITICAL HIGH (ref 70–99)
GLUCOSE UR QL: >=1000 MG/DL
HCO3 BLDV-SCNC: 31 MMOL/L — HIGH (ref 22–29)
HCT VFR BLD CALC: 37.3 % — SIGNIFICANT CHANGE UP (ref 37–47)
HCT VFR BLDA CALC: 36 % — SIGNIFICANT CHANGE UP (ref 34.5–46.5)
HGB BLD CALC-MCNC: 12 G/DL — SIGNIFICANT CHANGE UP (ref 11.7–16.1)
HGB BLD-MCNC: 11.5 G/DL — LOW (ref 12–16)
IMM GRANULOCYTES NFR BLD AUTO: 1 % — HIGH (ref 0.1–0.3)
KETONES UR-MCNC: NEGATIVE MG/DL — SIGNIFICANT CHANGE UP
LACTATE BLDV-MCNC: 1.8 MMOL/L — SIGNIFICANT CHANGE UP (ref 0.5–2)
LACTATE SERPL-SCNC: 1.3 MMOL/L — SIGNIFICANT CHANGE UP (ref 0.7–2)
LEUKOCYTE ESTERASE UR-ACNC: ABNORMAL
LIDOCAIN IGE QN: 77 U/L — HIGH (ref 7–60)
LYMPHOCYTES # BLD AUTO: 0.52 K/UL — LOW (ref 1.2–3.4)
LYMPHOCYTES # BLD AUTO: 8.8 % — LOW (ref 20.5–51.1)
MCHC RBC-ENTMCNC: 23.6 PG — LOW (ref 27–31)
MCHC RBC-ENTMCNC: 30.8 G/DL — LOW (ref 32–37)
MCV RBC AUTO: 76.6 FL — LOW (ref 81–99)
MONOCYTES # BLD AUTO: 0.72 K/UL — HIGH (ref 0.1–0.6)
MONOCYTES NFR BLD AUTO: 12.2 % — HIGH (ref 1.7–9.3)
NEUTROPHILS # BLD AUTO: 4.56 K/UL — SIGNIFICANT CHANGE UP (ref 1.4–6.5)
NEUTROPHILS NFR BLD AUTO: 77.5 % — HIGH (ref 42.2–75.2)
NITRITE UR-MCNC: NEGATIVE — SIGNIFICANT CHANGE UP
NRBC # BLD: 0 /100 WBCS — SIGNIFICANT CHANGE UP (ref 0–0)
PCO2 BLDV: 53 MMHG — HIGH (ref 39–42)
PH BLDV: 7.37 — SIGNIFICANT CHANGE UP (ref 7.32–7.43)
PH UR: 6.5 — SIGNIFICANT CHANGE UP (ref 5–8)
PLATELET # BLD AUTO: 284 K/UL — SIGNIFICANT CHANGE UP (ref 130–400)
PMV BLD: 10 FL — SIGNIFICANT CHANGE UP (ref 7.4–10.4)
PO2 BLDV: 32 MMHG — SIGNIFICANT CHANGE UP (ref 25–45)
POTASSIUM BLDV-SCNC: 4.9 MMOL/L — SIGNIFICANT CHANGE UP (ref 3.5–5.1)
POTASSIUM SERPL-MCNC: 4.9 MMOL/L — SIGNIFICANT CHANGE UP (ref 3.5–5)
POTASSIUM SERPL-SCNC: 4.9 MMOL/L — SIGNIFICANT CHANGE UP (ref 3.5–5)
PROT SERPL-MCNC: 6.5 G/DL — SIGNIFICANT CHANGE UP (ref 6–8)
PROT UR-MCNC: 30 MG/DL
RBC # BLD: 4.87 M/UL — SIGNIFICANT CHANGE UP (ref 4.2–5.4)
RBC # FLD: 15.9 % — HIGH (ref 11.5–14.5)
RBC CASTS # UR COMP ASSIST: 3 /HPF — SIGNIFICANT CHANGE UP (ref 0–4)
SAO2 % BLDV: 58.1 % — LOW (ref 67–88)
SODIUM SERPL-SCNC: 129 MMOL/L — LOW (ref 135–146)
SP GR SPEC: 1.02 — SIGNIFICANT CHANGE UP (ref 1–1.03)
UROBILINOGEN FLD QL: 0.2 MG/DL — SIGNIFICANT CHANGE UP (ref 0.2–1)
WBC # BLD: 5.89 K/UL — SIGNIFICANT CHANGE UP (ref 4.8–10.8)
WBC # FLD AUTO: 5.89 K/UL — SIGNIFICANT CHANGE UP (ref 4.8–10.8)
WBC UR QL: 30 /HPF — HIGH (ref 0–5)

## 2024-06-12 PROCEDURE — 82010 KETONE BODYS QUAN: CPT

## 2024-06-12 PROCEDURE — 71045 X-RAY EXAM CHEST 1 VIEW: CPT

## 2024-06-12 PROCEDURE — 82962 GLUCOSE BLOOD TEST: CPT

## 2024-06-12 PROCEDURE — 82803 BLOOD GASES ANY COMBINATION: CPT

## 2024-06-12 PROCEDURE — 85014 HEMATOCRIT: CPT

## 2024-06-12 PROCEDURE — 93005 ELECTROCARDIOGRAM TRACING: CPT

## 2024-06-12 PROCEDURE — 36415 COLL VENOUS BLD VENIPUNCTURE: CPT

## 2024-06-12 PROCEDURE — 99285 EMERGENCY DEPT VISIT HI MDM: CPT | Mod: FS

## 2024-06-12 PROCEDURE — 71045 X-RAY EXAM CHEST 1 VIEW: CPT | Mod: 26

## 2024-06-12 PROCEDURE — 83605 ASSAY OF LACTIC ACID: CPT | Mod: 59

## 2024-06-12 PROCEDURE — 83690 ASSAY OF LIPASE: CPT

## 2024-06-12 PROCEDURE — 85025 COMPLETE CBC W/AUTO DIFF WBC: CPT

## 2024-06-12 PROCEDURE — 84132 ASSAY OF SERUM POTASSIUM: CPT

## 2024-06-12 PROCEDURE — 93010 ELECTROCARDIOGRAM REPORT: CPT

## 2024-06-12 PROCEDURE — 84295 ASSAY OF SERUM SODIUM: CPT

## 2024-06-12 PROCEDURE — 74177 CT ABD & PELVIS W/CONTRAST: CPT | Mod: MC

## 2024-06-12 PROCEDURE — 87086 URINE CULTURE/COLONY COUNT: CPT

## 2024-06-12 PROCEDURE — 74177 CT ABD & PELVIS W/CONTRAST: CPT | Mod: 26,MC

## 2024-06-12 PROCEDURE — 85018 HEMOGLOBIN: CPT

## 2024-06-12 PROCEDURE — 99285 EMERGENCY DEPT VISIT HI MDM: CPT | Mod: 25

## 2024-06-12 PROCEDURE — 81001 URINALYSIS AUTO W/SCOPE: CPT

## 2024-06-12 PROCEDURE — 82330 ASSAY OF CALCIUM: CPT

## 2024-06-12 PROCEDURE — 80053 COMPREHEN METABOLIC PANEL: CPT

## 2024-06-12 PROCEDURE — 36000 PLACE NEEDLE IN VEIN: CPT | Mod: XU

## 2024-06-12 RX ORDER — GABAPENTIN 400 MG/1
1 CAPSULE ORAL
Qty: 14 | Refills: 0
Start: 2024-06-12 | End: 2024-06-18

## 2024-06-12 RX ORDER — CEFUROXIME AXETIL 250 MG
1 TABLET ORAL
Qty: 14 | Refills: 0
Start: 2024-06-12 | End: 2024-06-18

## 2024-06-12 RX ORDER — SODIUM CHLORIDE 9 MG/ML
1000 INJECTION, SOLUTION INTRAVENOUS ONCE
Refills: 0 | Status: COMPLETED | OUTPATIENT
Start: 2024-06-12 | End: 2024-06-12

## 2024-06-12 RX ADMIN — SODIUM CHLORIDE 1000 MILLILITER(S): 9 INJECTION, SOLUTION INTRAVENOUS at 11:57

## 2024-06-12 NOTE — ED PROVIDER NOTE - PATIENT PORTAL LINK FT
You can access the FollowMyHealth Patient Portal offered by [facility] by registering at the following website: [email or web address]. By joining [facility]’s FollowMyHealth portal, you will also be able to view your health information using other applications (apps) compatible with our system.

## 2024-06-12 NOTE — ED ADULT NURSE NOTE - NSFALLUNIVINTERV_ED_ALL_ED
Bed/Stretcher in lowest position, wheels locked, appropriate side rails in place/Call bell, personal items and telephone in reach/Instruct patient to call for assistance before getting out of bed/chair/stretcher/Non-slip footwear applied when patient is off stretcher/[place] to call system/Physically safe environment - no spills, clutter or unnecessary equipment/Purposeful proactive rounding/Room/bathroom lighting operational, light cord in reach

## 2024-06-12 NOTE — ED PROVIDER NOTE - PROGRESS NOTE DETAILS
Patient feels much better after fluids, tried to be hypoglycemic which improved with fluids alone, patient take her insulin today either.  Patient has no symptoms at this time feels better, found to have 30 white blood cells in her urine micro also will start her on cefuroxime as well as gabapentin for her nerve pain from her active shingles.  Patient's primary doctor made aware will DC with strict return precautions

## 2024-06-12 NOTE — ED PROVIDER NOTE - PHYSICAL EXAMINATION
1 pair VITAL SIGNS: I have reviewed nursing notes and confirm.  CONSTITUTIONAL: chronically well appearing  SKIN: shingles left flank  HEAD: Normocephalic; atraumatic.  EYES: conjunctiva and sclera clear.  ENT: No nasal discharge; airway clear.  CARD: S1, S2 normal; no murmurs, gallops, or rubs. Regular rate and rhythm.   RESP: No wheezes, rales or rhonchi.  ABD: Normal bowel sounds; soft; non-distended; non-tender  EXT: Normal ROM.  No clubbing, cyanosis or edema.   NEURO: Alert, oriented, grossly unremarkable

## 2024-06-12 NOTE — ED PROVIDER NOTE - CARE PROVIDER_API CALL
Finesse Yan  Internal Medicine  [address] [place] [address]  Phone: [phone]  Fax: [phone]  Follow Up Time:

## 2024-06-12 NOTE — ED PROVIDER NOTE - OBJECTIVE STATEMENT
Pt is a 83y/o female extensive pmhx here for eval of n/v/d x 2 days. pt was recently dx with shingles and currently taking anti-virals

## 2024-06-12 NOTE — ED PROVIDER NOTE - CARE PLAN
1 Principal Discharge DX:	Shingles  Secondary Diagnosis:	Nerve pain  Secondary Diagnosis:	Acute UTI

## 2024-06-12 NOTE — ED PROVIDER NOTE - CARE PROVIDERS DIRECT ADDRESSES
-- Message is from the [facility]--    Reason for Call: John called requesting that the patient's referral for the GI doctor is faxed over to [phone]. Please contact John once the referral has been faxed.     Caller Information       Type Contact Phone    06/03/2019 04:28 PM Phone (Incoming) JOHN WEBER (Emergency Contact) [phone] (H)          Alternative phone number: none    Turnaround time given to caller:   \"This message will be sent to [state Provider's name]. The clinical team will fulfill your request as soon as they review your message when the office opens tomorrow.\"     ,DirectAddress_Unknown

## 2024-06-12 NOTE — ED PROVIDER NOTE - NSFOLLOWUPINSTRUCTIONS_ED_ALL_ED_FT
TAKE GABAPENTIN ONCE A DAY UNTIL MONDAY, THEN TWICE A DAY AS PRESCRIBED FOR NERVE PAIN  TAKE ANTIBIOTICS FOR UTI

## 2024-06-12 NOTE — ED PROVIDER NOTE - CLINICAL SUMMARY MEDICAL DECISION MAKING FREE TEXT BOX
84-year-old presented today with abdominal pain with current shingles infection.  Patient is hemodynamically stable upon evaluation.  Labs indicative of no elevated white count, hyperglycemia without anion gap or elevated beta hydroxybutyrate.  Patient has no abdominal tenderness to palpation except for the areas where she has shingles infection that is decreasing in intensity.  Lactate is negative.  UA shows signs of possible infection will be treated with antibiotics.  CT scan was unremarkable.  Patient's case discussed with her primary care physician who recommended gabapentin.  Patient discharged to close follow-up outpatient with PMD.  Return precautions explained to patient.

## 2024-06-14 LAB
CULTURE RESULTS: SIGNIFICANT CHANGE UP
SPECIMEN SOURCE: SIGNIFICANT CHANGE UP

## 2024-06-19 ENCOUNTER — INPATIENT (INPATIENT)
Facility: HOSPITAL | Age: 84
LOS: 0 days | Discharge: HOME | DRG: 690 | End: 2024-06-20
Payer: MEDICARE

## 2024-06-19 VITALS
OXYGEN SATURATION: 99 % | SYSTOLIC BLOOD PRESSURE: 107 MMHG | RESPIRATION RATE: 18 BRPM | HEART RATE: 103 BPM | TEMPERATURE: 98 F | DIASTOLIC BLOOD PRESSURE: 69 MMHG

## 2024-06-19 DIAGNOSIS — N39.0 URINARY TRACT INFECTION, SITE NOT SPECIFIED: ICD-10-CM

## 2024-06-19 DIAGNOSIS — R22.40 LOCALIZED SWELLING, MASS AND LUMP, UNSPECIFIED LOWER LIMB: Chronic | ICD-10-CM

## 2024-06-19 DIAGNOSIS — Z92.89 PERSONAL HISTORY OF OTHER MEDICAL TREATMENT: Chronic | ICD-10-CM

## 2024-06-19 LAB
ALBUMIN SERPL ELPH-MCNC: 4.1 G/DL — SIGNIFICANT CHANGE UP (ref 3.5–5.2)
ALP SERPL-CCNC: 80 U/L — SIGNIFICANT CHANGE UP (ref 30–115)
ALT FLD-CCNC: 14 U/L — SIGNIFICANT CHANGE UP (ref 0–41)
ANION GAP SERPL CALC-SCNC: 11 MMOL/L — SIGNIFICANT CHANGE UP (ref 7–14)
APPEARANCE UR: CLEAR — SIGNIFICANT CHANGE UP
APTT BLD: 34 SEC — SIGNIFICANT CHANGE UP (ref 27–39.2)
AST SERPL-CCNC: 15 U/L — SIGNIFICANT CHANGE UP (ref 0–41)
BACTERIA # UR AUTO: ABNORMAL /HPF
BASOPHILS # BLD AUTO: 0.02 K/UL — SIGNIFICANT CHANGE UP (ref 0–0.2)
BASOPHILS NFR BLD AUTO: 0.2 % — SIGNIFICANT CHANGE UP (ref 0–1)
BILIRUB SERPL-MCNC: 0.4 MG/DL — SIGNIFICANT CHANGE UP (ref 0.2–1.2)
BILIRUB UR-MCNC: NEGATIVE — SIGNIFICANT CHANGE UP
BUN SERPL-MCNC: 14 MG/DL — SIGNIFICANT CHANGE UP (ref 10–20)
CALCIUM SERPL-MCNC: 9.4 MG/DL — SIGNIFICANT CHANGE UP (ref 8.4–10.5)
CHLORIDE SERPL-SCNC: 93 MMOL/L — LOW (ref 98–110)
CO2 SERPL-SCNC: 29 MMOL/L — SIGNIFICANT CHANGE UP (ref 17–32)
COLOR SPEC: YELLOW — SIGNIFICANT CHANGE UP
CREAT SERPL-MCNC: 1.1 MG/DL — SIGNIFICANT CHANGE UP (ref 0.7–1.5)
DIFF PNL FLD: NEGATIVE — SIGNIFICANT CHANGE UP
EGFR: 49 ML/MIN/1.73M2 — LOW
EOSINOPHIL # BLD AUTO: 0.02 K/UL — SIGNIFICANT CHANGE UP (ref 0–0.7)
EOSINOPHIL NFR BLD AUTO: 0.2 % — SIGNIFICANT CHANGE UP (ref 0–8)
EPI CELLS # UR: PRESENT
GLUCOSE SERPL-MCNC: 208 MG/DL — HIGH (ref 70–99)
GLUCOSE UR QL: 500 MG/DL
HCT VFR BLD CALC: 39.2 % — SIGNIFICANT CHANGE UP (ref 37–47)
HGB BLD-MCNC: 12.4 G/DL — SIGNIFICANT CHANGE UP (ref 12–16)
HYALINE CASTS # UR AUTO: PRESENT
IMM GRANULOCYTES NFR BLD AUTO: 0.4 % — HIGH (ref 0.1–0.3)
INR BLD: 1.27 RATIO — SIGNIFICANT CHANGE UP (ref 0.65–1.3)
KETONES UR-MCNC: 15 MG/DL
LEUKOCYTE ESTERASE UR-ACNC: ABNORMAL
LYMPHOCYTES # BLD AUTO: 1.14 K/UL — LOW (ref 1.2–3.4)
LYMPHOCYTES # BLD AUTO: 13.8 % — LOW (ref 20.5–51.1)
MCHC RBC-ENTMCNC: 24.1 PG — LOW (ref 27–31)
MCHC RBC-ENTMCNC: 31.6 G/DL — LOW (ref 32–37)
MCV RBC AUTO: 76.3 FL — LOW (ref 81–99)
MONOCYTES # BLD AUTO: 0.93 K/UL — HIGH (ref 0.1–0.6)
MONOCYTES NFR BLD AUTO: 11.3 % — HIGH (ref 1.7–9.3)
NEUTROPHILS # BLD AUTO: 6.1 K/UL — SIGNIFICANT CHANGE UP (ref 1.4–6.5)
NEUTROPHILS NFR BLD AUTO: 74.1 % — SIGNIFICANT CHANGE UP (ref 42.2–75.2)
NITRITE UR-MCNC: NEGATIVE — SIGNIFICANT CHANGE UP
NRBC # BLD: 0 /100 WBCS — SIGNIFICANT CHANGE UP (ref 0–0)
PH UR: 7 — SIGNIFICANT CHANGE UP (ref 5–8)
PLATELET # BLD AUTO: 263 K/UL — SIGNIFICANT CHANGE UP (ref 130–400)
PMV BLD: 10.4 FL — SIGNIFICANT CHANGE UP (ref 7.4–10.4)
POTASSIUM SERPL-MCNC: 5.2 MMOL/L — HIGH (ref 3.5–5)
POTASSIUM SERPL-SCNC: 5.2 MMOL/L — HIGH (ref 3.5–5)
PROT SERPL-MCNC: 6.8 G/DL — SIGNIFICANT CHANGE UP (ref 6–8)
PROT UR-MCNC: 30 MG/DL
PROTHROM AB SERPL-ACNC: 14.5 SEC — HIGH (ref 9.95–12.87)
RBC # BLD: 5.14 M/UL — SIGNIFICANT CHANGE UP (ref 4.2–5.4)
RBC # FLD: 15.8 % — HIGH (ref 11.5–14.5)
RBC CASTS # UR COMP ASSIST: 2 /HPF — SIGNIFICANT CHANGE UP (ref 0–4)
SODIUM SERPL-SCNC: 133 MMOL/L — LOW (ref 135–146)
SP GR SPEC: 1.02 — SIGNIFICANT CHANGE UP (ref 1–1.03)
SQUAMOUS # UR AUTO: 4 /HPF — SIGNIFICANT CHANGE UP (ref 0–5)
TROPONIN SAMPLING TIME: SIGNIFICANT CHANGE UP
TROPONIN T, HIGH SENSITIVITY RESULT: 20 NG/L — HIGH (ref 6–13)
TROPONIN T, HIGH SENSITIVITY RESULT: 22 NG/L — HIGH (ref 6–13)
UROBILINOGEN FLD QL: 0.2 MG/DL — SIGNIFICANT CHANGE UP (ref 0.2–1)
WBC # BLD: 8.24 K/UL — SIGNIFICANT CHANGE UP (ref 4.8–10.8)
WBC # FLD AUTO: 8.24 K/UL — SIGNIFICANT CHANGE UP (ref 4.8–10.8)
WBC UR QL: 12 /HPF — HIGH (ref 0–5)

## 2024-06-19 PROCEDURE — 82962 GLUCOSE BLOOD TEST: CPT

## 2024-06-19 PROCEDURE — 85025 COMPLETE CBC W/AUTO DIFF WBC: CPT

## 2024-06-19 PROCEDURE — 97165 OT EVAL LOW COMPLEX 30 MIN: CPT | Mod: GO

## 2024-06-19 PROCEDURE — 80048 BASIC METABOLIC PNL TOTAL CA: CPT

## 2024-06-19 PROCEDURE — 83036 HEMOGLOBIN GLYCOSYLATED A1C: CPT

## 2024-06-19 PROCEDURE — 36415 COLL VENOUS BLD VENIPUNCTURE: CPT

## 2024-06-19 PROCEDURE — 71045 X-RAY EXAM CHEST 1 VIEW: CPT | Mod: 26

## 2024-06-19 PROCEDURE — 99285 EMERGENCY DEPT VISIT HI MDM: CPT | Mod: FS

## 2024-06-19 PROCEDURE — 93010 ELECTROCARDIOGRAM REPORT: CPT

## 2024-06-19 RX ORDER — ACETAMINOPHEN 325 MG
650 TABLET ORAL EVERY 6 HOURS
Refills: 0 | Status: DISCONTINUED | OUTPATIENT
Start: 2024-06-19 | End: 2024-06-20

## 2024-06-19 RX ORDER — GLIPIZIDE 5 MG
0 TABLET ORAL
Qty: 0 | Refills: 0 | DISCHARGE

## 2024-06-19 RX ORDER — CEFTRIAXONE SODIUM 500 MG
1000 VIAL (EA) INJECTION ONCE
Refills: 0 | Status: COMPLETED | OUTPATIENT
Start: 2024-06-19 | End: 2024-06-19

## 2024-06-19 RX ORDER — MAGNESIUM, ALUMINUM HYDROXIDE 400-400
30 TABLET,CHEWABLE ORAL EVERY 4 HOURS
Refills: 0 | Status: DISCONTINUED | OUTPATIENT
Start: 2024-06-19 | End: 2024-06-20

## 2024-06-19 RX ORDER — VILAZODONE HYDROCHLORIDE 40 MG/1
0 TABLET ORAL
Qty: 0 | Refills: 5 | DISCHARGE

## 2024-06-19 RX ORDER — SODIUM CHLORIDE 0.9 % (FLUSH) 0.9 %
1000 SYRINGE (ML) INJECTION ONCE
Refills: 0 | Status: COMPLETED | OUTPATIENT
Start: 2024-06-19 | End: 2024-06-19

## 2024-06-19 RX ORDER — CEFTRIAXONE SODIUM 500 MG
1000 VIAL (EA) INJECTION EVERY 24 HOURS
Refills: 0 | Status: DISCONTINUED | OUTPATIENT
Start: 2024-06-19 | End: 2024-06-20

## 2024-06-19 RX ORDER — ONDANSETRON HYDROCHLORIDE 2 MG/ML
4 INJECTION INTRAMUSCULAR; INTRAVENOUS EVERY 8 HOURS
Refills: 0 | Status: DISCONTINUED | OUTPATIENT
Start: 2024-06-19 | End: 2024-06-20

## 2024-06-19 RX ORDER — BUPROPION HYDROCHLORIDE 150 MG/1
0 TABLET, EXTENDED RELEASE ORAL
Qty: 0 | Refills: 3 | DISCHARGE

## 2024-06-19 RX ORDER — HEPARIN SODIUM 50 [USP'U]/ML
5000 INJECTION, SOLUTION INTRAVENOUS EVERY 12 HOURS
Refills: 0 | Status: DISCONTINUED | OUTPATIENT
Start: 2024-06-19 | End: 2024-06-20

## 2024-06-19 RX ADMIN — Medication 100 MILLIGRAM(S): at 23:49

## 2024-06-19 RX ADMIN — Medication 650 MILLIGRAM(S): at 23:57

## 2024-06-19 RX ADMIN — Medication 1000 MILLILITER(S): at 20:04

## 2024-06-20 ENCOUNTER — TRANSCRIPTION ENCOUNTER (OUTPATIENT)
Age: 84
End: 2024-06-20

## 2024-06-20 VITALS
DIASTOLIC BLOOD PRESSURE: 70 MMHG | SYSTOLIC BLOOD PRESSURE: 142 MMHG | HEART RATE: 85 BPM | RESPIRATION RATE: 18 BRPM | OXYGEN SATURATION: 100 %

## 2024-06-20 LAB
A1C WITH ESTIMATED AVERAGE GLUCOSE RESULT: 9.6 % — HIGH (ref 4–5.6)
ANION GAP SERPL CALC-SCNC: 10 MMOL/L — SIGNIFICANT CHANGE UP (ref 7–14)
BASOPHILS # BLD AUTO: 0.02 K/UL — SIGNIFICANT CHANGE UP (ref 0–0.2)
BASOPHILS NFR BLD AUTO: 0.3 % — SIGNIFICANT CHANGE UP (ref 0–1)
BUN SERPL-MCNC: 14 MG/DL — SIGNIFICANT CHANGE UP (ref 10–20)
CALCIUM SERPL-MCNC: 8.9 MG/DL — SIGNIFICANT CHANGE UP (ref 8.4–10.5)
CHLORIDE SERPL-SCNC: 93 MMOL/L — LOW (ref 98–110)
CO2 SERPL-SCNC: 28 MMOL/L — SIGNIFICANT CHANGE UP (ref 17–32)
CREAT SERPL-MCNC: 0.9 MG/DL — SIGNIFICANT CHANGE UP (ref 0.7–1.5)
EGFR: 63 ML/MIN/1.73M2 — SIGNIFICANT CHANGE UP
EOSINOPHIL # BLD AUTO: 0.05 K/UL — SIGNIFICANT CHANGE UP (ref 0–0.7)
EOSINOPHIL NFR BLD AUTO: 0.7 % — SIGNIFICANT CHANGE UP (ref 0–8)
ESTIMATED AVERAGE GLUCOSE: 229 MG/DL — HIGH (ref 68–114)
GLUCOSE BLDC GLUCOMTR-MCNC: 139 MG/DL — HIGH (ref 70–99)
GLUCOSE BLDC GLUCOMTR-MCNC: 249 MG/DL — HIGH (ref 70–99)
GLUCOSE SERPL-MCNC: 259 MG/DL — HIGH (ref 70–99)
HCT VFR BLD CALC: 37.8 % — SIGNIFICANT CHANGE UP (ref 37–47)
HGB BLD-MCNC: 11.6 G/DL — LOW (ref 12–16)
IMM GRANULOCYTES NFR BLD AUTO: 0.4 % — HIGH (ref 0.1–0.3)
LYMPHOCYTES # BLD AUTO: 1.12 K/UL — LOW (ref 1.2–3.4)
LYMPHOCYTES # BLD AUTO: 16.4 % — LOW (ref 20.5–51.1)
MCHC RBC-ENTMCNC: 23.4 PG — LOW (ref 27–31)
MCHC RBC-ENTMCNC: 30.7 G/DL — LOW (ref 32–37)
MCV RBC AUTO: 76.4 FL — LOW (ref 81–99)
MONOCYTES # BLD AUTO: 0.87 K/UL — HIGH (ref 0.1–0.6)
MONOCYTES NFR BLD AUTO: 12.7 % — HIGH (ref 1.7–9.3)
NEUTROPHILS # BLD AUTO: 4.75 K/UL — SIGNIFICANT CHANGE UP (ref 1.4–6.5)
NEUTROPHILS NFR BLD AUTO: 69.5 % — SIGNIFICANT CHANGE UP (ref 42.2–75.2)
NRBC # BLD: 0 /100 WBCS — SIGNIFICANT CHANGE UP (ref 0–0)
PLATELET # BLD AUTO: 257 K/UL — SIGNIFICANT CHANGE UP (ref 130–400)
PMV BLD: 10.7 FL — HIGH (ref 7.4–10.4)
POTASSIUM SERPL-MCNC: 4.5 MMOL/L — SIGNIFICANT CHANGE UP (ref 3.5–5)
POTASSIUM SERPL-SCNC: 4.5 MMOL/L — SIGNIFICANT CHANGE UP (ref 3.5–5)
RBC # BLD: 4.95 M/UL — SIGNIFICANT CHANGE UP (ref 4.2–5.4)
RBC # FLD: 15.8 % — HIGH (ref 11.5–14.5)
SODIUM SERPL-SCNC: 131 MMOL/L — LOW (ref 135–146)
WBC # BLD: 6.84 K/UL — SIGNIFICANT CHANGE UP (ref 4.8–10.8)
WBC # FLD AUTO: 6.84 K/UL — SIGNIFICANT CHANGE UP (ref 4.8–10.8)

## 2024-06-20 RX ORDER — DEXTROSE MONOHYDRATE AND SODIUM CHLORIDE 5; .3 G/100ML; G/100ML
1000 INJECTION, SOLUTION INTRAVENOUS
Refills: 0 | Status: DISCONTINUED | OUTPATIENT
Start: 2024-06-20 | End: 2024-06-20

## 2024-06-20 RX ORDER — APIXABAN 5 MG/1
0 TABLET, FILM COATED ORAL
Qty: 0 | Refills: 0 | DISCHARGE

## 2024-06-20 RX ORDER — KETOROLAC TROMETHAMINE 30 MG/ML
15 INJECTION, SOLUTION INTRAMUSCULAR ONCE
Refills: 0 | Status: DISCONTINUED | OUTPATIENT
Start: 2024-06-20 | End: 2024-06-20

## 2024-06-20 RX ORDER — BUPROPION HYDROCHLORIDE 150 MG/1
300 TABLET, EXTENDED RELEASE ORAL DAILY
Refills: 0 | Status: DISCONTINUED | OUTPATIENT
Start: 2024-06-20 | End: 2024-06-20

## 2024-06-20 RX ORDER — BUPROPION HYDROCHLORIDE 150 MG/1
1 TABLET, EXTENDED RELEASE ORAL
Refills: 0 | DISCHARGE

## 2024-06-20 RX ORDER — AMLODIPINE BESYLATE 2.5 MG/1
5 TABLET ORAL DAILY
Refills: 0 | Status: DISCONTINUED | OUTPATIENT
Start: 2024-06-20 | End: 2024-06-20

## 2024-06-20 RX ORDER — FOLIC ACID
1 POWDER (GRAM) MISCELLANEOUS DAILY
Refills: 0 | Status: DISCONTINUED | OUTPATIENT
Start: 2024-06-20 | End: 2024-06-20

## 2024-06-20 RX ORDER — DEXTROSE 30 % IN WATER 30 %
25 VIAL (ML) INTRAVENOUS ONCE
Refills: 0 | Status: DISCONTINUED | OUTPATIENT
Start: 2024-06-20 | End: 2024-06-20

## 2024-06-20 RX ORDER — DEXTROSE 30 % IN WATER 30 %
12.5 VIAL (ML) INTRAVENOUS ONCE
Refills: 0 | Status: DISCONTINUED | OUTPATIENT
Start: 2024-06-20 | End: 2024-06-20

## 2024-06-20 RX ORDER — GABAPENTIN
100 POWDER (GRAM) MISCELLANEOUS
Refills: 0 | Status: DISCONTINUED | OUTPATIENT
Start: 2024-06-19 | End: 2024-06-20

## 2024-06-20 RX ORDER — INSULIN GLARGINE 100 [IU]/ML
15 INJECTION, SOLUTION SUBCUTANEOUS AT BEDTIME
Refills: 0 | Status: DISCONTINUED | OUTPATIENT
Start: 2024-06-20 | End: 2024-06-20

## 2024-06-20 RX ORDER — HYDROXYCHLOROQUINE SULFATE 200 MG
0 TABLET ORAL
Qty: 0 | Refills: 1 | DISCHARGE

## 2024-06-20 RX ORDER — DEXTROSE MONOHYDRATE 100 MG/ML
125 INJECTION, SOLUTION INTRAVENOUS ONCE
Refills: 0 | Status: DISCONTINUED | OUTPATIENT
Start: 2024-06-20 | End: 2024-06-20

## 2024-06-20 RX ORDER — INSULIN LISPRO 100 [IU]/ML
INJECTION, SOLUTION SUBCUTANEOUS
Refills: 0 | Status: DISCONTINUED | OUTPATIENT
Start: 2024-06-20 | End: 2024-06-20

## 2024-06-20 RX ORDER — LISINOPRIL 5 MG/1
20 TABLET ORAL DAILY
Refills: 0 | Status: DISCONTINUED | OUTPATIENT
Start: 2024-06-20 | End: 2024-06-20

## 2024-06-20 RX ORDER — AMLODIPINE AND BENAZEPRIL HYDROCHLORIDE 5; 20 MG/1; MG/1
0 CAPSULE ORAL
Qty: 0 | Refills: 0 | DISCHARGE

## 2024-06-20 RX ORDER — METHOTREXATE 25 MG/.4ML
0 INJECTION, SOLUTION SUBCUTANEOUS
Qty: 0 | Refills: 2 | DISCHARGE

## 2024-06-20 RX ORDER — HYDROXYCHLOROQUINE SULFATE 200 MG
400 TABLET ORAL DAILY
Refills: 0 | Status: DISCONTINUED | OUTPATIENT
Start: 2024-06-20 | End: 2024-06-20

## 2024-06-20 RX ORDER — GLUCAGON HYDROCHLORIDE 1 MG/ML
1 INJECTION, POWDER, FOR SOLUTION INTRAMUSCULAR; INTRAVENOUS; SUBCUTANEOUS ONCE
Refills: 0 | Status: DISCONTINUED | OUTPATIENT
Start: 2024-06-20 | End: 2024-06-20

## 2024-06-20 RX ORDER — INSULIN LISPRO 100 [IU]/ML
5 INJECTION, SOLUTION SUBCUTANEOUS
Refills: 0 | Status: DISCONTINUED | OUTPATIENT
Start: 2024-06-20 | End: 2024-06-20

## 2024-06-20 RX ORDER — DEXTROSE 30 % IN WATER 30 %
15 VIAL (ML) INTRAVENOUS ONCE
Refills: 0 | Status: DISCONTINUED | OUTPATIENT
Start: 2024-06-20 | End: 2024-06-20

## 2024-06-20 RX ORDER — APIXABAN 5 MG/1
5 TABLET, FILM COATED ORAL EVERY 12 HOURS
Refills: 0 | Status: DISCONTINUED | OUTPATIENT
Start: 2024-06-19 | End: 2024-06-20

## 2024-06-20 RX ADMIN — BUPROPION HYDROCHLORIDE 300 MILLIGRAM(S): 150 TABLET, EXTENDED RELEASE ORAL at 12:29

## 2024-06-20 RX ADMIN — INSULIN LISPRO 5 UNIT(S): 100 INJECTION, SOLUTION SUBCUTANEOUS at 08:15

## 2024-06-20 RX ADMIN — INSULIN LISPRO 5 UNIT(S): 100 INJECTION, SOLUTION SUBCUTANEOUS at 12:21

## 2024-06-20 RX ADMIN — INSULIN LISPRO 2: 100 INJECTION, SOLUTION SUBCUTANEOUS at 08:15

## 2024-06-20 RX ADMIN — Medication 1 MILLIGRAM(S): at 12:29

## 2024-06-20 RX ADMIN — KETOROLAC TROMETHAMINE 15 MILLIGRAM(S): 30 INJECTION, SOLUTION INTRAMUSCULAR at 05:11

## 2024-06-20 RX ADMIN — AMLODIPINE BESYLATE 5 MILLIGRAM(S): 2.5 TABLET ORAL at 08:16

## 2024-06-20 RX ADMIN — Medication 100 MILLIGRAM(S): at 05:10

## 2024-06-20 RX ADMIN — APIXABAN 5 MILLIGRAM(S): 5 TABLET, FILM COATED ORAL at 05:11

## 2024-06-20 RX ADMIN — Medication 400 MILLIGRAM(S): at 12:30

## 2024-06-21 LAB
CULTURE RESULTS: SIGNIFICANT CHANGE UP
SPECIMEN SOURCE: SIGNIFICANT CHANGE UP

## 2024-06-25 ENCOUNTER — APPOINTMENT (OUTPATIENT)
Age: 84
End: 2024-06-25

## 2024-06-27 ENCOUNTER — APPOINTMENT (OUTPATIENT)
Dept: VASCULAR SURGERY | Facility: CLINIC | Age: 84
End: 2024-06-27

## 2024-07-05 DIAGNOSIS — Z95.828 PRESENCE OF OTHER VASCULAR IMPLANTS AND GRAFTS: ICD-10-CM

## 2024-07-05 DIAGNOSIS — E11.9 TYPE 2 DIABETES MELLITUS WITHOUT COMPLICATIONS: ICD-10-CM

## 2024-07-05 DIAGNOSIS — Z79.4 LONG TERM (CURRENT) USE OF INSULIN: ICD-10-CM

## 2024-07-05 DIAGNOSIS — F39 UNSPECIFIED MOOD [AFFECTIVE] DISORDER: ICD-10-CM

## 2024-07-05 DIAGNOSIS — N39.0 URINARY TRACT INFECTION, SITE NOT SPECIFIED: ICD-10-CM

## 2024-07-05 DIAGNOSIS — Z92.21 PERSONAL HISTORY OF ANTINEOPLASTIC CHEMOTHERAPY: ICD-10-CM

## 2024-07-05 DIAGNOSIS — Z79.84 LONG TERM (CURRENT) USE OF ORAL HYPOGLYCEMIC DRUGS: ICD-10-CM

## 2024-07-05 DIAGNOSIS — I10 ESSENTIAL (PRIMARY) HYPERTENSION: ICD-10-CM

## 2024-07-05 DIAGNOSIS — Z85.41 PERSONAL HISTORY OF MALIGNANT NEOPLASM OF CERVIX UTERI: ICD-10-CM

## 2024-07-05 DIAGNOSIS — Z79.01 LONG TERM (CURRENT) USE OF ANTICOAGULANTS: ICD-10-CM

## 2024-07-05 DIAGNOSIS — M06.9 RHEUMATOID ARTHRITIS, UNSPECIFIED: ICD-10-CM

## 2024-07-05 DIAGNOSIS — Z79.52 LONG TERM (CURRENT) USE OF SYSTEMIC STEROIDS: ICD-10-CM

## 2024-07-05 DIAGNOSIS — Z92.3 PERSONAL HISTORY OF IRRADIATION: ICD-10-CM

## 2024-07-16 NOTE — ED PROVIDER NOTE - ATTENDING APP SHARED VISIT CONTRIBUTION OF CARE
How Severe Is Your Skin Lesion?: mild Has Your Skin Lesion Been Treated?: not been treated Is This A New Presentation, Or A Follow-Up?: Growth 82-year-old female past medical history as stated in the chart presents with altered mental status and fever.  Per patient's daughters, patient was noted to be more confused than normal.  States that this typically happens when she gets a UTI or other infection.  Daughter state that she was trailing off in the middle of her sentences and unable to dress herself.  Patient is typically independent and lives alone.  Patient has been endorsing frequent cough for the past few days.  No abdominal pain, no urinary symptoms, no palpitations, no dizziness, no trauma/falls, no chest pain, no shortness of breath.    CONSTITUTIONAL: Well-developed; well-nourished; in no acute distress.   SKIN: skin exam is warm and dry, no acute rash.  HEAD: Normocephalic; atraumatic.  EYES: PERRL, 3 mm bilateral, no nystagmus, EOM intact; conjunctiva and sclera clear.  ENT: No nasal discharge; airway clear.  NECK: Supple; non tender. + full passive ROM in all directions. No JVD  CARD: S1, S2 normal; no murmurs, gallops, or rubs. Regular rate and rhythm. + Symmetric Strong Pulses  RESP: No wheezes, rales or rhonchi. Good air movement bilaterally  ABD: soft; non-distended; non-tender. No Rebound, No Guarding, No signs of peritonitis, No CVA tenderness. No pulsatile abdominal mass. + Strong and Symmetric Pulses  EXT: Normal ROM. No clubbing, cyanosis or edema. Dp and Pt Pulses intact. Cap refill less than 3 seconds  NEURO: CN 2-12 intact, normal finger to nose, normal romberg, stable gait, no sensory or motor deficits, Alert, oriented, grossly unremarkable. No Focal deficits. GCS 15. NIH 0  PSYCH: Cooperative, appropriate.

## 2024-08-14 NOTE — VITALS
[Normal Sclera/Conjunctiva] : normal sclera/conjunctiva [Normal Outer Ear/Nose] : the outer ears and nose were normal in appearance [Normal Appearance] : normal in appearance [No Masses] : no palpable masses [No Nipple Discharge] : no nipple discharge [No Axillary Lymphadenopathy] : no axillary lymphadenopathy [Normal] : affect was normal and insight and judgment were intact [de-identified] : Denied looking [Maximal Pain Intensity: 0/10] : 0/10 [Least Pain Intensity: 0/10] : 0/10 [80: Normal activity with effort; some signs or symptoms of disease.] : 80: Normal activity with effort; some signs or symptoms of disease.

## 2024-08-22 ENCOUNTER — OUTPATIENT (OUTPATIENT)
Dept: OUTPATIENT SERVICES | Facility: HOSPITAL | Age: 84
LOS: 1 days | End: 2024-08-22
Payer: MEDICARE

## 2024-08-22 ENCOUNTER — LABORATORY RESULT (OUTPATIENT)
Age: 84
End: 2024-08-22

## 2024-08-22 ENCOUNTER — APPOINTMENT (OUTPATIENT)
Age: 84
End: 2024-08-22
Payer: MEDICARE

## 2024-08-22 DIAGNOSIS — C77.9 SECONDARY AND UNSPECIFIED MALIGNANT NEOPLASM OF LYMPH NODE, UNSPECIFIED: ICD-10-CM

## 2024-08-22 DIAGNOSIS — C53.9 MALIGNANT NEOPLASM OF CERVIX UTERI, UNSPECIFIED: ICD-10-CM

## 2024-08-22 DIAGNOSIS — R22.40 LOCALIZED SWELLING, MASS AND LUMP, UNSPECIFIED LOWER LIMB: Chronic | ICD-10-CM

## 2024-08-22 DIAGNOSIS — Z92.89 PERSONAL HISTORY OF OTHER MEDICAL TREATMENT: Chronic | ICD-10-CM

## 2024-08-22 DIAGNOSIS — I82.409 ACUTE EMBOLISM AND THROMBOSIS OF UNSPECIFIED DEEP VEINS OF UNSPECIFIED LOWER EXTREMITY: ICD-10-CM

## 2024-08-22 DIAGNOSIS — Z51.12 ENCOUNTER FOR ANTINEOPLASTIC IMMUNOTHERAPY: ICD-10-CM

## 2024-08-22 LAB
ALBUMIN SERPL ELPH-MCNC: 4.3 G/DL
ALP BLD-CCNC: 63 U/L
ALT SERPL-CCNC: 14 U/L
ANION GAP SERPL CALC-SCNC: 8 MMOL/L
AST SERPL-CCNC: 14 U/L
BILIRUB SERPL-MCNC: 0.3 MG/DL
BUN SERPL-MCNC: 20 MG/DL
CALCIUM SERPL-MCNC: 9.4 MG/DL
CHLORIDE SERPL-SCNC: 97 MMOL/L
CO2 SERPL-SCNC: 30 MMOL/L
CREAT SERPL-MCNC: 0.9 MG/DL
EGFR: 63 ML/MIN/1.73M2
GLUCOSE SERPL-MCNC: 300 MG/DL
HCT VFR BLD CALC: 36.4 %
HGB BLD-MCNC: 11.1 G/DL
MCHC RBC-ENTMCNC: 24.9 PG
MCHC RBC-ENTMCNC: 30.5 G/DL
MCV RBC AUTO: 81.6 FL
PLATELET # BLD AUTO: 290 K/UL
PMV BLD: 10.3 FL
POTASSIUM SERPL-SCNC: 4.9 MMOL/L
PROT SERPL-MCNC: 6.4 G/DL
RBC # BLD: 4.46 M/UL
RBC # FLD: 16.9 %
SODIUM SERPL-SCNC: 135 MMOL/L
TSH SERPL-ACNC: 1.11 UIU/ML
WBC # FLD AUTO: 9.89 K/UL

## 2024-08-22 PROCEDURE — 99215 OFFICE O/P EST HI 40 MIN: CPT

## 2024-08-22 PROCEDURE — 85027 COMPLETE CBC AUTOMATED: CPT

## 2024-08-22 PROCEDURE — 84443 ASSAY THYROID STIM HORMONE: CPT

## 2024-08-22 PROCEDURE — G2211 COMPLEX E/M VISIT ADD ON: CPT

## 2024-08-22 PROCEDURE — 80053 COMPREHEN METABOLIC PANEL: CPT

## 2024-08-23 DIAGNOSIS — C77.9 SECONDARY AND UNSPECIFIED MALIGNANT NEOPLASM OF LYMPH NODE, UNSPECIFIED: ICD-10-CM

## 2024-08-23 NOTE — HISTORY OF PRESENT ILLNESS
[Disease: _____________________] : Disease: [unfilled] [de-identified] : The patient is an 81 year old woman who presented with post-menopausal vaginal bleeding in March 2021.  Upon further work up, PAP smear on 3/18/21 showed: LSIL/AGC/HPV+. Pelvic US on 3/22/21 showed endometrium to be 4.2mm thick. 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  MRI pelvis on 4/3/21 showed signal abnormality in the posterior cervix 2.5cm x 1.6cm. The lesion does not go beyond the limit of the cervix.  She saw Dr. Echevarria on 4/14/21 and on his exam, she was noted to have a 4-5cm cervical mass involving at least the middle of the vaginal wall, circumferentially. No parametrial involvement. She was not deemed a surgical candidate.  Definitive chemoradiation was recommended. She is here to discuss radiation.  PET/CT: Pending  Pt C/O low back pain, for which she takes Tylenol. Vaginal bleeding is not very excessive. No SOB, Chest pain. Pt has a stress test next week  Patient feels some fatigue. Discussed the benefit vs risk of continuing vs stopping treatments as brought up by patient. [de-identified] : Squamous cell [de-identified] : 5/18/21 Pt is here for follow up. She has completed a PET scan. has moderate back pain  6/9/21 Pt is here for follow up. She has started RT last week. She also recd 1 dose of carboplatin last week. She did not have any nausea or vomiting. She has been getting diarrhea which she has been managing with imodium. Her bld sugar was also running high. No fever, mouth sores.  6/16/21 Pt is here for follow up. She is s/p 2 doses of Carbo. C/O feeling very fatigued. Had diarrhea which is controlled with imodium. No N, V. has abdominal discomfort.  6/23/2021 Pt is here to follow up for b=cervical cancer, accompanied by daughter She is on Carbo s/p 2 cycles, tolerating well She feels a lot better today She denies abdominal pain, nausea or vomiting She gets diarrhea but is managed with Imodium  7/1/2021 Pt is here to follow up for cervical cancer, accompanied by daughter She is on Carbo s/p 3 cycles, tolerating well She feels fine today, appetite is good She denies abdominal pain, nausea or vomiting, no vaginal bleeding/discharge She gets diarrhea, takes Imodium after having 7-8 episodes then gets constipation the following day  7/7/2021 Pt is here to follow up for cervical cancer, accompanied by daughter She is on weekly Carbo s/p  4 cycles, tolerating well She feels fine today, appetite is good She denies nausea or vomiting, no vaginal bleeding/discharge She c/o of low abdominal cramping and gets diarrhea 4-5x/day, managed with Imodium then gets constipation the following day  7/26/21 Pt is here for follow up, She is feeling better. Diarrhea has improved. NO vaginal bleeding  9/8/21 Pt is here for follow up. Has completed RT on 8/2/21. No vag bleeding, no pelvic pain.  12/21/21 Pt is here today for follow up visit for cervical cancer. She has completed chemo (7/2021)/Rad (8/2021). Pt denies any vaginal bleeding or abd/pelvic pain. She had a PET CT scan on 12/21/21 which showed FDG avid right external iliac lymph node, SUV max 10.0 measuring 1.0 cm, consistent with biological tumor activity. She saw Dr. Ragland and agreed to have SBRT since she is not surgical candidate. Simulation was done. She has an appt with Dr. Echevarria 1/12/22.  4/25/22 Pt is here for follow up. She had been C/O rectal bleeding over the past week. She went to ER and was treated with ABX. CT scan abdomen and pelvis reviwed ? proctoclitis. Symptoms have improved.Pt has appt with GI.  12/12/22 Pt is here for follow up.  She was recently admitted to hospital in November 2022 when she had severe RLE pain. She underwent CT AP in ED showing necrotic R iliac lymph node that increased in size since 4/2022 with asymmetrically dilated R iliac and feeding veins. She was evaluated by vascular team and she was taken for R iliac vein stent placement and now on eliquis. She was also evaluated by gynonc team as well radiation oncology team, unfortunately cannot offer any intervention at this time. She is doing much better since she was discharged from hospital, swelling of RLE significant improved.  She still experiences R groin pain which she takes Percocet.  Otherwise, she denies fever, chill, SOB, CP, N/V/D.   1/12/2023 Pt presents to clinic for follow up and cycle#2 of Carbo/taxol & Keytruda accompanied by daughter.  With her first cycle she had diarrhea & fatigue for two days that improved with Imodium. She has lost 20 lbs over last months. Her daughters are taking care of her  diet.  Her leg swelling has been subsiding well and she is requiring lesser pain meds for her leg pain.   1/31/23 Pt is here for follow up. C/O pain in her back since a week or so. Leg swelling and pain has resolved. C/O significant fatigue  3/2/23 Pt is here for follow up. Feels well mostly except for fatigue. Becomes confused post chemo ? d/t steroids. Swelling in LE has resolved. Tolerating AC well. Had one episode of rectal bleed which resolved likely d/t hemorrhoids, pt was constipated  3/20/23 Pt is here for follow up. Feels well mostly except for fatigue. Becomes confused post chemo ? d/t steroids which persisted for about 3 days post chemo.  Swelling in LE has resolved. Tolerating AC well. Had one episode of rectal bleed which resolved likely d/t hemorrhoids She completed PET scan on 3/9/23.  She is s/p 4 cycles of Carboplatin/Taxol.Keytruda.   4/10/23 Pt is here for follow up. Feels well mostly except for fatigue. Swelling in LE has resolved. Tolerating AC well. Had one episode of rectal bleed previously which resolved likely d/t hemorrhoids She completed PET scan on 3/9/23.  She is s/p 4 cycles of Carboplatin/Taxol/Keytruda which was switched to Keytruda alone starting cycle 5.  She complains of joint pain in b/l hands and left ankle, associated with L ankle swelling. She has hx of RA (was on MTX which has been on hold since chemo started).   5/1/23 Pt is here for follow up. Swelling in LE has resolved. Tolerating AC well. Had one episode of rectal bleed previously which resolved likely d/t hemorrhoids She completed PET scan on 3/9/23.  She is s/p 4 cycles of Carboplatin/Taxol/Keytruda which was switched to Keytruda alone starting cycle 5.  She complains of joint pain in b/l hands and left ankle, associated with L ankle swelling. She has hx of RA (was on MTX which has been on hold since chemo started). She still has persistent joint pain, stiffness and is affecting her quality of life.  She saw Dr Grant ([facility]) and was started on Prednisone 10mg qd, Hydroxychlorquine 400mg daily, and Tramadol prn. She feels symptoms are still uncontrolled.   5/22/23 Patient presents to clinic for follow up accompanied by her daughter. She appears to be in acute RA flare. She has stiffness and pain in her back, shoulders and small joints of hands. Daughter reported that she had a MRI done of cervical spine that showed nerve impingement in the cervical spine She is taking MTX, Plaquenil and advil 200 mg TiD with minimal relief. We spoke to her about giving her a break from immunotherapy till her pain improves and she agreed.  6/12/23 Patient presents to clinic for follow up accompanied by her daughter. Daughter reported that she had a MRI done of cervical spine that showed nerve impingement in the cervical spine She is taking MTX, Plaquenil and advil 200 mg TiD, and prednisone.  Her Keytruda was held last cycle, due to her RA flare. She is due for C#8 today.  She feels much better today, only complains of joint pain in b/l hands and neuropathy.  She also feels fatigued.  She completed PET scan on 6/9/23.   7/3/23 Pt is here for follow up. Feels well.  The joint pain is better  7/24/23 Patient presents for follow up accompanied by her daughter She is feeling well. Her RA has improved on medications. She is taking MTX, Plaquenil and prednisone  She feels well and is scheduled for cycle # 10 of Keytruda today.   8/14/23 Pt is here for follow up. Feels fairly well overall. Has C/O diarrhea occasionally and sometimes she is constipated. No pelvic pain or vag bleeding compliant with AC  9/26/23 Patient is here to follow up for cervical cancer, accompanied by her daughter. She is on Keytruda, tolerating well. She feels well, appetite is good. She denies abdominal pain, nausea, vomiting or abnormal vaginal bleeding/discharge. She is compliant with AC. She had seen Dr Ragland today.  10/17/23 Patient is here to follow up for cervical cancer, accompanied by her daughter. She is on Keytruda, tolerating well. She feels well, appetite is good. She denies abdominal pain, nausea, vomiting or abnormal vaginal bleeding/discharge. Her diarrhea is manageable with Imodium. She is compliant with AC.  11/09/23 Patient is here to follow up for cervical cancer, accompanied by her daughter. She is on Keytruda, tolerating well. She feels well, appetite is good., but endorses feeling tired s/p right eye cataract surgery on 11/7/2023 She denies abdominal pain, nausea, vomiting or abnormal vaginal bleeding/discharge. Her diarrhea is manageable with Imodium, no episodes for 3 days now. She is compliant with AC.  12/26/23 Patient is here to follow up for cervical cancer, accompanied by her daughter. She is on Keytruda, tolerating well. She is not feeling well as her blood glucose has been elevated. She denies abdominal pain, nausea, vomiting or abnormal vaginal bleeding/discharge, no shortness of breath, chest pain, headache of dizziness. She had received her flu vaccine. She is compliant with Eliquis, denies any bleeding or bruising. She is compliant with her RA meds, on prednisone 5 mg daily and MTX.  1/16/24 Pt is here for follow up. Feels well. RA controlled on current meds  2/6/24 Pt is here for follow up. Feels well. No diarrhea, rash,  2/27/24 Pt is here for follow up. Feels well except some fatigue. Remains on MTX, Dced prednisone. Compliant with AC  3/20/24 Patient feels some fatigue Family member reports patient is taking MTX and prednisone. Patient wanted to discuss risks and benefits of stopping therapy, chose to continue  4/26/24 Patient is here to follow up for cervical cancer, accompanied by her daughter. She was recently in ER due to urinary discomfort; discovered to have UTI and completing abx.   She remains compliant with Eliquis every 12 hours.   Reviewed most recent CBC, which shows slightly microcytic anemia with hgb 10.6g/dL.   Patient denies skin rash, abdominal/pelvic discomfort, new cough, persistent diarrhea, dyspnea or bleeding. She is due for cycle 21 of Keytruda.    5/16/24: Pt is here today for follow up visit for cervical cancer.  Pt denies any vaginal bleeding or abd/pelvic pain, skin rash, new cough, persistent diarrhea or dyspnea. CBC reviewed with stable HGB/ HCT is 11.2/35.5, Low MCV 76.2. Patient will proceed with Opdivo today.  8/22/24 Pt is here for follow up. She had H.zoster in 5/24 and started getting better end of July. She had disease involvement on abdomen and back. Had significant fatigue after and AILYN all resolved. No pelvic pain, vag bleeding. She is on prednisone 6 mg daily, being tapered. She is on MTX but hydoxy chloroquine is on hold. She is off of Benazepril and Amlodipine. Remains on Eliquis

## 2024-08-23 NOTE — ASSESSMENT
[FreeTextEntry1] : In summary, Uzma Pop is an 84 year old female who initially diagnosed with squamous cell carcinoma of the cervix, FIGO Stage IIIA, s/p chemo/RT.  She was treated with Carboplatin AUC 2 as a radiation sensitizer. She is s/p 5 weekly doses of Carboplatin. The patient received 7000 cGy to the Pelvis/cervix from 6/2/2021 through 8/2/2021  Post definitive chemoRT in 12/21/21, she had a FDG avid right external iliac lymph node, SUV max 10.0 measuring 1.0 cm, consistent with biological tumor activity. She is s/p SBRT x 5 to that lesion.  Her PET scan on 6/22 shows residual FDG uptake in the Rt external iliac LN although stable, findings d/w pt and daughter.  Unfortunately, pt was admitted to hospital in November 2022 when she had severe RLE pain. She underwent CT AP in ED showing necrotic R iliac lymph node that increased in size since 4/2022 with asymmetrically dilated R iliac and feeding veins. She was evaluated by vascular team and was taken for R iliac vein stent placement and now on Eliquis. She was also evaluated by gynecologic oncology team as well radiation oncology team, unfortunately no intervention could be offered at this time.  She underwent PET scan on 12/2022 shows compared to PET/CT 6/28/2022, interval placement of a right iliac venous stent. Increased FDG uptake in the region of the right pelvic sidewall (SUV 13.3), difficult to delineate on CT or emission imaging if this is related to recent stent placement versus right iliac lymph node. No other definite sites of abnormal FDG uptake to suggest biologic tumor activity.  Given pt has locoregional recurrence disease w/ prior hx of RT, the next best regimen will be systemic therapy.  Since pt's cervical cancer has PDL1 of 70-80%, we offered her Carbo/Taxol + Keytruda based on the KEYNOTE-826 trial (pembrolizumab improved median PFS versus placebo (10.4 versus 8.2 months) and OS at 24 months (50% vs 40% in pts w/ PD-L1 CPS of =1).  In the KEYNOTE-826 trial, Bevacizumab was optional (2/3 of pts received) and given pt has possible thrombus in her iliac vein, we will hold off on giving bevacizumab.  We will also modified Carboplatin/Taxol to weekly dosing due to her age and fraility with carboplatin AUC2 and Taxol 80 mg/m2 D1 and D8 every 3 weeks.  Currently maintained on Keytruda. pt has not come to office since 2 months as she was recovering from shingles. Clinically stable  PLAN: Previous notes reviewed and all relevant laboratory and radiology result and were communicated to the patient and her daughter.Will hold treatment and evaluate disease status with rpt PET scan. - Hold  Keytruda today. CBC today reviewed and is adequate. Mild anemia essentially stable. ****Side effect profile of pembrolizumab were discussed with pt including but not limited to: anemia, fatigue, hyperglycemia, hyponatremia, hypoalbuminemia, itching, cough, nausea, rash, decreased appetite, hypertriglyceridemia, increased liver enzymes, hypocalcemia, constipation, diarrhea, upper respiratory tract infection and an immune-mediated reaction. Lab work will check for elevated liver enzymes and thyroid function. - Labwork today: CBC, CMP, TSH, Mg level   # Microcytic anemia: stable, related to chronic disease   # R iliac vein dilation secondary to necrotic R iliac lymph node w/ possible thrombosis s/p stent placement - Follow up with vascular as recommended.  - Continue Eliquis 5mg every 12 hours   # Rheumatoid arthritis -  MTX as per Rheumatologist.   RTC 3 weeks

## 2024-08-23 NOTE — HISTORY OF PRESENT ILLNESS
[Disease: _____________________] : Disease: [unfilled] [de-identified] : The patient is an 81 year old woman who presented with post-menopausal vaginal bleeding in March 2021.  Upon further work up, PAP smear on 3/18/21 showed: LSIL/AGC/HPV+. Pelvic US on 3/22/21 showed endometrium to be 4.2mm thick. 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  MRI pelvis on 4/3/21 showed signal abnormality in the posterior cervix 2.5cm x 1.6cm. The lesion does not go beyond the limit of the cervix.  She saw Dr. Echevarria on 4/14/21 and on his exam, she was noted to have a 4-5cm cervical mass involving at least the middle of the vaginal wall, circumferentially. No parametrial involvement. She was not deemed a surgical candidate.  Definitive chemoradiation was recommended. She is here to discuss radiation.  PET/CT: Pending  Pt C/O low back pain, for which she takes Tylenol. Vaginal bleeding is not very excessive. No SOB, Chest pain. Pt has a stress test next week  Patient feels some fatigue. Discussed the benefit vs risk of continuing vs stopping treatments as brought up by patient. [de-identified] : Squamous cell [de-identified] : 5/18/21 Pt is here for follow up. She has completed a PET scan. has moderate back pain  6/9/21 Pt is here for follow up. She has started RT last week. She also recd 1 dose of carboplatin last week. She did not have any nausea or vomiting. She has been getting diarrhea which she has been managing with imodium. Her bld sugar was also running high. No fever, mouth sores.  6/16/21 Pt is here for follow up. She is s/p 2 doses of Carbo. C/O feeling very fatigued. Had diarrhea which is controlled with imodium. No N, V. has abdominal discomfort.  6/23/2021 Pt is here to follow up for b=cervical cancer, accompanied by daughter She is on Carbo s/p 2 cycles, tolerating well She feels a lot better today She denies abdominal pain, nausea or vomiting She gets diarrhea but is managed with Imodium  7/1/2021 Pt is here to follow up for cervical cancer, accompanied by daughter She is on Carbo s/p 3 cycles, tolerating well She feels fine today, appetite is good She denies abdominal pain, nausea or vomiting, no vaginal bleeding/discharge She gets diarrhea, takes Imodium after having 7-8 episodes then gets constipation the following day  7/7/2021 Pt is here to follow up for cervical cancer, accompanied by daughter She is on weekly Carbo s/p  4 cycles, tolerating well She feels fine today, appetite is good She denies nausea or vomiting, no vaginal bleeding/discharge She c/o of low abdominal cramping and gets diarrhea 4-5x/day, managed with Imodium then gets constipation the following day  7/26/21 Pt is here for follow up, She is feeling better. Diarrhea has improved. NO vaginal bleeding  9/8/21 Pt is here for follow up. Has completed RT on 8/2/21. No vag bleeding, no pelvic pain.  12/21/21 Pt is here today for follow up visit for cervical cancer. She has completed chemo (7/2021)/Rad (8/2021). Pt denies any vaginal bleeding or abd/pelvic pain. She had a PET CT scan on 12/21/21 which showed FDG avid right external iliac lymph node, SUV max 10.0 measuring 1.0 cm, consistent with biological tumor activity. She saw Dr. Ragland and agreed to have SBRT since she is not surgical candidate. Simulation was done. She has an appt with Dr. Echevarria 1/12/22.  4/25/22 Pt is here for follow up. She had been C/O rectal bleeding over the past week. She went to ER and was treated with ABX. CT scan abdomen and pelvis reviwed ? proctoclitis. Symptoms have improved.Pt has appt with GI.  12/12/22 Pt is here for follow up.  She was recently admitted to hospital in November 2022 when she had severe RLE pain. She underwent CT AP in ED showing necrotic R iliac lymph node that increased in size since 4/2022 with asymmetrically dilated R iliac and feeding veins. She was evaluated by vascular team and she was taken for R iliac vein stent placement and now on eliquis. She was also evaluated by gynonc team as well radiation oncology team, unfortunately cannot offer any intervention at this time. She is doing much better since she was discharged from hospital, swelling of RLE significant improved.  She still experiences R groin pain which she takes Percocet.  Otherwise, she denies fever, chill, SOB, CP, N/V/D.   1/12/2023 Pt presents to clinic for follow up and cycle#2 of Carbo/taxol & Keytruda accompanied by daughter.  With her first cycle she had diarrhea & fatigue for two days that improved with Imodium. She has lost 20 lbs over last months. Her daughters are taking care of her  diet.  Her leg swelling has been subsiding well and she is requiring lesser pain meds for her leg pain.   1/31/23 Pt is here for follow up. C/O pain in her back since a week or so. Leg swelling and pain has resolved. C/O significant fatigue  3/2/23 Pt is here for follow up. Feels well mostly except for fatigue. Becomes confused post chemo ? d/t steroids. Swelling in LE has resolved. Tolerating AC well. Had one episode of rectal bleed which resolved likely d/t hemorrhoids, pt was constipated  3/20/23 Pt is here for follow up. Feels well mostly except for fatigue. Becomes confused post chemo ? d/t steroids which persisted for about 3 days post chemo.  Swelling in LE has resolved. Tolerating AC well. Had one episode of rectal bleed which resolved likely d/t hemorrhoids She completed PET scan on 3/9/23.  She is s/p 4 cycles of Carboplatin/Taxol.Keytruda.   4/10/23 Pt is here for follow up. Feels well mostly except for fatigue. Swelling in LE has resolved. Tolerating AC well. Had one episode of rectal bleed previously which resolved likely d/t hemorrhoids She completed PET scan on 3/9/23.  She is s/p 4 cycles of Carboplatin/Taxol/Keytruda which was switched to Keytruda alone starting cycle 5.  She complains of joint pain in b/l hands and left ankle, associated with L ankle swelling. She has hx of RA (was on MTX which has been on hold since chemo started).   5/1/23 Pt is here for follow up. Swelling in LE has resolved. Tolerating AC well. Had one episode of rectal bleed previously which resolved likely d/t hemorrhoids She completed PET scan on 3/9/23.  She is s/p 4 cycles of Carboplatin/Taxol/Keytruda which was switched to Keytruda alone starting cycle 5.  She complains of joint pain in b/l hands and left ankle, associated with L ankle swelling. She has hx of RA (was on MTX which has been on hold since chemo started). She still has persistent joint pain, stiffness and is affecting her quality of life.  She saw Dr Grant ([facility]) and was started on Prednisone 10mg qd, Hydroxychlorquine 400mg daily, and Tramadol prn. She feels symptoms are still uncontrolled.   5/22/23 Patient presents to clinic for follow up accompanied by her daughter. She appears to be in acute RA flare. She has stiffness and pain in her back, shoulders and small joints of hands. Daughter reported that she had a MRI done of cervical spine that showed nerve impingement in the cervical spine She is taking MTX, Plaquenil and advil 200 mg TiD with minimal relief. We spoke to her about giving her a break from immunotherapy till her pain improves and she agreed.  6/12/23 Patient presents to clinic for follow up accompanied by her daughter. Daughter reported that she had a MRI done of cervical spine that showed nerve impingement in the cervical spine She is taking MTX, Plaquenil and advil 200 mg TiD, and prednisone.  Her Keytruda was held last cycle, due to her RA flare. She is due for C#8 today.  She feels much better today, only complains of joint pain in b/l hands and neuropathy.  She also feels fatigued.  She completed PET scan on 6/9/23.   7/3/23 Pt is here for follow up. Feels well.  The joint pain is better  7/24/23 Patient presents for follow up accompanied by her daughter She is feeling well. Her RA has improved on medications. She is taking MTX, Plaquenil and prednisone  She feels well and is scheduled for cycle # 10 of Keytruda today.   8/14/23 Pt is here for follow up. Feels fairly well overall. Has C/O diarrhea occasionally and sometimes she is constipated. No pelvic pain or vag bleeding compliant with AC  9/26/23 Patient is here to follow up for cervical cancer, accompanied by her daughter. She is on Keytruda, tolerating well. She feels well, appetite is good. She denies abdominal pain, nausea, vomiting or abnormal vaginal bleeding/discharge. She is compliant with AC. She had seen Dr Ragland today.  10/17/23 Patient is here to follow up for cervical cancer, accompanied by her daughter. She is on Keytruda, tolerating well. She feels well, appetite is good. She denies abdominal pain, nausea, vomiting or abnormal vaginal bleeding/discharge. Her diarrhea is manageable with Imodium. She is compliant with AC.  11/09/23 Patient is here to follow up for cervical cancer, accompanied by her daughter. She is on Keytruda, tolerating well. She feels well, appetite is good., but endorses feeling tired s/p right eye cataract surgery on 11/7/2023 She denies abdominal pain, nausea, vomiting or abnormal vaginal bleeding/discharge. Her diarrhea is manageable with Imodium, no episodes for 3 days now. She is compliant with AC.  12/26/23 Patient is here to follow up for cervical cancer, accompanied by her daughter. She is on Keytruda, tolerating well. She is not feeling well as her blood glucose has been elevated. She denies abdominal pain, nausea, vomiting or abnormal vaginal bleeding/discharge, no shortness of breath, chest pain, headache of dizziness. She had received her flu vaccine. She is compliant with Eliquis, denies any bleeding or bruising. She is compliant with her RA meds, on prednisone 5 mg daily and MTX.  1/16/24 Pt is here for follow up. Feels well. RA controlled on current meds  2/6/24 Pt is here for follow up. Feels well. No diarrhea, rash,  2/27/24 Pt is here for follow up. Feels well except some fatigue. Remains on MTX, Dced prednisone. Compliant with AC  3/20/24 Patient feels some fatigue Family member reports patient is taking MTX and prednisone. Patient wanted to discuss risks and benefits of stopping therapy, chose to continue  4/26/24 Patient is here to follow up for cervical cancer, accompanied by her daughter. She was recently in ER due to urinary discomfort; discovered to have UTI and completing abx.   She remains compliant with Eliquis every 12 hours.   Reviewed most recent CBC, which shows slightly microcytic anemia with hgb 10.6g/dL.   Patient denies skin rash, abdominal/pelvic discomfort, new cough, persistent diarrhea, dyspnea or bleeding. She is due for cycle 21 of Keytruda.    5/16/24: Pt is here today for follow up visit for cervical cancer.  Pt denies any vaginal bleeding or abd/pelvic pain, skin rash, new cough, persistent diarrhea or dyspnea. CBC reviewed with stable HGB/ HCT is 11.2/35.5, Low MCV 76.2. Patient will proceed with Opdivo today.  8/22/24 Pt is here for follow up. She had H.zoster in 5/24 and started getting better end of July. She had disease involvement on abdomen and back. Had significant fatigue after and AILYN all resolved. No pelvic pain, vag bleeding. She is on prednisone 6 mg daily, being tapered. She is on MTX but hydoxy chloroquine is on hold. She is off of Benazepril and Amlodipine. Remains on Eliquis

## 2024-08-25 NOTE — DISCHARGE NOTE PROVIDER - NSDCMRMEDTOKEN_GEN_ALL_CORE_FT
apixaban 5 mg oral tablet: 2 tab(s) orally every 12 hours    start date 21/23/22  end date 11/30/22  apixaban 5 mg oral tablet: 1 tab(s) orally every 12 hours     start date 12/01/22  end date 02/28/2023  atorvastatin 10 mg oral tablet: 1 tab(s) orally once a day (at bedtime)  folic acid 1 mg oral tablet: 1 tab(s) orally once a day  HumuLIN 70/30 KwikPen 70 units-30 units/mL subcutaneous suspension: subcutaneous 2 times a day -correction dose  methotrexate 2.5 mg oral tablet: 6 tab(s) orally once a week   apixaban 5 mg oral tablet: 2 tab(s) orally every 12 hours    start date 21/23/22  end date 11/30/22  apixaban 5 mg oral tablet: 1 tab(s) orally every 12 hours     start date 12/01/22  end date 02/28/2023  atorvastatin 10 mg oral tablet: 1 tab(s) orally once a day (at bedtime)  folic acid 1 mg oral tablet: 1 tab(s) orally once a day  HumuLIN 70/30 KwikPen 70 units-30 units/mL subcutaneous suspension: subcutaneous 2 times a day -correction dose  insulin glargine 100 units/mL subcutaneous solution: 15 unit(s) subcutaneous once a day (at bedtime)  methotrexate 2.5 mg oral tablet: 6 tab(s) orally once a week  NIFEdipine 30 mg oral tablet, extended release: 1 tab(s) orally once a day   amlodipine-benazepril 5 mg-20 mg oral capsule: 1 cap(s) orally once a day   apixaban 5 mg oral tablet: 2 tab(s) orally every 12 hours    start date 21/23/22  end date 11/30/22  apixaban 5 mg oral tablet: 1 tab(s) orally every 12 hours     start date 12/01/22  end date 02/28/2023  atorvastatin 10 mg oral tablet: 1 tab(s) orally once a day (at bedtime)  folic acid 1 mg oral tablet: 1 tab(s) orally once a day  HumuLIN 70/30 KwikPen 70 units-30 units/mL subcutaneous suspension: subcutaneous 2 times a day -correction dose  insulin glargine 100 units/mL subcutaneous solution: 15 unit(s) subcutaneous once a day (at bedtime)  methotrexate 2.5 mg oral tablet: 6 tab(s) orally once a week   amlodipine-benazepril 5 mg-20 mg oral capsule: 1 cap(s) orally once a day   atorvastatin 10 mg oral tablet: 1 tab(s) orally once a day (at bedtime)  folic acid 1 mg oral tablet: 1 tab(s) orally once a day  HumuLIN 70/30 KwikPen 70 units-30 units/mL subcutaneous suspension: subcutaneous 2 times a day -correction dose  insulin glargine 100 units/mL subcutaneous solution: 15 unit(s) subcutaneous once a day (at bedtime)  methotrexate 2.5 mg oral tablet: 6 tab(s) orally once a week   Afib amlodipine-benazepril 5 mg-20 mg oral capsule: 1 cap(s) orally once a day   apixaban 5 mg oral tablet: 1 tab(s) orally every 12 hours     start date 12/01/22  end date 02/28/2023  apixaban 5 mg oral tablet: 2 tab(s) orally every 12 hours     start date 11/23/22   end date 11/30/22   atorvastatin 10 mg oral tablet: 1 tab(s) orally once a day (at bedtime)  folic acid 1 mg oral tablet: 1 tab(s) orally once a day  HumuLIN 70/30 KwikPen 70 units-30 units/mL subcutaneous suspension: subcutaneous 2 times a day -correction dose  insulin glargine 100 units/mL subcutaneous solution: 15 unit(s) subcutaneous once a day (at bedtime)  methotrexate 2.5 mg oral tablet: 6 tab(s) orally once a week

## 2024-09-03 ENCOUNTER — APPOINTMENT (OUTPATIENT)
Dept: VASCULAR SURGERY | Facility: CLINIC | Age: 84
End: 2024-09-03
Payer: MEDICARE

## 2024-09-03 VITALS — BODY MASS INDEX: 31.41 KG/M2 | HEIGHT: 60 IN | WEIGHT: 160 LBS

## 2024-09-03 DIAGNOSIS — M79.89 OTHER SPECIFIED SOFT TISSUE DISORDERS: ICD-10-CM

## 2024-09-03 DIAGNOSIS — I82.409 ACUTE EMBOLISM AND THROMBOSIS OF UNSPECIFIED DEEP VEINS OF UNSPECIFIED LOWER EXTREMITY: ICD-10-CM

## 2024-09-03 PROCEDURE — 99213 OFFICE O/P EST LOW 20 MIN: CPT

## 2024-09-03 PROCEDURE — 93971 EXTREMITY STUDY: CPT

## 2024-09-03 NOTE — ASSESSMENT
[FreeTextEntry1] : 83 y/o female with RLE DVT and underwent right iliac vein thrombectomy and iliac vein stent on 11/21/22, had enlarged right inguinal lymph nodes.  Venous duplex showed patent right external iliac vein is patent with no evidence of thrombosis at this time.  The body of the stent is not visualized.  There is no evidence of acute deep venous thrombosis superficial thrombophlebitis all major veins are patent and compressible.  There is a Baker's cyst present in the popliteal fossa. She has followup with Oncology, advised to continue on low dose Elqiuis.  Follow up in 6 months time.

## 2024-09-03 NOTE — HISTORY OF PRESENT ILLNESS
[FreeTextEntry1] : 85 y/o female with h/o cervical cancer, and underwent chemo and still undergoing radiation, presented with right thigh and leg swelling, found to have DVT and underwent right iliac vein thrombectomy and iliac vein stent on 11/21/22, had enlarged right inguinal lymph nodes. She is doing well on eliquis and denies right lower extremity swelling.

## 2024-09-03 NOTE — DATA REVIEWED
[FreeTextEntry1] : I performed a venous duplex which was medically necessary to evaluate for DVT.  Limited study secondary to bowel gas and depth.  The external iliac vein on the right is patent with no evidence of thrombosis at this time.  The body of the stent is not visualized. There is no evidence of acute deep venous thrombosis superficial thrombophlebitis all major veins are patent and compressible with normal spectral waveform analysis.  There is a Baker's cyst in the popliteal fossa.

## 2024-09-19 RX ORDER — APIXABAN 2.5 MG/1
2.5 TABLET, FILM COATED ORAL TWICE DAILY
Qty: 60 | Refills: 0 | Status: ACTIVE | COMMUNITY
Start: 2024-09-19 | End: 1900-01-01

## 2024-09-19 RX ORDER — APIXABAN 2.5 MG/1
2.5 TABLET, FILM COATED ORAL TWICE DAILY
Qty: 180 | Refills: 3 | Status: ACTIVE | COMMUNITY
Start: 2024-09-19 | End: 1900-01-01

## 2024-09-21 ENCOUNTER — INPATIENT (INPATIENT)
Facility: HOSPITAL | Age: 84
LOS: 2 days | Discharge: HOME | DRG: 93 | End: 2024-09-24
Payer: MEDICARE

## 2024-09-21 VITALS
DIASTOLIC BLOOD PRESSURE: 77 MMHG | HEART RATE: 70 BPM | SYSTOLIC BLOOD PRESSURE: 180 MMHG | OXYGEN SATURATION: 99 % | RESPIRATION RATE: 18 BRPM

## 2024-09-21 DIAGNOSIS — R22.40 LOCALIZED SWELLING, MASS AND LUMP, UNSPECIFIED LOWER LIMB: Chronic | ICD-10-CM

## 2024-09-21 DIAGNOSIS — Z92.89 PERSONAL HISTORY OF OTHER MEDICAL TREATMENT: Chronic | ICD-10-CM

## 2024-09-21 DIAGNOSIS — R47.81 SLURRED SPEECH: ICD-10-CM

## 2024-09-21 LAB
ALBUMIN SERPL ELPH-MCNC: 3.9 G/DL — SIGNIFICANT CHANGE UP (ref 3.5–5.2)
ALP SERPL-CCNC: 65 U/L — SIGNIFICANT CHANGE UP (ref 30–115)
ALT FLD-CCNC: 12 U/L — SIGNIFICANT CHANGE UP (ref 0–41)
ANION GAP SERPL CALC-SCNC: 10 MMOL/L — SIGNIFICANT CHANGE UP (ref 7–14)
APTT BLD: 37 SEC — SIGNIFICANT CHANGE UP (ref 27–39.2)
AST SERPL-CCNC: 18 U/L — SIGNIFICANT CHANGE UP (ref 0–41)
BASOPHILS # BLD AUTO: 0.02 K/UL — SIGNIFICANT CHANGE UP (ref 0–0.2)
BASOPHILS NFR BLD AUTO: 0.4 % — SIGNIFICANT CHANGE UP (ref 0–1)
BILIRUB SERPL-MCNC: 0.2 MG/DL — SIGNIFICANT CHANGE UP (ref 0.2–1.2)
BUN SERPL-MCNC: 24 MG/DL — HIGH (ref 10–20)
CALCIUM SERPL-MCNC: 8.8 MG/DL — SIGNIFICANT CHANGE UP (ref 8.4–10.5)
CHLORIDE SERPL-SCNC: 93 MMOL/L — LOW (ref 98–110)
CO2 SERPL-SCNC: 26 MMOL/L — SIGNIFICANT CHANGE UP (ref 17–32)
CREAT SERPL-MCNC: 0.9 MG/DL — SIGNIFICANT CHANGE UP (ref 0.7–1.5)
EGFR: 63 ML/MIN/1.73M2 — SIGNIFICANT CHANGE UP
EOSINOPHIL # BLD AUTO: 0.15 K/UL — SIGNIFICANT CHANGE UP (ref 0–0.7)
EOSINOPHIL NFR BLD AUTO: 2.8 % — SIGNIFICANT CHANGE UP (ref 0–8)
GLUCOSE BLDC GLUCOMTR-MCNC: 156 MG/DL — HIGH (ref 70–99)
GLUCOSE SERPL-MCNC: 71 MG/DL — SIGNIFICANT CHANGE UP (ref 70–99)
HCT VFR BLD CALC: 37.5 % — SIGNIFICANT CHANGE UP (ref 37–47)
HGB BLD-MCNC: 11.4 G/DL — LOW (ref 12–16)
IMM GRANULOCYTES NFR BLD AUTO: 0.4 % — HIGH (ref 0.1–0.3)
INR BLD: 1.22 RATIO — SIGNIFICANT CHANGE UP (ref 0.65–1.3)
LYMPHOCYTES # BLD AUTO: 0.93 K/UL — LOW (ref 1.2–3.4)
LYMPHOCYTES # BLD AUTO: 17.4 % — LOW (ref 20.5–51.1)
MCHC RBC-ENTMCNC: 24.7 PG — LOW (ref 27–31)
MCHC RBC-ENTMCNC: 30.4 G/DL — LOW (ref 32–37)
MCV RBC AUTO: 81.3 FL — SIGNIFICANT CHANGE UP (ref 81–99)
MONOCYTES # BLD AUTO: 0.84 K/UL — HIGH (ref 0.1–0.6)
MONOCYTES NFR BLD AUTO: 15.7 % — HIGH (ref 1.7–9.3)
NEUTROPHILS # BLD AUTO: 3.4 K/UL — SIGNIFICANT CHANGE UP (ref 1.4–6.5)
NEUTROPHILS NFR BLD AUTO: 63.3 % — SIGNIFICANT CHANGE UP (ref 42.2–75.2)
NRBC # BLD: 0 /100 WBCS — SIGNIFICANT CHANGE UP (ref 0–0)
PLATELET # BLD AUTO: 233 K/UL — SIGNIFICANT CHANGE UP (ref 130–400)
PMV BLD: 10.6 FL — HIGH (ref 7.4–10.4)
POTASSIUM SERPL-MCNC: 4 MMOL/L — SIGNIFICANT CHANGE UP (ref 3.5–5)
POTASSIUM SERPL-SCNC: 4 MMOL/L — SIGNIFICANT CHANGE UP (ref 3.5–5)
PROT SERPL-MCNC: 6 G/DL — SIGNIFICANT CHANGE UP (ref 6–8)
PROTHROM AB SERPL-ACNC: 13.9 SEC — HIGH (ref 9.95–12.87)
RBC # BLD: 4.61 M/UL — SIGNIFICANT CHANGE UP (ref 4.2–5.4)
RBC # FLD: 15 % — HIGH (ref 11.5–14.5)
SODIUM SERPL-SCNC: 129 MMOL/L — LOW (ref 135–146)
TROPONIN T, HIGH SENSITIVITY RESULT: 18 NG/L — HIGH (ref 6–13)
WBC # BLD: 5.36 K/UL — SIGNIFICANT CHANGE UP (ref 4.8–10.8)
WBC # FLD AUTO: 5.36 K/UL — SIGNIFICANT CHANGE UP (ref 4.8–10.8)

## 2024-09-21 PROCEDURE — 70450 CT HEAD/BRAIN W/O DYE: CPT | Mod: 26,59,MC

## 2024-09-21 PROCEDURE — 83735 ASSAY OF MAGNESIUM: CPT

## 2024-09-21 PROCEDURE — 92526 ORAL FUNCTION THERAPY: CPT | Mod: GN

## 2024-09-21 PROCEDURE — 90471 IMMUNIZATION ADMIN: CPT

## 2024-09-21 PROCEDURE — 97110 THERAPEUTIC EXERCISES: CPT | Mod: GP

## 2024-09-21 PROCEDURE — 85025 COMPLETE CBC W/AUTO DIFF WBC: CPT

## 2024-09-21 PROCEDURE — 87086 URINE CULTURE/COLONY COUNT: CPT

## 2024-09-21 PROCEDURE — 82962 GLUCOSE BLOOD TEST: CPT

## 2024-09-21 PROCEDURE — 97162 PT EVAL MOD COMPLEX 30 MIN: CPT | Mod: GP

## 2024-09-21 PROCEDURE — 93010 ELECTROCARDIOGRAM REPORT: CPT

## 2024-09-21 PROCEDURE — 36415 COLL VENOUS BLD VENIPUNCTURE: CPT

## 2024-09-21 PROCEDURE — 99285 EMERGENCY DEPT VISIT HI MDM: CPT | Mod: FS

## 2024-09-21 PROCEDURE — 93306 TTE W/DOPPLER COMPLETE: CPT

## 2024-09-21 PROCEDURE — 0042T: CPT | Mod: MC

## 2024-09-21 PROCEDURE — 92610 EVALUATE SWALLOWING FUNCTION: CPT | Mod: GN

## 2024-09-21 PROCEDURE — 97165 OT EVAL LOW COMPLEX 30 MIN: CPT | Mod: GO

## 2024-09-21 PROCEDURE — 84443 ASSAY THYROID STIM HORMONE: CPT

## 2024-09-21 PROCEDURE — 81001 URINALYSIS AUTO W/SCOPE: CPT

## 2024-09-21 PROCEDURE — 84145 PROCALCITONIN (PCT): CPT

## 2024-09-21 PROCEDURE — 70450 CT HEAD/BRAIN W/O DYE: CPT | Mod: MC

## 2024-09-21 PROCEDURE — 70496 CT ANGIOGRAPHY HEAD: CPT | Mod: 26,MC

## 2024-09-21 PROCEDURE — 83036 HEMOGLOBIN GLYCOSYLATED A1C: CPT

## 2024-09-21 PROCEDURE — 93005 ELECTROCARDIOGRAM TRACING: CPT

## 2024-09-21 PROCEDURE — 80053 COMPREHEN METABOLIC PANEL: CPT

## 2024-09-21 PROCEDURE — 80061 LIPID PANEL: CPT

## 2024-09-21 PROCEDURE — 99291 CRITICAL CARE FIRST HOUR: CPT

## 2024-09-21 PROCEDURE — 90662 IIV NO PRSV INCREASED AG IM: CPT

## 2024-09-21 PROCEDURE — 70498 CT ANGIOGRAPHY NECK: CPT | Mod: 26,MC

## 2024-09-21 PROCEDURE — 97116 GAIT TRAINING THERAPY: CPT | Mod: GP

## 2024-09-21 PROCEDURE — 70551 MRI BRAIN STEM W/O DYE: CPT | Mod: MC

## 2024-09-21 PROCEDURE — 84100 ASSAY OF PHOSPHORUS: CPT

## 2024-09-21 RX ORDER — ASPIRIN 325 MG
325 TABLET ORAL ONCE
Refills: 0 | Status: COMPLETED | OUTPATIENT
Start: 2024-09-21 | End: 2024-09-21

## 2024-09-21 RX ORDER — INSULIN GLARGINE 300 U/ML
8 INJECTION, SOLUTION SUBCUTANEOUS
Refills: 0 | DISCHARGE

## 2024-09-21 RX ORDER — PREDNISONE 5 MG/1
1 TABLET ORAL DAILY
Refills: 0 | Status: DISCONTINUED | OUTPATIENT
Start: 2024-09-21 | End: 2024-09-24

## 2024-09-21 RX ORDER — PROPRANOLOL HCL 80 MG
1 CAPSULE, EXTENDED RELEASE 24HR ORAL
Refills: 0 | DISCHARGE

## 2024-09-21 RX ORDER — ALCOHOL ANTISEPTIC PADS
25 PADS, MEDICATED (EA) TOPICAL ONCE
Refills: 0 | Status: DISCONTINUED | OUTPATIENT
Start: 2024-09-21 | End: 2024-09-24

## 2024-09-21 RX ORDER — GLIPIZIDE 5 MG/1
0.5 TABLET ORAL
Refills: 0 | DISCHARGE

## 2024-09-21 RX ORDER — ALCOHOL ANTISEPTIC PADS
12.5 PADS, MEDICATED (EA) TOPICAL ONCE
Refills: 0 | Status: DISCONTINUED | OUTPATIENT
Start: 2024-09-21 | End: 2024-09-24

## 2024-09-21 RX ORDER — INFLUENZA VIRUS VACCINE 15; 15; 15; 15 UG/.5ML; UG/.5ML; UG/.5ML; UG/.5ML
0.5 SUSPENSION INTRAMUSCULAR ONCE
Refills: 0 | Status: COMPLETED | OUTPATIENT
Start: 2024-09-21 | End: 2024-09-24

## 2024-09-21 RX ORDER — GLUCAGON INJECTION, SOLUTION 0.5 MG/.1ML
1 INJECTION, SOLUTION SUBCUTANEOUS ONCE
Refills: 0 | Status: DISCONTINUED | OUTPATIENT
Start: 2024-09-21 | End: 2024-09-24

## 2024-09-21 RX ORDER — PANTOPRAZOLE SODIUM 40 MG/1
40 TABLET, DELAYED RELEASE ORAL
Refills: 0 | Status: DISCONTINUED | OUTPATIENT
Start: 2024-09-21 | End: 2024-09-24

## 2024-09-21 RX ORDER — INSULIN GLARGINE 300 U/ML
8 INJECTION, SOLUTION SUBCUTANEOUS AT BEDTIME
Refills: 0 | Status: DISCONTINUED | OUTPATIENT
Start: 2024-09-21 | End: 2024-09-24

## 2024-09-21 RX ORDER — SODIUM CHLORIDE IRRIG SOLUTION 0.9 %
1000 SOLUTION, IRRIGATION IRRIGATION
Refills: 0 | Status: DISCONTINUED | OUTPATIENT
Start: 2024-09-21 | End: 2024-09-24

## 2024-09-21 RX ORDER — ATORVASTATIN CALCIUM 10 MG/1
80 TABLET, FILM COATED ORAL AT BEDTIME
Refills: 0 | Status: DISCONTINUED | OUTPATIENT
Start: 2024-09-21 | End: 2024-09-24

## 2024-09-21 RX ORDER — ALCOHOL ANTISEPTIC PADS
15 PADS, MEDICATED (EA) TOPICAL ONCE
Refills: 0 | Status: DISCONTINUED | OUTPATIENT
Start: 2024-09-21 | End: 2024-09-24

## 2024-09-21 RX ORDER — ATORVASTATIN CALCIUM 10 MG/1
1 TABLET, FILM COATED ORAL
Refills: 0 | DISCHARGE

## 2024-09-21 RX ORDER — PANTOPRAZOLE SODIUM 40 MG/1
1 TABLET, DELAYED RELEASE ORAL
Refills: 0 | DISCHARGE

## 2024-09-21 RX ORDER — APIXABAN 5 MG/1
2.5 TABLET, FILM COATED ORAL EVERY 12 HOURS
Refills: 0 | Status: DISCONTINUED | OUTPATIENT
Start: 2024-09-21 | End: 2024-09-24

## 2024-09-21 RX ORDER — ASPIRIN 325 MG
81 TABLET ORAL DAILY
Refills: 0 | Status: DISCONTINUED | OUTPATIENT
Start: 2024-09-22 | End: 2024-09-24

## 2024-09-21 RX ORDER — PREDNISONE 5 MG/1
1 TABLET ORAL
Refills: 0 | DISCHARGE

## 2024-09-21 RX ORDER — APIXABAN 5 MG/1
1 TABLET, FILM COATED ORAL
Refills: 0 | DISCHARGE

## 2024-09-21 RX ORDER — FOLIC ACID 1 MG/1
1 TABLET ORAL DAILY
Refills: 0 | Status: DISCONTINUED | OUTPATIENT
Start: 2024-09-21 | End: 2024-09-24

## 2024-09-21 RX ORDER — INSULIN LISPRO 100/ML
VIAL (ML) SUBCUTANEOUS
Refills: 0 | Status: DISCONTINUED | OUTPATIENT
Start: 2024-09-21 | End: 2024-09-24

## 2024-09-21 RX ADMIN — ATORVASTATIN CALCIUM 80 MILLIGRAM(S): 10 TABLET, FILM COATED ORAL at 22:23

## 2024-09-21 RX ADMIN — Medication 325 MILLIGRAM(S): at 19:04

## 2024-09-21 RX ADMIN — Medication 300 MILLIGRAM(S): at 23:05

## 2024-09-21 RX ADMIN — APIXABAN 2.5 MILLIGRAM(S): 5 TABLET, FILM COATED ORAL at 23:06

## 2024-09-21 RX ADMIN — INSULIN GLARGINE 8 UNIT(S): 300 INJECTION, SOLUTION SUBCUTANEOUS at 23:02

## 2024-09-21 RX ADMIN — Medication 2: at 23:03

## 2024-09-21 NOTE — PATIENT PROFILE ADULT - LEGAL HELP
Liliana Gongora  [address]  [facility] [address]            2/9/2023      Dear Liliana,    Recently you were referred to our Gastroenterology Department by your primary care provider for a colonoscopy.  I would like to share some important information about a colonoscopy. Colorectal cancer is a leading cause of cancer death in this country. Colorectal cancer can be stopped in its tracks or even prevented with a  colonoscopy.    Please call [phone] to schedule your procedure with one of our Gastroenterology providers at either [facility],  [facility], or  [facility].  If you already spoke to someone in the GI Department and have your procedure scheduled you can disregard this letter.     If you decide to have your colonoscopy elsewhere, please call us at [phone] with the information so that we can update your record.    Your good health is important to us, a colonoscopy is important for you.    Sincerely,      [facility]  [facility] [facility] [address]    
no

## 2024-09-21 NOTE — H&P ADULT - NSHPPHYSICALEXAM_GEN_ALL_CORE
CONSTITUTIONAL: NAD   EYES: PERRLA and symmetric, EOMI,  ENMT: Oral mucosa with moist membranes.   NECK: Supple  RESP:  CV:  GI: Soft, Non tender; BS present.   MSK:   SKIN: No rashes or ulcers noted;  NEURO: CONSTITUTIONAL: NAD   EYES: PERRLA and symmetric, EOMI,  ENMT: Oral mucosa with moist membranes.   NECK: Supple  RESP: Bilateral equal air entry ; normal vesicular breath sounds.   CV: S1 S2 , murmur present. No Pitting edema   GI: Soft, Non tender; BS present.   MSK: Grossly intact   SKIN: No rashes or ulcers noted;  NEURO: Normal motor and sensory  Pupil Reative , EOMI , no facial deviation,   gait not assessed

## 2024-09-21 NOTE — H&P ADULT - ATTENDING COMMENTS
Pt seen w resident and agree with above.  84-year-old female [place] Speaking with past medical history of  HTN, DM, RA on methotrexate, cervical cancer s/p chemotherapy and radiation on eliquis, s/p right venogram, right iliac venoplasty, venous stent placement, mechanical thrombectomy.  Pt had dyasarthria, brief episode yesterday and recurrence today that di dnot resolve.  + facial droop as well.  Pt was not a candidate for TNK.  Ct head did  not reveal any infarct, ct angio revealed some carotid stenosis. (awaiting official report) As per tele stroke and neuro. Q4hr neuro checks, asa, statin.  BP control. MRI.  neuro eval.  monitor in SDU.

## 2024-09-21 NOTE — ED ADULT NURSE NOTE - NSFALLUNIVINTERV_ED_ALL_ED
Bed/Stretcher in lowest position, wheels locked, appropriate side rails in place/Call bell, personal items and telephone in reach/Instruct patient to call for assistance before getting out of bed/chair/stretcher/Non-slip footwear applied when patient is off stretcher/[place] to call system/Physically safe environment - no spills, clutter or unnecessary equipment/Purposeful proactive rounding/Room/bathroom lighting operational, light cord in reach

## 2024-09-21 NOTE — ED PROVIDER NOTE - ATTENDING APP SHARED VISIT CONTRIBUTION OF CARE
Patient is an 84-year-old female who comes in for slurred speech since 2 PM.  Symptoms had occurred yesterday as well as transient and resolved after few minutes but today's persisting.  Denies any weakness elsewhere.    Exam: Left facial droop, speech assessment limited due to language barrier, normal gait, no focal weakness, well-appearing pleasant lady in no acute distress  Plan: Labs, stroke code activated, CT imaging, discuss with neuro    Number of diagnoses or management options:  [x] Referral or consultation made: telestroke    Complexity of data reviewed:  [x] Decision made to obtain old record(s) or additional history from the family, caretaker, or other source  [x] Laboratory test(s) ordered and/or reviewed  [ ] Independent interpretation of EKG by Dr. Dario Hawley:  [ ] Independent interpretation of Radiology by Dr. Dario Hawley:   [ ] I, Dario Hawley, had a discussion with    Risk (Management Options):  [ ] High: emergency surgery; monitored drug therapy; controlled parenteral therapy; decision for DNR Patient is an 84-year-old female who comes in for slurred speech since 2 PM.  Symptoms had occurred yesterday as well as transient and resolved after few minutes but today's persisting.  Denies any weakness elsewhere.    Exam: Left facial droop, speech assessment limited due to language barrier, normal gait, no focal weakness, well-appearing pleasant lady in no acute distress  Plan: Labs, stroke code activated, CT imaging, discuss with neuro    Number of diagnoses or management options:  [x] Referral or consultation made: telestroke, neuro    Complexity of data reviewed:  [x] Decision made to obtain old record(s) or additional history from the family, caretaker, or other source  [x] Laboratory test(s) ordered and/or reviewed  [ ] Independent interpretation of EKG by Dr. Dario Hawley:  [ ] Independent interpretation of Radiology by Dr. Dario Hawley:   [ ] I, Dario Hawley, had a discussion with    Risk (Management Options):  [ ] High: emergency surgery; monitored drug therapy; controlled parenteral therapy; decision for DNR Patient is an 84-year-old female who comes in for slurred speech since 2 PM.  Symptoms had occurred yesterday as well as transient and resolved after few minutes but today's persisting.  Denies any weakness elsewhere.    Exam: Left facial droop, speech assessment limited due to language barrier, normal gait, no focal weakness, well-appearing pleasant lady in no acute distress  Plan: Labs, stroke code activated, CT imaging, discuss with neuro    Number of diagnoses or management options:  [x] Referral or consultation made: telestroke, neuro    Complexity of data reviewed:  [x] Decision made to obtain old record(s) or additional history from the family, caretaker, or other source  [x] Laboratory test(s) ordered and/or reviewed  [ ] Independent interpretation of EKG by Dr. Dario Hawley:  [ ] Independent interpretation of Radiology by Dr. Dario Hawley:   [x] IDario, had a discussion with Radiologist about findings and TeleStroke about TNK ineligibility    Risk (Management Options):  [ ] High: emergency surgery; monitored drug therapy; controlled parenteral therapy; decision for DNR

## 2024-09-21 NOTE — ED PROVIDER NOTE - OBJECTIVE STATEMENT
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

## 2024-09-21 NOTE — ED ADULT NURSE NOTE - BEFAST LAST WELL KNOWN
"  Reason for Disposition   MODERATE-SEVERE itching (e.g., interferes with school, work, or sleep)    Answer Assessment - Initial Assessment Questions  1. SYMPTOM: "What's the main symptom you're concerned about?" (e.g., rash, itching, swelling, dryness)     Vaginal itching internal and external- candida found on UA. Sx started Sunday. Afeb. No vag bleeding no abd pain   2. LOCATION: "Where is the  _______ located?" (e.g., inside/outside, left/right)    As above  3. ONSET: "When did the  ________  start?"    3/25  4. PAIN: "Is there any pain?" If so, ask: "How bad is it?" (Scale: 1-10; mild, moderate, severe)    -  MILD (1-3): doesn't interfere with normal activities     -  MODERATE (4-7): interferes with normal activities (e.g., work or school) or awakens from sleep      -  SEVERE (8-10): excruciating pain, unable to do any normal activities    No   5. CAUSE: "What do you think is causing the symptoms?"     Vag yeast   6. OTHER SYMPTOMS: "Do you have any other symptoms?" (e.g., fever, vaginal bleeding, pain with urination)    No pain   7. MAGGIE: "What date are you expecting to deliver?"     9/2/2019   8. PREGNANCY: "How many weeks pregnant are you?"     17 wks    Protocols used: ST PREGNANCY - VULVAR SYMPTOMS-A-AH  Mom called re Diflucan for yeast. Parent states that she is a RN. Spoke with dr loco will call in med now. Pt notified. Call pharmacy to make sure it is ready before you go to pharmacy.   [facility] [phone]   " Known

## 2024-09-21 NOTE — H&P ADULT - NSHPLABSRESULTS_GEN_ALL_CORE
Labs:                        11.4   5.36  )-----------( 233      ( 21 Sep 2024 18:04 )             37.5     09-21    129[L]  |  93[L]  |  24[H]  ----------------------------<  71  4.0   |  26  |  0.9    Ca    8.8      21 Sep 2024 18:04    TPro  6.0  /  Alb  3.9  /  TBili  0.2  /  DBili  x   /  AST  18  /  ALT  12  /  AlkPhos  65  09-21      Culture - Urine (collected 06-19-24 @ 19:50)  Source: Clean Catch None  Final Report (06-21-24 @ 06:39):    <10,000 CFU/mL Normal Urogenital Sandra    Urinalysis with Rflx Culture (collected 06-19-24 @ 19:50)    Culture - Urine (collected 06-12-24 @ 15:00)  Source: Clean Catch Clean Catch (Midstream)  Final Report (06-14-24 @ 07:46):    <10,000 CFU/mL Normal Urogenital Sandra    Culture - Urine (collected 05-05-24 @ 12:03)  Source: Clean Catch Clean Catch (Midstream)  Final Report (05-06-24 @ 15:49):    >=3 organisms. Probable collection contamination.    Culture - Blood (collected 11-22-22 @ 11:39)  Source: .Blood None  Final Report (11-27-22 @ 23:00):    No Growth Final    Culture - Urine (collected 10-07-22 @ 20:07)  Source: Clean Catch Clean Catch (Midstream)  Final Report (10-11-22 @ 15:18):    10,000 - 49,000 CFU/mL Proteus mirabilis  Organism: Proteus mirabilis (10-11-22 @ 15:18)  Organism: Proteus mirabilis (10-11-22 @ 15:18)    Sensitivities:      Method Type: CARMEN      -  Amikacin: S <=16      -  Amoxicillin/Clavulanic Acid: S <=8/4      -  Ampicillin: S <=8 These ampicillin results predict results for amoxicillin      -  Ampicillin/Sulbactam: S <=4/2 Enterobacter, Klebsiella aerogenes, Citrobacter, and Serratia may develop resistance during prolonged therapy (3-4 days)      -  Aztreonam: S <=4      -  Cefazolin: S <=2 For uncomplicated UTI with K. pneumoniae, E. coli, or P. mirablis: CARMEN <=16 is sensitive and CARMEN >=32 is resistant. This also predicts results for oral agents cefaclor, cefdinir, cefpodoxime, cefprozil, cefuroxime axetil, cephalexin and locarbef for uncomplicated UTI. Note that some isolates may be susceptible to these agents while testing resistant to cefazolin.      -  Cefepime: S <=2      -  Cefoxitin: S <=8      -  Ceftriaxone: S <=1 Enterobacter, Klebsiella aerogenes, Citrobacter, and Serratia may develop resistance during prolonged therapy      -  Ciprofloxacin: S <=0.25      -  Ertapenem: S <=0.5      -  Gentamicin: S <=2      -  Levofloxacin: S <=0.5      -  Meropenem: S <=1      -  Nitrofurantoin: R >64 Should not be used to treat pyelonephritis      -  Piperacillin/Tazobactam: S <=8      -  Tobramycin: S <=2      -  Trimethoprim/Sulfamethoxazole: S <=0.5/9.5      Creatinine: 0.9 mg/dL (09-21-24 @ 18:04)    WBC Count: 5.36 K/uL (09-21-24 @ 18:04)  Alkaline Phosphatase: 65 U/L (09-21-24 @ 18:04)  Alanine Aminotransferase (ALT/SGPT): 12 U/L (09-21-24 @ 18:04)  Aspartate Aminotransferase (AST/SGOT): 18 U/L (09-21-24 @ 18:04)  Bilirubin Total: 0.2 mg/dL (09-21-24 @ 18:04)

## 2024-09-21 NOTE — ED ADULT TRIAGE NOTE - CHIEF COMPLAINT QUOTE
as per daughter pt had non sensible speech yesterday at 2pm that resolved, then again today at  2pm. pt noted to have L sided facial droop on triage, stroke code called

## 2024-09-21 NOTE — PATIENT PROFILE ADULT - FALL HARM RISK - HARM RISK INTERVENTIONS

## 2024-09-21 NOTE — ED ADULT NURSE NOTE - CCCP TRG CHIEF CMPLNT
Complaint/Presenting Problem:    Elderly woman presenting to our practice for management of multiple myeloma diagnosed and treated elsewhere up to this time.    History Of Present Illness    Luli is a 70 year old female with history of hypertension, diabetes mellitus, hyperthyroidism treated with radioactive iodine, chronic kidney disease (stage III), osteoarthritis, sleep apnea on CPAP and multiple myeloma diagnosed in May 2018.  At the time, she was anemic, hypercalcemic with rising creatinine and a bone marrow biopsy revealed 12% plasma cell.  Under the care of, oncologist Dr. Lynne, she was treated with CyBorD for induction from January to May of 2020. She received two cycles of CyBorD for maintenance. She was deemed to be a non transplant candidate. Because of renal disease and neuropathy, no adjuvant therapy was continued. She was kept on Xjeva, however, until recently when it was held because she is undergoing dental work. A PET scan done on 4/9/2022 revealed  no pathologic hypermetabolism identified. Cardiomegaly, enlarged pulmonary trunk believed to be due to pulmonary hypertension and renal hypodense lesions that could represent cysts but other diagnosis were not excluded.  In 2022 she moved to our facility and wanted to continue care for her myeloma in our center.    I saw her first on June 24, 2022, she reported chronic dyspnea on exertion and long-term problems to ambulate due to unsteady knees for what she was using a walker.  She reported numbness and tingling in both lower extremities attributed to known peripheral neuropathy.  She denied fever chills night sweats or changes in body weight.  She was undergoing dental care for what denosumab was on hold. CBC revealed WBC 5.8K/mcl, hgb 10 g/dl and platelets 253K/mcl. PTH was high at 156. Plasma cell profile showed elevated Kappa/Lambda free chain ratio. No monoclonal gammopathy was identified SPEP was normal.  CMP showed creatinine 3.21 mg/dl,  normal electrolytes and normal LFTs. Ca was 10.1 mg/dl. She was kept off maintenance treatment and instructed to return in three months.     She was seen again on September 29, 2022, for follow-up.  She denied new medical problems except for chronic mild back pain that was being addressed by her PCP, Dr Tidwell. She continued been seen closely by her nephrologist Dr. Maurisio Todd. Maintenance treatment for Myeloma continued on hold. Recent blood work showed stable total IGG, persistent elevation of kappa/lambda ratio that had not changed since June 2022 and no monoclonal proteins. A watchful waiting approach was maintained.     She returns today, 5/11/23, for follow up. She feels well and denies new medical problems. She continues seeing her nephrologist, Dr Todd who is treating her for secondary hyperparathyroidism. She sees also regularly Dr Tidwell, her primary PCP, who told her she has osteoporosis and needs vit D intake. Recent blood work shows normal WBC 5.8K/mcl, hgb 10.4 g/dl and platelets 278K/cml. New plasma cell profile shows stable mildly high free light chain ratio at 3.42 and no evidence of monoclonal gammopathies. Creatinine is up at 2.83. Ca is normal at 10.1. PTH was high at 1051pg/ml.   August 10, 2023.  Seen in follow-up for multiple myeloma.  She denies any significant bone or back pain.  Her complaints are related to occasional episodes of right hip pain and pain in the buttock region.  She takes Tylenol for the right hip pain with good relief.  She does complain of paresthesias in both hands but denies any significant paresthesias in her lower extremities.  She has no difficulty in getting up out of a chair and ambulates with the aid of a walker.      Staging multiple myeloma: Stage III upon diagnosis      Patient Active Problem List   Diagnosis   • CPAP (continuous positive airway pressure) dependence   • CKD (chronic kidney disease) stage 4, GFR 15-29 ml/min (CMD)   • Type 2 diabetes  mellitus without complication, with long-term current use of insulin (CMD)   • Glaucoma   • Multiple myeloma (CMD)   • Hypothyroidism   • Hypertension   • Dyslipidemia   • Diabetic polyneuropathy associated with type 2 diabetes mellitus (CMD)         Patient's Medications   New Prescriptions    No medications on file   Previous Medications    ALLOPURINOL (ZYLOPRIM) 100 MG TABLET    TAKE 2 TABLETS BY MOUTH DAILY    AMLODIPINE (NORVASC) 5 MG TABLET    TAKE 1 TABLET BY MOUTH DAILY    ATORVASTATIN (LIPITOR) 40 MG TABLET    daily.    B COMPLEX-C ER PO        BRIMONIDINE (ALPHAGAN P) 0.15 % OPHTHALMIC SOLUTION    Instill 1 drop every day by ophthalmic route.    BRIMONIDINE (ALPHAGAN) 0.2 % OPHTHALMIC SOLUTION    INT 1 GTT IN OU BID    CALCITRIOL (ROCALTROL) 0.25 MCG CAPSULE    3 times a week    CALCITRIOL (ROCALTROL) 0.25 MCG CAPSULE    Take 0.25 mcg by mouth.    CARVEDILOL (COREG) 25 MG TABLET    Take 25 mg by mouth in the morning and 25 mg in the evening. Take with meals.    CLONIDINE (CATAPRES) 0.2 MG TABLET    Take 1 tablet every day by oral route at bedtime.    DENOSUMAB (XGEVA) 120 MG/1.7ML SOLUTION    Inject 120 mg into the skin every 30 days.    DORZOLAMIDE-TIMOLOL (COSOPT) 22.3-6.8 MG/ML OPHTHALMIC SOLUTION    Place 1 drop into both eyes 2 times daily.    DULAGLUTIDE (TRULICITY) 1.5 MG/0.5ML PEN-INJECTOR    Inject 1.5 mg into the skin every 7 days.    FUROSEMIDE (LASIX) 80 MG TABLET    Take 80 mg by mouth 2 times daily.    INSULIN DEGLUDEC (TRESIBA FLEXTOUCH) 100 UNIT/ML PEN-INJECTOR    Inject 40 Units into the skin daily. Prime 2 units before each dose.    INSULIN DETEMIR (LEVEMIR FLEXPEN) 100 UNIT/ML PEN-INJECTOR    Inject 20 units every day by subcutaneous route at bedtime.    INSULIN PEN NEEDLE 32G X 4 MM MISC    Use daily to inject basal insulin    KETOCONAZOLE (NIZORAL) 2 % CREAM    APPLY TOPICALLY TO THE AFFECTED AREA TWICE DAILY FOR 14 DAYS AS DIRECTED    KETOCONAZOLE (NIZORAL) 2 % CREAM    every 12  hours.    LANCETS (ONETOUCH DELICA PLUS [id number]) MISC    CHECK BLOOD SUGAR THREE TIMES DAILY    LATANOPROST (XALATAN) 0.005 % OPHTHALMIC SOLUTION    Place 1 drop into both eyes at bedtime.    LEVOTHYROXINE 100 MCG TABLET    Take 1 tablet every day by oral route.    LEVOTHYROXINE 125 MCG TABLET    TAKE 1 TABLET BY MOUTH 6 DAYS A WEEK. SKIP SUNDAYS    LOSARTAN (COZAAR) 100 MG TABLET    TAKE 1 TABLET(100 MG) BY MOUTH EVERY DAY    METOPROLOL SUCCINATE (TOPROL-XL) 100 MG 24 HR TABLET    Take 1 tablet every day by oral route.    NITROGLYCERIN (NITROSTAT) 0.4 MG SUBLINGUAL TABLET    nitroglycerin 0.4 mg sublingual tablet   Place 1 tablet by sublingual route as needed.    ONETOUCH VERIO TEST STRIP    3 times daily. Test blood sugar 3 times daily    PYRIDOXINE (B-6) 100 MG TABLET    Take 100 mg by mouth.    TRAVOPROST, BENZALKONIUM FREE, (TRAVATAN Z) 0.004 % OPHTHALMIC SOLUTION       Modified Medications    No medications on file   Discontinued Medications    No medications on file        Treatment Related Toxicites:     Severe peripheral neuropathy likely consequence of underlying diabetes mellitus and previous treatment with Velcade       ALLERGIES:  Patient has no known allergies.      Past Surgical History:   Procedure Laterality Date   • Cataract extrac w/ intraocular lens imp&ant vit,bilaterl Left 12/18/2020   • Eye surgery Left 07/19/2019    Glaucoma valve implant   • Hernia repair     • Removal gallbladder     • Tubal ligation          Family History  No family history on file.       Social History     Tobacco Use   • Smoking status: Never   • Smokeless tobacco: Never   Vaping Use   • Vaping Use: never used   Substance Use Topics   • Alcohol use: Not Currently     Comment: rare   • Drug use: Never         Review of Systems   Constitutional: Negative for activity change, appetite change, chills, diaphoresis, fatigue, fever and unexpected weight change.   HENT: Negative for congestion, hearing loss, mouth sores,  nosebleeds, postnasal drip, sore throat and tinnitus.    Eyes: Negative for visual disturbance.   Respiratory: Positive for shortness of breath ( On exertion). Negative for cough and wheezing.    Cardiovascular: Negative for chest pain, palpitations and leg swelling.   Gastrointestinal: Negative for abdominal distention, abdominal pain, blood in stool, constipation and diarrhea.   Endocrine: Negative for cold intolerance.   Genitourinary: Negative for difficulty urinating, hematuria, menstrual problem, pelvic pain and vaginal bleeding.   Musculoskeletal: Positive for arthralgias ( Mostly knees) and gait problem ( Use walker to ambulate). Negative for back pain.   Skin: Negative for color change, pallor, rash and wound.   Allergic/Immunologic: Negative for environmental allergies.   Neurological: Positive for numbness. Negative for dizziness, tremors, seizures, speech difficulty, weakness, light-headedness and headaches.        Known peripheral neuropathy with paresthesias in lower extremities and neuralgia at night   Hematological: Negative for adenopathy. Does not bruise/bleed easily.   Psychiatric/Behavioral: Negative for behavioral problems, confusion and sleep disturbance.         Last Recorded Vitals    Oncology Encounter Vitals [08/10/23 1453]   ONC OP Encounter Vitals Group      BP (!) 157/86      Heart Rate 71      Resp 16      Temp 97.2 °F (36.2 °C)      Temp src Temporal      SpO2 100 %      Weight 218 lb 11.1 oz (99.2 kg)      Height       Pain Score  0      Pain Location       Pain Education?       BSA (Calculated - m2) - [employer]       BSA (Calculated - sq m)       BMI (Calculated)        ECOG Performance Status   ECOG   ECOG Performance Status            Physical Exam  General: Alert and oriented. In no distress. Overweight  HEENT: Normal oral mucosa and conjunctiva. No jaundice. EOM are also normal. LOYDA  NECK: Supple. No lymphadenopathies. No Goiter. No JVD  CHEST: No tenderness, central  lines or superficial collateral veins are noted.   HEART: Regular heart sounds. No gallops or murmurs  LUNGS: Clear bilaterally. No pleural rubs. No pleuritic pain. No hemoptysis reported or noted  ABDOMEN: Soft. Bowel sound present. No organomegaly. No collateral veins in surface of abdominal wall or pelvis  : No flank tenderness. No suprapubic fullness or pain  EXTREMITIES: No edema noted in upper or lower extremities. No varicosities or prominent veins. No open skin lesions. No signs of chronic stasis, She uses walker to ambulate.  Marcelino's sign negative bilaterally  SKIN: No rashes, petechiae or ecchymoses. No lipodermatosclerosis  Neuro: No gross neurological deficits. Alert and oriented x 3. Cranial nerves are intact.  Muscle strength in the upper extremities is graded 5 out of 5.  Muscle strength in the lower extremities: 5 out of 5 right quadriceps; the left quadriceps is 4 out of 5.  Psych: No sings of depression, delusions or severe anxiety. Appropriate conversation. On no psychiatric medications.   Labs     Lab Services on 08/03/2023   Component Date Value Ref Range Status   • Sodium 08/03/2023 139  135 - 145 mmol/L Final   • Potassium 08/03/2023 4.1  3.4 - 5.1 mmol/L Final   • Chloride 08/03/2023 108  97 - 110 mmol/L Final   • Carbon Dioxide 08/03/2023 24  21 - 32 mmol/L Final   • Anion Gap 08/03/2023 11  7 - 19 mmol/L Final   • Glucose 08/03/2023 96  70 - 99 mg/dL Final   • BUN 08/03/2023 60 (H)  6 - 20 mg/dL Final   • Creatinine 08/03/2023 2.67 (H)  0.51 - 0.95 mg/dL Final   • Glomerular Filtration Rate 08/03/2023 19 (L)  >=60 Final    eGFR 15 - 29 mL/min/1.73 m2 = Severe decrease in kidney function. Stage 4 CKD (chronic kidney disease), or severe kidney disease. Estimated GFR calculated using the CKD-EPI-R (2021) equation that does not include race in the creatinine calculation.   • BUN/Cr 08/03/2023 22  7 - 25 Final   • Calcium 08/03/2023 10.6 (H)  8.4 - 10.2 mg/dL Final   • Bilirubin, Total  08/03/2023 0.8  0.2 - 1.0 mg/dL Final   • GOT/AST 08/03/2023 <5  <=37 Units/L Final   • GPT/ALT 08/03/2023 9  <64 Units/L Final   • Alkaline Phosphatase 08/03/2023 75  45 - 117 Units/L Final   • Albumin 08/03/2023 3.6  3.6 - 5.1 g/dL Final   • Protein, Total 08/03/2023 8.5 (H)  6.4 - 8.2 g/dL Final   • Globulin 08/03/2023 4.9 (H)  2.0 - 4.0 g/dL Final   • A/G Ratio 08/03/2023 0.7 (L)  1.0 - 2.4 Final   • Protein, Total 08/03/2023 7.7  6.4 - 8.2 g/dL Final   • Total Globulin 08/03/2023 3.8  2.1 - 4.2 g/dL Final   • Albumin 08/03/2023 3.9  3.5 - 4.9 g/dL Final   • Alpha 1 08/03/2023 0.4  0.2 - 0.4 g/dL Final   • Alpha 2 08/03/2023 0.9  0.5 - 0.9 g/dL Final   • Beta 08/03/2023 1.0  0.7 - 1.2 g/dL Final   • Gamma 08/03/2023 1.5  0.7 - 1.7 g/dL Final   • Kappa, Free 08/03/2023 13.64 (H)  0.33 - 1.94 mg/dL Final   • Lambda, Free 08/03/2023 4.34 (H)  0.57 - 2.63 mg/dL Final   • Kappa/ Lambda Ratio 08/03/2023 3.14 (H)  0.26 - 1.65 Final    In patients with chronic kidney disease or renal impairment, the reference range of kappa/lambda free light chain ratio is 0.37-3.10   • Serum Protein Electrophoresis Path* 08/03/2023   Essentially normal electrophoretic pattern.       Final   • Serum Immunofixation Pathology Int* 08/03/2023   No serum monoclonal protein identified.    Elevated kappa/lambda ratio, recommend urine immunofixation if clinically indicated.       Final   • Immunoglobulin G 08/03/2023 1,670 (H)  700 - 1,600 mg/dL Final   • Immunoglobulin A 08/03/2023 400  70 - 400 mg/dL Final   • Immunoglobulin M 08/03/2023 78  40 - 230 mg/dL Final   • WBC 08/03/2023 5.6  4.2 - 11.0 K/mcL Final   • RBC 08/03/2023 3.87 (L)  4.00 - 5.20 mil/mcL Final   • HGB 08/03/2023 10.4 (L)  12.0 - 15.5 g/dL Final   • HCT 08/03/2023 32.1 (L)  36.0 - 46.5 % Final   • MCV 08/03/2023 82.9  78.0 - 100.0 fl Final   • MCH 08/03/2023 26.9  26.0 - 34.0 pg Final   • MCHC 08/03/2023 32.4  32.0 - 36.5 g/dL Final   • RDW-CV 08/03/2023 16.3 (H)  11.0 -  15.0 % Final   • RDW-SD 08/03/2023 48.6  39.0 - 50.0 fL Final   • PLT 08/03/2023 244  140 - 450 K/mcL Final   • NRBC 08/03/2023 0  <=0 /100 WBC Final   • Neutrophil, Percent 08/03/2023 70  % Final   • Lymphocytes, Percent 08/03/2023 22  % Final   • Mono, Percent 08/03/2023 6  % Final   • Eosinophils, Percent 08/03/2023 1  % Final   • Basophils, Percent 08/03/2023 1  % Final   • Immature Granulocytes 08/03/2023 0  % Final   • Absolute Neutrophils 08/03/2023 3.9  1.8 - 7.7 K/mcL Final   • Absolute Lymphocytes 08/03/2023 1.2  1.0 - 4.0 K/mcL Final   • Absolute Monocytes 08/03/2023 0.4  0.3 - 0.9 K/mcL Final   • Absolute Eosinophils  08/03/2023 0.1  0.0 - 0.5 K/mcL Final   • Absolute Basophils 08/03/2023 0.0  0.0 - 0.3 K/mcL Final   • Absolute Immature Granulocytes 08/03/2023 0.0  0.0 - 0.2 K/mcL Final       Radiology Results:    DEXA SCAN AXIAL SKELETON  Narrative: EXAM: BD DEXA AXIAL SKELETON    CLINICAL INDICATION : Osteoporosis    Scan Unit: HOLOGIC (Discovery unit)    COMPARISON: None    Sites Analyzed: Lumbar spine (L-1 to L-4 ) & Left hip    FINDINGS:          LUMBAR SPINE:  T-SCORE: 0.9         LEFT HIP:       Femoral Neck:  T-SCORE:   -1.4           Total analyzed area: T-SCORE:    -0.6  Impression: Osteopenia. The 10 year fracture risk major osteoporotic  fracture is 3.8% and 10 year fracture risk hip fracture is 0.5%                               Follow up exam in 2-3 year(s) is suggested.    WORLD HEALTH ORGANIZATION CRITERIA FOR OSTEOPOROSIS    DIAGNOSTIC CATEGORY      T-SCORE  Normal.....................................................0 to - 1.0  Osteopenia..............................................-1.0 to -2.5  Osteoporosis..........................................Greater than -2.5 (no  history of fragility fractures)*  Established Osteoporosis...................Greater than -2.5 (with history  of fragility fractures)*    *FRAGILITY FRACTURES: VERTEBRAE, HUMERUS, WRIST, HIP (OVER 40 YEARS OF  AGE)    Electronically Signed by: MP PRESLEY M.D.   Signed on: 4/3/2023 2:34 PM   Workstation ID: [id number]        Pain Assessment Score: No active pain reported today  Current Pain Management:  Effective:    Counseling/Teaching?Time counseling/Time visit: 10/20 minutes spent to discuss rationale for recommended management. Understanding was verbalized and emotional support provided      Impression:    1.- Multiple Myeloma. ISS III on diagnosis. Treated with induction CyborD x cycles followed by 2 cycles of maintenance CyBorD. She was deemed not a transplant candidate. Maintenance therapy was held because of concurrent CKD and neuropathy. Since then, according to her in remission. PET scan earlier this year negative for hyperactive sites. Her care was transferred to our institution and I'm seeing her since June 2022 without signs of Myeloma progression. On denosumab every three months. To continue on it for now  2.- CKD. Being followed by nephrologist Dr Todd. Secondary hyperparathyroidism but normal Ca now. OK to proceed with oral vit D form hematological standpoint if believed necessary given recent diagnosis of osteoporosis.    3.- Osteoporosis. Ok to undergo treatment with oral vit K .      Plan (Problem-oriented)    1.- Reassurance. No need to restablish treatment for myeloma other than Xjade  2.- CBC, CMP and plasma cell profile before next office visit.   3.- To return in 6 weeks  4.  X-ray of the pelvis and lumbosacral spine        Elliott Livingston MD             code: stroke

## 2024-09-21 NOTE — H&P ADULT - ASSESSMENT
# TIA   # Moderate Stenosis of B/L Cavernous ICA  # Moderate stenosis Proximal Left ICA.   # Hypertension, Diabetes Mellitus Type 2:   # Right Iliac Vein stent on Eliquis :    # TIA   # Moderate Stenosis of B/L Cavernous ICA  # Moderate stenosis Proximal Left ICA.   # Hypertension, Diabetes Mellitus Type 2:   # Right Iliac Vein stent on Eliquis :     Plan:   Plan:  - Admit to Step Down   - q4 hour neurochecks  - Neurology consult  - Loaded with ASA, continue 81mg daily  - Will hold home hypertension medications.  - Lipid panel, TSH, and A1c level  - Echocardiogram  - SLP evaluation (passed dysphagia screen)  - PT evaluation, follow up recommendation  - OOB to chair as tolerated  - Daily incentive spirometer use  - Monitor electrolytes and replete as needed to maintain K of 4,, Mg of 2, and Phos of 3  - Follow up AM labs   # TIA   # Moderate Stenosis of B/L Cavernous ICA  # Moderate stenosis Proximal Left ICA.   # Hypertension, Diabetes Mellitus Type 2:   # Right Iliac Vein stent on Eliquis :   # Rheumatoid Arthritis     Plan:   Plan:  - Admit to Step Down   - q4 hour neuro checks  - Neurology consult  - Loaded with ASA, continue 81mg daily  - Continue eliquis 2.5 mg BID.   - Lipid panel, TSH, and A1c level  - Echocardiogram  - SLP evaluation   - PT evaluation, follow up recommendation  - OOB to chair as tolerated  - Continue Prednisone 1 mg OD ; As per daughter ; was planned to continue for 2 weeks.   - insulin Glargine 8 units and Sliding scale.   - Daily incentive spirometer use  - Monitor electrolytes and replete as needed to maintain K of 4,, Mg of 2, and Phos of 3  - Follow up AM labs  - MRI head >> MRI questionnaire done

## 2024-09-21 NOTE — H&P ADULT - HISTORY OF PRESENT ILLNESS
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     Labs:   WBC 5.36 K Hb 11.4 ( At baseline) Platelets 233 K   Na 129 K 4.0  Bicarbonate 26   BUN/Creatinine 24/0.9  AST/ALT 18/12   Troponin 18    Imaging:   CT Angio: No evidence of acute intracranial pathology.    CT PERFUSION:  No perfusion deficits to suggest areas of completed infarction or at risk   territory.    CTA HEAD/NECK:  Moderate stenosis involving the bilateral cavernous ICAs due to atherosclerotic calcification. Moderate stenosis of the proximal left internal carotid artery due to calcified atherosclerotic plaque.    s/p Aspirin 325 mg.    84-year-old female Gabonese Speaking with past medical history of  HTN, DM, RA on methotrexate, cervical cancer s/p chemotherapy and radiation on eliquis, s/p right venogram, right iliac venoplasty, venous stent placement, mechanical thrombectomy presents to emergency department for episodes of incoherent speech. 1st Episode was yesterday around 2 pm where she was lethargic and was speaking gibberish as per the daughter.. They laid her on the bed, glucose was 176 ;  systolic . At around 4 pm the episode resolved.   She had similar episode today where she was lethargic , forgot what happened during the day and was again speaking gibberish. No similar episodes on the past.   Follows Dr. Yan .     In the ED; Vitals : /64 mmHg ; Hr 66 ; RR 18 ; Spo2 95% on RA.     Labs:   WBC 5.36 K Hb 11.4 ( At baseline) Platelets 233 K   Na 129 K 4.0  Bicarbonate 26   BUN/Creatinine 24/0.9  AST/ALT 18/12   Troponin 18    Imaging:   CT Angio: No evidence of acute intracranial pathology.    CT PERFUSION:  No perfusion deficits to suggest areas of completed infarction or at risk   territory.    CTA HEAD/NECK:  Moderate stenosis involving the bilateral cavernous ICAs due to atherosclerotic calcification. Moderate stenosis of the proximal left internal carotid artery due to calcified atherosclerotic plaque.    s/p Aspirin 325 mg.

## 2024-09-22 LAB
ALBUMIN SERPL ELPH-MCNC: 3.7 G/DL — SIGNIFICANT CHANGE UP (ref 3.5–5.2)
ALP SERPL-CCNC: 66 U/L — SIGNIFICANT CHANGE UP (ref 30–115)
ALT FLD-CCNC: 12 U/L — SIGNIFICANT CHANGE UP (ref 0–41)
ANION GAP SERPL CALC-SCNC: 10 MMOL/L — SIGNIFICANT CHANGE UP (ref 7–14)
APPEARANCE UR: CLEAR — SIGNIFICANT CHANGE UP
AST SERPL-CCNC: 16 U/L — SIGNIFICANT CHANGE UP (ref 0–41)
BASOPHILS # BLD AUTO: 0.02 K/UL — SIGNIFICANT CHANGE UP (ref 0–0.2)
BASOPHILS NFR BLD AUTO: 0.3 % — SIGNIFICANT CHANGE UP (ref 0–1)
BILIRUB SERPL-MCNC: 0.4 MG/DL — SIGNIFICANT CHANGE UP (ref 0.2–1.2)
BILIRUB UR-MCNC: NEGATIVE — SIGNIFICANT CHANGE UP
BUN SERPL-MCNC: 19 MG/DL — SIGNIFICANT CHANGE UP (ref 10–20)
CALCIUM SERPL-MCNC: 9 MG/DL — SIGNIFICANT CHANGE UP (ref 8.4–10.5)
CHLORIDE SERPL-SCNC: 101 MMOL/L — SIGNIFICANT CHANGE UP (ref 98–110)
CHOLEST SERPL-MCNC: 108 MG/DL — SIGNIFICANT CHANGE UP
CO2 SERPL-SCNC: 28 MMOL/L — SIGNIFICANT CHANGE UP (ref 17–32)
COLOR SPEC: YELLOW — SIGNIFICANT CHANGE UP
CREAT SERPL-MCNC: 1 MG/DL — SIGNIFICANT CHANGE UP (ref 0.7–1.5)
DIFF PNL FLD: NEGATIVE — SIGNIFICANT CHANGE UP
EGFR: 55 ML/MIN/1.73M2 — LOW
EOSINOPHIL # BLD AUTO: 0.15 K/UL — SIGNIFICANT CHANGE UP (ref 0–0.7)
EOSINOPHIL NFR BLD AUTO: 2.2 % — SIGNIFICANT CHANGE UP (ref 0–8)
GLUCOSE BLDC GLUCOMTR-MCNC: 159 MG/DL — HIGH (ref 70–99)
GLUCOSE BLDC GLUCOMTR-MCNC: 165 MG/DL — HIGH (ref 70–99)
GLUCOSE BLDC GLUCOMTR-MCNC: 208 MG/DL — HIGH (ref 70–99)
GLUCOSE BLDC GLUCOMTR-MCNC: 303 MG/DL — HIGH (ref 70–99)
GLUCOSE SERPL-MCNC: 142 MG/DL — HIGH (ref 70–99)
GLUCOSE UR QL: NEGATIVE MG/DL — SIGNIFICANT CHANGE UP
HCT VFR BLD CALC: 37.9 % — SIGNIFICANT CHANGE UP (ref 37–47)
HDLC SERPL-MCNC: 53 MG/DL — SIGNIFICANT CHANGE UP
HGB BLD-MCNC: 11.7 G/DL — LOW (ref 12–16)
IMM GRANULOCYTES NFR BLD AUTO: 0.4 % — HIGH (ref 0.1–0.3)
KETONES UR-MCNC: NEGATIVE MG/DL — SIGNIFICANT CHANGE UP
LEUKOCYTE ESTERASE UR-ACNC: ABNORMAL
LIPID PNL WITH DIRECT LDL SERPL: 43 MG/DL — SIGNIFICANT CHANGE UP
LYMPHOCYTES # BLD AUTO: 0.87 K/UL — LOW (ref 1.2–3.4)
LYMPHOCYTES # BLD AUTO: 13 % — LOW (ref 20.5–51.1)
MAGNESIUM SERPL-MCNC: 1.7 MG/DL — LOW (ref 1.8–2.4)
MCHC RBC-ENTMCNC: 25.3 PG — LOW (ref 27–31)
MCHC RBC-ENTMCNC: 30.9 G/DL — LOW (ref 32–37)
MCV RBC AUTO: 81.9 FL — SIGNIFICANT CHANGE UP (ref 81–99)
MONOCYTES # BLD AUTO: 0.97 K/UL — HIGH (ref 0.1–0.6)
MONOCYTES NFR BLD AUTO: 14.5 % — HIGH (ref 1.7–9.3)
NEUTROPHILS # BLD AUTO: 4.63 K/UL — SIGNIFICANT CHANGE UP (ref 1.4–6.5)
NEUTROPHILS NFR BLD AUTO: 69.6 % — SIGNIFICANT CHANGE UP (ref 42.2–75.2)
NITRITE UR-MCNC: NEGATIVE — SIGNIFICANT CHANGE UP
NON HDL CHOLESTEROL: 55 MG/DL — SIGNIFICANT CHANGE UP
NRBC # BLD: 0 /100 WBCS — SIGNIFICANT CHANGE UP (ref 0–0)
PH UR: 6 — SIGNIFICANT CHANGE UP (ref 5–8)
PHOSPHATE SERPL-MCNC: 3.6 MG/DL — SIGNIFICANT CHANGE UP (ref 2.1–4.9)
PLATELET # BLD AUTO: 242 K/UL — SIGNIFICANT CHANGE UP (ref 130–400)
PMV BLD: 11.1 FL — HIGH (ref 7.4–10.4)
POTASSIUM SERPL-MCNC: 4 MMOL/L — SIGNIFICANT CHANGE UP (ref 3.5–5)
POTASSIUM SERPL-SCNC: 4 MMOL/L — SIGNIFICANT CHANGE UP (ref 3.5–5)
PROT SERPL-MCNC: 5.9 G/DL — LOW (ref 6–8)
PROT UR-MCNC: NEGATIVE MG/DL — SIGNIFICANT CHANGE UP
RBC # BLD: 4.63 M/UL — SIGNIFICANT CHANGE UP (ref 4.2–5.4)
RBC # FLD: 14.8 % — HIGH (ref 11.5–14.5)
SODIUM SERPL-SCNC: 139 MMOL/L — SIGNIFICANT CHANGE UP (ref 135–146)
SP GR SPEC: >1.03 — HIGH (ref 1–1.03)
TRIGL SERPL-MCNC: 62 MG/DL — SIGNIFICANT CHANGE UP
UROBILINOGEN FLD QL: 0.2 MG/DL — SIGNIFICANT CHANGE UP (ref 0.2–1)
WBC # BLD: 6.67 K/UL — SIGNIFICANT CHANGE UP (ref 4.8–10.8)
WBC # FLD AUTO: 6.67 K/UL — SIGNIFICANT CHANGE UP (ref 4.8–10.8)

## 2024-09-22 PROCEDURE — 70450 CT HEAD/BRAIN W/O DYE: CPT | Mod: 26

## 2024-09-22 PROCEDURE — 99222 1ST HOSP IP/OBS MODERATE 55: CPT

## 2024-09-22 RX ORDER — MAGNESIUM SULFATE 500 MG/ML
2 VIAL (ML) INJECTION ONCE
Refills: 0 | Status: COMPLETED | OUTPATIENT
Start: 2024-09-22 | End: 2024-09-22

## 2024-09-22 RX ORDER — CEFTRIAXONE SODIUM 1 G
1000 VIAL (EA) INJECTION EVERY 24 HOURS
Refills: 0 | Status: COMPLETED | OUTPATIENT
Start: 2024-09-22 | End: 2024-09-24

## 2024-09-22 RX ORDER — QUETIAPINE FUMARATE 50 MG/1
25 TABLET, FILM COATED ORAL ONCE
Refills: 0 | Status: COMPLETED | OUTPATIENT
Start: 2024-09-22 | End: 2024-09-22

## 2024-09-22 RX ORDER — HALOPERIDOL LACTATE 2 MG/ML
2.5 CONCENTRATE, ORAL ORAL ONCE
Refills: 0 | Status: COMPLETED | OUTPATIENT
Start: 2024-09-22 | End: 2024-09-22

## 2024-09-22 RX ORDER — DEXMEDETOMIDINE HYDROCHLORIDE IN 0.9% SODIUM CHLORIDE 400 UG/100ML
0.2 INJECTION INTRAVENOUS
Qty: 400 | Refills: 0 | Status: DISCONTINUED | OUTPATIENT
Start: 2024-09-22 | End: 2024-09-24

## 2024-09-22 RX ADMIN — Medication 2: at 16:33

## 2024-09-22 RX ADMIN — Medication 1 MILLIGRAM(S): at 18:35

## 2024-09-22 RX ADMIN — APIXABAN 2.5 MILLIGRAM(S): 5 TABLET, FILM COATED ORAL at 06:46

## 2024-09-22 RX ADMIN — Medication 8: at 11:34

## 2024-09-22 RX ADMIN — DEXMEDETOMIDINE HYDROCHLORIDE IN 0.9% SODIUM CHLORIDE 3.65 MICROGRAM(S)/KG/HR: 400 INJECTION INTRAVENOUS at 22:42

## 2024-09-22 RX ADMIN — INSULIN GLARGINE 8 UNIT(S): 300 INJECTION, SOLUTION SUBCUTANEOUS at 21:57

## 2024-09-22 RX ADMIN — Medication 300 MILLIGRAM(S): at 16:32

## 2024-09-22 RX ADMIN — Medication 100 MILLIGRAM(S): at 14:10

## 2024-09-22 RX ADMIN — PREDNISONE 1 MILLIGRAM(S): 5 TABLET ORAL at 06:46

## 2024-09-22 RX ADMIN — Medication 2: at 08:10

## 2024-09-22 RX ADMIN — QUETIAPINE FUMARATE 25 MILLIGRAM(S): 50 TABLET, FILM COATED ORAL at 19:52

## 2024-09-22 RX ADMIN — PANTOPRAZOLE SODIUM 40 MILLIGRAM(S): 40 TABLET, DELAYED RELEASE ORAL at 06:46

## 2024-09-22 RX ADMIN — FOLIC ACID 1 MILLIGRAM(S): 1 TABLET ORAL at 11:35

## 2024-09-22 RX ADMIN — Medication 81 MILLIGRAM(S): at 11:35

## 2024-09-22 RX ADMIN — Medication 25 GRAM(S): at 08:11

## 2024-09-22 RX ADMIN — Medication 2.5 MILLIGRAM(S): at 20:13

## 2024-09-22 NOTE — PROVIDER CONTACT NOTE (EICU) - BACKGROUND
84y y  F with Hx of  HTN, DM, RA on methotrexate, cervical cancer s/p chemotherapy/ RT ,  on eliquis, s/p right venogram, right iliac venoplasty, venous stent placement, mechanical thrombectomy presents to emergency department for episodes of incoherent speech.

## 2024-09-22 NOTE — PHYSICAL THERAPY INITIAL EVALUATION ADULT - PERTINENT HX OF CURRENT PROBLEM, REHAB EVAL
85 y/o female admitted with diagnosis of Slurred speech, had episode of incoherent speech on 9/20/24, resolved, then returned at 2 pm on 9/21/24, no acute intracranial pathology on Head CT, seen by Neurology

## 2024-09-22 NOTE — PROVIDER CONTACT NOTE (EICU) - SITUATION
Final Anesthesia Post-op Assessment    Patient: Douglas Luo  Procedure(s) Performed: EXCISION OF MASS LEFT WRIST, LEFT WRIST FLEXOR CARP RADIALIS TENDON SHEATH RELEASE - LEFT  Anesthesia type: MAC    Vitals Value Taken Time   Temp 36.6 °C (97.9 °F) 10/06/21 0841   Pulse 65 10/06/21 0841   Resp 18 10/06/21 0841   SpO2 100 % 10/06/21 0841   /87 10/06/21 0841         Patient Location: Phase II  Post-op Vital Signs:stable  Level of Consciousness: participates in exam, awake and alert  Respiratory Status: spontaneous ventilation  Cardiovascular blood pressure returned to baseline  Hydration: euvolemic  Pain Management: well controlled  Handoff: Handoff to receiving clinician was performed and questions were answered  Vomiting: none  Nausea: None  Airway Patency:patent  Post-op Assessment: awake, alert, appropriately conversant, or baseline, no complications and patient tolerated procedure well with no complications      No complications documented.    E lerted by bedside Rn with request for help with restlessness of this pt - admitted yesterday with dng TIA.  Pt reportedly confused today and bedside team planned repeat head CT, ordered Seroquel PO. Bedside RN tells me - pt is due to go for CT now and appears to confused and fidgety, standing next to the bed.   I saw pt on camera with daughter at bedside. I ordered 1 mg IV Ativan prior to transport to CT to allow safe test performance. E lerted by bedside Rn with request for help with restlessness of this pt - admitted yesterday with dng TIA.  Pt reportedly more confused today and bedside team planned repeat head CT, ordered Seroquel PO. Bedside RN tells me - pt is due to go for CT now and appears  confused and fidgety, standing next to the bed.   I saw pt on camera with daughter at bedside. I ordered 1 mg IV Ativan prior to transport to CT to allow safe test performance.

## 2024-09-22 NOTE — PROVIDER CONTACT NOTE (OTHER) - ASSESSMENT
Patient oriented x1, follows commands yet needs constant redirection, keeps attempting to ambulate out of the room, NIH 2

## 2024-09-22 NOTE — PHYSICAL THERAPY INITIAL EVALUATION ADULT - GAIT DEVIATIONS NOTED, PT EVAL
stooped posture, dec heel strike/pushoff , tends to lean on (R) side/decreased hubert/decreased step length/decreased weight-shifting ability

## 2024-09-22 NOTE — PHYSICAL THERAPY INITIAL EVALUATION ADULT - LEVEL OF INDEPENDENCE: STAIR NEGOTIATION, REHAB EVAL
unable to perform secondary to unsteady gait while ambulating on level with rolling walker at this time

## 2024-09-22 NOTE — PROVIDER CONTACT NOTE (OTHER) - RECOMMENDATIONS
seroquel ordered. Dr. Griffith assessed patient at bedside, spoke to family - informed  RN to continue to monitor worsening symptoms of confusion and to notify medical ICU resident for repeat CT

## 2024-09-22 NOTE — PHYSICAL THERAPY INITIAL EVALUATION ADULT - GENERAL OBSERVATIONS, REHAB EVAL
11:15-11:45 Chart reviewed. Pt encountered sitting in chair,  may be seen by Physical Therapist as confirmed with Nurse. Patient denied pain and ready to try to walk; +tele/ IV lock LUE/ BP cuff; daughters at bedside

## 2024-09-22 NOTE — CONSULT NOTE ADULT - ASSESSMENT
IMPRESSION: Rehab of 84 y.o  f  rehab  for  r/o cva / tia      PRECAUTIONS: [  ] Cardiac  [  ] Respiratory  [  ] Seizures [  ] Contact Isolation  [  ] Droplet Isolation  [ FALL ] Other    Weight Bearing Status:     RECOMMENDATION:    Out of Bed to Chair     DVT/Decubiti Prophylaxis    REHAB PLAN:     [ x  ] Bedside P/T 3-5 times a week   [ x  ]   Bedside O/T  2-3 times a week             [   ] No Rehab Therapy Indicated                   [ x  ]  Speech Therapy   Conditioning/ROM                                    ADL  Bed Mobility                                               Conditioning/ROM  Transfers                                                     Bed Mobility  Sitting /Standing Balance                         Transfers                                        Gait Training                                               Sitting/Standing Balance  Stair Training [   ]Applicable                    Home equipment Eval                                                                        Splinting  [   ] Only      GOALS:   ADL   [  x ]   Independent                    Transfers  [  x ] Independent                          Ambulation  [   ] Independent     [    ] With device                            [  x ]  CG                                                         [  x ]  CG                                                                  [  x ] CG                            [    ] Min A                                                   [   ] Min A                                                              [   ] Min  A          DISCHARGE PLAN:   [  xx ]  Good candidate for Intensive Rehabilitation/Hospital based-[facility]                                             Will tolerate 3hrs Intensive Rehab Daily ptn will  be able to  ppt in  acute rehab  pt  ot st  3 hrs/day  ptn  need  medical  supervision  due  to  multi medical  issue  ptn  was indep  prior  to admitting d/w  ptn  dtrs  rehab  plan                                           [    ]  Short Term Rehab in Skilled Nursing Facility                                       [    ]  Home with Outpatient or VN services                                         [    ]  Possible Candidate for Intensive Hospital based Rehab                                        IMPRESSION: Rehab of 84 y.o  f  rehab  for  r/o cva / tia      PRECAUTIONS: [  ] Cardiac  [  ] Respiratory  [  ] Seizures [  ] Contact Isolation  [  ] Droplet Isolation  [ FALL ] Other    Weight Bearing Status:     RECOMMENDATION:    Out of Bed to Chair     DVT/Decubiti Prophylaxis    REHAB PLAN:     [ x  ] Bedside P/T 3-5 times a week   [ x  ]   Bedside O/T  2-3 times a week             [   ] No Rehab Therapy Indicated                   [ x  ]  Speech Therapy   Conditioning/ROM                                    ADL  Bed Mobility                                               Conditioning/ROM  Transfers                                                     Bed Mobility  Sitting /Standing Balance                         Transfers                                        Gait Training                                               Sitting/Standing Balance  Stair Training [   ]Applicable                    Home equipment Eval                                                                        Splinting  [   ] Only      GOALS:   ADL   [  x ]   Independent                    Transfers  [  x ] Independent                          Ambulation  [   ] Independent     [    ] With device                            [  x ]  CG                                                         [  x ]  CG                                                                  [  x ] CG                            [    ] Min A                                                   [   ] Min A                                                              [   ] Min  A          DISCHARGE PLAN:   [  ]  Good candidate for Intensive Rehabilitation/Hospital based-[facility]                                             Will tolerate 3hrs Intensive Rehab Daily ptn will  be able to  ppt in  acute rehab  pt  ot st  3 hrs/day  ptn  need  medical  supervision  due  to  multi medical  issue  ptn  was indep  prior  to admitting d/w  ptn  dtrs  rehab  plan                                           [    ]  Short Term Rehab in Skilled Nursing Facility                                       [    ]  Home with Outpatient or VN services                                         [   x ]  Possible Candidate for Intensive Hospital based Rehab

## 2024-09-22 NOTE — PHYSICAL THERAPY INITIAL EVALUATION ADULT - LEVEL OF INDEPENDENCE: SUPINE/SIT, REHAB EVAL
Patient encountered already out of bed in chair, no apparent distress.  Per daughter, required assist x 1-2

## 2024-09-22 NOTE — PHYSICAL THERAPY INITIAL EVALUATION ADULT - ADDITIONAL COMMENTS
Per daughters at bedside whom patient preferred to translate Romanian, there are 3 steps outside with (L) rail going up then no further steps inside; was not using any assistive device

## 2024-09-23 ENCOUNTER — RESULT REVIEW (OUTPATIENT)
Age: 84
End: 2024-09-23

## 2024-09-23 LAB
A1C WITH ESTIMATED AVERAGE GLUCOSE RESULT: 9 % — HIGH (ref 4–5.6)
A1C WITH ESTIMATED AVERAGE GLUCOSE RESULT: 9.3 % — HIGH (ref 4–5.6)
ALBUMIN SERPL ELPH-MCNC: 3.5 G/DL — SIGNIFICANT CHANGE UP (ref 3.5–5.2)
ALP SERPL-CCNC: 69 U/L — SIGNIFICANT CHANGE UP (ref 30–115)
ALT FLD-CCNC: 11 U/L — SIGNIFICANT CHANGE UP (ref 0–41)
ANION GAP SERPL CALC-SCNC: 7 MMOL/L — SIGNIFICANT CHANGE UP (ref 7–14)
AST SERPL-CCNC: 13 U/L — SIGNIFICANT CHANGE UP (ref 0–41)
BASOPHILS # BLD AUTO: 0.02 K/UL — SIGNIFICANT CHANGE UP (ref 0–0.2)
BASOPHILS NFR BLD AUTO: 0.4 % — SIGNIFICANT CHANGE UP (ref 0–1)
BILIRUB SERPL-MCNC: 0.4 MG/DL — SIGNIFICANT CHANGE UP (ref 0.2–1.2)
BUN SERPL-MCNC: 16 MG/DL — SIGNIFICANT CHANGE UP (ref 10–20)
CALCIUM SERPL-MCNC: 9 MG/DL — SIGNIFICANT CHANGE UP (ref 8.4–10.5)
CHLORIDE SERPL-SCNC: 101 MMOL/L — SIGNIFICANT CHANGE UP (ref 98–110)
CO2 SERPL-SCNC: 30 MMOL/L — SIGNIFICANT CHANGE UP (ref 17–32)
CREAT SERPL-MCNC: 0.8 MG/DL — SIGNIFICANT CHANGE UP (ref 0.7–1.5)
CULTURE RESULTS: SIGNIFICANT CHANGE UP
EGFR: 72 ML/MIN/1.73M2 — SIGNIFICANT CHANGE UP
EOSINOPHIL # BLD AUTO: 0.1 K/UL — SIGNIFICANT CHANGE UP (ref 0–0.7)
EOSINOPHIL NFR BLD AUTO: 2 % — SIGNIFICANT CHANGE UP (ref 0–8)
ESTIMATED AVERAGE GLUCOSE: 212 MG/DL — HIGH (ref 68–114)
ESTIMATED AVERAGE GLUCOSE: 220 MG/DL — HIGH (ref 68–114)
GLUCOSE BLDC GLUCOMTR-MCNC: 136 MG/DL — HIGH (ref 70–99)
GLUCOSE BLDC GLUCOMTR-MCNC: 178 MG/DL — HIGH (ref 70–99)
GLUCOSE BLDC GLUCOMTR-MCNC: 196 MG/DL — HIGH (ref 70–99)
GLUCOSE BLDC GLUCOMTR-MCNC: 277 MG/DL — HIGH (ref 70–99)
GLUCOSE SERPL-MCNC: 264 MG/DL — HIGH (ref 70–99)
HCT VFR BLD CALC: 36.2 % — LOW (ref 37–47)
HGB BLD-MCNC: 11 G/DL — LOW (ref 12–16)
IMM GRANULOCYTES NFR BLD AUTO: 0.2 % — SIGNIFICANT CHANGE UP (ref 0.1–0.3)
LYMPHOCYTES # BLD AUTO: 0.66 K/UL — LOW (ref 1.2–3.4)
LYMPHOCYTES # BLD AUTO: 13.5 % — LOW (ref 20.5–51.1)
MAGNESIUM SERPL-MCNC: 1.9 MG/DL — SIGNIFICANT CHANGE UP (ref 1.8–2.4)
MCHC RBC-ENTMCNC: 24.5 PG — LOW (ref 27–31)
MCHC RBC-ENTMCNC: 30.4 G/DL — LOW (ref 32–37)
MCV RBC AUTO: 80.6 FL — LOW (ref 81–99)
MONOCYTES # BLD AUTO: 0.73 K/UL — HIGH (ref 0.1–0.6)
MONOCYTES NFR BLD AUTO: 14.9 % — HIGH (ref 1.7–9.3)
NEUTROPHILS # BLD AUTO: 3.37 K/UL — SIGNIFICANT CHANGE UP (ref 1.4–6.5)
NEUTROPHILS NFR BLD AUTO: 69 % — SIGNIFICANT CHANGE UP (ref 42.2–75.2)
NRBC # BLD: 0 /100 WBCS — SIGNIFICANT CHANGE UP (ref 0–0)
PLATELET # BLD AUTO: 207 K/UL — SIGNIFICANT CHANGE UP (ref 130–400)
PMV BLD: 10.9 FL — HIGH (ref 7.4–10.4)
POTASSIUM SERPL-MCNC: 4.5 MMOL/L — SIGNIFICANT CHANGE UP (ref 3.5–5)
POTASSIUM SERPL-SCNC: 4.5 MMOL/L — SIGNIFICANT CHANGE UP (ref 3.5–5)
PROT SERPL-MCNC: 5.8 G/DL — LOW (ref 6–8)
RBC # BLD: 4.49 M/UL — SIGNIFICANT CHANGE UP (ref 4.2–5.4)
RBC # FLD: 14.8 % — HIGH (ref 11.5–14.5)
SODIUM SERPL-SCNC: 138 MMOL/L — SIGNIFICANT CHANGE UP (ref 135–146)
SPECIMEN SOURCE: SIGNIFICANT CHANGE UP
TSH SERPL-MCNC: 1.58 UIU/ML — SIGNIFICANT CHANGE UP (ref 0.27–4.2)
WBC # BLD: 4.89 K/UL — SIGNIFICANT CHANGE UP (ref 4.8–10.8)
WBC # FLD AUTO: 4.89 K/UL — SIGNIFICANT CHANGE UP (ref 4.8–10.8)

## 2024-09-23 PROCEDURE — 93306 TTE W/DOPPLER COMPLETE: CPT | Mod: 26

## 2024-09-23 PROCEDURE — 70551 MRI BRAIN STEM W/O DYE: CPT | Mod: 26

## 2024-09-23 PROCEDURE — 99222 1ST HOSP IP/OBS MODERATE 55: CPT

## 2024-09-23 PROCEDURE — 93010 ELECTROCARDIOGRAM REPORT: CPT

## 2024-09-23 RX ORDER — CHLORHEXIDINE GLUCONATE ORAL RINSE 1.2 MG/ML
1 SOLUTION DENTAL
Refills: 0 | Status: DISCONTINUED | OUTPATIENT
Start: 2024-09-23 | End: 2024-09-24

## 2024-09-23 RX ORDER — 5-HYDROXYTRYPTOPHAN (5-HTP) 100 MG
5 TABLET,DISINTEGRATING ORAL AT BEDTIME
Refills: 0 | Status: DISCONTINUED | OUTPATIENT
Start: 2024-09-23 | End: 2024-09-24

## 2024-09-23 RX ADMIN — Medication 2: at 11:46

## 2024-09-23 RX ADMIN — Medication 300 MILLIGRAM(S): at 17:23

## 2024-09-23 RX ADMIN — PREDNISONE 1 MILLIGRAM(S): 5 TABLET ORAL at 11:31

## 2024-09-23 RX ADMIN — DEXMEDETOMIDINE HYDROCHLORIDE IN 0.9% SODIUM CHLORIDE 3.65 MICROGRAM(S)/KG/HR: 400 INJECTION INTRAVENOUS at 23:41

## 2024-09-23 RX ADMIN — Medication 81 MILLIGRAM(S): at 11:29

## 2024-09-23 RX ADMIN — APIXABAN 2.5 MILLIGRAM(S): 5 TABLET, FILM COATED ORAL at 11:29

## 2024-09-23 RX ADMIN — DEXMEDETOMIDINE HYDROCHLORIDE IN 0.9% SODIUM CHLORIDE 3.65 MICROGRAM(S)/KG/HR: 400 INJECTION INTRAVENOUS at 07:59

## 2024-09-23 RX ADMIN — DEXMEDETOMIDINE HYDROCHLORIDE IN 0.9% SODIUM CHLORIDE 3.65 MICROGRAM(S)/KG/HR: 400 INJECTION INTRAVENOUS at 22:29

## 2024-09-23 RX ADMIN — ATORVASTATIN CALCIUM 80 MILLIGRAM(S): 10 TABLET, FILM COATED ORAL at 22:08

## 2024-09-23 RX ADMIN — CHLORHEXIDINE GLUCONATE ORAL RINSE 1 APPLICATION(S): 1.2 SOLUTION DENTAL at 11:46

## 2024-09-23 RX ADMIN — Medication 0.5 MILLIGRAM(S): at 17:29

## 2024-09-23 RX ADMIN — Medication 6: at 07:59

## 2024-09-23 RX ADMIN — PANTOPRAZOLE SODIUM 40 MILLIGRAM(S): 40 TABLET, DELAYED RELEASE ORAL at 11:32

## 2024-09-23 RX ADMIN — Medication 5 MILLIGRAM(S): at 22:09

## 2024-09-23 RX ADMIN — INSULIN GLARGINE 8 UNIT(S): 300 INJECTION, SOLUTION SUBCUTANEOUS at 22:09

## 2024-09-23 RX ADMIN — Medication 100 MILLIGRAM(S): at 11:33

## 2024-09-23 RX ADMIN — DEXMEDETOMIDINE HYDROCHLORIDE IN 0.9% SODIUM CHLORIDE 3.65 MICROGRAM(S)/KG/HR: 400 INJECTION INTRAVENOUS at 03:50

## 2024-09-23 RX ADMIN — FOLIC ACID 1 MILLIGRAM(S): 1 TABLET ORAL at 11:30

## 2024-09-23 RX ADMIN — Medication 2: at 17:20

## 2024-09-23 RX ADMIN — APIXABAN 2.5 MILLIGRAM(S): 5 TABLET, FILM COATED ORAL at 17:22

## 2024-09-23 NOTE — CHART NOTE - NSCHARTNOTEFT_GEN_A_CORE
pt v agitated this evening.  Ativan, Seroquel ,haldol  were ineffective. CT head was done to r/oacute process. I requested radiology for reading earlier. none @ this time.  Pt family is at bedside ( who also agreed to sedation) . Precedex started.  Pt sedated .This was reviewed w/ ICU MD on call

## 2024-09-23 NOTE — DIETITIAN INITIAL EVALUATION ADULT - PERTINENT MEDS FT
MEDICATIONS  (STANDING):  apixaban 2.5 milliGRAM(s) Oral every 12 hours  aspirin enteric coated 81 milliGRAM(s) Oral daily  atorvastatin 80 milliGRAM(s) Oral at bedtime  buPROPion XL (24-Hour) . 300 milliGRAM(s) Oral daily  cefTRIAXone   IVPB 1000 milliGRAM(s) IV Intermittent every 24 hours  chlorhexidine 2% Cloths 1 Application(s) Topical <User Schedule>  dexMEDEtomidine Infusion 0.2 MICROgram(s)/kG/Hr (3.65 mL/Hr) IV Continuous <Continuous>  dextrose 5%. 1000 milliLiter(s) (100 mL/Hr) IV Continuous <Continuous>  dextrose 5%. 1000 milliLiter(s) (50 mL/Hr) IV Continuous <Continuous>    folic acid 1 milliGRAM(s) Oral daily  glucagon  Injectable 1 milliGRAM(s) IntraMuscular once  influenza  Vaccine (HIGH DOSE) 0.5 milliLiter(s) IntraMuscular once  insulin glargine Injectable (LANTUS) 8 Unit(s) SubCutaneous at bedtime  insulin lispro (ADMELOG) corrective regimen sliding scale   SubCutaneous three times a day before meals  pantoprazole    Tablet 40 milliGRAM(s) Oral before breakfast  predniSONE   Tablet 1 milliGRAM(s) Oral daily  propranolol 10 milliGRAM(s) Oral two times a day    MEDICATIONS  (PRN):  dextrose Oral Gel 15 Gram(s) Oral once PRN Blood Glucose LESS THAN 70 milliGRAM(s)/deciliter

## 2024-09-23 NOTE — OCCUPATIONAL THERAPY INITIAL EVALUATION ADULT - PERTINENT HX OF CURRENT PROBLEM, REHAB EVAL
84-year-old F French Speaking with past medical history of  HTN, DM, RA on methotrexate, cervical cancer s/p chemotherapy and radiation on eliquis, s/p right venogram, right iliac venoplasty, venous stent placement, mechanical thrombectomy presents to emergency department for episodes of incoherent speech. 1st Episode was yesterday around 2 pm where she was lethargic and was speaking gibberish as per the daughter.. They laid her on the bed, glucose was 176 ;  systolic . At around 4 pm the episode resolved. She had similar episode today where she was lethargic , forgot what happened during the day and was again speaking gibberish. No similar episodes on the past. Follows Dr. Yan.    CT head 9/22: No acute intracranial pathology.  MRI pending

## 2024-09-23 NOTE — DIETITIAN INITIAL EVALUATION ADULT - PERTINENT LABORATORY DATA
09-23    138  |  101  |  16  ----------------------------<  264[H]  4.5   |  30  |  0.8    Ca    9.0      23 Sep 2024 05:50  Phos  3.6     09-22  Mg     1.9     09-23    TPro  5.8[L]  /  Alb  3.5  /  TBili  0.4  /  DBili  x   /  AST  13  /  ALT  11  /  AlkPhos  69  09-23  POCT Blood Glucose.: 178 mg/dL (09-23-24 @ 17:05)  A1C with Estimated Average Glucose Result: 9.0 % (09-22-24 @ 05:27)  A1C with Estimated Average Glucose Result: 9.3 % (09-21-24 @ 21:00)  A1C with Estimated Average Glucose Result: 9.6 % (06-20-24 @ 05:24)                          11.0   4.89  )-----------( 207      ( 23 Sep 2024 05:50 )             36.2

## 2024-09-23 NOTE — SWALLOW BEDSIDE ASSESSMENT ADULT - COMMENTS
Daughter at bedside provided Tongan interpretation at pt's request
Daughter at bedside provided Samoan interpretation at pt's request

## 2024-09-23 NOTE — CONSULT NOTE ADULT - SUBJECTIVE AND OBJECTIVE BOX
HPI: 84-year-old female Japanese Speaking with past medical history of  HTN, DM, RA on methotrexate, cervical cancer s/p chemotherapy and radiation on eliquis, s/p right venogram, right iliac venoplasty, venous stent placement, mechanical thrombectomy presents to emergency department for episodes of incoherent speech. 1st Episode was yesterday around 2 pm where she was lethargic and was speaking gibberish as per the daughters.. They laid her on the bed, glucose was 176 ;  systolic . At around 4 pm the episode resolved.   She had similar episode today where she was lethargic , forgot what happened during the day and was again speaking gibberish. No similar episodes on the past.   ptn  seen and exam  at  bed  side ptn dtrs  are  visiting  at  bed  side  providing  hx  ptn  as per  dtr is  slight  confused  but  fu  command  ptn  labs imaging and  rehab  notes are  appreciated  and  reviewed  .     In the ED; Vitals : /64 mmHg ; Hr 66 ; RR 18 ; Spo2 95% on RA.     Labs:   WBC 5.36 K Hb 11.4 ( At baseline) Platelets 233 K   Na 129 K 4.0  Bicarbonate 26   BUN/Creatinine 24/0.9  AST/ALT 18/12   Troponin 18    Imaging:   CT Angio: No evidence of acute intracranial pathology.    CT PERFUSION:  No perfusion deficits to suggest areas of completed infarction or at risk   territory.    CTA HEAD/NECK:  Moderate stenosis involving the bilateral cavernous ICAs due to atherosclerotic calcification. Moderate stenosis of the proximal left internal carotid artery due to calcified atherosclerotic plaque.    s/p Aspirin 325 mg.    (21 Sep 2024 20:37)    PTN  REFERRED TO ACUTE  REHAB  FOR  EVAL AND  TX   PAST MEDICAL & SURGICAL HISTORY:  Diabetes      HTN (hypertension)      Rheumatoid arthritis      Depression      Anxiety      Cervical cancer      History of dental surgery      Foot mass  left foot mass removal 20 years ago          Hospital Course:    TODAY'S SUBJECTIVE & REVIEW OF SYMPTOMS:     Constitutional WNL   Cardio WNL   Resp WNL   GI WNL  Heme WNL  Endo WNL  Skin WNL  MSK WNL  Neuro WNL  Cognitive WNL  Psych WNL      MEDICATIONS  (STANDING):  apixaban 2.5 milliGRAM(s) Oral every 12 hours  aspirin enteric coated 81 milliGRAM(s) Oral daily  atorvastatin 80 milliGRAM(s) Oral at bedtime  buPROPion XL (24-Hour) . 300 milliGRAM(s) Oral daily  cefTRIAXone   IVPB 1000 milliGRAM(s) IV Intermittent every 24 hours  dextrose 5%. 1000 milliLiter(s) (100 mL/Hr) IV Continuous <Continuous>  dextrose 5%. 1000 milliLiter(s) (50 mL/Hr) IV Continuous <Continuous>  dextrose 50% Injectable 12.5 Gram(s) IV Push once  dextrose 50% Injectable 25 Gram(s) IV Push once  dextrose 50% Injectable 25 Gram(s) IV Push once  folic acid 1 milliGRAM(s) Oral daily  glucagon  Injectable 1 milliGRAM(s) IntraMuscular once  influenza  Vaccine (HIGH DOSE) 0.5 milliLiter(s) IntraMuscular once  insulin glargine Injectable (LANTUS) 8 Unit(s) SubCutaneous at bedtime  insulin lispro (ADMELOG) corrective regimen sliding scale   SubCutaneous three times a day before meals  magnesium sulfate  IVPB 2 Gram(s) IV Intermittent once  pantoprazole    Tablet 40 milliGRAM(s) Oral before breakfast  predniSONE   Tablet 1 milliGRAM(s) Oral daily  propranolol 10 milliGRAM(s) Oral two times a day    MEDICATIONS  (PRN):  dextrose Oral Gel 15 Gram(s) Oral once PRN Blood Glucose LESS THAN 70 milliGRAM(s)/deciliter      FAMILY HISTORY:      Allergies    No Known Allergies    Intolerances        SOCIAL HISTORY:    [  ] Etoh  [  ] Smoking  [  ] Substance abuse     Home Environment:  [  ] Home Alone  [x Lives with Family  [  ] Home Health Aid    Dwelling:  [  ] Apartment  [x  ] Private House  [  ] Adult Home  [  ] Skilled Nursing Facility      [  ] Short Term  [  ] Long Term  [ x ] Stairs       Elevator [  ]    FUNCTIONAL STATUS PTA: (Check all that apply)  Ambulation: [  x ]Independent    [  ] Dependent     [  ] Non-Ambulatory  Assistive Device: [  ] SA Cane  [  ]  Q Cane  [  ] Walker  [  ]  Wheelchair  ADL : [  x] Independent  [  ]  Dependent       Vital Signs Last 24 Hrs  T(C): 36.1 (22 Sep 2024 07:10), Max: 36.2 (21 Sep 2024 20:43)  T(F): 97 (22 Sep 2024 07:10), Max: 97.2 (21 Sep 2024 20:43)  HR: 75 (22 Sep 2024 07:04) (66 - 82)  BP: 169/79 (22 Sep 2024 07:04) (156/70 - 211/91)  BP(mean): 113 (22 Sep 2024 07:04) (100 - 113)  RR: 22 (22 Sep 2024 07:10) (18 - 30)  SpO2: 94% (22 Sep 2024 07:04) (94% - 99%)    Parameters below as of 22 Sep 2024 07:04  Patient On (Oxygen Delivery Method): room air          PHYSICAL EXAM: Alert & Oriented X3  GENERAL: NAD, well-groomed, well-developed  HEAD:  Atraumatic, Normocephalic  EYES: EOMI, PERRLA, conjunctiva and sclera clear  NECK: Supple, No JVD, Normal thyroid  CHEST/LUNG: Clear to percussion bilaterally; No rales, rhonchi, wheezing, or rubs  HEART: Regular rate and rhythm; No murmurs, rubs, or gallops  ABDOMEN: Soft, Nontender, Nondistended; Bowel sounds present  EXTREMITIES:  2+ Peripheral Pulses, No clubbing, cyanosis, or edema    NERVOUS SYSTEM:  Cranial Nerves 2-12 intact [x  ] Abnormal  [  ]  ROM: WFL all extremities [ p ]  Abnormal [  ]  Motor Strength: WFL all extremities  [4-5/5  all ext  ]  Abnormal [  ]  Sensation: intact to light touch [ x ] Abnormal [  ]  Reflexes: Symmetric [  x]  Abnormal [  ]    FUNCTIONAL STATUS:  Bed Mobility: Independent [  ]  Supervision [  ]  Needs Assistance [x  ]  N/A [  ]  Transfers: Independent [  ]  Supervision [  ]  Needs Assistance [ x ]  N/A [  ]   Ambulation: Independent [  ]  Supervision [  ]  Needs Assistance [x  ]  N/A [  ]  ADL: Independent [  ] Requires Assistance [  ] N/A [ x ]  SEE PT/OT IE NOTES    LABS:                        11.7   6.67  )-----------( 242      ( 22 Sep 2024 05:27 )             37.9     09-22    139  |  101  |  19  ----------------------------<  142[H]  4.0   |  28  |  1.0    Ca    9.0      22 Sep 2024 05:27  Phos  3.6     09-22  Mg     1.7     09-22    TPro  5.9[L]  /  Alb  3.7  /  TBili  0.4  /  DBili  x   /  AST  16  /  ALT  12  /  AlkPhos  66  09-22    PT/INR - ( 21 Sep 2024 18:04 )   PT: 13.90 sec;   INR: 1.22 ratio         PTT - ( 21 Sep 2024 18:04 )  PTT:37.0 sec  Urinalysis Basic - ( 22 Sep 2024 05:27 )    Color: x / Appearance: x / SG: x / pH: x  Gluc: 142 mg/dL / Ketone: x  / Bili: x / Urobili: x   Blood: x / Protein: x / Nitrite: x   Leuk Esterase: x / RBC: x / WBC x   Sq Epi: x / Non Sq Epi: x / Bacteria: x        RADIOLOGY & ADDITIONAL STUDIES:< from: CT Brain Stroke Protocol (09.21.24 @ 18:20) >  IMPRESSION:    No evidence of acute intracranial pathology.      < end of copied text >      Assesment:
Patient is a 84y old Female who presents with a chief complaint of incoherent speech    HPI:  84-year-old female [place]ian Speaking with past medical history of  HTN, DM, RA on methotrexate, cervical cancer s/p chemotherapy and radiation on eliquis, s/p right venogram, right iliac venoplasty, venous stent placement, mechanical thrombectomy presents to emergency department for episodes of incoherent speech. 1st Episode was yesterday around 2 pm where she was lethargic and was speaking gibberish as per the daughter.. They laid her on the bed, glucose was 176 ;  systolic . At around 4 pm the episode resolved.   She had similar episode today where she was lethargic , forgot what happened during the day and was again speaking gibberish. No similar episodes on the past.   Follows Dr. Yan .     In the ED; Vitals : /64 mmHg ; Hr 66 ; RR 18 ; Spo2 95% on RA.     Labs:   WBC 5.36 K Hb 11.4 ( At baseline) Platelets 233 K   Na 129 K 4.0  Bicarbonate 26   BUN/Creatinine 24/0.9  AST/ALT 18/12   Troponin 18    Imaging:   CT Angio: No evidence of acute intracranial pathology.    CT PERFUSION:  No perfusion deficits to suggest areas of completed infarction or at risk   territory.    CTA HEAD/NECK:  Moderate stenosis involving the bilateral cavernous ICAs due to atherosclerotic calcification. Moderate stenosis of the proximal left internal carotid artery due to calcified atherosclerotic plaque.    s/p Aspirin 325 mg.    (21 Sep 2024 20:37)      PAST MEDICAL & SURGICAL HISTORY:  Diabetes      HTN (hypertension)      Rheumatoid arthritis      Depression      Anxiety      Cervical cancer      History of dental surgery      Foot mass  left foot mass removal 20 years ago          SOCIAL HX:  Ex Smoking                             FAMILY HISTORY:  :  No known cardiovacular family hisotry     Review Of Systems:     All ROS are negative except per HPI       Allergies    No Known Allergies    Intolerances          PHYSICAL EXAM    ICU Vital Signs Last 24 Hrs  T(C): 35.6 (23 Sep 2024 08:00), Max: 36.1 (22 Sep 2024 15:01)  T(F): 96 (23 Sep 2024 08:00), Max: 97 (22 Sep 2024 15:01)  HR: 55 (23 Sep 2024 08:00) (55 - 100)  BP: 151/67 (23 Sep 2024 08:00) (99/47 - 173/72)  BP(mean): 96 (23 Sep 2024 08:00) (68 - 103)  ABP: --  ABP(mean): --  RR: 19 (23 Sep 2024 08:00) (18 - 24)  SpO2: 97% (23 Sep 2024 08:00) (94% - 97%)    O2 Parameters below as of 23 Sep 2024 08:00  Patient On (Oxygen Delivery Method): nasal cannula  O2 Flow (L/min): 2          CONSTITUTIONAL:  NAD    ENT:   Airway patent,   Mouth with normal mucosa.     CARDIAC:   Normal rate,   Regular rhythm.    No edema    RESPIRATORY:   No wheezing  Bilateral BS   Not tachypneic,  No use of accessory muscles    GASTROINTESTINAL:  Abdomen soft,   Non-tender,   No guarding,   + BS      NEUROLOGICAL:   awake  follows commands  rt sided facial weakness    SKIN:   Skin normal color for race,   No evidence of rash.              09-22-24 @ 07:01  -  09-23-24 @ 07:00  --------------------------------------------------------  IN:    Dexmedetomidine: 117.3 mL    IV PiggyBack: 100 mL    Oral Fluid: 120 mL  Total IN: 337.3 mL    OUT:  Total OUT: 0 mL    Total NET: 337.3 mL      09-23-24 @ 07:01  -  09-23-24 @ 10:03  --------------------------------------------------------  IN:    Dexmedetomidine: 9 mL  Total IN: 9 mL    OUT:  Total OUT: 0 mL    Total NET: 9 mL          LABS:                          11.0   4.89  )-----------( 207      ( 23 Sep 2024 05:50 )             36.2                                               09-23    138  |  101  |  16  ----------------------------<  264[H]  4.5   |  30  |  0.8    Ca    9.0      23 Sep 2024 05:50  Phos  3.6     09-22  Mg     1.9     09-23    TPro  5.8[L]  /  Alb  3.5  /  TBili  0.4  /  DBili  x   /  AST  13  /  ALT  11  /  AlkPhos  69  09-23      PT/INR - ( 21 Sep 2024 18:04 )   PT: 13.90 sec;   INR: 1.22 ratio         PTT - ( 21 Sep 2024 18:04 )  PTT:37.0 sec                                       Urinalysis Basic - ( 23 Sep 2024 05:50 )    Color: x / Appearance: x / SG: x / pH: x  Gluc: 264 mg/dL / Ketone: x  / Bili: x / Urobili: x   Blood: x / Protein: x / Nitrite: x   Leuk Esterase: x / RBC: x / WBC x   Sq Epi: x / Non Sq Epi: x / Bacteria: x                                                  LIVER FUNCTIONS - ( 23 Sep 2024 05:50 )  Alb: 3.5 g/dL / Pro: 5.8 g/dL / ALK PHOS: 69 U/L / ALT: 11 U/L / AST: 13 U/L / GGT: x                                                                                                                                       X-Rays                                                                            ECHO      MEDICATIONS  (STANDING):  apixaban 2.5 milliGRAM(s) Oral every 12 hours  aspirin enteric coated 81 milliGRAM(s) Oral daily  atorvastatin 80 milliGRAM(s) Oral at bedtime  buPROPion XL (24-Hour) . 300 milliGRAM(s) Oral daily  cefTRIAXone   IVPB 1000 milliGRAM(s) IV Intermittent every 24 hours  chlorhexidine 2% Cloths 1 Application(s) Topical <User Schedule>  dexMEDEtomidine Infusion 0.2 MICROgram(s)/kG/Hr (3.65 mL/Hr) IV Continuous <Continuous>  dextrose 5%. 1000 milliLiter(s) (50 mL/Hr) IV Continuous <Continuous>  dextrose 5%. 1000 milliLiter(s) (100 mL/Hr) IV Continuous <Continuous>  dextrose 50% Injectable 25 Gram(s) IV Push once  dextrose 50% Injectable 12.5 Gram(s) IV Push once  dextrose 50% Injectable 25 Gram(s) IV Push once  folic acid 1 milliGRAM(s) Oral daily  glucagon  Injectable 1 milliGRAM(s) IntraMuscular once  influenza  Vaccine (HIGH DOSE) 0.5 milliLiter(s) IntraMuscular once  insulin glargine Injectable (LANTUS) 8 Unit(s) SubCutaneous at bedtime  insulin lispro (ADMELOG) corrective regimen sliding scale   SubCutaneous three times a day before meals  pantoprazole    Tablet 40 milliGRAM(s) Oral before breakfast  predniSONE   Tablet 1 milliGRAM(s) Oral daily  propranolol 10 milliGRAM(s) Oral two times a day    MEDICATIONS  (PRN):  dextrose Oral Gel 15 Gram(s) Oral once PRN Blood Glucose LESS THAN 70 milliGRAM(s)/deciliter        
JOY ARAGON     84y     Female    MRN-[id number]                                                           CC:Patient is a 84y old  Female who presents with a chief complaint of     HPI:  84-year-old female Lithuanian Speaking with past medical history of  HTN, DM, RA on methotrexate, cervical cancer s/p chemotherapy and radiation on eliquis, s/p right venogram, right iliac venoplasty, venous stent placement, mechanical thrombectomy presents to emergency department for episodes of incoherent speech. 1st Episode was yesterday around 2 pm where she was lethargic and was speaking gibberish as per the daughter.. They laid her on the bed, glucose was 176 ;  systolic . At around 4 pm the episode resolved.   She had similar episode today where she was lethargic , forgot what happened during the day and was again speaking gibberish. No similar episodes on the past.   Follows Dr. Yan .     In the ED; Vitals : /64 mmHg ; Hr 66 ; RR 18 ; Spo2 95% on RA.     Labs:   WBC 5.36 K Hb 11.4 ( At baseline) Platelets 233 K   Na 129 K 4.0  Bicarbonate 26   BUN/Creatinine 24/0.9  AST/ALT 18/12   Troponin 18    Imaging:   CT Angio: No evidence of acute intracranial pathology.    CT PERFUSION:  No perfusion deficits to suggest areas of completed infarction or at risk   territory.    CTA HEAD/NECK:  Moderate stenosis involving the bilateral cavernous ICAs due to atherosclerotic calcification. Moderate stenosis of the proximal left internal carotid artery due to calcified atherosclerotic plaque.    s/p Aspirin 325 mg.    (21 Sep 2024 20:37)    Spoke with daughter at bedside for history.  Patient has been off for the last few days and yesterday was very confused.  She had some medication changes recently including decreasing prednisone to 1mg and decreasing eliquis form 5mg BID to 2.5mg BID.      ROS:  Constitutional, Neurological, Psychiatric, Eyes, ENT, Cardiovascular, Respiratory, Gastrointestinal, Genitourinary, Musculoskeletal, Integumentary, Endocrine and Heme/Lymph are otherwise negative. Except for noted above    Social History: No smoking, No drinking, No drug use    FAMILY HISTORY:   : non-contributory              Vital Signs Last 24 Hrs  T(C): 36.1 (22 Sep 2024 07:10), Max: 36.2 (21 Sep 2024 20:43)  T(F): 97 (22 Sep 2024 07:10), Max: 97.2 (21 Sep 2024 20:43)  HR: 75 (22 Sep 2024 07:04) (66 - 82)  BP: 169/79 (22 Sep 2024 07:04) (156/70 - 211/91)  BP(mean): 113 (22 Sep 2024 07:04) (100 - 113)  RR: 22 (22 Sep 2024 07:10) (18 - 30)  SpO2: 94% (22 Sep 2024 07:04) (94% - 99%)    Parameters below as of 22 Sep 2024 07:04  Patient On (Oxygen Delivery Method): room air        Physical Exam:  Constitutional: alert and in no acute distress.  Eyes: the sclera and conjunctiva were normal, pupils were equal in size, round, reactive to light, with normal accommodation and extraocular movements were intact.   Back: no costovertebral angle tenderness and no spinal tenderness.      Neuro Exam:  a+Ox2 able to name and repeat  Right lower face facial weakness, no dysarthria, EOMI, VFF, V1-V3 symmetric  No drift, power 5/5  LT symmetric in extremities  FTN NL  No neglect       NIHSS: 2  mrankin 2    Allergies    No Known Allergies    Intolerances       Home Medications:  atorvastatin 10 mg oral tablet: 1 tab(s) orally once a day (at bedtime) (21 Sep 2024 22:20)  BUPROPION HCL  MG TABLET: TAKE 1 TABLET BY MOUTH EVERY DAY (21 Sep 2024 22:21)  Eliquis 2.5 mg oral tablet: 1 tab(s) orally 2 times a day (21 Sep 2024 22:17)  folic acid 1 mg oral tablet: 1 tab(s) orally once a day (21 Sep 2024 22:21)  glipiZIDE 2.5 mg oral tablet: 0.5 tab(s) orally once a day (21 Sep 2024 22:21)  HumuLIN 70/30 KwikPen 70 units-30 units/mL subcutaneous suspension: subcutaneous 2 times a day -correction dose (21 Sep 2024 22:21)  methotrexate 2.5 mg oral tablet: 6 tab(s) orally once a week (21 Sep 2024 22:21)  pantoprazole 40 mg oral delayed release tablet: 1 tab(s) orally once a day (21 Sep 2024 22:21)  predniSONE 1 mg oral tablet: 1 tab(s) orally once a day For 2 weeks. (21 Sep 2024 22:21)  propranolol 10 mg oral tablet: 1 tab(s) orally 2 times a day (21 Sep 2024 22:21)  Toujeo Max SoloStar 300 units/mL subcutaneous solution: 8 unit(s) subcutaneous once a day (at bedtime) (21 Sep 2024 22:21)  VILAZODONE HCL 40 MG TABLET: TAKE 1 TABLET BY MOUTH EVERY DAY (21 Sep 2024 22:21)      MEDICATIONS  (STANDING):  apixaban 2.5 milliGRAM(s) Oral every 12 hours  aspirin enteric coated 81 milliGRAM(s) Oral daily  atorvastatin 80 milliGRAM(s) Oral at bedtime  buPROPion XL (24-Hour) . 300 milliGRAM(s) Oral daily  cefTRIAXone   IVPB 1000 milliGRAM(s) IV Intermittent every 24 hours  dextrose 5%. 1000 milliLiter(s) (100 mL/Hr) IV Continuous <Continuous>  dextrose 5%. 1000 milliLiter(s) (50 mL/Hr) IV Continuous <Continuous>  dextrose 50% Injectable 12.5 Gram(s) IV Push once  dextrose 50% Injectable 25 Gram(s) IV Push once  dextrose 50% Injectable 25 Gram(s) IV Push once  folic acid 1 milliGRAM(s) Oral daily  glucagon  Injectable 1 milliGRAM(s) IntraMuscular once  influenza  Vaccine (HIGH DOSE) 0.5 milliLiter(s) IntraMuscular once  insulin glargine Injectable (LANTUS) 8 Unit(s) SubCutaneous at bedtime  insulin lispro (ADMELOG) corrective regimen sliding scale   SubCutaneous three times a day before meals  magnesium sulfate  IVPB 2 Gram(s) IV Intermittent once  pantoprazole    Tablet 40 milliGRAM(s) Oral before breakfast  predniSONE   Tablet 1 milliGRAM(s) Oral daily  propranolol 10 milliGRAM(s) Oral two times a day    MEDICATIONS  (PRN):  dextrose Oral Gel 15 Gram(s) Oral once PRN Blood Glucose LESS THAN 70 milliGRAM(s)/deciliter      LABS:                        11.7   6.67  )-----------( 242      ( 22 Sep 2024 05:27 )             37.9     09-22    139  |  101  |  19  ----------------------------<  142[H]  4.0   |  28  |  1.0    Ca    9.0      22 Sep 2024 05:27  Phos  3.6     09-22  Mg     1.7     09-22    TPro  5.9[L]  /  Alb  3.7  /  TBili  0.4  /  DBili  x   /  AST  16  /  ALT  12  /  AlkPhos  66  09-22    PT/INR - ( 21 Sep 2024 18:04 )   PT: 13.90 sec;   INR: 1.22 ratio         PTT - ( 21 Sep 2024 18:04 )  PTT:37.0 sec    Urinalysis (09.22.24 @ 05:18)    Glucose Qualitative, Urine: Negative mg/dL   Blood, Urine: Negative   pH Urine: 6.0   Color: Yellow   Urine Appearance: Clear   Bilirubin: Negative   Ketone - Urine: Negative mg/dL   Specific Gravity: >1.030   Protein, Urine: Negative mg/dL   Urobilinogen: 0.2 mg/dL   Nitrite: Negative   Leukocyte Esterase Concentration: Moderate        Neuro Imaging:  NCHCT:   < from: CT Angio Neck Stroke Protocol w/ IV Cont (09.21.24 @ 18:31) >    COMPARISON: CT of the neck dated 5/1/2018.    FINDINGS:    CT PERFUSION:    Motion artifact degrades exam.    CBF<30%: 0 mL  Tmax>6sec: 0 mL  Mismatch volume: 0 mL  Mismatch ratio: none    Per RAPID assessment for acute infarct, threshold for ischemic tissue   volume is Tmax > 6 sec.  Threshold for infarct core volume estimate is CBF < 30 percent.  Threshold for poor collateral flow or severe delayed perfusion is Tmax >   10 sec.      CTA HEAD/NECK:    AORTIC ARCH: There is scattered atherosclerotic calcification of the   aortic arch as well as the proximal great vessels without stenosis.    RIGHT ANTERIOR CIRCULATION:  Common carotid artery: There is mild stenosis at the origin of the right   common carotid artery due to mixed calcified and noncalcified   atherosclerotic plaque. Scattered calcified and noncalcified plaques of   the common carotid artery without stenosis.  External carotid artery: There is atherosclerotic calcification at the   origin of the external carotid artery resulting in mild stenosis.  Internal carotid artery:  -Extracranial: Calcified plaque at the carotid bulb causing mild stenosis.  -Intracranial: Calcified plaque at the cavernous segment causing moderate   stenosis.    Anterior cerebral artery: The right A1 segment is diminutive in caliber   though patent. No stenosis.  Middle cerebral artery: No stenosis.      LEFT ANTERIOR CIRCULATION:  Common carotid artery: Calcified and noncalcified plaque causing up to   moderate stenosis.  External carotid artery: No stenosis.  Internal carotid artery:  -Extracranial: There is atherosclerotic calcification involving the   proximal internal carotid artery resulting in approximately 60% stenosis.   The internal carotid arteries otherwise patent intracranial circulation.  -Intracranial: Calcified plaque at the cavernous segment causing moderate   stenosis..    Anterior cerebral artery: No stenosis.  Middle cerebral artery: No stenosis.      POSTERIOR CIRCULATION:  Right vertebral artery: No stenosis.  Left vertebral artery: No stenosis.  Basilar artery: No stenosis.  Proximal cerebellar arteries: No stenosis.    Posterior cerebral arteries: No stenosis.      IMPRESSION:    CT PERFUSION:  No perfusion deficits to suggest areas of completed infarction or at risk   territory.    CTA HEAD/NECK:  Moderate stenosis involving the bilateral cavernous ICAs due to   atherosclerotic calcification.    Moderate stenosis of the proximal left internal carotid artery due to   calcified atherosclerotic plaque.    --- End of Report ---    < end of copied text >    < from: CT Brain Stroke Protocol (09.21.24 @ 18:20) >  IMPRESSION:    No evidence of acute intracranial pathology.    Spoke with BANDAR JONES PA; Emergency Me on 9/21/2024 6:47 PM with   readback.    --- End of Report ---    < end of copied text >        Assessment / Plan: This is a 84y year old Female presenting with a few days of confusion which was worse on day of admission.  There was also a worsening of her right facial which she has had for a few years since facial gland surgery.  She has improved today and etiology is unclear if was related to a toxic/metabolic etiology vs stroke (given cerebrovascular disease  1. MRI brain w/o DWAYNE tomorrow  2. Can continue Eliquis and asa 81mg QD  3. Continue statin  4. PT/OT/Rehab  5.

## 2024-09-23 NOTE — DIETITIAN INITIAL EVALUATION ADULT - OTHER INFO
pt is 84 year old female with hx of HTN, DM, RA, cervical CA s/p chemo and RT, R venogram, R iliac venoplasty, mechanical thrombectomy p/w incoherent speech and lethargy, pt admitted with TIA, + carotid stenosis.

## 2024-09-23 NOTE — SWALLOW BEDSIDE ASSESSMENT ADULT - ASR SWALLOW LABIAL MOBILITY
left-sided droop/impaired retraction/impaired pursing
left-sided droop/impaired retraction/impaired pursing

## 2024-09-23 NOTE — DIETITIAN INITIAL EVALUATION ADULT - NS FNS DIET ORDER
Diet, Easy to Chew:   Consistent Carbohydrate {Evening Snack} (CSTCHOSN)  DASH/TLC {Sodium & Cholesterol Restricted} (DASH) (09-22-24 @ 10:13) [Active]

## 2024-09-23 NOTE — SWALLOW BEDSIDE ASSESSMENT ADULT - SWALLOW EVAL: FUNCTIONAL LEVEL AT TIME OF EVAL
AOx2 to self and place, cannot specify which hospital
AOx2 to self and place, cannot specify which hospital. Per daughter pt. is confused. Not baseline.

## 2024-09-23 NOTE — OCCUPATIONAL THERAPY INITIAL EVALUATION ADULT - LIVES WITH, PROFILE
(daughter lives nearby) +3 steps to enter with (L) handrail then level inside +sponge bathe +standard toilet with vanity within reach/alone

## 2024-09-23 NOTE — OCCUPATIONAL THERAPY INITIAL EVALUATION ADULT - ADDITIONAL COMMENTS
PLOF obtained from pt and pt's daughter at bedside. Pt owns RW. Pt's daughter was assisting with cooking/cleaning for previous couple of weeks.  (-) driving

## 2024-09-23 NOTE — DIETITIAN INITIAL EVALUATION ADULT - ORAL INTAKE PTA/DIET HISTORY
as per family at bedside pt consumes a regular diet PTA with good po intake. NKFA or intolerances. no recent weight changes    presently on an easy to chew CHO consistent DASH/TLC diet tolerating well consumed 75% of dinner

## 2024-09-23 NOTE — SWALLOW BEDSIDE ASSESSMENT ADULT - SWALLOW EVAL: DIAGNOSIS
+toleration observed w/o s/s of aspiration/penetration for Easy to chew consistency and thin liquids
+toleration observed w/o s/s of aspiration/penetration for easy to chew consistency and thin liquids

## 2024-09-23 NOTE — SWALLOW BEDSIDE ASSESSMENT ADULT - SLP PERTINENT HISTORY OF CURRENT PROBLEM
84-year-old female Ivorian Speaking with past medical history of  HTN, DM, RA on methotrexate, cervical cancer s/p chemotherapy and radiation on eliquis, s/p right venogram, right iliac venoplasty, venous stent placement, mechanical thrombectomy presents to emergency department for episodes of incoherent speech. 1st Episode was 9/20 around 2 pm where she was lethargic and was speaking gibberish as per the daughter. They laid her on the bed, glucose was 176 ;  systolic . At around 4 pm the episode resolved. She had similar episode 9/21 where she was lethargic , forgot what happened during the day and was again speaking gibberish. No similar episodes on the past. CTH negative for acute infarct. CTA head & neck: Moderate Stenosis of B/L Cavernous ICA and Moderate stenosis Proximal Left ICA.
84-year-old female Kittitian Speaking with past medical history of  HTN, DM, RA on methotrexate, cervical cancer s/p chemotherapy and radiation on eliquis, s/p right venogram, right iliac venoplasty, venous stent placement, mechanical thrombectomy presents to emergency department for episodes of incoherent speech. 1st Episode was 9/20 around 2 pm where she was lethargic and was speaking gibberish as per the daughter. They laid her on the bed, glucose was 176 ;  systolic . At around 4 pm the episode resolved. She had similar episode 9/21 where she was lethargic , forgot what happened during the day and was again speaking gibberish. No similar episodes on the past. CTH negative for acute infarct. CTA head & neck: Moderate Stenosis of B/L Cavernous ICA and Moderate stenosis Proximal Left ICA.

## 2024-09-23 NOTE — OCCUPATIONAL THERAPY INITIAL EVALUATION ADULT - GENERAL OBSERVATIONS, REHAB EVAL
14:40-15:05; chart reviewed, ok to treat by Occupational Therapist as confirmed by RN Marline, Pt received semifowler +tele +BP cuff +pulse ox +LUE heplock with daughter and 1:1 present in NAD. Pt's daughter, Edna, preferred to translate at bedside. Pt denied pain at rest and in agreement with OT IE.

## 2024-09-23 NOTE — OCCUPATIONAL THERAPY INITIAL EVALUATION ADULT - SPECIFY REASON(S)
As discussed with PILY Horan, Hold OT IE 2/2 patient on precedex at this time. OT to f/u when appropriate. Daughters aware and in agreement.

## 2024-09-23 NOTE — CONSULT NOTE ADULT - ASSESSMENT
# CVA  # Moderate Stenosis of B/L Cavernous ICA  # Moderate stenosis Proximal Left ICA.   # Hypertension, Diabetes Mellitus Type 2:   # Right Iliac Vein stent on Eliquis :   # Rheumatoid Arthritis       PLAN:      CNS: avoid sedation. CT head noted. neuro check per neuro recs. off precedex. MRI. resume home meds    HEENT: Oral care    PULMONARY:  HOB @ 45 degrees.  Aspiration precautions,     CARDIOVASCULAR: avoid volume overload, MAP adequate    GI: GI prophylaxis. swallow eval    RENAL:  Follow up lytes.  Correct as needed    INFECTIOUS DISEASE: Follow up cultures, procalcitonin    HEMATOLOGICAL:  DVT prophylaxis. on eliquis    ENDOCRINE:  Follow up FS.  Insulin protocol if needed.    MUSCULOSKELETAL: pt/rehab    recall as needed

## 2024-09-23 NOTE — SWALLOW BEDSIDE ASSESSMENT ADULT - SLP GENERAL OBSERVATIONS
Pt received awake, alert, in bed, O2 via room air, speech is clear, +confusion
Pt received awake, alert, in bed, O2 via room air, speech is clear, +confusion

## 2024-09-24 ENCOUNTER — TRANSCRIPTION ENCOUNTER (OUTPATIENT)
Age: 84
End: 2024-09-24

## 2024-09-24 VITALS
HEART RATE: 61 BPM | RESPIRATION RATE: 20 BRPM | DIASTOLIC BLOOD PRESSURE: 53 MMHG | SYSTOLIC BLOOD PRESSURE: 137 MMHG | TEMPERATURE: 97 F | OXYGEN SATURATION: 98 %

## 2024-09-24 LAB
ALBUMIN SERPL ELPH-MCNC: 3.6 G/DL — SIGNIFICANT CHANGE UP (ref 3.5–5.2)
ALP SERPL-CCNC: 68 U/L — SIGNIFICANT CHANGE UP (ref 30–115)
ALT FLD-CCNC: 10 U/L — SIGNIFICANT CHANGE UP (ref 0–41)
ANION GAP SERPL CALC-SCNC: 12 MMOL/L — SIGNIFICANT CHANGE UP (ref 7–14)
AST SERPL-CCNC: 14 U/L — SIGNIFICANT CHANGE UP (ref 0–41)
BASOPHILS # BLD AUTO: 0.02 K/UL — SIGNIFICANT CHANGE UP (ref 0–0.2)
BASOPHILS NFR BLD AUTO: 0.3 % — SIGNIFICANT CHANGE UP (ref 0–1)
BILIRUB SERPL-MCNC: 0.3 MG/DL — SIGNIFICANT CHANGE UP (ref 0.2–1.2)
BUN SERPL-MCNC: 16 MG/DL — SIGNIFICANT CHANGE UP (ref 10–20)
CALCIUM SERPL-MCNC: 9 MG/DL — SIGNIFICANT CHANGE UP (ref 8.4–10.5)
CHLORIDE SERPL-SCNC: 101 MMOL/L — SIGNIFICANT CHANGE UP (ref 98–110)
CO2 SERPL-SCNC: 26 MMOL/L — SIGNIFICANT CHANGE UP (ref 17–32)
CREAT SERPL-MCNC: 0.9 MG/DL — SIGNIFICANT CHANGE UP (ref 0.7–1.5)
EGFR: 63 ML/MIN/1.73M2 — SIGNIFICANT CHANGE UP
EOSINOPHIL # BLD AUTO: 0.12 K/UL — SIGNIFICANT CHANGE UP (ref 0–0.7)
EOSINOPHIL NFR BLD AUTO: 2 % — SIGNIFICANT CHANGE UP (ref 0–8)
GLUCOSE BLDC GLUCOMTR-MCNC: 155 MG/DL — HIGH (ref 70–99)
GLUCOSE BLDC GLUCOMTR-MCNC: 197 MG/DL — HIGH (ref 70–99)
GLUCOSE SERPL-MCNC: 191 MG/DL — HIGH (ref 70–99)
HCT VFR BLD CALC: 37 % — SIGNIFICANT CHANGE UP (ref 37–47)
HGB BLD-MCNC: 11.5 G/DL — LOW (ref 12–16)
IMM GRANULOCYTES NFR BLD AUTO: 0.3 % — SIGNIFICANT CHANGE UP (ref 0.1–0.3)
LYMPHOCYTES # BLD AUTO: 0.72 K/UL — LOW (ref 1.2–3.4)
LYMPHOCYTES # BLD AUTO: 12.2 % — LOW (ref 20.5–51.1)
MAGNESIUM SERPL-MCNC: 1.8 MG/DL — SIGNIFICANT CHANGE UP (ref 1.8–2.4)
MCHC RBC-ENTMCNC: 25.3 PG — LOW (ref 27–31)
MCHC RBC-ENTMCNC: 31.1 G/DL — LOW (ref 32–37)
MCV RBC AUTO: 81.5 FL — SIGNIFICANT CHANGE UP (ref 81–99)
MONOCYTES # BLD AUTO: 0.63 K/UL — HIGH (ref 0.1–0.6)
MONOCYTES NFR BLD AUTO: 10.6 % — HIGH (ref 1.7–9.3)
NEUTROPHILS # BLD AUTO: 4.41 K/UL — SIGNIFICANT CHANGE UP (ref 1.4–6.5)
NEUTROPHILS NFR BLD AUTO: 74.6 % — SIGNIFICANT CHANGE UP (ref 42.2–75.2)
NRBC # BLD: 0 /100 WBCS — SIGNIFICANT CHANGE UP (ref 0–0)
PLATELET # BLD AUTO: 225 K/UL — SIGNIFICANT CHANGE UP (ref 130–400)
PMV BLD: 11.7 FL — HIGH (ref 7.4–10.4)
POTASSIUM SERPL-MCNC: 4.5 MMOL/L — SIGNIFICANT CHANGE UP (ref 3.5–5)
POTASSIUM SERPL-SCNC: 4.5 MMOL/L — SIGNIFICANT CHANGE UP (ref 3.5–5)
PROT SERPL-MCNC: 6.1 G/DL — SIGNIFICANT CHANGE UP (ref 6–8)
RBC # BLD: 4.54 M/UL — SIGNIFICANT CHANGE UP (ref 4.2–5.4)
RBC # FLD: 14.6 % — HIGH (ref 11.5–14.5)
SODIUM SERPL-SCNC: 139 MMOL/L — SIGNIFICANT CHANGE UP (ref 135–146)
WBC # BLD: 5.92 K/UL — SIGNIFICANT CHANGE UP (ref 4.8–10.8)
WBC # FLD AUTO: 5.92 K/UL — SIGNIFICANT CHANGE UP (ref 4.8–10.8)

## 2024-09-24 RX ORDER — ASPIRIN 325 MG
1 TABLET ORAL
Qty: 60 | Refills: 0
Start: 2024-09-24 | End: 2024-11-22

## 2024-09-24 RX ADMIN — INFLUENZA VIRUS VACCINE 0.5 MILLILITER(S): 15; 15; 15; 15 SUSPENSION INTRAMUSCULAR at 15:27

## 2024-09-24 RX ADMIN — PREDNISONE 1 MILLIGRAM(S): 5 TABLET ORAL at 06:32

## 2024-09-24 RX ADMIN — Medication 81 MILLIGRAM(S): at 11:41

## 2024-09-24 RX ADMIN — Medication 2: at 11:43

## 2024-09-24 RX ADMIN — Medication 100 MILLIGRAM(S): at 11:28

## 2024-09-24 RX ADMIN — PANTOPRAZOLE SODIUM 40 MILLIGRAM(S): 40 TABLET, DELAYED RELEASE ORAL at 06:32

## 2024-09-24 RX ADMIN — DEXMEDETOMIDINE HYDROCHLORIDE IN 0.9% SODIUM CHLORIDE 3.65 MICROGRAM(S)/KG/HR: 400 INJECTION INTRAVENOUS at 06:43

## 2024-09-24 RX ADMIN — DEXMEDETOMIDINE HYDROCHLORIDE IN 0.9% SODIUM CHLORIDE 3.65 MICROGRAM(S)/KG/HR: 400 INJECTION INTRAVENOUS at 04:48

## 2024-09-24 RX ADMIN — Medication 2: at 07:50

## 2024-09-24 RX ADMIN — FOLIC ACID 1 MILLIGRAM(S): 1 TABLET ORAL at 11:41

## 2024-09-24 RX ADMIN — CHLORHEXIDINE GLUCONATE ORAL RINSE 1 APPLICATION(S): 1.2 SOLUTION DENTAL at 06:31

## 2024-09-24 RX ADMIN — APIXABAN 2.5 MILLIGRAM(S): 5 TABLET, FILM COATED ORAL at 06:32

## 2024-09-24 NOTE — DISCHARGE NOTE PROVIDER - HOSPITAL COURSE
HPI:  84-year-old female Surinamese Speaking with past medical history of  HTN, DM, RA on methotrexate, cervical cancer s/p chemotherapy and radiation on eliquis, s/p right venogram, right iliac venoplasty, venous stent placement, mechanical thrombectomy presents to emergency department for episodes of incoherent speech. 1st Episode was yesterday around 2 pm where she was lethargic and was speaking gibberish as per the daughter.. They laid her on the bed, glucose was 176 ;  systolic . At around 4 pm the episode resolved.   She had similar episode today where she was lethargic , forgot what happened during the day and was again speaking gibberish. No similar episodes on the past.   Follows Dr. Yan .   In the ED; Vitals : /64 mmHg ; Hr 66 ; RR 18 ; Spo2 95% on RA.   Labs:   WBC 5.36 K Hb 11.4 ( At baseline) Platelets 233 K   Na 129 K 4.0  Bicarbonate 26   BUN/Creatinine 24/0.9  AST/ALT 18/12   Troponin 18  Imaging:   CT Angio: No evidence of acute intracranial pathology.  CT PERFUSION:  No perfusion deficits to suggest areas of completed infarction or at risk   territory.  CTA HEAD/NECK:  Moderate stenosis involving the bilateral cavernous ICAs due to atherosclerotic calcification. Moderate stenosis of the proximal left internal carotid artery due to calcified atherosclerotic plaque.  s/p Aspirin 325 mg.    (21 Sep 2024 20:37)    MR head showed: IMPRESSION:    No acute intracranial pathology.    Mild chronic microvascular ischemic changes.    Patient was seen and examined this morning at bedside and is stable for discharge.  He is to be discharged on the prescribed medications  He is to follow-up with his PCP

## 2024-09-24 NOTE — DISCHARGE NOTE PROVIDER - NSDCMRMEDTOKEN_GEN_ALL_CORE_FT
aspirin 81 mg oral delayed release tablet: 1 tab(s) orally once a day  atorvastatin 10 mg oral tablet: 1 tab(s) orally once a day (at bedtime)  BUPROPION HCL  MG TABLET: 1 tab(s) orally once a day TAKE 1 TABLET BY MOUTH EVERY DAY  Eliquis 2.5 mg oral tablet: 1 tab(s) orally 2 times a day  folic acid 1 mg oral tablet: 1 tab(s) orally once a day  glipiZIDE 2.5 mg oral tablet: 0.5 tab(s) orally once a day  HumuLIN 70/30 KwikPen 70 units-30 units/mL subcutaneous suspension: subcutaneous 2 times a day -correction dose  methotrexate 2.5 mg oral tablet: 6 tab(s) orally once a week  pantoprazole 40 mg oral delayed release tablet: 1 tab(s) orally once a day  predniSONE 1 mg oral tablet: 1 tab(s) orally once a day For 2 weeks.  propranolol 10 mg oral tablet: 1 tab(s) orally 2 times a day  Toujeo Max SoloStar 300 units/mL subcutaneous solution: 8 unit(s) subcutaneous once a day (at bedtime)  VILAZODONE HCL 40 MG TABLET: TAKE 1 TABLET BY MOUTH EVERY DAY

## 2024-09-24 NOTE — DISCHARGE NOTE PROVIDER - NSDCCPCAREPLAN_GEN_ALL_CORE_FT
PRINCIPAL DISCHARGE DIAGNOSIS  Diagnosis: Slurred speech  Assessment and Plan of Treatment: Luckily you did not suffer a stroke at this time, however you may have had what we believe is a "TIA" or a mini-stroke. You do not have any changes on images of your brain and you will not have any residual problems from it, but we worry about TIAs because they may predict a higher likelihood of having a stroke in the future. Be sure to practice healthy lifestyle measures, exercise 150min per week, eat a healthy well balanced diet. If you have been told to take medications called statins or aspirin, be sure to take them as prescribed on a daily basis. Return to th ER immediately if you have symptoms consistent with a stroke again such as slurred speech, one sided weakness, confusion, inability to talk or to understand speech from others.

## 2024-09-24 NOTE — DISCHARGE NOTE NURSING/CASE MANAGEMENT/SOCIAL WORK - PATIENT PORTAL LINK FT
You can access the FollowMyHealth Patient Portal offered by [facility] by registering at the following website: [email or web address]. By joining [employer]’s FollowMyHealth portal, you will also be able to view your health information using other applications (apps) compatible with our system.

## 2024-09-24 NOTE — DISCHARGE NOTE NURSING/CASE MANAGEMENT/SOCIAL WORK - NSDCVIVACCINE_GEN_ALL_CORE_FT
Influenza, high-dose, trivalent, preservative free; 24-Sep-2024 15:27; Mia Alonzo (PILY); [employer]; [id number] (Exp. Date: 30-Jun-2025); IntraMuscular; Deltoid Left.; 0.5 milliLiter(s); VIS (VIS Published: 06-Aug-2021, VIS Presented: 24-Sep-2024);

## 2024-09-24 NOTE — DISCHARGE NOTE PROVIDER - NSDCFUADDAPPT_GEN_ALL_CORE_FT
APPTS ARE READY TO BE MADE: [ ] YES    Best Family or Patient Contact (if needed):    Additional Information about above appointments (if needed):    1: Farrah  2: Tavares   3:     Other comments or requests:

## 2024-09-24 NOTE — PROGRESS NOTE ADULT - ASSESSMENT
# CVA  - CT head noted  - neuro checks per neuro, stable   - f/u MRI  - resume home meds  - speech eval appreciated, easy to chew     # Moderate Stenosis of B/L Cavernous ICA  # Moderate stenosis Proximal Left ICA.   # Hypertension, Diabetes Mellitus Type 2  # Right Iliac Vein stent on Eliquis :   - cont eliquis, aspirin, statin  - cont propanolol    # Rheumatoid Arthritis   - cont home prednisone

## 2024-09-24 NOTE — DISCHARGE NOTE PROVIDER - CARE PROVIDER_API CALL
Finesse Yan  Internal Medicine  [address] [place] [address]  Phone: [phone]  Fax: [phone]  Established Patient  Scheduled Appointment: 09/26/2024 02:00 PM    Manjinder Chapin  Neurology  [address] [place] [address], [address]  [place] [address]  Phone: [phone]  Fax: [phone]  Follow Up Time: 1 week

## 2024-09-24 NOTE — BH DISCHARGE NOTE NURSING/SOCIAL WORK/PSYCH REHAB - PATIENT PORTAL LINK FT
You can access the FollowMyHealth Patient Portal offered by [facility] by registering at the following website: [email or web address]. By joining [employer]’s FollowMyHealth portal, you will also be able to view your health information using other applications (apps) compatible with our system.

## 2024-09-24 NOTE — DISCHARGE NOTE PROVIDER - NSDCFUSCHEDAPPT_GEN_ALL_CORE_FT
[facility]  Scheduled Appointment: 10/08/2024    Leslie Ragland  [facility]  [address]  Scheduled Appointment: 10/08/2024

## 2024-09-24 NOTE — DISCHARGE NOTE PROVIDER - PROVIDER TOKENS
PROVIDER:[TOKEN:[[id number]],SCHEDULEDAPPT:[09/26/2024],SCHEDULEDAPPTTIME:[02:00 PM],ESTABLISHEDPATIENT:[T]],PROVIDER:[TOKEN:[[id number]],FOLLOWUP:[1 week]]

## 2024-09-25 LAB — PROCALCITONIN SERPL-MCNC: <0.02 NG/ML — SIGNIFICANT CHANGE UP (ref 0.02–0.1)

## 2024-09-25 NOTE — CHART NOTE - NSCHARTNOTEFT_GEN_A_CORE
[facility] MRN [id number] patient has  pcp and others scheduled- pt seems to only need neurology- we can call and offer the number to get in touch. / Pt already has established care 9/25 - JASON   Specialty: PCP

## 2024-10-03 DIAGNOSIS — G47.00 INSOMNIA, UNSPECIFIED: ICD-10-CM

## 2024-10-03 DIAGNOSIS — Z92.21 PERSONAL HISTORY OF ANTINEOPLASTIC CHEMOTHERAPY: ICD-10-CM

## 2024-10-03 DIAGNOSIS — R29.702 NIHSS SCORE 2: ICD-10-CM

## 2024-10-03 DIAGNOSIS — I10 ESSENTIAL (PRIMARY) HYPERTENSION: ICD-10-CM

## 2024-10-03 DIAGNOSIS — Z92.3 PERSONAL HISTORY OF IRRADIATION: ICD-10-CM

## 2024-10-03 DIAGNOSIS — Z79.4 LONG TERM (CURRENT) USE OF INSULIN: ICD-10-CM

## 2024-10-03 DIAGNOSIS — R47.81 SLURRED SPEECH: ICD-10-CM

## 2024-10-03 DIAGNOSIS — E11.9 TYPE 2 DIABETES MELLITUS WITHOUT COMPLICATIONS: ICD-10-CM

## 2024-10-03 DIAGNOSIS — E83.42 HYPOMAGNESEMIA: ICD-10-CM

## 2024-10-03 DIAGNOSIS — M06.9 RHEUMATOID ARTHRITIS, UNSPECIFIED: ICD-10-CM

## 2024-10-03 DIAGNOSIS — R29.810 FACIAL WEAKNESS: ICD-10-CM

## 2024-10-03 DIAGNOSIS — Z85.41 PERSONAL HISTORY OF MALIGNANT NEOPLASM OF CERVIX UTERI: ICD-10-CM

## 2024-10-07 DIAGNOSIS — Z23 ENCOUNTER FOR IMMUNIZATION: ICD-10-CM

## 2024-10-08 ENCOUNTER — APPOINTMENT (OUTPATIENT)
Dept: RADIATION ONCOLOGY | Facility: HOSPITAL | Age: 84
End: 2024-10-08

## 2024-10-23 NOTE — ED PROVIDER NOTE - NS SC NIH SEDATED_GEN_A_CORE
-- DO NOT REPLY / DO NOT REPLY ALL --  -- This inbox is not monitored. If this was sent to the wrong provider or department, reroute message to P ECO Reroute pool. --  -- Message is from Engagement Center Operations (ECO) --    General Patient Message: Patient is requesting a call in regards to discuss and possible move forward with the Injection that was discuss on 5/20/2024  Caller Information       Contact Date/Time Type Contact Phone/Fax    10/23/2024 02:18 PM CDT Phone (Incoming) Jennyfer Weems (Self) [phone] (M)            Alternative phone number: N/A     Can a detailed message be left? Yes - Voicemail   Patient has been advised the message will be addressed within 2-3 business days.                 No

## 2024-11-11 ENCOUNTER — OUTPATIENT (OUTPATIENT)
Dept: OUTPATIENT SERVICES | Facility: HOSPITAL | Age: 84
LOS: 1 days | End: 2024-11-11
Payer: MEDICARE

## 2024-11-11 ENCOUNTER — RESULT REVIEW (OUTPATIENT)
Age: 84
End: 2024-11-11

## 2024-11-11 DIAGNOSIS — R22.40 LOCALIZED SWELLING, MASS AND LUMP, UNSPECIFIED LOWER LIMB: Chronic | ICD-10-CM

## 2024-11-11 DIAGNOSIS — Z92.89 PERSONAL HISTORY OF OTHER MEDICAL TREATMENT: Chronic | ICD-10-CM

## 2024-11-11 DIAGNOSIS — C53.9 MALIGNANT NEOPLASM OF CERVIX UTERI, UNSPECIFIED: ICD-10-CM

## 2024-11-11 LAB — GLUCOSE BLDC GLUCOMTR-MCNC: 138 MG/DL — HIGH (ref 70–99)

## 2024-11-11 PROCEDURE — 78815 PET IMAGE W/CT SKULL-THIGH: CPT | Mod: 26,PS,MH

## 2024-11-11 PROCEDURE — 82962 GLUCOSE BLOOD TEST: CPT

## 2024-11-11 PROCEDURE — 78815 PET IMAGE W/CT SKULL-THIGH: CPT | Mod: PS

## 2024-11-11 PROCEDURE — A9552: CPT

## 2024-11-12 DIAGNOSIS — C53.9 MALIGNANT NEOPLASM OF CERVIX UTERI, UNSPECIFIED: ICD-10-CM

## 2025-03-04 ENCOUNTER — APPOINTMENT (OUTPATIENT)
Age: 85
End: 2025-03-04

## 2025-03-24 DIAGNOSIS — I77.1 STRICTURE OF ARTERY: ICD-10-CM

## 2025-05-27 ENCOUNTER — APPOINTMENT (OUTPATIENT)
Dept: VASCULAR SURGERY | Facility: CLINIC | Age: 85
End: 2025-05-27

## 2025-06-12 ENCOUNTER — EMERGENCY (EMERGENCY)
Facility: HOSPITAL | Age: 85
LOS: 0 days | Discharge: HOME | End: 2025-06-12
Attending: EMERGENCY MEDICINE
Payer: MEDICARE

## 2025-06-12 VITALS
SYSTOLIC BLOOD PRESSURE: 143 MMHG | DIASTOLIC BLOOD PRESSURE: 100 MMHG | WEIGHT: 160.06 LBS | TEMPERATURE: 98 F | HEART RATE: 95 BPM | OXYGEN SATURATION: 99 % | RESPIRATION RATE: 20 BRPM

## 2025-06-12 DIAGNOSIS — Z79.82 LONG TERM (CURRENT) USE OF ASPIRIN: ICD-10-CM

## 2025-06-12 DIAGNOSIS — M19.90 UNSPECIFIED OSTEOARTHRITIS, UNSPECIFIED SITE: ICD-10-CM

## 2025-06-12 DIAGNOSIS — R11.0 NAUSEA: ICD-10-CM

## 2025-06-12 DIAGNOSIS — R22.40 LOCALIZED SWELLING, MASS AND LUMP, UNSPECIFIED LOWER LIMB: Chronic | ICD-10-CM

## 2025-06-12 DIAGNOSIS — E11.9 TYPE 2 DIABETES MELLITUS WITHOUT COMPLICATIONS: ICD-10-CM

## 2025-06-12 DIAGNOSIS — M54.50 LOW BACK PAIN, UNSPECIFIED: ICD-10-CM

## 2025-06-12 DIAGNOSIS — Z92.89 PERSONAL HISTORY OF OTHER MEDICAL TREATMENT: Chronic | ICD-10-CM

## 2025-06-12 DIAGNOSIS — E78.5 HYPERLIPIDEMIA, UNSPECIFIED: ICD-10-CM

## 2025-06-12 DIAGNOSIS — N17.9 ACUTE KIDNEY FAILURE, UNSPECIFIED: ICD-10-CM

## 2025-06-12 DIAGNOSIS — I10 ESSENTIAL (PRIMARY) HYPERTENSION: ICD-10-CM

## 2025-06-12 DIAGNOSIS — E86.0 DEHYDRATION: ICD-10-CM

## 2025-06-12 DIAGNOSIS — K52.9 NONINFECTIVE GASTROENTERITIS AND COLITIS, UNSPECIFIED: ICD-10-CM

## 2025-06-12 LAB
ALBUMIN SERPL ELPH-MCNC: 4.5 G/DL — SIGNIFICANT CHANGE UP (ref 3.5–5.2)
ALP SERPL-CCNC: 71 U/L — SIGNIFICANT CHANGE UP (ref 30–115)
ALT FLD-CCNC: 16 U/L — SIGNIFICANT CHANGE UP (ref 0–41)
ANION GAP SERPL CALC-SCNC: 16 MMOL/L — HIGH (ref 7–14)
APPEARANCE UR: CLEAR — SIGNIFICANT CHANGE UP
AST SERPL-CCNC: 26 U/L — SIGNIFICANT CHANGE UP (ref 0–41)
BACTERIA # UR AUTO: ABNORMAL /HPF
BASOPHILS # BLD AUTO: 0.04 K/UL — SIGNIFICANT CHANGE UP (ref 0–0.2)
BASOPHILS NFR BLD AUTO: 0.4 % — SIGNIFICANT CHANGE UP (ref 0–1)
BILIRUB SERPL-MCNC: 0.5 MG/DL — SIGNIFICANT CHANGE UP (ref 0.2–1.2)
BILIRUB UR-MCNC: NEGATIVE — SIGNIFICANT CHANGE UP
BUN SERPL-MCNC: 19 MG/DL — SIGNIFICANT CHANGE UP (ref 10–20)
CALCIUM SERPL-MCNC: 9.7 MG/DL — SIGNIFICANT CHANGE UP (ref 8.4–10.5)
CHLORIDE SERPL-SCNC: 99 MMOL/L — SIGNIFICANT CHANGE UP (ref 98–110)
CO2 SERPL-SCNC: 21 MMOL/L — SIGNIFICANT CHANGE UP (ref 17–32)
COLOR SPEC: YELLOW — SIGNIFICANT CHANGE UP
CREAT SERPL-MCNC: 1.1 MG/DL — SIGNIFICANT CHANGE UP (ref 0.7–1.5)
DIFF PNL FLD: ABNORMAL
EGFR: 49 ML/MIN/1.73M2 — LOW
EGFR: 49 ML/MIN/1.73M2 — LOW
EOSINOPHIL # BLD AUTO: 0.09 K/UL — SIGNIFICANT CHANGE UP (ref 0–0.7)
EOSINOPHIL NFR BLD AUTO: 1 % — SIGNIFICANT CHANGE UP (ref 0–8)
EPI CELLS # UR: SIGNIFICANT CHANGE UP
GLUCOSE SERPL-MCNC: 292 MG/DL — HIGH (ref 70–99)
GLUCOSE UR QL: 250 MG/DL
HCT VFR BLD CALC: 41.4 % — SIGNIFICANT CHANGE UP (ref 37–47)
HGB BLD-MCNC: 12.6 G/DL — SIGNIFICANT CHANGE UP (ref 12–16)
IMM GRANULOCYTES NFR BLD AUTO: 0.4 % — HIGH (ref 0.1–0.3)
KETONES UR QL: ABNORMAL MG/DL
LACTATE SERPL-SCNC: 1.6 MMOL/L — SIGNIFICANT CHANGE UP (ref 0.7–2)
LEUKOCYTE ESTERASE UR-ACNC: ABNORMAL
LIDOCAIN IGE QN: 34 U/L — SIGNIFICANT CHANGE UP (ref 7–60)
LYMPHOCYTES # BLD AUTO: 1.06 K/UL — LOW (ref 1.2–3.4)
LYMPHOCYTES # BLD AUTO: 11.6 % — LOW (ref 20.5–51.1)
MCHC RBC-ENTMCNC: 24.4 PG — LOW (ref 27–31)
MCHC RBC-ENTMCNC: 30.4 G/DL — LOW (ref 32–37)
MCV RBC AUTO: 80.2 FL — LOW (ref 81–99)
MONOCYTES # BLD AUTO: 0.63 K/UL — HIGH (ref 0.1–0.6)
MONOCYTES NFR BLD AUTO: 6.9 % — SIGNIFICANT CHANGE UP (ref 1.7–9.3)
NEUTROPHILS # BLD AUTO: 7.28 K/UL — HIGH (ref 1.4–6.5)
NEUTROPHILS NFR BLD AUTO: 79.7 % — HIGH (ref 42.2–75.2)
NITRITE UR-MCNC: NEGATIVE — SIGNIFICANT CHANGE UP
NRBC BLD AUTO-RTO: 0 /100 WBCS — SIGNIFICANT CHANGE UP (ref 0–0)
PH UR: 7.5 — SIGNIFICANT CHANGE UP (ref 5–8)
PLATELET # BLD AUTO: 283 K/UL — SIGNIFICANT CHANGE UP (ref 130–400)
PMV BLD: 10.3 FL — SIGNIFICANT CHANGE UP (ref 7.4–10.4)
POTASSIUM SERPL-MCNC: 4.8 MMOL/L — SIGNIFICANT CHANGE UP (ref 3.5–5)
POTASSIUM SERPL-SCNC: 4.8 MMOL/L — SIGNIFICANT CHANGE UP (ref 3.5–5)
PROT SERPL-MCNC: 7.2 G/DL — SIGNIFICANT CHANGE UP (ref 6–8)
PROT UR-MCNC: 300 MG/DL
RBC # BLD: 5.16 M/UL — SIGNIFICANT CHANGE UP (ref 4.2–5.4)
RBC # FLD: 18.1 % — HIGH (ref 11.5–14.5)
RBC CASTS # UR COMP ASSIST: 3 /HPF — SIGNIFICANT CHANGE UP (ref 0–4)
SODIUM SERPL-SCNC: 136 MMOL/L — SIGNIFICANT CHANGE UP (ref 135–146)
SP GR SPEC: 1.01 — SIGNIFICANT CHANGE UP (ref 1–1.03)
UROBILINOGEN FLD QL: 0.2 MG/DL — SIGNIFICANT CHANGE UP (ref 0.2–1)
WBC # BLD: 9.14 K/UL — SIGNIFICANT CHANGE UP (ref 4.8–10.8)
WBC # FLD AUTO: 9.14 K/UL — SIGNIFICANT CHANGE UP (ref 4.8–10.8)
WBC UR QL: 4 /HPF — SIGNIFICANT CHANGE UP (ref 0–5)

## 2025-06-12 PROCEDURE — 36415 COLL VENOUS BLD VENIPUNCTURE: CPT

## 2025-06-12 PROCEDURE — 74177 CT ABD & PELVIS W/CONTRAST: CPT

## 2025-06-12 PROCEDURE — 74177 CT ABD & PELVIS W/CONTRAST: CPT | Mod: 26

## 2025-06-12 PROCEDURE — 96375 TX/PRO/DX INJ NEW DRUG ADDON: CPT

## 2025-06-12 PROCEDURE — 83605 ASSAY OF LACTIC ACID: CPT

## 2025-06-12 PROCEDURE — 99285 EMERGENCY DEPT VISIT HI MDM: CPT | Mod: FS

## 2025-06-12 PROCEDURE — 83690 ASSAY OF LIPASE: CPT

## 2025-06-12 PROCEDURE — 85025 COMPLETE CBC W/AUTO DIFF WBC: CPT

## 2025-06-12 PROCEDURE — 80053 COMPREHEN METABOLIC PANEL: CPT

## 2025-06-12 PROCEDURE — 96374 THER/PROPH/DIAG INJ IV PUSH: CPT | Mod: XU

## 2025-06-12 PROCEDURE — 99284 EMERGENCY DEPT VISIT MOD MDM: CPT | Mod: 25

## 2025-06-12 PROCEDURE — 81001 URINALYSIS AUTO W/SCOPE: CPT

## 2025-06-12 RX ORDER — ACETAMINOPHEN 500 MG/5ML
650 LIQUID (ML) ORAL ONCE
Refills: 0 | Status: COMPLETED | OUTPATIENT
Start: 2025-06-12 | End: 2025-06-12

## 2025-06-12 RX ORDER — ONDANSETRON HCL/PF 4 MG/2 ML
4 VIAL (ML) INJECTION ONCE
Refills: 0 | Status: COMPLETED | OUTPATIENT
Start: 2025-06-12 | End: 2025-06-12

## 2025-06-12 RX ORDER — LIDOCAINE HYDROCHLORIDE 20 MG/ML
1 JELLY TOPICAL ONCE
Refills: 0 | Status: COMPLETED | OUTPATIENT
Start: 2025-06-12 | End: 2025-06-12

## 2025-06-12 RX ORDER — CIPROFLOXACIN HCL 250 MG
1 TABLET ORAL
Qty: 14 | Refills: 0
Start: 2025-06-12 | End: 2025-06-18

## 2025-06-12 RX ORDER — LIDOCAINE HYDROCHLORIDE 20 MG/ML
1 JELLY TOPICAL
Qty: 1 | Refills: 0
Start: 2025-06-12 | End: 2025-06-16

## 2025-06-12 RX ORDER — METRONIDAZOLE 250 MG
1 TABLET ORAL
Qty: 21 | Refills: 0
Start: 2025-06-12 | End: 2025-06-18

## 2025-06-12 RX ADMIN — Medication 4 MILLIGRAM(S): at 09:34

## 2025-06-12 RX ADMIN — Medication 4 MILLIGRAM(S): at 09:17

## 2025-06-12 RX ADMIN — Medication 4 MILLIGRAM(S): at 09:59

## 2025-06-12 RX ADMIN — LIDOCAINE HYDROCHLORIDE 1 PATCH: 20 JELLY TOPICAL at 10:08

## 2025-06-12 RX ADMIN — Medication 650 MILLIGRAM(S): at 10:08

## 2025-06-12 RX ADMIN — Medication 1000 MILLILITER(S): at 09:17

## 2025-06-12 RX ADMIN — Medication 4 MILLIGRAM(S): at 09:36

## 2025-06-12 NOTE — ED PROVIDER NOTE - BIRTH SEX
Female Split-Thickness Skin Graft Text: Because of the size and thickness of the defect, and to provide coverage and facilitate healing with minimal contraction, a split-thickness skin graft was planned.  The donor site was marked and the graft harvested by scalpel, Weck blade, or dermatome.  The graft was then trimmed to fit the defect.  It was sutured into place and a bolster dressing applied.

## 2025-06-12 NOTE — ED PROVIDER NOTE - PATIENT PORTAL LINK FT
You can access the FollowMyHealth Patient Portal offered by [facility] by registering at the following website: [email or web address]. By joining [employer]’s FollowMyHealth portal, you will also be able to view your health information using other applications (apps) compatible with our system.

## 2025-06-12 NOTE — ED PROVIDER NOTE - PHYSICAL EXAMINATION
As Follows:  CONST: Uncomfortable appearing in NAD  EYES: PERRL, EOMI, Sclera and conjunctiva clear.   ENT: No nasal discharge. Oropharynx normal appearing, no erythema / exudates. Uvula midline. Airway intact.   CARD: No murmurs, rubs, or gallops; Normal rate and rhythm  RESP: BS Equal B/L, No wheezes, rhonchi or rales. No distress or accessory breathing  GI: Soft, non-tender, non-distended. No cva tenderness.   MS: No reproducible tenderness. Normal ROM in all extremities. No midline Cervical/Thoracic/Lumbar spinal tenderness.  SKIN: Warm, dry, no acute rashes. MMM  NEURO: Alert and Oriented, No focal deficits.

## 2025-06-12 NOTE — ED PROVIDER NOTE - CLINICAL SUMMARY MEDICAL DECISION MAKING FREE TEXT BOX
85-year-old female presents to the ED for left-sided flank pain.  10 out of 10 pain.  Started yesterday.  Had an episode of nausea vomiting.  Physical exam noted to have left-sided flank pain and left lower quadrant tenderness to palpation.  Nonperitoneal nondistended.  We obtained labs along with CT imaging.  Labs noted to have mild THEODORA.  Additionally UA with some trace leuk esterase.  CT revealed circumferential sigmoid wall thickening consistent with colitis.  Given morphine with total pain relief.  On reevaluation patient is resting comfortable.  Results conveyed to patient and daughter at bedside.  Antibiotic sent to the pharmacy.  Given GI follow-up.  Currently tolerating p.o.  Additionally told patient to take Advil for pain relief.  Lidocaine patch sent to the pharmacy.  THEODORA likely from dehydration as patient has not been eating and drinking from the pain since yesterday.  Mild leuk esterase likely translocation from sigmoid diverticulitis.  Currently no symptoms of UTI.  Patient and daughter understood plan.  Return precautions of worsening pain and/or fever.  Discharged home.

## 2025-06-12 NOTE — ED PROVIDER NOTE - OBJECTIVE STATEMENT
Patient is an 85-year-old female with past medical history of hypertension, hyperlipidemia, diabetes, on aspirin, arthritis on methotrexate, Panamanian speaking with daughter present translating presents for evaluation of left flank and lower back pain that started this morning with associated nausea.  She denies any fever, cough, sore throat, vomiting, chest pain, shortness of breath, urinary complaints.

## 2025-06-12 NOTE — ED PROVIDER NOTE - CARE PLAN
Principal Discharge DX:	Colitis  Secondary Diagnosis:	THEODORA (acute kidney injury)  Secondary Diagnosis:	Dehydration   1

## 2025-06-22 ENCOUNTER — INPATIENT (INPATIENT)
Facility: HOSPITAL | Age: 85
LOS: 9 days | Discharge: NURSING, REHAB OR LONG-TERM CARE | DRG: 543 | End: 2025-07-02
Payer: MEDICARE

## 2025-06-22 VITALS
RESPIRATION RATE: 18 BRPM | HEART RATE: 68 BPM | OXYGEN SATURATION: 98 % | DIASTOLIC BLOOD PRESSURE: 68 MMHG | WEIGHT: 179.9 LBS | TEMPERATURE: 98 F | SYSTOLIC BLOOD PRESSURE: 195 MMHG

## 2025-06-22 DIAGNOSIS — T38.0X5A ADVERSE EFFECT OF GLUCOCORTICOIDS AND SYNTHETIC ANALOGUES, INITIAL ENCOUNTER: ICD-10-CM

## 2025-06-22 DIAGNOSIS — M54.9 DORSALGIA, UNSPECIFIED: ICD-10-CM

## 2025-06-22 DIAGNOSIS — C53.9 MALIGNANT NEOPLASM OF CERVIX UTERI, UNSPECIFIED: ICD-10-CM

## 2025-06-22 DIAGNOSIS — E11.65 TYPE 2 DIABETES MELLITUS WITH HYPERGLYCEMIA: ICD-10-CM

## 2025-06-22 DIAGNOSIS — Z79.52 LONG TERM (CURRENT) USE OF SYSTEMIC STEROIDS: ICD-10-CM

## 2025-06-22 DIAGNOSIS — D84.821 IMMUNODEFICIENCY DUE TO DRUGS: ICD-10-CM

## 2025-06-22 DIAGNOSIS — Z79.01 LONG TERM (CURRENT) USE OF ANTICOAGULANTS: ICD-10-CM

## 2025-06-22 DIAGNOSIS — F32.A DEPRESSION, UNSPECIFIED: ICD-10-CM

## 2025-06-22 DIAGNOSIS — Z79.631 LONG TERM (CURRENT) USE OF ANTIMETABOLITE AGENT: ICD-10-CM

## 2025-06-22 DIAGNOSIS — Z92.89 PERSONAL HISTORY OF OTHER MEDICAL TREATMENT: Chronic | ICD-10-CM

## 2025-06-22 DIAGNOSIS — T45.1X5A ADVERSE EFFECT OF ANTINEOPLASTIC AND IMMUNOSUPPRESSIVE DRUGS, INITIAL ENCOUNTER: ICD-10-CM

## 2025-06-22 DIAGNOSIS — M48.07 SPINAL STENOSIS, LUMBOSACRAL REGION: ICD-10-CM

## 2025-06-22 DIAGNOSIS — M54.16 RADICULOPATHY, LUMBAR REGION: ICD-10-CM

## 2025-06-22 DIAGNOSIS — Z79.82 LONG TERM (CURRENT) USE OF ASPIRIN: ICD-10-CM

## 2025-06-22 DIAGNOSIS — M84.48XA PATHOLOGICAL FRACTURE, OTHER SITE, INITIAL ENCOUNTER FOR FRACTURE: ICD-10-CM

## 2025-06-22 DIAGNOSIS — Z79.4 LONG TERM (CURRENT) USE OF INSULIN: ICD-10-CM

## 2025-06-22 DIAGNOSIS — R26.81 UNSTEADINESS ON FEET: ICD-10-CM

## 2025-06-22 DIAGNOSIS — I10 ESSENTIAL (PRIMARY) HYPERTENSION: ICD-10-CM

## 2025-06-22 DIAGNOSIS — R22.40 LOCALIZED SWELLING, MASS AND LUMP, UNSPECIFIED LOWER LIMB: Chronic | ICD-10-CM

## 2025-06-22 DIAGNOSIS — G47.00 INSOMNIA, UNSPECIFIED: ICD-10-CM

## 2025-06-22 LAB
ALBUMIN SERPL ELPH-MCNC: 4 G/DL — SIGNIFICANT CHANGE UP (ref 3.5–5.2)
ALP SERPL-CCNC: 68 U/L — SIGNIFICANT CHANGE UP (ref 30–115)
ALT FLD-CCNC: 12 U/L — SIGNIFICANT CHANGE UP (ref 0–41)
ANION GAP SERPL CALC-SCNC: 12 MMOL/L — SIGNIFICANT CHANGE UP (ref 7–14)
APPEARANCE UR: CLEAR — SIGNIFICANT CHANGE UP
AST SERPL-CCNC: 16 U/L — SIGNIFICANT CHANGE UP (ref 0–41)
BACTERIA # UR AUTO: ABNORMAL /HPF
BASOPHILS # BLD AUTO: 0.02 K/UL — SIGNIFICANT CHANGE UP (ref 0–0.2)
BASOPHILS NFR BLD AUTO: 0.2 % — SIGNIFICANT CHANGE UP (ref 0–2)
BILIRUB SERPL-MCNC: 0.4 MG/DL — SIGNIFICANT CHANGE UP (ref 0.2–1.2)
BILIRUB UR-MCNC: NEGATIVE — SIGNIFICANT CHANGE UP
BUN SERPL-MCNC: 14 MG/DL — SIGNIFICANT CHANGE UP (ref 10–20)
CALCIUM SERPL-MCNC: 9.3 MG/DL — SIGNIFICANT CHANGE UP (ref 8.4–10.5)
CHLORIDE SERPL-SCNC: 102 MMOL/L — SIGNIFICANT CHANGE UP (ref 98–110)
CO2 SERPL-SCNC: 26 MMOL/L — SIGNIFICANT CHANGE UP (ref 17–32)
COLOR SPEC: YELLOW — SIGNIFICANT CHANGE UP
CREAT SERPL-MCNC: 0.9 MG/DL — SIGNIFICANT CHANGE UP (ref 0.7–1.5)
DIFF PNL FLD: NEGATIVE — SIGNIFICANT CHANGE UP
EGFR: 62 ML/MIN/1.73M2 — SIGNIFICANT CHANGE UP
EGFR: 62 ML/MIN/1.73M2 — SIGNIFICANT CHANGE UP
EOSINOPHIL # BLD AUTO: 0.16 K/UL — SIGNIFICANT CHANGE UP (ref 0–0.5)
EOSINOPHIL NFR BLD AUTO: 1.9 % — SIGNIFICANT CHANGE UP (ref 0–6)
EPI CELLS # UR: 3 — SIGNIFICANT CHANGE UP
GLUCOSE BLDC GLUCOMTR-MCNC: 181 MG/DL — HIGH (ref 70–99)
GLUCOSE BLDC GLUCOMTR-MCNC: 186 MG/DL — HIGH (ref 70–99)
GLUCOSE SERPL-MCNC: 149 MG/DL — HIGH (ref 70–99)
GLUCOSE UR QL: NEGATIVE MG/DL — SIGNIFICANT CHANGE UP
HCT VFR BLD CALC: 35.8 % — SIGNIFICANT CHANGE UP (ref 34.5–45)
HGB BLD-MCNC: 10.8 G/DL — LOW (ref 11.5–15.5)
IMM GRANULOCYTES # BLD AUTO: 0.06 K/UL — SIGNIFICANT CHANGE UP (ref 0–0.07)
IMM GRANULOCYTES NFR BLD AUTO: 0.7 % — SIGNIFICANT CHANGE UP (ref 0–0.9)
KETONES UR QL: NEGATIVE MG/DL — SIGNIFICANT CHANGE UP
LEUKOCYTE ESTERASE UR-ACNC: ABNORMAL
LYMPHOCYTES # BLD AUTO: 0.68 K/UL — LOW (ref 1–3.3)
LYMPHOCYTES NFR BLD AUTO: 8 % — LOW (ref 13–44)
MCHC RBC-ENTMCNC: 23.9 PG — LOW (ref 27–34)
MCHC RBC-ENTMCNC: 30.2 G/DL — LOW (ref 32–36)
MCV RBC AUTO: 79.2 FL — LOW (ref 80–100)
MONOCYTES # BLD AUTO: 1.02 K/UL — HIGH (ref 0–0.9)
MONOCYTES NFR BLD AUTO: 12.1 % — SIGNIFICANT CHANGE UP (ref 2–14)
NEUTROPHILS # BLD AUTO: 6.51 K/UL — SIGNIFICANT CHANGE UP (ref 1.8–7.4)
NEUTROPHILS NFR BLD AUTO: 77.1 % — HIGH (ref 43–77)
NITRITE UR-MCNC: NEGATIVE — SIGNIFICANT CHANGE UP
NRBC # BLD AUTO: 0 K/UL — SIGNIFICANT CHANGE UP (ref 0–0)
NRBC # FLD: 0 K/UL — SIGNIFICANT CHANGE UP (ref 0–0)
NRBC BLD AUTO-RTO: 0 /100 WBCS — SIGNIFICANT CHANGE UP (ref 0–0)
PH UR: 7 — SIGNIFICANT CHANGE UP (ref 5–8)
PLATELET # BLD AUTO: 304 K/UL — SIGNIFICANT CHANGE UP (ref 150–400)
PMV BLD: 10.6 FL — SIGNIFICANT CHANGE UP (ref 7–13)
POTASSIUM SERPL-MCNC: 3.9 MMOL/L — SIGNIFICANT CHANGE UP (ref 3.5–5)
POTASSIUM SERPL-SCNC: 3.9 MMOL/L — SIGNIFICANT CHANGE UP (ref 3.5–5)
PROT SERPL-MCNC: 6.4 G/DL — SIGNIFICANT CHANGE UP (ref 6–8)
PROT UR-MCNC: 30 MG/DL
RBC # BLD: 4.52 M/UL — SIGNIFICANT CHANGE UP (ref 3.8–5.2)
RBC # FLD: 17.1 % — HIGH (ref 10.3–14.5)
RBC CASTS # UR COMP ASSIST: 4 /HPF — SIGNIFICANT CHANGE UP (ref 0–4)
SODIUM SERPL-SCNC: 140 MMOL/L — SIGNIFICANT CHANGE UP (ref 135–146)
SP GR SPEC: 1.01 — SIGNIFICANT CHANGE UP (ref 1–1.03)
UROBILINOGEN FLD QL: 0.2 MG/DL — SIGNIFICANT CHANGE UP (ref 0.2–1)
WBC # BLD: 8.45 K/UL — SIGNIFICANT CHANGE UP (ref 3.8–10.5)
WBC # FLD AUTO: 8.45 K/UL — SIGNIFICANT CHANGE UP (ref 3.8–10.5)
WBC UR QL: 12 /HPF — HIGH (ref 0–5)

## 2025-06-22 PROCEDURE — 85027 COMPLETE CBC AUTOMATED: CPT

## 2025-06-22 PROCEDURE — 97110 THERAPEUTIC EXERCISES: CPT | Mod: GP

## 2025-06-22 PROCEDURE — 93005 ELECTROCARDIOGRAM TRACING: CPT

## 2025-06-22 PROCEDURE — 71045 X-RAY EXAM CHEST 1 VIEW: CPT

## 2025-06-22 PROCEDURE — 97116 GAIT TRAINING THERAPY: CPT | Mod: GP

## 2025-06-22 PROCEDURE — 97162 PT EVAL MOD COMPLEX 30 MIN: CPT | Mod: GP

## 2025-06-22 PROCEDURE — 82962 GLUCOSE BLOOD TEST: CPT

## 2025-06-22 PROCEDURE — 80048 BASIC METABOLIC PNL TOTAL CA: CPT

## 2025-06-22 PROCEDURE — 72148 MRI LUMBAR SPINE W/O DYE: CPT

## 2025-06-22 PROCEDURE — 99285 EMERGENCY DEPT VISIT HI MDM: CPT | Mod: FS

## 2025-06-22 PROCEDURE — 36415 COLL VENOUS BLD VENIPUNCTURE: CPT

## 2025-06-22 PROCEDURE — 83036 HEMOGLOBIN GLYCOSYLATED A1C: CPT

## 2025-06-22 RX ORDER — DEXTROSE 50 % IN WATER 50 %
12.5 SYRINGE (ML) INTRAVENOUS ONCE
Refills: 0 | Status: DISCONTINUED | OUTPATIENT
Start: 2025-06-22 | End: 2025-07-02

## 2025-06-22 RX ORDER — INSULIN LISPRO 100 U/ML
3 INJECTION, SOLUTION INTRAVENOUS; SUBCUTANEOUS
Refills: 0 | Status: DISCONTINUED | OUTPATIENT
Start: 2025-06-22 | End: 2025-06-24

## 2025-06-22 RX ORDER — OXYCODONE HYDROCHLORIDE 30 MG/1
2.5 TABLET ORAL EVERY 4 HOURS
Refills: 0 | Status: DISCONTINUED | OUTPATIENT
Start: 2025-06-22 | End: 2025-06-25

## 2025-06-22 RX ORDER — METHOCARBAMOL 500 MG/1
500 TABLET, FILM COATED ORAL EVERY 6 HOURS
Refills: 0 | Status: DISCONTINUED | OUTPATIENT
Start: 2025-06-22 | End: 2025-07-01

## 2025-06-22 RX ORDER — FOLIC ACID 1 MG/1
1 TABLET ORAL
Refills: 0 | DISCHARGE

## 2025-06-22 RX ORDER — ACETAMINOPHEN 500 MG/5ML
975 LIQUID (ML) ORAL EVERY 8 HOURS
Refills: 0 | Status: DISCONTINUED | OUTPATIENT
Start: 2025-06-22 | End: 2025-06-28

## 2025-06-22 RX ORDER — ASPIRIN 325 MG
81 TABLET ORAL DAILY
Refills: 0 | Status: DISCONTINUED | OUTPATIENT
Start: 2025-06-23 | End: 2025-07-02

## 2025-06-22 RX ORDER — ATORVASTATIN CALCIUM 80 MG/1
10 TABLET, FILM COATED ORAL AT BEDTIME
Refills: 0 | Status: DISCONTINUED | OUTPATIENT
Start: 2025-06-22 | End: 2025-07-02

## 2025-06-22 RX ORDER — LISINOPRIL 5 MG/1
1 TABLET ORAL
Refills: 0 | DISCHARGE

## 2025-06-22 RX ORDER — METHOTREXATE 25 MG/ML
15 INJECTION, SOLUTION INTRA-ARTERIAL; INTRAMUSCULAR; INTRATHECAL; INTRAVENOUS
Refills: 0 | Status: DISCONTINUED | OUTPATIENT
Start: 2025-06-22 | End: 2025-07-02

## 2025-06-22 RX ORDER — GLUCAGON 3 MG/1
1 POWDER NASAL ONCE
Refills: 0 | Status: DISCONTINUED | OUTPATIENT
Start: 2025-06-22 | End: 2025-07-02

## 2025-06-22 RX ORDER — FOLIC ACID 1 MG/1
1 TABLET ORAL DAILY
Refills: 0 | Status: DISCONTINUED | OUTPATIENT
Start: 2025-06-23 | End: 2025-07-02

## 2025-06-22 RX ORDER — INSULIN GLARGINE-YFGN 100 [IU]/ML
8 INJECTION, SOLUTION SUBCUTANEOUS EVERY MORNING
Refills: 0 | Status: DISCONTINUED | OUTPATIENT
Start: 2025-06-22 | End: 2025-06-24

## 2025-06-22 RX ORDER — PREDNISONE 20 MG/1
1 TABLET ORAL
Refills: 0 | DISCHARGE

## 2025-06-22 RX ORDER — SODIUM CHLORIDE 9 G/1000ML
1000 INJECTION, SOLUTION INTRAVENOUS
Refills: 0 | Status: DISCONTINUED | OUTPATIENT
Start: 2025-06-22 | End: 2025-07-02

## 2025-06-22 RX ORDER — APIXABAN 2.5 MG/1
2.5 TABLET, FILM COATED ORAL EVERY 12 HOURS
Refills: 0 | Status: DISCONTINUED | OUTPATIENT
Start: 2025-06-22 | End: 2025-07-02

## 2025-06-22 RX ORDER — DEXTROSE 50 % IN WATER 50 %
25 SYRINGE (ML) INTRAVENOUS ONCE
Refills: 0 | Status: DISCONTINUED | OUTPATIENT
Start: 2025-06-22 | End: 2025-07-02

## 2025-06-22 RX ORDER — BUPROPION HYDROBROMIDE 522 MG/1
300 TABLET, EXTENDED RELEASE ORAL AT BEDTIME
Refills: 0 | Status: DISCONTINUED | OUTPATIENT
Start: 2025-06-22 | End: 2025-07-02

## 2025-06-22 RX ORDER — MAGNESIUM, ALUMINUM HYDROXIDE 200-200 MG
30 TABLET,CHEWABLE ORAL EVERY 4 HOURS
Refills: 0 | Status: DISCONTINUED | OUTPATIENT
Start: 2025-06-22 | End: 2025-07-02

## 2025-06-22 RX ORDER — ONDANSETRON HCL/PF 4 MG/2 ML
4 VIAL (ML) INJECTION EVERY 8 HOURS
Refills: 0 | Status: DISCONTINUED | OUTPATIENT
Start: 2025-06-22 | End: 2025-07-02

## 2025-06-22 RX ORDER — PREDNISONE 20 MG/1
2.5 TABLET ORAL DAILY
Refills: 0 | Status: DISCONTINUED | OUTPATIENT
Start: 2025-06-23 | End: 2025-07-02

## 2025-06-22 RX ORDER — ASPIRIN 325 MG
1 TABLET ORAL
Refills: 0 | DISCHARGE

## 2025-06-22 RX ORDER — INSULIN LISPRO 100 U/ML
INJECTION, SOLUTION INTRAVENOUS; SUBCUTANEOUS AT BEDTIME
Refills: 0 | Status: DISCONTINUED | OUTPATIENT
Start: 2025-06-22 | End: 2025-07-02

## 2025-06-22 RX ORDER — INSULIN LISPRO 100 U/ML
INJECTION, SOLUTION INTRAVENOUS; SUBCUTANEOUS
Refills: 0 | Status: DISCONTINUED | OUTPATIENT
Start: 2025-06-22 | End: 2025-07-02

## 2025-06-22 RX ORDER — MELATONIN 5 MG
3 TABLET ORAL AT BEDTIME
Refills: 0 | Status: DISCONTINUED | OUTPATIENT
Start: 2025-06-22 | End: 2025-07-02

## 2025-06-22 RX ORDER — LISINOPRIL 5 MG/1
10 TABLET ORAL DAILY
Refills: 0 | Status: DISCONTINUED | OUTPATIENT
Start: 2025-06-23 | End: 2025-07-02

## 2025-06-22 RX ORDER — DEXTROSE 50 % IN WATER 50 %
15 SYRINGE (ML) INTRAVENOUS ONCE
Refills: 0 | Status: DISCONTINUED | OUTPATIENT
Start: 2025-06-22 | End: 2025-07-02

## 2025-06-22 RX ORDER — GABAPENTIN 400 MG/1
100 CAPSULE ORAL EVERY 8 HOURS
Refills: 0 | Status: DISCONTINUED | OUTPATIENT
Start: 2025-06-22 | End: 2025-06-27

## 2025-06-22 RX ADMIN — Medication 2 MILLIGRAM(S): at 16:01

## 2025-06-22 RX ADMIN — BUPROPION HYDROBROMIDE 300 MILLIGRAM(S): 522 TABLET, EXTENDED RELEASE ORAL at 21:00

## 2025-06-22 RX ADMIN — Medication 975 MILLIGRAM(S): at 21:48

## 2025-06-22 RX ADMIN — METHOCARBAMOL 500 MILLIGRAM(S): 500 TABLET, FILM COATED ORAL at 18:40

## 2025-06-22 RX ADMIN — Medication 3 MILLIGRAM(S): at 21:00

## 2025-06-22 RX ADMIN — ATORVASTATIN CALCIUM 10 MILLIGRAM(S): 80 TABLET, FILM COATED ORAL at 21:00

## 2025-06-22 RX ADMIN — GABAPENTIN 100 MILLIGRAM(S): 400 CAPSULE ORAL at 21:00

## 2025-06-22 RX ADMIN — Medication 975 MILLIGRAM(S): at 21:00

## 2025-06-22 RX ADMIN — APIXABAN 2.5 MILLIGRAM(S): 2.5 TABLET, FILM COATED ORAL at 18:40

## 2025-06-22 NOTE — ED PROVIDER NOTE - ATTENDING APP SHARED VISIT CONTRIBUTION OF CARE
Patient with history of chronic back pain status post steroid injection by PMD seen in the ED the other day for exacerbation had workup including labs urine and CT, unremarkable, pain relieved by morphine, returns his pain is back and intolerable, no bowel or bladder symptoms, no numbness or weakness, on exam vitals appreciated, well-appearing, abdomen soft nontender, back nontender but has pain on straight leg raise of left lower extremity, neurovascular intact, will admit for pain control, rehab

## 2025-06-22 NOTE — H&P ADULT - NSHPPHYSICALEXAM_GEN_ALL_CORE
Vital Signs Last 24 Hrs  T(C): 36.6 (22 Jun 2025 14:11), Max: 36.6 (22 Jun 2025 14:11)  T(F): 97.8 (22 Jun 2025 14:11), Max: 97.8 (22 Jun 2025 14:11)  HR: 68 (22 Jun 2025 14:11) (68 - 68)  BP: 195/68 (22 Jun 2025 14:11) (195/68 - 195/68)  BP(mean): --  RR: 18 (22 Jun 2025 14:11) (18 - 18)  SpO2: 98% (22 Jun 2025 14:11) (98% - 98%)    PHYSICAL EXAM:      Constitutional: A&Ox4, NAD  Respiratory: cta b/l  Cardiovascular: s1 s2 rrr + 2/6 systolic murmur aortic area  Gastrointestinal: soft nt  nd + bs no rebound or guarding  Genitourinary: no cva tenderness  Extremities: normal rom, no edema, calf tenderness  Neurological:no focal deficits  Skin: no rash

## 2025-06-22 NOTE — H&P ADULT - NSHPLABSRESULTS_GEN_ALL_CORE
10.8   8.45  )-----------( 304      ( 22 Jun 2025 15:07 )             35.8       06-22    140  |  102  |  14  ----------------------------<  149[H]  3.9   |  26  |  0.9    Ca    9.3      22 Jun 2025 15:07    TPro  6.4  /  Alb  4.0  /  TBili  0.4  /  DBili  x   /  AST  16  /  ALT  12  /  AlkPhos  68  06-22              Urinalysis Basic - ( 22 Jun 2025 15:07 )    Color: x / Appearance: x / SG: x / pH: x  Gluc: 149 mg/dL / Ketone: x  / Bili: x / Urobili: x   Blood: x / Protein: x / Nitrite: x   Leuk Esterase: x / RBC: x / WBC x   Sq Epi: x / Non Sq Epi: x / Bacteria: x

## 2025-06-22 NOTE — ED PROVIDER NOTE - PHYSICAL EXAMINATION
Physical Exam    Vital Signs: I have reviewed the initial vital signs.  Constitutional: appears stated age, no acute distress  Eyes: Conjunctiva pink, Sclera clear,  Cardiovascular: S1 and S2, regular rate, regular rhythm, well-perfused extremities, radial pulses equal and 2+, pedal pulses 2+ and equal  Respiratory: unlabored respiratory effort, clear to auscultation bilaterally no wheezing, rales and rhonchi  Gastrointestinal: soft, non-tender abdomen, no pulsatile mass, normal bowl sounds  Musculoskeletal: supple neck, no lower extremity edema, no midline tenderness. Lumbar paraspinal tenderness.  Integumentary: warm, dry, no rash  Neurologic: awake, alert, nvi

## 2025-06-22 NOTE — H&P ADULT - ASSESSMENT
Patient is a 85-year-old female past medical history of cervical cancer, depression, diabetes, hypertension, RA, HTN, whom presented to ED with c/o lower back pain described as severe, constant, associated with b/l leg weakness and difficulty ambulating.  Patient denies radiation of pain into legs,  fever, chills, cp, sob, numbness, tingling, saddle anesthesia, urinary incontinence,  dysuria, hematuria. Patient given 2mg IV morphine in ed with relief of pain.    #gait disorder  #lower back pain  #b/l le weakness  -gabapentin, robaxin, tylneol  -oxycodone prn  -MR lumbar spine  -PT and PMR consult    #DM  -ada diet  -monitor BS  -cont meds from home tresiba chaneg to lantus  -Humalog 3 unit qac  -insulin coverage prn elevated  blood sugar  -hgb a1c  -hold glimeperide while in hospital    #HTN  -DASH diet  -monitor bp  -cont meds from home lisinopril    #RA  -c/w prednisone and methotrexate    #cervical cancer  -off chemo due to intolerance  -not surgical candidate            -d/w Dr Yan        Patient is a 85-year-old female past medical history of cervical cancer, depression, diabetes, hypertension, RA, HTN, whom presented to ED with c/o lower back pain described as severe, constant, associated with b/l leg weakness and difficulty ambulating.  Patient denies radiation of pain into legs,  fever, chills, cp, sob, numbness, tingling, saddle anesthesia, urinary incontinence,  dysuria, hematuria. Patient given 2mg IV morphine in ed with relief of pain.    #gait disorder, Unsteady Gait                frequent falls                family not able  to help her at home so requesting assistance at home or placement .                 #lower back pain - chronic   #b/l le weakness- chronic   -gabapentin, robaxin, tylneol  -oxycodone prn  -MR lumbar spine  -PT and PMR consult    #DM  -ada diet  -monitor BS  -cont meds from home tresiba chaneg to lantus  -Humalog 3 unit qac  -insulin coverage prn elevated  blood sugar  -hgb a1c  -hold glimeperide while in hospital    #HTN  -DASH diet  -monitor bp  -cont meds from home lisinopril    #RA  -c/w prednisone and methotrexate    #cervical cancer  -off chemo due to intolerance  -not surgical candidate        PLAN - pt needs placment , ? STR , family not able to assist.

## 2025-06-22 NOTE — ED PROVIDER NOTE - OBJECTIVE STATEMENT
85-year-old female past medical history of cervical cancer depression diabetes hypertension nausea presents for back pain intractable unable to ambulate Denies fever, chills, cp, sob, numbness, tingling, saddle anesthesia, urinary incontinence,  dysuria, hematuria.

## 2025-06-22 NOTE — H&P ADULT - HISTORY OF PRESENT ILLNESS
Patient is a 85-year-old female past medical history of cervical cancer, depression, diabetes, hypertension, RA, HTN, whom presented to ED with c/o lower back pain described as severe, constant, associated with b/l leg weakness and diffculty ambulating.  Patient denies radiation of pain into legs,  fever, chills, cp, sob, numbness, tingling, saddle anesthesia, urinary incontinence,  dysuria, hematuria. Patient given 2mg IV morphine in ed with relief of pain.

## 2025-06-22 NOTE — PATIENT PROFILE ADULT - HOW PATIENT ADDRESSED, PROFILE
----- Message from Molly Escamilla sent at 7/6/2023 10:13 AM CDT -----  Regarding: refill  Contact: patient  Type:  RX Refill Request    Who Called:  Patient  Refill or New Rx:  refill  RX Name and Strength:  rivaroxaban (XARELTO) 20 mg Tab  How is the patient currently taking it? (ex. 1XDay):    Is this a 30 day or 90 day RX:    Preferred Pharmacy with phone number:      [id number] - [place] [address] [email or web address] [place] [address] [facility] [address] [place] [address] [facility] [address] [place] [address]  Phone: [phone] Fax: [phone]      Local or Mail Order:    Ordering Provider:    Abdias Call Back Number:  [phone]    Additional Information:  Waiting on the refill been sent states he called in Monday; notify once sent thanks!       tiffanie

## 2025-06-23 LAB
GLUCOSE BLDC GLUCOMTR-MCNC: 173 MG/DL — HIGH (ref 70–99)
GLUCOSE BLDC GLUCOMTR-MCNC: 204 MG/DL — HIGH (ref 70–99)
GLUCOSE BLDC GLUCOMTR-MCNC: 289 MG/DL — HIGH (ref 70–99)
GLUCOSE BLDC GLUCOMTR-MCNC: 295 MG/DL — HIGH (ref 70–99)
GLUCOSE BLDC GLUCOMTR-MCNC: 315 MG/DL — HIGH (ref 70–99)

## 2025-06-23 PROCEDURE — 72148 MRI LUMBAR SPINE W/O DYE: CPT | Mod: 26

## 2025-06-23 RX ORDER — POLYETHYLENE GLYCOL 3350 17 G/17G
17 POWDER, FOR SOLUTION ORAL DAILY
Refills: 0 | Status: DISCONTINUED | OUTPATIENT
Start: 2025-06-23 | End: 2025-07-02

## 2025-06-23 RX ORDER — INSULIN LISPRO 100 U/ML
2 INJECTION, SOLUTION INTRAVENOUS; SUBCUTANEOUS ONCE
Refills: 0 | Status: COMPLETED | OUTPATIENT
Start: 2025-06-23 | End: 2025-06-23

## 2025-06-23 RX ORDER — VILAZODONE HYDROCHLORIDE 40 MG/1
40 TABLET, FILM COATED ORAL DAILY
Refills: 0 | Status: DISCONTINUED | OUTPATIENT
Start: 2025-06-23 | End: 2025-07-02

## 2025-06-23 RX ORDER — DIAZEPAM 2 MG/1
5 TABLET ORAL ONCE
Refills: 0 | Status: DISCONTINUED | OUTPATIENT
Start: 2025-06-23 | End: 2025-06-23

## 2025-06-23 RX ORDER — LORAZEPAM 4 MG/ML
0.5 VIAL (ML) INJECTION ONCE
Refills: 0 | Status: DISCONTINUED | OUTPATIENT
Start: 2025-06-23 | End: 2025-06-23

## 2025-06-23 RX ADMIN — INSULIN LISPRO 4: 100 INJECTION, SOLUTION INTRAVENOUS; SUBCUTANEOUS at 11:39

## 2025-06-23 RX ADMIN — INSULIN LISPRO 2 UNIT(S): 100 INJECTION, SOLUTION INTRAVENOUS; SUBCUTANEOUS at 11:00

## 2025-06-23 RX ADMIN — APIXABAN 2.5 MILLIGRAM(S): 2.5 TABLET, FILM COATED ORAL at 17:19

## 2025-06-23 RX ADMIN — Medication 975 MILLIGRAM(S): at 13:31

## 2025-06-23 RX ADMIN — APIXABAN 2.5 MILLIGRAM(S): 2.5 TABLET, FILM COATED ORAL at 05:37

## 2025-06-23 RX ADMIN — METHOCARBAMOL 500 MILLIGRAM(S): 500 TABLET, FILM COATED ORAL at 17:19

## 2025-06-23 RX ADMIN — Medication 975 MILLIGRAM(S): at 06:39

## 2025-06-23 RX ADMIN — Medication 975 MILLIGRAM(S): at 05:36

## 2025-06-23 RX ADMIN — INSULIN LISPRO 3 UNIT(S): 100 INJECTION, SOLUTION INTRAVENOUS; SUBCUTANEOUS at 07:52

## 2025-06-23 RX ADMIN — METHOCARBAMOL 500 MILLIGRAM(S): 500 TABLET, FILM COATED ORAL at 23:48

## 2025-06-23 RX ADMIN — INSULIN LISPRO 3: 100 INJECTION, SOLUTION INTRAVENOUS; SUBCUTANEOUS at 07:52

## 2025-06-23 RX ADMIN — METHOCARBAMOL 500 MILLIGRAM(S): 500 TABLET, FILM COATED ORAL at 00:22

## 2025-06-23 RX ADMIN — INSULIN LISPRO 3 UNIT(S): 100 INJECTION, SOLUTION INTRAVENOUS; SUBCUTANEOUS at 11:38

## 2025-06-23 RX ADMIN — PREDNISONE 2.5 MILLIGRAM(S): 20 TABLET ORAL at 05:36

## 2025-06-23 RX ADMIN — Medication 975 MILLIGRAM(S): at 13:36

## 2025-06-23 RX ADMIN — LISINOPRIL 10 MILLIGRAM(S): 5 TABLET ORAL at 05:36

## 2025-06-23 RX ADMIN — BUPROPION HYDROBROMIDE 300 MILLIGRAM(S): 522 TABLET, EXTENDED RELEASE ORAL at 22:06

## 2025-06-23 RX ADMIN — ATORVASTATIN CALCIUM 10 MILLIGRAM(S): 80 TABLET, FILM COATED ORAL at 22:06

## 2025-06-23 RX ADMIN — METHOCARBAMOL 500 MILLIGRAM(S): 500 TABLET, FILM COATED ORAL at 11:02

## 2025-06-23 RX ADMIN — INSULIN LISPRO 3 UNIT(S): 100 INJECTION, SOLUTION INTRAVENOUS; SUBCUTANEOUS at 16:49

## 2025-06-23 RX ADMIN — GABAPENTIN 100 MILLIGRAM(S): 400 CAPSULE ORAL at 22:05

## 2025-06-23 RX ADMIN — INSULIN LISPRO 2: 100 INJECTION, SOLUTION INTRAVENOUS; SUBCUTANEOUS at 16:48

## 2025-06-23 RX ADMIN — Medication 81 MILLIGRAM(S): at 11:02

## 2025-06-23 RX ADMIN — Medication 975 MILLIGRAM(S): at 22:05

## 2025-06-23 RX ADMIN — DIAZEPAM 5 MILLIGRAM(S): 2 TABLET ORAL at 17:58

## 2025-06-23 RX ADMIN — FOLIC ACID 1 MILLIGRAM(S): 1 TABLET ORAL at 11:02

## 2025-06-23 RX ADMIN — GABAPENTIN 100 MILLIGRAM(S): 400 CAPSULE ORAL at 13:31

## 2025-06-23 RX ADMIN — METHOCARBAMOL 500 MILLIGRAM(S): 500 TABLET, FILM COATED ORAL at 05:33

## 2025-06-23 RX ADMIN — GABAPENTIN 100 MILLIGRAM(S): 400 CAPSULE ORAL at 05:37

## 2025-06-23 RX ADMIN — INSULIN GLARGINE-YFGN 8 UNIT(S): 100 INJECTION, SOLUTION SUBCUTANEOUS at 11:00

## 2025-06-23 NOTE — CONSULT NOTE ADULT - ASSESSMENT
IMPRESSION: Rehab of 84 y/o  f  rehab  for gd  lbp  djd     PRECAUTIONS: [  ] Cardiac  [  ] Respiratory  [  ] Seizures [  ] Contact Isolation  [  ] Droplet Isolation  [ FALL ] Other    Weight Bearing Status:     RECOMMENDATION:    Out of Bed to Chair     DVT/Decubiti Prophylaxis    REHAB PLAN:     [  x ] Bedside P/T 3-5 times a week   [ x  ]   Bedside O/T  2-3 times a week             [   ] No Rehab Therapy Indicated                   [   ]  Speech Therapy   Conditioning/ROM                                    ADL  Bed Mobility                                               Conditioning/ROM  Transfers                                                     Bed Mobility  Sitting /Standing Balance                         Transfers                                        Gait Training                                               Sitting/Standing Balance  Stair Training [   ]Applicable                    Home equipment Eval                                                                        Splinting  [   ] Only      GOALS:   ADL   [ x  ]   Independent                    Transfers  [  x ] Independent                          Ambulation  [x  ] Independent     [    ] With device                            [  x]  CG                                                         [ x ]  CG                                                                  [ x  ] CG                            [    ] Min A                                                   [   ] Min A                                                              [   ] Min  A          DISCHARGE PLAN:   [   ]  Good candidate for Intensive Rehabilitation/Hospital based-4A [facility]                                             Will tolerate 3hrs Intensive Rehab Daily                                       [ xx   ]  Short Term Rehab in Skilled Nursing Facility and  cont pain med  d/w  ptn  rehab  care                                        [    ]  Home with Outpatient or VN services                                         [    ]  Possible Candidate for Intensive Hospital based Rehab

## 2025-06-23 NOTE — PHYSICAL THERAPY INITIAL EVALUATION ADULT - PERTINENT HX OF CURRENT PROBLEM, REHAB EVAL
85-year-old female past medical history of cervical cancer, depression, diabetes, hypertension, RA, HTN, whom presented to ED with c/o lower back pain described as severe, constant, associated with b/l leg weakness and diffculty ambulating.  Patient denies radiation of pain into legs,  fever, chills, cp, sob, numbness, tingling, saddle anesthesia, urinary incontinence,  dysuria, hematuria. Patient given 2mg IV morphine in ed with relief of pain.

## 2025-06-23 NOTE — PHYSICAL THERAPY INITIAL EVALUATION ADULT - ADDITIONAL COMMENTS
As per Daughter pt lives in[place] / 10 Steps to  Enter W/ BL HR and all in one level for her , as per Daughter  She use RW for  all functional independent and Family giver her help as needed

## 2025-06-23 NOTE — PHYSICAL THERAPY INITIAL EVALUATION ADULT - GENERAL OBSERVATIONS, REHAB EVAL
10:05 - 10 :30 Chart reviewed. Order received.  Patient is ok to be  seen for Pt, confirmed with RN. pt encountered  Semi martinez inbed,  C/o Lower back  pain, and agrees to participate in session, +  Weber NAD.   Daughter at bedside

## 2025-06-23 NOTE — PHYSICAL THERAPY INITIAL EVALUATION ADULT - TRANSFER SKILLS, REHAB EVAL
Spoke with pt, reassuring her that the lesion was a benign 'skin colored mole' and that there are no signs of a skin cancer. Nothing further needs to be done at this time.    
W /RW/needs device

## 2025-06-23 NOTE — CONSULT NOTE ADULT - SUBJECTIVE AND OBJECTIVE BOX
HPI: Patient is a 85-year-old female past medical history of cervical cancer, depression, diabetes, hypertension, RA, HTN, whom presented to ED with c/o lower back pain described as severe, constant, associated with b/l leg weakness and diffculty ambulating.  Patient denies radiation of pain into legs,  fever, chills, cp, sob, numbness, tingling, saddle anesthesia, urinary incontinence,  dysuria, hematuria. Patient given 2mg IV morphine in ed with relief of pain. ptn seen and exam at  bed  side  does not  speak  english  but  aox3  fu  command  ptn  las  and  imaging  is  appreciated  and  reviewed  .    PTN  REFERRED TO ACUTE  REHAB  FOR  EVAL AND  TX   PAST MEDICAL & SURGICAL HISTORY:  Diabetes      HTN (hypertension)      Rheumatoid arthritis      Depression      Anxiety      Cervical cancer      History of dental surgery      Foot mass  left foot mass removal 20 years ago          Hospital Course:    TODAY'S SUBJECTIVE & REVIEW OF SYMPTOMS:     Constitutional WNL   Cardio WNL   Resp WNL   GI WNL  Heme WNL  Endo WNL  Skin WNL  MSK WNL  Neuro WNL  Cognitive WNL  Psych WNL      MEDICATIONS  (STANDING):  acetaminophen     Tablet .. 975 milliGRAM(s) Oral every 8 hours  apixaban 2.5 milliGRAM(s) Oral every 12 hours  aspirin enteric coated 81 milliGRAM(s) Oral daily  atorvastatin 10 milliGRAM(s) Oral at bedtime  buPROPion XL (24-Hour) . 300 milliGRAM(s) Oral at bedtime  chlorhexidine 4% Liquid 1 Application(s) Topical <User Schedule>  dextrose 5%. 1000 milliLiter(s) (50 mL/Hr) IV Continuous <Continuous>  dextrose 5%. 1000 milliLiter(s) (100 mL/Hr) IV Continuous <Continuous>  dextrose 50% Injectable 25 Gram(s) IV Push once  dextrose 50% Injectable 12.5 Gram(s) IV Push once  dextrose 50% Injectable 25 Gram(s) IV Push once  folic acid 1 milliGRAM(s) Oral daily  gabapentin 100 milliGRAM(s) Oral every 8 hours  glucagon  Injectable 1 milliGRAM(s) IntraMuscular once  insulin glargine Injectable (LANTUS) 8 Unit(s) SubCutaneous every morning  insulin lispro (ADMELOG) corrective regimen sliding scale   SubCutaneous three times a day before meals  insulin lispro (ADMELOG) corrective regimen sliding scale   SubCutaneous at bedtime  insulin lispro Injectable (ADMELOG) 3 Unit(s) SubCutaneous before breakfast  insulin lispro Injectable (ADMELOG) 3 Unit(s) SubCutaneous before lunch  insulin lispro Injectable (ADMELOG) 3 Unit(s) SubCutaneous before dinner  lisinopril 10 milliGRAM(s) Oral daily  methocarbamol 500 milliGRAM(s) Oral every 6 hours  methotrexate 15 milliGRAM(s) Oral <User Schedule>  predniSONE   Tablet 2.5 milliGRAM(s) Oral daily  propranolol 10 milliGRAM(s) Oral every 12 hours    MEDICATIONS  (PRN):  aluminum hydroxide/magnesium hydroxide/simethicone Suspension 30 milliLiter(s) Oral every 4 hours PRN Dyspepsia  dextrose Oral Gel 15 Gram(s) Oral once PRN Blood Glucose LESS THAN 70 milliGRAM(s)/deciliter  melatonin 3 milliGRAM(s) Oral at bedtime PRN Insomnia  ondansetron Injectable 4 milliGRAM(s) IV Push every 8 hours PRN Nausea and/or Vomiting  oxyCODONE    IR 2.5 milliGRAM(s) Oral every 4 hours PRN Severe Pain (7 - 10)      FAMILY HISTORY:      Allergies    No Known Allergies    Intolerances        SOCIAL HISTORY:    [  ] Etoh  [  ] Smoking  [  ] Substance abuse     Home Environment:  [  ] Home Alone  [x  ] Lives with Family  [  ] Home Health Aid    Dwelling:  [  ] Apartment  [x  ] Private House  [  ] Adult Home  [  ] Skilled Nursing Facility      [  ] Short Term  [  ] Long Term  [ x] Stairs       Elevator [  ]    FUNCTIONAL STATUS PTA: (Check all that apply)  Ambulation: [  x ]Independent    [  ] Dependent     [  ] Non-Ambulatory  Assistive Device: [ x ] SA Cane  [  ]  Q Cane  [  ] Walker  [  ]  Wheelchair  ADL : [ x ] Independent  [  ]  Dependent       Vital Signs Last 24 Hrs  T(C): 36.9 (23 Jun 2025 03:58), Max: 36.9 (23 Jun 2025 03:58)  T(F): 98.4 (23 Jun 2025 03:58), Max: 98.4 (23 Jun 2025 03:58)  HR: 70 (23 Jun 2025 03:58) (44 - 83)  BP: 172/75 (23 Jun 2025 03:58) (172/75 - 203/80)  BP(mean): --  RR: 18 (23 Jun 2025 03:58) (18 - 18)  SpO2: 98% (23 Jun 2025 03:58) (93% - 98%)    Parameters below as of 22 Jun 2025 21:30  Patient On (Oxygen Delivery Method): room air          PHYSICAL EXAM: Alert & Oriented X 2  GENERAL: NAD, well-groomed, well-developed  HEAD:  Atraumatic, Normocephalic  EYES: EOMI, PERRLA, conjunctiva and sclera clear  NECK: Supple, No JVD, Normal thyroid  CHEST/LUNG: Clear to percussion bilaterally; No rales, rhonchi, wheezing, or rubs  HEART: Regular rate and rhythm; No murmurs, rubs, or gallops  ABDOMEN: Soft, Nontender, Nondistended; Bowel sounds present  EXTREMITIES:  2+ Peripheral Pulses, No clubbing, cyanosis, or edema    NERVOUS SYSTEM:  Cranial Nerves 2-12 intact [ x ] Abnormal  [  ]  ROM: WFL all extremities [ x ]  Abnormal [  ]  Motor Strength: WFL all extremities  [4-5/5  all ext    ]  Abnormal [  ]  Sensation: intact to light touch [x  ] Abnormal [  ]  Reflexes: Symmetric [ x ]  Abnormal [  ]    FUNCTIONAL STATUS:  Bed Mobility: Independent [  ]  Supervision [  ]  Needs Assistance [ x ]  N/A [  ]  Transfers: Independent [  ]  Supervision [  ]  Needs Assistance [x  ]  N/A [  ]   Ambulation: Independent [  ]  Supervision [  ]  Needs Assistance [  x]  N/A [  ]  ADL: Independent [  ] Requires Assistance [  ] N/A [ x ]  SEE PT/OT IE NOTES    LABS:                        10.8   8.45  )-----------( 304      ( 22 Jun 2025 15:07 )             35.8     06-22    140  |  102  |  14  ----------------------------<  149[H]  3.9   |  26  |  0.9    Ca    9.3      22 Jun 2025 15:07    TPro  6.4  /  Alb  4.0  /  TBili  0.4  /  DBili  x   /  AST  16  /  ALT  12  /  AlkPhos  68  06-22      Urinalysis Basic - ( 22 Jun 2025 15:07 )    Color: x / Appearance: x / SG: x / pH: x  Gluc: 149 mg/dL / Ketone: x  / Bili: x / Urobili: x   Blood: x / Protein: x / Nitrite: x   Leuk Esterase: x / RBC: x / WBC x   Sq Epi: x / Non Sq Epi: x / Bacteria: x        RADIOLOGY & ADDITIONAL STUDIES:< from: CT Abdomen and Pelvis w/ IV Cont (06.12.25 @ 11:07) >  IMPRESSION:        Circumferential wall thickening of the sigmoid colon and rectum may be     < end of copied text >      Assesment:

## 2025-06-24 LAB
ANION GAP SERPL CALC-SCNC: 13 MMOL/L — SIGNIFICANT CHANGE UP (ref 7–14)
BUN SERPL-MCNC: 16 MG/DL — SIGNIFICANT CHANGE UP (ref 10–20)
CALCIUM SERPL-MCNC: 9.1 MG/DL — SIGNIFICANT CHANGE UP (ref 8.4–10.5)
CHLORIDE SERPL-SCNC: 99 MMOL/L — SIGNIFICANT CHANGE UP (ref 98–110)
CO2 SERPL-SCNC: 25 MMOL/L — SIGNIFICANT CHANGE UP (ref 17–32)
CREAT SERPL-MCNC: 0.8 MG/DL — SIGNIFICANT CHANGE UP (ref 0.7–1.5)
CULTURE RESULTS: SIGNIFICANT CHANGE UP
EGFR: 72 ML/MIN/1.73M2 — SIGNIFICANT CHANGE UP
EGFR: 72 ML/MIN/1.73M2 — SIGNIFICANT CHANGE UP
GLUCOSE BLDC GLUCOMTR-MCNC: 269 MG/DL — HIGH (ref 70–99)
GLUCOSE BLDC GLUCOMTR-MCNC: 277 MG/DL — HIGH (ref 70–99)
GLUCOSE BLDC GLUCOMTR-MCNC: 296 MG/DL — HIGH (ref 70–99)
GLUCOSE BLDC GLUCOMTR-MCNC: 340 MG/DL — HIGH (ref 70–99)
GLUCOSE BLDC GLUCOMTR-MCNC: 341 MG/DL — HIGH (ref 70–99)
GLUCOSE SERPL-MCNC: 364 MG/DL — HIGH (ref 70–99)
HCT VFR BLD CALC: 36.6 % — SIGNIFICANT CHANGE UP (ref 34.5–45)
HGB BLD-MCNC: 10.8 G/DL — LOW (ref 11.5–15.5)
MCHC RBC-ENTMCNC: 23.3 PG — LOW (ref 27–34)
MCHC RBC-ENTMCNC: 29.5 G/DL — LOW (ref 32–36)
MCV RBC AUTO: 79 FL — LOW (ref 80–100)
NRBC # BLD AUTO: 0 K/UL — SIGNIFICANT CHANGE UP (ref 0–0)
NRBC # FLD: 0 K/UL — SIGNIFICANT CHANGE UP (ref 0–0)
NRBC BLD AUTO-RTO: 0 /100 WBCS — SIGNIFICANT CHANGE UP (ref 0–0)
PLATELET # BLD AUTO: 333 K/UL — SIGNIFICANT CHANGE UP (ref 150–400)
PMV BLD: 11 FL — SIGNIFICANT CHANGE UP (ref 7–13)
POTASSIUM SERPL-MCNC: 4.2 MMOL/L — SIGNIFICANT CHANGE UP (ref 3.5–5)
POTASSIUM SERPL-SCNC: 4.2 MMOL/L — SIGNIFICANT CHANGE UP (ref 3.5–5)
RBC # BLD: 4.63 M/UL — SIGNIFICANT CHANGE UP (ref 3.8–5.2)
RBC # FLD: 16.7 % — HIGH (ref 10.3–14.5)
SODIUM SERPL-SCNC: 137 MMOL/L — SIGNIFICANT CHANGE UP (ref 135–146)
SPECIMEN SOURCE: SIGNIFICANT CHANGE UP
WBC # BLD: 8.39 K/UL — SIGNIFICANT CHANGE UP (ref 3.8–10.5)
WBC # FLD AUTO: 8.39 K/UL — SIGNIFICANT CHANGE UP (ref 3.8–10.5)

## 2025-06-24 RX ORDER — INSULIN LISPRO 100 U/ML
6 INJECTION, SOLUTION INTRAVENOUS; SUBCUTANEOUS
Refills: 0 | Status: DISCONTINUED | OUTPATIENT
Start: 2025-06-24 | End: 2025-06-30

## 2025-06-24 RX ORDER — INSULIN GLARGINE-YFGN 100 [IU]/ML
21 INJECTION, SOLUTION SUBCUTANEOUS EVERY MORNING
Refills: 0 | Status: DISCONTINUED | OUTPATIENT
Start: 2025-06-24 | End: 2025-06-30

## 2025-06-24 RX ADMIN — LISINOPRIL 10 MILLIGRAM(S): 5 TABLET ORAL at 05:45

## 2025-06-24 RX ADMIN — GABAPENTIN 100 MILLIGRAM(S): 400 CAPSULE ORAL at 21:10

## 2025-06-24 RX ADMIN — OXYCODONE HYDROCHLORIDE 2.5 MILLIGRAM(S): 30 TABLET ORAL at 21:10

## 2025-06-24 RX ADMIN — INSULIN LISPRO 3 UNIT(S): 100 INJECTION, SOLUTION INTRAVENOUS; SUBCUTANEOUS at 11:39

## 2025-06-24 RX ADMIN — METHOCARBAMOL 500 MILLIGRAM(S): 500 TABLET, FILM COATED ORAL at 11:07

## 2025-06-24 RX ADMIN — METHOCARBAMOL 500 MILLIGRAM(S): 500 TABLET, FILM COATED ORAL at 17:58

## 2025-06-24 RX ADMIN — METHOCARBAMOL 500 MILLIGRAM(S): 500 TABLET, FILM COATED ORAL at 05:45

## 2025-06-24 RX ADMIN — INSULIN LISPRO 4: 100 INJECTION, SOLUTION INTRAVENOUS; SUBCUTANEOUS at 07:45

## 2025-06-24 RX ADMIN — Medication 975 MILLIGRAM(S): at 22:01

## 2025-06-24 RX ADMIN — Medication 975 MILLIGRAM(S): at 13:28

## 2025-06-24 RX ADMIN — GABAPENTIN 100 MILLIGRAM(S): 400 CAPSULE ORAL at 13:26

## 2025-06-24 RX ADMIN — INSULIN LISPRO 3 UNIT(S): 100 INJECTION, SOLUTION INTRAVENOUS; SUBCUTANEOUS at 07:45

## 2025-06-24 RX ADMIN — Medication 975 MILLIGRAM(S): at 13:26

## 2025-06-24 RX ADMIN — APIXABAN 2.5 MILLIGRAM(S): 2.5 TABLET, FILM COATED ORAL at 05:45

## 2025-06-24 RX ADMIN — APIXABAN 2.5 MILLIGRAM(S): 2.5 TABLET, FILM COATED ORAL at 17:58

## 2025-06-24 RX ADMIN — ATORVASTATIN CALCIUM 10 MILLIGRAM(S): 80 TABLET, FILM COATED ORAL at 21:10

## 2025-06-24 RX ADMIN — Medication 975 MILLIGRAM(S): at 07:49

## 2025-06-24 RX ADMIN — Medication 975 MILLIGRAM(S): at 05:46

## 2025-06-24 RX ADMIN — VILAZODONE HYDROCHLORIDE 40 MILLIGRAM(S): 40 TABLET, FILM COATED ORAL at 13:26

## 2025-06-24 RX ADMIN — INSULIN GLARGINE-YFGN 8 UNIT(S): 100 INJECTION, SOLUTION SUBCUTANEOUS at 09:55

## 2025-06-24 RX ADMIN — FOLIC ACID 1 MILLIGRAM(S): 1 TABLET ORAL at 11:06

## 2025-06-24 RX ADMIN — Medication 1 APPLICATION(S): at 05:47

## 2025-06-24 RX ADMIN — INSULIN LISPRO 3: 100 INJECTION, SOLUTION INTRAVENOUS; SUBCUTANEOUS at 11:40

## 2025-06-24 RX ADMIN — GABAPENTIN 100 MILLIGRAM(S): 400 CAPSULE ORAL at 05:45

## 2025-06-24 RX ADMIN — OXYCODONE HYDROCHLORIDE 2.5 MILLIGRAM(S): 30 TABLET ORAL at 22:10

## 2025-06-24 RX ADMIN — INSULIN LISPRO 4: 100 INJECTION, SOLUTION INTRAVENOUS; SUBCUTANEOUS at 16:38

## 2025-06-24 RX ADMIN — PREDNISONE 2.5 MILLIGRAM(S): 20 TABLET ORAL at 05:45

## 2025-06-24 RX ADMIN — Medication 81 MILLIGRAM(S): at 11:06

## 2025-06-24 RX ADMIN — BUPROPION HYDROBROMIDE 300 MILLIGRAM(S): 522 TABLET, EXTENDED RELEASE ORAL at 21:10

## 2025-06-24 RX ADMIN — Medication 975 MILLIGRAM(S): at 21:09

## 2025-06-24 NOTE — PROGRESS NOTE ADULT - ASSESSMENT
Patient is a 85-year-old female past medical history of cervical cancer, depression, diabetes, hypertension, RA, HTN, whom presented to ED with c/o lower back pain described as severe, constant, associated with b/l leg weakness and difficulty ambulating.  Patient denies radiation of pain into legs,  fever, chills, cp, sob, numbness, tingling, saddle anesthesia, urinary incontinence,  dysuria, hematuria. Patient given 2mg IV morphine in ed with relief of pain.    #Sacral Insufficiency fracture, Severe L4-S1 spinal stenosis  #gait disorder, Unsteady Gait  #Low Back pain - Chronic  #B/L LE weakness - Chronic   - Reports frequent falls, family not able to help her at home so requesting assistance at home or placement   - MR Asymmetric edema in the right sacrum with linear signal likely   representing sacral insufficiency fracture.  Multilevel degenerative changes, most severe at L4-5 with prominent   superiorly migrating left paracentral disc extrusion superimposed on disc   bulge resulting in severe spinal stenosis with impingement of the thecal   sac and descending left L5 nerve root, as well as at L5-S1 with severe   bilateral neural foraminal stenosis.  Fatty marrow change of the L5 and sacrum likely reflecting sequelae of   post radiation. Patchy endplate edema and sclerosis involving L4, L5, and   S1 may represent superimposed mixed Modic type I and III changes.  - c/w pain control: gabapentin, Robaxin Tylenol  - oxycodone prn  - Dr. Yan to discuss the imaging finding w/ family, if family opts for surgical intervention will consult neurosurgery/orthopedics   - PT following     #DMII  - On Humalog 6u breakfast and lunch and Tresiba 26u lunch time, hold glimepride   - FS running high, stop lispro 3u BID and lantus 8 units   - Will put on Lispro 6u BID, Lantus 21 units, plus ISS, adjust as needed based on FS   - c/w monitoring FS     #HTN  - DASH diet  - monitor bp  - cont meds from home lisinopril    #RA  - c/w prednisone and methotrexate    #cervical cancer  - off chemo due to intolerance  - not surgical candidate    Dispo: Dr. Yan to discuss the MRI finding w/ family to come up with a definitive plan, no more labs needed       Above discussed w. Dr. Yan

## 2025-06-25 ENCOUNTER — TRANSCRIPTION ENCOUNTER (OUTPATIENT)
Age: 85
End: 2025-06-25

## 2025-06-25 LAB
A1C WITH ESTIMATED AVERAGE GLUCOSE RESULT: 8.4 % — HIGH (ref 4–5.6)
ESTIMATED AVERAGE GLUCOSE: 194 MG/DL — HIGH (ref 68–114)
GLUCOSE BLDC GLUCOMTR-MCNC: 197 MG/DL — HIGH (ref 70–99)
GLUCOSE BLDC GLUCOMTR-MCNC: 237 MG/DL — HIGH (ref 70–99)
GLUCOSE BLDC GLUCOMTR-MCNC: 298 MG/DL — HIGH (ref 70–99)
GLUCOSE BLDC GLUCOMTR-MCNC: 369 MG/DL — HIGH (ref 70–99)

## 2025-06-25 RX ADMIN — Medication 975 MILLIGRAM(S): at 21:18

## 2025-06-25 RX ADMIN — FOLIC ACID 1 MILLIGRAM(S): 1 TABLET ORAL at 11:33

## 2025-06-25 RX ADMIN — APIXABAN 2.5 MILLIGRAM(S): 2.5 TABLET, FILM COATED ORAL at 18:17

## 2025-06-25 RX ADMIN — INSULIN GLARGINE-YFGN 21 UNIT(S): 100 INJECTION, SOLUTION SUBCUTANEOUS at 08:54

## 2025-06-25 RX ADMIN — METHOCARBAMOL 500 MILLIGRAM(S): 500 TABLET, FILM COATED ORAL at 18:17

## 2025-06-25 RX ADMIN — ATORVASTATIN CALCIUM 10 MILLIGRAM(S): 80 TABLET, FILM COATED ORAL at 21:17

## 2025-06-25 RX ADMIN — PREDNISONE 2.5 MILLIGRAM(S): 20 TABLET ORAL at 05:10

## 2025-06-25 RX ADMIN — INSULIN LISPRO 6 UNIT(S): 100 INJECTION, SOLUTION INTRAVENOUS; SUBCUTANEOUS at 08:23

## 2025-06-25 RX ADMIN — Medication 975 MILLIGRAM(S): at 05:10

## 2025-06-25 RX ADMIN — APIXABAN 2.5 MILLIGRAM(S): 2.5 TABLET, FILM COATED ORAL at 05:11

## 2025-06-25 RX ADMIN — METHOCARBAMOL 500 MILLIGRAM(S): 500 TABLET, FILM COATED ORAL at 00:03

## 2025-06-25 RX ADMIN — Medication 4 MILLIGRAM(S): at 15:44

## 2025-06-25 RX ADMIN — INSULIN LISPRO 3: 100 INJECTION, SOLUTION INTRAVENOUS; SUBCUTANEOUS at 11:34

## 2025-06-25 RX ADMIN — Medication 975 MILLIGRAM(S): at 12:20

## 2025-06-25 RX ADMIN — METHOCARBAMOL 500 MILLIGRAM(S): 500 TABLET, FILM COATED ORAL at 23:00

## 2025-06-25 RX ADMIN — METHOCARBAMOL 500 MILLIGRAM(S): 500 TABLET, FILM COATED ORAL at 11:33

## 2025-06-25 RX ADMIN — INSULIN LISPRO 6 UNIT(S): 100 INJECTION, SOLUTION INTRAVENOUS; SUBCUTANEOUS at 11:34

## 2025-06-25 RX ADMIN — BUPROPION HYDROBROMIDE 300 MILLIGRAM(S): 522 TABLET, EXTENDED RELEASE ORAL at 21:18

## 2025-06-25 RX ADMIN — GABAPENTIN 100 MILLIGRAM(S): 400 CAPSULE ORAL at 21:17

## 2025-06-25 RX ADMIN — Medication 975 MILLIGRAM(S): at 11:33

## 2025-06-25 RX ADMIN — Medication 975 MILLIGRAM(S): at 21:40

## 2025-06-25 RX ADMIN — Medication 975 MILLIGRAM(S): at 06:25

## 2025-06-25 RX ADMIN — METHOCARBAMOL 500 MILLIGRAM(S): 500 TABLET, FILM COATED ORAL at 05:10

## 2025-06-25 RX ADMIN — INSULIN LISPRO 5: 100 INJECTION, SOLUTION INTRAVENOUS; SUBCUTANEOUS at 08:22

## 2025-06-25 RX ADMIN — Medication 4 MILLIGRAM(S): at 18:48

## 2025-06-25 RX ADMIN — INSULIN LISPRO 1: 100 INJECTION, SOLUTION INTRAVENOUS; SUBCUTANEOUS at 00:02

## 2025-06-25 RX ADMIN — GABAPENTIN 100 MILLIGRAM(S): 400 CAPSULE ORAL at 11:33

## 2025-06-25 RX ADMIN — LISINOPRIL 10 MILLIGRAM(S): 5 TABLET ORAL at 05:11

## 2025-06-25 RX ADMIN — GABAPENTIN 100 MILLIGRAM(S): 400 CAPSULE ORAL at 05:11

## 2025-06-25 RX ADMIN — VILAZODONE HYDROCHLORIDE 40 MILLIGRAM(S): 40 TABLET, FILM COATED ORAL at 12:20

## 2025-06-25 RX ADMIN — INSULIN LISPRO 1: 100 INJECTION, SOLUTION INTRAVENOUS; SUBCUTANEOUS at 18:49

## 2025-06-25 RX ADMIN — Medication 81 MILLIGRAM(S): at 11:38

## 2025-06-25 NOTE — DISCHARGE NOTE PROVIDER - HOSPITAL COURSE
Patient is a 85-year-old female past medical history of cervical cancer, depression, diabetes, hypertension, RA, HTN, whom presented to ED with c/o lower back pain described as severe, constant, associated with b/l leg weakness and difficulty ambulating.  Patient denies radiation of pain into legs,  fever, chills, cp, sob, numbness, tingling, saddle anesthesia, urinary incontinence,  dysuria, hematuria. Patient given 2mg IV morphine in ed with relief of pain.    #Sacral Insufficiency fracture, Severe L4-S1 spinal stenosis  sacral pain 2/2 fracture   #gait disorder, Unsteady Gait  #Low Back pain - Chronic  #B/L LE weakness - Chronic     Entire report of MRI was d/w pts dtr and pt herself.  Spinal stenosis and lumbar radiculopathy  is not the problem here as the pt denies any pain in her legs  Sacral insufficiency fx is a hairline fracture and will likely heal on its own over the next 2 to 3 weeks.  pt and family not considering any intervention at all  they would like home services if possible including PT  will d/w SW in am  will start on Percocet 2.5/325 one tab q8 standing order not prn so pt may do some PT in hospital    #DMII  - On Humalog 6u breakfast and lunch and Tresiba 26u lunch time, hold glimepride   - FS running high, stop lispro 3u BID and lantus 8 units   - Will put on Lispro 6u BID, Lantus 21 units, plus ISS, adjust as needed based on FS   - c/w monitoring FS     #HTN  - DASH diet  - monitor bp  - cont meds from home lisinopril    #RA  - c/w prednisone and methotrexate    #cervical cancer  - off chemo due to intolerance  - not surgical candidate     Patient is a 85-year-old female past medical history of cervical cancer, depression, diabetes, hypertension, RA, HTN, whom presented to ED with c/o lower back pain described as severe, constant, associated with b/l leg weakness and difficulty ambulating.  Patient denies radiation of pain into legs,  fever, chills, cp, sob, numbness, tingling, saddle anesthesia, urinary incontinence,  dysuria, hematuria. Was afebrile with stable vitals. Labs largely unremarkable. MR L spine with asymmetric edema in the right sacrum with linear signal likely   representing sacral insufficiency fracture; multilevel degenerative changes, most severe at L4-5 with prominent superiorly migrating left paracentral disc extrusion superimposed on disc bulge resulting in severe spinal stenosis with impingement of the thecal sac and descending left L5 nerve root, as well as at L5-S1 with severe bilateral neural foraminal stenosis; fatty marrow change of the L5 and sacrum likely reflecting sequelae of post radiation; patchy endplate edema and sclerosis involving L4, L5, and S1 may represent superimposed mixed Modic type I and III changes. Entire report of MRI was d/w pts dtr and pt herself. Spinal stenosis and lumbar radiculopathy not the significant issue as the pt denies any pain in her legs. Sacral insufficiency fx is a hairline fracture and will likely heal on its own over the next 2 to 3 weeks. pt and family not considering any intervention at all. Seen by physical therapy and recommended for STR. Pain initially controlled with percocet and robaxin, which then lead to oversedation. Currently pain controlled with standing tylenol and lidocaine patch.     Of note, course complicated by hyperglycemia. Titrated insulin accordingly to Lantus 25u, Lispro 8u with meals

## 2025-06-25 NOTE — DISCHARGE NOTE PROVIDER - NSDCQMPCI_CARD_ALL_CORE
Medication:gabapentin (NEURONTIN) 300 MG capsule  Last script 8-9-19 for 60 pills with 0 refills.  Last O.V.  7-17-19.   Future Appointments: none.    Patient is requesting medication too soon. Note sent to the pharmacy.     
Patient is requesting a medication refill. Medication pending, pharmacy verified and setup.    
No

## 2025-06-25 NOTE — DISCHARGE NOTE PROVIDER - YES NO FOR MLM POSITIVE OR NEGATIVE COVID RESULT
Barbara Tran returned call.  Reviewed Dr. Leon's message with patient and she verbalized understanding.  Provided patient with phone number and she stated she will call the lymphedema clinic.     Thank you,  Destiny RAMIREZ RN  Triage Nurse [facility]   12:35 EDT  
Called and left VM, will continue to try to reach pt.    Haley Haskins, RN  Triage RN  09/25/23 10:35 EDT    
Called and left VM, will continue to try to reach pt.    Haley Haskins, RN  Triage RN  09/26/23 09:33 EDT    
Pls call her and let her know that the lymphedema clinic is trying to reach her - have her call number below.   thx      ----- Message from Karen Cho PT sent at 9/25/2023  9:43 AM EDT -----  Regarding: RE: lymphedema  Dr. Leon,    We have been unsuccessful in contacting Ms. Tran to schedule treatment by phone; Kathryn has made multiple attempts.  Do you have an alternative way that you usually contact her?  If so, she can call our clinic at [phone] and ask for Kathryn, and we would be happy to help her.    Thanks,  Karen Cho PT, DPT, CLT  ----- Message -----  From: Lashay Leon MD  Sent: 9/20/2023  12:32 PM EDT  To: Karen Cho PT  Subject: RE: lymphedema                                   thx  ----- Message -----  From: Karen Cho PT  Sent: 9/20/2023  11:01 AM EDT  To: Lashay Leon MD  Subject: RE: lymphedema                                   Dr. Leon,     Ms. Tran was removed from my waitlist earlier this year which typically only occurs if a patient declines treatment, decides to receive treatment at another facility which has availability sooner, or does not answer or return calls attempting to schedule after 3 attempts.  I have not discharged her case, so there should be not issue with her coming in for treatment.  My  Taney will call her today to see when we can get her scheduled, and I will keep you updated.    Thank you,  Karen Cho PT, DPT, CLT  ----- Message -----  From: Lashay Leon MD  Sent: 9/19/2023   9:13 AM EDT  To: Karen Cho PT  Subject: lymphedema                                       This pt has not heard anything back about therapy/stockings/pumps for lymphedema.  Could you please contact her and send me correspondence about this.     Thx-  Lashay Leon MD          
,

## 2025-06-25 NOTE — DISCHARGE NOTE PROVIDER - CARE PROVIDER_API CALL
Finesse Yan  Internal Medicine  [address] [place] [address]  Phone: [phone]  Fax: [phone]  Follow Up Time: 1 week

## 2025-06-25 NOTE — DISCHARGE NOTE PROVIDER - NSDCMRMEDTOKEN_GEN_ALL_CORE_FT
aspirin 81 mg oral tablet: 1 tab(s) orally once a day  atorvastatin 10 mg oral tablet: 1 tab(s) orally once a day (at bedtime)  BUPROPION HCL  MG TABLET: 1 tab(s) orally once a day TAKE 1 TABLET BY MOUTH EVERY DAY  Eliquis 2.5 mg oral tablet: 1 tab(s) orally 2 times a day  folic acid 1 mg oral tablet: 1 tab(s) orally once a day  glipiZIDE 2.5 mg oral tablet: 0.5 tab(s) orally once a day  HumaLOG KwikPen 100 units/mL injectable solution: injectable 3 times a day (before meals) uses a scale to determine dose  lisinopril 10 mg oral tablet: 1 tab(s) orally once a day  methotrexate 2.5 mg oral tablet: 6 tab(s) orally once a week  predniSONE 2.5 mg oral tablet: 1 tab(s) orally once a day  propranolol 10 mg oral tablet: 1 tab(s) orally 2 times a day  Tresiba 100 units/mL subcutaneous solution: subcutaneous once a day  VILAZODONE HCL 40 MG TABLET: TAKE 1 TABLET BY MOUTH EVERY DAY   acetaminophen 325 mg oral tablet: 3 tab(s) orally every 6 hours As needed Mild Pain (1 - 3), Moderate Pain (4 - 6)  amLODIPine 2.5 mg oral tablet: 1 tab(s) orally once a day  aspirin 81 mg oral tablet: 1 tab(s) orally once a day  atorvastatin 10 mg oral tablet: 1 tab(s) orally once a day (at bedtime)  BUPROPION HCL  MG TABLET: 1 tab(s) orally once a day TAKE 1 TABLET BY MOUTH EVERY DAY  Eliquis 2.5 mg oral tablet: 1 tab(s) orally 2 times a day  folic acid 1 mg oral tablet: 1 tab(s) orally once a day  gabapentin 300 mg oral capsule: 1 cap(s) orally 2 times a day  glipiZIDE 2.5 mg oral tablet: 0.5 tab(s) orally once a day  lidocaine 4% topical film: Apply topically to affected area once a day  lisinopril 10 mg oral tablet: 1 tab(s) orally once a day  methotrexate 2.5 mg oral tablet: 6 tab(s) orally once a week  polyethylene glycol 3350 oral powder for reconstitution: 17 gram(s) orally once a day As needed Constipation  predniSONE 2.5 mg oral tablet: 1 tab(s) orally once a day  propranolol 10 mg oral tablet: 1 tab(s) orally 2 times a day  Tresiba 100 units/mL subcutaneous solution: 25 unit(s) subcutaneous once a day  VILAZODONE HCL 40 MG TABLET: TAKE 1 TABLET BY MOUTH EVERY DAY

## 2025-06-25 NOTE — DISCHARGE NOTE PROVIDER - NSDCCPCAREPLAN_GEN_ALL_CORE_FT
PRINCIPAL DISCHARGE DIAGNOSIS  Diagnosis: Intractable back pain  Assessment and Plan of Treatment: Continue with pain medications. Continue with physical therapy. Return to hospital if worsening pain, leg weakness, or new incontinence.     PRINCIPAL DISCHARGE DIAGNOSIS  Diagnosis: Intractable back pain  Assessment and Plan of Treatment: You were admitted with persistent lower back pain. You have a small hairline fracture in your sacrum, as well as severe degenrative changes in your lower spine. No surgical intervention is warranted. Continue with pain medications. Continue with physical therapy. Return to hospital if worsening pain, leg weakness, or new incontinence.

## 2025-06-26 LAB
GLUCOSE BLDC GLUCOMTR-MCNC: 123 MG/DL — HIGH (ref 70–99)
GLUCOSE BLDC GLUCOMTR-MCNC: 277 MG/DL — HIGH (ref 70–99)
GLUCOSE BLDC GLUCOMTR-MCNC: 289 MG/DL — HIGH (ref 70–99)
GLUCOSE BLDC GLUCOMTR-MCNC: 318 MG/DL — HIGH (ref 70–99)

## 2025-06-26 RX ADMIN — Medication 975 MILLIGRAM(S): at 17:03

## 2025-06-26 RX ADMIN — GABAPENTIN 100 MILLIGRAM(S): 400 CAPSULE ORAL at 21:12

## 2025-06-26 RX ADMIN — GABAPENTIN 100 MILLIGRAM(S): 400 CAPSULE ORAL at 11:06

## 2025-06-26 RX ADMIN — LISINOPRIL 10 MILLIGRAM(S): 5 TABLET ORAL at 05:05

## 2025-06-26 RX ADMIN — APIXABAN 2.5 MILLIGRAM(S): 2.5 TABLET, FILM COATED ORAL at 05:05

## 2025-06-26 RX ADMIN — ATORVASTATIN CALCIUM 10 MILLIGRAM(S): 80 TABLET, FILM COATED ORAL at 21:12

## 2025-06-26 RX ADMIN — Medication 975 MILLIGRAM(S): at 21:13

## 2025-06-26 RX ADMIN — FOLIC ACID 1 MILLIGRAM(S): 1 TABLET ORAL at 11:06

## 2025-06-26 RX ADMIN — INSULIN LISPRO 1: 100 INJECTION, SOLUTION INTRAVENOUS; SUBCUTANEOUS at 21:12

## 2025-06-26 RX ADMIN — Medication 5 MILLIGRAM(S): at 23:52

## 2025-06-26 RX ADMIN — Medication 975 MILLIGRAM(S): at 12:18

## 2025-06-26 RX ADMIN — GABAPENTIN 100 MILLIGRAM(S): 400 CAPSULE ORAL at 05:04

## 2025-06-26 RX ADMIN — INSULIN LISPRO 6 UNIT(S): 100 INJECTION, SOLUTION INTRAVENOUS; SUBCUTANEOUS at 11:50

## 2025-06-26 RX ADMIN — PREDNISONE 2.5 MILLIGRAM(S): 20 TABLET ORAL at 05:04

## 2025-06-26 RX ADMIN — Medication 975 MILLIGRAM(S): at 21:32

## 2025-06-26 RX ADMIN — INSULIN LISPRO 4: 100 INJECTION, SOLUTION INTRAVENOUS; SUBCUTANEOUS at 11:50

## 2025-06-26 RX ADMIN — METHOCARBAMOL 500 MILLIGRAM(S): 500 TABLET, FILM COATED ORAL at 17:08

## 2025-06-26 RX ADMIN — METHOCARBAMOL 500 MILLIGRAM(S): 500 TABLET, FILM COATED ORAL at 12:16

## 2025-06-26 RX ADMIN — Medication 975 MILLIGRAM(S): at 05:04

## 2025-06-26 RX ADMIN — APIXABAN 2.5 MILLIGRAM(S): 2.5 TABLET, FILM COATED ORAL at 17:08

## 2025-06-26 RX ADMIN — INSULIN GLARGINE-YFGN 21 UNIT(S): 100 INJECTION, SOLUTION SUBCUTANEOUS at 08:01

## 2025-06-26 RX ADMIN — METHOCARBAMOL 500 MILLIGRAM(S): 500 TABLET, FILM COATED ORAL at 22:10

## 2025-06-26 RX ADMIN — Medication 81 MILLIGRAM(S): at 11:05

## 2025-06-26 RX ADMIN — INSULIN LISPRO 3: 100 INJECTION, SOLUTION INTRAVENOUS; SUBCUTANEOUS at 08:01

## 2025-06-26 RX ADMIN — VILAZODONE HYDROCHLORIDE 40 MILLIGRAM(S): 40 TABLET, FILM COATED ORAL at 11:09

## 2025-06-26 RX ADMIN — Medication 975 MILLIGRAM(S): at 05:15

## 2025-06-26 RX ADMIN — INSULIN LISPRO 6 UNIT(S): 100 INJECTION, SOLUTION INTRAVENOUS; SUBCUTANEOUS at 08:02

## 2025-06-26 RX ADMIN — BUPROPION HYDROBROMIDE 300 MILLIGRAM(S): 522 TABLET, EXTENDED RELEASE ORAL at 21:13

## 2025-06-26 RX ADMIN — METHOCARBAMOL 500 MILLIGRAM(S): 500 TABLET, FILM COATED ORAL at 05:05

## 2025-06-26 NOTE — CHART NOTE - NSCHARTNOTEFT_GEN_A_CORE
Still has a lot of pain with ambulation. INcreased percocet to q8h.  Spoke with PT, abd binder and tlso brace ordered.  Intented for dc on 6/27

## 2025-06-27 LAB
GLUCOSE BLDC GLUCOMTR-MCNC: 221 MG/DL — HIGH (ref 70–99)
GLUCOSE BLDC GLUCOMTR-MCNC: 226 MG/DL — HIGH (ref 70–99)
GLUCOSE BLDC GLUCOMTR-MCNC: 282 MG/DL — HIGH (ref 70–99)
GLUCOSE BLDC GLUCOMTR-MCNC: 297 MG/DL — HIGH (ref 70–99)

## 2025-06-27 PROCEDURE — 71045 X-RAY EXAM CHEST 1 VIEW: CPT | Mod: 26

## 2025-06-27 PROCEDURE — 93010 ELECTROCARDIOGRAM REPORT: CPT

## 2025-06-27 RX ORDER — GABAPENTIN 400 MG/1
300 CAPSULE ORAL EVERY 8 HOURS
Refills: 0 | Status: DISCONTINUED | OUTPATIENT
Start: 2025-06-27 | End: 2025-07-01

## 2025-06-27 RX ORDER — OXYCODONE HYDROCHLORIDE 30 MG/1
5 TABLET ORAL ONCE
Refills: 0 | Status: DISCONTINUED | OUTPATIENT
Start: 2025-06-27 | End: 2025-06-27

## 2025-06-27 RX ORDER — LIDOCAINE HYDROCHLORIDE 20 MG/ML
1 JELLY TOPICAL DAILY
Refills: 0 | Status: DISCONTINUED | OUTPATIENT
Start: 2025-06-27 | End: 2025-07-02

## 2025-06-27 RX ORDER — GABAPENTIN 400 MG/1
200 CAPSULE ORAL EVERY 8 HOURS
Refills: 0 | Status: DISCONTINUED | OUTPATIENT
Start: 2025-06-27 | End: 2025-06-27

## 2025-06-27 RX ORDER — AMLODIPINE BESYLATE 10 MG/1
2.5 TABLET ORAL DAILY
Refills: 0 | Status: DISCONTINUED | OUTPATIENT
Start: 2025-06-27 | End: 2025-07-02

## 2025-06-27 RX ADMIN — Medication 975 MILLIGRAM(S): at 05:49

## 2025-06-27 RX ADMIN — INSULIN LISPRO 2: 100 INJECTION, SOLUTION INTRAVENOUS; SUBCUTANEOUS at 17:12

## 2025-06-27 RX ADMIN — OXYCODONE HYDROCHLORIDE 5 MILLIGRAM(S): 30 TABLET ORAL at 11:56

## 2025-06-27 RX ADMIN — OXYCODONE HYDROCHLORIDE 5 MILLIGRAM(S): 30 TABLET ORAL at 14:52

## 2025-06-27 RX ADMIN — Medication 975 MILLIGRAM(S): at 21:37

## 2025-06-27 RX ADMIN — INSULIN LISPRO 3: 100 INJECTION, SOLUTION INTRAVENOUS; SUBCUTANEOUS at 13:33

## 2025-06-27 RX ADMIN — LISINOPRIL 10 MILLIGRAM(S): 5 TABLET ORAL at 05:18

## 2025-06-27 RX ADMIN — Medication 975 MILLIGRAM(S): at 13:33

## 2025-06-27 RX ADMIN — Medication 10 MILLIGRAM(S): at 06:49

## 2025-06-27 RX ADMIN — Medication 975 MILLIGRAM(S): at 05:18

## 2025-06-27 RX ADMIN — GABAPENTIN 300 MILLIGRAM(S): 400 CAPSULE ORAL at 21:38

## 2025-06-27 RX ADMIN — METHOCARBAMOL 500 MILLIGRAM(S): 500 TABLET, FILM COATED ORAL at 05:18

## 2025-06-27 RX ADMIN — APIXABAN 2.5 MILLIGRAM(S): 2.5 TABLET, FILM COATED ORAL at 17:13

## 2025-06-27 RX ADMIN — INSULIN LISPRO 3: 100 INJECTION, SOLUTION INTRAVENOUS; SUBCUTANEOUS at 07:44

## 2025-06-27 RX ADMIN — BUPROPION HYDROBROMIDE 300 MILLIGRAM(S): 522 TABLET, EXTENDED RELEASE ORAL at 21:38

## 2025-06-27 RX ADMIN — AMLODIPINE BESYLATE 2.5 MILLIGRAM(S): 10 TABLET ORAL at 08:06

## 2025-06-27 RX ADMIN — Medication 975 MILLIGRAM(S): at 12:05

## 2025-06-27 RX ADMIN — GABAPENTIN 300 MILLIGRAM(S): 400 CAPSULE ORAL at 12:03

## 2025-06-27 RX ADMIN — METHOCARBAMOL 500 MILLIGRAM(S): 500 TABLET, FILM COATED ORAL at 17:13

## 2025-06-27 RX ADMIN — METHOCARBAMOL 500 MILLIGRAM(S): 500 TABLET, FILM COATED ORAL at 12:04

## 2025-06-27 RX ADMIN — FOLIC ACID 1 MILLIGRAM(S): 1 TABLET ORAL at 12:04

## 2025-06-27 RX ADMIN — INSULIN LISPRO 6 UNIT(S): 100 INJECTION, SOLUTION INTRAVENOUS; SUBCUTANEOUS at 13:33

## 2025-06-27 RX ADMIN — LIDOCAINE HYDROCHLORIDE 1 PATCH: 20 JELLY TOPICAL at 12:05

## 2025-06-27 RX ADMIN — INSULIN LISPRO 6 UNIT(S): 100 INJECTION, SOLUTION INTRAVENOUS; SUBCUTANEOUS at 07:44

## 2025-06-27 RX ADMIN — Medication 81 MILLIGRAM(S): at 12:03

## 2025-06-27 RX ADMIN — ATORVASTATIN CALCIUM 10 MILLIGRAM(S): 80 TABLET, FILM COATED ORAL at 21:38

## 2025-06-27 RX ADMIN — INSULIN GLARGINE-YFGN 21 UNIT(S): 100 INJECTION, SOLUTION SUBCUTANEOUS at 07:55

## 2025-06-27 RX ADMIN — VILAZODONE HYDROCHLORIDE 40 MILLIGRAM(S): 40 TABLET, FILM COATED ORAL at 12:05

## 2025-06-27 RX ADMIN — OXYCODONE HYDROCHLORIDE 5 MILLIGRAM(S): 30 TABLET ORAL at 14:22

## 2025-06-27 RX ADMIN — OXYCODONE HYDROCHLORIDE 5 MILLIGRAM(S): 30 TABLET ORAL at 10:39

## 2025-06-27 RX ADMIN — PREDNISONE 2.5 MILLIGRAM(S): 20 TABLET ORAL at 05:19

## 2025-06-27 RX ADMIN — GABAPENTIN 100 MILLIGRAM(S): 400 CAPSULE ORAL at 05:18

## 2025-06-27 RX ADMIN — APIXABAN 2.5 MILLIGRAM(S): 2.5 TABLET, FILM COATED ORAL at 05:18

## 2025-06-27 NOTE — CHART NOTE - NSCHARTNOTEFT_GEN_A_CORE
Patient required hydralazine x 2 pushes overnight for SBP > 180 each time.   Home BP med given this am   Cardiology is following

## 2025-06-27 NOTE — CHART NOTE - NSCHARTNOTEFT_GEN_A_CORE
1.PT with elevated bp, started on amlodipine for uncontrolled htn, bp improved on this med  2. episode of left chest pain, which was intermittent, mild, worse with movement, and resolved. ekg no ischemic changes, cxr normal.  3. back pain not controlled yet, increased gabapentin, started lidocaine patch, and received extra dose of percocet today.   4. Fall, pt attempted to get out of bed on her own without assistance. Pt reports some pain in back of left leg, but denies any severe pain or loss of rom.    Vital Signs Last 24 Hrs    HR: 97 (27 Jun 2025 15:36) (63 - 97)  BP: 160/77 (27 Jun 2025 15:36) (147/73 - 216/81)  RR: 18 (27 Jun 2025 15:36) (18 - 18)  SpO2: 99% (27 Jun 2025 15:36) (97% - 99%)  PHYSICAL EXAM:      Constitutional: A&Ox4  Respiratory: cta b/l  Cardiovascular: s1 s2 rrr  Gastrointestinal: soft nt  nd + bs no rebound or guarding  Genitourinary: no cva tenderness  Extremities: normal rom, no edema, calf tenderness  Neurological:no focal deficits  Skin: no rash    -fall precautions  -d/w staff  -above discussed with pt daughter  -d/w Dr Yan 1.PT with elevated bp, started on amlodipine for uncontrolled htn, bp improved on this med  2. episode of left chest pain, which was intermittent, mild, worse with movement, and resolved. ekg no ischemic changes, cxr normal.  3. back pain not controlled yet, increased gabapentin, started lidocaine patch, and received extra dose of percocet today.   4. Fall, pt attempted to get out of bed on her own without assistance. Pt reports some pain in back of left leg, but denies any severe pain or loss of rom.    Vital Signs Last 24 Hrs    HR: 97 (27 Jun 2025 15:36) (63 - 97)  BP: 160/77 (27 Jun 2025 15:36) (147/73 - 216/81)  RR: 18 (27 Jun 2025 15:36) (18 - 18)  SpO2: 99% (27 Jun 2025 15:36) (97% - 99%)  PHYSICAL EXAM:      Constitutional: A&Ox4  Respiratory: cta b/l  Cardiovascular: s1 s2 rrr  Gastrointestinal: soft nt  nd + bs no rebound or guarding  Genitourinary: no cva tenderness  Extremities: normal rom, no edema, calf tenderness  Neurological:no focal deficits  Skin: no rash    -fall precautions  -d/w staff  -PT recommending STR, spoke with family about this and they are becoming more amenable to this but would like her pain better controlled.   -above discussed with pt daughter  -d/w Dr Yan

## 2025-06-28 LAB
GLUCOSE BLDC GLUCOMTR-MCNC: 149 MG/DL — HIGH (ref 70–99)
GLUCOSE BLDC GLUCOMTR-MCNC: 150 MG/DL — HIGH (ref 70–99)
GLUCOSE BLDC GLUCOMTR-MCNC: 281 MG/DL — HIGH (ref 70–99)
GLUCOSE BLDC GLUCOMTR-MCNC: 308 MG/DL — HIGH (ref 70–99)

## 2025-06-28 RX ORDER — OXYCODONE HYDROCHLORIDE AND ACETAMINOPHEN 10; 325 MG/1; MG/1
1 TABLET ORAL EVERY 8 HOURS
Refills: 0 | Status: DISCONTINUED | OUTPATIENT
Start: 2025-06-28 | End: 2025-07-01

## 2025-06-28 RX ADMIN — FOLIC ACID 1 MILLIGRAM(S): 1 TABLET ORAL at 11:19

## 2025-06-28 RX ADMIN — VILAZODONE HYDROCHLORIDE 40 MILLIGRAM(S): 40 TABLET, FILM COATED ORAL at 13:28

## 2025-06-28 RX ADMIN — INSULIN LISPRO 6 UNIT(S): 100 INJECTION, SOLUTION INTRAVENOUS; SUBCUTANEOUS at 07:56

## 2025-06-28 RX ADMIN — GABAPENTIN 300 MILLIGRAM(S): 400 CAPSULE ORAL at 05:23

## 2025-06-28 RX ADMIN — Medication 975 MILLIGRAM(S): at 06:30

## 2025-06-28 RX ADMIN — BUPROPION HYDROBROMIDE 300 MILLIGRAM(S): 522 TABLET, EXTENDED RELEASE ORAL at 21:39

## 2025-06-28 RX ADMIN — METHOCARBAMOL 500 MILLIGRAM(S): 500 TABLET, FILM COATED ORAL at 11:19

## 2025-06-28 RX ADMIN — LISINOPRIL 10 MILLIGRAM(S): 5 TABLET ORAL at 05:23

## 2025-06-28 RX ADMIN — METHOCARBAMOL 500 MILLIGRAM(S): 500 TABLET, FILM COATED ORAL at 05:20

## 2025-06-28 RX ADMIN — LIDOCAINE HYDROCHLORIDE 1 PATCH: 20 JELLY TOPICAL at 00:00

## 2025-06-28 RX ADMIN — METHOCARBAMOL 500 MILLIGRAM(S): 500 TABLET, FILM COATED ORAL at 17:09

## 2025-06-28 RX ADMIN — INSULIN LISPRO 4: 100 INJECTION, SOLUTION INTRAVENOUS; SUBCUTANEOUS at 07:56

## 2025-06-28 RX ADMIN — Medication 81 MILLIGRAM(S): at 11:19

## 2025-06-28 RX ADMIN — GABAPENTIN 300 MILLIGRAM(S): 400 CAPSULE ORAL at 21:39

## 2025-06-28 RX ADMIN — APIXABAN 2.5 MILLIGRAM(S): 2.5 TABLET, FILM COATED ORAL at 05:21

## 2025-06-28 RX ADMIN — PREDNISONE 2.5 MILLIGRAM(S): 20 TABLET ORAL at 05:49

## 2025-06-28 RX ADMIN — AMLODIPINE BESYLATE 2.5 MILLIGRAM(S): 10 TABLET ORAL at 05:20

## 2025-06-28 RX ADMIN — INSULIN LISPRO 3: 100 INJECTION, SOLUTION INTRAVENOUS; SUBCUTANEOUS at 12:04

## 2025-06-28 RX ADMIN — INSULIN GLARGINE-YFGN 21 UNIT(S): 100 INJECTION, SOLUTION SUBCUTANEOUS at 11:19

## 2025-06-28 RX ADMIN — GABAPENTIN 300 MILLIGRAM(S): 400 CAPSULE ORAL at 13:28

## 2025-06-28 RX ADMIN — ATORVASTATIN CALCIUM 10 MILLIGRAM(S): 80 TABLET, FILM COATED ORAL at 21:38

## 2025-06-28 RX ADMIN — APIXABAN 2.5 MILLIGRAM(S): 2.5 TABLET, FILM COATED ORAL at 17:09

## 2025-06-28 RX ADMIN — Medication 975 MILLIGRAM(S): at 05:49

## 2025-06-28 RX ADMIN — INSULIN LISPRO 6 UNIT(S): 100 INJECTION, SOLUTION INTRAVENOUS; SUBCUTANEOUS at 12:02

## 2025-06-28 RX ADMIN — LIDOCAINE HYDROCHLORIDE 1 PATCH: 20 JELLY TOPICAL at 11:20

## 2025-06-28 NOTE — CHART NOTE - NSCHARTNOTEFT_GEN_A_CORE
pt was seen in chair, alert, NAD  as per RN pt agitated, restless  Vital Signs Last 24 Hrs  T(C): 36.7 (28 Jun 2025 04:58), Max: 36.7 (28 Jun 2025 04:58)  T(F): 98 (28 Jun 2025 04:58), Max: 98 (28 Jun 2025 04:58)  HR: 100 (28 Jun 2025 04:58) (76 - 100)  BP: 159/74 (28 Jun 2025 04:58) (146/67 - 196/77)  RR: 16 (28 Jun 2025 04:58) (16 - 18)  SpO2: 96% (28 Jun 2025 04:58) (96% - 99%)    A/P:  continue current tx  constant observation  safety/fall precautions  monitor vss  monitor pt

## 2025-06-28 NOTE — CHART NOTE - NSCHARTNOTEFT_GEN_A_CORE
as per RN pt agitated, restless, danger to self by trying to climb out of bed  pt s/p fall  constant observation  safety/fall precautions  monitor vss  monitor pt

## 2025-06-29 LAB
GLUCOSE BLDC GLUCOMTR-MCNC: 179 MG/DL — HIGH (ref 70–99)
GLUCOSE BLDC GLUCOMTR-MCNC: 213 MG/DL — HIGH (ref 70–99)
GLUCOSE BLDC GLUCOMTR-MCNC: 252 MG/DL — HIGH (ref 70–99)
GLUCOSE BLDC GLUCOMTR-MCNC: 284 MG/DL — HIGH (ref 70–99)

## 2025-06-29 RX ADMIN — INSULIN LISPRO 2: 100 INJECTION, SOLUTION INTRAVENOUS; SUBCUTANEOUS at 07:59

## 2025-06-29 RX ADMIN — INSULIN LISPRO 6 UNIT(S): 100 INJECTION, SOLUTION INTRAVENOUS; SUBCUTANEOUS at 07:58

## 2025-06-29 RX ADMIN — APIXABAN 2.5 MILLIGRAM(S): 2.5 TABLET, FILM COATED ORAL at 05:37

## 2025-06-29 RX ADMIN — GABAPENTIN 300 MILLIGRAM(S): 400 CAPSULE ORAL at 21:36

## 2025-06-29 RX ADMIN — LIDOCAINE HYDROCHLORIDE 1 PATCH: 20 JELLY TOPICAL at 09:39

## 2025-06-29 RX ADMIN — Medication 81 MILLIGRAM(S): at 11:29

## 2025-06-29 RX ADMIN — APIXABAN 2.5 MILLIGRAM(S): 2.5 TABLET, FILM COATED ORAL at 17:15

## 2025-06-29 RX ADMIN — ATORVASTATIN CALCIUM 10 MILLIGRAM(S): 80 TABLET, FILM COATED ORAL at 21:35

## 2025-06-29 RX ADMIN — METHOCARBAMOL 500 MILLIGRAM(S): 500 TABLET, FILM COATED ORAL at 11:29

## 2025-06-29 RX ADMIN — INSULIN GLARGINE-YFGN 21 UNIT(S): 100 INJECTION, SOLUTION SUBCUTANEOUS at 11:30

## 2025-06-29 RX ADMIN — INSULIN LISPRO 3: 100 INJECTION, SOLUTION INTRAVENOUS; SUBCUTANEOUS at 11:56

## 2025-06-29 RX ADMIN — AMLODIPINE BESYLATE 2.5 MILLIGRAM(S): 10 TABLET ORAL at 05:36

## 2025-06-29 RX ADMIN — VILAZODONE HYDROCHLORIDE 40 MILLIGRAM(S): 40 TABLET, FILM COATED ORAL at 11:31

## 2025-06-29 RX ADMIN — METHOCARBAMOL 500 MILLIGRAM(S): 500 TABLET, FILM COATED ORAL at 17:15

## 2025-06-29 RX ADMIN — LIDOCAINE HYDROCHLORIDE 1 PATCH: 20 JELLY TOPICAL at 02:25

## 2025-06-29 RX ADMIN — PREDNISONE 2.5 MILLIGRAM(S): 20 TABLET ORAL at 05:37

## 2025-06-29 RX ADMIN — INSULIN LISPRO 6 UNIT(S): 100 INJECTION, SOLUTION INTRAVENOUS; SUBCUTANEOUS at 11:56

## 2025-06-29 RX ADMIN — GABAPENTIN 300 MILLIGRAM(S): 400 CAPSULE ORAL at 05:37

## 2025-06-29 RX ADMIN — LISINOPRIL 10 MILLIGRAM(S): 5 TABLET ORAL at 05:36

## 2025-06-29 RX ADMIN — METHOCARBAMOL 500 MILLIGRAM(S): 500 TABLET, FILM COATED ORAL at 05:37

## 2025-06-29 RX ADMIN — BUPROPION HYDROBROMIDE 300 MILLIGRAM(S): 522 TABLET, EXTENDED RELEASE ORAL at 21:35

## 2025-06-29 RX ADMIN — GABAPENTIN 300 MILLIGRAM(S): 400 CAPSULE ORAL at 13:29

## 2025-06-29 RX ADMIN — FOLIC ACID 1 MILLIGRAM(S): 1 TABLET ORAL at 11:29

## 2025-06-29 NOTE — CHART NOTE - NSCHARTNOTEFT_GEN_A_CORE
pt seen in bed, alert, restless, NAD  Vital Signs Last 24 Hrs  T(C): 36.4 (29 Jun 2025 05:37), Max: 36.7 (28 Jun 2025 13:35)  T(F): 97.5 (29 Jun 2025 05:37), Max: 98 (28 Jun 2025 13:35)  HR: 94 (29 Jun 2025 05:37) (77 - 94)  BP: 179/73 (29 Jun 2025 05:37) (133/76 - 179/73)  RR: 18 (29 Jun 2025 05:37) (18 - 18)  SpO2: 96% (29 Jun 2025 05:37) (94% - 99%)    A/P:  continue current tx  constant observation  PT/Rehab fup  safety/fall precautions  sw for placement  monitor vss  monitor pt

## 2025-06-30 LAB
GLUCOSE BLDC GLUCOMTR-MCNC: 158 MG/DL — HIGH (ref 70–99)
GLUCOSE BLDC GLUCOMTR-MCNC: 192 MG/DL — HIGH (ref 70–99)
GLUCOSE BLDC GLUCOMTR-MCNC: 256 MG/DL — HIGH (ref 70–99)
GLUCOSE BLDC GLUCOMTR-MCNC: 335 MG/DL — HIGH (ref 70–99)

## 2025-06-30 RX ORDER — INSULIN LISPRO 100 U/ML
8 INJECTION, SOLUTION INTRAVENOUS; SUBCUTANEOUS
Refills: 0 | Status: DISCONTINUED | OUTPATIENT
Start: 2025-06-30 | End: 2025-07-02

## 2025-06-30 RX ORDER — INSULIN GLARGINE-YFGN 100 [IU]/ML
25 INJECTION, SOLUTION SUBCUTANEOUS EVERY MORNING
Refills: 0 | Status: DISCONTINUED | OUTPATIENT
Start: 2025-06-30 | End: 2025-07-02

## 2025-06-30 RX ADMIN — INSULIN LISPRO 1: 100 INJECTION, SOLUTION INTRAVENOUS; SUBCUTANEOUS at 17:31

## 2025-06-30 RX ADMIN — Medication 81 MILLIGRAM(S): at 12:00

## 2025-06-30 RX ADMIN — BUPROPION HYDROBROMIDE 300 MILLIGRAM(S): 522 TABLET, EXTENDED RELEASE ORAL at 21:12

## 2025-06-30 RX ADMIN — GABAPENTIN 300 MILLIGRAM(S): 400 CAPSULE ORAL at 05:10

## 2025-06-30 RX ADMIN — PREDNISONE 2.5 MILLIGRAM(S): 20 TABLET ORAL at 05:09

## 2025-06-30 RX ADMIN — INSULIN LISPRO 4: 100 INJECTION, SOLUTION INTRAVENOUS; SUBCUTANEOUS at 12:01

## 2025-06-30 RX ADMIN — ATORVASTATIN CALCIUM 10 MILLIGRAM(S): 80 TABLET, FILM COATED ORAL at 21:12

## 2025-06-30 RX ADMIN — INSULIN LISPRO 8 UNIT(S): 100 INJECTION, SOLUTION INTRAVENOUS; SUBCUTANEOUS at 12:01

## 2025-06-30 RX ADMIN — GABAPENTIN 300 MILLIGRAM(S): 400 CAPSULE ORAL at 17:38

## 2025-06-30 RX ADMIN — LIDOCAINE HYDROCHLORIDE 1 PATCH: 20 JELLY TOPICAL at 12:00

## 2025-06-30 RX ADMIN — METHOCARBAMOL 500 MILLIGRAM(S): 500 TABLET, FILM COATED ORAL at 05:10

## 2025-06-30 RX ADMIN — INSULIN GLARGINE-YFGN 21 UNIT(S): 100 INJECTION, SOLUTION SUBCUTANEOUS at 08:41

## 2025-06-30 RX ADMIN — INSULIN LISPRO 3: 100 INJECTION, SOLUTION INTRAVENOUS; SUBCUTANEOUS at 08:42

## 2025-06-30 RX ADMIN — FOLIC ACID 1 MILLIGRAM(S): 1 TABLET ORAL at 12:00

## 2025-06-30 RX ADMIN — VILAZODONE HYDROCHLORIDE 40 MILLIGRAM(S): 40 TABLET, FILM COATED ORAL at 12:00

## 2025-06-30 RX ADMIN — INSULIN LISPRO 6 UNIT(S): 100 INJECTION, SOLUTION INTRAVENOUS; SUBCUTANEOUS at 07:57

## 2025-06-30 RX ADMIN — LIDOCAINE HYDROCHLORIDE 1 PATCH: 20 JELLY TOPICAL at 19:11

## 2025-06-30 RX ADMIN — APIXABAN 2.5 MILLIGRAM(S): 2.5 TABLET, FILM COATED ORAL at 05:10

## 2025-06-30 RX ADMIN — METHOCARBAMOL 500 MILLIGRAM(S): 500 TABLET, FILM COATED ORAL at 17:30

## 2025-06-30 RX ADMIN — LIDOCAINE HYDROCHLORIDE 1 PATCH: 20 JELLY TOPICAL at 23:13

## 2025-06-30 RX ADMIN — LISINOPRIL 10 MILLIGRAM(S): 5 TABLET ORAL at 05:10

## 2025-06-30 RX ADMIN — APIXABAN 2.5 MILLIGRAM(S): 2.5 TABLET, FILM COATED ORAL at 17:31

## 2025-06-30 RX ADMIN — AMLODIPINE BESYLATE 2.5 MILLIGRAM(S): 10 TABLET ORAL at 05:09

## 2025-07-01 LAB
GLUCOSE BLDC GLUCOMTR-MCNC: 133 MG/DL — HIGH (ref 70–99)
GLUCOSE BLDC GLUCOMTR-MCNC: 191 MG/DL — HIGH (ref 70–99)
GLUCOSE BLDC GLUCOMTR-MCNC: 222 MG/DL — HIGH (ref 70–99)
GLUCOSE BLDC GLUCOMTR-MCNC: 268 MG/DL — HIGH (ref 70–99)

## 2025-07-01 RX ORDER — GABAPENTIN 400 MG/1
300 CAPSULE ORAL
Refills: 0 | Status: DISCONTINUED | OUTPATIENT
Start: 2025-07-01 | End: 2025-07-02

## 2025-07-01 RX ORDER — ACETAMINOPHEN 500 MG/5ML
975 LIQUID (ML) ORAL EVERY 6 HOURS
Refills: 0 | Status: DISCONTINUED | OUTPATIENT
Start: 2025-07-01 | End: 2025-07-02

## 2025-07-01 RX ADMIN — INSULIN LISPRO 8 UNIT(S): 100 INJECTION, SOLUTION INTRAVENOUS; SUBCUTANEOUS at 08:08

## 2025-07-01 RX ADMIN — LIDOCAINE HYDROCHLORIDE 1 PATCH: 20 JELLY TOPICAL at 23:58

## 2025-07-01 RX ADMIN — INSULIN GLARGINE-YFGN 25 UNIT(S): 100 INJECTION, SOLUTION SUBCUTANEOUS at 08:09

## 2025-07-01 RX ADMIN — LIDOCAINE HYDROCHLORIDE 1 PATCH: 20 JELLY TOPICAL at 19:31

## 2025-07-01 RX ADMIN — LISINOPRIL 10 MILLIGRAM(S): 5 TABLET ORAL at 05:12

## 2025-07-01 RX ADMIN — APIXABAN 2.5 MILLIGRAM(S): 2.5 TABLET, FILM COATED ORAL at 05:12

## 2025-07-01 RX ADMIN — METHOCARBAMOL 500 MILLIGRAM(S): 500 TABLET, FILM COATED ORAL at 00:38

## 2025-07-01 RX ADMIN — INSULIN LISPRO 8 UNIT(S): 100 INJECTION, SOLUTION INTRAVENOUS; SUBCUTANEOUS at 11:48

## 2025-07-01 RX ADMIN — FOLIC ACID 1 MILLIGRAM(S): 1 TABLET ORAL at 11:48

## 2025-07-01 RX ADMIN — GABAPENTIN 300 MILLIGRAM(S): 400 CAPSULE ORAL at 13:24

## 2025-07-01 RX ADMIN — VILAZODONE HYDROCHLORIDE 40 MILLIGRAM(S): 40 TABLET, FILM COATED ORAL at 11:52

## 2025-07-01 RX ADMIN — INSULIN LISPRO 3: 100 INJECTION, SOLUTION INTRAVENOUS; SUBCUTANEOUS at 08:08

## 2025-07-01 RX ADMIN — METHOCARBAMOL 500 MILLIGRAM(S): 500 TABLET, FILM COATED ORAL at 05:12

## 2025-07-01 RX ADMIN — INSULIN LISPRO 1: 100 INJECTION, SOLUTION INTRAVENOUS; SUBCUTANEOUS at 11:48

## 2025-07-01 RX ADMIN — ATORVASTATIN CALCIUM 10 MILLIGRAM(S): 80 TABLET, FILM COATED ORAL at 21:12

## 2025-07-01 RX ADMIN — APIXABAN 2.5 MILLIGRAM(S): 2.5 TABLET, FILM COATED ORAL at 17:14

## 2025-07-01 RX ADMIN — BUPROPION HYDROBROMIDE 300 MILLIGRAM(S): 522 TABLET, EXTENDED RELEASE ORAL at 21:12

## 2025-07-01 RX ADMIN — Medication 81 MILLIGRAM(S): at 11:47

## 2025-07-01 RX ADMIN — PREDNISONE 2.5 MILLIGRAM(S): 20 TABLET ORAL at 05:12

## 2025-07-01 RX ADMIN — AMLODIPINE BESYLATE 2.5 MILLIGRAM(S): 10 TABLET ORAL at 05:12

## 2025-07-01 RX ADMIN — GABAPENTIN 300 MILLIGRAM(S): 400 CAPSULE ORAL at 05:17

## 2025-07-01 RX ADMIN — LIDOCAINE HYDROCHLORIDE 1 PATCH: 20 JELLY TOPICAL at 11:47

## 2025-07-01 NOTE — CHART NOTE - NSCHARTNOTEFT_GEN_A_CORE
Family told patient's RN that she takes Gabapentin only twice daily (not three times daily). Will change frequency to twice daily pending PMD review

## 2025-07-01 NOTE — PROGRESS NOTE ADULT - SUBJECTIVE AND OBJECTIVE BOX
Name: JOY ARAGON  Age: 85y  Gender: Female    Pt was seen and examined.   c/o:  doing about same   events noted   s/p fall today    Allergies:  No Known Allergies      PHYSICAL EXAM:    Vitals:  T(C): 36.4 (06-27-25 @ 19:57), Max: 36.7 (06-27-25 @ 11:21)  HR: 76 (06-27-25 @ 19:57) (63 - 97)  BP: 146/67 (06-27-25 @ 19:57) (146/67 - 216/81)  RR: 18 (06-27-25 @ 19:57) (18 - 18)  SpO2: 99% (06-27-25 @ 19:57) (97% - 99%)  Wt(kg): --Vital Signs Last 24 Hrs  T(C): 36.4 (27 Jun 2025 19:57), Max: 36.7 (27 Jun 2025 11:21)  T(F): 97.6 (27 Jun 2025 19:57), Max: 98.1 (27 Jun 2025 11:21)  HR: 76 (27 Jun 2025 19:57) (63 - 97)  BP: 146/67 (27 Jun 2025 19:57) (146/67 - 216/81)  BP(mean): --  RR: 18 (27 Jun 2025 19:57) (18 - 18)  SpO2: 99% (27 Jun 2025 19:57) (97% - 99%)    Parameters below as of 27 Jun 2025 05:45  Patient On (Oxygen Delivery Method): room air          NECK: Supple, No JVD  CHEST/LUNG: CTA, B/L, No rales, rhonchi, wheezing, or rubs  HEART: S1,S2, N1 Regular rate and rhythm; No murmurs, rubs, or gallops  ABDOMEN: Soft, Nontender, Nondistended; Bowel sounds present  EXTREMITIES:  2+ Peripheral Pulses, No clubbing, cyanosis, or edema      LABS:                  MEDICATIONS  (STANDING):  acetaminophen     Tablet .. 975 milliGRAM(s) Oral every 8 hours  amLODIPine   Tablet 2.5 milliGRAM(s) Oral daily  apixaban 2.5 milliGRAM(s) Oral every 12 hours  aspirin enteric coated 81 milliGRAM(s) Oral daily  atorvastatin 10 milliGRAM(s) Oral at bedtime  buPROPion XL (24-Hour) . 300 milliGRAM(s) Oral at bedtime  chlorhexidine 4% Liquid 1 Application(s) Topical <User Schedule>  dextrose 5%. 1000 milliLiter(s) (50 mL/Hr) IV Continuous <Continuous>  dextrose 5%. 1000 milliLiter(s) (100 mL/Hr) IV Continuous <Continuous>  dextrose 50% Injectable 25 Gram(s) IV Push once  dextrose 50% Injectable 12.5 Gram(s) IV Push once  dextrose 50% Injectable 25 Gram(s) IV Push once  folic acid 1 milliGRAM(s) Oral daily  gabapentin 300 milliGRAM(s) Oral every 8 hours  glucagon  Injectable 1 milliGRAM(s) IntraMuscular once  insulin glargine Injectable (LANTUS) 21 Unit(s) SubCutaneous every morning  insulin lispro (ADMELOG) corrective regimen sliding scale   SubCutaneous three times a day before meals  insulin lispro (ADMELOG) corrective regimen sliding scale   SubCutaneous at bedtime  insulin lispro Injectable (ADMELOG) 6 Unit(s) SubCutaneous before breakfast  insulin lispro Injectable (ADMELOG) 6 Unit(s) SubCutaneous before lunch  lidocaine   4% Patch 1 Patch Transdermal daily  lisinopril 10 milliGRAM(s) Oral daily  methocarbamol 500 milliGRAM(s) Oral every 6 hours  methotrexate 15 milliGRAM(s) Oral <User Schedule>  oxycodone    5 mG/acetaminophen 325 mG 1 Tablet(s) Oral every 8 hours  predniSONE   Tablet 2.5 milliGRAM(s) Oral daily  propranolol 10 milliGRAM(s) Oral every 12 hours  vilazodone 40 milliGRAM(s) Oral daily        RADIOLOGY & ADDITIONAL TESTS:    Imaging Personally Reviewed:  [ ] YES  [ ] NO    A/P:  Patient is a 85-year-old female past medical history of cervical cancer, depression, diabetes, hypertension, RA, HTN, whom presented to ED with c/o lower back pain described as severe, constant, associated with b/l leg weakness and difficulty ambulating.  Patient denies radiation of pain into legs,  fever, chills, cp, sob, numbness, tingling, saddle anesthesia, urinary incontinence,  dysuria, hematuria. Patient given 2mg IV morphine in ed with relief of pain.    #Sacral Insufficiency fracture, Severe L4-S1 spinal stenosis  sacral pain 2/2 fracture   #gait disorder, Unsteady Gait  #Low Back pain - Chronic  #B/L LE weakness - Chronic     s/p fall today   no evidence of any acute fractures or significant hematomas or abrasions on detailed exam.    Entire report of MRI was d/w pts dtr and pt herself.  Spinal stenosis and lumbar radiculopathy  is not the problem here as the pt denies any pain in her legs  Sacral insufficiency fx is a hairline fracture and will likely heal on its own over the next 2 to 3 weeks.  pt and family not considering any intervention at all  they would like home services if possible including PT  will d/w SW in am  will start on Percocet 2.5/325 one tab q8 standing order not prn so pt may do some PT in hospital  pt needs HHA and home PT if possible    #DMII  - On Humalog 6u breakfast and lunch and Tresiba 26u lunch time, hold glimepride   - FS running high, stop lispro 3u BID and lantus 8 units   - Will put on Lispro 6u BID, Lantus 21 units, plus ISS, adjust as needed based on FS   - c/w monitoring FS     #HTN  - DASH diet  - monitor bp  - cont meds from home lisinopril    #RA  - c/w prednisone and methotrexate    #cervical cancer  - off chemo due to intolerance  - not surgical candidate    PLAN - d/w pts dtr , she now says that she is ok with STR             she says that she lives alone and that they cannot help her 
  Name: JOY ARAGON  Age: 85y  Gender: Female    Pt was seen and examined.   c/o:  doing better    Allergies:  No Known Allergies      PHYSICAL EXAM:    Vitals:  T(C): 36.5 (06-23-25 @ 20:30), Max: 36.9 (06-23-25 @ 03:58)  HR: 78 (06-23-25 @ 20:30) (70 - 97)  BP: 160/69 (06-23-25 @ 20:30) (126/61 - 178/69)  RR: 18 (06-23-25 @ 20:30) (18 - 18)  SpO2: 96% (06-23-25 @ 20:30) (96% - 98%)  Wt(kg): --Vital Signs Last 24 Hrs  T(C): 36.5 (23 Jun 2025 20:30), Max: 36.9 (23 Jun 2025 03:58)  T(F): 97.7 (23 Jun 2025 20:30), Max: 98.4 (23 Jun 2025 03:58)  HR: 78 (23 Jun 2025 20:30) (70 - 97)  BP: 160/69 (23 Jun 2025 20:30) (126/61 - 178/69)  BP(mean): --  RR: 18 (23 Jun 2025 20:30) (18 - 18)  SpO2: 96% (23 Jun 2025 20:30) (96% - 98%)    Parameters below as of 23 Jun 2025 20:30  Patient On (Oxygen Delivery Method): room air          NECK: Supple, No JVD  CHEST/LUNG: CTA, B/L, No rales, rhonchi, wheezing, or rubs  HEART: S1,S2, N1 Regular rate and rhythm; No murmurs, rubs, or gallops  ABDOMEN: Soft, Nontender, Nondistended; Bowel sounds present  EXTREMITIES:  2+ Peripheral Pulses, No clubbing, cyanosis, or edema      LABS:                        10.8   8.45  )-----------( 304      ( 22 Jun 2025 15:07 )             35.8     06-22    140  |  102  |  14  ----------------------------<  149[H]  3.9   |  26  |  0.9    Ca    9.3      22 Jun 2025 15:07    TPro  6.4  /  Alb  4.0  /  TBili  0.4  /  DBili  x   /  AST  16  /  ALT  12  /  AlkPhos  68  06-22    LIVER FUNCTIONS - ( 22 Jun 2025 15:07 )  Alb: 4.0 g/dL / Pro: 6.4 g/dL / ALK PHOS: 68 U/L / ALT: 12 U/L / AST: 16 U/L / GGT: x                 MEDICATIONS  (STANDING):  acetaminophen     Tablet .. 975 milliGRAM(s) Oral every 8 hours  apixaban 2.5 milliGRAM(s) Oral every 12 hours  aspirin enteric coated 81 milliGRAM(s) Oral daily  atorvastatin 10 milliGRAM(s) Oral at bedtime  buPROPion XL (24-Hour) . 300 milliGRAM(s) Oral at bedtime  chlorhexidine 4% Liquid 1 Application(s) Topical <User Schedule>  dextrose 5%. 1000 milliLiter(s) (50 mL/Hr) IV Continuous <Continuous>  dextrose 5%. 1000 milliLiter(s) (100 mL/Hr) IV Continuous <Continuous>  dextrose 50% Injectable 25 Gram(s) IV Push once  dextrose 50% Injectable 12.5 Gram(s) IV Push once  dextrose 50% Injectable 25 Gram(s) IV Push once  folic acid 1 milliGRAM(s) Oral daily  gabapentin 100 milliGRAM(s) Oral every 8 hours  glucagon  Injectable 1 milliGRAM(s) IntraMuscular once  insulin glargine Injectable (LANTUS) 8 Unit(s) SubCutaneous every morning  insulin lispro (ADMELOG) corrective regimen sliding scale   SubCutaneous three times a day before meals  insulin lispro (ADMELOG) corrective regimen sliding scale   SubCutaneous at bedtime  insulin lispro Injectable (ADMELOG) 3 Unit(s) SubCutaneous before breakfast  insulin lispro Injectable (ADMELOG) 3 Unit(s) SubCutaneous before lunch  insulin lispro Injectable (ADMELOG) 3 Unit(s) SubCutaneous before dinner  lisinopril 10 milliGRAM(s) Oral daily  methocarbamol 500 milliGRAM(s) Oral every 6 hours  methotrexate 15 milliGRAM(s) Oral <User Schedule>  predniSONE   Tablet 2.5 milliGRAM(s) Oral daily  propranolol 10 milliGRAM(s) Oral every 12 hours  vilazodone 40 milliGRAM(s) Oral daily        RADIOLOGY & ADDITIONAL TESTS:    Imaging Personally Reviewed:  [ ] YES  [ ] NO    A/P:   Patient is a 85-year-old female past medical history of cervical cancer, depression, diabetes, hypertension, RA, HTN, whom presented to ED with c/o lower back pain described as severe, constant, associated with b/l leg weakness and difficulty ambulating.  Patient denies radiation of pain into legs,  fever, chills, cp, sob, numbness, tingling, saddle anesthesia, urinary incontinence,  dysuria, hematuria. Patient given 2mg IV morphine in ed with relief of pain.    #gait disorder, Unsteady Gait                frequent falls                family not able  to help her at home so requesting assistance at home or placement .                 #lower back pain - chronic   #b/l le weakness- chronic   -gabapentin, robaxin, tylneol  -oxycodone prn  -MR lumbar spine  -PT and PMR consult    #DM  -ada diet  -monitor BS  -cont meds from home tresiba chaneg to lantus  -Humalog 3 unit qac  -insulin coverage prn elevated  blood sugar  -hgb a1c  -hold glimeperide while in hospital    #HTN  -DASH diet  -monitor bp  -cont meds from home lisinopril    #RA  -c/w prednisone and methotrexate    #cervical cancer  -off chemo due to intolerance  -not surgical candidate        PLAN - appreciate PT and Rehab consult                  certsinly will try to make family and pt happy and obtain home PT although highly doubt this os a long term solution.      
  Name: JOY ARAGON  Age: 85y  Gender: Female    Pt was seen and examined.   c/o:  doing better, less pain she says    Allergies:  No Known Allergies      PHYSICAL EXAM:    Vitals:  T(C): 36.5 (06-26-25 @ 20:04), Max: 36.8 (06-26-25 @ 14:18)  HR: 72 (06-26-25 @ 22:04) (61 - 72)  BP: 192/72 (06-26-25 @ 22:04) (151/81 - 192/72)  RR: 18 (06-26-25 @ 20:04) (18 - 18)  SpO2: 98% (06-26-25 @ 20:04) (98% - 98%)  Wt(kg): --Vital Signs Last 24 Hrs  T(C): 36.5 (26 Jun 2025 20:04), Max: 36.8 (26 Jun 2025 14:18)  T(F): 97.7 (26 Jun 2025 20:04), Max: 98.2 (26 Jun 2025 14:18)  HR: 72 (26 Jun 2025 22:04) (61 - 72)  BP: 192/72 (26 Jun 2025 22:04) (151/81 - 192/72)  BP(mean): --  RR: 18 (26 Jun 2025 20:04) (18 - 18)  SpO2: 98% (26 Jun 2025 20:04) (98% - 98%)    Parameters below as of 26 Jun 2025 20:04  Patient On (Oxygen Delivery Method): room air          NECK: Supple, No JVD  CHEST/LUNG: CTA, B/L, No rales, rhonchi, wheezing, or rubs  HEART: S1,S2, N1 Regular rate and rhythm; No murmurs, rubs, or gallops  ABDOMEN: Soft, Nontender, Nondistended; Bowel sounds present  EXTREMITIES:  2+ Peripheral Pulses, No clubbing, cyanosis, or edema      LABS:                  MEDICATIONS  (STANDING):  acetaminophen     Tablet .. 975 milliGRAM(s) Oral every 8 hours  apixaban 2.5 milliGRAM(s) Oral every 12 hours  aspirin enteric coated 81 milliGRAM(s) Oral daily  atorvastatin 10 milliGRAM(s) Oral at bedtime  buPROPion XL (24-Hour) . 300 milliGRAM(s) Oral at bedtime  chlorhexidine 4% Liquid 1 Application(s) Topical <User Schedule>  dextrose 5%. 1000 milliLiter(s) (50 mL/Hr) IV Continuous <Continuous>  dextrose 5%. 1000 milliLiter(s) (100 mL/Hr) IV Continuous <Continuous>  dextrose 50% Injectable 25 Gram(s) IV Push once  dextrose 50% Injectable 12.5 Gram(s) IV Push once  dextrose 50% Injectable 25 Gram(s) IV Push once  folic acid 1 milliGRAM(s) Oral daily  gabapentin 100 milliGRAM(s) Oral every 8 hours  glucagon  Injectable 1 milliGRAM(s) IntraMuscular once  insulin glargine Injectable (LANTUS) 21 Unit(s) SubCutaneous every morning  insulin lispro (ADMELOG) corrective regimen sliding scale   SubCutaneous three times a day before meals  insulin lispro (ADMELOG) corrective regimen sliding scale   SubCutaneous at bedtime  insulin lispro Injectable (ADMELOG) 6 Unit(s) SubCutaneous before breakfast  insulin lispro Injectable (ADMELOG) 6 Unit(s) SubCutaneous before lunch  lisinopril 10 milliGRAM(s) Oral daily  methocarbamol 500 milliGRAM(s) Oral every 6 hours  methotrexate 15 milliGRAM(s) Oral <User Schedule>  oxycodone    5 mG/acetaminophen 325 mG 1 Tablet(s) Oral every 8 hours  predniSONE   Tablet 2.5 milliGRAM(s) Oral daily  propranolol 10 milliGRAM(s) Oral every 12 hours  vilazodone 40 milliGRAM(s) Oral daily        RADIOLOGY & ADDITIONAL TESTS:    Imaging Personally Reviewed:  [ ] YES  [ ] NO    A/P:  Patient is a 85-year-old female past medical history of cervical cancer, depression, diabetes, hypertension, RA, HTN, whom presented to ED with c/o lower back pain described as severe, constant, associated with b/l leg weakness and difficulty ambulating.  Patient denies radiation of pain into legs,  fever, chills, cp, sob, numbness, tingling, saddle anesthesia, urinary incontinence,  dysuria, hematuria. Patient given 2mg IV morphine in ed with relief of pain.    #Sacral Insufficiency fracture, Severe L4-S1 spinal stenosis  sacral pain 2/2 fracture   #gait disorder, Unsteady Gait  #Low Back pain - Chronic  #B/L LE weakness - Chronic     Entire report of MRI was d/w pts dtr and pt herself.  Spinal stenosis and lumbar radiculopathy  is not the problem here as the pt denies any pain in her legs  Sacral insufficiency fx is a hairline fracture and will likely heal on its own over the next 2 to 3 weeks.  pt and family not considering any intervention at all  they would like home services if possible including PT  will d/w SW in am  will start on Percocet 2.5/325 one tab q8 standing order not prn so pt may do some PT in hospital  pt needs HHA and home PT if possible    #DMII  - On Humalog 6u breakfast and lunch and Tresiba 26u lunch time, hold glimepride   - FS running high, stop lispro 3u BID and lantus 8 units   - Will put on Lispro 6u BID, Lantus 21 units, plus ISS, adjust as needed based on FS   - c/w monitoring FS     #HTN  - DASH diet  - monitor bp  - cont meds from home lisinopril    #RA  - c/w prednisone and methotrexate    #cervical cancer  - off chemo due to intolerance  - not surgical candidate    PLAN - d/c in am if feeling better  
  Name: JOY ARAGON  Age: 85y  Gender: Female    Pt was seen and examined.   c/o:  doing same  no signif LE pain , only sacral pain    Allergies:  No Known Allergies      PHYSICAL EXAM:    Vitals:  T(C): 36.9 (06-29-25 @ 20:12), Max: 36.9 (06-29-25 @ 20:12)  HR: 87 (06-29-25 @ 20:12) (81 - 94)  BP: 146/76 (06-29-25 @ 20:12) (111/65 - 179/73)  RR: 18 (06-29-25 @ 20:12) (18 - 18)  SpO2: 97% (06-29-25 @ 20:12) (94% - 97%)  Wt(kg): --Vital Signs Last 24 Hrs  T(C): 36.9 (29 Jun 2025 20:12), Max: 36.9 (29 Jun 2025 20:12)  T(F): 98.5 (29 Jun 2025 20:12), Max: 98.5 (29 Jun 2025 20:12)  HR: 87 (29 Jun 2025 20:12) (81 - 94)  BP: 146/76 (29 Jun 2025 20:12) (111/65 - 179/73)  BP(mean): --  RR: 18 (29 Jun 2025 20:12) (18 - 18)  SpO2: 97% (29 Jun 2025 20:12) (94% - 97%)          NECK: Supple, No JVD  CHEST/LUNG: CTA, B/L, No rales, rhonchi, wheezing, or rubs  HEART: S1,S2, N1 Regular rate and rhythm; No murmurs, rubs, or gallops  ABDOMEN: Soft, Nontender, Nondistended; Bowel sounds present  EXTREMITIES:  2+ Peripheral Pulses, No clubbing, cyanosis, or edema      LABS:                  MEDICATIONS  (STANDING):  amLODIPine   Tablet 2.5 milliGRAM(s) Oral daily  apixaban 2.5 milliGRAM(s) Oral every 12 hours  aspirin enteric coated 81 milliGRAM(s) Oral daily  atorvastatin 10 milliGRAM(s) Oral at bedtime  buPROPion XL (24-Hour) . 300 milliGRAM(s) Oral at bedtime  chlorhexidine 4% Liquid 1 Application(s) Topical <User Schedule>  dextrose 5%. 1000 milliLiter(s) (50 mL/Hr) IV Continuous <Continuous>  dextrose 5%. 1000 milliLiter(s) (100 mL/Hr) IV Continuous <Continuous>  dextrose 50% Injectable 25 Gram(s) IV Push once  dextrose 50% Injectable 12.5 Gram(s) IV Push once  dextrose 50% Injectable 25 Gram(s) IV Push once  folic acid 1 milliGRAM(s) Oral daily  gabapentin 300 milliGRAM(s) Oral every 8 hours  glucagon  Injectable 1 milliGRAM(s) IntraMuscular once  insulin glargine Injectable (LANTUS) 21 Unit(s) SubCutaneous every morning  insulin lispro (ADMELOG) corrective regimen sliding scale   SubCutaneous three times a day before meals  insulin lispro (ADMELOG) corrective regimen sliding scale   SubCutaneous at bedtime  insulin lispro Injectable (ADMELOG) 6 Unit(s) SubCutaneous before breakfast  insulin lispro Injectable (ADMELOG) 6 Unit(s) SubCutaneous before lunch  lidocaine   4% Patch 1 Patch Transdermal daily  lisinopril 10 milliGRAM(s) Oral daily  methocarbamol 500 milliGRAM(s) Oral every 6 hours  methotrexate 15 milliGRAM(s) Oral <User Schedule>  predniSONE   Tablet 2.5 milliGRAM(s) Oral daily  propranolol 10 milliGRAM(s) Oral every 12 hours  vilazodone 40 milliGRAM(s) Oral daily        RADIOLOGY & ADDITIONAL TESTS:    Imaging Personally Reviewed:  [ ] YES  [ ] NO    A/P:  Patient is a 85-year-old female past medical history of cervical cancer, depression, diabetes, hypertension, RA, HTN, whom presented to ED with c/o lower back pain described as severe, constant, associated with b/l leg weakness and difficulty ambulating.  Patient denies radiation of pain into legs,  fever, chills, cp, sob, numbness, tingling, saddle anesthesia, urinary incontinence,  dysuria, hematuria. Patient given 2mg IV morphine in ed with relief of pain.    #Sacral Insufficiency fracture, Severe L4-S1 spinal stenosis  sacral pain 2/2 fracture   #gait disorder, Unsteady Gait  #Low Back pain - Chronic  #B/L LE weakness - Chronic     s/p fall today   no evidence of any acute fractures or significant hematomas or abrasions on detailed exam.    Entire report of MRI was d/w pts dtr and pt herself.  Spinal stenosis and lumbar radiculopathy  is not the problem here as the pt denies any pain in her legs  Sacral insufficiency fx is a hairline fracture and will likely heal on its own over the next 2 to 3 weeks.  pt and family not considering any intervention at all  they would like home services if possible including PT  will d/w SW in am  will start on Percocet 2.5/325 one tab q8 standing order not prn so pt may do some PT in hospital  pt needs HHA and home PT if possible    #DMII  - On Humalog 6u breakfast and lunch and Tresiba 26u lunch time, hold glimepride   - FS running high, stop lispro 3u BID and lantus 8 units   - Will put on Lispro 6u BID, Lantus 21 units, plus ISS, adjust as needed based on FS   - c/w monitoring FS     #HTN  - DASH diet  - monitor bp  - cont meds from home lisinopril    #RA  - c/w prednisone and methotrexate    #cervical cancer  - off chemo due to intolerance  - not surgical candidate    PLAN - d/w pts dtr bedside , she now says that she is ok with STR             she says that she lives alone and that they cannot help her            will plan for rehab, will d/w SW re placement      
  Name: JOY ARAGON  Age: 85y  Gender: Female    Pt was seen and examined.   c/o:  doing slightly better     Allergies:  No Known Allergies      PHYSICAL EXAM:    Vitals:  T(C): 36.9 (06-30-25 @ 20:22), Max: 36.9 (06-30-25 @ 20:22)  HR: 63 (06-30-25 @ 20:22) (63 - 99)  BP: 159/58 (06-30-25 @ 20:22) (134/51 - 189/66)  RR: 18 (06-30-25 @ 20:22) (18 - 18)  SpO2: 96% (06-30-25 @ 20:22) (94% - 98%)  Wt(kg): --Vital Signs Last 24 Hrs  T(C): 36.9 (30 Jun 2025 20:22), Max: 36.9 (30 Jun 2025 20:22)  T(F): 98.4 (30 Jun 2025 20:22), Max: 98.4 (30 Jun 2025 20:22)  HR: 63 (30 Jun 2025 20:22) (63 - 99)  BP: 159/58 (30 Jun 2025 20:22) (134/51 - 189/66)  BP(mean): --  RR: 18 (30 Jun 2025 20:22) (18 - 18)  SpO2: 96% (30 Jun 2025 20:22) (94% - 98%)    Parameters below as of 30 Jun 2025 14:00  Patient On (Oxygen Delivery Method): room air          NECK: Supple, No JVD  CHEST/LUNG: CTA, B/L, No rales, rhonchi, wheezing, or rubs  HEART: S1,S2, N1 Regular rate and rhythm; No murmurs, rubs, or gallops  ABDOMEN: Soft, Nontender, Nondistended; Bowel sounds present  EXTREMITIES:  2+ Peripheral Pulses, No clubbing, cyanosis, or edema      LABS:                  MEDICATIONS  (STANDING):  amLODIPine   Tablet 2.5 milliGRAM(s) Oral daily  apixaban 2.5 milliGRAM(s) Oral every 12 hours  aspirin enteric coated 81 milliGRAM(s) Oral daily  atorvastatin 10 milliGRAM(s) Oral at bedtime  buPROPion XL (24-Hour) . 300 milliGRAM(s) Oral at bedtime  chlorhexidine 4% Liquid 1 Application(s) Topical <User Schedule>  dextrose 5%. 1000 milliLiter(s) (50 mL/Hr) IV Continuous <Continuous>  dextrose 5%. 1000 milliLiter(s) (100 mL/Hr) IV Continuous <Continuous>  dextrose 50% Injectable 25 Gram(s) IV Push once  dextrose 50% Injectable 12.5 Gram(s) IV Push once  dextrose 50% Injectable 25 Gram(s) IV Push once  folic acid 1 milliGRAM(s) Oral daily  gabapentin 300 milliGRAM(s) Oral every 8 hours  glucagon  Injectable 1 milliGRAM(s) IntraMuscular once  insulin glargine Injectable (LANTUS) 25 Unit(s) SubCutaneous every morning  insulin lispro (ADMELOG) corrective regimen sliding scale   SubCutaneous three times a day before meals  insulin lispro (ADMELOG) corrective regimen sliding scale   SubCutaneous at bedtime  insulin lispro Injectable (ADMELOG) 8 Unit(s) SubCutaneous before breakfast  insulin lispro Injectable (ADMELOG) 8 Unit(s) SubCutaneous before lunch  lidocaine   4% Patch 1 Patch Transdermal daily  lisinopril 10 milliGRAM(s) Oral daily  methocarbamol 500 milliGRAM(s) Oral every 6 hours  methotrexate 15 milliGRAM(s) Oral <User Schedule>  predniSONE   Tablet 2.5 milliGRAM(s) Oral daily  propranolol 10 milliGRAM(s) Oral every 12 hours  vilazodone 40 milliGRAM(s) Oral daily        RADIOLOGY & ADDITIONAL TESTS:    Imaging Personally Reviewed:  [ ] YES  [ ] NO    A/P:  Patient is a 85-year-old female past medical history of cervical cancer, depression, diabetes, hypertension, RA, HTN, whom presented to ED with c/o lower back pain described as severe, constant, associated with b/l leg weakness and difficulty ambulating.  Patient denies radiation of pain into legs,  fever, chills, cp, sob, numbness, tingling, saddle anesthesia, urinary incontinence,  dysuria, hematuria. Patient given 2mg IV morphine in ed with relief of pain.    #Sacral Insufficiency fracture, Severe L4-S1 spinal stenosis  sacral pain 2/2 fracture   #gait disorder, Unsteady Gait  #Low Back pain - Chronic  #B/L LE weakness - Chronic     s/p fall today   no evidence of any acute fractures or significant hematomas or abrasions on detailed exam.    Entire report of MRI was d/w pts dtr and pt herself.  Spinal stenosis and lumbar radiculopathy  is not the problem here as the pt denies any pain in her legs  Sacral insufficiency fx is a hairline fracture and will likely heal on its own over the next 2 to 3 weeks.  pt and family not considering any intervention at all  they would like home services if possible including PT  will d/w SW in am  will start on Percocet 2.5/325 one tab q8 standing order not prn so pt may do some PT in hospital  pt needs HHA and home PT if possible    #DMII  - On Humalog 6u breakfast and lunch and Tresiba 26u lunch time, hold glimepride   - FS running high, stop lispro 3u BID and lantus 8 units   - Will put on Lispro 6u BID, Lantus 21 units, plus ISS, adjust as needed based on FS   - c/w monitoring FS     #HTN  - DASH diet  - monitor bp  - cont meds from home lisinopril    #RA  - c/w prednisone and methotrexate    #cervical cancer  - off chemo due to intolerance  - not surgical candidate    PLAN - family now ok with STR                       will plan for rehab, will d/w SW re placement in am    
  Name: JOY ARAGON  Age: 85y  Gender: Female    Pt was seen and examined.   c/o:  about same but less pain    Allergies:  No Known Allergies      PHYSICAL EXAM:    Vitals:  T(C): 36.7 (06-28-25 @ 13:35), Max: 36.7 (06-28-25 @ 04:58)  HR: 81 (06-28-25 @ 13:35) (76 - 100)  BP: 138/72 (06-28-25 @ 13:35) (138/72 - 159/74)  RR: 18 (06-28-25 @ 13:35) (16 - 18)  SpO2: 99% (06-28-25 @ 13:35) (96% - 99%)  Wt(kg): --Vital Signs Last 24 Hrs  T(C): 36.7 (28 Jun 2025 13:35), Max: 36.7 (28 Jun 2025 04:58)  T(F): 98 (28 Jun 2025 13:35), Max: 98 (28 Jun 2025 04:58)  HR: 81 (28 Jun 2025 13:35) (76 - 100)  BP: 138/72 (28 Jun 2025 13:35) (138/72 - 159/74)  BP(mean): --  RR: 18 (28 Jun 2025 13:35) (16 - 18)  SpO2: 99% (28 Jun 2025 13:35) (96% - 99%)          NECK: Supple, No JVD  CHEST/LUNG: CTA, B/L, No rales, rhonchi, wheezing, or rubs  HEART: S1,S2, N1 Regular rate and rhythm; No murmurs, rubs, or gallops  ABDOMEN: Soft, Nontender, Nondistended; Bowel sounds present  EXTREMITIES:  2+ Peripheral Pulses, No clubbing, cyanosis, or edema      LABS:                  MEDICATIONS  (STANDING):  amLODIPine   Tablet 2.5 milliGRAM(s) Oral daily  apixaban 2.5 milliGRAM(s) Oral every 12 hours  aspirin enteric coated 81 milliGRAM(s) Oral daily  atorvastatin 10 milliGRAM(s) Oral at bedtime  buPROPion XL (24-Hour) . 300 milliGRAM(s) Oral at bedtime  chlorhexidine 4% Liquid 1 Application(s) Topical <User Schedule>  dextrose 5%. 1000 milliLiter(s) (50 mL/Hr) IV Continuous <Continuous>  dextrose 5%. 1000 milliLiter(s) (100 mL/Hr) IV Continuous <Continuous>  dextrose 50% Injectable 25 Gram(s) IV Push once  dextrose 50% Injectable 12.5 Gram(s) IV Push once  dextrose 50% Injectable 25 Gram(s) IV Push once  folic acid 1 milliGRAM(s) Oral daily  gabapentin 300 milliGRAM(s) Oral every 8 hours  glucagon  Injectable 1 milliGRAM(s) IntraMuscular once  insulin glargine Injectable (LANTUS) 21 Unit(s) SubCutaneous every morning  insulin lispro (ADMELOG) corrective regimen sliding scale   SubCutaneous three times a day before meals  insulin lispro (ADMELOG) corrective regimen sliding scale   SubCutaneous at bedtime  insulin lispro Injectable (ADMELOG) 6 Unit(s) SubCutaneous before breakfast  insulin lispro Injectable (ADMELOG) 6 Unit(s) SubCutaneous before lunch  lidocaine   4% Patch 1 Patch Transdermal daily  lisinopril 10 milliGRAM(s) Oral daily  methocarbamol 500 milliGRAM(s) Oral every 6 hours  methotrexate 15 milliGRAM(s) Oral <User Schedule>  predniSONE   Tablet 2.5 milliGRAM(s) Oral daily  propranolol 10 milliGRAM(s) Oral every 12 hours  vilazodone 40 milliGRAM(s) Oral daily        RADIOLOGY & ADDITIONAL TESTS:    Imaging Personally Reviewed:  [ ] YES  [ ] NO    A/P:  Patient is a 85-year-old female past medical history of cervical cancer, depression, diabetes, hypertension, RA, HTN, whom presented to ED with c/o lower back pain described as severe, constant, associated with b/l leg weakness and difficulty ambulating.  Patient denies radiation of pain into legs,  fever, chills, cp, sob, numbness, tingling, saddle anesthesia, urinary incontinence,  dysuria, hematuria. Patient given 2mg IV morphine in ed with relief of pain.    #Sacral Insufficiency fracture, Severe L4-S1 spinal stenosis  sacral pain 2/2 fracture   #gait disorder, Unsteady Gait  #Low Back pain - Chronic  #B/L LE weakness - Chronic     s/p fall today   no evidence of any acute fractures or significant hematomas or abrasions on detailed exam.    Entire report of MRI was d/w pts dtr and pt herself.  Spinal stenosis and lumbar radiculopathy  is not the problem here as the pt denies any pain in her legs  Sacral insufficiency fx is a hairline fracture and will likely heal on its own over the next 2 to 3 weeks.  pt and family not considering any intervention at all  they would like home services if possible including PT  will d/w SW in am  will start on Percocet 2.5/325 one tab q8 standing order not prn so pt may do some PT in hospital  pt needs HHA and home PT if possible    #DMII  - On Humalog 6u breakfast and lunch and Tresiba 26u lunch time, hold glimepride   - FS running high, stop lispro 3u BID and lantus 8 units   - Will put on Lispro 6u BID, Lantus 21 units, plus ISS, adjust as needed based on FS   - c/w monitoring FS     #HTN  - DASH diet  - monitor bp  - cont meds from home lisinopril    #RA  - c/w prednisone and methotrexate    #cervical cancer  - off chemo due to intolerance  - not surgical candidate    PLAN - d/w pts dtr bedside , she now says that she is ok with STR             she says that she lives alone and that they cannot help her            will plan for rehab, will d/w SW re placement      
  Name: JOY ARAGON  Age: 85y  Gender: Female    Pt was seen and examined.   c/o:  c/o sacral area pain but no leg pains at all    Allergies:  No Known Allergies      PHYSICAL EXAM:    Vitals:  T(C): 36.7 (06-24-25 @ 20:50), Max: 36.7 (06-24-25 @ 20:50)  HR: 73 (06-24-25 @ 20:50) (50 - 95)  BP: 160/68 (06-24-25 @ 20:50) (150/90 - 174/66)  RR: 18 (06-24-25 @ 20:50) (18 - 18)  SpO2: 97% (06-24-25 @ 20:50) (97% - 99%)  Wt(kg): --Vital Signs Last 24 Hrs  T(C): 36.7 (24 Jun 2025 20:50), Max: 36.7 (24 Jun 2025 20:50)  T(F): 98.1 (24 Jun 2025 20:50), Max: 98.1 (24 Jun 2025 20:50)  HR: 73 (24 Jun 2025 20:50) (50 - 95)  BP: 160/68 (24 Jun 2025 20:50) (150/90 - 174/66)  BP(mean): --  RR: 18 (24 Jun 2025 20:50) (18 - 18)  SpO2: 97% (24 Jun 2025 20:50) (97% - 99%)    Parameters below as of 24 Jun 2025 20:50  Patient On (Oxygen Delivery Method): room air          NECK: Supple, No JVD  CHEST/LUNG: CTA, B/L, No rales, rhonchi, wheezing, or rubs  HEART: S1,S2, N1 Regular rate and rhythm; No murmurs, rubs, or gallops  ABDOMEN: Soft, Nontender, Nondistended; Bowel sounds present  EXTREMITIES:  2+ Peripheral Pulses, No clubbing, cyanosis, or edema  sacral area positive tenderness to palpation   negative straight leg raising test       LABS:                        10.8   8.39  )-----------( 333      ( 24 Jun 2025 08:15 )             36.6     06-24    137  |  99  |  16  ----------------------------<  364[H]  4.2   |  25  |  0.8    Ca    9.1      24 Jun 2025 08:15              MEDICATIONS  (STANDING):  acetaminophen     Tablet .. 975 milliGRAM(s) Oral every 8 hours  apixaban 2.5 milliGRAM(s) Oral every 12 hours  aspirin enteric coated 81 milliGRAM(s) Oral daily  atorvastatin 10 milliGRAM(s) Oral at bedtime  buPROPion XL (24-Hour) . 300 milliGRAM(s) Oral at bedtime  chlorhexidine 4% Liquid 1 Application(s) Topical <User Schedule>  dextrose 5%. 1000 milliLiter(s) (50 mL/Hr) IV Continuous <Continuous>  dextrose 5%. 1000 milliLiter(s) (100 mL/Hr) IV Continuous <Continuous>  dextrose 50% Injectable 25 Gram(s) IV Push once  dextrose 50% Injectable 12.5 Gram(s) IV Push once  dextrose 50% Injectable 25 Gram(s) IV Push once  folic acid 1 milliGRAM(s) Oral daily  gabapentin 100 milliGRAM(s) Oral every 8 hours  glucagon  Injectable 1 milliGRAM(s) IntraMuscular once  insulin glargine Injectable (LANTUS) 21 Unit(s) SubCutaneous every morning  insulin lispro (ADMELOG) corrective regimen sliding scale   SubCutaneous three times a day before meals  insulin lispro (ADMELOG) corrective regimen sliding scale   SubCutaneous at bedtime  insulin lispro Injectable (ADMELOG) 6 Unit(s) SubCutaneous before breakfast  insulin lispro Injectable (ADMELOG) 6 Unit(s) SubCutaneous before lunch  lisinopril 10 milliGRAM(s) Oral daily  methocarbamol 500 milliGRAM(s) Oral every 6 hours  methotrexate 15 milliGRAM(s) Oral <User Schedule>  predniSONE   Tablet 2.5 milliGRAM(s) Oral daily  propranolol 10 milliGRAM(s) Oral every 12 hours  vilazodone 40 milliGRAM(s) Oral daily        RADIOLOGY & ADDITIONAL TESTS:    Imaging Personally Reviewed:  [ ] YES  [ ] NO    A/P:  Patient is a 85-year-old female past medical history of cervical cancer, depression, diabetes, hypertension, RA, HTN, whom presented to ED with c/o lower back pain described as severe, constant, associated with b/l leg weakness and difficulty ambulating.  Patient denies radiation of pain into legs,  fever, chills, cp, sob, numbness, tingling, saddle anesthesia, urinary incontinence,  dysuria, hematuria. Patient given 2mg IV morphine in ed with relief of pain.    #Sacral Insufficiency fracture, Severe L4-S1 spinal stenosis  sacral pain 2/2 fracture   #gait disorder, Unsteady Gait  #Low Back pain - Chronic  #B/L LE weakness - Chronic     Entire report of MRI was d/w pts dtr and pt herself.  Spinal stenosis and lumbar radiculopathy  is not the problem here as the pt denies any pain in her legs  Sacral insufficiency fx is a hairline fracture and will likely heal on its own over the next 2 to 3 weeks.  pt and family not considering any intervention at all  they would like home services if possible including PT  will d/w SW in am  will start on Percocet 2.5/325 one tab q8 standing order not prn so pt may do some PT in hospital    #DMII  - On Humalog 6u breakfast and lunch and Tresiba 26u lunch time, hold glimepride   - FS running high, stop lispro 3u BID and lantus 8 units   - Will put on Lispro 6u BID, Lantus 21 units, plus ISS, adjust as needed based on FS   - c/w monitoring FS     #HTN  - DASH diet  - monitor bp  - cont meds from home lisinopril    #RA  - c/w prednisone and methotrexate    #cervical cancer  - off chemo due to intolerance  - not surgical candidate      
  Name: JOY ARAGON  Age: 85y  Gender: Female    Pt was seen and examined.   c/o:  doing about same      Allergies:  No Known Allergies      PHYSICAL EXAM:    Vitals:  T(C): 36.6 (07-01-25 @ 20:14), Max: 36.6 (07-01-25 @ 04:52)  HR: 67 (07-01-25 @ 20:14) (67 - 83)  BP: 144/52 (07-01-25 @ 20:14) (115/47 - 150/72)  RR: 20 (07-01-25 @ 20:14) (18 - 20)  SpO2: 95% (07-01-25 @ 20:14) (95% - 97%)  Wt(kg): --Vital Signs Last 24 Hrs  T(C): 36.6 (01 Jul 2025 20:14), Max: 36.6 (01 Jul 2025 04:52)  T(F): 97.8 (01 Jul 2025 20:14), Max: 97.8 (01 Jul 2025 04:52)  HR: 67 (01 Jul 2025 20:14) (67 - 83)  BP: 144/52 (01 Jul 2025 20:14) (115/47 - 150/72)  BP(mean): --  RR: 20 (01 Jul 2025 20:14) (18 - 20)  SpO2: 95% (01 Jul 2025 20:14) (95% - 97%)    Parameters below as of 01 Jul 2025 20:14  Patient On (Oxygen Delivery Method): room air          NECK: Supple, No JVD  CHEST/LUNG: CTA, B/L, No rales, rhonchi, wheezing, or rubs  HEART: S1,S2, N1 Regular rate and rhythm; No murmurs, rubs, or gallops  ABDOMEN: Soft, Nontender, Nondistended; Bowel sounds present  EXTREMITIES:  2+ Peripheral Pulses, No clubbing, cyanosis, or edema      LABS:                  MEDICATIONS  (STANDING):  amLODIPine   Tablet 2.5 milliGRAM(s) Oral daily  apixaban 2.5 milliGRAM(s) Oral every 12 hours  aspirin enteric coated 81 milliGRAM(s) Oral daily  atorvastatin 10 milliGRAM(s) Oral at bedtime  buPROPion XL (24-Hour) . 300 milliGRAM(s) Oral at bedtime  chlorhexidine 4% Liquid 1 Application(s) Topical <User Schedule>  dextrose 5%. 1000 milliLiter(s) (100 mL/Hr) IV Continuous <Continuous>  dextrose 5%. 1000 milliLiter(s) (50 mL/Hr) IV Continuous <Continuous>  dextrose 50% Injectable 25 Gram(s) IV Push once  dextrose 50% Injectable 12.5 Gram(s) IV Push once  dextrose 50% Injectable 25 Gram(s) IV Push once  folic acid 1 milliGRAM(s) Oral daily  gabapentin 300 milliGRAM(s) Oral two times a day  glucagon  Injectable 1 milliGRAM(s) IntraMuscular once  insulin glargine Injectable (LANTUS) 25 Unit(s) SubCutaneous every morning  insulin lispro (ADMELOG) corrective regimen sliding scale   SubCutaneous three times a day before meals  insulin lispro (ADMELOG) corrective regimen sliding scale   SubCutaneous at bedtime  insulin lispro Injectable (ADMELOG) 8 Unit(s) SubCutaneous before breakfast  insulin lispro Injectable (ADMELOG) 8 Unit(s) SubCutaneous before lunch  lidocaine   4% Patch 1 Patch Transdermal daily  lisinopril 10 milliGRAM(s) Oral daily  methotrexate 15 milliGRAM(s) Oral <User Schedule>  predniSONE   Tablet 2.5 milliGRAM(s) Oral daily  propranolol 10 milliGRAM(s) Oral every 12 hours  vilazodone 40 milliGRAM(s) Oral daily        RADIOLOGY & ADDITIONAL TESTS:    Imaging Personally Reviewed:  [ ] YES  [ ] NO    A/P:  Patient is a 85-year-old female past medical history of cervical cancer, depression, diabetes, hypertension, RA, HTN, whom presented to ED with c/o lower back pain described as severe, constant, associated with b/l leg weakness and difficulty ambulating.  Patient denies radiation of pain into legs,  fever, chills, cp, sob, numbness, tingling, saddle anesthesia, urinary incontinence,  dysuria, hematuria. Patient given 2mg IV morphine in ed with relief of pain.    #Sacral Insufficiency fracture, Severe L4-S1 spinal stenosis  sacral pain 2/2 fracture   #gait disorder, Unsteady Gait  #Low Back pain - Chronic  #B/L LE weakness - Chronic     s/p fall  no evidence of any acute fractures or significant hematomas or abrasions on detailed exam.    Entire report of MRI was d/w pts dtr and pt herself.  Spinal stenosis and lumbar radiculopathy  is not the problem here as the pt denies any pain in her legs  Sacral insufficiency fx is a hairline fracture and will likely heal on its own over the next 2 to 3 weeks.  pt and family not considering any intervention at all  they would like home services if possible including PT  will d/w SW in am  will start on Percocet 2.5/325 one tab q8 standing order not prn so pt may do some PT in hospital  pt needs HHA and home PT if possible    #DMII  - On Humalog 6u breakfast and lunch and Tresiba 26u lunch time, hold glimepride   - FS running high, stop lispro 3u BID and lantus 8 units   - Will put on Lispro 6u BID, Lantus 21 units, plus ISS, adjust as needed based on FS   - c/w monitoring FS     #HTN  - DASH diet  - monitor bp  - cont meds from home lisinopril    #RA  - c/w prednisone and methotrexate    #cervical cancer  - off chemo due to intolerance  - not surgical candidate    PLAN - family now ok with STR            Gabapentin decreased to 300mg po q12 per family request            will plan for rehab, will d/w SW re placement in am      
Patient seen at bedside. No acute complaints at time. No acute events noted overnight. Reports still w/ back pain intermittently. Worst when ambulating.     Vital Signs Last 24 Hrs  T(C): 36.4 (24 Jun 2025 13:22), Max: 36.6 (24 Jun 2025 04:40)  T(F): 97.6 (24 Jun 2025 13:22), Max: 97.8 (24 Jun 2025 04:40)  HR: 50 (24 Jun 2025 13:22) (50 - 95)  BP: 158/58 (24 Jun 2025 13:22) (126/61 - 160/69)  RR: 18 (24 Jun 2025 13:22) (18 - 18)  SpO2: 99% (24 Jun 2025 13:22) (96% - 99%)    Parameters below as of 23 Jun 2025 20:30  Patient On (Oxygen Delivery Method): room air        General Appearance: NAD  Chest: CTA B/L  CV: S1 S2  Abdomen: Soft, NT/ND,+bs,  no rebound/gaurding  Extremities: No edema BLE  CNS: A/O x 3    MEDICATIONS  (STANDING):  acetaminophen     Tablet .. 975 milliGRAM(s) Oral every 8 hours  apixaban 2.5 milliGRAM(s) Oral every 12 hours  aspirin enteric coated 81 milliGRAM(s) Oral daily  atorvastatin 10 milliGRAM(s) Oral at bedtime  buPROPion XL (24-Hour) . 300 milliGRAM(s) Oral at bedtime  chlorhexidine 4% Liquid 1 Application(s) Topical <User Schedule>  dextrose 5%. 1000 milliLiter(s) (50 mL/Hr) IV Continuous <Continuous>  dextrose 5%. 1000 milliLiter(s) (100 mL/Hr) IV Continuous <Continuous>  dextrose 50% Injectable 25 Gram(s) IV Push once  dextrose 50% Injectable 12.5 Gram(s) IV Push once  dextrose 50% Injectable 25 Gram(s) IV Push once  folic acid 1 milliGRAM(s) Oral daily  gabapentin 100 milliGRAM(s) Oral every 8 hours  glucagon  Injectable 1 milliGRAM(s) IntraMuscular once  insulin glargine Injectable (LANTUS) 8 Unit(s) SubCutaneous every morning  insulin lispro (ADMELOG) corrective regimen sliding scale   SubCutaneous three times a day before meals  insulin lispro (ADMELOG) corrective regimen sliding scale   SubCutaneous at bedtime  insulin lispro Injectable (ADMELOG) 3 Unit(s) SubCutaneous before breakfast  insulin lispro Injectable (ADMELOG) 3 Unit(s) SubCutaneous before lunch  insulin lispro Injectable (ADMELOG) 3 Unit(s) SubCutaneous before dinner  lisinopril 10 milliGRAM(s) Oral daily  methocarbamol 500 milliGRAM(s) Oral every 6 hours  methotrexate 15 milliGRAM(s) Oral <User Schedule>  predniSONE   Tablet 2.5 milliGRAM(s) Oral daily  propranolol 10 milliGRAM(s) Oral every 12 hours  vilazodone 40 milliGRAM(s) Oral daily    MEDICATIONS  (PRN):  aluminum hydroxide/magnesium hydroxide/simethicone Suspension 30 milliLiter(s) Oral every 4 hours PRN Dyspepsia  dextrose Oral Gel 15 Gram(s) Oral once PRN Blood Glucose LESS THAN 70 milliGRAM(s)/deciliter  melatonin 3 milliGRAM(s) Oral at bedtime PRN Insomnia  ondansetron Injectable 4 milliGRAM(s) IV Push every 8 hours PRN Nausea and/or Vomiting  oxyCODONE    IR 2.5 milliGRAM(s) Oral every 4 hours PRN Severe Pain (7 - 10)  polyethylene glycol 3350 17 Gram(s) Oral daily PRN Constipation      LABS:                        10.8   8.39  )-----------( 333      ( 24 Jun 2025 08:15 )             36.6     06-24    137  |  99  |  16  ----------------------------<  364[H]  4.2   |  25  |  0.8    Ca    9.1      24 Jun 2025 08:15    TPro  6.4  /  Alb  4.0  /  TBili  0.4  /  DBili  x   /  AST  16  /  ALT  12  /  AlkPhos  68  06-22      Urinalysis Basic - ( 24 Jun 2025 08:15 )    Color: x / Appearance: x / SG: x / pH: x  Gluc: 364 mg/dL / Ketone: x  / Bili: x / Urobili: x   Blood: x / Protein: x / Nitrite: x   Leuk Esterase: x / RBC: x / WBC x   Sq Epi: x / Non Sq Epi: x / Bacteria: x        RADIOLOGY & ADDITIONAL STUDIES:   < from: MR Lumbar Spine No Cont (06.23.25 @ 18:30) >  IMPRESSION:    Asymmetric edema in the right sacrum with linear signal likely   representing sacral insufficiency fracture.    Multilevel degenerative changes, most severe at L4-5 with prominent   superiorly migrating left paracentral disc extrusion superimposed on disc   bulge resulting in severe spinal stenosis with impingement of the thecal   sac and descending left L5 nerve root, as well as at L5-S1 with severe   bilateral neural foraminal stenosis.    Fatty marrow change of the L5 and sacrum likely reflecting sequelae of   post radiation. Patchy endplate edema and sclerosis involving L4, L5, and   S1 may represent superimposed mixed Modic type I and III changes.    Communication: The summary of above findings were discussed with readback   confirmation with MERARI Leslie by radiologist Dr. Capps on 6/24/2025 at 10:30   AM.    --- End of Report ---

## 2025-07-01 NOTE — PROGRESS NOTE ADULT - PROVIDER SPECIALTY LIST ADULT
Cardiology Preventive
Internal Medicine
Cardiology Preventive

## 2025-07-02 ENCOUNTER — TRANSCRIPTION ENCOUNTER (OUTPATIENT)
Age: 85
End: 2025-07-02

## 2025-07-02 VITALS
SYSTOLIC BLOOD PRESSURE: 109 MMHG | DIASTOLIC BLOOD PRESSURE: 46 MMHG | OXYGEN SATURATION: 97 % | TEMPERATURE: 98 F | RESPIRATION RATE: 18 BRPM | HEART RATE: 73 BPM

## 2025-07-02 LAB
GLUCOSE BLDC GLUCOMTR-MCNC: 164 MG/DL — HIGH (ref 70–99)
GLUCOSE BLDC GLUCOMTR-MCNC: 174 MG/DL — HIGH (ref 70–99)
GLUCOSE BLDC GLUCOMTR-MCNC: 262 MG/DL — HIGH (ref 70–99)

## 2025-07-02 RX ORDER — INSULIN LISPRO 100 U/ML
0 INJECTION, SOLUTION INTRAVENOUS; SUBCUTANEOUS
Refills: 0 | DISCHARGE

## 2025-07-02 RX ORDER — ACETAMINOPHEN 500 MG/5ML
3 LIQUID (ML) ORAL
Qty: 0 | Refills: 0 | DISCHARGE
Start: 2025-07-02

## 2025-07-02 RX ORDER — AMLODIPINE BESYLATE 10 MG/1
1 TABLET ORAL
Qty: 0 | Refills: 0 | DISCHARGE
Start: 2025-07-02

## 2025-07-02 RX ORDER — POLYETHYLENE GLYCOL 3350 17 G/17G
17 POWDER, FOR SOLUTION ORAL
Qty: 0 | Refills: 0 | DISCHARGE
Start: 2025-07-02

## 2025-07-02 RX ORDER — INSULIN DEGLUDEC 100 U/ML
25 INJECTION, SOLUTION SUBCUTANEOUS
Qty: 0 | Refills: 0 | DISCHARGE

## 2025-07-02 RX ORDER — INSULIN LISPRO 100 U/ML
10 INJECTION, SOLUTION INTRAVENOUS; SUBCUTANEOUS
Refills: 0
Start: 2025-07-02

## 2025-07-02 RX ORDER — LIDOCAINE HYDROCHLORIDE 20 MG/ML
1 JELLY TOPICAL
Qty: 0 | Refills: 0 | DISCHARGE
Start: 2025-07-02

## 2025-07-02 RX ORDER — GABAPENTIN 400 MG/1
1 CAPSULE ORAL
Qty: 0 | Refills: 0 | DISCHARGE
Start: 2025-07-02

## 2025-07-02 RX ADMIN — GABAPENTIN 300 MILLIGRAM(S): 400 CAPSULE ORAL at 05:11

## 2025-07-02 RX ADMIN — APIXABAN 2.5 MILLIGRAM(S): 2.5 TABLET, FILM COATED ORAL at 05:08

## 2025-07-02 RX ADMIN — INSULIN LISPRO 8 UNIT(S): 100 INJECTION, SOLUTION INTRAVENOUS; SUBCUTANEOUS at 07:42

## 2025-07-02 RX ADMIN — AMLODIPINE BESYLATE 2.5 MILLIGRAM(S): 10 TABLET ORAL at 05:08

## 2025-07-02 RX ADMIN — Medication 81 MILLIGRAM(S): at 11:15

## 2025-07-02 RX ADMIN — VILAZODONE HYDROCHLORIDE 40 MILLIGRAM(S): 40 TABLET, FILM COATED ORAL at 11:12

## 2025-07-02 RX ADMIN — LISINOPRIL 10 MILLIGRAM(S): 5 TABLET ORAL at 05:08

## 2025-07-02 RX ADMIN — INSULIN LISPRO 1: 100 INJECTION, SOLUTION INTRAVENOUS; SUBCUTANEOUS at 16:41

## 2025-07-02 RX ADMIN — INSULIN LISPRO 1: 100 INJECTION, SOLUTION INTRAVENOUS; SUBCUTANEOUS at 11:36

## 2025-07-02 RX ADMIN — APIXABAN 2.5 MILLIGRAM(S): 2.5 TABLET, FILM COATED ORAL at 17:18

## 2025-07-02 RX ADMIN — INSULIN GLARGINE-YFGN 25 UNIT(S): 100 INJECTION, SOLUTION SUBCUTANEOUS at 08:05

## 2025-07-02 RX ADMIN — GABAPENTIN 300 MILLIGRAM(S): 400 CAPSULE ORAL at 17:18

## 2025-07-02 RX ADMIN — PREDNISONE 2.5 MILLIGRAM(S): 20 TABLET ORAL at 05:09

## 2025-07-02 RX ADMIN — LIDOCAINE HYDROCHLORIDE 1 PATCH: 20 JELLY TOPICAL at 11:14

## 2025-07-02 RX ADMIN — INSULIN LISPRO 8 UNIT(S): 100 INJECTION, SOLUTION INTRAVENOUS; SUBCUTANEOUS at 11:37

## 2025-07-02 RX ADMIN — INSULIN LISPRO 3: 100 INJECTION, SOLUTION INTRAVENOUS; SUBCUTANEOUS at 07:41

## 2025-07-02 RX ADMIN — FOLIC ACID 1 MILLIGRAM(S): 1 TABLET ORAL at 11:15

## 2025-07-02 NOTE — DISCHARGE NOTE NURSING/CASE MANAGEMENT/SOCIAL WORK - FINANCIAL ASSISTANCE
[facility] provides services at a reduced cost to those who are determined to be eligible through [facility]’s financial assistance program. Information regarding [facility]’s financial assistance program can be found by going to [email or web address] or by calling [phone].

## 2025-07-02 NOTE — DISCHARGE NOTE NURSING/CASE MANAGEMENT/SOCIAL WORK - PATIENT PORTAL LINK FT
You can access the FollowMyHealth Patient Portal offered by [facility] by registering at the following website: [email or web address]. By joining [employer]’s FollowMyHealth portal, you will also be able to view your health information using other applications (apps) compatible with our system.

## 2025-07-02 NOTE — DISCHARGE NOTE NURSING/CASE MANAGEMENT/SOCIAL WORK - NSDCVIVACCINE_GEN_ALL_CORE_FT
Influenza, high-dose, trivalent, preservative free; 24-Sep-2024 15:27; Mia Alonzo (PILY); [employer]; [id number] (Exp. Date: 30-Jun-2025); IntraMuscular; Deltoid Left.; 0.5 milliLiter(s); VIS (VIS Published: 06-Aug-2021, VIS Presented: 24-Sep-2024);

## 2025-07-11 NOTE — CDI QUERY NOTE - NSCDIOTHERTXTBX_GEN_ALL_CORE_HH
Clinical documentation and/or evidence of the patient’s presentation, evaluation, and medical management, as evidenced below, may support a diagnosis that is not documented in the medical record.  In order to ensure accurate coding and accuracy of the clinical record, the documentation in this patient’s medical record requires additional clarification.    If you think the supporting documentation and/or clinical evidence supports a more specific diagnosis, please include more specific documentation of a diagnosis associated with these findings in your progress note and/or discharge summary.                                                     =====================================    Please clarify this patient’s immune status:    • The patient is in an immunocompromised state associated with malignancy, and drug use (methotrexate and prednisone).  • The patient is in an immunocompromised state associated with malignancy.  • The patient is in an immunocompromised state associated with drug use (methotrexate and prednisone).  • Other (specify)      Supporting documentation and/or clinical evidence:   ** 7/1 PN Attending:       A/P: Patient is a 85-year-old female past medical history of cervical cancer,......., RA          #RA           - c/w prednisone and methotrexate         #cervical cancer          - off chemo due to intolerance          - not surgical candidate    ** Orders:   - 6/22- 7/2: Prednisone 2.5 mg oral daily   - 6/22- 7/2: Methotrexate 15 mg every week (Sunday/8:00)

## 2025-09-13 ENCOUNTER — TRANSCRIPTION ENCOUNTER (OUTPATIENT)
Age: 85
End: 2025-09-13

## 2025-09-17 ENCOUNTER — TRANSCRIPTION ENCOUNTER (OUTPATIENT)
Age: 85
End: 2025-09-17